# Patient Record
Sex: MALE | Race: WHITE | NOT HISPANIC OR LATINO | Employment: UNEMPLOYED | URBAN - METROPOLITAN AREA
[De-identification: names, ages, dates, MRNs, and addresses within clinical notes are randomized per-mention and may not be internally consistent; named-entity substitution may affect disease eponyms.]

---

## 2019-11-25 ENCOUNTER — HOSPITAL ENCOUNTER (INPATIENT)
Facility: HOSPITAL | Age: 64
LOS: 4 days | Discharge: HOME | End: 2019-11-29
Attending: EMERGENCY MEDICINE | Admitting: INTERNAL MEDICINE

## 2019-11-25 ENCOUNTER — APPOINTMENT (OUTPATIENT)
Dept: GENERAL RADIOLOGY | Facility: HOSPITAL | Age: 64
End: 2019-11-25

## 2019-11-25 ENCOUNTER — APPOINTMENT (OUTPATIENT)
Dept: CT IMAGING | Facility: HOSPITAL | Age: 64
End: 2019-11-25

## 2019-11-25 DIAGNOSIS — I63.9 CEREBROVASCULAR ACCIDENT (CVA), UNSPECIFIED MECHANISM (HCC): Primary | ICD-10-CM

## 2019-11-25 LAB
ABO GROUP BLD: NORMAL
ALBUMIN SERPL-MCNC: 4.2 G/DL (ref 3.5–5.2)
ALBUMIN/GLOB SERPL: 1.5 G/DL
ALP SERPL-CCNC: 77 U/L (ref 39–117)
ALT SERPL W P-5'-P-CCNC: 18 U/L (ref 1–41)
ANION GAP SERPL CALCULATED.3IONS-SCNC: 11 MMOL/L (ref 5–15)
APTT PPP: 24.1 SECONDS (ref 24–31)
AST SERPL-CCNC: 17 U/L (ref 1–40)
BASOPHILS # BLD AUTO: 0 10*3/MM3 (ref 0–0.2)
BASOPHILS NFR BLD AUTO: 0.6 % (ref 0–1.5)
BILIRUB SERPL-MCNC: 0.4 MG/DL (ref 0.2–1.2)
BLD GP AB SCN SERPL QL: NEGATIVE
BUN BLD-MCNC: 16 MG/DL (ref 8–23)
BUN/CREAT SERPL: 13.1 (ref 7–25)
CALCIUM SPEC-SCNC: 9.1 MG/DL (ref 8.6–10.5)
CHLORIDE SERPL-SCNC: 96 MMOL/L (ref 98–107)
CO2 SERPL-SCNC: 29 MMOL/L (ref 22–29)
CREAT BLD-MCNC: 1.22 MG/DL (ref 0.76–1.27)
DEPRECATED RDW RBC AUTO: 43.3 FL (ref 37–54)
EOSINOPHIL # BLD AUTO: 0.2 10*3/MM3 (ref 0–0.4)
EOSINOPHIL NFR BLD AUTO: 2.7 % (ref 0.3–6.2)
ERYTHROCYTE [DISTWIDTH] IN BLOOD BY AUTOMATED COUNT: 13.9 % (ref 12.3–15.4)
GFR SERPL CREATININE-BSD FRML MDRD: 60 ML/MIN/1.73
GLOBULIN UR ELPH-MCNC: 2.8 GM/DL
GLUCOSE BLD-MCNC: 283 MG/DL (ref 65–99)
GLUCOSE BLDC GLUCOMTR-MCNC: 165 MG/DL (ref 70–105)
GLUCOSE BLDC GLUCOMTR-MCNC: 275 MG/DL (ref 70–105)
HCT VFR BLD AUTO: 43.9 % (ref 37.5–51)
HGB BLD-MCNC: 14.9 G/DL (ref 13–17.7)
HOLD SPECIMEN: NORMAL
HOLD SPECIMEN: NORMAL
INR PPP: 0.99 (ref 0.9–1.1)
LARGE PLATELETS: NORMAL
LYMPHOCYTES # BLD AUTO: 2.9 10*3/MM3 (ref 0.7–3.1)
LYMPHOCYTES NFR BLD AUTO: 40.3 % (ref 19.6–45.3)
MCH RBC QN AUTO: 30.5 PG (ref 26.6–33)
MCHC RBC AUTO-ENTMCNC: 34 G/DL (ref 31.5–35.7)
MCV RBC AUTO: 89.7 FL (ref 79–97)
MONOCYTES # BLD AUTO: 0.5 10*3/MM3 (ref 0.1–0.9)
MONOCYTES NFR BLD AUTO: 6.6 % (ref 5–12)
NEUTROPHILS # BLD AUTO: 3.6 10*3/MM3 (ref 1.7–7)
NEUTROPHILS NFR BLD AUTO: 49.8 % (ref 42.7–76)
NRBC BLD AUTO-RTO: 0.1 /100 WBC (ref 0–0.2)
PLATELET # BLD AUTO: 167 10*3/MM3 (ref 140–450)
PMV BLD AUTO: 11.6 FL (ref 6–12)
POTASSIUM BLD-SCNC: 4.3 MMOL/L (ref 3.5–5.2)
PROT SERPL-MCNC: 7 G/DL (ref 6–8.5)
PROTHROMBIN TIME: 10.4 SECONDS (ref 9.6–11.7)
RBC # BLD AUTO: 4.89 10*6/MM3 (ref 4.14–5.8)
RBC MORPH BLD: NORMAL
RH BLD: POSITIVE
SODIUM BLD-SCNC: 136 MMOL/L (ref 136–145)
T&S EXPIRATION DATE: NORMAL
TROPONIN T SERPL-MCNC: <0.01 NG/ML (ref 0–0.03)
WBC MORPH BLD: NORMAL
WBC NRBC COR # BLD: 7.1 10*3/MM3 (ref 3.4–10.8)
WHOLE BLOOD HOLD SPECIMEN: NORMAL
WHOLE BLOOD HOLD SPECIMEN: NORMAL

## 2019-11-25 PROCEDURE — 70498 CT ANGIOGRAPHY NECK: CPT

## 2019-11-25 PROCEDURE — 84484 ASSAY OF TROPONIN QUANT: CPT | Performed by: EMERGENCY MEDICINE

## 2019-11-25 PROCEDURE — 85025 COMPLETE CBC W/AUTO DIFF WBC: CPT | Performed by: EMERGENCY MEDICINE

## 2019-11-25 PROCEDURE — 86900 BLOOD TYPING SEROLOGIC ABO: CPT | Performed by: EMERGENCY MEDICINE

## 2019-11-25 PROCEDURE — 71045 X-RAY EXAM CHEST 1 VIEW: CPT

## 2019-11-25 PROCEDURE — 85730 THROMBOPLASTIN TIME PARTIAL: CPT | Performed by: EMERGENCY MEDICINE

## 2019-11-25 PROCEDURE — 3E03317 INTRODUCTION OF OTHER THROMBOLYTIC INTO PERIPHERAL VEIN, PERCUTANEOUS APPROACH: ICD-10-PCS | Performed by: INTERNAL MEDICINE

## 2019-11-25 PROCEDURE — 93005 ELECTROCARDIOGRAM TRACING: CPT | Performed by: EMERGENCY MEDICINE

## 2019-11-25 PROCEDURE — 86900 BLOOD TYPING SEROLOGIC ABO: CPT

## 2019-11-25 PROCEDURE — 86850 RBC ANTIBODY SCREEN: CPT | Performed by: EMERGENCY MEDICINE

## 2019-11-25 PROCEDURE — 99285 EMERGENCY DEPT VISIT HI MDM: CPT

## 2019-11-25 PROCEDURE — 85007 BL SMEAR W/DIFF WBC COUNT: CPT | Performed by: EMERGENCY MEDICINE

## 2019-11-25 PROCEDURE — 80053 COMPREHEN METABOLIC PANEL: CPT | Performed by: EMERGENCY MEDICINE

## 2019-11-25 PROCEDURE — 25010000002 ALTEPLASE PER 1 MG: Performed by: EMERGENCY MEDICINE

## 2019-11-25 PROCEDURE — 70450 CT HEAD/BRAIN W/O DYE: CPT

## 2019-11-25 PROCEDURE — 82962 GLUCOSE BLOOD TEST: CPT

## 2019-11-25 PROCEDURE — 93005 ELECTROCARDIOGRAM TRACING: CPT | Performed by: INTERNAL MEDICINE

## 2019-11-25 PROCEDURE — 85610 PROTHROMBIN TIME: CPT | Performed by: EMERGENCY MEDICINE

## 2019-11-25 PROCEDURE — 0 IOPAMIDOL PER 1 ML: Performed by: EMERGENCY MEDICINE

## 2019-11-25 PROCEDURE — 86901 BLOOD TYPING SEROLOGIC RH(D): CPT | Performed by: EMERGENCY MEDICINE

## 2019-11-25 PROCEDURE — 86901 BLOOD TYPING SEROLOGIC RH(D): CPT

## 2019-11-25 PROCEDURE — 70496 CT ANGIOGRAPHY HEAD: CPT

## 2019-11-25 PROCEDURE — 82607 VITAMIN B-12: CPT | Performed by: NURSE PRACTITIONER

## 2019-11-25 RX ORDER — SODIUM CHLORIDE 0.9 % (FLUSH) 0.9 %
10 SYRINGE (ML) INJECTION EVERY 12 HOURS SCHEDULED
Status: DISCONTINUED | OUTPATIENT
Start: 2019-11-25 | End: 2019-11-29 | Stop reason: HOSPADM

## 2019-11-25 RX ORDER — LABETALOL HYDROCHLORIDE 5 MG/ML
10 INJECTION, SOLUTION INTRAVENOUS EVERY 6 HOURS PRN
Status: DISCONTINUED | OUTPATIENT
Start: 2019-11-25 | End: 2019-11-29 | Stop reason: HOSPADM

## 2019-11-25 RX ORDER — FAMOTIDINE 10 MG/ML
20 INJECTION, SOLUTION INTRAVENOUS EVERY 12 HOURS SCHEDULED
Status: DISCONTINUED | OUTPATIENT
Start: 2019-11-25 | End: 2019-11-29 | Stop reason: HOSPADM

## 2019-11-25 RX ORDER — DIAZEPAM 5 MG/1
5 TABLET ORAL DAILY PRN
COMMUNITY
End: 2019-11-29 | Stop reason: HOSPADM

## 2019-11-25 RX ORDER — HYDROCODONE BITARTRATE AND ACETAMINOPHEN 10; 325 MG/1; MG/1
1 TABLET ORAL 2 TIMES DAILY PRN
COMMUNITY
End: 2019-12-17 | Stop reason: SDUPTHER

## 2019-11-25 RX ORDER — PHENTERMINE HYDROCHLORIDE 37.5 MG/1
37.5 TABLET ORAL
COMMUNITY
End: 2019-11-29 | Stop reason: HOSPADM

## 2019-11-25 RX ORDER — SODIUM CHLORIDE 9 MG/ML
100 INJECTION, SOLUTION INTRAVENOUS ONCE
Status: COMPLETED | OUTPATIENT
Start: 2019-11-25 | End: 2019-11-25

## 2019-11-25 RX ORDER — ATORVASTATIN CALCIUM 40 MG/1
80 TABLET, FILM COATED ORAL NIGHTLY
Status: DISCONTINUED | OUTPATIENT
Start: 2019-11-26 | End: 2019-11-29 | Stop reason: HOSPADM

## 2019-11-25 RX ORDER — SODIUM CHLORIDE 0.9 % (FLUSH) 0.9 %
10 SYRINGE (ML) INJECTION AS NEEDED
Status: DISCONTINUED | OUTPATIENT
Start: 2019-11-25 | End: 2019-11-27 | Stop reason: SDUPTHER

## 2019-11-25 RX ORDER — ASPIRIN 300 MG/1
300 SUPPOSITORY RECTAL DAILY
Status: DISCONTINUED | OUTPATIENT
Start: 2019-11-26 | End: 2019-11-29 | Stop reason: HOSPADM

## 2019-11-25 RX ORDER — ACETAMINOPHEN 325 MG/1
650 TABLET ORAL ONCE
Status: COMPLETED | OUTPATIENT
Start: 2019-11-25 | End: 2019-11-25

## 2019-11-25 RX ORDER — SODIUM CHLORIDE 0.9 % (FLUSH) 0.9 %
10 SYRINGE (ML) INJECTION AS NEEDED
Status: DISCONTINUED | OUTPATIENT
Start: 2019-11-25 | End: 2019-11-29 | Stop reason: HOSPADM

## 2019-11-25 RX ORDER — MECLIZINE HYDROCHLORIDE 25 MG/1
25 TABLET ORAL 3 TIMES DAILY PRN
COMMUNITY
End: 2019-11-29 | Stop reason: HOSPADM

## 2019-11-25 RX ORDER — PREGABALIN 75 MG/1
75 CAPSULE ORAL 2 TIMES DAILY
COMMUNITY
End: 2019-12-17 | Stop reason: SDUPTHER

## 2019-11-25 RX ORDER — ASPIRIN 325 MG
325 TABLET ORAL DAILY
Status: DISCONTINUED | OUTPATIENT
Start: 2019-11-26 | End: 2019-11-29 | Stop reason: HOSPADM

## 2019-11-25 RX ADMIN — ACETAMINOPHEN 650 MG: 325 TABLET, FILM COATED ORAL at 21:47

## 2019-11-25 RX ADMIN — FAMOTIDINE 20 MG: 10 INJECTION, SOLUTION INTRAVENOUS at 21:47

## 2019-11-25 RX ADMIN — SODIUM CHLORIDE 100 ML: 9 INJECTION, SOLUTION INTRAVENOUS at 19:16

## 2019-11-25 RX ADMIN — Medication 10 ML: at 21:47

## 2019-11-25 RX ADMIN — ALTEPLASE 81 MG: KIT at 16:48

## 2019-11-25 RX ADMIN — IOPAMIDOL 100 ML: 755 INJECTION, SOLUTION INTRAVENOUS at 16:56

## 2019-11-26 ENCOUNTER — APPOINTMENT (OUTPATIENT)
Dept: MRI IMAGING | Facility: HOSPITAL | Age: 64
End: 2019-11-26

## 2019-11-26 ENCOUNTER — APPOINTMENT (OUTPATIENT)
Dept: CT IMAGING | Facility: HOSPITAL | Age: 64
End: 2019-11-26

## 2019-11-26 ENCOUNTER — HOSPITAL ENCOUNTER (INPATIENT)
Dept: CARDIOLOGY | Facility: HOSPITAL | Age: 64
Discharge: HOME | End: 2019-11-26

## 2019-11-26 PROBLEM — R42 VERTIGO, INTERMITTENT: Chronic | Status: ACTIVE | Noted: 2019-11-26

## 2019-11-26 PROBLEM — G81.91 ACUTE RIGHT HEMIPARESIS: Status: ACTIVE | Noted: 2019-11-26

## 2019-11-26 PROBLEM — R47.1 DYSARTHRIA: Status: ACTIVE | Noted: 2019-11-25

## 2019-11-26 PROBLEM — E78.2 MIXED HYPERLIPIDEMIA: Status: ACTIVE | Noted: 2019-11-26

## 2019-11-26 PROBLEM — F33.1 MAJOR DEPRESSIVE DISORDER, RECURRENT EPISODE, MODERATE DEGREE (HCC): Status: ACTIVE | Noted: 2019-11-26

## 2019-11-26 PROBLEM — E11.9 TYPE 2 DIABETES MELLITUS (HCC): Chronic | Status: ACTIVE | Noted: 2019-11-26

## 2019-11-26 LAB
ALBUMIN SERPL-MCNC: 3.6 G/DL (ref 3.5–5.2)
ALBUMIN/GLOB SERPL: 1.3 G/DL
ALP SERPL-CCNC: 71 U/L (ref 39–117)
ALT SERPL W P-5'-P-CCNC: 16 U/L (ref 1–41)
ANION GAP SERPL CALCULATED.3IONS-SCNC: 9 MMOL/L (ref 5–15)
APTT PPP: 24.8 SECONDS (ref 24–31)
AST SERPL-CCNC: 13 U/L (ref 1–40)
BH CV ECHO MEAS - ACS: 2.3 CM
BH CV ECHO MEAS - AO MAX PG (FULL): 1.3 MMHG
BH CV ECHO MEAS - AO MAX PG: 3.9 MMHG
BH CV ECHO MEAS - AO MEAN PG (FULL): 0.94 MMHG
BH CV ECHO MEAS - AO MEAN PG: 2.4 MMHG
BH CV ECHO MEAS - AO ROOT AREA (BSA CORRECTED): 1.6
BH CV ECHO MEAS - AO ROOT AREA: 11 CM^2
BH CV ECHO MEAS - AO ROOT DIAM: 3.7 CM
BH CV ECHO MEAS - AO V2 MAX: 99.2 CM/SEC
BH CV ECHO MEAS - AO V2 MEAN: 75.3 CM/SEC
BH CV ECHO MEAS - AO V2 VTI: 20.2 CM
BH CV ECHO MEAS - AORTIC HR: 223.6 BPM
BH CV ECHO MEAS - AORTIC R-R: 0.27 SEC
BH CV ECHO MEAS - ASC AORTA: 3 CM
BH CV ECHO MEAS - AVA(I,A): 2.9 CM^2
BH CV ECHO MEAS - AVA(I,D): 2.9 CM^2
BH CV ECHO MEAS - AVA(V,A): 3.1 CM^2
BH CV ECHO MEAS - AVA(V,D): 3.1 CM^2
BH CV ECHO MEAS - BSA(HAYCOCK): 2.4 M^2
BH CV ECHO MEAS - BSA: 2.3 M^2
BH CV ECHO MEAS - BZI_BMI: 39.1 KILOGRAMS/M^2
BH CV ECHO MEAS - BZI_METRIC_HEIGHT: 172.7 CM
BH CV ECHO MEAS - BZI_METRIC_WEIGHT: 116.6 KG
BH CV ECHO MEAS - CI(AO): 21.8 L/MIN/M^2
BH CV ECHO MEAS - CI(LVOT): 5.8 L/MIN/M^2
BH CV ECHO MEAS - CO(AO): 49.6 L/MIN
BH CV ECHO MEAS - CO(LVOT): 13.2 L/MIN
BH CV ECHO MEAS - EDV(CUBED): 59.1 ML
BH CV ECHO MEAS - EDV(MOD-SP2): 89.6 ML
BH CV ECHO MEAS - EDV(MOD-SP4): 79.5 ML
BH CV ECHO MEAS - EDV(TEICH): 65.7 ML
BH CV ECHO MEAS - EF(CUBED): 53.9 %
BH CV ECHO MEAS - EF(MOD-BP): 53 %
BH CV ECHO MEAS - EF(MOD-SP2): 57.3 %
BH CV ECHO MEAS - EF(MOD-SP4): 46.6 %
BH CV ECHO MEAS - EF(TEICH): 46.4 %
BH CV ECHO MEAS - ESV(CUBED): 27.2 ML
BH CV ECHO MEAS - ESV(MOD-SP2): 38.2 ML
BH CV ECHO MEAS - ESV(MOD-SP4): 42.5 ML
BH CV ECHO MEAS - ESV(TEICH): 35.2 ML
BH CV ECHO MEAS - FS: 22.8 %
BH CV ECHO MEAS - IVS/LVPW: 1
BH CV ECHO MEAS - IVSD: 1.2 CM
BH CV ECHO MEAS - LA DIMENSION(2D): 3 CM
BH CV ECHO MEAS - LA DIMENSION: 4 CM
BH CV ECHO MEAS - LA/AO: 1.1
BH CV ECHO MEAS - LV DIASTOLIC VOL/BSA (35-75): 34.9 ML/M^2
BH CV ECHO MEAS - LV MASS(C)D: 163.7 GRAMS
BH CV ECHO MEAS - LV MASS(C)DI: 72 GRAMS/M^2
BH CV ECHO MEAS - LV MAX PG: 2.6 MMHG
BH CV ECHO MEAS - LV MEAN PG: 1.4 MMHG
BH CV ECHO MEAS - LV SYSTOLIC VOL/BSA (12-30): 18.7 ML/M^2
BH CV ECHO MEAS - LV V1 MAX: 80.9 CM/SEC
BH CV ECHO MEAS - LV V1 MEAN: 56.9 CM/SEC
BH CV ECHO MEAS - LV V1 VTI: 15.4 CM
BH CV ECHO MEAS - LVIDD: 3.9 CM
BH CV ECHO MEAS - LVIDS: 3 CM
BH CV ECHO MEAS - LVOT AREA: 3.8 CM^2
BH CV ECHO MEAS - LVOT DIAM: 2.2 CM
BH CV ECHO MEAS - LVPWD: 1.2 CM
BH CV ECHO MEAS - MV A MAX VEL: 77.3 CM/SEC
BH CV ECHO MEAS - MV DEC SLOPE: 177.4 CM/SEC^2
BH CV ECHO MEAS - MV DEC TIME: 0.31 SEC
BH CV ECHO MEAS - MV E MAX VEL: 54.4 CM/SEC
BH CV ECHO MEAS - MV E/A: 0.7
BH CV ECHO MEAS - MV MAX PG: 3.8 MMHG
BH CV ECHO MEAS - MV MEAN PG: 1.4 MMHG
BH CV ECHO MEAS - MV V2 MAX: 98 CM/SEC
BH CV ECHO MEAS - MV V2 MEAN: 56.4 CM/SEC
BH CV ECHO MEAS - MV V2 VTI: 24.6 CM
BH CV ECHO MEAS - MVA(VTI): 2.4 CM^2
BH CV ECHO MEAS - PA ACC TIME: 0.06 SEC
BH CV ECHO MEAS - PA MAX PG (FULL): 1.1 MMHG
BH CV ECHO MEAS - PA MAX PG: 3.5 MMHG
BH CV ECHO MEAS - PA PR(ACCEL): 51.6 MMHG
BH CV ECHO MEAS - PA V2 MAX: 93.1 CM/SEC
BH CV ECHO MEAS - PULM A REVS VEL: 25.5 CM/SEC
BH CV ECHO MEAS - PULM DIAS VEL: 44.6 CM/SEC
BH CV ECHO MEAS - PULM S/D: 0.84
BH CV ECHO MEAS - PULM SYS VEL: 37.6 CM/SEC
BH CV ECHO MEAS - PVA(V,A): 7.6 CM^2
BH CV ECHO MEAS - PVA(V,D): 7.6 CM^2
BH CV ECHO MEAS - QP/QS: 1.8
BH CV ECHO MEAS - RAP SYSTOLE: 8 MMHG
BH CV ECHO MEAS - RV MAX PG: 2.4 MMHG
BH CV ECHO MEAS - RV MEAN PG: 1.3 MMHG
BH CV ECHO MEAS - RV V1 MAX: 77.6 CM/SEC
BH CV ECHO MEAS - RV V1 MEAN: 55.1 CM/SEC
BH CV ECHO MEAS - RV V1 VTI: 11.3 CM
BH CV ECHO MEAS - RVDD: 3.6 CM
BH CV ECHO MEAS - RVOT AREA: 9.1 CM^2
BH CV ECHO MEAS - RVOT DIAM: 3.4 CM
BH CV ECHO MEAS - RVSP: 16.9 MMHG
BH CV ECHO MEAS - SI(AO): 97.5 ML/M^2
BH CV ECHO MEAS - SI(CUBED): 14 ML/M^2
BH CV ECHO MEAS - SI(LVOT): 25.9 ML/M^2
BH CV ECHO MEAS - SI(MOD-SP2): 22.6 ML/M^2
BH CV ECHO MEAS - SI(MOD-SP4): 16.3 ML/M^2
BH CV ECHO MEAS - SI(TEICH): 13.4 ML/M^2
BH CV ECHO MEAS - SV(AO): 221.6 ML
BH CV ECHO MEAS - SV(CUBED): 31.9 ML
BH CV ECHO MEAS - SV(LVOT): 59 ML
BH CV ECHO MEAS - SV(MOD-SP2): 51.4 ML
BH CV ECHO MEAS - SV(MOD-SP4): 37 ML
BH CV ECHO MEAS - SV(RVOT): 103.2 ML
BH CV ECHO MEAS - SV(TEICH): 30.5 ML
BH CV ECHO MEAS - TR MAX VEL: 148.9 CM/SEC
BILIRUB SERPL-MCNC: 0.4 MG/DL (ref 0.2–1.2)
BUN BLD-MCNC: 16 MG/DL (ref 8–23)
BUN/CREAT SERPL: 17 (ref 7–25)
CALCIUM SPEC-SCNC: 8.8 MG/DL (ref 8.6–10.5)
CHLORIDE SERPL-SCNC: 102 MMOL/L (ref 98–107)
CHOLEST SERPL-MCNC: 209 MG/DL (ref 0–200)
CO2 SERPL-SCNC: 27 MMOL/L (ref 22–29)
CREAT BLD-MCNC: 0.94 MG/DL (ref 0.76–1.27)
DEPRECATED RDW RBC AUTO: 42.4 FL (ref 37–54)
ERYTHROCYTE [DISTWIDTH] IN BLOOD BY AUTOMATED COUNT: 13.5 % (ref 12.3–15.4)
GFR SERPL CREATININE-BSD FRML MDRD: 81 ML/MIN/1.73
GLOBULIN UR ELPH-MCNC: 2.8 GM/DL
GLUCOSE BLD-MCNC: 121 MG/DL (ref 65–99)
GLUCOSE BLDC GLUCOMTR-MCNC: 115 MG/DL (ref 70–105)
GLUCOSE BLDC GLUCOMTR-MCNC: 154 MG/DL (ref 70–105)
GLUCOSE BLDC GLUCOMTR-MCNC: 220 MG/DL (ref 70–105)
GLUCOSE BLDC GLUCOMTR-MCNC: 98 MG/DL (ref 70–105)
HBA1C MFR BLD: 9.6 % (ref 3.5–5.6)
HCT VFR BLD AUTO: 42 % (ref 37.5–51)
HDLC SERPL-MCNC: 44 MG/DL (ref 40–60)
HGB BLD-MCNC: 14.5 G/DL (ref 13–17.7)
INR PPP: 1 (ref 0.9–1.1)
LDLC SERPL CALC-MCNC: 136 MG/DL (ref 0–100)
LDLC/HDLC SERPL: 3.08 {RATIO}
LV EF 2D ECHO EST: 55 %
MCH RBC QN AUTO: 30.9 PG (ref 26.6–33)
MCHC RBC AUTO-ENTMCNC: 34.4 G/DL (ref 31.5–35.7)
MCV RBC AUTO: 89.7 FL (ref 79–97)
PLATELET # BLD AUTO: 126 10*3/MM3 (ref 140–450)
PMV BLD AUTO: 11 FL (ref 6–12)
POTASSIUM BLD-SCNC: 3.7 MMOL/L (ref 3.5–5.2)
PROT SERPL-MCNC: 6.4 G/DL (ref 6–8.5)
PROTHROMBIN TIME: 10.5 SECONDS (ref 9.6–11.7)
RBC # BLD AUTO: 4.69 10*6/MM3 (ref 4.14–5.8)
SODIUM BLD-SCNC: 138 MMOL/L (ref 136–145)
TRIGL SERPL-MCNC: 147 MG/DL (ref 0–150)
TSH SERPL DL<=0.05 MIU/L-ACNC: 2.64 UIU/ML (ref 0.27–4.2)
VIT B12 BLD-MCNC: 570 PG/ML (ref 211–946)
VLDLC SERPL-MCNC: 29.4 MG/DL
WBC NRBC COR # BLD: 7 10*3/MM3 (ref 3.4–10.8)

## 2019-11-26 PROCEDURE — 99222 1ST HOSP IP/OBS MODERATE 55: CPT | Performed by: PSYCHIATRY & NEUROLOGY

## 2019-11-26 PROCEDURE — 70450 CT HEAD/BRAIN W/O DYE: CPT

## 2019-11-26 PROCEDURE — 80053 COMPREHEN METABOLIC PANEL: CPT | Performed by: NURSE PRACTITIONER

## 2019-11-26 PROCEDURE — 25010000002 MORPHINE PER 10 MG: Performed by: NURSE PRACTITIONER

## 2019-11-26 PROCEDURE — 63710000001 INSULIN LISPRO (HUMAN) PER 5 UNITS: Performed by: INTERNAL MEDICINE

## 2019-11-26 PROCEDURE — 85027 COMPLETE CBC AUTOMATED: CPT | Performed by: NURSE PRACTITIONER

## 2019-11-26 PROCEDURE — 80061 LIPID PANEL: CPT | Performed by: NURSE PRACTITIONER

## 2019-11-26 PROCEDURE — 85610 PROTHROMBIN TIME: CPT | Performed by: NURSE PRACTITIONER

## 2019-11-26 PROCEDURE — 93306 TTE W/DOPPLER COMPLETE: CPT

## 2019-11-26 PROCEDURE — 70551 MRI BRAIN STEM W/O DYE: CPT

## 2019-11-26 PROCEDURE — 84443 ASSAY THYROID STIM HORMONE: CPT | Performed by: NURSE PRACTITIONER

## 2019-11-26 PROCEDURE — 99223 1ST HOSP IP/OBS HIGH 75: CPT | Performed by: NURSE PRACTITIONER

## 2019-11-26 PROCEDURE — 85730 THROMBOPLASTIN TIME PARTIAL: CPT | Performed by: NURSE PRACTITIONER

## 2019-11-26 PROCEDURE — 99222 1ST HOSP IP/OBS MODERATE 55: CPT | Performed by: INTERNAL MEDICINE

## 2019-11-26 PROCEDURE — 82962 GLUCOSE BLOOD TEST: CPT

## 2019-11-26 PROCEDURE — 83036 HEMOGLOBIN GLYCOSYLATED A1C: CPT | Performed by: NURSE PRACTITIONER

## 2019-11-26 PROCEDURE — 93306 TTE W/DOPPLER COMPLETE: CPT | Performed by: INTERNAL MEDICINE

## 2019-11-26 RX ORDER — MORPHINE SULFATE 4 MG/ML
2 INJECTION, SOLUTION INTRAMUSCULAR; INTRAVENOUS ONCE
Status: COMPLETED | OUTPATIENT
Start: 2019-11-26 | End: 2019-11-26

## 2019-11-26 RX ORDER — HYDROCODONE BITARTRATE AND ACETAMINOPHEN 7.5; 325 MG/1; MG/1
1 TABLET ORAL ONCE AS NEEDED
Status: COMPLETED | OUTPATIENT
Start: 2019-11-26 | End: 2019-11-26

## 2019-11-26 RX ORDER — NICOTINE POLACRILEX 4 MG
15 LOZENGE BUCCAL
Status: DISCONTINUED | OUTPATIENT
Start: 2019-11-26 | End: 2019-11-29 | Stop reason: HOSPADM

## 2019-11-26 RX ORDER — DEXTROSE MONOHYDRATE 25 G/50ML
25 INJECTION, SOLUTION INTRAVENOUS
Status: DISCONTINUED | OUTPATIENT
Start: 2019-11-26 | End: 2019-11-29 | Stop reason: HOSPADM

## 2019-11-26 RX ORDER — HYDROCODONE BITARTRATE AND ACETAMINOPHEN 10; 325 MG/1; MG/1
1 TABLET ORAL 2 TIMES DAILY PRN
Status: DISCONTINUED | OUTPATIENT
Start: 2019-11-26 | End: 2019-11-29 | Stop reason: HOSPADM

## 2019-11-26 RX ADMIN — HYDROCODONE BITARTRATE AND ACETAMINOPHEN 1 TABLET: 10; 325 TABLET ORAL at 10:59

## 2019-11-26 RX ADMIN — HYDROCODONE BITARTRATE AND ACETAMINOPHEN 1 TABLET: 10; 325 TABLET ORAL at 20:25

## 2019-11-26 RX ADMIN — MORPHINE SULFATE 2 MG: 4 INJECTION INTRAVENOUS at 01:48

## 2019-11-26 RX ADMIN — Medication 10 ML: at 20:24

## 2019-11-26 RX ADMIN — ASPIRIN 325 MG ORAL TABLET 325 MG: 325 PILL ORAL at 20:24

## 2019-11-26 RX ADMIN — INSULIN LISPRO 3 UNITS: 100 INJECTION, SOLUTION INTRAVENOUS; SUBCUTANEOUS at 17:48

## 2019-11-26 RX ADMIN — HYDROCODONE BITARTRATE AND ACETAMINOPHEN 1 TABLET: 7.5; 325 TABLET ORAL at 05:10

## 2019-11-26 RX ADMIN — FAMOTIDINE 20 MG: 10 INJECTION, SOLUTION INTRAVENOUS at 20:24

## 2019-11-26 RX ADMIN — INSULIN LISPRO 2 UNITS: 100 INJECTION, SOLUTION INTRAVENOUS; SUBCUTANEOUS at 21:41

## 2019-11-26 RX ADMIN — Medication 10 ML: at 08:14

## 2019-11-26 RX ADMIN — FAMOTIDINE 20 MG: 10 INJECTION, SOLUTION INTRAVENOUS at 08:14

## 2019-11-26 RX ADMIN — ATORVASTATIN CALCIUM 80 MG: 40 TABLET, FILM COATED ORAL at 20:24

## 2019-11-27 DIAGNOSIS — G47.33 OSA (OBSTRUCTIVE SLEEP APNEA): Primary | ICD-10-CM

## 2019-11-27 PROBLEM — E11.42 DIABETIC POLYNEUROPATHY ASSOCIATED WITH TYPE 2 DIABETES MELLITUS (HCC): Status: ACTIVE | Noted: 2019-11-27

## 2019-11-27 LAB
ALBUMIN SERPL-MCNC: 3.5 G/DL (ref 3.5–5.2)
ALBUMIN/GLOB SERPL: 1.3 G/DL
ALP SERPL-CCNC: 68 U/L (ref 39–117)
ALT SERPL W P-5'-P-CCNC: 13 U/L (ref 1–41)
ANION GAP SERPL CALCULATED.3IONS-SCNC: 10 MMOL/L (ref 5–15)
APTT PPP: 24.7 SECONDS (ref 24–31)
AST SERPL-CCNC: 12 U/L (ref 1–40)
BILIRUB SERPL-MCNC: 0.3 MG/DL (ref 0.2–1.2)
BUN BLD-MCNC: 15 MG/DL (ref 8–23)
BUN/CREAT SERPL: 15.3 (ref 7–25)
CALCIUM SPEC-SCNC: 8.7 MG/DL (ref 8.6–10.5)
CHLORIDE SERPL-SCNC: 103 MMOL/L (ref 98–107)
CO2 SERPL-SCNC: 25 MMOL/L (ref 22–29)
CREAT BLD-MCNC: 0.98 MG/DL (ref 0.76–1.27)
CRP SERPL-MCNC: 0.22 MG/DL (ref 0–0.5)
D DIMER PPP FEU-MCNC: 0.42 MCGFEU/ML (ref 0.17–0.59)
DEPRECATED RDW RBC AUTO: 44.2 FL (ref 37–54)
ERYTHROCYTE [DISTWIDTH] IN BLOOD BY AUTOMATED COUNT: 14 % (ref 12.3–15.4)
ERYTHROCYTE [SEDIMENTATION RATE] IN BLOOD: 10 MM/HR (ref 0–20)
GFR SERPL CREATININE-BSD FRML MDRD: 77 ML/MIN/1.73
GLOBULIN UR ELPH-MCNC: 2.6 GM/DL
GLUCOSE BLD-MCNC: 139 MG/DL (ref 65–99)
GLUCOSE BLDC GLUCOMTR-MCNC: 130 MG/DL (ref 70–105)
GLUCOSE BLDC GLUCOMTR-MCNC: 142 MG/DL (ref 70–105)
GLUCOSE BLDC GLUCOMTR-MCNC: 203 MG/DL (ref 70–105)
GLUCOSE BLDC GLUCOMTR-MCNC: 259 MG/DL (ref 70–105)
HCT VFR BLD AUTO: 41 % (ref 37.5–51)
HGB BLD-MCNC: 14.1 G/DL (ref 13–17.7)
INR PPP: 0.97 (ref 0.9–1.1)
MCH RBC QN AUTO: 30.9 PG (ref 26.6–33)
MCHC RBC AUTO-ENTMCNC: 34.3 G/DL (ref 31.5–35.7)
MCV RBC AUTO: 90.2 FL (ref 79–97)
PLATELET # BLD AUTO: 126 10*3/MM3 (ref 140–450)
PMV BLD AUTO: 11.4 FL (ref 6–12)
POTASSIUM BLD-SCNC: 4 MMOL/L (ref 3.5–5.2)
PROT SERPL-MCNC: 6.1 G/DL (ref 6–8.5)
PROTHROMBIN TIME: 10.3 SECONDS (ref 9.6–11.7)
RBC # BLD AUTO: 4.55 10*6/MM3 (ref 4.14–5.8)
SODIUM BLD-SCNC: 138 MMOL/L (ref 136–145)
WBC NRBC COR # BLD: 5.8 10*3/MM3 (ref 3.4–10.8)

## 2019-11-27 PROCEDURE — 85027 COMPLETE CBC AUTOMATED: CPT | Performed by: NURSE PRACTITIONER

## 2019-11-27 PROCEDURE — 92523 SPEECH SOUND LANG COMPREHEN: CPT

## 2019-11-27 PROCEDURE — 99221 1ST HOSP IP/OBS SF/LOW 40: CPT | Performed by: INTERNAL MEDICINE

## 2019-11-27 PROCEDURE — 99232 SBSQ HOSP IP/OBS MODERATE 35: CPT | Performed by: PSYCHIATRY & NEUROLOGY

## 2019-11-27 PROCEDURE — 63710000001 INSULIN LISPRO (HUMAN) PER 5 UNITS: Performed by: INTERNAL MEDICINE

## 2019-11-27 PROCEDURE — 86038 ANTINUCLEAR ANTIBODIES: CPT | Performed by: INTERNAL MEDICINE

## 2019-11-27 PROCEDURE — 97162 PT EVAL MOD COMPLEX 30 MIN: CPT

## 2019-11-27 PROCEDURE — 80053 COMPREHEN METABOLIC PANEL: CPT | Performed by: NURSE PRACTITIONER

## 2019-11-27 PROCEDURE — 82962 GLUCOSE BLOOD TEST: CPT

## 2019-11-27 PROCEDURE — 85730 THROMBOPLASTIN TIME PARTIAL: CPT | Performed by: NURSE PRACTITIONER

## 2019-11-27 PROCEDURE — 99232 SBSQ HOSP IP/OBS MODERATE 35: CPT | Performed by: NURSE PRACTITIONER

## 2019-11-27 PROCEDURE — 97166 OT EVAL MOD COMPLEX 45 MIN: CPT

## 2019-11-27 PROCEDURE — G0108 DIAB MANAGE TRN  PER INDIV: HCPCS

## 2019-11-27 PROCEDURE — 99232 SBSQ HOSP IP/OBS MODERATE 35: CPT | Performed by: INTERNAL MEDICINE

## 2019-11-27 PROCEDURE — 25010000002 ENOXAPARIN PER 10 MG: Performed by: INTERNAL MEDICINE

## 2019-11-27 PROCEDURE — 85379 FIBRIN DEGRADATION QUANT: CPT | Performed by: INTERNAL MEDICINE

## 2019-11-27 PROCEDURE — 97530 THERAPEUTIC ACTIVITIES: CPT

## 2019-11-27 PROCEDURE — 86140 C-REACTIVE PROTEIN: CPT | Performed by: INTERNAL MEDICINE

## 2019-11-27 PROCEDURE — 85610 PROTHROMBIN TIME: CPT | Performed by: NURSE PRACTITIONER

## 2019-11-27 PROCEDURE — 85652 RBC SED RATE AUTOMATED: CPT | Performed by: INTERNAL MEDICINE

## 2019-11-27 RX ORDER — PREGABALIN 75 MG/1
75 CAPSULE ORAL EVERY 12 HOURS SCHEDULED
Status: DISCONTINUED | OUTPATIENT
Start: 2019-11-27 | End: 2019-11-29 | Stop reason: HOSPADM

## 2019-11-27 RX ADMIN — ASPIRIN 325 MG ORAL TABLET 325 MG: 325 PILL ORAL at 08:15

## 2019-11-27 RX ADMIN — PREGABALIN 75 MG: 75 CAPSULE ORAL at 11:00

## 2019-11-27 RX ADMIN — HYDROCODONE BITARTRATE AND ACETAMINOPHEN 1 TABLET: 10; 325 TABLET ORAL at 17:24

## 2019-11-27 RX ADMIN — INSULIN LISPRO 3 UNITS: 100 INJECTION, SOLUTION INTRAVENOUS; SUBCUTANEOUS at 17:22

## 2019-11-27 RX ADMIN — FAMOTIDINE 20 MG: 10 INJECTION, SOLUTION INTRAVENOUS at 08:24

## 2019-11-27 RX ADMIN — ENOXAPARIN SODIUM 40 MG: 40 INJECTION SUBCUTANEOUS at 15:50

## 2019-11-27 RX ADMIN — SERTRALINE HYDROCHLORIDE 25 MG: 50 TABLET ORAL at 08:16

## 2019-11-27 RX ADMIN — INSULIN LISPRO 4 UNITS: 100 INJECTION, SOLUTION INTRAVENOUS; SUBCUTANEOUS at 21:18

## 2019-11-27 RX ADMIN — PREGABALIN 75 MG: 75 CAPSULE ORAL at 21:18

## 2019-11-27 RX ADMIN — ATORVASTATIN CALCIUM 80 MG: 40 TABLET, FILM COATED ORAL at 21:18

## 2019-11-27 RX ADMIN — FAMOTIDINE 20 MG: 10 INJECTION, SOLUTION INTRAVENOUS at 21:18

## 2019-11-27 RX ADMIN — Medication 10 ML: at 08:17

## 2019-11-27 RX ADMIN — Medication 10 ML: at 21:18

## 2019-11-27 RX ADMIN — HYDROCODONE BITARTRATE AND ACETAMINOPHEN 1 TABLET: 10; 325 TABLET ORAL at 08:24

## 2019-11-28 PROBLEM — G43.019 INTRACTABLE MIGRAINE WITHOUT AURA AND WITHOUT STATUS MIGRAINOSUS: Status: ACTIVE | Noted: 2019-11-28

## 2019-11-28 LAB
ALBUMIN SERPL-MCNC: 3.6 G/DL (ref 3.5–5.2)
ALBUMIN/GLOB SERPL: 1.4 G/DL
ALP SERPL-CCNC: 71 U/L (ref 39–117)
ALT SERPL W P-5'-P-CCNC: 12 U/L (ref 1–41)
ANION GAP SERPL CALCULATED.3IONS-SCNC: 11 MMOL/L (ref 5–15)
APTT PPP: 26.5 SECONDS (ref 24–31)
AST SERPL-CCNC: 11 U/L (ref 1–40)
BASOPHILS # BLD AUTO: 0 10*3/MM3 (ref 0–0.2)
BASOPHILS NFR BLD AUTO: 0.7 % (ref 0–1.5)
BILIRUB SERPL-MCNC: 0.4 MG/DL (ref 0.2–1.2)
BUN BLD-MCNC: 15 MG/DL (ref 8–23)
BUN/CREAT SERPL: 17.6 (ref 7–25)
CALCIUM SPEC-SCNC: 8.8 MG/DL (ref 8.6–10.5)
CHLORIDE SERPL-SCNC: 102 MMOL/L (ref 98–107)
CHOLEST SERPL-MCNC: 177 MG/DL (ref 0–200)
CK SERPL-CCNC: 59 U/L (ref 20–200)
CO2 SERPL-SCNC: 26 MMOL/L (ref 22–29)
CREAT BLD-MCNC: 0.85 MG/DL (ref 0.76–1.27)
CRP SERPL-MCNC: 0.21 MG/DL (ref 0–0.5)
DEPRECATED RDW RBC AUTO: 43.3 FL (ref 37–54)
EOSINOPHIL # BLD AUTO: 0.2 10*3/MM3 (ref 0–0.4)
EOSINOPHIL NFR BLD AUTO: 3.5 % (ref 0.3–6.2)
ERYTHROCYTE [DISTWIDTH] IN BLOOD BY AUTOMATED COUNT: 13.8 % (ref 12.3–15.4)
ERYTHROCYTE [SEDIMENTATION RATE] IN BLOOD: 15 MM/HR (ref 0–20)
FERRITIN SERPL-MCNC: 510.1 NG/ML (ref 30–400)
GFR SERPL CREATININE-BSD FRML MDRD: 91 ML/MIN/1.73
GLOBULIN UR ELPH-MCNC: 2.5 GM/DL
GLUCOSE BLD-MCNC: 140 MG/DL (ref 65–99)
GLUCOSE BLDC GLUCOMTR-MCNC: 137 MG/DL (ref 70–105)
GLUCOSE BLDC GLUCOMTR-MCNC: 161 MG/DL (ref 70–105)
GLUCOSE BLDC GLUCOMTR-MCNC: 214 MG/DL (ref 70–105)
GLUCOSE BLDC GLUCOMTR-MCNC: 306 MG/DL (ref 70–105)
HCT VFR BLD AUTO: 41.4 % (ref 37.5–51)
HDLC SERPL-MCNC: 42 MG/DL (ref 40–60)
HGB BLD-MCNC: 14 G/DL (ref 13–17.7)
INR PPP: 0.98 (ref 0.9–1.1)
LDLC SERPL CALC-MCNC: 112 MG/DL (ref 0–100)
LDLC/HDLC SERPL: 2.68 {RATIO}
LYMPHOCYTES # BLD AUTO: 2.7 10*3/MM3 (ref 0.7–3.1)
LYMPHOCYTES NFR BLD AUTO: 45.8 % (ref 19.6–45.3)
MAGNESIUM SERPL-MCNC: 1.9 MG/DL (ref 1.6–2.4)
MCH RBC QN AUTO: 30.2 PG (ref 26.6–33)
MCHC RBC AUTO-ENTMCNC: 33.9 G/DL (ref 31.5–35.7)
MCV RBC AUTO: 89.1 FL (ref 79–97)
MONOCYTES # BLD AUTO: 0.6 10*3/MM3 (ref 0.1–0.9)
MONOCYTES NFR BLD AUTO: 9.7 % (ref 5–12)
NEUTROPHILS # BLD AUTO: 2.4 10*3/MM3 (ref 1.7–7)
NEUTROPHILS NFR BLD AUTO: 40.3 % (ref 42.7–76)
NRBC BLD AUTO-RTO: 0.1 /100 WBC (ref 0–0.2)
PHOSPHATE SERPL-MCNC: 3.3 MG/DL (ref 2.5–4.5)
PLATELET # BLD AUTO: 138 10*3/MM3 (ref 140–450)
PMV BLD AUTO: 11.2 FL (ref 6–12)
POTASSIUM BLD-SCNC: 4.1 MMOL/L (ref 3.5–5.2)
PROT SERPL-MCNC: 6.1 G/DL (ref 6–8.5)
PROTHROMBIN TIME: 10.3 SECONDS (ref 9.6–11.7)
RBC # BLD AUTO: 4.65 10*6/MM3 (ref 4.14–5.8)
SODIUM BLD-SCNC: 139 MMOL/L (ref 136–145)
TRIGL SERPL-MCNC: 113 MG/DL (ref 0–150)
TROPONIN T SERPL-MCNC: <0.01 NG/ML (ref 0–0.03)
TSH SERPL DL<=0.05 MIU/L-ACNC: 2.13 UIU/ML (ref 0.27–4.2)
URATE SERPL-MCNC: 5.6 MG/DL (ref 3.4–7)
VIT B12 BLD-MCNC: 404 PG/ML (ref 211–946)
VLDLC SERPL-MCNC: 22.6 MG/DL
WBC NRBC COR # BLD: 5.9 10*3/MM3 (ref 3.4–10.8)

## 2019-11-28 PROCEDURE — 82607 VITAMIN B-12: CPT | Performed by: INTERNAL MEDICINE

## 2019-11-28 PROCEDURE — 63710000001 INSULIN LISPRO (HUMAN) PER 5 UNITS: Performed by: INTERNAL MEDICINE

## 2019-11-28 PROCEDURE — 82728 ASSAY OF FERRITIN: CPT | Performed by: INTERNAL MEDICINE

## 2019-11-28 PROCEDURE — 99222 1ST HOSP IP/OBS MODERATE 55: CPT | Performed by: INTERNAL MEDICINE

## 2019-11-28 PROCEDURE — 83735 ASSAY OF MAGNESIUM: CPT | Performed by: INTERNAL MEDICINE

## 2019-11-28 PROCEDURE — 99232 SBSQ HOSP IP/OBS MODERATE 35: CPT | Performed by: INTERNAL MEDICINE

## 2019-11-28 PROCEDURE — 82962 GLUCOSE BLOOD TEST: CPT

## 2019-11-28 PROCEDURE — 63710000001 INSULIN GLARGINE PER 5 UNITS: Performed by: INTERNAL MEDICINE

## 2019-11-28 PROCEDURE — 85610 PROTHROMBIN TIME: CPT | Performed by: NURSE PRACTITIONER

## 2019-11-28 PROCEDURE — 85652 RBC SED RATE AUTOMATED: CPT | Performed by: INTERNAL MEDICINE

## 2019-11-28 PROCEDURE — 85025 COMPLETE CBC W/AUTO DIFF WBC: CPT | Performed by: INTERNAL MEDICINE

## 2019-11-28 PROCEDURE — 25010000002 ENOXAPARIN PER 10 MG: Performed by: INTERNAL MEDICINE

## 2019-11-28 PROCEDURE — 84484 ASSAY OF TROPONIN QUANT: CPT | Performed by: INTERNAL MEDICINE

## 2019-11-28 PROCEDURE — 84443 ASSAY THYROID STIM HORMONE: CPT | Performed by: INTERNAL MEDICINE

## 2019-11-28 PROCEDURE — 84100 ASSAY OF PHOSPHORUS: CPT | Performed by: INTERNAL MEDICINE

## 2019-11-28 PROCEDURE — 80061 LIPID PANEL: CPT | Performed by: INTERNAL MEDICINE

## 2019-11-28 PROCEDURE — 84550 ASSAY OF BLOOD/URIC ACID: CPT | Performed by: INTERNAL MEDICINE

## 2019-11-28 PROCEDURE — 25010000002 KETOROLAC TROMETHAMINE PER 15 MG: Performed by: INTERNAL MEDICINE

## 2019-11-28 PROCEDURE — 85730 THROMBOPLASTIN TIME PARTIAL: CPT | Performed by: NURSE PRACTITIONER

## 2019-11-28 PROCEDURE — 80053 COMPREHEN METABOLIC PANEL: CPT | Performed by: NURSE PRACTITIONER

## 2019-11-28 PROCEDURE — 82550 ASSAY OF CK (CPK): CPT | Performed by: INTERNAL MEDICINE

## 2019-11-28 PROCEDURE — 86140 C-REACTIVE PROTEIN: CPT | Performed by: INTERNAL MEDICINE

## 2019-11-28 RX ORDER — INSULIN GLARGINE 100 [IU]/ML
30 INJECTION, SOLUTION SUBCUTANEOUS NIGHTLY
Status: DISCONTINUED | OUTPATIENT
Start: 2019-11-28 | End: 2019-11-29 | Stop reason: HOSPADM

## 2019-11-28 RX ORDER — KETOROLAC TROMETHAMINE 30 MG/ML
30 INJECTION, SOLUTION INTRAMUSCULAR; INTRAVENOUS EVERY 6 HOURS PRN
Status: DISCONTINUED | OUTPATIENT
Start: 2019-11-28 | End: 2019-11-29 | Stop reason: HOSPADM

## 2019-11-28 RX ORDER — BUTALBITAL, ACETAMINOPHEN AND CAFFEINE 50; 325; 40 MG/1; MG/1; MG/1
1 TABLET ORAL EVERY 4 HOURS PRN
Status: DISCONTINUED | OUTPATIENT
Start: 2019-11-28 | End: 2019-11-29 | Stop reason: HOSPADM

## 2019-11-28 RX ADMIN — SERTRALINE HYDROCHLORIDE 25 MG: 50 TABLET ORAL at 08:03

## 2019-11-28 RX ADMIN — INSULIN LISPRO 3 UNITS: 100 INJECTION, SOLUTION INTRAVENOUS; SUBCUTANEOUS at 11:16

## 2019-11-28 RX ADMIN — ASPIRIN 325 MG ORAL TABLET 325 MG: 325 PILL ORAL at 08:03

## 2019-11-28 RX ADMIN — HYDROCODONE BITARTRATE AND ACETAMINOPHEN 1 TABLET: 10; 325 TABLET ORAL at 06:28

## 2019-11-28 RX ADMIN — PREGABALIN 75 MG: 75 CAPSULE ORAL at 20:30

## 2019-11-28 RX ADMIN — Medication 10 ML: at 08:04

## 2019-11-28 RX ADMIN — FAMOTIDINE 20 MG: 10 INJECTION, SOLUTION INTRAVENOUS at 20:30

## 2019-11-28 RX ADMIN — INSULIN LISPRO 10 UNITS: 100 INJECTION, SOLUTION INTRAVENOUS; SUBCUTANEOUS at 17:21

## 2019-11-28 RX ADMIN — BUTALBITAL, ACETAMINOPHEN AND CAFFEINE 1 TABLET: 50; 325; 40 TABLET ORAL at 16:21

## 2019-11-28 RX ADMIN — PREGABALIN 75 MG: 75 CAPSULE ORAL at 08:03

## 2019-11-28 RX ADMIN — ENOXAPARIN SODIUM 40 MG: 40 INJECTION SUBCUTANEOUS at 16:22

## 2019-11-28 RX ADMIN — KETOROLAC TROMETHAMINE 30 MG: 30 INJECTION, SOLUTION INTRAMUSCULAR at 11:16

## 2019-11-28 RX ADMIN — Medication 10 ML: at 22:23

## 2019-11-28 RX ADMIN — BUTALBITAL, ACETAMINOPHEN AND CAFFEINE 1 TABLET: 50; 325; 40 TABLET ORAL at 22:31

## 2019-11-28 RX ADMIN — INSULIN GLARGINE 30 UNITS: 100 INJECTION, SOLUTION SUBCUTANEOUS at 20:30

## 2019-11-28 RX ADMIN — FAMOTIDINE 20 MG: 10 INJECTION, SOLUTION INTRAVENOUS at 08:04

## 2019-11-28 RX ADMIN — INSULIN LISPRO 5 UNITS: 100 INJECTION, SOLUTION INTRAVENOUS; SUBCUTANEOUS at 17:21

## 2019-11-28 RX ADMIN — ATORVASTATIN CALCIUM 80 MG: 40 TABLET, FILM COATED ORAL at 22:23

## 2019-11-29 ENCOUNTER — APPOINTMENT (OUTPATIENT)
Dept: CARDIOLOGY | Facility: HOSPITAL | Age: 64
End: 2019-11-29

## 2019-11-29 ENCOUNTER — ANESTHESIA EVENT (OUTPATIENT)
Dept: CARDIOLOGY | Facility: HOSPITAL | Age: 64
End: 2019-11-29

## 2019-11-29 ENCOUNTER — ANESTHESIA (OUTPATIENT)
Dept: CARDIOLOGY | Facility: HOSPITAL | Age: 64
End: 2019-11-29

## 2019-11-29 VITALS
OXYGEN SATURATION: 100 % | BODY MASS INDEX: 38.62 KG/M2 | RESPIRATION RATE: 16 BRPM | DIASTOLIC BLOOD PRESSURE: 76 MMHG | TEMPERATURE: 98.7 F | HEIGHT: 68 IN | WEIGHT: 254.85 LBS | HEART RATE: 73 BPM | SYSTOLIC BLOOD PRESSURE: 156 MMHG

## 2019-11-29 PROBLEM — R42 VERTIGO, INTERMITTENT: Chronic | Status: RESOLVED | Noted: 2019-11-26 | Resolved: 2019-11-29

## 2019-11-29 PROBLEM — I63.10 CEREBROVASCULAR ACCIDENT (CVA) DUE TO EMBOLISM OF PRECEREBRAL ARTERY (HCC): Status: ACTIVE | Noted: 2019-11-29

## 2019-11-29 PROBLEM — Q21.12 PFO (PATENT FORAMEN OVALE): Chronic | Status: ACTIVE | Noted: 2019-11-29

## 2019-11-29 PROBLEM — G81.91 ACUTE RIGHT HEMIPARESIS (HCC): Status: RESOLVED | Noted: 2019-11-26 | Resolved: 2019-11-29

## 2019-11-29 PROBLEM — R47.1 DYSARTHRIA: Status: RESOLVED | Noted: 2019-11-25 | Resolved: 2019-11-29

## 2019-11-29 LAB
ALBUMIN SERPL-MCNC: 3.7 G/DL (ref 3.5–5.2)
ALBUMIN/GLOB SERPL: 1.4 G/DL
ALP SERPL-CCNC: 64 U/L (ref 39–117)
ALT SERPL W P-5'-P-CCNC: 12 U/L (ref 1–41)
ANA SER QL: NEGATIVE
ANION GAP SERPL CALCULATED.3IONS-SCNC: 9 MMOL/L (ref 5–15)
APTT PPP: 25.4 SECONDS (ref 24–31)
AST SERPL-CCNC: 14 U/L (ref 1–40)
BH CV ECHO MEAS - BSA(HAYCOCK): 2.4 M^2
BH CV ECHO MEAS - BSA: 2.3 M^2
BH CV ECHO MEAS - BZI_BMI: 39.1 KILOGRAMS/M^2
BH CV ECHO MEAS - BZI_METRIC_HEIGHT: 172.7 CM
BH CV ECHO MEAS - BZI_METRIC_WEIGHT: 116.6 KG
BILIRUB SERPL-MCNC: 0.4 MG/DL (ref 0.2–1.2)
BUN BLD-MCNC: 21 MG/DL (ref 8–23)
BUN/CREAT SERPL: 20.6 (ref 7–25)
CALCIUM SPEC-SCNC: 9 MG/DL (ref 8.6–10.5)
CHLORIDE SERPL-SCNC: 105 MMOL/L (ref 98–107)
CO2 SERPL-SCNC: 25 MMOL/L (ref 22–29)
CREAT BLD-MCNC: 1.02 MG/DL (ref 0.76–1.27)
DEPRECATED RDW RBC AUTO: 43.3 FL (ref 37–54)
ERYTHROCYTE [DISTWIDTH] IN BLOOD BY AUTOMATED COUNT: 13.9 % (ref 12.3–15.4)
GFR SERPL CREATININE-BSD FRML MDRD: 74 ML/MIN/1.73
GLOBULIN UR ELPH-MCNC: 2.6 GM/DL
GLUCOSE BLD-MCNC: 121 MG/DL (ref 65–99)
GLUCOSE BLDC GLUCOMTR-MCNC: 118 MG/DL (ref 70–105)
GLUCOSE BLDC GLUCOMTR-MCNC: 136 MG/DL (ref 70–105)
HCT VFR BLD AUTO: 40.2 % (ref 37.5–51)
HGB BLD-MCNC: 13.7 G/DL (ref 13–17.7)
INR PPP: 0.97 (ref 0.9–1.1)
MCH RBC QN AUTO: 30.3 PG (ref 26.6–33)
MCHC RBC AUTO-ENTMCNC: 34 G/DL (ref 31.5–35.7)
MCV RBC AUTO: 89 FL (ref 79–97)
PLATELET # BLD AUTO: 133 10*3/MM3 (ref 140–450)
PMV BLD AUTO: 11.7 FL (ref 6–12)
POTASSIUM BLD-SCNC: 3.9 MMOL/L (ref 3.5–5.2)
PROT SERPL-MCNC: 6.3 G/DL (ref 6–8.5)
PROTHROMBIN TIME: 10.2 SECONDS (ref 9.6–11.7)
RBC # BLD AUTO: 4.51 10*6/MM3 (ref 4.14–5.8)
SODIUM BLD-SCNC: 139 MMOL/L (ref 136–145)
WBC NRBC COR # BLD: 6.6 10*3/MM3 (ref 3.4–10.8)

## 2019-11-29 PROCEDURE — 85610 PROTHROMBIN TIME: CPT | Performed by: NURSE PRACTITIONER

## 2019-11-29 PROCEDURE — 82962 GLUCOSE BLOOD TEST: CPT

## 2019-11-29 PROCEDURE — 85730 THROMBOPLASTIN TIME PARTIAL: CPT | Performed by: NURSE PRACTITIONER

## 2019-11-29 PROCEDURE — 99232 SBSQ HOSP IP/OBS MODERATE 35: CPT | Performed by: INTERNAL MEDICINE

## 2019-11-29 PROCEDURE — 80053 COMPREHEN METABOLIC PANEL: CPT | Performed by: NURSE PRACTITIONER

## 2019-11-29 PROCEDURE — 97116 GAIT TRAINING THERAPY: CPT

## 2019-11-29 PROCEDURE — 93325 DOPPLER ECHO COLOR FLOW MAPG: CPT

## 2019-11-29 PROCEDURE — 99239 HOSP IP/OBS DSCHRG MGMT >30: CPT | Performed by: INTERNAL MEDICINE

## 2019-11-29 PROCEDURE — 97530 THERAPEUTIC ACTIVITIES: CPT

## 2019-11-29 PROCEDURE — 93320 DOPPLER ECHO COMPLETE: CPT | Performed by: INTERNAL MEDICINE

## 2019-11-29 PROCEDURE — 93320 DOPPLER ECHO COMPLETE: CPT

## 2019-11-29 PROCEDURE — 25010000002 PROPOFOL 10 MG/ML EMULSION: Performed by: NURSE ANESTHETIST, CERTIFIED REGISTERED

## 2019-11-29 PROCEDURE — 93312 ECHO TRANSESOPHAGEAL: CPT

## 2019-11-29 PROCEDURE — 93312 ECHO TRANSESOPHAGEAL: CPT | Performed by: INTERNAL MEDICINE

## 2019-11-29 PROCEDURE — 93325 DOPPLER ECHO COLOR FLOW MAPG: CPT | Performed by: INTERNAL MEDICINE

## 2019-11-29 PROCEDURE — 85027 COMPLETE CBC AUTOMATED: CPT | Performed by: NURSE PRACTITIONER

## 2019-11-29 PROCEDURE — 63710000001 INSULIN LISPRO (HUMAN) PER 5 UNITS: Performed by: INTERNAL MEDICINE

## 2019-11-29 RX ORDER — PROMETHAZINE HYDROCHLORIDE 25 MG/1
25 TABLET ORAL ONCE AS NEEDED
Status: CANCELLED | OUTPATIENT
Start: 2019-11-29

## 2019-11-29 RX ORDER — DIPHENHYDRAMINE HYDROCHLORIDE 50 MG/ML
12.5 INJECTION INTRAMUSCULAR; INTRAVENOUS
Status: CANCELLED | OUTPATIENT
Start: 2019-11-29

## 2019-11-29 RX ORDER — ISOPROPYL ALCOHOL 700 MG/ML
1 CLOTH TOPICAL
Qty: 200 EACH | Refills: 0 | Status: SHIPPED | OUTPATIENT
Start: 2019-11-29 | End: 2020-07-13

## 2019-11-29 RX ORDER — ACETAMINOPHEN 325 MG/1
650 TABLET ORAL ONCE AS NEEDED
Status: CANCELLED | OUTPATIENT
Start: 2019-11-29

## 2019-11-29 RX ORDER — ONDANSETRON 2 MG/ML
4 INJECTION INTRAMUSCULAR; INTRAVENOUS ONCE AS NEEDED
Status: CANCELLED | OUTPATIENT
Start: 2019-11-29

## 2019-11-29 RX ORDER — SODIUM CHLORIDE 9 MG/ML
INJECTION, SOLUTION INTRAVENOUS CONTINUOUS PRN
Status: DISCONTINUED | OUTPATIENT
Start: 2019-11-29 | End: 2019-11-29 | Stop reason: SURG

## 2019-11-29 RX ORDER — PROPOFOL 10 MG/ML
VIAL (ML) INTRAVENOUS AS NEEDED
Status: DISCONTINUED | OUTPATIENT
Start: 2019-11-29 | End: 2019-11-29 | Stop reason: SURG

## 2019-11-29 RX ORDER — INSULIN LISPRO 100 [IU]/ML
10 INJECTION, SOLUTION INTRAVENOUS; SUBCUTANEOUS
Qty: 1 PEN | Refills: 0 | Status: SHIPPED | OUTPATIENT
Start: 2019-11-29 | End: 2020-01-17 | Stop reason: SDUPTHER

## 2019-11-29 RX ORDER — IPRATROPIUM BROMIDE AND ALBUTEROL SULFATE 2.5; .5 MG/3ML; MG/3ML
3 SOLUTION RESPIRATORY (INHALATION) ONCE AS NEEDED
Status: CANCELLED | OUTPATIENT
Start: 2019-11-29

## 2019-11-29 RX ORDER — EPHEDRINE SULFATE 50 MG/ML
5 INJECTION, SOLUTION INTRAVENOUS ONCE AS NEEDED
Status: CANCELLED | OUTPATIENT
Start: 2019-11-29

## 2019-11-29 RX ORDER — PROMETHAZINE HYDROCHLORIDE 25 MG/1
25 SUPPOSITORY RECTAL ONCE AS NEEDED
Status: CANCELLED | OUTPATIENT
Start: 2019-11-29

## 2019-11-29 RX ORDER — PEN NEEDLE, DIABETIC 30 GX3/16"
1 NEEDLE, DISPOSABLE MISCELLANEOUS
Qty: 200 EACH | Refills: 0 | Status: SHIPPED | OUTPATIENT
Start: 2019-11-29 | End: 2020-07-13

## 2019-11-29 RX ORDER — SERTRALINE HYDROCHLORIDE 25 MG/1
25 TABLET, FILM COATED ORAL DAILY
Qty: 30 TABLET | Refills: 0 | Status: SHIPPED | OUTPATIENT
Start: 2019-11-30 | End: 2019-12-17

## 2019-11-29 RX ORDER — HYDRALAZINE HYDROCHLORIDE 20 MG/ML
5 INJECTION INTRAMUSCULAR; INTRAVENOUS
Status: CANCELLED | OUTPATIENT
Start: 2019-11-29

## 2019-11-29 RX ORDER — ACETAMINOPHEN 650 MG/1
650 SUPPOSITORY RECTAL ONCE AS NEEDED
Status: CANCELLED | OUTPATIENT
Start: 2019-11-29

## 2019-11-29 RX ORDER — PROMETHAZINE HYDROCHLORIDE 25 MG/ML
6.25 INJECTION, SOLUTION INTRAMUSCULAR; INTRAVENOUS ONCE AS NEEDED
Status: CANCELLED | OUTPATIENT
Start: 2019-11-29

## 2019-11-29 RX ORDER — LABETALOL HYDROCHLORIDE 5 MG/ML
5 INJECTION, SOLUTION INTRAVENOUS
Status: CANCELLED | OUTPATIENT
Start: 2019-11-29

## 2019-11-29 RX ORDER — ATORVASTATIN CALCIUM 80 MG/1
80 TABLET, FILM COATED ORAL NIGHTLY
Qty: 30 TABLET | Refills: 2 | Status: SHIPPED | OUTPATIENT
Start: 2019-11-29 | End: 2019-12-17 | Stop reason: SDUPTHER

## 2019-11-29 RX ORDER — PHENYLEPHRINE HCL IN 0.9% NACL 0.5 MG/5ML
.5-3 SYRINGE (ML) INTRAVENOUS
Status: CANCELLED | OUTPATIENT
Start: 2019-11-29

## 2019-11-29 RX ADMIN — PREGABALIN 75 MG: 75 CAPSULE ORAL at 10:29

## 2019-11-29 RX ADMIN — PROPOFOL 150 MG: 10 INJECTION, EMULSION INTRAVENOUS at 09:17

## 2019-11-29 RX ADMIN — INSULIN LISPRO 10 UNITS: 100 INJECTION, SOLUTION INTRAVENOUS; SUBCUTANEOUS at 10:31

## 2019-11-29 RX ADMIN — INSULIN LISPRO 10 UNITS: 100 INJECTION, SOLUTION INTRAVENOUS; SUBCUTANEOUS at 12:32

## 2019-11-29 RX ADMIN — SERTRALINE HYDROCHLORIDE 25 MG: 50 TABLET ORAL at 10:30

## 2019-11-29 RX ADMIN — SODIUM CHLORIDE: 0.9 INJECTION, SOLUTION INTRAVENOUS at 09:03

## 2019-11-29 RX ADMIN — HYDROCODONE BITARTRATE AND ACETAMINOPHEN 1 TABLET: 10; 325 TABLET ORAL at 10:42

## 2019-11-29 RX ADMIN — FAMOTIDINE 20 MG: 10 INJECTION, SOLUTION INTRAVENOUS at 10:30

## 2019-11-29 RX ADMIN — Medication 10 ML: at 10:29

## 2019-11-29 RX ADMIN — ASPIRIN 325 MG ORAL TABLET 325 MG: 325 PILL ORAL at 10:29

## 2019-11-29 NOTE — ANESTHESIA POSTPROCEDURE EVALUATION
Patient: Humble Green    Procedure Summary     Date:  11/29/19 Room / Location:  [facility]    Anesthesia Start:  0915 Anesthesia Stop:  0925    Procedure:  ADULT TRANSESOPHAGEAL ECHO (LILIAN) W/ CONT IF NECESSARY PER PROTOCOL Diagnosis:  (Cardiac Source of Emboli)    Scheduled Providers:  Oziel Velazquez MD Provider:  Oziel Velazquez MD    Anesthesia Type:  MAC ASA Status:  4          Anesthesia Type: MAC  Last vitals  BP   156/76 (11/29/19 1015)   Temp   97.3 °F (36.3 °C) (11/29/19 1002)   Pulse   73 (11/29/19 1015)   Resp   21 (11/29/19 1002)     SpO2   100 % (11/29/19 1015)     Post Anesthesia Care and Evaluation    Patient location during evaluation: PACU  Patient participation: complete - patient participated  Level of consciousness: awake  Pain score: 0 (See nurse's notes for pain score)  Pain management: adequate  Airway patency: patent  Anesthetic complications: No anesthetic complications  PONV Status: none  Cardiovascular status: acceptable  Respiratory status: acceptable  Hydration status: acceptable    Comments: Patient seen and examined postoperatively; vital signs stable; SpO2 greater than or equal to 90%; cardiopulmonary status stable; nausea/vomiting adequately controlled; pain adequately controlled; no apparent anesthesia complications; patient discharged from anesthesia care when discharge criteria were met

## 2019-11-29 NOTE — ANESTHESIA PREPROCEDURE EVALUATION
Anesthesia Evaluation     Patient summary reviewed and Nursing notes reviewed   NPO Solid Status: > 8 hours  NPO Liquid Status: > 8 hours           Airway   Mallampati: I  TM distance: >3 FB  Neck ROM: full  No difficulty expected  Dental - normal exam     Pulmonary - negative pulmonary ROS and normal exam   Cardiovascular - normal exam    (+) CAD,       Neuro/Psych  (+) headaches,     GI/Hepatic/Renal/Endo    (+)   diabetes mellitus,     Musculoskeletal (-) negative ROS    Abdominal  - normal exam    Bowel sounds: normal.   Substance History - negative use     OB/GYN negative ob/gyn ROS         Other                        Anesthesia Plan    ASA 4     MAC     intravenous induction     Anesthetic plan, all risks, benefits, and alternatives have been provided, discussed and informed consent has been obtained with: patient.

## 2019-11-30 ENCOUNTER — READMISSION MANAGEMENT (OUTPATIENT)
Dept: CALL CENTER | Facility: HOSPITAL | Age: 64
End: 2019-11-30

## 2019-11-30 NOTE — OUTREACH NOTE
Prep Survey      Responses   Facility patient discharged from?  [facility]   Is patient eligible?  Yes   Discharge diagnosis  Cerebrovascular accident,    PFO (patent foramen ovale),    Intractable migraine     Does the patient have one of the following disease processes/diagnoses(primary or secondary)?  Stroke (TIA)   Does the patient have Home health ordered?  No [ OP PT, OT and SLP]   Is there a DME ordered?  No   Prep survey completed?  Yes          Anum Salgado RN

## 2019-12-02 NOTE — PROGRESS NOTES
Case Management Discharge Note      Final Note: With OP PT, OT and SLP              Final Discharge Disposition Code: 01 - home or self-care

## 2019-12-03 ENCOUNTER — READMISSION MANAGEMENT (OUTPATIENT)
Dept: CALL CENTER | Facility: HOSPITAL | Age: 64
End: 2019-12-03

## 2019-12-03 NOTE — OUTREACH NOTE
Stroke Week 1 Survey      Responses   Facility patient discharged from?  [facility]   Does the patient have one of the following disease processes/diagnoses(primary or secondary)?  Stroke (TIA)   Is there a successful TCM telephone encounter documented?  No   Week 1 attempt successful?  Yes   Call start time  1311   Call end time  1320   Discharge diagnosis  Cerebrovascular accident,    PFO (patent foramen ovale),    Intractable migraine     Meds reviewed with patient/caregiver?  Yes   Is the patient having any side effects they believe may be caused by any medication additions or changes?  No   Does the patient have all medications ordered at discharge?  Yes   Is the patient taking all medications as directed (includes completed medication regime)?  Yes   Does the patient have a primary care provider?   No   PCP Nursing Intervention  Assisted patient with PCP selection   Does the patient have an appointment with their PCP within 7 days of discharge?  No   Comments regarding PCP  Appt made with Dr. Duckworth for Dec 17 @ 3:30    What is preventing the patient from scheduling follow up appointments within 7 days of discharge?  Earlier appointment not available   Urgent appointment interventions  Facilitated patient appointment   Has the patient kept scheduled appointments due by today?  N/A   Comments  Patient also needing appt with Dr. Doyle.  Called and left voicemail for office regarding this   Did the patient receive a copy of their discharge instructions?  Yes   Nursing interventions  Reviewed instructions with patient   What is the patient's perception of their health status since discharge?  Improving   Nursing interventions  Nurse provided patient education   Is the patient able to teach back FAST for Stroke?  Yes   Is the patient/caregiver able to teach back the risk factors for a stroke?  High blood pressure-goal below 120/80, Smoking, Diabetes, High Cholesterol, Physical inactivity and obesity, Carotid or other  artery disease, History of TIAs, History of Afib, HIgh red blood cell count, Excessive alcohol intake, Illegal drug use, Sleep apnea   Is the patient/caregiver able to teach back signs and symptoms related to disease process for when to call PCP?  Yes   Is the patient/caregiver able to teach back signs and symptoms related to disease process for when to call 911?  Yes   Is the patient/caregiver able to teach back the hierarchy of who to call/visit for symptoms/problems? PCP, Specialist, Home health nurse, Urgent Care, ED, 911  Yes   Additional teach back comments  Patient states he has not heard from [facility] regarding outpatient therapy.  Called [facility] and they stated patient is presumptional Medicaid and once it is full active they can start therapies.  They do have orders to start.  Spoke to pt regarding this and he states he has paperwork that he still needs to mail in.  Told him once he is active to notify [facility] so they can start therapies   Week 1 call completed?  Yes   Wrap up additional comments  Appt made with new PCP.  Waiting on return call from Dr. Doyle's office regarding appt.  Outpatient therapy can not be started til Medicaid is fully active          Priscilla Andrew LPN     Appt with Dr. Doyle Dec 26 @ 12:10.  Pt notified of date and time

## 2019-12-05 ENCOUNTER — NURSE TRIAGE (OUTPATIENT)
Dept: CALL CENTER | Facility: HOSPITAL | Age: 64
End: 2019-12-05

## 2019-12-05 NOTE — TELEPHONE ENCOUNTER
"Caller states that he is returning to work and needs a statement from the hospitalist.   Routed to case management.    Reason for Disposition  • [1] Caller requesting NON-URGENT health information AND [2] PCP's office is the best resource    Additional Information  • Negative: [1] Caller is not with the adult (patient) AND [2] reporting urgent symptoms  • Negative: Lab result questions  • Negative: Medication questions  • Negative: Caller cannot be reached by phone  • Negative: Caller has already spoken to PCP or another triager  • Negative: RN needs further essential information from caller in order to complete triage  • Negative: Requesting regular office appointment  • Negative: Health Information question, no triage required and triager able to answer question  • Negative: General information question, no triage required and triager able to answer question    Answer Assessment - Initial Assessment Questions  1. REASON FOR CALL or QUESTION: \"What is your reason for calling today?\" or \"How can I best help you?\" or \"What question do you have that I can help answer?\"      I a return to work statement.    Protocols used: INFORMATION ONLY CALL-ADULT-      "

## 2019-12-09 ENCOUNTER — NURSE TRIAGE (OUTPATIENT)
Dept: CALL CENTER | Facility: HOSPITAL | Age: 64
End: 2019-12-09

## 2019-12-09 NOTE — TELEPHONE ENCOUNTER
"States he was discharged from [facility] and they called him with his appointment but he doesn't remember when or where it is. Provided him with appointment information including date, time, office location and number. No further needs.     Reason for Disposition  • General information question, no triage required and triager able to answer question    Additional Information  • Negative: [1] Caller is not with the adult (patient) AND [2] reporting urgent symptoms  • Negative: Lab result questions  • Negative: Medication questions  • Negative: Caller cannot be reached by phone  • Negative: Caller has already spoken to PCP or another triager  • Negative: RN needs further essential information from caller in order to complete triage  • Negative: Requesting regular office appointment  • Negative: [1] Caller requesting NON-URGENT health information AND [2] PCP's office is the best resource  • Negative: Health Information question, no triage required and triager able to answer question  • Negative: Question about upcoming scheduled test, no triage required and triager able to answer question  • Negative: [1] Caller is not with the adult (patient) AND [2] probable NON-URGENT symptoms    Answer Assessment - Initial Assessment Questions  1. REASON FOR CALL or QUESTION: \"What is your reason for calling today?\" or \"How can I best help you?\" or \"What question do you have that I can help answer?\"      See note    Protocols used: INFORMATION ONLY CALL-ADULT-      "

## 2019-12-10 ENCOUNTER — READMISSION MANAGEMENT (OUTPATIENT)
Dept: CALL CENTER | Facility: HOSPITAL | Age: 64
End: 2019-12-10

## 2019-12-10 NOTE — OUTREACH NOTE
Stroke Week 2 Survey      Responses   Facility patient discharged from?  [facility]   Does the patient have one of the following disease processes/diagnoses(primary or secondary)?  Stroke (TIA)   Week 2 attempt successful?  No   Revoke  Decline to participate [No answer/Left voicemail]          Priscilla Andrew LPN

## 2019-12-13 ENCOUNTER — HOSPITAL ENCOUNTER (EMERGENCY)
Facility: HOSPITAL | Age: 64
Discharge: HOME | End: 2019-12-13
Admitting: EMERGENCY MEDICINE

## 2019-12-13 ENCOUNTER — APPOINTMENT (OUTPATIENT)
Dept: CT IMAGING | Facility: HOSPITAL | Age: 64
End: 2019-12-13

## 2019-12-13 ENCOUNTER — APPOINTMENT (OUTPATIENT)
Dept: GENERAL RADIOLOGY | Facility: HOSPITAL | Age: 64
End: 2019-12-13

## 2019-12-13 VITALS
OXYGEN SATURATION: 95 % | RESPIRATION RATE: 14 BRPM | TEMPERATURE: 97.5 F | HEART RATE: 75 BPM | HEIGHT: 68 IN | WEIGHT: 253.53 LBS | DIASTOLIC BLOOD PRESSURE: 84 MMHG | BODY MASS INDEX: 38.42 KG/M2 | SYSTOLIC BLOOD PRESSURE: 151 MMHG

## 2019-12-13 DIAGNOSIS — G44.209 TENSION-TYPE HEADACHE, NOT INTRACTABLE, UNSPECIFIED CHRONICITY PATTERN: Primary | ICD-10-CM

## 2019-12-13 DIAGNOSIS — R42 DIZZINESS: ICD-10-CM

## 2019-12-13 LAB
ALBUMIN SERPL-MCNC: 4.1 G/DL (ref 3.5–5.2)
ALBUMIN/GLOB SERPL: 1.4 G/DL
ALP SERPL-CCNC: 91 U/L (ref 39–117)
ALT SERPL W P-5'-P-CCNC: 15 U/L (ref 1–41)
ANION GAP SERPL CALCULATED.3IONS-SCNC: 12 MMOL/L (ref 5–15)
AST SERPL-CCNC: 10 U/L (ref 1–40)
BASOPHILS # BLD AUTO: 0.1 10*3/MM3 (ref 0–0.2)
BASOPHILS NFR BLD AUTO: 0.8 % (ref 0–1.5)
BILIRUB SERPL-MCNC: 0.3 MG/DL (ref 0.2–1.2)
BUN BLD-MCNC: 16 MG/DL (ref 8–23)
BUN/CREAT SERPL: 19.3 (ref 7–25)
CALCIUM SPEC-SCNC: 9.6 MG/DL (ref 8.6–10.5)
CHLORIDE SERPL-SCNC: 99 MMOL/L (ref 98–107)
CO2 SERPL-SCNC: 27 MMOL/L (ref 22–29)
CREAT BLD-MCNC: 0.83 MG/DL (ref 0.76–1.27)
DEPRECATED RDW RBC AUTO: 42.9 FL (ref 37–54)
EOSINOPHIL # BLD AUTO: 0.1 10*3/MM3 (ref 0–0.4)
EOSINOPHIL NFR BLD AUTO: 1.9 % (ref 0.3–6.2)
ERYTHROCYTE [DISTWIDTH] IN BLOOD BY AUTOMATED COUNT: 13.7 % (ref 12.3–15.4)
GFR SERPL CREATININE-BSD FRML MDRD: 93 ML/MIN/1.73
GLOBULIN UR ELPH-MCNC: 3 GM/DL
GLUCOSE BLD-MCNC: 266 MG/DL (ref 65–99)
HCT VFR BLD AUTO: 44.3 % (ref 37.5–51)
HGB BLD-MCNC: 15 G/DL (ref 13–17.7)
INR PPP: 1.06 (ref 0.9–1.1)
LYMPHOCYTES # BLD AUTO: 2.1 10*3/MM3 (ref 0.7–3.1)
LYMPHOCYTES NFR BLD AUTO: 29.8 % (ref 19.6–45.3)
MCH RBC QN AUTO: 30.1 PG (ref 26.6–33)
MCHC RBC AUTO-ENTMCNC: 33.8 G/DL (ref 31.5–35.7)
MCV RBC AUTO: 89 FL (ref 79–97)
MONOCYTES # BLD AUTO: 0.5 10*3/MM3 (ref 0.1–0.9)
MONOCYTES NFR BLD AUTO: 6.9 % (ref 5–12)
NEUTROPHILS # BLD AUTO: 4.3 10*3/MM3 (ref 1.7–7)
NEUTROPHILS NFR BLD AUTO: 60.6 % (ref 42.7–76)
NRBC BLD AUTO-RTO: 0.1 /100 WBC (ref 0–0.2)
PLATELET # BLD AUTO: 164 10*3/MM3 (ref 140–450)
PMV BLD AUTO: 11.4 FL (ref 6–12)
POTASSIUM BLD-SCNC: 4.5 MMOL/L (ref 3.5–5.2)
PROT SERPL-MCNC: 7.1 G/DL (ref 6–8.5)
PROTHROMBIN TIME: 11 SECONDS (ref 9.6–11.7)
RBC # BLD AUTO: 4.98 10*6/MM3 (ref 4.14–5.8)
SODIUM BLD-SCNC: 138 MMOL/L (ref 136–145)
WBC NRBC COR # BLD: 7.2 10*3/MM3 (ref 3.4–10.8)

## 2019-12-13 PROCEDURE — 96374 THER/PROPH/DIAG INJ IV PUSH: CPT

## 2019-12-13 PROCEDURE — 85610 PROTHROMBIN TIME: CPT | Performed by: NURSE PRACTITIONER

## 2019-12-13 PROCEDURE — 96375 TX/PRO/DX INJ NEW DRUG ADDON: CPT

## 2019-12-13 PROCEDURE — 99284 EMERGENCY DEPT VISIT MOD MDM: CPT

## 2019-12-13 PROCEDURE — 25010000002 DIPHENHYDRAMINE PER 50 MG: Performed by: NURSE PRACTITIONER

## 2019-12-13 PROCEDURE — 25010000002 ONDANSETRON PER 1 MG: Performed by: NURSE PRACTITIONER

## 2019-12-13 PROCEDURE — 85025 COMPLETE CBC W/AUTO DIFF WBC: CPT | Performed by: NURSE PRACTITIONER

## 2019-12-13 PROCEDURE — 93005 ELECTROCARDIOGRAM TRACING: CPT | Performed by: NURSE PRACTITIONER

## 2019-12-13 PROCEDURE — 25010000002 MORPHINE PER 10 MG: Performed by: NURSE PRACTITIONER

## 2019-12-13 PROCEDURE — 80053 COMPREHEN METABOLIC PANEL: CPT | Performed by: NURSE PRACTITIONER

## 2019-12-13 PROCEDURE — 71045 X-RAY EXAM CHEST 1 VIEW: CPT

## 2019-12-13 PROCEDURE — 70450 CT HEAD/BRAIN W/O DYE: CPT

## 2019-12-13 RX ORDER — MECLIZINE HYDROCHLORIDE 25 MG/1
25 TABLET ORAL 3 TIMES DAILY PRN
Qty: 21 TABLET | Refills: 0 | Status: SHIPPED | OUTPATIENT
Start: 2019-12-13 | End: 2019-12-17

## 2019-12-13 RX ORDER — MORPHINE SULFATE 4 MG/ML
4 INJECTION, SOLUTION INTRAMUSCULAR; INTRAVENOUS ONCE
Status: COMPLETED | OUTPATIENT
Start: 2019-12-13 | End: 2019-12-13

## 2019-12-13 RX ORDER — ONDANSETRON 2 MG/ML
4 INJECTION INTRAMUSCULAR; INTRAVENOUS ONCE
Status: COMPLETED | OUTPATIENT
Start: 2019-12-13 | End: 2019-12-13

## 2019-12-13 RX ORDER — METHYLPREDNISOLONE 4 MG/1
TABLET ORAL
Qty: 21 TABLET | Refills: 0 | Status: SHIPPED | OUTPATIENT
Start: 2019-12-13 | End: 2019-12-17

## 2019-12-13 RX ORDER — DIPHENHYDRAMINE HYDROCHLORIDE 50 MG/ML
25 INJECTION INTRAMUSCULAR; INTRAVENOUS ONCE
Status: COMPLETED | OUTPATIENT
Start: 2019-12-13 | End: 2019-12-13

## 2019-12-13 RX ORDER — SODIUM CHLORIDE 0.9 % (FLUSH) 0.9 %
10 SYRINGE (ML) INJECTION AS NEEDED
Status: DISCONTINUED | OUTPATIENT
Start: 2019-12-13 | End: 2019-12-13 | Stop reason: HOSPADM

## 2019-12-13 RX ADMIN — MORPHINE SULFATE 4 MG: 4 INJECTION INTRAVENOUS at 16:08

## 2019-12-13 RX ADMIN — DIPHENHYDRAMINE HYDROCHLORIDE 25 MG: 50 INJECTION, SOLUTION INTRAMUSCULAR; INTRAVENOUS at 16:09

## 2019-12-13 RX ADMIN — ONDANSETRON 4 MG: 2 INJECTION INTRAMUSCULAR; INTRAVENOUS at 16:09

## 2019-12-13 RX ADMIN — Medication 10 ML: at 13:37

## 2019-12-13 NOTE — ED NOTES
Pt reports that he had a stroke a couple weeks ago and was given TPA, states he continues to have a headache and dizziness and has been unable to work or function.      Laurie Tejada RN  12/13/19 0718

## 2019-12-13 NOTE — ED PROVIDER NOTES
[place]   Is a 64-year-old gentleman that states on November 25 he presented with right-sided weakness and was diagnosed with stroke and received TPA.  He states his symptoms resolved-    He states that prior to his stroke he was having dizzy episodes and since receiving TPA and being diagnosed with stroke he continues to have episodes of dizziness.  He denies any numbness or weakness at this time.  He states that when he stands up too quickly he becomes very dizzy that is when he has his episodes.  He states he also has right sided frontal headache.  He states that it has been gradual and persistent since his stroke.  He denies any thunderclap headache he denies any trauma.  He denies any nausea or vomiting or photophobia.  He denies fever.    At this time he rates his pain a 5/10.  He states it is a dull aching throbbing pain across the right side of his forehead.  He states it is constant but does wax and wane in intensity          Review of Systems   Constitutional: Negative for chills, fatigue and fever.   HENT: Negative for congestion, tinnitus and trouble swallowing.    Eyes: Negative for photophobia, discharge and redness.   Respiratory: Negative for cough and shortness of breath.    Cardiovascular: Negative for chest pain and palpitations.   Gastrointestinal: Negative for abdominal pain, diarrhea, nausea and vomiting.   Genitourinary: Negative for dysuria, frequency and urgency.   Musculoskeletal: Negative for back pain, joint swelling and myalgias.   Skin: Negative for rash.   Neurological: Positive for dizziness, speech difficulty and headaches. Negative for facial asymmetry, weakness and numbness.   Psychiatric/Behavioral: Negative for confusion.   All other systems reviewed and are negative.      Past Medical History:   Diagnosis Date   • BPH (benign prostatic hyperplasia)    • Chronic back pain    • Diabetes mellitus (CMS/[facility])    • Diabetic neuropathy (CMS/[facility])    • Low vision of right eye with  normal vision in contralateral eye    • Migraines    • Nonobstructive atherosclerosis of coronary artery    • PFO (patent foramen ovale) 11/29/2019       No Known Allergies    Past Surgical History:   Procedure Laterality Date   • ARM TENDON REPAIR Left    • CHOLECYSTECTOMY     • REPLACEMENT TOTAL KNEE Left    • ROTATOR CUFF REPAIR Bilateral    • UMBILICAL HERNIA REPAIR         Family History   Problem Relation Age of Onset   • Diabetes Mother    • Heart disease Mother    • Diabetes Father    • Heart disease Father    • Diabetes Brother        Social History     Socioeconomic History   • Marital status: Single     Spouse name: Not on file   • Number of children: Not on file   • Years of education: Not on file   • Highest education level: Not on file   Tobacco Use   • Smoking status: Never Smoker   • Smokeless tobacco: Never Used           Objective   Physical Exam   Constitutional: He is oriented to person, place, and time. He appears well-developed and well-nourished.   HENT:   Head: Normocephalic and atraumatic.   Right Ear: External ear normal.   Left Ear: External ear normal.   Nose: Nose normal.   Mouth/Throat: Oropharynx is clear and moist.   Eyes: Pupils are equal, round, and reactive to light. Conjunctivae, EOM and lids are normal. Right eye exhibits no nystagmus. Left eye exhibits no nystagmus.   Neck: Normal range of motion. Neck supple.   Cardiovascular: Normal rate, regular rhythm, normal heart sounds, intact distal pulses and normal pulses.   Pulmonary/Chest: Effort normal and breath sounds normal. He has no decreased breath sounds.   Abdominal: Soft. Bowel sounds are normal.   Musculoskeletal: Normal range of motion.   Neurological: He is alert and oriented to person, place, and time. He has normal strength and normal reflexes. No cranial nerve deficit or sensory deficit. He displays a negative Romberg sign. GCS eye subscore is 4. GCS verbal subscore is 5. GCS motor subscore is 6.   Skin: Skin is warm,  "dry and intact. Capillary refill takes less than 2 seconds. No rash noted.   Psychiatric: He has a normal mood and affect. His speech is normal and behavior is normal. Judgment and thought content normal. Cognition and memory are normal.   Vitals reviewed.      Procedures           ED Course  ED Course as of Dec 13 1626   Fri Dec 13, 2019   1622 Woke with Dr. Mejia who states the patient should be placed on a Medrol Dosepak and have neurology follow-up    [KW]      ED Course User Index  [KW] KeyshawnKarin D, APRN      /89   Pulse 76   Temp 97.5 °F (36.4 °C) (Oral)   Resp 14   Ht 172.7 cm (68\")   Wt 115 kg (253 lb 8.5 oz)   SpO2 96%   BMI 38.55 kg/m²   Labs Reviewed   COMPREHENSIVE METABOLIC PANEL - Abnormal; Notable for the following components:       Result Value    Glucose 266 (*)     All other components within normal limits    Narrative:     GFR Normal >60  Chronic Kidney Disease <60  Kidney Failure <15     PROTIME-INR - Normal   CBC WITH AUTO DIFFERENTIAL - Normal   CBC AND DIFFERENTIAL    Narrative:     The following orders were created for panel order CBC & Differential.  Procedure                               Abnormality         Status                     ---------                               -----------         ------                     CBC Auto Differential[[id number]]        Normal              Final result                 Please view results for these tests on the individual orders.     Medications   sodium chloride 0.9 % flush 10 mL (10 mL Intravenous Incomplete 12/13/19 1337)   Morphine sulfate (PF) injection 4 mg (has no administration in time range)   diphenhydrAMINE (BENADRYL) injection 25 mg (has no administration in time range)   ondansetron (ZOFRAN) injection 4 mg (has no administration in time range)     Ct Head Without Contrast    Result Date: 12/13/2019   1. Volume loss secondary to mild cerebral atrophy. 2. Chronic ischemic changes. 3. No acute findings.  " Electronically Signed By-Zev Gonzalez On:12/13/2019 2:38 PM This report was finalized on [id number] by  Zev Gonzalez, .    Xr Chest 1 View    Result Date: 12/13/2019  No acute chest findings.  Electronically Signed By-Dr. Charla Nelson MD On:12/13/2019 1:23 PM This report was finalized on [id number] by Dr. Charla Nelson MD.                    No data recorded                        MDM  Number of Diagnoses or Management Options  Dizziness:   Tension-type headache, not intractable, unspecified chronicity pattern:   Diagnosis management comments: Patient has had no change in his symptoms while in the emergency room.  And his symptomatology of dizziness is the same that he has had over the last year.-CT was normal today and patient was treated-with Benadryl morphine and for headache here in the emergency room.       Amount and/or Complexity of Data Reviewed  Clinical lab tests: reviewed  Tests in the radiology section of CPT®: reviewed  Tests in the medicine section of CPT®: reviewed        Final diagnoses:   Tension-type headache, not intractable, unspecified chronicity pattern   Dizziness              Karin Mahajan, APRN  12/13/19 4231

## 2019-12-13 NOTE — DISCHARGE INSTRUCTIONS
Push clear liquids    Use meclizine as needed for dizziness  Stand up slowly and get your balance before taking off.    Make an appointment with neurology for follow-up and continued care of headaches and post stroke care    Return if worse

## 2019-12-17 ENCOUNTER — OFFICE VISIT (OUTPATIENT)
Dept: FAMILY MEDICINE CLINIC | Facility: CLINIC | Age: 64
End: 2019-12-17

## 2019-12-17 VITALS
RESPIRATION RATE: 16 BRPM | SYSTOLIC BLOOD PRESSURE: 120 MMHG | HEART RATE: 94 BPM | TEMPERATURE: 97.5 F | HEIGHT: 68 IN | DIASTOLIC BLOOD PRESSURE: 83 MMHG | WEIGHT: 239 LBS | OXYGEN SATURATION: 96 % | BODY MASS INDEX: 36.22 KG/M2

## 2019-12-17 DIAGNOSIS — I63.9 CEREBROVASCULAR ACCIDENT (CVA), UNSPECIFIED MECHANISM (HCC): Primary | ICD-10-CM

## 2019-12-17 DIAGNOSIS — Z79.4 TYPE 2 DIABETES MELLITUS WITH DIABETIC POLYNEUROPATHY, WITH LONG-TERM CURRENT USE OF INSULIN (HCC): ICD-10-CM

## 2019-12-17 DIAGNOSIS — E11.42 DIABETIC POLYNEUROPATHY ASSOCIATED WITH TYPE 2 DIABETES MELLITUS (HCC): ICD-10-CM

## 2019-12-17 DIAGNOSIS — E11.42 TYPE 2 DIABETES MELLITUS WITH DIABETIC POLYNEUROPATHY, WITH LONG-TERM CURRENT USE OF INSULIN (HCC): ICD-10-CM

## 2019-12-17 DIAGNOSIS — M54.42 CHRONIC MIDLINE LOW BACK PAIN WITH LEFT-SIDED SCIATICA: ICD-10-CM

## 2019-12-17 DIAGNOSIS — F41.9 ANXIETY: ICD-10-CM

## 2019-12-17 DIAGNOSIS — R42 DIZZY: ICD-10-CM

## 2019-12-17 DIAGNOSIS — G89.29 CHRONIC MIDLINE LOW BACK PAIN WITH LEFT-SIDED SCIATICA: ICD-10-CM

## 2019-12-17 DIAGNOSIS — H61.22 IMPACTED CERUMEN OF LEFT EAR: ICD-10-CM

## 2019-12-17 PROCEDURE — 99203 OFFICE O/P NEW LOW 30 MIN: CPT | Performed by: FAMILY MEDICINE

## 2019-12-17 PROCEDURE — 69209 REMOVE IMPACTED EAR WAX UNI: CPT | Performed by: FAMILY MEDICINE

## 2019-12-17 RX ORDER — DIAZEPAM 5 MG/1
5 TABLET ORAL DAILY PRN
Qty: 1 TABLET | Refills: 0 | Status: SHIPPED
Start: 2019-12-17 | End: 2020-03-06

## 2019-12-17 RX ORDER — DIAZEPAM 5 MG/1
5 TABLET ORAL NIGHTLY
COMMUNITY
End: 2019-12-17

## 2019-12-17 RX ORDER — MELOXICAM 7.5 MG/1
7.5 TABLET ORAL DAILY
COMMUNITY
End: 2019-12-17

## 2019-12-17 RX ORDER — INSULIN GLARGINE 100 [IU]/ML
30 INJECTION, SOLUTION SUBCUTANEOUS DAILY
Qty: 3 PEN | Refills: 0
Start: 2019-12-17 | End: 2020-04-06

## 2019-12-17 RX ORDER — SERTRALINE HYDROCHLORIDE 25 MG/1
25 TABLET, FILM COATED ORAL NIGHTLY
Qty: 30 TABLET | Refills: 0 | Status: SHIPPED
Start: 2019-12-17 | End: 2019-12-17

## 2019-12-17 RX ORDER — HYDROCODONE BITARTRATE AND ACETAMINOPHEN 10; 325 MG/1; MG/1
1 TABLET ORAL 2 TIMES DAILY PRN
Qty: 60 TABLET | Refills: 0 | Status: SHIPPED | OUTPATIENT
Start: 2019-12-17 | End: 2019-12-19 | Stop reason: SDUPTHER

## 2019-12-17 RX ORDER — FUROSEMIDE 20 MG/1
20 TABLET ORAL DAILY
COMMUNITY
End: 2019-12-17

## 2019-12-17 RX ORDER — ATORVASTATIN CALCIUM 80 MG/1
80 TABLET, FILM COATED ORAL NIGHTLY
Qty: 30 TABLET | Refills: 2 | Status: SHIPPED | OUTPATIENT
Start: 2019-12-17 | End: 2020-03-16

## 2019-12-17 RX ORDER — MIDODRINE HYDROCHLORIDE 10 MG/1
10 TABLET ORAL
COMMUNITY
End: 2019-12-17

## 2019-12-17 RX ORDER — PREGABALIN 75 MG/1
75 CAPSULE ORAL 2 TIMES DAILY
Qty: 60 CAPSULE | Refills: 1 | Status: SHIPPED | OUTPATIENT
Start: 2019-12-17 | End: 2020-01-17

## 2019-12-19 DIAGNOSIS — M54.42 CHRONIC MIDLINE LOW BACK PAIN WITH LEFT-SIDED SCIATICA: ICD-10-CM

## 2019-12-19 DIAGNOSIS — G89.29 CHRONIC MIDLINE LOW BACK PAIN WITH LEFT-SIDED SCIATICA: ICD-10-CM

## 2019-12-19 RX ORDER — HYDROCODONE BITARTRATE AND ACETAMINOPHEN 10; 325 MG/1; MG/1
1 TABLET ORAL 2 TIMES DAILY PRN
Qty: 14 TABLET | Refills: 0 | Status: SHIPPED | OUTPATIENT
Start: 2019-12-19 | End: 2020-01-17 | Stop reason: SDUPTHER

## 2019-12-20 ENCOUNTER — TELEPHONE (OUTPATIENT)
Dept: FAMILY MEDICINE CLINIC | Facility: CLINIC | Age: 64
End: 2019-12-20

## 2019-12-20 NOTE — TELEPHONE ENCOUNTER
Patient called and left a VM stating that he hasn't had anymore dizziness since you did the procedure on his ears and he would like a letter to go back to work part time.

## 2020-01-02 ENCOUNTER — OFFICE VISIT (OUTPATIENT)
Dept: CARDIOLOGY | Facility: CLINIC | Age: 65
End: 2020-01-02

## 2020-01-02 VITALS
HEIGHT: 68 IN | HEART RATE: 86 BPM | BODY MASS INDEX: 37.74 KG/M2 | DIASTOLIC BLOOD PRESSURE: 75 MMHG | WEIGHT: 249 LBS | OXYGEN SATURATION: 95 % | SYSTOLIC BLOOD PRESSURE: 123 MMHG

## 2020-01-02 DIAGNOSIS — I63.10 CEREBROVASCULAR ACCIDENT (CVA) DUE TO EMBOLISM OF PRECEREBRAL ARTERY (HCC): ICD-10-CM

## 2020-01-02 DIAGNOSIS — E78.2 MIXED HYPERLIPIDEMIA: Primary | ICD-10-CM

## 2020-01-02 DIAGNOSIS — Q21.12 PFO (PATENT FORAMEN OVALE): ICD-10-CM

## 2020-01-02 DIAGNOSIS — E11.65 TYPE 2 DIABETES MELLITUS WITH HYPERGLYCEMIA, WITHOUT LONG-TERM CURRENT USE OF INSULIN (HCC): ICD-10-CM

## 2020-01-02 PROCEDURE — 99213 OFFICE O/P EST LOW 20 MIN: CPT | Performed by: INTERNAL MEDICINE

## 2020-01-02 NOTE — PROGRESS NOTES
"    Subjective:     Encounter Date:01/02/2020      Patient ID: Humble Green is a 64 y.o. male.    Chief Complaint:  History of Present Illness 64-year-old white male with recent CVA history of recently diagnosed patent foramen ovale hyperlipidemia hypertension diabetes presents to my office for follow-up.  Patient is currently stable without symptoms of chest pain or shortness of breath at rest or exertion.  No complaints of any PND orthopnea.  No palpitation dizziness syncope or swelling of the feet.  Patient is taking all the medicines regularly.  Patient was in the hospital with a CVA and had a patent foramen ovale with bubble contrast study.  Patient was placed on Eliquis and is currently stable    The following portions of the patient's history were reviewed and updated as appropriate: allergies, current medications, past family history, past medical history, past social history, past surgical history and problem list.  Past Medical History:   Diagnosis Date   • Arthritis    • BPH (benign prostatic hyperplasia)    • Chronic back pain    • Depression    • Diabetes mellitus (CMS/HCC)    • Diabetic neuropathy (CMS/HCC)    • Hyperlipidemia    • Low vision of right eye with normal vision in contralateral eye    • Memory problem    • Migraines    • Nonobstructive atherosclerosis of coronary artery    • PFO (patent foramen ovale) 11/29/2019   • Stroke (CMS/HCC)      Past Surgical History:   Procedure Laterality Date   • ARM TENDON REPAIR Left    • CHOLECYSTECTOMY     • COLONOSCOPY      2018 = [facility] 2023   • EYE SURGERY      Rt Eye Sx after trauma @ 2y/o   • JOINT REPLACEMENT      Left TKR   • ROTATOR CUFF REPAIR Bilateral    • UMBILICAL HERNIA REPAIR       /75 (BP Location: Left arm, Patient Position: Sitting)   Pulse 86   Ht 172.7 cm (68\")   Wt 113 kg (249 lb)   SpO2 95%   BMI 37.86 kg/m²   Family History   Problem Relation Age of Onset   • Diabetes Mother    • Heart disease Mother    • Arthritis " Mother    • Obesity Mother    • Hypertension Mother    • Migraines Mother    • Osteoporosis Mother    • Diabetes Father    • Heart disease Father    • Arthritis Father    • Hypertension Father    • Stroke Father    • Heart attack Father    • Hyperlipidemia Father    • Diabetes Brother    • Heart disease Brother    • Arthritis Sister    • Anemia Sister        Current Outpatient Medications:   •  apixaban (ELIQUIS) 5 MG tablet tablet, Take 1 tablet by mouth Every 12 (Twelve) Hours., Disp: 60 tablet, Rfl: 3  •  atorvastatin (LIPITOR) 80 MG tablet, Take 1 tablet by mouth Every Night for 90 days., Disp: 30 tablet, Rfl: 2  •  diazePAM (VALIUM) 5 MG tablet, Take 1 tablet by mouth Daily As Needed. 1/2 -1 tab po qday prn panic, Disp: 1 tablet, Rfl: 0  •  HYDROcodone-acetaminophen (NORCO)  MG per tablet, Take 1 tablet by mouth 2 (Two) Times a Day As Needed for Moderate Pain  or Severe Pain ., Disp: 14 tablet, Rfl: 0  •  Insulin Glargine (BASAGLAR KWIKPEN) 100 UNIT/ML injection pen, Inject 30 Units under the skin into the appropriate area as directed Daily., Disp: 3 pen, Rfl: 0  •  Insulin Lispro, 1 Unit Dial, (HUMALOG KWIKPEN) 100 UNIT/ML solution pen-injector, Inject 10 Units under the skin into the appropriate area as directed 3 (Three) Times a Day Before Meals., Disp: 1 pen, Rfl: 0  •  Insulin Pen Needle (PEN NEEDLES) 32G X 4 MM misc, 1 each 4 (Four) Times a Day Before Meals & at Bedtime., Disp: 200 each, Rfl: 0  •  Isopropyl Alcohol (ALCOHOL WIPES) 70 % misc, Apply 1 each topically 4 (Four) Times a Day Before Meals & at Bedtime., Disp: 200 each, Rfl: 0  •  pregabalin (LYRICA) 75 MG capsule, Take 1 capsule by mouth 2 (Two) Times a Day., Disp: 60 capsule, Rfl: 1  •  sertraline (ZOLOFT) 50 MG tablet, Take 1 tablet by mouth Every Night., Disp: 30 tablet, Rfl: 1  No Known Allergies  Social History     Socioeconomic History   • Marital status: Single     Spouse name: Not on file   • Number of children: Not on file   •  Years of education: Not on file   • Highest education level: Not on file   Tobacco Use   • Smoking status: Never Smoker   • Smokeless tobacco: Never Used   Substance and Sexual Activity   • Alcohol use: Yes     Alcohol/week: 1.0 standard drinks     Types: 1 Cans of beer per week   • Drug use: Never   • Sexual activity: Defer     Review of Systems   Constitution: Positive for malaise/fatigue. Negative for fever.   Cardiovascular: Negative for chest pain, dyspnea on exertion, irregular heartbeat, leg swelling and palpitations.   Respiratory: Positive for shortness of breath. Negative for cough.    Skin: Negative for rash.   Gastrointestinal: Negative for abdominal pain, nausea and vomiting.   Neurological: Positive for headaches. Negative for dizziness, focal weakness and light-headedness.   All other systems reviewed and are negative.             Objective:     Physical Exam   Constitutional: He appears well-developed and well-nourished.   HENT:   Head: Normocephalic and atraumatic.   Eyes: Conjunctivae are normal. No scleral icterus.   Neck: Normal range of motion. Neck supple. No JVD present. Carotid bruit is not present.   Cardiovascular: Normal rate, regular rhythm, S1 normal, S2 normal, normal heart sounds and intact distal pulses. PMI is not displaced.   Pulmonary/Chest: Effort normal and breath sounds normal. He has no wheezes. He has no rales.   Abdominal: Soft. Bowel sounds are normal.   Neurological: He is alert. He has normal strength.   Skin: Skin is warm and dry. No rash noted.     Procedures    Lab Review:       Assessment:          Diagnosis Plan   1. Mixed hyperlipidemia     2. PFO (patent foramen ovale)     3. Cerebrovascular accident (CVA) due to embolism of precerebral artery (CMS/HCC)     4. Type 2 diabetes mellitus with hyperglycemia, without long-term current use of insulin (CMS/HCC)            Plan:       Patient has a patent foramen ovale and is currently on Eliquis  Patient had a CVA and is  currently stable without any residual deficits  Patient is currently on medical therapy with Eliquis and statins  Patient sugar levels and blood pressure are followed by the primary care doctor  We will follow-up in 3 months and then schedule him for a PFO closure.

## 2020-01-17 ENCOUNTER — OFFICE VISIT (OUTPATIENT)
Dept: FAMILY MEDICINE CLINIC | Facility: CLINIC | Age: 65
End: 2020-01-17

## 2020-01-17 VITALS
BODY MASS INDEX: 37.28 KG/M2 | WEIGHT: 246 LBS | HEIGHT: 68 IN | DIASTOLIC BLOOD PRESSURE: 90 MMHG | SYSTOLIC BLOOD PRESSURE: 146 MMHG | RESPIRATION RATE: 14 BRPM | OXYGEN SATURATION: 99 % | TEMPERATURE: 97.4 F | HEART RATE: 80 BPM

## 2020-01-17 DIAGNOSIS — M54.42 CHRONIC MIDLINE LOW BACK PAIN WITH LEFT-SIDED SCIATICA: ICD-10-CM

## 2020-01-17 DIAGNOSIS — E11.42 DIABETIC POLYNEUROPATHY ASSOCIATED WITH TYPE 2 DIABETES MELLITUS (HCC): ICD-10-CM

## 2020-01-17 DIAGNOSIS — G89.29 CHRONIC MIDLINE LOW BACK PAIN WITH LEFT-SIDED SCIATICA: ICD-10-CM

## 2020-01-17 DIAGNOSIS — F32.5 MAJOR DEPRESSIVE DISORDER IN FULL REMISSION, UNSPECIFIED WHETHER RECURRENT (HCC): ICD-10-CM

## 2020-01-17 DIAGNOSIS — I10 ESSENTIAL HYPERTENSION: ICD-10-CM

## 2020-01-17 DIAGNOSIS — J06.9 ACUTE URI: ICD-10-CM

## 2020-01-17 DIAGNOSIS — IMO0002: Primary | ICD-10-CM

## 2020-01-17 DIAGNOSIS — Z23 FLU VACCINE NEED: ICD-10-CM

## 2020-01-17 PROCEDURE — 90471 IMMUNIZATION ADMIN: CPT | Performed by: FAMILY MEDICINE

## 2020-01-17 PROCEDURE — 90674 CCIIV4 VAC NO PRSV 0.5 ML IM: CPT | Performed by: FAMILY MEDICINE

## 2020-01-17 PROCEDURE — 99214 OFFICE O/P EST MOD 30 MIN: CPT | Performed by: FAMILY MEDICINE

## 2020-01-17 RX ORDER — ASPIRIN 81 MG/1
81 TABLET ORAL DAILY
COMMUNITY
End: 2020-06-19 | Stop reason: SDUPTHER

## 2020-01-17 RX ORDER — INSULIN LISPRO 100 [IU]/ML
10 INJECTION, SOLUTION INTRAVENOUS; SUBCUTANEOUS
Qty: 9 PEN | Refills: 0 | Status: SHIPPED | OUTPATIENT
Start: 2020-01-17 | End: 2020-04-06

## 2020-01-17 RX ORDER — PREGABALIN 75 MG/1
75 CAPSULE ORAL 3 TIMES DAILY
Qty: 90 CAPSULE | Refills: 1 | Status: SHIPPED | OUTPATIENT
Start: 2020-01-17 | End: 2020-04-28 | Stop reason: SDUPTHER

## 2020-01-17 RX ORDER — HYDROCODONE BITARTRATE AND ACETAMINOPHEN 10; 325 MG/1; MG/1
1 TABLET ORAL EVERY 8 HOURS PRN
Qty: 90 TABLET | Refills: 0 | Status: SHIPPED | OUTPATIENT
Start: 2020-01-17 | End: 2020-02-12 | Stop reason: SDUPTHER

## 2020-01-18 PROBLEM — I10 HYPERTENSION: Status: ACTIVE | Noted: 2020-01-18

## 2020-01-18 PROBLEM — I25.10 NONOBSTRUCTIVE ATHEROSCLEROSIS OF CORONARY ARTERY: Status: ACTIVE | Noted: 2020-01-18

## 2020-01-18 NOTE — PROGRESS NOTES
Subjective   Humble Green is a 64 y.o. male.     Chief Complaint   Patient presents with   • Sore Throat     runny nose,sore throat x3days    • Hypertension   • Diabetes   • Pain         Current Outpatient Medications:   •  apixaban (ELIQUIS) 5 MG tablet tablet, Take 1 tablet by mouth Every 12 (Twelve) Hours., Disp: 60 tablet, Rfl: 3  •  aspirin 81 MG EC tablet, Take 81 mg by mouth Daily., Disp: , Rfl:   •  atorvastatin (LIPITOR) 80 MG tablet, Take 1 tablet by mouth Every Night for 90 days., Disp: 30 tablet, Rfl: 2  •  diazePAM (VALIUM) 5 MG tablet, Take 1 tablet by mouth Daily As Needed. 1/2 -1 tab po qday prn panic, Disp: 1 tablet, Rfl: 0  •  glucose blood (TRUE METRIX BLOOD GLUCOSE TEST) test strip, Check sugar twice daily. Diagnosis E11.65, Disp: 300 each, Rfl: 3  •  HYDROcodone-acetaminophen (NORCO)  MG per tablet, Take 1 tablet by mouth Every 8 (Eight) Hours As Needed for Moderate Pain  or Severe Pain ., Disp: 90 tablet, Rfl: 0  •  Insulin Glargine (BASAGLAR KWIKPEN) 100 UNIT/ML injection pen, Inject 30 Units under the skin into the appropriate area as directed Daily., Disp: 3 pen, Rfl: 0  •  Insulin Lispro, 1 Unit Dial, (HUMALOG KWIKPEN) 100 UNIT/ML solution pen-injector, Inject 10 Units under the skin into the appropriate area as directed 3 (Three) Times a Day Before Meals., Disp: 9 pen, Rfl: 0  •  Insulin Pen Needle (PEN NEEDLES) 32G X 4 MM misc, 1 each 4 (Four) Times a Day Before Meals & at Bedtime., Disp: 200 each, Rfl: 0  •  Isopropyl Alcohol (ALCOHOL WIPES) 70 % misc, Apply 1 each topically 4 (Four) Times a Day Before Meals & at Bedtime., Disp: 200 each, Rfl: 0  •  pregabalin (LYRICA) 75 MG capsule, Take 1 capsule by mouth 3 (Three) Times a Day., Disp: 90 capsule, Rfl: 1  •  sertraline (ZOLOFT) 50 MG tablet, Take 1 tablet by mouth Every Night., Disp: 90 tablet, Rfl: 1    Past Medical History:   Diagnosis Date   • Arthritis    • BPH (benign prostatic hyperplasia)    • Chronic back pain    •  Depression    • Hypertension    • Low back pain    • Low vision of right eye with normal vision in contralateral eye    • Memory problem    • Nonobstructive atherosclerosis of coronary artery    • PFO (patent foramen ovale) 11/29/2019       Past Surgical History:   Procedure Laterality Date   • ARM TENDON REPAIR Left    • CHOLECYSTECTOMY     • COLONOSCOPY      2018 = TA, rech 2023   • EYE SURGERY      Rt Eye Sx after trauma @ 4y/o   • JOINT REPLACEMENT      Left TKR   • ROTATOR CUFF REPAIR Bilateral    • UMBILICAL HERNIA REPAIR         Family History   Problem Relation Age of Onset   • Diabetes Mother    • Heart disease Mother    • Arthritis Mother    • Obesity Mother    • Hypertension Mother    • Migraines Mother    • Osteoporosis Mother    • Diabetes Father    • Heart disease Father    • Arthritis Father    • Hypertension Father    • Stroke Father    • Heart attack Father    • Hyperlipidemia Father    • Diabetes Brother    • Heart disease Brother    • Arthritis Sister    • Anemia Sister        Social History     Socioeconomic History   • Marital status: Single     Spouse name: Not on file   • Number of children: Not on file   • Years of education: Not on file   • Highest education level: Not on file   Tobacco Use   • Smoking status: Never Smoker   • Smokeless tobacco: Never Used   Substance and Sexual Activity   • Alcohol use: Yes     Alcohol/week: 1.0 standard drinks     Types: 1 Cans of beer per week   • Drug use: Never   • Sexual activity: Defer       65 y/o C male here for f/u on DMII/ Subacute CVA/ HTN and new URI symptoms    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    Review of Systems   Constitutional: Negative for activity change, appetite change, fever, unexpected weight gain and unexpected weight loss.   HENT: Positive for congestion, rhinorrhea and sore throat. Negative for ear pain and trouble swallowing.    Eyes: Negative for blurred vision, double vision, pain and visual disturbance.   Respiratory: Negative for cough and shortness of breath.    Cardiovascular: Negative for leg swelling.   Gastrointestinal: Negative for abdominal distention, abdominal pain, constipation, diarrhea, nausea and vomiting.   Endocrine: Negative for polydipsia, polyphagia and polyuria.   Genitourinary: Negative for frequency.   Musculoskeletal: Negative for gait problem.   Skin: Negative for color change, dry skin, rash and skin lesions.   Neurological: Negative for weakness and numbness.   Psychiatric/Behavioral: Negative for sleep disturbance.       Vitals:    01/17/20 0827   BP: 146/90   Pulse: 80   Resp: 14   Temp: 97.4 °F (36.3 °C)   SpO2: 99%       Objective   Physical Exam   Constitutional: He is oriented to person, place, and time. He appears well-developed and well-nourished. No distress.   HENT:   Head: Normocephalic and atraumatic.   Right Ear: External ear normal.   Left Ear: External ear normal.   Nose: Nose normal.   Mouth/Throat: Oropharynx is clear and moist. No oropharyngeal exudate.   Eyes: Pupils are equal, round, and reactive to light. Conjunctivae and EOM are normal. Right eye exhibits no discharge. Left eye exhibits no discharge. No scleral icterus.   Neck: Normal range of motion. Neck supple. No thyromegaly present.   Cardiovascular: Normal rate, regular rhythm, normal heart sounds and intact distal pulses.   No murmur heard.  Pulmonary/Chest: Effort normal and breath sounds normal. No respiratory distress. He has no wheezes. He has no rales.   Musculoskeletal: He exhibits no edema, tenderness or  deformity.    Humble had a diabetic foot exam performed today.    Neurological Sensory Findings - Unaltered hot/cold right ankle/foot discrimination and unaltered hot/cold left ankle/foot discrimination. Unaltered sharp/dull right ankle/foot discrimination and unaltered sharp/dull left ankle/foot discrimination.  Vascular Status -  His right foot exhibits normal foot vasculature  and no edema. His left foot exhibits normal foot vasculature  and no edema.  Skin Integrity  -  His right foot skin is intact.  He has no right foot onychomycosis, no right foot ulcer, no ingrown toenail on right foot, right heel is not dry and cracked, no right foot warmth, no right foot blister and no right foot gangrenous changes.His left foot skin is intact. He has no left foot onychomycosis, non-callous left foot, no left ingrown toenail, no left heel dry and cracked, no left foot warmth, no left foot blister and no left foot gangrenous changes..  Lymphadenopathy:     He has no cervical adenopathy.   Neurological: He is alert and oriented to person, place, and time. No cranial nerve deficit.   Skin: Skin is warm and dry. Capillary refill takes less than 2 seconds. No rash noted. He is not diaphoretic. No erythema. No pallor.   Psychiatric: He has a normal mood and affect. His behavior is normal. Judgment and thought content normal.   Nursing note and vitals reviewed.        Assessment/Plan   Humble was seen today for sore throat, hypertension, diabetes and pain.    Diagnoses and all orders for this visit:    Uncontrolled diabetes mellitus with diabetic autonomic neuropathy, with long-term current use of insulin (CMS/[facility])    Acute URI    Chronic midline low back pain with left-sided sciatica  -     HYDROcodone-acetaminophen (NORCO)  MG per tablet; Take 1 tablet by mouth Every 8 (Eight) Hours As Needed for Moderate Pain  or Severe Pain .    Major depressive disorder in full remission, unspecified whether recurrent  (CMS/HCC)    Essential hypertension    Diabetic polyneuropathy associated with type 2 diabetes mellitus (CMS/HCC)  -     pregabalin (LYRICA) 75 MG capsule; Take 1 capsule by mouth 3 (Three) Times a Day.    Flu vaccine need  -     Flucelvax Quad=>4Years (PFS)    Other orders  -     sertraline (ZOLOFT) 50 MG tablet; Take 1 tablet by mouth Every Night.  -     Insulin Lispro, 1 Unit Dial, (HUMALOG KWIKPEN) 100 UNIT/ML solution pen-injector; Inject 10 Units under the skin into the appropriate area as directed 3 (Three) Times a Day Before Meals.  -     glucose blood (TRUE METRIX BLOOD GLUCOSE TEST) test strip; Check sugar twice daily. Diagnosis E11.65    OTC URI symptomatic tx disc'd  A1C = 9.6   (11/19)  rech A1C 3mos w/ f/u appt  Flu shot today

## 2020-01-20 ENCOUNTER — HOSPITAL ENCOUNTER (OUTPATIENT)
Dept: SLEEP MEDICINE | Facility: HOSPITAL | Age: 65
Discharge: HOME | End: 2020-01-20

## 2020-02-06 ENCOUNTER — TELEPHONE (OUTPATIENT)
Dept: FAMILY MEDICINE CLINIC | Facility: CLINIC | Age: 65
End: 2020-02-06

## 2020-02-12 DIAGNOSIS — M54.42 CHRONIC MIDLINE LOW BACK PAIN WITH LEFT-SIDED SCIATICA: ICD-10-CM

## 2020-02-12 DIAGNOSIS — G89.29 CHRONIC MIDLINE LOW BACK PAIN WITH LEFT-SIDED SCIATICA: ICD-10-CM

## 2020-02-12 RX ORDER — HYDROCODONE BITARTRATE AND ACETAMINOPHEN 10; 325 MG/1; MG/1
1 TABLET ORAL EVERY 8 HOURS PRN
Qty: 90 TABLET | Refills: 0 | Status: SHIPPED | OUTPATIENT
Start: 2020-02-12 | End: 2020-03-06 | Stop reason: SDUPTHER

## 2020-02-12 NOTE — TELEPHONE ENCOUNTER
Patient is requesting his hydrocodone be called in to [facility] in [place] rather than [facility] because [facility] told him that they are out of stock and wont have any in stock again until Wednesday

## 2020-03-06 ENCOUNTER — OFFICE VISIT (OUTPATIENT)
Dept: FAMILY MEDICINE CLINIC | Facility: CLINIC | Age: 65
End: 2020-03-06

## 2020-03-06 VITALS
DIASTOLIC BLOOD PRESSURE: 79 MMHG | RESPIRATION RATE: 16 BRPM | TEMPERATURE: 98.2 F | OXYGEN SATURATION: 98 % | HEIGHT: 68 IN | WEIGHT: 252 LBS | BODY MASS INDEX: 38.19 KG/M2 | HEART RATE: 85 BPM | SYSTOLIC BLOOD PRESSURE: 144 MMHG

## 2020-03-06 DIAGNOSIS — IMO0002: Primary | ICD-10-CM

## 2020-03-06 DIAGNOSIS — M54.42 CHRONIC MIDLINE LOW BACK PAIN WITH LEFT-SIDED SCIATICA: ICD-10-CM

## 2020-03-06 DIAGNOSIS — G89.29 CHRONIC MIDLINE LOW BACK PAIN WITH LEFT-SIDED SCIATICA: ICD-10-CM

## 2020-03-06 DIAGNOSIS — F33.1 MAJOR DEPRESSIVE DISORDER, RECURRENT EPISODE, MODERATE DEGREE (HCC): ICD-10-CM

## 2020-03-06 LAB
GLUCOSE BLDC GLUCOMTR-MCNC: 194 MG/DL (ref 70–130)
HBA1C MFR BLD: 8.6 %

## 2020-03-06 PROCEDURE — 99214 OFFICE O/P EST MOD 30 MIN: CPT | Performed by: FAMILY MEDICINE

## 2020-03-06 PROCEDURE — 83036 HEMOGLOBIN GLYCOSYLATED A1C: CPT | Performed by: FAMILY MEDICINE

## 2020-03-06 PROCEDURE — 82962 GLUCOSE BLOOD TEST: CPT | Performed by: FAMILY MEDICINE

## 2020-03-06 RX ORDER — FLUOXETINE HYDROCHLORIDE 40 MG/1
40 CAPSULE ORAL DAILY
Qty: 90 CAPSULE | Refills: 0 | Status: SHIPPED | OUTPATIENT
Start: 2020-03-06 | End: 2020-06-19 | Stop reason: SDUPTHER

## 2020-03-06 RX ORDER — CYCLOBENZAPRINE HCL 10 MG
10 TABLET ORAL NIGHTLY PRN
Qty: 30 TABLET | Refills: 0 | Status: SHIPPED | OUTPATIENT
Start: 2020-03-06 | End: 2020-04-17 | Stop reason: SDUPTHER

## 2020-03-06 RX ORDER — HYDROCODONE BITARTRATE AND ACETAMINOPHEN 10; 325 MG/1; MG/1
1 TABLET ORAL EVERY 6 HOURS PRN
Qty: 120 TABLET | Refills: 0 | Status: SHIPPED | OUTPATIENT
Start: 2020-03-06 | End: 2020-03-30

## 2020-03-07 NOTE — PROGRESS NOTES
Subjective   Humble Green is a 64 y.o. male.     Chief Complaint   Patient presents with   • Diabetes     Patient was given a accu-chek guide meter today.    • Hypertension     needs a refill on everything pulse he is wanting diabetic foot cream for his feet.    • Depression         Current Outpatient Medications:   •  apixaban (ELIQUIS) 5 MG tablet tablet, Take 1 tablet by mouth Every 12 (Twelve) Hours., Disp: 60 tablet, Rfl: 3  •  aspirin 81 MG EC tablet, Take 81 mg by mouth Daily., Disp: , Rfl:   •  glucose blood (ACCU-CHEK GUIDE) test strip, Check sugar BID with Accu-chek guide glucometer. Diagnosis:E11.65, Disp: , Rfl:   •  HYDROcodone-acetaminophen (NORCO)  MG per tablet, Take 1 tablet by mouth Every 6 (Six) Hours As Needed for Moderate Pain  or Severe Pain ., Disp: 120 tablet, Rfl: 0  •  Insulin Glargine (BASAGLAR KWIKPEN) 100 UNIT/ML injection pen, Inject 30 Units under the skin into the appropriate area as directed Daily., Disp: 3 pen, Rfl: 0  •  Insulin Lispro, 1 Unit Dial, (HUMALOG KWIKPEN) 100 UNIT/ML solution pen-injector, Inject 10 Units under the skin into the appropriate area as directed 3 (Three) Times a Day Before Meals., Disp: 9 pen, Rfl: 0  •  Insulin Pen Needle (PEN NEEDLES) 32G X 4 MM misc, 1 each 4 (Four) Times a Day Before Meals & at Bedtime., Disp: 200 each, Rfl: 0  •  Isopropyl Alcohol (ALCOHOL WIPES) 70 % misc, Apply 1 each topically 4 (Four) Times a Day Before Meals & at Bedtime., Disp: 200 each, Rfl: 0  •  atorvastatin (LIPITOR) 80 MG tablet, Take 1 tablet by mouth Every Night for 90 days., Disp: 30 tablet, Rfl: 2  •  cyclobenzaprine (FLEXERIL) 10 MG tablet, Take 1 tablet by mouth At Night As Needed for Muscle Spasms., Disp: 30 tablet, Rfl: 0  •  Dapagliflozin Propanediol (FARXIGA) 10 MG tablet, Take 10 mg by mouth Daily With Breakfast., Disp: 30 tablet, Rfl: 0  •  FLUoxetine (PROZAC) 40 MG capsule, Take 1 capsule by mouth Daily., Disp: 90 capsule, Rfl: 0  •  pregabalin (LYRICA)  75 MG capsule, Take 1 capsule by mouth 3 (Three) Times a Day., Disp: 90 capsule, Rfl: 1    Past Medical History:   Diagnosis Date   • Arthritis    • BPH (benign prostatic hyperplasia)    • Chronic back pain    • Depression    • Hypertension    • Low back pain    • Low vision of right eye with normal vision in contralateral eye    • Memory problem    • Nonobstructive atherosclerosis of coronary artery    • PFO (patent foramen ovale) 11/29/2019       Past Surgical History:   Procedure Laterality Date   • ARM TENDON REPAIR Left    • CHOLECYSTECTOMY     • COLONOSCOPY      2018 = TA, rech 2023   • EYE SURGERY      Rt Eye Sx after trauma @ 4y/o   • JOINT REPLACEMENT      Left TKR   • ROTATOR CUFF REPAIR Bilateral    • UMBILICAL HERNIA REPAIR         Family History   Problem Relation Age of Onset   • Diabetes Mother    • Heart disease Mother    • Arthritis Mother    • Obesity Mother    • Hypertension Mother    • Migraines Mother    • Osteoporosis Mother    • Diabetes Father    • Heart disease Father    • Arthritis Father    • Hypertension Father    • Stroke Father    • Heart attack Father    • Hyperlipidemia Father    • Diabetes Brother    • Heart disease Brother    • Arthritis Sister    • Anemia Sister        Social History     Socioeconomic History   • Marital status: Single     Spouse name: Not on file   • Number of children: Not on file   • Years of education: Not on file   • Highest education level: Not on file   Tobacco Use   • Smoking status: Never Smoker   • Smokeless tobacco: Never Used   Substance and Sexual Activity   • Alcohol use: Yes     Alcohol/week: 1.0 standard drinks     Types: 1 Cans of beer per week   • Drug use: Never   • Sexual activity: Defer       63 y/o C male here for f/u on DMII/ HTN/ Depression/ Chronic low back pain    Pt frustrated because his ins hasnt sent him a card so he can get his meds-----he has tried to call and has a f/u appt on MON but cant afford the meds w/o the ins card.....the  patient states he is having to take his pain med up to 5 times a day since ran out of Lyrica.......    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    He needs a new meter since old one has too $$ strips    Pt states he has been on Prozac 40mg in past w/ good response and not sure why he stopped taking it----zoloft isn't working        The following portions of the patient's history were reviewed and updated as appropriate: allergies, current medications, past family history, past medical history, past social history, past surgical history and problem list.    Review of Systems   Constitutional: Negative for appetite change.   Eyes: Negative for visual disturbance.   Musculoskeletal: Positive for arthralgias, back pain and myalgias.   Skin: Negative for rash.   Psychiatric/Behavioral: Positive for dysphoric mood, depressed mood and stress.       Vitals:    03/06/20 1356   BP: 144/79   Pulse: 85   Resp: 16   Temp: 98.2 °F (36.8 °C)   SpO2: 98%       Objective   Physical Exam   Constitutional: He is oriented to person, place, and time. He appears well-developed and well-nourished.   Cardiovascular: Normal rate, regular rhythm, normal heart sounds and intact distal pulses.   No murmur heard.  Pulmonary/Chest: Effort normal and breath sounds normal.   Musculoskeletal: He exhibits no edema, tenderness or deformity.    Humble had a diabetic foot exam performed today.   During the foot exam he had a monofilament test performed.    Neurological Sensory Findings - Unaltered hot/cold right ankle/foot discrimination and unaltered hot/cold left ankle/foot discrimination. Unaltered sharp/dull right ankle/foot discrimination and unaltered sharp/dull left ankle/foot discrimination.  Vascular Status -  His right foot exhibits normal foot vasculature  and no  edema. His left foot exhibits normal foot vasculature  and no edema.  Skin Integrity  -  His right foot skin is intact.  He has no right foot onychomycosis, no right foot ulcer, no ingrown toenail on right foot, right heel is not dry and cracked, no right foot warmth, no right foot blister and no right foot gangrenous changes.His left foot skin is intact. He has no left foot onychomycosis, non-callous left foot, no left ingrown toenail, no left heel dry and cracked, no left foot warmth, no left foot blister and no left foot gangrenous changes..  Neurological: He is alert and oriented to person, place, and time. No cranial nerve deficit.   Skin: Skin is warm and dry. Capillary refill takes less than 2 seconds. No rash noted. No erythema.   Psychiatric: His behavior is normal. Judgment and thought content normal. Cognition and memory are normal. He exhibits a depressed mood (tearful).   Nursing note and vitals reviewed.        Assessment/Plan   Humble was seen today for diabetes, hypertension and depression.    Diagnoses and all orders for this visit:    Uncontrolled diabetes mellitus with diabetic autonomic neuropathy, with long-term current use of insulin (CMS/[facility])  -     POC Glycosylated Hemoglobin (Hb A1C)  -     POCT Glucose    Chronic midline low back pain with left-sided sciatica  -     HYDROcodone-acetaminophen (NORCO)  MG per tablet; Take 1 tablet by mouth Every 6 (Six) Hours As Needed for Moderate Pain  or Severe Pain .    Major depressive disorder, recurrent episode, moderate degree (CMS/[facility])    Other orders  -     FLUoxetine (PROZAC) 40 MG capsule; Take 1 capsule by mouth Daily.  -     Dapagliflozin Propanediol (FARXIGA) 10 MG tablet; Take 10 mg by mouth Daily With Breakfast.  -     cyclobenzaprine (FLEXERIL) 10 MG tablet; Take 1 tablet by mouth At Night As Needed for Muscle Spasms.    Basaglar insulin pen x 2 samples given  Eliquis 5mg samples x 3weeks given  Farxiga 10mg qday samples/ Rx/  Coupon  D/c Zoloft and start Prozac 40mg po qday  Rx Norco qid until Lyrica avail w/ ins  Accuchek meter given at appt  RTC 1mo

## 2020-03-30 DIAGNOSIS — M54.42 CHRONIC MIDLINE LOW BACK PAIN WITH LEFT-SIDED SCIATICA: ICD-10-CM

## 2020-03-30 DIAGNOSIS — G89.29 CHRONIC MIDLINE LOW BACK PAIN WITH LEFT-SIDED SCIATICA: ICD-10-CM

## 2020-03-30 RX ORDER — HYDROCODONE BITARTRATE AND ACETAMINOPHEN 10; 325 MG/1; MG/1
TABLET ORAL
Qty: 120 TABLET | Refills: 0 | Status: SHIPPED | OUTPATIENT
Start: 2020-03-30 | End: 2020-04-23 | Stop reason: SDUPTHER

## 2020-03-30 NOTE — TELEPHONE ENCOUNTER
Last visit: 3/6/20  Next visit: 4/6/20  Last labs:3/6/20     Rx requested: HYDROcodone-acetaminophen  MG per tablet  Pharmacy: [facility] in [place]

## 2020-04-06 ENCOUNTER — OFFICE VISIT (OUTPATIENT)
Dept: FAMILY MEDICINE CLINIC | Facility: CLINIC | Age: 65
End: 2020-04-06

## 2020-04-06 VITALS
HEART RATE: 78 BPM | OXYGEN SATURATION: 97 % | HEIGHT: 68 IN | DIASTOLIC BLOOD PRESSURE: 88 MMHG | SYSTOLIC BLOOD PRESSURE: 148 MMHG | RESPIRATION RATE: 18 BRPM | TEMPERATURE: 98.1 F | BODY MASS INDEX: 38.04 KG/M2 | WEIGHT: 251 LBS

## 2020-04-06 DIAGNOSIS — F33.1 MAJOR DEPRESSIVE DISORDER, RECURRENT EPISODE, MODERATE DEGREE (HCC): ICD-10-CM

## 2020-04-06 DIAGNOSIS — I10 UNCONTROLLED HYPERTENSION: ICD-10-CM

## 2020-04-06 DIAGNOSIS — E11.42 TYPE 2 DIABETES MELLITUS WITH DIABETIC POLYNEUROPATHY, WITHOUT LONG-TERM CURRENT USE OF INSULIN (HCC): Primary | ICD-10-CM

## 2020-04-06 PROBLEM — E11.9 DIABETES MELLITUS: Status: ACTIVE | Noted: 2020-04-06

## 2020-04-06 PROCEDURE — 99214 OFFICE O/P EST MOD 30 MIN: CPT | Performed by: FAMILY MEDICINE

## 2020-04-06 RX ORDER — LISINOPRIL 20 MG/1
20 TABLET ORAL DAILY
Qty: 90 TABLET | Refills: 0 | Status: SHIPPED | OUTPATIENT
Start: 2020-04-06 | End: 2020-06-19 | Stop reason: SDUPTHER

## 2020-04-06 RX ORDER — INSULIN GLARGINE 100 [IU]/ML
33 INJECTION, SOLUTION SUBCUTANEOUS EVERY MORNING
Qty: 3 PEN | Refills: 0 | Status: SHIPPED
Start: 2020-04-06 | End: 2020-04-23 | Stop reason: SDUPTHER

## 2020-04-06 RX ORDER — INSULIN GLARGINE 100 [IU]/ML
33 INJECTION, SOLUTION SUBCUTANEOUS DAILY
Qty: 3 PEN | Refills: 0 | Status: SHIPPED
Start: 2020-04-06 | End: 2020-04-06

## 2020-04-06 RX ORDER — INSULIN LISPRO 100 [IU]/ML
5 INJECTION, SOLUTION INTRAVENOUS; SUBCUTANEOUS
Qty: 9 PEN | Refills: 0 | Status: SHIPPED
Start: 2020-04-06 | End: 2020-06-19 | Stop reason: SDUPTHER

## 2020-04-06 NOTE — PROGRESS NOTES
Subjective   Humble Green is a 64 y.o. male.     Chief Complaint   Patient presents with   • Heart Problem     sx postponed (Dr Doyle)   • Diabetes         Current Outpatient Medications:   •  apixaban (ELIQUIS) 5 MG tablet tablet, Take 1 tablet by mouth Every 12 (Twelve) Hours., Disp: 60 tablet, Rfl: 3  •  aspirin 81 MG EC tablet, Take 81 mg by mouth Daily., Disp: , Rfl:   •  Dapagliflozin Propanediol (FARXIGA) 10 MG tablet, Take 10 mg by mouth Daily With Breakfast., Disp: 30 tablet, Rfl: 0  •  FLUoxetine (PROZAC) 40 MG capsule, Take 1 capsule by mouth Daily., Disp: 90 capsule, Rfl: 0  •  glucose blood (ACCU-CHEK GUIDE) test strip, Check sugar BID with Accu-chek guide glucometer. Diagnosis:E11.65, Disp: , Rfl:   •  HYDROcodone-acetaminophen (NORCO)  MG per tablet, TAKE 1 TABLET BY MOUTH EVERY SIX HOURS AS NEEDED FOR MODERATE OR SEVERE PAIN, Disp: 120 tablet, Rfl: 0  •  Insulin Glargine (BASAGLAR KWIKPEN) 100 UNIT/ML injection pen, Inject 33 Units under the skin into the appropriate area as directed Every Morning., Disp: 3 pen, Rfl: 0  •  Insulin Lispro, 1 Unit Dial, (HumaLOG KwikPen) 100 UNIT/ML solution pen-injector, Inject 5 Units under the skin into the appropriate area as directed 3 (Three) Times a Day Before Meals., Disp: 9 pen, Rfl: 0  •  Insulin Pen Needle (PEN NEEDLES) 32G X 4 MM misc, 1 each 4 (Four) Times a Day Before Meals & at Bedtime., Disp: 200 each, Rfl: 0  •  Isopropyl Alcohol (ALCOHOL WIPES) 70 % misc, Apply 1 each topically 4 (Four) Times a Day Before Meals & at Bedtime., Disp: 200 each, Rfl: 0  •  pregabalin (LYRICA) 75 MG capsule, Take 1 capsule by mouth 3 (Three) Times a Day., Disp: 90 capsule, Rfl: 1  •  cyclobenzaprine (FLEXERIL) 10 MG tablet, Take 1 tablet by mouth At Night As Needed for Muscle Spasms., Disp: 30 tablet, Rfl: 0  •  lisinopril (PRINIVIL,ZESTRIL) 20 MG tablet, Take 1 tablet by mouth Daily., Disp: 90 tablet, Rfl: 0    Past Medical History:   Diagnosis Date   • Arthritis     • BPH (benign prostatic hyperplasia)    • Chronic back pain    • Diabetes mellitus (CMS/HCC)    • Hypertension    • Low back pain    • Low vision of right eye with normal vision in contralateral eye    • Memory problem    • Nonobstructive atherosclerosis of coronary artery    • PFO (patent foramen ovale) 11/29/2019       Past Surgical History:   Procedure Laterality Date   • ARM TENDON REPAIR Left    • CHOLECYSTECTOMY     • COLONOSCOPY      2018 = TA, rech 2023   • EYE SURGERY      Rt Eye Sx after trauma @ 4y/o   • JOINT REPLACEMENT      Left TKR   • ROTATOR CUFF REPAIR Bilateral    • UMBILICAL HERNIA REPAIR         Family History   Problem Relation Age of Onset   • Diabetes Mother    • Heart disease Mother    • Arthritis Mother    • Obesity Mother    • Hypertension Mother    • Migraines Mother    • Osteoporosis Mother    • Diabetes Father    • Heart disease Father    • Arthritis Father    • Hypertension Father    • Stroke Father    • Heart attack Father    • Hyperlipidemia Father    • Diabetes Brother    • Heart disease Brother    • Arthritis Sister    • Anemia Sister        Social History     Socioeconomic History   • Marital status: Single     Spouse name: Not on file   • Number of children: Not on file   • Years of education: Not on file   • Highest education level: Not on file   Tobacco Use   • Smoking status: Never Smoker   • Smokeless tobacco: Never Used   Substance and Sexual Activity   • Alcohol use: Yes     Alcohol/week: 1.0 standard drinks     Types: 1 Cans of beer per week   • Drug use: Never   • Sexual activity: Defer       63 y/o C male here for f/u on DMII/ PFO/ Elev BP/ Depression    Pt states he is still waiting on his ins to give him cards and paying for meds out of pocket; 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  Pt using Basaglar pen @ 30 units qday and about 3 units of the other pen @ each meal but   BS QAC < 220 usually; Pt states doing pretty good w/ the DM  diet but exercise is limited due to trailor and limited o/s area  Hollie the Farxiga w/o side effects    Pt states his heart sx was post-poned due to virus outbreak----taking the samples of eliquis bid w/o a prob  Hollie other meds/ doses w/o difficulty       The following portions of the patient's history were reviewed and updated as appropriate: allergies, current medications, past family history, past medical history, past social history, past surgical history and problem list.    Review of Systems   Constitutional: Negative for unexpected weight gain and unexpected weight loss.   Eyes: Negative for blurred vision, double vision, pain and visual disturbance.   Cardiovascular: Negative for leg swelling.   Gastrointestinal: Negative for abdominal distention, abdominal pain, constipation, diarrhea, nausea and vomiting.   Endocrine: Negative for polydipsia, polyphagia and polyuria.   Genitourinary: Negative for frequency.   Musculoskeletal: Negative for gait problem.   Skin: Negative for color change, dry skin, rash and skin lesions.   Neurological: Negative for weakness and numbness.   Psychiatric/Behavioral: Negative for decreased concentration, dysphoric mood, sleep disturbance and depressed mood. The patient is not nervous/anxious.        Vitals:    04/06/20 0924   BP: 148/88   Pulse:    Resp:    Temp:    SpO2:        Objective   Physical Exam   Constitutional: He is oriented to person, place, and time. He appears well-developed and well-nourished.   HENT:   Head: Normocephalic and atraumatic.   Musculoskeletal: He exhibits no edema.   Neurological: He is alert and oriented to person, place, and time. No cranial nerve deficit.   Skin: Skin is warm and dry. No rash noted.   Psychiatric: He has a normal mood and affect. His behavior is normal. Judgment and thought content normal.   Nursing note and vitals reviewed.        Assessment/Plan   Humble was seen today for heart problem and diabetes.    Diagnoses and all orders  for this visit:    Type 2 diabetes mellitus with diabetic polyneuropathy, without long-term current use of insulin (CMS/HCC)    Uncontrolled hypertension    Major depressive disorder, recurrent episode, moderate degree (CMS/HCC)    Other orders  -     Discontinue: Insulin Glargine (BASAGLAR KWIKPEN) 100 UNIT/ML injection pen; Inject 33 Units under the skin into the appropriate area as directed Daily.  -     Insulin Lispro, 1 Unit Dial, (HumaLOG KwikPen) 100 UNIT/ML solution pen-injector; Inject 5 Units under the skin into the appropriate area as directed 3 (Three) Times a Day Before Meals.  -     Insulin Glargine (BASAGLAR KWIKPEN) 100 UNIT/ML injection pen; Inject 33 Units under the skin into the appropriate area as directed Every Morning.  -     lisinopril (PRINIVIL,ZESTRIL) 20 MG tablet; Take 1 tablet by mouth Daily.    Increase the basaglar to 33untis and Qac insulin to 5units   Pt drop off new readings in 1-2 weeks for poss insulin adjustments  Start Lisinopril 20mg qday  Stay on Prozac  Pt to call Ins office about his medical ins card

## 2020-04-07 ENCOUNTER — TELEPHONE (OUTPATIENT)
Dept: FAMILY MEDICINE CLINIC | Facility: CLINIC | Age: 65
End: 2020-04-07

## 2020-04-07 NOTE — TELEPHONE ENCOUNTER
Patient called and stated he was started on Lisinopril yesterday.  He stated that within 1-2 hours after taking medication that he had severe dizziness.  He hasn't taken it yet today because he wanted to check with you first to see if you think the dizziness was caused by the medication.

## 2020-04-08 NOTE — TELEPHONE ENCOUNTER
Patient called and stated that he took the 1/2 tab at bedtime last night and just wanted to let you know that so far he seems to be doing fine.

## 2020-04-14 ENCOUNTER — TELEPHONE (OUTPATIENT)
Dept: FAMILY MEDICINE CLINIC | Facility: CLINIC | Age: 65
End: 2020-04-14

## 2020-04-15 NOTE — TELEPHONE ENCOUNTER
FIRST-------Did his ins finally go thru?  Second ------how much insulin is he using before meals? And is he still taking 33 units Basaglar qday too?

## 2020-04-17 ENCOUNTER — TELEPHONE (OUTPATIENT)
Dept: FAMILY MEDICINE CLINIC | Facility: CLINIC | Age: 65
End: 2020-04-17

## 2020-04-17 RX ORDER — CYCLOBENZAPRINE HCL 10 MG
10 TABLET ORAL NIGHTLY PRN
Qty: 30 TABLET | Refills: 0 | Status: SHIPPED | OUTPATIENT
Start: 2020-04-17 | End: 2020-04-17 | Stop reason: SDUPTHER

## 2020-04-17 RX ORDER — CYCLOBENZAPRINE HCL 10 MG
10 TABLET ORAL NIGHTLY PRN
Qty: 30 TABLET | Refills: 0 | Status: SHIPPED | OUTPATIENT
Start: 2020-04-17 | End: 2020-06-19

## 2020-04-17 NOTE — TELEPHONE ENCOUNTER
"Pt called - requesting medication for back spasms/ muscle relaxer.    I saw that pt had rx for flexeril in March - pt stated he \"was not able to get it filled\"    [facility]  "

## 2020-04-23 DIAGNOSIS — M54.42 CHRONIC MIDLINE LOW BACK PAIN WITH LEFT-SIDED SCIATICA: ICD-10-CM

## 2020-04-23 DIAGNOSIS — G89.29 CHRONIC MIDLINE LOW BACK PAIN WITH LEFT-SIDED SCIATICA: ICD-10-CM

## 2020-04-23 RX ORDER — INSULIN GLARGINE 100 [IU]/ML
33 INJECTION, SOLUTION SUBCUTANEOUS EVERY MORNING
Qty: 4 PEN | Refills: 1 | Status: SHIPPED | OUTPATIENT
Start: 2020-04-23 | End: 2020-05-07

## 2020-04-23 RX ORDER — HYDROCODONE BITARTRATE AND ACETAMINOPHEN 10; 325 MG/1; MG/1
1 TABLET ORAL EVERY 6 HOURS PRN
Qty: 120 TABLET | Refills: 0 | Status: SHIPPED | OUTPATIENT
Start: 2020-04-23 | End: 2020-05-26

## 2020-04-23 NOTE — TELEPHONE ENCOUNTER
Pt called stating he got his INS back. Needs refills of Norco, Basaglar, Accucheck Strips - [facility]

## 2020-04-28 ENCOUNTER — OFFICE VISIT (OUTPATIENT)
Dept: FAMILY MEDICINE CLINIC | Facility: CLINIC | Age: 65
End: 2020-04-28

## 2020-04-28 DIAGNOSIS — G43.009 ATYPICAL MIGRAINE: Primary | ICD-10-CM

## 2020-04-28 DIAGNOSIS — E11.42 DIABETIC POLYNEUROPATHY ASSOCIATED WITH TYPE 2 DIABETES MELLITUS (HCC): ICD-10-CM

## 2020-04-28 PROCEDURE — 99213 OFFICE O/P EST LOW 20 MIN: CPT | Performed by: FAMILY MEDICINE

## 2020-04-28 RX ORDER — PREGABALIN 75 MG/1
75 CAPSULE ORAL 3 TIMES DAILY
Qty: 90 CAPSULE | Refills: 1 | Status: SHIPPED | OUTPATIENT
Start: 2020-04-28 | End: 2020-06-19 | Stop reason: SDUPTHER

## 2020-05-07 ENCOUNTER — TELEPHONE (OUTPATIENT)
Dept: FAMILY MEDICINE CLINIC | Facility: CLINIC | Age: 65
End: 2020-05-07

## 2020-05-12 ENCOUNTER — TELEPHONE (OUTPATIENT)
Dept: FAMILY MEDICINE CLINIC | Facility: CLINIC | Age: 65
End: 2020-05-12

## 2020-05-12 DIAGNOSIS — M54.50 CHRONIC MIDLINE LOW BACK PAIN WITHOUT SCIATICA: ICD-10-CM

## 2020-05-12 DIAGNOSIS — E11.42 DIABETIC POLYNEUROPATHY ASSOCIATED WITH TYPE 2 DIABETES MELLITUS (HCC): Primary | ICD-10-CM

## 2020-05-12 DIAGNOSIS — G89.29 CHRONIC MIDLINE LOW BACK PAIN WITHOUT SCIATICA: ICD-10-CM

## 2020-05-12 NOTE — TELEPHONE ENCOUNTER
Pt wants to know if you will send him for PT for his back.  He wants to go next door here.  Last seen 4/2020

## 2020-05-26 DIAGNOSIS — M54.42 CHRONIC MIDLINE LOW BACK PAIN WITH LEFT-SIDED SCIATICA: ICD-10-CM

## 2020-05-26 DIAGNOSIS — G89.29 CHRONIC MIDLINE LOW BACK PAIN WITH LEFT-SIDED SCIATICA: ICD-10-CM

## 2020-05-26 PROCEDURE — U0003 INFECTIOUS AGENT DETECTION BY NUCLEIC ACID (DNA OR RNA); SEVERE ACUTE RESPIRATORY SYNDROME CORONAVIRUS 2 (SARS-COV-2) (CORONAVIRUS DISEASE [COVID-19]), AMPLIFIED PROBE TECHNIQUE, MAKING USE OF HIGH THROUGHPUT TECHNOLOGIES AS DESCRIBED BY CMS-2020-01-R: HCPCS | Performed by: FAMILY MEDICINE

## 2020-05-26 RX ORDER — HYDROCODONE BITARTRATE AND ACETAMINOPHEN 10; 325 MG/1; MG/1
TABLET ORAL
Qty: 120 TABLET | Refills: 0 | Status: SHIPPED | OUTPATIENT
Start: 2020-05-26 | End: 2020-06-19 | Stop reason: SDUPTHER

## 2020-05-26 NOTE — TELEPHONE ENCOUNTER
Per PT [facility] staff. Patient came in to their office with a sore throat,fatigue and a temporal temp of 99.0 and a oral temp of 99.8. Patient was directed to go to the [facility] per the PT staff.

## 2020-06-01 ENCOUNTER — TREATMENT (OUTPATIENT)
Dept: PHYSICAL THERAPY | Facility: CLINIC | Age: 65
End: 2020-06-01

## 2020-06-01 DIAGNOSIS — M99.04 SEGMENTAL AND SOMATIC DYSFUNCTION OF SACRAL REGION: ICD-10-CM

## 2020-06-01 DIAGNOSIS — G89.29 CHRONIC MIDLINE LOW BACK PAIN WITHOUT SCIATICA: Primary | ICD-10-CM

## 2020-06-01 DIAGNOSIS — M54.50 CHRONIC MIDLINE LOW BACK PAIN WITHOUT SCIATICA: Primary | ICD-10-CM

## 2020-06-01 DIAGNOSIS — M99.05 SEGMENTAL AND SOMATIC DYSFUNCTION OF PELVIC REGION: ICD-10-CM

## 2020-06-01 DIAGNOSIS — M79.605 PAIN IN LEFT LEG: ICD-10-CM

## 2020-06-01 PROCEDURE — 97163 PT EVAL HIGH COMPLEX 45 MIN: CPT | Performed by: PHYSICAL THERAPIST

## 2020-06-01 NOTE — PROGRESS NOTES
Physical Therapy Initial Evaluation and Plan of Care    Patient: Humble Green   : 1955  Diagnosis/ICD-10 Code:  Chronic midline low back pain without sciatica [M54.5, G89.29]  Referring practitioner: Leda Duckworth DO  Date of Initial Visit: 2020  Today's Date: 2020  Patient seen for 1 sessions           Subjective Questionnaire: Oswestry: 86% limited      Subjective Evaluation    History of Present Illness  Mechanism of injury: Pt reports 3 yr hx LBP issues for DIANELYS. Pt states the spine is closing up around the sciatic nerve. Symptoms have worsened over time and reports L LE symptoms with pain down the back of the leg to the foot. Pt reports numbness B hips which he attributes to arthritis. Pt states that leg gives out on him especially if going up steps.  Pt moved from [place] in 10/2019 then had a CVA around that time with some reduced strength R side. Pt uses SPC prn, but did not bring today. No recent imaging. Dr. Duckworth referred for PT for the LB. Pt denies any falls in the last 12 months or bowel/bladder dysfunction.     RMD: 20    Reviewed meds/medical hx with pt. 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), colonoscopy, cholecystectomy; takes Hydrocodone 10 mg 1 tab q 6 hrs, insulin injectible, depression meds, Flexeril, baby aspirin per pt    Pain: 9/10 current, 4-5/10 at best, 10/10 at worst    Aggravating/functional factors: sitting prolonged, rising, standing, walking, moving around in bed, pushing, pulling, lifting, driving/riding, house work, yard work and personal care, stairs, sleep, work activities    Relieving factors: pain  meds    Social Hx: lives alone, ramp into house then it's level, 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     Patient Goals  Patient goals for therapy: decreased pain, improved balance, increased strength, independence with ADLs/IADLs, return to sport/leisure activities, increased motion and decreased edema  Patient goal: Be able to continue working; would love to return to [place] but knows he can't     Objective          Active Range of Motion     Lumbar   Flexion: 40 degrees with pain  Extension: 5 degrees with pain  Left lateral flexion: 20 degrees with pain  Right lateral flexion: 15 degrees with pain  Left rotation: 15 degrees with pain  Right rotation: 15 degrees with pain    Additional Active Range of Motion Details  Hip flex 90 L/103 R pain at hips  Knee ext la L/10 R pulling at hams    Strength/Myotome Testing     Left Hip   Planes of Motion   Flexion: 4-  External rotation: 4+  Internal rotation: 4+    Right Hip   Planes of Motion   Flexion: 4-  External rotation: 4+  Internal rotation: 4+    Left Ankle/Foot   Dorsiflexion: 4-  Inversion: 5  Eversion: 4-    Right Ankle/Foot   Dorsiflexion: 4  Inversion: 5  Eversion: 4-      Vitals: /84 ; HR 82; SpO2 95%    Posture/observation: head fwd/rounded shoulders, L shld elevated, convexity L in mid/low thor region; diastasis recti noted with attempts at trunk flex to reposition    Palpation: Mod TTP at sacral/SI regions; R on L & R on R sacrum; L ASIS superior/PSIS inferior; lumb vert & lower thor rotated L with reduced UPA on that side & reduced     Sensation: intact/equal to LT B LEs    DTRs: 1+ LEs    Clonus: (-)    Gait: no AD (uses SPC prn, not with pt);  antalgic with L forefoot abd, foot does not fully clear the floor & scuffs forefoot on ground with swing phase, slight trunk lat flex R during L swing phase    Balance: SLS unable without UE support; <2 s with UE support B; level ground; SBA    Bed Mobility: slow and cautious with mod difficulty    Transfers: I with mod difficulty rising requiring UE support on counter for A    Flexibility: Mod restricted hip IRs/ERs, hams L, ITB, hip flexors, adductors; min/mod lea R hams    Special Tests: 90/90 (+) L tighter than R, SLR (+) L for radiating pain LB to foot, TERESA (+) B, Ely's (+) B, Verona's (+) B, Modified Gary test (+) B, Femoral n test (+) L    Assessment & Plan     Assessment  Impairments: abnormal coordination, abnormal gait, abnormal muscle firing, abnormal muscle tone, abnormal or restricted ROM, activity intolerance, impaired balance, impaired physical strength, lacks appropriate home exercise program, pain with function, safety issue and weight-bearing intolerance  Assessment details: The patient is a 64 y.o. male who presents to physical therapy today for chronic midline low back pain without sciatica. Upon initial evaluation, the patient demonstrates the following impairments: pain, reduced posture, SI/IS dysfunction, decreased ROM/flexibility, strength, gait, balance and function. Due to these impairments, the patient is unable to/limited with: sitting prolonged, rising, standing, walking, moving around in bed, pushing, pulling, lifting, driving/riding, house work, yard work, stairs, sleep, work activities, and personal care. The patient would benefit from skilled PT services to address functional limitations and impairments and to improve patient quality of life.      Barriers to therapy: PFO, HTN, DM, arthritis, memory issues, hx CVA, anxiety all could affect PT Rx/progress/outcomes if exacerbated, unregulated or recurs  Prognosis: good    Goals  Plan Goals: STGs in 4 weeks:  Decrease pain to 5-6/10  on average  Increase trunk/LE ROM by 10 degrees where limited as much  Increase LE strength to 4/5 or better for those movements tested    LTGs by discharge  Increase trunk/LE ROM to WFL/WNL  Increase LE strength to 5/5   Pt will be able to ascend/descend stairs reciprocally with or without use of rail(s) and with minimal difficulty or pain and no reports of knee giving out  Pt will be able to sit/drive/ride for 30-60 mins without difficulty or pain  Pt will be able to stand 30-60 mins for basic ADLs without difficulty or pain  Pt will be able to walk 30-60 mins for grocery shopping without difficulty, pain or LOB  Pt will be able to wash/dress/groom without difficulty or increased pain  Pt will be able to lift/carry laundry baskets, pots/pans, garbage or grocery bags without difficulty or increased pain  Pt will be able to sleep uninterrupted by pain        Plan  Therapy options: will be seen for skilled physical therapy services  Planned modality interventions: cryotherapy, thermotherapy (hydrocollator packs), electrical stimulation/[place]alander stimulation and ultrasound  Planned therapy interventions: manual therapy, neuromuscular re-education, postural training, soft tissue mobilization, spinal/joint mobilization, strengthening, stretching, therapeutic activities, transfer training, abdominal trunk stabilization, ADL retraining, body mechanics training, home exercise program, gait training, functional ROM exercises, flexibility, balance/weight-bearing training, motor coordination training and joint mobilization  Other planned therapy interventions: *telehealth visits prn due to higher risk with Covid   Frequency: 3x week  Duration in visits: 20  Treatment plan discussed with: patient  Plan details: Pt in agreement.      History # of Personal Factors and/or Comorbidities: HIGH (3+)  Examination of Body System(s): # of elements: HIGH (4+)  Clinical Presentation: HIGH  Clinical Decision Making: HIGH      Timed:          Manual Therapy:         mins  56539;     Therapeutic Exercise:         mins  61089;     Neuromuscular Irina:        mins  96951;    Therapeutic Activity:          mins  04364;     Gait Training:           mins  42499;     Ultrasound:          mins  63876;    Ionto                                  mins   29061  Self Care                            mins   48406  Canalith Repos         mins 15686      Un-Timed:  Electrical Stimulation:         mins  79356 ( );  Dry Needling          mins self-pay  Traction          mins 20317  Low Eval          Mins  42723  Mod Eval          Mins  85337  High Eval                       55     Mins  37896  Re-Eval                               mins  58712        Timed Treatment:      mins   Total Treatment:        mins    PT SIGNATURE: Deepa Kingsley, PT   DATE TREATMENT INITIATED: 6/1/2020    Initial Certification  Certification Period: 8/30/2020  I certify that the therapy services are furnished while this patient is under my care.  The services outlined above are required by this patient, and will be reviewed every 90 days.     PHYSICIAN: Leda Duckworth,       DATE:     Please sign and return via fax to [phone]. Thank you, [facility].

## 2020-06-10 ENCOUNTER — TREATMENT (OUTPATIENT)
Dept: PHYSICAL THERAPY | Facility: CLINIC | Age: 65
End: 2020-06-10

## 2020-06-10 ENCOUNTER — OFFICE VISIT (OUTPATIENT)
Dept: CARDIOLOGY | Facility: CLINIC | Age: 65
End: 2020-06-10

## 2020-06-10 VITALS
HEART RATE: 82 BPM | HEIGHT: 69 IN | WEIGHT: 254 LBS | OXYGEN SATURATION: 97 % | SYSTOLIC BLOOD PRESSURE: 150 MMHG | DIASTOLIC BLOOD PRESSURE: 81 MMHG | BODY MASS INDEX: 37.62 KG/M2

## 2020-06-10 DIAGNOSIS — E78.2 MIXED HYPERLIPIDEMIA: Primary | ICD-10-CM

## 2020-06-10 DIAGNOSIS — M79.605 PAIN IN LEFT LEG: ICD-10-CM

## 2020-06-10 DIAGNOSIS — G89.29 CHRONIC MIDLINE LOW BACK PAIN WITHOUT SCIATICA: Primary | ICD-10-CM

## 2020-06-10 DIAGNOSIS — I25.10 NONOBSTRUCTIVE ATHEROSCLEROSIS OF CORONARY ARTERY: ICD-10-CM

## 2020-06-10 DIAGNOSIS — M54.50 CHRONIC MIDLINE LOW BACK PAIN WITHOUT SCIATICA: Primary | ICD-10-CM

## 2020-06-10 DIAGNOSIS — Q21.12 PFO (PATENT FORAMEN OVALE): ICD-10-CM

## 2020-06-10 DIAGNOSIS — E11.42 TYPE 2 DIABETES MELLITUS WITH DIABETIC POLYNEUROPATHY, WITHOUT LONG-TERM CURRENT USE OF INSULIN (HCC): ICD-10-CM

## 2020-06-10 DIAGNOSIS — M99.04 SEGMENTAL AND SOMATIC DYSFUNCTION OF SACRAL REGION: ICD-10-CM

## 2020-06-10 DIAGNOSIS — M99.05 SEGMENTAL AND SOMATIC DYSFUNCTION OF PELVIC REGION: ICD-10-CM

## 2020-06-10 DIAGNOSIS — I10 ESSENTIAL HYPERTENSION: ICD-10-CM

## 2020-06-10 DIAGNOSIS — R00.2 PALPITATIONS: ICD-10-CM

## 2020-06-10 DIAGNOSIS — I63.10 CEREBROVASCULAR ACCIDENT (CVA) DUE TO EMBOLISM OF PRECEREBRAL ARTERY (HCC): ICD-10-CM

## 2020-06-10 PROCEDURE — 97110 THERAPEUTIC EXERCISES: CPT | Performed by: PHYSICAL THERAPIST

## 2020-06-10 PROCEDURE — 97140 MANUAL THERAPY 1/> REGIONS: CPT | Performed by: PHYSICAL THERAPIST

## 2020-06-10 PROCEDURE — 97014 ELECTRIC STIMULATION THERAPY: CPT | Performed by: PHYSICAL THERAPIST

## 2020-06-10 PROCEDURE — 99214 OFFICE O/P EST MOD 30 MIN: CPT | Performed by: INTERNAL MEDICINE

## 2020-06-10 NOTE — PROGRESS NOTES
"    Subjective:     Encounter Date:06/10/2020      Patient ID: Humble Green is a 64 y.o. male.    Chief Complaint:  History of Present Illness 64-year-old white male with history of coronary artery disease history of patent foramen ovale history of arrhythmia with palpitations diabetes hypertension hyperlipidemia presents to my office for follow-up.  Patient is currently stable without incidence of chest pain or shortness of breath at rest on exertion.  No complaint of any PND orthopnea.  He has occasional palpitation without any dizziness syncope.  He has some swelling of the feet.  He is taking his medicines regularly.  He does not smoke.  He is trying to exercise regular.  He follows a good diet.    The following portions of the patient's history were reviewed and updated as appropriate: allergies, current medications, past family history, past medical history, past social history, past surgical history and problem list.  Past Medical History:   Diagnosis Date   • Arthritis    • BPH (benign prostatic hyperplasia)    • Chronic back pain    • Diabetes mellitus (CMS/HCC)    • Hypertension    • Low back pain    • Low vision of right eye with normal vision in contralateral eye    • Memory problem    • Nonobstructive atherosclerosis of coronary artery    • PFO (patent foramen ovale) 11/29/2019     Past Surgical History:   Procedure Laterality Date   • ARM TENDON REPAIR Left    • CHOLECYSTECTOMY     • COLONOSCOPY      2018 = TA, rech 2023   • EYE SURGERY      Rt Eye Sx after trauma @ 4y/o   • JOINT REPLACEMENT      Left TKR   • ROTATOR CUFF REPAIR Bilateral    • UMBILICAL HERNIA REPAIR       /81 (BP Location: Left arm, Patient Position: Sitting)   Pulse 82   Ht 175.3 cm (69\")   Wt 115 kg (254 lb)   SpO2 97%   BMI 37.51 kg/m²   Family History   Problem Relation Age of Onset   • Diabetes Mother    • Heart disease Mother    • Arthritis Mother    • Obesity Mother    • Hypertension Mother    • Migraines Mother  "   • Osteoporosis Mother    • Diabetes Father    • Heart disease Father    • Arthritis Father    • Hypertension Father    • Stroke Father    • Heart attack Father    • Hyperlipidemia Father    • Diabetes Brother    • Heart disease Brother    • Arthritis Sister    • Anemia Sister        Current Outpatient Medications:   •  apixaban (ELIQUIS) 5 MG tablet tablet, Take 1 tablet by mouth Every 12 (Twelve) Hours., Disp: 60 tablet, Rfl: 3  •  aspirin 81 MG EC tablet, Take 81 mg by mouth Daily., Disp: , Rfl:   •  cyclobenzaprine (FLEXERIL) 10 MG tablet, Take 1 tablet by mouth At Night As Needed for Muscle Spasms., Disp: 30 tablet, Rfl: 0  •  Dapagliflozin Propanediol (FARXIGA) 10 MG tablet, Take 10 mg by mouth Daily With Breakfast., Disp: 30 tablet, Rfl: 0  •  FLUoxetine (PROZAC) 40 MG capsule, Take 1 capsule by mouth Daily., Disp: 90 capsule, Rfl: 0  •  glucose blood (Accu-Chek Guide) test strip, Check sugar BID with Accu-chek guide glucometer., Disp: 120 each, Rfl: 1  •  HYDROcodone-acetaminophen (NORCO)  MG per tablet, TAKE 1 TABLET BY MOUTH EVERY SIX HOURS AS NEEDED FOR SEVERE PAIN, Disp: 120 tablet, Rfl: 0  •  insulin detemir (LEVEMIR) 100 UNIT/ML injection, Inject 33 Units under the skin into the appropriate area as directed Daily., Disp: 4 pen, Rfl: 3  •  Insulin Lispro, 1 Unit Dial, (HumaLOG KwikPen) 100 UNIT/ML solution pen-injector, Inject 5 Units under the skin into the appropriate area as directed 3 (Three) Times a Day Before Meals., Disp: 9 pen, Rfl: 0  •  Insulin Pen Needle (PEN NEEDLES) 32G X 4 MM misc, 1 each 4 (Four) Times a Day Before Meals & at Bedtime., Disp: 200 each, Rfl: 0  •  Isopropyl Alcohol (ALCOHOL WIPES) 70 % misc, Apply 1 each topically 4 (Four) Times a Day Before Meals & at Bedtime., Disp: 200 each, Rfl: 0  •  lisinopril (PRINIVIL,ZESTRIL) 20 MG tablet, Take 1 tablet by mouth Daily., Disp: 90 tablet, Rfl: 0  •  pregabalin (LYRICA) 75 MG capsule, Take 1 capsule by mouth 3 (Three) Times a  Day., Disp: 90 capsule, Rfl: 1  No Known Allergies  Social History     Socioeconomic History   • Marital status: Single     Spouse name: Not on file   • Number of children: Not on file   • Years of education: Not on file   • Highest education level: Not on file   Tobacco Use   • Smoking status: Never Smoker   • Smokeless tobacco: Never Used   Substance and Sexual Activity   • Alcohol use: Yes     Alcohol/week: 1.0 standard drinks     Types: 1 Cans of beer per week   • Drug use: Never   • Sexual activity: Defer     Review of Systems   Constitution: Positive for malaise/fatigue. Negative for fever.   HENT: Negative for ear pain and nosebleeds.    Eyes: Negative for blurred vision and double vision.   Cardiovascular: Positive for palpitations. Negative for chest pain, dyspnea on exertion and leg swelling.   Respiratory: Negative for cough and shortness of breath.    Skin: Negative for rash.   Musculoskeletal: Negative for joint pain.   Gastrointestinal: Negative for abdominal pain, nausea and vomiting.   Neurological: Negative for focal weakness, headaches, light-headedness and numbness.   Psychiatric/Behavioral: Negative for depression. The patient is not nervous/anxious.    All other systems reviewed and are negative.             Objective:     Physical Exam   Constitutional: He appears well-developed and well-nourished.   HENT:   Head: Normocephalic and atraumatic.   Eyes: Pupils are equal, round, and reactive to light. Conjunctivae and EOM are normal. No scleral icterus.   Neck: Normal range of motion. Neck supple. No JVD present. Carotid bruit is not present.   Cardiovascular: Normal rate, regular rhythm, S1 normal, S2 normal, normal heart sounds and intact distal pulses. PMI is not displaced.   Pulmonary/Chest: Effort normal and breath sounds normal. He has no wheezes. He has no rales.   Abdominal: Soft. Bowel sounds are normal.   Musculoskeletal: Normal range of motion.   Neurological: He is alert. He has normal  strength.   No focal deficits   Skin: Skin is warm and dry. No rash noted.   Psychiatric: He has a normal mood and affect.     Procedures    Lab Review:       Assessment:          Diagnosis Plan   1. Mixed hyperlipidemia     2. Nonobstructive atherosclerosis of coronary artery     3. Cerebrovascular accident (CVA) due to embolism of precerebral artery (CMS/HCC)     4. Essential hypertension     5. PFO (patent foramen ovale)     6. Type 2 diabetes mellitus with diabetic polyneuropathy, without long-term current use of insulin (CMS/[facility])     7. Palpitations            Plan:     Patient has history of nonobstructive coronary disease and is currently stable on medical therapy  Patient has normal LV systolic function  Patient has history of significant PFO and hence he is on Eliquis  Patient also has palpitations with possible arrhythmias and had a history of CVA and hence he is on Eliquis  Patient's blood pressure and heart rate are stable  Patient's lipid levels are followed by the primary care doctor  Patient has diabetes and is currently using oral medicines  We will follow him in 6 months

## 2020-06-10 NOTE — PROGRESS NOTES
Physical Therapy Daily Progress Note    VISIT#: 2    Subjective   Humble Green reports his lower back is not doing well this morning. He is experiencing pain on both sides of his back and down both LE's.     Current Pain Level: 7-8/10      Objective     See Exercise, Manual, and Modality Logs for complete treatment.   Established  HEP for patient. Handouts given.      Assessment/Plan  Patient presents with increased pain along lower back and down the back of both LE's. Reported that manual felt good but he was very tender to deep pressure. Exercises were mildly uncomfortable however he was able to complete with minor adjustments. Has a lot of difficulty with transitioning between positions. Reported improvement in pain level following ESTIM.      Goals  Plan Goals: STGs in 4 weeks:  Decrease pain to 5-6/10 on average  Increase trunk/LE ROM by 10 degrees where limited as much  Increase LE strength to 4/5 or better for those movements tested    LTGs by discharge  Increase trunk/LE ROM to WFL/WNL  Increase LE strength to 5/5   Pt will be able to ascend/descend stairs reciprocally with or without use of rail(s) and with minimal difficulty or pain and no reports of knee giving out  Pt will be able to sit/drive/ride for 30-60 mins without difficulty or pain  Pt will be able to stand 30-60 mins for basic ADLs without difficulty or pain  Pt will be able to walk 30-60 mins for grocery shopping without difficulty, pain or LOB  Pt will be able to wash/dress/groom without difficulty or increased pain  Pt will be able to lift/carry laundry baskets, pots/pans, garbage or grocery bags without difficulty or increased pain  Pt will be able to sleep uninterrupted by pain          Timed:         Manual Therapy:    20     mins  74277;     Therapeutic Exercise:    30     mins  24574;         Un-Timed:  Electrical Stimulation:    15     mins  28015 ( );      Timed Treatment:   50   mins   Total Treatment:     65   mins    Eboni  Cristina, JERE    Physical Therapist Assistant

## 2020-06-11 ENCOUNTER — TREATMENT (OUTPATIENT)
Dept: PHYSICAL THERAPY | Facility: CLINIC | Age: 65
End: 2020-06-11

## 2020-06-11 DIAGNOSIS — M99.05 SEGMENTAL AND SOMATIC DYSFUNCTION OF PELVIC REGION: ICD-10-CM

## 2020-06-11 DIAGNOSIS — M99.04 SEGMENTAL AND SOMATIC DYSFUNCTION OF SACRAL REGION: ICD-10-CM

## 2020-06-11 DIAGNOSIS — M54.50 CHRONIC MIDLINE LOW BACK PAIN WITHOUT SCIATICA: Primary | ICD-10-CM

## 2020-06-11 DIAGNOSIS — G89.29 CHRONIC MIDLINE LOW BACK PAIN WITHOUT SCIATICA: Primary | ICD-10-CM

## 2020-06-11 DIAGNOSIS — M79.605 PAIN IN LEFT LEG: ICD-10-CM

## 2020-06-11 PROCEDURE — 97140 MANUAL THERAPY 1/> REGIONS: CPT | Performed by: PHYSICAL THERAPIST

## 2020-06-11 PROCEDURE — G0283 ELEC STIM OTHER THAN WOUND: HCPCS | Performed by: PHYSICAL THERAPIST

## 2020-06-11 PROCEDURE — 97110 THERAPEUTIC EXERCISES: CPT | Performed by: PHYSICAL THERAPIST

## 2020-06-11 NOTE — PROGRESS NOTES
"Physical Therapy Daily Progress Note    VISIT#: 3    Subjective   Humble Green reports he felt great when he left his appointment yesterday. By about 7pm his back started hurting but doesn't recall doing anything that caused it. He had a lot of difficulty sleeping and \"almost drove myself to the ER my pain got so bad.\" He ended up going to work today and his back is hurting a lot worse now.    Current Pain Level: 10/10    Objective     See Exercise, Manual, and Modality Logs for complete treatment.   Increased difficulty transitioning from prone to supine to sitting.  More difficulty lifting his R LE during exercises and required a strap or therapist to assist.    Patient Education: Reviewed how to perform a log roll to get in and out of bed. Patient was able to safely perform will less irritation in the back.    Assessment/Plan  Patient demonstrated increased pain in his LB and hips this session. Able to complete his exercises but with modifications. Slow, guarded, antalgic gait walking into and out of the clinic this date.      Goals  Plan Goals: STGs in 4 weeks:  Decrease pain to 5-6/10 on average  Increase trunk/LE ROM by 10 degrees where limited as much  Increase LE strength to 4/5 or better for those movements tested    LTGs by discharge  Increase trunk/LE ROM to WFL/WNL  Increase LE strength to 5/5   Pt will be able to ascend/descend stairs reciprocally with or without use of rail(s) and with minimal difficulty or pain and no reports of knee giving out  Pt will be able to sit/drive/ride for 30-60 mins without difficulty or pain  Pt will be able to stand 30-60 mins for basic ADLs without difficulty or pain  Pt will be able to walk 30-60 mins for grocery shopping without difficulty, pain or LOB  Pt will be able to wash/dress/groom without difficulty or increased pain  Pt will be able to lift/carry laundry baskets, pots/pans, garbage or grocery bags without difficulty or increased pain  Pt will be able to sleep " uninterrupted by pain          Timed:         Manual Therapy:    20     mins  97374;     Therapeutic Exercise:    25     mins  24387;            Un-Timed:  Electrical Stimulation:    15     mins  14031 ( );      Timed Treatment:   45   mins   Total Treatment:     60   mins    Eboni Evans PTA    Physical Therapist Assistant

## 2020-06-15 DIAGNOSIS — M54.42 CHRONIC MIDLINE LOW BACK PAIN WITH LEFT-SIDED SCIATICA: ICD-10-CM

## 2020-06-15 DIAGNOSIS — G89.29 CHRONIC MIDLINE LOW BACK PAIN WITH LEFT-SIDED SCIATICA: ICD-10-CM

## 2020-06-15 RX ORDER — HYDROCODONE BITARTRATE AND ACETAMINOPHEN 10; 325 MG/1; MG/1
TABLET ORAL
Qty: 120 TABLET | Refills: 0 | OUTPATIENT
Start: 2020-06-15

## 2020-06-15 NOTE — TELEPHONE ENCOUNTER
Last visit:  4/28/20  Next visit: 6/19/20  Last labs: 3/6/20    Rx requested: Hydrocodone   Pharmacy: [facility] in [place]

## 2020-06-17 ENCOUNTER — TREATMENT (OUTPATIENT)
Dept: PHYSICAL THERAPY | Facility: CLINIC | Age: 65
End: 2020-06-17

## 2020-06-17 DIAGNOSIS — M54.42 CHRONIC MIDLINE LOW BACK PAIN WITH LEFT-SIDED SCIATICA: ICD-10-CM

## 2020-06-17 DIAGNOSIS — M99.04 SEGMENTAL AND SOMATIC DYSFUNCTION OF SACRAL REGION: ICD-10-CM

## 2020-06-17 DIAGNOSIS — G89.29 CHRONIC MIDLINE LOW BACK PAIN WITH LEFT-SIDED SCIATICA: ICD-10-CM

## 2020-06-17 DIAGNOSIS — M99.05 SEGMENTAL AND SOMATIC DYSFUNCTION OF PELVIC REGION: ICD-10-CM

## 2020-06-17 DIAGNOSIS — G89.29 CHRONIC MIDLINE LOW BACK PAIN WITHOUT SCIATICA: Primary | ICD-10-CM

## 2020-06-17 DIAGNOSIS — M79.605 PAIN IN LEFT LEG: ICD-10-CM

## 2020-06-17 DIAGNOSIS — M54.50 CHRONIC MIDLINE LOW BACK PAIN WITHOUT SCIATICA: Primary | ICD-10-CM

## 2020-06-17 PROCEDURE — 97035 APP MDLTY 1+ULTRASOUND EA 15: CPT | Performed by: PHYSICAL THERAPIST

## 2020-06-17 PROCEDURE — 97014 ELECTRIC STIMULATION THERAPY: CPT | Performed by: PHYSICAL THERAPIST

## 2020-06-17 PROCEDURE — 97140 MANUAL THERAPY 1/> REGIONS: CPT | Performed by: PHYSICAL THERAPIST

## 2020-06-17 RX ORDER — HYDROCODONE BITARTRATE AND ACETAMINOPHEN 10; 325 MG/1; MG/1
TABLET ORAL
Qty: 120 TABLET | Refills: 0 | OUTPATIENT
Start: 2020-06-17

## 2020-06-17 NOTE — TELEPHONE ENCOUNTER
Last visit: 4/28/20  Next visit:6/19/20  Last labs: 3/6/20    Rx requested:Hydrocodone   Pharmacy:[facility] in [place]

## 2020-06-17 NOTE — PROGRESS NOTES
"Physical Therapy Daily Progress Note    VISIT#: 4    Subjective   Humble Green reports his back is the worst it has ever been. He tried to call in his meds to get refilled and the pharmacy was out of stock so he will be without his meds until Friday this week. He was having trouble getting in/out of his shower and getting dressed this morning and showed up late to work today which is not like him. \"Not sure I'm gonna make it to my Dr appointment on Friday. I may have to take myself to the [facility] if this gets any worse.\"  RMD: this Friday 6/19/20    Current Pain Level: 12/10     Objective     See Exercise, Manual, and Modality Logs for complete treatment.   O: Ambulating with a forward head and trunk posture. Very guarded and occasional grunting.    Assessment/Plan  Patient continues to have a significant increase in LB pain. Difficulty performing ADL's and with transitioning into different positions. Very limited tolerance to exercises even with modifications. Avoided any rotation of the lumbar spine.    Goals  Plan Goals: STGs in 4 weeks:  Decrease pain to 5-6/10 on average  Increase trunk/LE ROM by 10 degrees where limited as much  Increase LE strength to 4/5 or better for those movements tested    LTGs by discharge  Increase trunk/LE ROM to WFL/WNL  Increase LE strength to 5/5   Pt will be able to ascend/descend stairs reciprocally with or without use of rail(s) and with minimal difficulty or pain and no reports of knee giving out  Pt will be able to sit/drive/ride for 30-60 mins without difficulty or pain  Pt will be able to stand 30-60 mins for basic ADLs without difficulty or pain  Pt will be able to walk 30-60 mins for grocery shopping without difficulty, pain or LOB  Pt will be able to wash/dress/groom without difficulty or increased pain  Pt will be able to lift/carry laundry baskets, pots/pans, garbage or grocery bags without difficulty or increased pain  Pt will be able to sleep uninterrupted by " pain          Timed:         Manual Therapy:    15     mins  07295;     Therapeutic Exercise:   5      mins  71283;        Ultrasound:     12     mins  68892;        Un-Timed:  Electrical Stimulation:    15     mins  15577 ( );      Timed Treatment:   32   mins   Total Treatment:     55   mins    Eboni Evans PTA    Physical Therapist Assistant

## 2020-06-19 ENCOUNTER — OFFICE VISIT (OUTPATIENT)
Dept: FAMILY MEDICINE CLINIC | Facility: CLINIC | Age: 65
End: 2020-06-19

## 2020-06-19 ENCOUNTER — TELEPHONE (OUTPATIENT)
Dept: FAMILY MEDICINE CLINIC | Facility: CLINIC | Age: 65
End: 2020-06-19

## 2020-06-19 VITALS
BODY MASS INDEX: 36.73 KG/M2 | HEART RATE: 87 BPM | RESPIRATION RATE: 18 BRPM | HEIGHT: 69 IN | SYSTOLIC BLOOD PRESSURE: 149 MMHG | TEMPERATURE: 97.1 F | OXYGEN SATURATION: 98 % | WEIGHT: 248 LBS | DIASTOLIC BLOOD PRESSURE: 92 MMHG

## 2020-06-19 DIAGNOSIS — E11.42 TYPE 2 DIABETES MELLITUS WITH DIABETIC POLYNEUROPATHY, WITHOUT LONG-TERM CURRENT USE OF INSULIN (HCC): Primary | ICD-10-CM

## 2020-06-19 DIAGNOSIS — M54.42 CHRONIC MIDLINE LOW BACK PAIN WITH LEFT-SIDED SCIATICA: ICD-10-CM

## 2020-06-19 DIAGNOSIS — E11.42 DIABETIC POLYNEUROPATHY ASSOCIATED WITH TYPE 2 DIABETES MELLITUS (HCC): ICD-10-CM

## 2020-06-19 DIAGNOSIS — B35.3 TINEA PEDIS OF BOTH FEET: ICD-10-CM

## 2020-06-19 DIAGNOSIS — G89.29 CHRONIC MIDLINE LOW BACK PAIN WITH LEFT-SIDED SCIATICA: ICD-10-CM

## 2020-06-19 LAB — HBA1C MFR BLD: 8.2 %

## 2020-06-19 PROCEDURE — 83036 HEMOGLOBIN GLYCOSYLATED A1C: CPT | Performed by: FAMILY MEDICINE

## 2020-06-19 PROCEDURE — 99214 OFFICE O/P EST MOD 30 MIN: CPT | Performed by: FAMILY MEDICINE

## 2020-06-19 RX ORDER — HYDROCODONE BITARTRATE AND ACETAMINOPHEN 10; 325 MG/1; MG/1
1 TABLET ORAL EVERY 4 HOURS PRN
Qty: 150 TABLET | Refills: 0 | Status: SHIPPED | OUTPATIENT
Start: 2020-06-19 | End: 2020-07-13

## 2020-06-19 RX ORDER — FLUOXETINE HYDROCHLORIDE 40 MG/1
40 CAPSULE ORAL DAILY
Qty: 90 CAPSULE | Refills: 1 | Status: SHIPPED | OUTPATIENT
Start: 2020-06-19 | End: 2020-07-13

## 2020-06-19 RX ORDER — PREGABALIN 75 MG/1
75 CAPSULE ORAL 3 TIMES DAILY
Qty: 90 CAPSULE | Refills: 2 | Status: SHIPPED | OUTPATIENT
Start: 2020-06-19 | End: 2020-07-21

## 2020-06-19 RX ORDER — INSULIN LISPRO 100 [IU]/ML
5 INJECTION, SOLUTION INTRAVENOUS; SUBCUTANEOUS
Qty: 9 PEN | Refills: 0 | Status: SHIPPED | OUTPATIENT
Start: 2020-06-19 | End: 2020-10-05 | Stop reason: SDUPTHER

## 2020-06-19 RX ORDER — BACLOFEN 10 MG/1
10 TABLET ORAL NIGHTLY PRN
Qty: 30 TABLET | Refills: 0 | Status: SHIPPED | OUTPATIENT
Start: 2020-06-19 | End: 2020-08-04

## 2020-06-19 RX ORDER — LISINOPRIL 10 MG/1
10 TABLET ORAL NIGHTLY
Qty: 90 TABLET | Refills: 0 | Status: SHIPPED | OUTPATIENT
Start: 2020-06-19 | End: 2020-08-04 | Stop reason: SDUPTHER

## 2020-06-19 RX ORDER — ASPIRIN 81 MG/1
81 TABLET ORAL DAILY
Qty: 90 TABLET | Refills: 1 | Status: SHIPPED | OUTPATIENT
Start: 2020-06-19 | End: 2020-07-13

## 2020-06-19 NOTE — PROGRESS NOTES
Subjective   Humble Green is a 64 y.o. male.     Chief Complaint   Patient presents with   • Diabetes   • Hypertension   • Pain         Current Outpatient Medications:   •  apixaban (ELIQUIS) 5 MG tablet tablet, Take 1 tablet by mouth Every 12 (Twelve) Hours., Disp: 60 tablet, Rfl: 3  •  aspirin 81 MG EC tablet, Take 1 tablet by mouth Daily., Disp: 90 tablet, Rfl: 1  •  baclofen (LIORESAL) 10 MG tablet, Take 1 tablet by mouth At Night As Needed for Muscle Spasms., Disp: 30 tablet, Rfl: 0  •  Dapagliflozin Propanediol (Farxiga) 10 MG tablet, Take 10 mg by mouth Daily With Breakfast., Disp: 90 tablet, Rfl: 0  •  FLUoxetine (PROzac) 40 MG capsule, Take 1 capsule by mouth Daily., Disp: 90 capsule, Rfl: 1  •  glucose blood (Accu-Chek Guide) test strip, Check sugar BID with Accu-chek guide glucometer., Disp: 120 each, Rfl: 1  •  HYDROcodone-acetaminophen (NORCO)  MG per tablet, Take 1 tablet by mouth Every 4 (Four) Hours As Needed for Severe Pain ., Disp: 150 tablet, Rfl: 0  •  insulin detemir (LEVEMIR) 100 UNIT/ML injection, Inject 33 Units under the skin into the appropriate area as directed Daily., Disp: 4 pen, Rfl: 3  •  Insulin Lispro, 1 Unit Dial, (HumaLOG KwikPen) 100 UNIT/ML solution pen-injector, Inject 5 Units under the skin into the appropriate area as directed 3 (Three) Times a Day Before Meals., Disp: 9 pen, Rfl: 0  •  Insulin Pen Needle (PEN NEEDLES) 32G X 4 MM misc, 1 each 4 (Four) Times a Day Before Meals & at Bedtime., Disp: 200 each, Rfl: 0  •  Isopropyl Alcohol (ALCOHOL WIPES) 70 % misc, Apply 1 each topically 4 (Four) Times a Day Before Meals & at Bedtime., Disp: 200 each, Rfl: 0  •  lisinopril (PRINIVIL,ZESTRIL) 10 MG tablet, Take 1 tablet by mouth Every Night., Disp: 90 tablet, Rfl: 0  •  pregabalin (LYRICA) 75 MG capsule, Take 1 capsule by mouth 3 (Three) Times a Day., Disp: 90 capsule, Rfl: 2    Past Medical History:   Diagnosis Date   • Arthritis    • BPH (benign prostatic hyperplasia)     • Chronic back pain    • Diabetes mellitus (CMS/HCC)    • Hypertension    • Low back pain    • Low vision of right eye with normal vision in contralateral eye    • Memory problem    • Nonobstructive atherosclerosis of coronary artery    • PFO (patent foramen ovale) 11/29/2019       Past Surgical History:   Procedure Laterality Date   • ARM TENDON REPAIR Left    • CHOLECYSTECTOMY     • COLONOSCOPY      2018 = TA, rech 2023   • EYE SURGERY      Rt Eye Sx after trauma @ 4y/o   • JOINT REPLACEMENT      Left TKR   • ROTATOR CUFF REPAIR Bilateral    • UMBILICAL HERNIA REPAIR         Family History   Problem Relation Age of Onset   • Diabetes Mother    • Heart disease Mother    • Arthritis Mother    • Obesity Mother    • Hypertension Mother    • Migraines Mother    • Osteoporosis Mother    • Diabetes Father    • Heart disease Father    • Arthritis Father    • Hypertension Father    • Stroke Father    • Heart attack Father    • Hyperlipidemia Father    • Diabetes Brother    • Heart disease Brother    • Arthritis Sister    • Anemia Sister        Social History     Socioeconomic History   • Marital status: Single     Spouse name: Not on file   • Number of children: Not on file   • Years of education: Not on file   • Highest education level: Not on file   Tobacco Use   • Smoking status: Never Smoker   • Smokeless tobacco: Never Used   Substance and Sexual Activity   • Alcohol use: Yes     Alcohol/week: 1.0 standard drinks     Types: 1 Cans of beer per week   • Drug use: Never   • Sexual activity: Defer       63 y/o C male here for fu on DMII/ HTN/ Chronic back pain    Pt states his low back has been hurting more lately w/o inciting incident and admits he has taken more of his pain meds and ran out early---he had to take a couple days off work too because of it......    His ins kicked in but states the pharmacy charged him $40 for his pain meds w/ the Good Rx per pt; admits he has been out of BP med too  Pt states he has been  following his DM diet pretty well and feels his BS is under good control w/ BS usu <140          Diabetes   He presents for his follow-up diabetic visit. He has type 2 diabetes mellitus. His disease course has been improving. Associated symptoms include blurred vision, visual change and weight loss. Pertinent negatives for diabetes include no polydipsia, no polyphagia, no polyuria and no weakness. Symptoms are improving. Diabetic complications include a CVA, heart disease, peripheral neuropathy and PVD. Risk factors for coronary artery disease include diabetes mellitus, hypertension, dyslipidemia and male sex. Current diabetic treatment includes insulin injections and diet. He is compliant with treatment most of the time. His weight is decreasing steadily. He is following a diabetic diet. Meal planning includes carbohydrate counting and avoidance of concentrated sweets. He has not had a previous visit with a dietitian. He participates in exercise daily. His breakfast blood glucose range is generally 110-130 mg/dl. His dinner blood glucose range is generally 110-130 mg/dl. An ACE inhibitor/angiotensin II receptor blocker is being taken. He does not see a podiatrist.Eye exam is not current.        The following portions of the patient's history were reviewed and updated as appropriate: allergies, current medications, past family history, past medical history, past social history, past surgical history and problem list.    Review of Systems   Constitutional: Positive for activity change, appetite change and unexpected weight loss. Negative for unexpected weight gain.   Eyes: Positive for blurred vision and visual disturbance. Negative for double vision and pain.   Cardiovascular: Negative for leg swelling.   Gastrointestinal: Negative for abdominal distention, abdominal pain, constipation, diarrhea, nausea and vomiting.   Endocrine: Negative for polydipsia, polyphagia and polyuria.   Genitourinary: Negative for  frequency.   Musculoskeletal: Positive for arthralgias, back pain and gait problem.   Skin: Negative for color change, dry skin, rash and skin lesions.   Neurological: Negative for weakness and numbness.       Vitals:    06/19/20 0920   BP: 149/92   Pulse: 87   Resp: 18   Temp: 97.1 °F (36.2 °C)   SpO2: 98%       Objective   Physical Exam   Constitutional: He is oriented to person, place, and time. He appears well-developed and well-nourished. No distress.   HENT:   Head: Normocephalic and atraumatic.   Cardiovascular: Normal rate, regular rhythm, normal heart sounds and intact distal pulses.   No murmur heard.  Pulmonary/Chest: Effort normal and breath sounds normal. No respiratory distress.   Musculoskeletal: He exhibits tenderness. He exhibits no edema or deformity.        Lumbar back: He exhibits decreased range of motion, bony tenderness and pain. He exhibits no deformity and no laceration.    Humble had a diabetic foot exam performed today.    Neurological Sensory Findings - Unaltered hot/cold right ankle/foot discrimination and unaltered hot/cold left ankle/foot discrimination. Unaltered sharp/dull right ankle/foot discrimination and unaltered sharp/dull left ankle/foot discrimination.  Vascular Status -  His right foot exhibits normal foot vasculature  and no edema. His left foot exhibits normal foot vasculature  and no edema.  Skin Integrity  -  His right foot skin is intact. He has right foot onychomycosis.  He has no right foot ulcer, non-callous right foot, no ingrown toenail on right foot, right heel is not dry and cracked, no right foot warmth, no right foot blister and no right foot gangrenous changes.His left foot skin is intact. He has no left foot onychomycosis, no left foot ulcer, non-callous left foot, no left ingrown toenail, no left heel dry and cracked, no left foot warmth, no left foot blister and no left foot gangrenous changes..     Neurological: He is alert and oriented to person, place,  and time. No cranial nerve deficit.   Skin: Skin is warm and dry. Capillary refill takes less than 2 seconds. No rash noted. No erythema.   Psychiatric: He has a normal mood and affect. His behavior is normal. Judgment and thought content normal.   Nursing note and vitals reviewed.        Assessment/Plan   Humble was seen today for diabetes, hypertension and pain.    Diagnoses and all orders for this visit:    Type 2 diabetes mellitus with diabetic polyneuropathy, without long-term current use of insulin (CMS/[facility])  -     POC Glycosylated Hemoglobin (Hb A1C)  -     Ambulatory Referral to Ophthalmology    Chronic midline low back pain with left-sided sciatica  -     MRI Lumbar Spine Without Contrast; Future  -     HYDROcodone-acetaminophen (NORCO)  MG per tablet; Take 1 tablet by mouth Every 4 (Four) Hours As Needed for Severe Pain .    Diabetic polyneuropathy associated with type 2 diabetes mellitus (CMS/[facility])  -     pregabalin (LYRICA) 75 MG capsule; Take 1 capsule by mouth 3 (Three) Times a Day.    Tinea pedis of both feet    Other orders  -     Insulin Lispro, 1 Unit Dial, (HumaLOG KwikPen) 100 UNIT/ML solution pen-injector; Inject 5 Units under the skin into the appropriate area as directed 3 (Three) Times a Day Before Meals.  -     lisinopril (PRINIVIL,ZESTRIL) 10 MG tablet; Take 1 tablet by mouth Every Night.  -     FLUoxetine (PROzac) 40 MG capsule; Take 1 capsule by mouth Daily.  -     Dapagliflozin Propanediol (Farxiga) 10 MG tablet; Take 10 mg by mouth Daily With Breakfast.  -     apixaban (ELIQUIS) 5 MG tablet tablet; Take 1 tablet by mouth Every 12 (Twelve) Hours.  -     aspirin 81 MG EC tablet; Take 1 tablet by mouth Daily.  -     baclofen (LIORESAL) 10 MG tablet; Take 1 tablet by mouth At Night As Needed for Muscle Spasms.      STRESSED imp of calling office if he runs out of med  A1C = 8.2 today  TRIAL of Farxiga 10mg qday at breakfast  OPTHO appt  RTC 3mos

## 2020-06-23 ENCOUNTER — TREATMENT (OUTPATIENT)
Dept: PHYSICAL THERAPY | Facility: CLINIC | Age: 65
End: 2020-06-23

## 2020-06-23 DIAGNOSIS — G89.29 CHRONIC MIDLINE LOW BACK PAIN WITHOUT SCIATICA: Primary | ICD-10-CM

## 2020-06-23 DIAGNOSIS — M79.605 PAIN IN LEFT LEG: ICD-10-CM

## 2020-06-23 DIAGNOSIS — M99.05 SEGMENTAL AND SOMATIC DYSFUNCTION OF PELVIC REGION: ICD-10-CM

## 2020-06-23 DIAGNOSIS — M54.50 CHRONIC MIDLINE LOW BACK PAIN WITHOUT SCIATICA: Primary | ICD-10-CM

## 2020-06-23 DIAGNOSIS — M99.04 SEGMENTAL AND SOMATIC DYSFUNCTION OF SACRAL REGION: ICD-10-CM

## 2020-06-23 PROCEDURE — 97012 MECHANICAL TRACTION THERAPY: CPT | Performed by: PHYSICAL THERAPIST

## 2020-06-23 PROCEDURE — 97035 APP MDLTY 1+ULTRASOUND EA 15: CPT | Performed by: PHYSICAL THERAPIST

## 2020-06-23 PROCEDURE — 97014 ELECTRIC STIMULATION THERAPY: CPT | Performed by: PHYSICAL THERAPIST

## 2020-06-23 PROCEDURE — 97140 MANUAL THERAPY 1/> REGIONS: CPT | Performed by: PHYSICAL THERAPIST

## 2020-06-23 NOTE — PROGRESS NOTES
Physical Therapy Daily Progress Note    VISIT#: 5    Subjective   Humble Green reports he is still having trouble walking but glad he is still upright.   Has an MRI schedule for this Friday (6/26/2020) on his LB.    Current Pain Level: 10/10    Objective     See Exercise, Manual, and Modality Logs for complete treatment.   Observation: Slow antalgic gait, grunting and grabbing onto anything nearby for support while ambulating. 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 Have had discussions with patient about trying to at least cut back on the side jobs he is doing now while his back is flared up but patient states he can't really afford to pass on jobs.    Assessment/Plan  Patient is presenting with very high pain levels. ADL's and ambulation have become more difficult to perform. Trialed mechanical traction on his lumbar spine and once off the table his back felt worse. Will follow up with him next visit on how he tolerated it. Unable to tolerate his exercises at this time.    Next Visit: NO TRACTION    Goals  Plan Goals: STGs in 4 weeks:  Decrease pain to 5-6/10 on average  Increase trunk/LE ROM by 10 degrees where limited as much  Increase LE strength to 4/5 or better for those movements tested    LTGs by discharge  Increase trunk/LE ROM to WFL/WNL  Increase LE strength to 5/5   Pt will be able to ascend/descend stairs reciprocally with or without use of rail(s) and with minimal difficulty or pain and no reports of knee giving out  Pt will be able to sit/drive/ride for 30-60 mins without difficulty or pain  Pt will be able to stand 30-60 mins for basic ADLs without difficulty or pain  Pt will be able to walk 30-60 mins for grocery shopping without difficulty, pain or LOB  Pt will be able to wash/dress/groom without difficulty or increased pain  Pt will be able to lift/carry laundry  baskets, pots/pans, garbage or grocery bags without difficulty or increased pain  Pt will be able to sleep uninterrupted by pain          Timed:         Manual Therapy:    18     mins  06236;        Ultrasound:     12     mins  31330;        Un-Timed:  Electrical Stimulation:    15     mins  55967 ( );  Traction     15     mins 11897      Timed Treatment:   30   mins   Total Treatment:     64   mins    Eboni Evans PTA    Physical Therapist Assistant

## 2020-06-25 ENCOUNTER — TREATMENT (OUTPATIENT)
Dept: PHYSICAL THERAPY | Facility: CLINIC | Age: 65
End: 2020-06-25

## 2020-06-25 PROCEDURE — 97140 MANUAL THERAPY 1/> REGIONS: CPT | Performed by: PHYSICAL THERAPIST

## 2020-06-25 PROCEDURE — 97110 THERAPEUTIC EXERCISES: CPT | Performed by: PHYSICAL THERAPIST

## 2020-06-25 PROCEDURE — 97014 ELECTRIC STIMULATION THERAPY: CPT | Performed by: PHYSICAL THERAPIST

## 2020-06-25 NOTE — PROGRESS NOTES
Physical Therapy Daily Progress Note    VISIT#: 6    Subjective   Humble Green reports his pain level is currently 10/10.  This is the pain level is frequently on a daily basis.  An MRI was scheduled for [facility] (tomorrow) however canceled due to waiting on insurance approval.   Left leg feels really heavy       Objective   Seated knee ext with posterior leg tightness./    jacky IR/ER with LBP equal.    Trunk motion very restricted with rigid movements by patient.  Pain increased in all planes with greatest pain during trunk ext, LSB and bilateral rotation.  LLE distraction with greater pain.   Palpation without soft tissue thickening; however with point tenderness throughout bilateral lumbar paraspinals and PSIS.   PIVM 1- jacky.  Posture check unchanged as noted from the eval date.   See Exercise, Manual, and Modality Logs for complete treatment.     Patient Education:  Stop all rotation ex  Therapist discussed with patient to hold PT due to lack of progress from the PT eval date and until an MRI has been completed.    Assessment/Plan  Mr. Green's subjective reports are unchanged from the eval with reports of pain levels mostly at a 10.  The objective presentation is also unchanged.  He states an MRI is to be scheduled once approved by the insurance.  He is not able to tolerate the exercises or manual work.  Pt will be held until an MRI is completed.     Other  Hold PT until MRI is completed.          Timed:         Manual Therapy:    11     mins  93199;     Therapeutic Exercise:    10     mins  18373;     Neuromuscular Irina:        mins  71368;    Therapeutic Activity:          mins  90855;     Gait Training:           mins  05897;     Ultrasound:          mins  19583;    Ionto                                   mins   45247  Self Care                       4     mins   41575  Canalith Repos                   mins  78488    Un-Timed:  Electrical Stimulation:    15     mins  08704 ( );  Dry Needling          mins  self-pay  Traction          mins 64788  Low Eval          Mins  53290  Mod Eval          Mins  85677  High Eval                            Mins  44497  Re-Eval                               mins  00425    Timed Treatment:  25    mins   Total Treatment:     52   mins    Yarely Vega PT    Physical Therapist

## 2020-06-30 ENCOUNTER — TELEPHONE (OUTPATIENT)
Dept: FAMILY MEDICINE CLINIC | Facility: CLINIC | Age: 65
End: 2020-06-30

## 2020-06-30 NOTE — TELEPHONE ENCOUNTER
Pt states he is having rt testicle swelling.  Advised to go to ER due to condition per Gelacio and Vi.  Dr LACEY booked.  He states he is going to [place] today

## 2020-07-01 ENCOUNTER — HOSPITAL ENCOUNTER (EMERGENCY)
Facility: HOSPITAL | Age: 65
Discharge: HOME | End: 2020-07-01
Admitting: EMERGENCY MEDICINE

## 2020-07-01 ENCOUNTER — APPOINTMENT (OUTPATIENT)
Dept: ULTRASOUND IMAGING | Facility: HOSPITAL | Age: 65
End: 2020-07-01

## 2020-07-01 VITALS
BODY MASS INDEX: 37.39 KG/M2 | TEMPERATURE: 98 F | HEIGHT: 69 IN | SYSTOLIC BLOOD PRESSURE: 146 MMHG | WEIGHT: 252.43 LBS | DIASTOLIC BLOOD PRESSURE: 80 MMHG | OXYGEN SATURATION: 97 % | RESPIRATION RATE: 16 BRPM | HEART RATE: 70 BPM

## 2020-07-01 DIAGNOSIS — N50.811 PAIN IN RIGHT TESTICLE: Primary | ICD-10-CM

## 2020-07-01 DIAGNOSIS — N43.3 BILATERAL HYDROCELE: ICD-10-CM

## 2020-07-01 LAB
ANION GAP SERPL CALCULATED.3IONS-SCNC: 13 MMOL/L (ref 5–15)
BACTERIA UR QL AUTO: ABNORMAL /HPF
BASOPHILS # BLD AUTO: 0 10*3/MM3 (ref 0–0.2)
BASOPHILS NFR BLD AUTO: 0.4 % (ref 0–1.5)
BILIRUB UR QL STRIP: NEGATIVE
BUN SERPL-MCNC: 25 MG/DL (ref 8–23)
BUN SERPL-MCNC: ABNORMAL MG/DL
BUN/CREAT SERPL: ABNORMAL
CALCIUM SPEC-SCNC: 8.7 MG/DL (ref 8.6–10.5)
CHLORIDE SERPL-SCNC: 99 MMOL/L (ref 98–107)
CLARITY UR: CLEAR
CO2 SERPL-SCNC: 25 MMOL/L (ref 22–29)
COLOR UR: YELLOW
CREAT SERPL-MCNC: 1.02 MG/DL (ref 0.76–1.27)
DEPRECATED RDW RBC AUTO: 45.1 FL (ref 37–54)
EOSINOPHIL # BLD AUTO: 0.2 10*3/MM3 (ref 0–0.4)
EOSINOPHIL NFR BLD AUTO: 2.7 % (ref 0.3–6.2)
ERYTHROCYTE [DISTWIDTH] IN BLOOD BY AUTOMATED COUNT: 14.3 % (ref 12.3–15.4)
GFR SERPL CREATININE-BSD FRML MDRD: 74 ML/MIN/1.73
GLUCOSE SERPL-MCNC: 222 MG/DL (ref 65–99)
GLUCOSE UR STRIP-MCNC: ABNORMAL MG/DL
HCT VFR BLD AUTO: 39.4 % (ref 37.5–51)
HGB BLD-MCNC: 13.2 G/DL (ref 13–17.7)
HGB UR QL STRIP.AUTO: NEGATIVE
HYALINE CASTS UR QL AUTO: ABNORMAL /LPF
KETONES UR QL STRIP: NEGATIVE
LEUKOCYTE ESTERASE UR QL STRIP.AUTO: NEGATIVE
LYMPHOCYTES # BLD AUTO: 2.1 10*3/MM3 (ref 0.7–3.1)
LYMPHOCYTES NFR BLD AUTO: 31.3 % (ref 19.6–45.3)
MCH RBC QN AUTO: 30.6 PG (ref 26.6–33)
MCHC RBC AUTO-ENTMCNC: 33.4 G/DL (ref 31.5–35.7)
MCV RBC AUTO: 91.7 FL (ref 79–97)
MONOCYTES # BLD AUTO: 0.6 10*3/MM3 (ref 0.1–0.9)
MONOCYTES NFR BLD AUTO: 8.9 % (ref 5–12)
NEUTROPHILS NFR BLD AUTO: 3.8 10*3/MM3 (ref 1.7–7)
NEUTROPHILS NFR BLD AUTO: 56.7 % (ref 42.7–76)
NITRITE UR QL STRIP: NEGATIVE
NRBC BLD AUTO-RTO: 0.1 /100 WBC (ref 0–0.2)
PH UR STRIP.AUTO: <=5 [PH] (ref 5–8)
PLATELET # BLD AUTO: 140 10*3/MM3 (ref 140–450)
PMV BLD AUTO: 11.3 FL (ref 6–12)
POTASSIUM SERPL-SCNC: 4.3 MMOL/L (ref 3.5–5.2)
PROT UR QL STRIP: ABNORMAL
RBC # BLD AUTO: 4.3 10*6/MM3 (ref 4.14–5.8)
RBC # UR: ABNORMAL /HPF
REF LAB TEST METHOD: ABNORMAL
SODIUM SERPL-SCNC: 137 MMOL/L (ref 136–145)
SP GR UR STRIP: 1.03 (ref 1–1.03)
SQUAMOUS #/AREA URNS HPF: ABNORMAL /HPF
UROBILINOGEN UR QL STRIP: ABNORMAL
WBC # BLD AUTO: 6.7 10*3/MM3 (ref 3.4–10.8)
WBC UR QL AUTO: ABNORMAL /HPF

## 2020-07-01 PROCEDURE — 85025 COMPLETE CBC W/AUTO DIFF WBC: CPT | Performed by: NURSE PRACTITIONER

## 2020-07-01 PROCEDURE — 81001 URINALYSIS AUTO W/SCOPE: CPT | Performed by: NURSE PRACTITIONER

## 2020-07-01 PROCEDURE — 99283 EMERGENCY DEPT VISIT LOW MDM: CPT

## 2020-07-01 PROCEDURE — 76870 US EXAM SCROTUM: CPT

## 2020-07-01 PROCEDURE — 80048 BASIC METABOLIC PNL TOTAL CA: CPT | Performed by: NURSE PRACTITIONER

## 2020-07-01 NOTE — DISCHARGE INSTRUCTIONS
Continue current home medications.  Monitor your blood sugars.  Follow-up with urology. Return for any worsening symptoms.

## 2020-07-01 NOTE — ED PROVIDER NOTES
Subjective   Patient is a 64-year-old white male with history of hypertension, CAD and diabetes who presents today with complaints of right testicular pain and swelling.  He states he has had symptoms for 1 week with no known injury or trauma.  He denies any redness.  He denies any fever chills nausea vomiting.  He denies any dysuria frequency urgency or difficulty urinating.  He denies any abdominal pain.  Denies any penile discharge.  He denies any alleviating or exacerbating factors.          Review of Systems   Constitutional: Negative for chills and fever.   Gastrointestinal: Negative for abdominal pain, nausea and vomiting.   Genitourinary: Positive for scrotal swelling and testicular pain. Negative for decreased urine volume, difficulty urinating, discharge, dysuria, flank pain, frequency, hematuria, penile pain, penile swelling and urgency.   Skin: Negative for rash.       Past Medical History:   Diagnosis Date   • Arthritis    • BPH (benign prostatic hyperplasia)    • Chronic back pain    • Diabetes mellitus (CMS/HCC)    • Hypertension    • Low back pain    • Low vision of right eye with normal vision in contralateral eye    • Memory problem    • Nonobstructive atherosclerosis of coronary artery    • PFO (patent foramen ovale) 11/29/2019       No Known Allergies    Past Surgical History:   Procedure Laterality Date   • ARM TENDON REPAIR Left    • CHOLECYSTECTOMY     • COLONOSCOPY      2018 = TA, rech 2023   • EYE SURGERY      Rt Eye Sx after trauma @ 2y/o   • JOINT REPLACEMENT      Left TKR   • ROTATOR CUFF REPAIR Bilateral    • UMBILICAL HERNIA REPAIR         Family History   Problem Relation Age of Onset   • Diabetes Mother    • Heart disease Mother    • Arthritis Mother    • Obesity Mother    • Hypertension Mother    • Migraines Mother    • Osteoporosis Mother    • Diabetes Father    • Heart disease Father    • Arthritis Father    • Hypertension Father    • Stroke Father    • Heart attack Father    •  Hyperlipidemia Father    • Diabetes Brother    • Heart disease Brother    • Arthritis Sister    • Anemia Sister        Social History     Socioeconomic History   • Marital status: Single     Spouse name: Not on file   • Number of children: Not on file   • Years of education: Not on file   • Highest education level: Not on file   Tobacco Use   • Smoking status: Never Smoker   • Smokeless tobacco: Never Used   Substance and Sexual Activity   • Alcohol use: Yes     Alcohol/week: 1.0 standard drinks     Types: 1 Cans of beer per week   • Drug use: Never   • Sexual activity: Defer           Objective   Physical Exam  Vital signs and triage nurse note reviewed.  Constitutional: Awake, alert; well-developed and well-nourished. No acute distress is noted.  HEENT: Normocephalic, atraumatic; pupils are PERRL with intact EOM; oropharynx is pink and moist without exudate or erythema.  No drooling or pooling of oral secretions.  Neck: Supple, full range of motion without pain; no cervical lymphadenopathy. Normal phonation.  Cardiovascular: Regular rate and rhythm, normal S1-S2.  No murmur noted.  Pulmonary: Respiratory effort regular nonlabored, breath sounds clear to auscultation all fields.  Abdomen: Soft, nontender, nondistended with normoactive bowel sounds; no rebound or guarding.  Genital exam reveals bilateral descended testicles.  There is mild edema noted over the right scrotum.  There is no erythema.  No significant tenderness.  No palpable masses.  Normal thoracic reflexes bilaterally.  Musculoskeletal: Independent range of motion of all extremities with no palpable tenderness or edema.  Neuro: Alert oriented x3, speech is clear and appropriate, GCS 15.    Skin: Flesh tone, warm, dry, intact; no erythematous or petechial rash or lesion.      Procedures           ED Course      Labs Reviewed   URINALYSIS W/ CULTURE IF INDICATED - Abnormal; Notable for the following components:       Result Value    Glucose, UA >=1000  mg/dL (3+) (*)     Protein, UA 30 mg/dL (1+) (*)     All other components within normal limits   URINALYSIS, MICROSCOPIC ONLY - Abnormal; Notable for the following components:    RBC, UA 0-2 (*)     WBC, UA 0-2 (*)     All other components within normal limits   CBC WITH AUTO DIFFERENTIAL - Normal   BASIC METABOLIC PANEL   CBC AND DIFFERENTIAL    Narrative:     The following orders were created for panel order CBC & Differential.  Procedure                               Abnormality         Status                     ---------                               -----------         ------                     CBC Auto Differential[[id number]]        Normal              Final result                 Please view results for these tests on the individual orders.     Us Scrotum & Testicles    Result Date: 7/1/2020  1. No acute scrotal abnormality. 2. Small bilateral hydroceles.  Electronically Signed By-Juma Perry On:7/1/2020 9:37 AM This report was finalized on [id number] by  Juma Perry, .    Medications - No data to display                                       MDM  Number of Diagnoses or Management Options  Diagnosis management comments: Comorbidities: Hypertension, CAD, diabetes  Differentials: Hydrocele, epididymitis, tumor mass, torsion, infection;this list is not all inclusive and does not constitute the entirety of considered causes  Discussion with provider:  Radiology interpretation: X-rays reviewed by me and interpreted by radiologist: As above  Lab interpretation: Labs viewed by me significant for: As above    Patient had labs and ultrasound obtained.  He remained well-appearing throughout his ED stay and in no acute distress and with stable vital signs.  Ultrasound revealed small bilateral hydroceles without other acute findings.  Urinalysis significant for glucose.  Normal CBC.  Patient is afebrile.  There is no erythema or sign of infection.  He will be discharged follow-up with urology.    Diagnosis  and treatment plan discussed with patient.  Patient agreeable to plan.   I discussed findings with patient who voices understanding of discharge instructions, signs and symptoms requiring return to ED; discharged improved and in stable condition with follow up for re-evaluation.           Amount and/or Complexity of Data Reviewed  Clinical lab tests: reviewed and ordered  Tests in the radiology section of CPT®: reviewed and ordered    Patient Progress  Patient progress: stable      Final diagnoses:   Pain in right testicle   Bilateral hydrocele            Nusrat Raza, ANTONY  07/01/20 1024

## 2020-07-02 ENCOUNTER — TELEPHONE (OUTPATIENT)
Dept: FAMILY MEDICINE CLINIC | Facility: CLINIC | Age: 65
End: 2020-07-02

## 2020-07-06 ENCOUNTER — TELEPHONE (OUTPATIENT)
Dept: FAMILY MEDICINE CLINIC | Facility: CLINIC | Age: 65
End: 2020-07-06

## 2020-07-06 NOTE — TELEPHONE ENCOUNTER
Patient called and would like to know what other options he has since his Mri was denied.  Should he be referred to a spine doctor?  He stated he just really wants to find out what is going on because they had to stop his PT and he is in a lot of pain.

## 2020-07-07 DIAGNOSIS — G89.29 CHRONIC MIDLINE LOW BACK PAIN WITH LEFT-SIDED SCIATICA: Primary | ICD-10-CM

## 2020-07-07 DIAGNOSIS — M54.42 CHRONIC MIDLINE LOW BACK PAIN WITH LEFT-SIDED SCIATICA: Primary | ICD-10-CM

## 2020-07-10 DIAGNOSIS — M51.36 NARROWING OF LUMBAR INTERVERTEBRAL DISC SPACE: Primary | ICD-10-CM

## 2020-07-10 DIAGNOSIS — M54.50 ACUTE MIDLINE LOW BACK PAIN WITHOUT SCIATICA: ICD-10-CM

## 2020-07-13 ENCOUNTER — HOSPITAL ENCOUNTER (INPATIENT)
Facility: HOSPITAL | Age: 65
LOS: 2 days | Discharge: HOME | End: 2020-07-16
Attending: INTERNAL MEDICINE | Admitting: INTERNAL MEDICINE

## 2020-07-13 ENCOUNTER — APPOINTMENT (OUTPATIENT)
Dept: GENERAL RADIOLOGY | Facility: HOSPITAL | Age: 65
End: 2020-07-13

## 2020-07-13 ENCOUNTER — APPOINTMENT (OUTPATIENT)
Dept: CT IMAGING | Facility: HOSPITAL | Age: 65
End: 2020-07-13

## 2020-07-13 DIAGNOSIS — G45.9 TIA (TRANSIENT ISCHEMIC ATTACK): Primary | ICD-10-CM

## 2020-07-13 DIAGNOSIS — G89.29 CHRONIC MIDLINE LOW BACK PAIN WITH LEFT-SIDED SCIATICA: ICD-10-CM

## 2020-07-13 DIAGNOSIS — M54.42 CHRONIC MIDLINE LOW BACK PAIN WITH LEFT-SIDED SCIATICA: ICD-10-CM

## 2020-07-13 DIAGNOSIS — R41.0 TRANSIENT CONFUSION: ICD-10-CM

## 2020-07-13 DIAGNOSIS — I63.413 CEREBROVASCULAR ACCIDENT (CVA) DUE TO BILATERAL EMBOLISM OF MIDDLE CEREBRAL ARTERIES (HCC): ICD-10-CM

## 2020-07-13 LAB
ABO GROUP BLD: NORMAL
ALBUMIN SERPL-MCNC: 4.3 G/DL (ref 3.5–5.2)
ALBUMIN/GLOB SERPL: 1.6 G/DL
ALP SERPL-CCNC: 63 U/L (ref 39–117)
ALT SERPL W P-5'-P-CCNC: 13 U/L (ref 1–41)
ANION GAP SERPL CALCULATED.3IONS-SCNC: 14 MMOL/L (ref 5–15)
APTT PPP: 24.1 SECONDS (ref 24–31)
AST SERPL-CCNC: 12 U/L (ref 1–40)
BASOPHILS # BLD AUTO: 0.1 10*3/MM3 (ref 0–0.2)
BASOPHILS NFR BLD AUTO: 0.8 % (ref 0–1.5)
BILIRUB SERPL-MCNC: 0.4 MG/DL (ref 0–1.2)
BLD GP AB SCN SERPL QL: NEGATIVE
BUN SERPL-MCNC: 26 MG/DL (ref 8–23)
BUN SERPL-MCNC: ABNORMAL MG/DL
BUN/CREAT SERPL: ABNORMAL
CALCIUM SPEC-SCNC: 9 MG/DL (ref 8.6–10.5)
CHLORIDE SERPL-SCNC: 99 MMOL/L (ref 98–107)
CHOLEST SERPL-MCNC: 224 MG/DL (ref 0–200)
CO2 SERPL-SCNC: 26 MMOL/L (ref 22–29)
CREAT SERPL-MCNC: 1.57 MG/DL (ref 0.76–1.27)
DEPRECATED RDW RBC AUTO: 45.5 FL (ref 37–54)
EOSINOPHIL # BLD AUTO: 0.1 10*3/MM3 (ref 0–0.4)
EOSINOPHIL NFR BLD AUTO: 1.6 % (ref 0.3–6.2)
ERYTHROCYTE [DISTWIDTH] IN BLOOD BY AUTOMATED COUNT: 14.2 % (ref 12.3–15.4)
GFR SERPL CREATININE-BSD FRML MDRD: 45 ML/MIN/1.73
GLOBULIN UR ELPH-MCNC: 2.7 GM/DL
GLUCOSE BLDC GLUCOMTR-MCNC: 105 MG/DL (ref 70–105)
GLUCOSE BLDC GLUCOMTR-MCNC: 132 MG/DL (ref 70–105)
GLUCOSE BLDC GLUCOMTR-MCNC: 168 MG/DL (ref 70–105)
GLUCOSE SERPL-MCNC: 171 MG/DL (ref 65–99)
HBA1C MFR BLD: 8.1 % (ref 3.5–5.6)
HCT VFR BLD AUTO: 40.3 % (ref 37.5–51)
HDLC SERPL-MCNC: 61 MG/DL (ref 40–60)
HGB BLD-MCNC: 13.8 G/DL (ref 13–17.7)
HOLD SPECIMEN: NORMAL
HOLD SPECIMEN: NORMAL
INR PPP: 0.93 (ref 0.9–1.1)
LDLC SERPL CALC-MCNC: 116 MG/DL (ref 0–100)
LDLC/HDLC SERPL: 1.9 {RATIO}
LYMPHOCYTES # BLD AUTO: 2.3 10*3/MM3 (ref 0.7–3.1)
LYMPHOCYTES NFR BLD AUTO: 31.8 % (ref 19.6–45.3)
MCH RBC QN AUTO: 30.9 PG (ref 26.6–33)
MCHC RBC AUTO-ENTMCNC: 34.3 G/DL (ref 31.5–35.7)
MCV RBC AUTO: 89.9 FL (ref 79–97)
MONOCYTES # BLD AUTO: 0.5 10*3/MM3 (ref 0.1–0.9)
MONOCYTES NFR BLD AUTO: 7.2 % (ref 5–12)
NEUTROPHILS NFR BLD AUTO: 4.3 10*3/MM3 (ref 1.7–7)
NEUTROPHILS NFR BLD AUTO: 58.6 % (ref 42.7–76)
NRBC BLD AUTO-RTO: 0 /100 WBC (ref 0–0.2)
PLATELET # BLD AUTO: 144 10*3/MM3 (ref 140–450)
PMV BLD AUTO: 10.6 FL (ref 6–12)
POTASSIUM SERPL-SCNC: 4.1 MMOL/L (ref 3.5–5.2)
PROT SERPL-MCNC: 7 G/DL (ref 6–8.5)
PROTHROMBIN TIME: 9.9 SECONDS (ref 9.6–11.7)
RBC # BLD AUTO: 4.48 10*6/MM3 (ref 4.14–5.8)
RH BLD: POSITIVE
SODIUM SERPL-SCNC: 139 MMOL/L (ref 136–145)
T&S EXPIRATION DATE: NORMAL
TRIGL SERPL-MCNC: 234 MG/DL (ref 0–150)
TROPONIN T SERPL-MCNC: <0.01 NG/ML (ref 0–0.03)
TSH SERPL DL<=0.05 MIU/L-ACNC: 1.1 UIU/ML (ref 0.27–4.2)
VIT B12 BLD-MCNC: 556 PG/ML (ref 211–946)
VLDLC SERPL-MCNC: 46.8 MG/DL
WBC # BLD AUTO: 7.3 10*3/MM3 (ref 3.4–10.8)
WHOLE BLOOD HOLD SPECIMEN: NORMAL
WHOLE BLOOD HOLD SPECIMEN: NORMAL

## 2020-07-13 PROCEDURE — 82962 GLUCOSE BLOOD TEST: CPT

## 2020-07-13 PROCEDURE — 70498 CT ANGIOGRAPHY NECK: CPT

## 2020-07-13 PROCEDURE — 80053 COMPREHEN METABOLIC PANEL: CPT | Performed by: PHYSICIAN ASSISTANT

## 2020-07-13 PROCEDURE — 83036 HEMOGLOBIN GLYCOSYLATED A1C: CPT | Performed by: NURSE PRACTITIONER

## 2020-07-13 PROCEDURE — 84484 ASSAY OF TROPONIN QUANT: CPT | Performed by: PHYSICIAN ASSISTANT

## 2020-07-13 PROCEDURE — 99285 EMERGENCY DEPT VISIT HI MDM: CPT

## 2020-07-13 PROCEDURE — 70450 CT HEAD/BRAIN W/O DYE: CPT

## 2020-07-13 PROCEDURE — G0378 HOSPITAL OBSERVATION PER HR: HCPCS

## 2020-07-13 PROCEDURE — 70496 CT ANGIOGRAPHY HEAD: CPT

## 2020-07-13 PROCEDURE — 82607 VITAMIN B-12: CPT | Performed by: NURSE PRACTITIONER

## 2020-07-13 PROCEDURE — 63710000001 INSULIN GLARGINE PER 5 UNITS: Performed by: INTERNAL MEDICINE

## 2020-07-13 PROCEDURE — 99222 1ST HOSP IP/OBS MODERATE 55: CPT | Performed by: NURSE PRACTITIONER

## 2020-07-13 PROCEDURE — 85025 COMPLETE CBC W/AUTO DIFF WBC: CPT | Performed by: PHYSICIAN ASSISTANT

## 2020-07-13 PROCEDURE — 86901 BLOOD TYPING SEROLOGIC RH(D): CPT | Performed by: PHYSICIAN ASSISTANT

## 2020-07-13 PROCEDURE — 85610 PROTHROMBIN TIME: CPT | Performed by: PHYSICIAN ASSISTANT

## 2020-07-13 PROCEDURE — 86900 BLOOD TYPING SEROLOGIC ABO: CPT | Performed by: PHYSICIAN ASSISTANT

## 2020-07-13 PROCEDURE — 0 IOPAMIDOL PER 1 ML: Performed by: PHYSICIAN ASSISTANT

## 2020-07-13 PROCEDURE — 71045 X-RAY EXAM CHEST 1 VIEW: CPT

## 2020-07-13 PROCEDURE — 93005 ELECTROCARDIOGRAM TRACING: CPT | Performed by: PHYSICIAN ASSISTANT

## 2020-07-13 PROCEDURE — 84443 ASSAY THYROID STIM HORMONE: CPT | Performed by: NURSE PRACTITIONER

## 2020-07-13 PROCEDURE — 80061 LIPID PANEL: CPT | Performed by: NURSE PRACTITIONER

## 2020-07-13 PROCEDURE — 99220 PR INITIAL OBSERVATION CARE/DAY 70 MINUTES: CPT | Performed by: INTERNAL MEDICINE

## 2020-07-13 PROCEDURE — 85730 THROMBOPLASTIN TIME PARTIAL: CPT | Performed by: PHYSICIAN ASSISTANT

## 2020-07-13 PROCEDURE — 86850 RBC ANTIBODY SCREEN: CPT | Performed by: PHYSICIAN ASSISTANT

## 2020-07-13 RX ORDER — ACETAMINOPHEN 650 MG/1
650 SUPPOSITORY RECTAL EVERY 4 HOURS PRN
Status: DISCONTINUED | OUTPATIENT
Start: 2020-07-13 | End: 2020-07-16 | Stop reason: HOSPADM

## 2020-07-13 RX ORDER — DEXTROSE MONOHYDRATE 25 G/50ML
25 INJECTION, SOLUTION INTRAVENOUS
Status: DISCONTINUED | OUTPATIENT
Start: 2020-07-13 | End: 2020-07-16 | Stop reason: HOSPADM

## 2020-07-13 RX ORDER — SODIUM CHLORIDE 0.9 % (FLUSH) 0.9 %
10 SYRINGE (ML) INJECTION AS NEEDED
Status: DISCONTINUED | OUTPATIENT
Start: 2020-07-13 | End: 2020-07-16 | Stop reason: HOSPADM

## 2020-07-13 RX ORDER — FLUOXETINE HYDROCHLORIDE 40 MG/1
40 CAPSULE ORAL EVERY EVENING
COMMUNITY
End: 2020-07-21

## 2020-07-13 RX ORDER — HYDROCODONE BITARTRATE AND ACETAMINOPHEN 10; 325 MG/1; MG/1
1 TABLET ORAL EVERY 4 HOURS PRN
COMMUNITY
End: 2020-08-04

## 2020-07-13 RX ORDER — ASPIRIN 81 MG/1
81 TABLET, CHEWABLE ORAL DAILY
COMMUNITY
End: 2021-10-19

## 2020-07-13 RX ORDER — ONDANSETRON 2 MG/ML
4 INJECTION INTRAMUSCULAR; INTRAVENOUS EVERY 6 HOURS PRN
Status: DISCONTINUED | OUTPATIENT
Start: 2020-07-13 | End: 2020-07-16 | Stop reason: HOSPADM

## 2020-07-13 RX ORDER — HYDROCODONE BITARTRATE AND ACETAMINOPHEN 7.5; 325 MG/1; MG/1
1 TABLET ORAL ONCE
Status: COMPLETED | OUTPATIENT
Start: 2020-07-13 | End: 2020-07-13

## 2020-07-13 RX ORDER — SODIUM CHLORIDE 0.9 % (FLUSH) 0.9 %
10 SYRINGE (ML) INJECTION EVERY 12 HOURS SCHEDULED
Status: DISCONTINUED | OUTPATIENT
Start: 2020-07-13 | End: 2020-07-16 | Stop reason: HOSPADM

## 2020-07-13 RX ORDER — ACETAMINOPHEN 325 MG/1
650 TABLET ORAL EVERY 4 HOURS PRN
Status: DISCONTINUED | OUTPATIENT
Start: 2020-07-13 | End: 2020-07-16 | Stop reason: HOSPADM

## 2020-07-13 RX ORDER — SODIUM CHLORIDE 9 MG/ML
75 INJECTION, SOLUTION INTRAVENOUS CONTINUOUS
Status: DISCONTINUED | OUTPATIENT
Start: 2020-07-13 | End: 2020-07-14

## 2020-07-13 RX ORDER — NICOTINE POLACRILEX 4 MG
15 LOZENGE BUCCAL
Status: DISCONTINUED | OUTPATIENT
Start: 2020-07-13 | End: 2020-07-16 | Stop reason: HOSPADM

## 2020-07-13 RX ORDER — FLUOXETINE HYDROCHLORIDE 20 MG/1
40 CAPSULE ORAL EVERY EVENING
Status: DISCONTINUED | OUTPATIENT
Start: 2020-07-13 | End: 2020-07-16 | Stop reason: HOSPADM

## 2020-07-13 RX ORDER — ALUMINA, MAGNESIA, AND SIMETHICONE 2400; 2400; 240 MG/30ML; MG/30ML; MG/30ML
7.5 SUSPENSION ORAL EVERY 4 HOURS PRN
Status: DISCONTINUED | OUTPATIENT
Start: 2020-07-13 | End: 2020-07-16 | Stop reason: HOSPADM

## 2020-07-13 RX ORDER — HYDROCODONE BITARTRATE AND ACETAMINOPHEN 10; 325 MG/1; MG/1
1 TABLET ORAL EVERY 4 HOURS PRN
Status: DISCONTINUED | OUTPATIENT
Start: 2020-07-13 | End: 2020-07-16 | Stop reason: HOSPADM

## 2020-07-13 RX ORDER — BACLOFEN 10 MG/1
10 TABLET ORAL NIGHTLY PRN
Status: DISCONTINUED | OUTPATIENT
Start: 2020-07-13 | End: 2020-07-16 | Stop reason: HOSPADM

## 2020-07-13 RX ORDER — BISACODYL 10 MG
10 SUPPOSITORY, RECTAL RECTAL DAILY PRN
Status: DISCONTINUED | OUTPATIENT
Start: 2020-07-13 | End: 2020-07-16 | Stop reason: HOSPADM

## 2020-07-13 RX ORDER — PREGABALIN 75 MG/1
75 CAPSULE ORAL 3 TIMES DAILY
Status: DISCONTINUED | OUTPATIENT
Start: 2020-07-13 | End: 2020-07-16 | Stop reason: HOSPADM

## 2020-07-13 RX ORDER — LISINOPRIL 5 MG/1
10 TABLET ORAL NIGHTLY
Status: DISCONTINUED | OUTPATIENT
Start: 2020-07-13 | End: 2020-07-16 | Stop reason: HOSPADM

## 2020-07-13 RX ORDER — HYDROCODONE BITARTRATE AND ACETAMINOPHEN 10; 325 MG/1; MG/1
TABLET ORAL
Qty: 150 TABLET | Refills: 0 | Status: SHIPPED | OUTPATIENT
Start: 2020-07-13 | End: 2020-07-13

## 2020-07-13 RX ORDER — INSULIN GLARGINE 100 [IU]/ML
20 INJECTION, SOLUTION SUBCUTANEOUS NIGHTLY
Status: DISCONTINUED | OUTPATIENT
Start: 2020-07-13 | End: 2020-07-16 | Stop reason: HOSPADM

## 2020-07-13 RX ORDER — ATORVASTATIN CALCIUM 40 MG/1
80 TABLET, FILM COATED ORAL NIGHTLY
Status: DISCONTINUED | OUTPATIENT
Start: 2020-07-13 | End: 2020-07-16 | Stop reason: HOSPADM

## 2020-07-13 RX ORDER — CIPROFLOXACIN 500 MG/1
500 TABLET, FILM COATED ORAL 2 TIMES DAILY
COMMUNITY
Start: 2020-07-01 | End: 2020-07-16 | Stop reason: HOSPADM

## 2020-07-13 RX ORDER — ASPIRIN 81 MG/1
81 TABLET, CHEWABLE ORAL NIGHTLY
Status: DISCONTINUED | OUTPATIENT
Start: 2020-07-13 | End: 2020-07-16 | Stop reason: HOSPADM

## 2020-07-13 RX ORDER — INSULIN LISPRO 100 [IU]/ML
5 INJECTION, SOLUTION INTRAVENOUS; SUBCUTANEOUS
Status: DISCONTINUED | OUTPATIENT
Start: 2020-07-13 | End: 2020-07-13 | Stop reason: CLARIF

## 2020-07-13 RX ADMIN — INSULIN GLARGINE 20 UNITS: 100 INJECTION, SOLUTION SUBCUTANEOUS at 21:25

## 2020-07-13 RX ADMIN — IOPAMIDOL 100 ML: 755 INJECTION, SOLUTION INTRAVENOUS at 15:15

## 2020-07-13 RX ADMIN — ASPIRIN 81 MG 81 MG: 81 TABLET ORAL at 21:24

## 2020-07-13 RX ADMIN — HYDROCODONE BITARTRATE AND ACETAMINOPHEN 1 TABLET: 10; 325 TABLET ORAL at 21:24

## 2020-07-13 RX ADMIN — Medication 10 ML: at 21:25

## 2020-07-13 RX ADMIN — FLUOXETINE 40 MG: 20 CAPSULE ORAL at 21:24

## 2020-07-13 RX ADMIN — HYDROCODONE BITARTRATE AND ACETAMINOPHEN 1 TABLET: 7.5; 325 TABLET ORAL at 17:08

## 2020-07-13 RX ADMIN — SODIUM CHLORIDE 1000 ML: 0.9 INJECTION, SOLUTION INTRAVENOUS at 16:00

## 2020-07-13 RX ADMIN — APIXABAN 5 MG: 5 TABLET, FILM COATED ORAL at 21:25

## 2020-07-13 RX ADMIN — SODIUM CHLORIDE 75 ML/HR: 900 INJECTION, SOLUTION INTRAVENOUS at 21:28

## 2020-07-13 RX ADMIN — PREGABALIN 75 MG: 75 CAPSULE ORAL at 21:24

## 2020-07-13 RX ADMIN — ATORVASTATIN CALCIUM 80 MG: 40 TABLET, FILM COATED ORAL at 21:24

## 2020-07-13 RX ADMIN — LISINOPRIL 10 MG: 5 TABLET ORAL at 21:24

## 2020-07-13 NOTE — CONSULTS
Primary Care Provider: Leda Duckworth DO     Consult requested by: GAVIOTA Chin    Reason for Consultation: Neurological evaluation, code stroke    History taken from: patient chart RN    Chief complaint: Acute onset confusion       SUBJECTIVE:    History of present illness: Is a 64-year-old male who was at work around 130 this afternoon when he had acute onset of confusion with mild headache. Patient was shortly brought into the ER afterwards.  Code stroke was initiated.  Patient was examined at CT suite by myself and Dr. Low. Patient was able to give a decent history of what happened. He denies any vision changes other than blurry vision, no focal deficits, no numbness tingling or weakness, no speech difficulty or slurred speech, no difficulty with ambulating, no ataxia, no loss of consciousness or syncopal event.  Patient does feel that his confusion and headache is better now at the time of arrival.  Patient states he is on Coumadin.  CT head completed without any hemorrhage seen, CTA completed.    Review of Systems   Eyes: Positive for visual disturbance (Blurry  vision).   Cardiovascular: Negative.    Neurological: Positive for headaches. Negative for dizziness, tremors, seizures, syncope, facial asymmetry, speech difficulty, weakness, light-headedness and numbness.   Psychiatric/Behavioral: Positive for confusion and decreased concentration.          PATIENT HISTORY:  Past Medical History:   Diagnosis Date   • Arthritis    • BPH (benign prostatic hyperplasia)    • Chronic back pain    • Diabetes mellitus (CMS/HCC)    • Hypertension    • Low back pain    • Low vision of right eye with normal vision in contralateral eye    • Memory problem    • Nonobstructive atherosclerosis of coronary artery    • PFO (patent foramen ovale) 11/29/2019   , Past Surgical History:   Procedure Laterality Date   • ARM TENDON REPAIR Left    • CHOLECYSTECTOMY     • COLONOSCOPY      2018 = TA, rech 2023   • EYE SURGERY       Rt Eye Sx after trauma @ 2y/o   • JOINT REPLACEMENT      Left TKR   • ROTATOR CUFF REPAIR Bilateral    • UMBILICAL HERNIA REPAIR     , Family History   Problem Relation Age of Onset   • Diabetes Mother    • Heart disease Mother    • Arthritis Mother    • Obesity Mother    • Hypertension Mother    • Migraines Mother    • Osteoporosis Mother    • Diabetes Father    • Heart disease Father    • Arthritis Father    • Hypertension Father    • Stroke Father    • Heart attack Father    • Hyperlipidemia Father    • Diabetes Brother    • Heart disease Brother    • Arthritis Sister    • Anemia Sister    , Social History     Tobacco Use   • Smoking status: Never Smoker   • Smokeless tobacco: Never Used   Substance Use Topics   • Alcohol use: Yes     Alcohol/week: 1.0 standard drinks     Types: 1 Cans of beer per week   • Drug use: Never   ,   (Not in a hospital admission), Scheduled Meds:   , Continuous Infusions:   , PRN Meds:  •  sodium chloride, Allergies:  Patient has no known allergies.    ________________________________________________________        OBJECTIVE:    PHYSICAL EXAM:    Constitutional: The patient is in no apparent distress, awake and alert. There is no shortness of breath.     HEENT: Normocephalic, atraumatic.     Chest: Breathing unlabored    Cardiac: Regular rate and rhythm.     Extremities:  No clubbing, cyanosis or edema.    NEUROLOGICAL:    Cognition:   Oriented x3  Fund of knowledge limited.  Very mild encephalopathy  Concentration and attention normal.   Language normal with normal comprehension, fluent speech, intact repetition and naming.     Cranial nerves;    II - pupils bilaterally equal reacting to light,  No new Visual field deficits;  Fundoscopic exam- Not able to be done, non-dilated exam  III,IV,VI: EOMI with no diplopia  V: Normal facial sensations  VII: No facial asymmetry,  VIII: No New hearing abnormality  IX, X, XI: normal gag and shoulder shrug;  XII: tongue is in the  midline.    Sensory:  Intact to light touch in all extremities.     Motor: Strength 5/5 bilaterally upper and lower extremities. No involuntary movements present. Normal tone and bulk.  Deep tendon reflexes: 2/4 and symmetrical in biceps, brachioradialis, triceps, bilateral 2/4 knees and ankles. Both plantars are flexor.    Cerebellar: Finger to nose and mirror movements normal bilaterally.    Gait and balance: Deferred.     Physical exam performed by FRANCI Stone.    NIHSS:    Level Of Consciousness 0  0=Alert; keenly responsive 1=Not alert, but arousable by minor stimulation 2=Not alert, requires repeated stimulation 3=Responds only with reflex movements    LOC Questions to Month and age 0  0=Answers both questions correctly 1=Answers one question correctly 2=Answers neither question correctly    LOC Commands      -Open/Close eyes 0    -Open/close  0   0=Performs both tasks correctly 1=Performs one task correctly 2=Performs neighter task correctly     Best Gaze 0  0=Normal 1=Partial gaze palsy 2=Forced deviation, or total gaze paresis    Visual 0  0=No visual loss 1=Partial hemianopia 2=Complete hemianopia 3=Bilateral hemianopia (blind including cortical blindness)    Facial Palsy 0  0=Normal symmetrical movement 1=Minor paralysis (asymmetry) 2=Partial paralysis (lower facde) 3=Complete paralysis (upper and lower face)    Motor: Left Arm-0  Left leg-0; Right Arm-0 Right Leg-0  0=No drift, limb holds posture for full 10 seconds 1=Drift, limb holds posture, no drift to bed 2=Some antigravity effort, cannot maintain posture, drifts to bed 3=No effort against gravity, limb falls 4=No movement    Limb Ataxia 0  0=Absent 1=Present in one limb 2=Present in two limbs    Sensory 0   0=Normal 1=Mild to moderate sensory loss 2=Severe to total sensory loss    Best Language 0   0=No aphasia, normal 1=Mild to moderate aphasia 2=Severe Aphasia (very few words correct or understood)3=Multe, global aphasia    Dysarthria  0  0=Normal 1=Mild to moderate 2=Severe, unintelligible or mute/anarthric    Extinction/Neglect 0   0=No abnormality 1=Extinction to bilateral simultaneous stimulation 2=Profound neglect    Total: 0    Blood pressure: 109/70  Blood glucose: 168  Last known well 130  No tPA at this time due to NIH of 0, no focal symptoms.   ________________________________________________________   RESULTS REVIEW:    VITAL SIGNS:   Temp:  [98.2 °F (36.8 °C)] 98.2 °F (36.8 °C)  Heart Rate:  [82] 82  Resp:  [14] 14  BP: (109-123)/(64-70) 123/64     LABS:  WBC   Date Value Ref Range Status   07/13/2020 7.30 3.40 - 10.80 10*3/mm3 Final     RBC   Date Value Ref Range Status   07/13/2020 4.48 4.14 - 5.80 10*6/mm3 Final     Hemoglobin   Date Value Ref Range Status   07/13/2020 13.8 13.0 - 17.7 g/dL Final     Hematocrit   Date Value Ref Range Status   07/13/2020 40.3 37.5 - 51.0 % Final     MCV   Date Value Ref Range Status   07/13/2020 89.9 79.0 - 97.0 fL Final     MCH   Date Value Ref Range Status   07/13/2020 30.9 26.6 - 33.0 pg Final     MCHC   Date Value Ref Range Status   07/13/2020 34.3 31.5 - 35.7 g/dL Final     RDW   Date Value Ref Range Status   07/13/2020 14.2 12.3 - 15.4 % Final     RDW-SD   Date Value Ref Range Status   07/13/2020 45.5 37.0 - 54.0 fl Final     MPV   Date Value Ref Range Status   07/13/2020 10.6 6.0 - 12.0 fL Final     Platelets   Date Value Ref Range Status   07/13/2020 144 140 - 450 10*3/mm3 Final     Neutrophil %   Date Value Ref Range Status   07/13/2020 58.6 42.7 - 76.0 % Final     Lymphocyte %   Date Value Ref Range Status   07/13/2020 31.8 19.6 - 45.3 % Final     Monocyte %   Date Value Ref Range Status   07/13/2020 7.2 5.0 - 12.0 % Final     Eosinophil %   Date Value Ref Range Status   07/13/2020 1.6 0.3 - 6.2 % Final     Basophil %   Date Value Ref Range Status   07/13/2020 0.8 0.0 - 1.5 % Final     Neutrophils, Absolute   Date Value Ref Range Status   07/13/2020 4.30 1.70 - 7.00 10*3/mm3 Final      Lymphocytes, Absolute   Date Value Ref Range Status   07/13/2020 2.30 0.70 - 3.10 10*3/mm3 Final     Monocytes, Absolute   Date Value Ref Range Status   07/13/2020 0.50 0.10 - 0.90 10*3/mm3 Final     Eosinophils, Absolute   Date Value Ref Range Status   07/13/2020 0.10 0.00 - 0.40 10*3/mm3 Final     Basophils, Absolute   Date Value Ref Range Status   07/13/2020 0.10 0.00 - 0.20 10*3/mm3 Final     nRBC   Date Value Ref Range Status   07/13/2020 0.0 0.0 - 0.2 /100 WBC Final     Glucose   Date Value Ref Range Status   07/13/2020 171 (H) 65 - 99 mg/dL Final     BUN   Date Value Ref Range Status   07/13/2020   Final     Comment:     Testing performed by alternate method     Creatinine   Date Value Ref Range Status   07/13/2020 1.57 (H) 0.76 - 1.27 mg/dL Final     Sodium   Date Value Ref Range Status   07/13/2020 139 136 - 145 mmol/L Final     Potassium   Date Value Ref Range Status   07/13/2020 4.1 3.5 - 5.2 mmol/L Final     Chloride   Date Value Ref Range Status   07/13/2020 99 98 - 107 mmol/L Final     CO2   Date Value Ref Range Status   07/13/2020 26.0 22.0 - 29.0 mmol/L Final     Calcium   Date Value Ref Range Status   07/13/2020 9.0 8.6 - 10.5 mg/dL Final     Total Protein   Date Value Ref Range Status   07/13/2020 7.0 6.0 - 8.5 g/dL Final     Albumin   Date Value Ref Range Status   07/13/2020 4.30 3.50 - 5.20 g/dL Final     ALT (SGPT)   Date Value Ref Range Status   07/13/2020 13 1 - 41 U/L Final     AST (SGOT)   Date Value Ref Range Status   07/13/2020 12 1 - 40 U/L Final     Alkaline Phosphatase   Date Value Ref Range Status   07/13/2020 63 39 - 117 U/L Final     Total Bilirubin   Date Value Ref Range Status   07/13/2020 0.4 0.0 - 1.2 mg/dL Final     eGFR Non  Amer   Date Value Ref Range Status   07/13/2020 45 (L) >60 mL/min/1.73 Final     BUN/Creatinine Ratio   Date Value Ref Range Status   07/13/2020   Final     Comment:     Testing not performed     Anion Gap   Date Value Ref Range Status    07/13/2020 14.0 5.0 - 15.0 mmol/L Final       Lab Results   Component Value Date    TSH 2.130 11/28/2019     (H) 11/28/2019    HGBA1C 8.2 06/19/2020    [id number] 11/28/2019         IMAGING STUDIES:  Xr Chest 1 View    Result Date: 7/13/2020  Negative portable chest  Electronically Signed By-Honorio Daly On:7/13/2020 3:24 PM This report was finalized on [id number] by  Honorio Daly, .    Ct Head Without Contrast Stroke Protocol    Result Date: 7/13/2020  No evidence of hemorrhage, mass effect or midline shift. No acute process identified.  The above findings were discussed with Brianne Carreon nurse practitioner at 3:06 PM on July 13, 2020  Electronically Signed By-Lilia Scruggs On:7/13/2020 3:07 PM This report was finalized on [id number] by  Lilia Scruggs, .      I reviewed the patient's new clinical results.      ________________________________________________________     PROBLEM LIST:    * No active hospital problems. *          Assessment/Plan   ASSESSMENT/PLAN:    1.  Acute onset of confusion, improving. Unknown etiology.  Patient did not have any focal deficit, no dysarthria or aphasia, no vision deficits. No tPA given due to NIH of 0, symptoms resolved.   - CT head: No evidence of hemorrhage, no acute process.  Multiple chronic infarcts seen.  - CTA head and neck: Preliminary negative, awaiting official read .   - MRI brain if symptoms continue   - EKG: Sinus rhythm, rate 79, MO interval 177  - Labs: A1C: P, B12: P, LDL:  P, TSH: P  - Continue Eliquis and ASA  - Additional work-up pending clinical course.     Modification of stroke risk factors:   - Blood pressure should be less than 130/80 outpatient, HbA1c less than 6.5, LDL less than 70; b12>500 and smoking cessation if applicable. We would be grateful if the primary team / primary care physician would keep a close watch on the above targets.  - Stroke education  - Follow up with neurologist of choice      Will follow as needed.  Please call with  any neurological changes, questions or concerns.  I discussed the patient's findings and my recommendations with patient and consulting provider.     Dagmar Carreon, ANTONY  07/13/20  15:44

## 2020-07-13 NOTE — H&P
[facility] MEDICAL [facility] HOSPITALIST     Leda Duckworth DO    CHIEF COMPLAINT:     Confusion    HISTORY OF PRESENT ILLNESS:    64-year-old male who presented to the ER after having acute onset of confusion with a mild headache that started today.  It started around 1330 today at work.  He noticed he was more confused and he called his son who noted that he was having trouble communicating with the patient so they became concerned and decided to come to the ER for evaluation.  In the ER code stroke was called and stroke work-up was initiated.  The patient denied any visual changes no focal deficits had no numbness or tingling.  Shortly after arriving to the ER symptoms started to improve.  Admission was requested after code stroke was work-up done.      Past Medical History:   Diagnosis Date   • Arthritis    • BPH (benign prostatic hyperplasia)    • Chronic back pain    • Diabetes mellitus (CMS/HCC)    • Hypertension    • Low back pain    • Low vision of right eye with normal vision in contralateral eye    • Memory problem    • Nonobstructive atherosclerosis of coronary artery    • PFO (patent foramen ovale) 11/29/2019     Past Surgical History:   Procedure Laterality Date   • ARM TENDON REPAIR Left    • CHOLECYSTECTOMY     • COLONOSCOPY      2018 = TA, rech 2023   • EYE SURGERY      Rt Eye Sx after trauma @ 4y/o   • JOINT REPLACEMENT      Left TKR   • ROTATOR CUFF REPAIR Bilateral    • UMBILICAL HERNIA REPAIR       Family History   Problem Relation Age of Onset   • Diabetes Mother    • Heart disease Mother    • Arthritis Mother    • Obesity Mother    • Hypertension Mother    • Migraines Mother    • Osteoporosis Mother    • Diabetes Father    • Heart disease Father    • Arthritis Father    • Hypertension Father    • Stroke Father    • Heart attack Father    • Hyperlipidemia Father    • Diabetes Brother    • Heart disease Brother    • Arthritis Sister    • Anemia Sister      Social History     Tobacco Use   •  "Smoking status: Never Smoker   • Smokeless tobacco: Never Used   Substance Use Topics   • Alcohol use: Yes     Alcohol/week: 1.0 standard drinks     Types: 1 Cans of beer per week   • Drug use: Never       (Not in a hospital admission)  Allergies:  Patient has no known allergies.    Immunization History   Administered Date(s) Administered   • Influenza TIV (IM) 11/01/2015   • flucelvax quad pfs =>4 YRS 01/17/2020           REVIEW OF SYSTEMS:  Please see the above history of present illness for pertinent positives and negatives.  The remainder of the patient's systems have been reviewed and are negative.     Objective     Vital Signs  Temp:  [98.2 °F (36.8 °C)] 98.2 °F (36.8 °C)  Heart Rate:  [76-82] 77  Resp:  [14] 14  BP: (109-141)/(64-78) 129/73    Flowsheet Rows      First Filed Value   Admission Height  172.7 cm (68\") Documented at 07/13/2020 1442   Admission Weight  114 kg (251 lb 5.2 oz) Documented at 07/13/2020 1442           Physical Exam:  Physical Exam   Vitals reviewed.      Gen: NAD  HEENT: EOMI, no icterus, PERRL  Neck: No JVD  Heart: RRR, no murmur  Lung: CTA b/l, adequate air movement  ABD: soft, NT, ND, no rebound or guarding  MSK: moves ext spontaneously  Neuro: AO x 3, CN II-XII intact  Psych: no anxiety, no depression  Skin: warm, dry, intact  Extremities:  No edema    Results Review:    I reviewed the patient's new clinical results.  Lab Results (most recent)     Procedure Component Value Units Date/Time    BUN [[id number]]  (Abnormal) Collected:  07/13/20 1456    Specimen:  Blood from Arm, Right Updated:  07/13/20 1630     BUN 26 mg/dL     TSH [[id number]]  (Normal) Collected:  07/13/20 1456    Specimen:  Blood from Arm, Right Updated:  07/13/20 1621     TSH 1.100 uIU/mL     Lipid Panel [[id number]]  (Abnormal) Collected:  07/13/20 1456    Specimen:  Blood from Arm, Right Updated:  07/13/20 1615     Total Cholesterol 224 mg/dL      Triglycerides 234 mg/dL      HDL Cholesterol 61 mg/dL      LDL " Cholesterol  116 mg/dL      VLDL Cholesterol 46.8 mg/dL      LDL/HDL Ratio 1.90    Narrative:       Cholesterol Reference Ranges  (U.S. Department of Health and Human Services ATP III Classifications)    Desirable          <200 mg/dL  Borderline High    200-239 mg/dL  High Risk          >240 mg/dL      Triglyceride Reference Ranges  (U.S. Department of Health and Human Services ATP III Classifications)    Normal           <150 mg/dL  Borderline High  150-199 mg/dL  High             200-499 mg/dL  Very High        >500 mg/dL    HDL Reference Ranges  (U.S. Department of Health and Human Services ATP III Classifcations)    Low     <40 mg/dl (major risk factor for CHD)  High    >60 mg/dl ('negative' risk factor for CHD)        LDL Reference Ranges  (U.S. Department of Health and Human Services ATP III Classifcations)    Optimal          <100 mg/dL  Near Optimal     100-129 mg/dL  Borderline High  130-159 mg/dL  High             160-189 mg/dL  Very High        >189 mg/dL    Hemoglobin A1c [[id number]]  (Abnormal) Collected:  07/13/20 1456    Specimen:  Blood from Arm, Right Updated:  07/13/20 1602     Hemoglobin A1C 8.1 %     Narrative:       Hemoglobin A1C Reference Range:    <5.7 %        Normal  5.7-6.4 %     Increased risk for diabetes  > 6.4 %        Diabetes       These guidelines have been recommended by the American Diabetic Association for Hgb A1c.      The following 2010 guidelines have been recommended by the American Diabetes Association for Hemoglobin A1c.    HBA1c 5.7-6.4% Increased risk for future diabetes (pre-diabetes)  HBA1c     >6.4% Diabetes      [place] Draw [[id number]] Collected:  07/13/20 1456    Specimen:  Blood from Arm, Right Updated:  07/13/20 1600    Narrative:       The following orders were created for panel order [place] Draw.  Procedure                               Abnormality         Status                     ---------                               -----------         ------                      Light Blue Top[[id number]]                                   Final result               Green Top (Gel)[[id number]]                                  Final result               Lavender Top[[id number]]                                     Final result               Gold Top - SST[[id number]]                                   Final result                 Please view results for these tests on the individual orders.    Green Top (Gel) [[id number]] Collected:  07/13/20 1456    Specimen:  Blood from Arm, Right Updated:  07/13/20 1600     Extra Tube Hold for add-ons.     Comment: Auto resulted.       Gold Top - SST [[id number]] Collected:  07/13/20 1456    Specimen:  Blood from Arm, Right Updated:  07/13/20 1600     Extra Tube Hold for add-ons.     Comment: Auto resulted.       Light Blue Top [[id number]] Collected:  07/13/20 1456    Specimen:  Blood from Arm, Right Updated:  07/13/20 1600     Extra Tube hold for add-on     Comment: Auto resulted       Lavender Top [[id number]] Collected:  07/13/20 1456    Specimen:  Blood from Arm, Right Updated:  07/13/20 1600     Extra Tube hold for add-on     Comment: Auto resulted       Vitamin B12 [[id number]] Collected:  07/13/20 1456    Specimen:  Blood from Arm, Right Updated:  07/13/20 1557    Comprehensive Metabolic Panel [[id number]]  (Abnormal) Collected:  07/13/20 1456    Specimen:  Blood from Arm, Right Updated:  07/13/20 1543     Glucose 171 mg/dL      BUN --     Comment: Testing performed by alternate method        Creatinine 1.57 mg/dL      Sodium 139 mmol/L      Potassium 4.1 mmol/L      Chloride 99 mmol/L      CO2 26.0 mmol/L      Calcium 9.0 mg/dL      Total Protein 7.0 g/dL      Albumin 4.30 g/dL      ALT (SGPT) 13 U/L      AST (SGOT) 12 U/L      Alkaline Phosphatase 63 U/L      Total Bilirubin 0.4 mg/dL      eGFR Non African Amer 45 mL/min/1.73      Globulin 2.7 gm/dL      A/G Ratio 1.6 g/dL      BUN/Creatinine Ratio --     Comment: Testing not performed        Anion  Gap 14.0 mmol/L     Narrative:       GFR Normal >60  Chronic Kidney Disease <60  Kidney Failure <15      Troponin [[id number]]  (Normal) Collected:  07/13/20 1456    Specimen:  Blood from Arm, Right Updated:  07/13/20 1543     Troponin T <0.010 ng/mL     Narrative:       Troponin T Reference Range:  <= 0.03 ng/mL-   Negative for AMI  >0.03 ng/mL-     Abnormal for myocardial necrosis.  Clinicians would have to utilize clinical acumen, EKG, Troponin and serial changes to determine if it is an Acute Myocardial Infarction or myocardial injury due to an underlying chronic condition.       Results may be falsely decreased if patient taking Biotin.      Protime-INR [[id number]]  (Normal) Collected:  07/13/20 1456    Specimen:  Blood from Arm, Right Updated:  07/13/20 1512     Protime 9.9 Seconds      INR 0.93    aPTT [[id number]]  (Normal) Collected:  07/13/20 1456    Specimen:  Blood from Arm, Right Updated:  07/13/20 1512     PTT 24.1 seconds     CBC & Differential [[id number]] Collected:  07/13/20 1456    Specimen:  Blood from Arm, Right Updated:  07/13/20 1501    Narrative:       The following orders were created for panel order CBC & Differential.  Procedure                               Abnormality         Status                     ---------                               -----------         ------                     CBC Auto Differential[[id number]]        Normal              Final result                 Please view results for these tests on the individual orders.    CBC Auto Differential [[id number]]  (Normal) Collected:  07/13/20 1456    Specimen:  Blood from Arm, Right Updated:  07/13/20 1501     WBC 7.30 10*3/mm3      RBC 4.48 10*6/mm3      Hemoglobin 13.8 g/dL      Hematocrit 40.3 %      MCV 89.9 fL      MCH 30.9 pg      MCHC 34.3 g/dL      RDW 14.2 %      RDW-SD 45.5 fl      MPV 10.6 fL      Platelets 144 10*3/mm3      Neutrophil % 58.6 %      Lymphocyte % 31.8 %      Monocyte % 7.2 %      Eosinophil % 1.6 %       Basophil % 0.8 %      Neutrophils, Absolute 4.30 10*3/mm3      Lymphocytes, Absolute 2.30 10*3/mm3      Monocytes, Absolute 0.50 10*3/mm3      Eosinophils, Absolute 0.10 10*3/mm3      Basophils, Absolute 0.10 10*3/mm3      nRBC 0.0 /100 WBC     POC Glucose Once [[id number]]  (Abnormal) Collected:  07/13/20 1446    Specimen:  Blood Updated:  07/13/20 1447     Glucose 168 mg/dL      Comment: Serial Number: [id number]:  [id number]             Imaging Results (Most Recent)     Procedure Component Value Units Date/Time    CT Angiogram Head [[id number]] Collected:  07/13/20 1633     Updated:  07/13/20 1644    Narrative:          DATE OF EXAM:  7/13/2020 3:03 PM     PROCEDURE:  CT ANGIOGRAM HEAD-, CT ANGIOGRAM NECK-     INDICATIONS:   Stroke, confusion     COMPARISON:   No Comparisons Available     TECHNIQUE:  CTA of the head and CTA of the neck were performed after the intravenous  administration of 100 mL Isovue 370. Reconstructed coronal and sagittal  images were also obtained. In addition, a 3 D volume rendered image was  obtained after post processing. Automated exposure control and iterative  reconstruction methods were used.      FINDINGS:  VASCULAR FINDINGS: No significant plaque identified in the arch or  proximal great vessels. Right and left vertebral arteries appear patent.  There is no significant basilar stenosis. There is intimal thickening in  the right carotid bifurcation and calcified plaque in the left carotid  bifurcation extending into the proximal left ICA. The degree of  narrowing is less than 50% and not hemodynamically significant. There is  no significant carotid siphon stenosis. There is symmetric filling of  the middle and anterior cerebral arteries without significant stenosis  or occlusion. Both posterior cerebral arteries appear to fill normally.     NONVASCULAR FINDINGS: The lung apices are clear. No superior mediastinal  masses are seen. There is no evidence of supraclavicular or  cervical  lymphadenopathy. Skull base, mastoid air cells and paranasal sinuses are  clear. No enhancing intracranial lesions are identified. There are areas  of infarction identified in the basal ganglia bilaterally.          Impression:          1. Atherosclerotic plaque in both carotid bifurcations. The degree of  narrowing is less than 50% and not hemodynamically significant.  2. No evidence of vertebrobasilar stenosis.  3. No intracranial stenoses or occlusion. No evidence of aneurysm or  AVM.     Electronically Signed By-Honorio Daly On:7/13/2020 4:37 PM  This report was finalized on [id number] by  Honorio Daly, .    CT Angiogram Neck [[id number]] Collected:  07/13/20 1633     Updated:  07/13/20 1644    Narrative:          DATE OF EXAM:  7/13/2020 3:03 PM     PROCEDURE:  CT ANGIOGRAM HEAD-, CT ANGIOGRAM NECK-     INDICATIONS:   Stroke, confusion     COMPARISON:   No Comparisons Available     TECHNIQUE:  CTA of the head and CTA of the neck were performed after the intravenous  administration of 100 mL Isovue 370. Reconstructed coronal and sagittal  images were also obtained. In addition, a 3 D volume rendered image was  obtained after post processing. Automated exposure control and iterative  reconstruction methods were used.      FINDINGS:  VASCULAR FINDINGS: No significant plaque identified in the arch or  proximal great vessels. Right and left vertebral arteries appear patent.  There is no significant basilar stenosis. There is intimal thickening in  the right carotid bifurcation and calcified plaque in the left carotid  bifurcation extending into the proximal left ICA. The degree of  narrowing is less than 50% and not hemodynamically significant. There is  no significant carotid siphon stenosis. There is symmetric filling of  the middle and anterior cerebral arteries without significant stenosis  or occlusion. Both posterior cerebral arteries appear to fill normally.     NONVASCULAR FINDINGS: The lung apices  are clear. No superior mediastinal  masses are seen. There is no evidence of supraclavicular or cervical  lymphadenopathy. Skull base, mastoid air cells and paranasal sinuses are  clear. No enhancing intracranial lesions are identified. There are areas  of infarction identified in the basal ganglia bilaterally.          Impression:          1. Atherosclerotic plaque in both carotid bifurcations. The degree of  narrowing is less than 50% and not hemodynamically significant.  2. No evidence of vertebrobasilar stenosis.  3. No intracranial stenoses or occlusion. No evidence of aneurysm or  AVM.     Electronically Signed ByNik Daly On:7/13/2020 4:37 PM  This report was finalized on [id number] by  Honorio Daly, .    XR Chest 1 View [[id number]] Collected:  07/13/20 1524     Updated:  07/13/20 1526    Narrative:       DATE OF EXAM:  7/13/2020 3:21 PM     PROCEDURE:  XR CHEST 1 VW-     INDICATIONS:  Acute Stroke Protocol (onset < 12 hrs)     COMPARISON:  12/13/2019     TECHNIQUE:   Single radiographic AP view of the chest was obtained.     FINDINGS:  Cardiac size is normal. The film is relatively underpenetrated the  bases. The lungs appear clear and the vascular markings normal        Impression:       Negative portable chest     Electronically Signed ByNik Daly On:7/13/2020 3:24 PM  This report was finalized on [id number] by  Honorio Daly, .    CT Head Without Contrast Stroke Protocol [[id number]] Collected:  07/13/20 1506     Updated:  07/13/20 1514    Narrative:       CT HEAD WO CONTRAST STROKE PROTOCOL-     Date of Exam: 7/13/2020 2:55 PM     Indication: Stroke.  Confusion, headache     Comparison: CT December 2019     Technique:  Without contrast, contiguous axial CT images of the head  were obtained from skull base to vertex.  Coronal and sagittal  reconstructions were performed.  Automated exposure control and  iterative reconstruction methods were used.     FINDINGS  No evidence of intracranial hemorrhage,  mass, or midline shift. The  ventricles appear normal in size for the patient's age. Previous lacunar  infarcts are present within the basal ganglia region bilaterally,  unchanged as compared to the previous study. No extra-axial collections  identified. The gray white matter differentiation is intact. No  air-fluid levels identified within the paranasal sinuses. The  extra-axial structures demonstrate no acute process.       Impression:       No evidence of hemorrhage, mass effect or midline shift. No acute  process identified.     The above findings were discussed with Brianne Carreon nurse practitioner at  3:06 PM on July 13, 2020     Electronically Signed By-Lilia Scruggs On:7/13/2020 3:07 PM  This report was finalized on [id number] by  Lilia Scruggs, .        Reviewed personally     ECG/EMG Results (most recent)     Procedure Component Value Units Date/Time    ECG 12 Lead [[id number]] Collected:  07/13/20 1519     Updated:  07/13/20 1522    Narrative:       HEART RATE= 79  bpm  RR Interval= 760  ms  LA Interval= 177  ms  P Horizontal Axis= 10  deg  P Front Axis= 68  deg  QRSD Interval= 91  ms  QT Interval= 403  ms  QRS Axis= 12  deg  T Wave Axis= 51  deg  - OTHERWISE NORMAL ECG -  Sinus rhythm  Low voltage, precordial leads  When compared with ECG of 13-Dec-2019 13:36:02,  No significant change  Electronically Signed By:   Date and Time of Study: 2020-07-13 15:19:59        Reviewed personally       Assessment/Plan     Active Hospital Problems:  Active Hospital Problems    Diagnosis  POA   • **Confusion and disorientation [R41.0]  Yes   • PFO (patent foramen ovale) [Q21.1]  Not Applicable   • Diabetic polyneuropathy associated with type 2 diabetes mellitus (CMS/HCC) [E11.42]  Yes      Resolved Hospital Problems   No resolved problems to display.     Acute onset of confusion, improving. Unknown etiology.  - CT head: No evidence of hemorrhage, no acute process.  Multiple chronic infarcts seen.  - neurology evaluated in  the ER  - CTA head and neck: Preliminary negative, awaiting official read .   - MRI brain if symptoms continue   - Continue Eliquis and ASA    ANT  -Cr 1.5, baseline 1.0  -IVF and BMP in am     Chronic embolic stroke  -on ASA and eliquis continue    PFO  -chronic an on eliquis    Type II DM  -c/w home basal and pre-meal insulin  -add SSI and adjust prn  -hold home po agents     HTN  -chronic   -c/w lisinopril    DVT ppx-on eliquis    High risk     This patient has been examined wearing appropriate Personal Protective Equipment. 07/13/20      I discussed the patient's findings and my recommendations with patient.     Kevyn Vogel DO  07/13/20  17:19

## 2020-07-13 NOTE — TELEPHONE ENCOUNTER
Last visit:  6/19/20  Next visit: 7/16/20  Last labs: 7/1/20    Rx requested: Hydrocodone   Pharmacy: [facility] in [place]

## 2020-07-13 NOTE — ED NOTES
Called lab to verify new lab orders were added to blood already sent to Molly Humphries, RN  07/13/20 4253

## 2020-07-13 NOTE — ED PROVIDER NOTES
Subjective   History: Patient is 64-year-old male who is brought in by his son for transient confusion.  He reports that he got off work he was on his way home and he felt very confused and he did not know where he was he called his son who reports that he is speech sounded confused as well.  Has a history of previous CVA.  Currently alert and oriented x3      Onset: 1 day  Location: Generalized  Duration: Improving  Character: Confusion  Aggravating/Alleviating factors: None  Radiation none  Severity: Moderate            Review of Systems   Constitutional: Negative for chills, diaphoresis, fatigue and fever.   Respiratory: Negative for cough and shortness of breath.    Cardiovascular: Negative for chest pain and palpitations.   Psychiatric/Behavioral: Positive for confusion.       Past Medical History:   Diagnosis Date   • Arthritis    • BPH (benign prostatic hyperplasia)    • Chronic back pain    • Diabetes mellitus (CMS/HCC)    • Hypertension    • Low back pain    • Low vision of right eye with normal vision in contralateral eye    • Memory problem    • Nonobstructive atherosclerosis of coronary artery    • PFO (patent foramen ovale) 11/29/2019       No Known Allergies    Past Surgical History:   Procedure Laterality Date   • ARM TENDON REPAIR Left    • CHOLECYSTECTOMY     • COLONOSCOPY      2018 = TA, rech 2023   • EYE SURGERY      Rt Eye Sx after trauma @ 4y/o   • JOINT REPLACEMENT      Left TKR   • ROTATOR CUFF REPAIR Bilateral    • UMBILICAL HERNIA REPAIR         Family History   Problem Relation Age of Onset   • Diabetes Mother    • Heart disease Mother    • Arthritis Mother    • Obesity Mother    • Hypertension Mother    • Migraines Mother    • Osteoporosis Mother    • Diabetes Father    • Heart disease Father    • Arthritis Father    • Hypertension Father    • Stroke Father    • Heart attack Father    • Hyperlipidemia Father    • Diabetes Brother    • Heart disease Brother    • Arthritis Sister    • Anemia  Sister        Social History     Socioeconomic History   • Marital status: Single     Spouse name: Not on file   • Number of children: Not on file   • Years of education: Not on file   • Highest education level: Not on file   Tobacco Use   • Smoking status: Never Smoker   • Smokeless tobacco: Never Used   Substance and Sexual Activity   • Alcohol use: Yes     Alcohol/week: 1.0 standard drinks     Types: 1 Cans of beer per week   • Drug use: Never   • Sexual activity: Defer           Objective   Physical Exam   Constitutional: He is oriented to person, place, and time. He appears well-developed and well-nourished.   HENT:   Head: Normocephalic and atraumatic.   Eyes: Pupils are equal, round, and reactive to light.   Neck: Normal range of motion.   Cardiovascular: Normal rate and regular rhythm.   Pulmonary/Chest: Effort normal and breath sounds normal.   Musculoskeletal: Normal range of motion.   Neurological: He is alert and oriented to person, place, and time. He has normal strength. He is not disoriented. No cranial nerve deficit or sensory deficit. Coordination normal.   Skin: Skin is warm and dry.   Psychiatric: He has a normal mood and affect. His behavior is normal. His mood appears not anxious. He is not agitated.       Procedures           ED Course  ED Course as of Jul 13 1637   Mon Jul 13, 2020   1538 KG interpreted by ER physician reviewed myself.  Sinus rhythm rate of 79    [MG]      ED Course User Index  [MG] Yoanna Arriola PA-C          Xr Chest 1 View    Result Date: 7/13/2020  Negative portable chest  Electronically Signed By-Honorio Daly On:7/13/2020 3:24 PM This report was finalized on [id number] by  Honorio Daly, .    Ct Head Without Contrast Stroke Protocol    Result Date: 7/13/2020  No evidence of hemorrhage, mass effect or midline shift. No acute process identified.  The above findings were discussed with Brianne Carreon nurse practitioner at 3:06 PM on July 13, 2020  Electronically Signed  By-Lilia Scruggs On:7/13/2020 3:07 PM This report was finalized on [id number] by  Lilia Scruggs, .    Labs Reviewed   COMPREHENSIVE METABOLIC PANEL - Abnormal; Notable for the following components:       Result Value    Glucose 171 (*)     Creatinine 1.57 (*)     eGFR Non  Amer 45 (*)     All other components within normal limits    Narrative:     GFR Normal >60  Chronic Kidney Disease <60  Kidney Failure <15     BUN - Abnormal; Notable for the following components:    BUN 26 (*)     All other components within normal limits   LIPID PANEL - Abnormal; Notable for the following components:    Total Cholesterol 224 (*)     Triglycerides 234 (*)     HDL Cholesterol 61 (*)     LDL Cholesterol  116 (*)     All other components within normal limits    Narrative:     Cholesterol Reference Ranges  (U.S. Department of Health and Human Services ATP III Classifications)    Desirable          <200 mg/dL  Borderline High    200-239 mg/dL  High Risk          >240 mg/dL      Triglyceride Reference Ranges  (U.S. Department of Health and Human Services ATP III Classifications)    Normal           <150 mg/dL  Borderline High  150-199 mg/dL  High             200-499 mg/dL  Very High        >500 mg/dL    HDL Reference Ranges  (U.S. Department of Health and Human Services ATP III Classifcations)    Low     <40 mg/dl (major risk factor for CHD)  High    >60 mg/dl ('negative' risk factor for CHD)        LDL Reference Ranges  (U.S. Department of Health and Human Services ATP III Classifcations)    Optimal          <100 mg/dL  Near Optimal     100-129 mg/dL  Borderline High  130-159 mg/dL  High             160-189 mg/dL  Very High        >189 mg/dL   HEMOGLOBIN A1C - Abnormal; Notable for the following components:    Hemoglobin A1C 8.1 (*)     All other components within normal limits    Narrative:     Hemoglobin A1C Reference Range:    <5.7 %        Normal  5.7-6.4 %     Increased risk for diabetes  > 6.4 %        Diabetes        These guidelines have been recommended by the American Diabetic Association for Hgb A1c.      The following 2010 guidelines have been recommended by the American Diabetes Association for Hemoglobin A1c.    HBA1c 5.7-6.4% Increased risk for future diabetes (pre-diabetes)  HBA1c     >6.4% Diabetes     POCT GLUCOSE FINGERSTICK - Abnormal; Notable for the following components:    Glucose 168 (*)     All other components within normal limits   PROTIME-INR - Normal   APTT - Normal   TROPONIN (IN-HOUSE) - Normal    Narrative:     Troponin T Reference Range:  <= 0.03 ng/mL-   Negative for AMI  >0.03 ng/mL-     Abnormal for myocardial necrosis.  Clinicians would have to utilize clinical acumen, EKG, Troponin and serial changes to determine if it is an Acute Myocardial Infarction or myocardial injury due to an underlying chronic condition.       Results may be falsely decreased if patient taking Biotin.     CBC WITH AUTO DIFFERENTIAL - Normal   TSH - Normal   RAINBOW DRAW    Narrative:     The following orders were created for panel order [place] Draw.  Procedure                               Abnormality         Status                     ---------                               -----------         ------                     Light Blue Top[[id number]]                                   Final result               Green Top (Gel)[[id number]]                                  Final result               Lavender Top[[id number]]                                     Final result               Gold Top - SST[[id number]]                                   Final result                 Please view results for these tests on the individual orders.   VITAMIN B12   POCT GLUCOSE FINGERSTICK   TYPE AND SCREEN   BB ARMBAND CHECK   CBC AND DIFFERENTIAL    Narrative:     The following orders were created for panel order CBC & Differential.  Procedure                               Abnormality         Status                     ---------                                -----------         ------                     CBC Auto Differential[[id number]]        Normal              Final result                 Please view results for these tests on the individual orders.   LIGHT BLUE TOP   GREEN TOP   LAVENDER TOP   GOLD TOP - SST     Medications   sodium chloride 0.9 % flush 10 mL (has no administration in time range)   sodium chloride 0.9 % bolus 1,000 mL (has no administration in time range)   iopamidol (ISOVUE-370) 76 % injection 100 mL (100 mL Intravenous Given 7/13/20 1515)                                       MDM  Number of Diagnoses or Management Options  TIA (transient ischemic attack):   Transient confusion:   Diagnosis management comments: I examined the patient using the appropriate personal protective equipment.      DISPOSITION:   Chart Review:  Comorbidity:  has a past medical history of Arthritis, BPH (benign prostatic hyperplasia), Chronic back pain, Diabetes mellitus (CMS/HCC), Hypertension, Low back pain, Low vision of right eye with normal vision in contralateral eye, Memory problem, Nonobstructive atherosclerosis of coronary artery, and PFO (patent foramen ovale) (11/29/2019).  Differentials:this list is not all inclusive and does not constitute the entirety of considered causes --> TIA, acute stroke, dehydration  ECG: interpreted by ER physician and reviewed by myself: Sinus rhythm  Labs: Slightly elevated creatinine from baseline.    Imaging: Was interpreted by physician and reviewed by myself:  Xr Chest 1 View    Result Date: 7/13/2020  Negative portable chest  Electronically Signed By-Honorio Daly On:7/13/2020 3:24 PM This report was finalized on [id number] by  Honorio Daly, .    Ct Head Without Contrast Stroke Protocol    Result Date: 7/13/2020  No evidence of hemorrhage, mass effect or midline shift. No acute process identified.  The above findings were discussed with Brianne Carreon nurse practitioner at 3:06 PM on July 13, 2020  Electronically  Signed By-Lilia Scruggs On:7/13/2020 3:07 PM This report was finalized on [id number] by  Lilia Scruggs, .      Disposition/Treatment:    64-year-old male who presents to the ER with transient confusion.  Patient's imaging and lab work was all negative.  This was resolving by the time he got to the ER.  I evaluated the patient and consulted with Dr. Perez who agrees with the plan.  Patient is admitted to the hospitalist.  Neurology was consulted       Amount and/or Complexity of Data Reviewed  Clinical lab tests: reviewed  Tests in the radiology section of CPT®: reviewed  Tests in the medicine section of CPT®: reviewed  Decide to obtain previous medical records or to obtain history from someone other than the patient: yes    Patient Progress  Patient progress: improved      Final diagnoses:   TIA (transient ischemic attack)   Transient confusion            Yoanna Arriola PA-C  07/13/20 1631

## 2020-07-13 NOTE — ED NOTES
Pt c/o headache and confusion that started today about 1330.  Pt reports confusion has resolved but still has headache.     Molly Lucas RN  07/13/20 7919

## 2020-07-14 ENCOUNTER — APPOINTMENT (OUTPATIENT)
Dept: MRI IMAGING | Facility: HOSPITAL | Age: 65
End: 2020-07-14

## 2020-07-14 ENCOUNTER — APPOINTMENT (OUTPATIENT)
Dept: CARDIOLOGY | Facility: HOSPITAL | Age: 65
End: 2020-07-14

## 2020-07-14 PROBLEM — I63.413 CEREBROVASCULAR ACCIDENT (CVA) DUE TO BILATERAL EMBOLISM OF MIDDLE CEREBRAL ARTERIES (HCC): Status: ACTIVE | Noted: 2020-07-14

## 2020-07-14 LAB
ALBUMIN SERPL-MCNC: 3.6 G/DL (ref 3.5–5.2)
ALBUMIN/GLOB SERPL: 1.7 G/DL
ALP SERPL-CCNC: 52 U/L (ref 39–117)
ALT SERPL W P-5'-P-CCNC: 11 U/L (ref 1–41)
ANION GAP SERPL CALCULATED.3IONS-SCNC: 11 MMOL/L (ref 5–15)
AST SERPL-CCNC: 11 U/L (ref 1–40)
BH CV LOWER VASCULAR LEFT COMMON FEMORAL AUGMENT: NORMAL
BH CV LOWER VASCULAR LEFT COMMON FEMORAL COMPETENT: NORMAL
BH CV LOWER VASCULAR LEFT COMMON FEMORAL COMPRESS: NORMAL
BH CV LOWER VASCULAR LEFT COMMON FEMORAL PHASIC: NORMAL
BH CV LOWER VASCULAR LEFT COMMON FEMORAL SPONT: NORMAL
BH CV LOWER VASCULAR RIGHT COMMON FEMORAL AUGMENT: NORMAL
BH CV LOWER VASCULAR RIGHT COMMON FEMORAL COMPETENT: NORMAL
BH CV LOWER VASCULAR RIGHT COMMON FEMORAL COMPRESS: NORMAL
BH CV LOWER VASCULAR RIGHT COMMON FEMORAL PHASIC: NORMAL
BH CV LOWER VASCULAR RIGHT COMMON FEMORAL SPONT: NORMAL
BH CV LOWER VASCULAR RIGHT DISTAL FEMORAL COMPRESS: NORMAL
BH CV LOWER VASCULAR RIGHT GASTRONEMIUS COMPRESS: NORMAL
BH CV LOWER VASCULAR RIGHT GREATER SAPH AK COMPRESS: NORMAL
BH CV LOWER VASCULAR RIGHT GREATER SAPH BK COMPRESS: NORMAL
BH CV LOWER VASCULAR RIGHT LESSER SAPH COMPRESS: NORMAL
BH CV LOWER VASCULAR RIGHT MID FEMORAL AUGMENT: NORMAL
BH CV LOWER VASCULAR RIGHT MID FEMORAL COMPETENT: NORMAL
BH CV LOWER VASCULAR RIGHT MID FEMORAL COMPRESS: NORMAL
BH CV LOWER VASCULAR RIGHT MID FEMORAL PHASIC: NORMAL
BH CV LOWER VASCULAR RIGHT MID FEMORAL SPONT: NORMAL
BH CV LOWER VASCULAR RIGHT PERONEAL COMPRESS: NORMAL
BH CV LOWER VASCULAR RIGHT POPLITEAL AUGMENT: NORMAL
BH CV LOWER VASCULAR RIGHT POPLITEAL COMPETENT: NORMAL
BH CV LOWER VASCULAR RIGHT POPLITEAL COMPRESS: NORMAL
BH CV LOWER VASCULAR RIGHT POPLITEAL PHASIC: NORMAL
BH CV LOWER VASCULAR RIGHT POPLITEAL SPONT: NORMAL
BH CV LOWER VASCULAR RIGHT POSTERIOR TIBIAL COMPRESS: NORMAL
BH CV LOWER VASCULAR RIGHT PROXIMAL FEMORAL COMPRESS: NORMAL
BH CV LOWER VASCULAR RIGHT SAPHENOFEMORAL JUNCTION AUGMENT: NORMAL
BH CV LOWER VASCULAR RIGHT SAPHENOFEMORAL JUNCTION COMPETENT: NORMAL
BH CV LOWER VASCULAR RIGHT SAPHENOFEMORAL JUNCTION COMPRESS: NORMAL
BH CV LOWER VASCULAR RIGHT SAPHENOFEMORAL JUNCTION PHASIC: NORMAL
BH CV LOWER VASCULAR RIGHT SAPHENOFEMORAL JUNCTION SPONT: NORMAL
BILIRUB SERPL-MCNC: 0.4 MG/DL (ref 0–1.2)
BUN SERPL-MCNC: 21 MG/DL (ref 8–23)
BUN SERPL-MCNC: ABNORMAL MG/DL
BUN/CREAT SERPL: ABNORMAL
CALCIUM SPEC-SCNC: 8.7 MG/DL (ref 8.6–10.5)
CHLORIDE SERPL-SCNC: 103 MMOL/L (ref 98–107)
CHOLEST SERPL-MCNC: 194 MG/DL (ref 0–200)
CO2 SERPL-SCNC: 26 MMOL/L (ref 22–29)
CREAT SERPL-MCNC: 1.06 MG/DL (ref 0.76–1.27)
DEPRECATED RDW RBC AUTO: 46.4 FL (ref 37–54)
ERYTHROCYTE [DISTWIDTH] IN BLOOD BY AUTOMATED COUNT: 14.3 % (ref 12.3–15.4)
GFR SERPL CREATININE-BSD FRML MDRD: 70 ML/MIN/1.73
GLOBULIN UR ELPH-MCNC: 2.1 GM/DL
GLUCOSE BLDC GLUCOMTR-MCNC: 113 MG/DL (ref 70–105)
GLUCOSE BLDC GLUCOMTR-MCNC: 117 MG/DL (ref 70–105)
GLUCOSE BLDC GLUCOMTR-MCNC: 129 MG/DL (ref 70–105)
GLUCOSE BLDC GLUCOMTR-MCNC: 191 MG/DL (ref 70–105)
GLUCOSE BLDC GLUCOMTR-MCNC: 205 MG/DL (ref 70–105)
GLUCOSE SERPL-MCNC: 118 MG/DL (ref 65–99)
HCT VFR BLD AUTO: 37.1 % (ref 37.5–51)
HDLC SERPL-MCNC: 50 MG/DL (ref 40–60)
HGB BLD-MCNC: 12.9 G/DL (ref 13–17.7)
LDLC SERPL CALC-MCNC: 106 MG/DL (ref 0–100)
LDLC/HDLC SERPL: 2.12 {RATIO}
MCH RBC QN AUTO: 31.3 PG (ref 26.6–33)
MCHC RBC AUTO-ENTMCNC: 34.7 G/DL (ref 31.5–35.7)
MCV RBC AUTO: 90 FL (ref 79–97)
PLATELET # BLD AUTO: 133 10*3/MM3 (ref 140–450)
PMV BLD AUTO: 11.7 FL (ref 6–12)
POTASSIUM SERPL-SCNC: 4.4 MMOL/L (ref 3.5–5.2)
PROT SERPL-MCNC: 5.7 G/DL (ref 6–8.5)
RBC # BLD AUTO: 4.13 10*6/MM3 (ref 4.14–5.8)
SODIUM SERPL-SCNC: 140 MMOL/L (ref 136–145)
TRIGL SERPL-MCNC: 191 MG/DL (ref 0–150)
VLDLC SERPL-MCNC: 38.2 MG/DL
WBC # BLD AUTO: 6.9 10*3/MM3 (ref 3.4–10.8)

## 2020-07-14 PROCEDURE — 63710000001 INSULIN LISPRO (HUMAN) PER 5 UNITS: Performed by: INTERNAL MEDICINE

## 2020-07-14 PROCEDURE — 97166 OT EVAL MOD COMPLEX 45 MIN: CPT

## 2020-07-14 PROCEDURE — 70551 MRI BRAIN STEM W/O DYE: CPT

## 2020-07-14 PROCEDURE — 99232 SBSQ HOSP IP/OBS MODERATE 35: CPT | Performed by: NURSE PRACTITIONER

## 2020-07-14 PROCEDURE — 99222 1ST HOSP IP/OBS MODERATE 55: CPT | Performed by: INTERNAL MEDICINE

## 2020-07-14 PROCEDURE — 99233 SBSQ HOSP IP/OBS HIGH 50: CPT | Performed by: INTERNAL MEDICINE

## 2020-07-14 PROCEDURE — 93971 EXTREMITY STUDY: CPT

## 2020-07-14 PROCEDURE — 25010000002 DIPHENHYDRAMINE PER 50 MG: Performed by: NURSE PRACTITIONER

## 2020-07-14 PROCEDURE — 82962 GLUCOSE BLOOD TEST: CPT

## 2020-07-14 PROCEDURE — 80053 COMPREHEN METABOLIC PANEL: CPT | Performed by: INTERNAL MEDICINE

## 2020-07-14 PROCEDURE — 63710000001 INSULIN GLARGINE PER 5 UNITS: Performed by: INTERNAL MEDICINE

## 2020-07-14 PROCEDURE — 25010000002 PROCHLORPERAZINE 10 MG/2ML SOLUTION: Performed by: NURSE PRACTITIONER

## 2020-07-14 PROCEDURE — 85027 COMPLETE CBC AUTOMATED: CPT | Performed by: INTERNAL MEDICINE

## 2020-07-14 PROCEDURE — 97162 PT EVAL MOD COMPLEX 30 MIN: CPT

## 2020-07-14 PROCEDURE — 80061 LIPID PANEL: CPT | Performed by: INTERNAL MEDICINE

## 2020-07-14 RX ORDER — PROCHLORPERAZINE EDISYLATE 5 MG/ML
10 INJECTION INTRAMUSCULAR; INTRAVENOUS ONCE
Status: COMPLETED | OUTPATIENT
Start: 2020-07-14 | End: 2020-07-14

## 2020-07-14 RX ORDER — DIPHENHYDRAMINE HYDROCHLORIDE 50 MG/ML
12.5 INJECTION INTRAMUSCULAR; INTRAVENOUS ONCE
Status: COMPLETED | OUTPATIENT
Start: 2020-07-14 | End: 2020-07-14

## 2020-07-14 RX ADMIN — HYDROCODONE BITARTRATE AND ACETAMINOPHEN 1 TABLET: 10; 325 TABLET ORAL at 20:11

## 2020-07-14 RX ADMIN — PROCHLORPERAZINE EDISYLATE 10 MG: 5 INJECTION INTRAMUSCULAR; INTRAVENOUS at 17:31

## 2020-07-14 RX ADMIN — LISINOPRIL 10 MG: 5 TABLET ORAL at 20:10

## 2020-07-14 RX ADMIN — Medication 10 ML: at 08:59

## 2020-07-14 RX ADMIN — INSULIN LISPRO 5 UNITS: 100 INJECTION, SOLUTION INTRAVENOUS; SUBCUTANEOUS at 17:31

## 2020-07-14 RX ADMIN — APIXABAN 5 MG: 5 TABLET, FILM COATED ORAL at 20:11

## 2020-07-14 RX ADMIN — FLUOXETINE 40 MG: 20 CAPSULE ORAL at 14:56

## 2020-07-14 RX ADMIN — HYDROCODONE BITARTRATE AND ACETAMINOPHEN 1 TABLET: 10; 325 TABLET ORAL at 14:55

## 2020-07-14 RX ADMIN — ATORVASTATIN CALCIUM 80 MG: 40 TABLET, FILM COATED ORAL at 20:09

## 2020-07-14 RX ADMIN — PREGABALIN 75 MG: 75 CAPSULE ORAL at 08:58

## 2020-07-14 RX ADMIN — INSULIN LISPRO 5 UNITS: 100 INJECTION, SOLUTION INTRAVENOUS; SUBCUTANEOUS at 11:29

## 2020-07-14 RX ADMIN — PREGABALIN 75 MG: 75 CAPSULE ORAL at 14:55

## 2020-07-14 RX ADMIN — INSULIN LISPRO 5 UNITS: 100 INJECTION, SOLUTION INTRAVENOUS; SUBCUTANEOUS at 08:58

## 2020-07-14 RX ADMIN — DIPHENHYDRAMINE HYDROCHLORIDE 12.5 MG: 50 INJECTION, SOLUTION INTRAMUSCULAR; INTRAVENOUS at 17:30

## 2020-07-14 RX ADMIN — APIXABAN 5 MG: 5 TABLET, FILM COATED ORAL at 08:58

## 2020-07-14 RX ADMIN — ASPIRIN 81 MG 81 MG: 81 TABLET ORAL at 20:11

## 2020-07-14 RX ADMIN — PREGABALIN 75 MG: 75 CAPSULE ORAL at 20:11

## 2020-07-14 RX ADMIN — Medication 10 ML: at 20:14

## 2020-07-14 RX ADMIN — HYDROCODONE BITARTRATE AND ACETAMINOPHEN 1 TABLET: 10; 325 TABLET ORAL at 04:09

## 2020-07-14 RX ADMIN — INSULIN GLARGINE 20 UNITS: 100 INJECTION, SOLUTION SUBCUTANEOUS at 21:14

## 2020-07-14 RX ADMIN — FLUOXETINE 40 MG: 20 CAPSULE ORAL at 17:17

## 2020-07-14 NOTE — PLAN OF CARE
Problem: Patient Care Overview  Goal: Plan of Care Review  Outcome: Ongoing (interventions implemented as appropriate)  Flowsheets (Taken 7/14/2020 1257)  Outcome Summary: 63 y/o M who presented from work ( at [email or web address]) with acute onset confusion and history of stroke in nov 2019. Pt reports previous stroke he mostly recovered from but it may have remaining deficits on cognition and L LE. No therapy following his stroke. He reports he does notice tripping with L foot occasionally. Per assessment, pt with decreased foot clearance with ambulation L LE but is able to ambulate 200' independently. He has weakness in his R hip that he reports is chronic and is not sure why he has it. He is safe to d/c home from PT standpoint but would benefit from OPPT to address chronic deficits from previous stroke and R hip weakness. Pt agreeable to eager to participate. Will sign off. PPE: mask, face shield, gloves.

## 2020-07-14 NOTE — PROGRESS NOTES
Continued Stay Note  [facility]     Patient Name: Humble Green  MRN: [id number]  Today's Date: 7/14/2020    Admit Date: 7/13/2020    Discharge Plan        Attempted to reach patient per phone due to Covid process to discuss DC planning. Message left with son Sean to call to discuss.      Linda Arteaga RN, CM  Office Phone [phone]  Cell [phone]

## 2020-07-14 NOTE — PLAN OF CARE
Problem: Patient Care Overview  Goal: Plan of Care Review  Outcome: Ongoing (interventions implemented as appropriate)  Goal: Individualization and Mutuality  Outcome: Ongoing (interventions implemented as appropriate)  Goal: Discharge Needs Assessment  Outcome: Ongoing (interventions implemented as appropriate)  Goal: Interprofessional Rounds/Family Conf  Outcome: Ongoing (interventions implemented as appropriate)     Problem: Pain, Chronic (Adult)  Goal: Identify Related Risk Factors and Signs and Symptoms  Outcome: Ongoing (interventions implemented as appropriate)  Goal: Acceptable Pain/Comfort Level and Functional Ability  Outcome: Ongoing (interventions implemented as appropriate)     Problem: Stroke (Ischemic) (Adult)  Goal: Signs and Symptoms of Listed Potential Problems Will be Absent, Minimized or Managed (Stroke)  Outcome: Ongoing (interventions implemented as appropriate)

## 2020-07-14 NOTE — CONSULTS
Referring Provider: Hospitalist  Reason for Consultation: Stroke    Patient Care Team:  Leda Duckworth DO as PCP - General (Family Medicine)  Leda Duckworth DO as Referring Physician (Family Medicine)    Chief complaint headache and weakness    Subjective .     History of present illness:  Humble Green is a 64 y.o. male with history of diabetes coronary artery disease previous stroke PFO hypertension hyperlipidemia presented to the hospital with complaints of headaches and weakness.  Patient presented to the ER with the symptoms.  He did not have any chest pain or shortness of breath.  No complains of any PND orthopnea.  No palpitation dizziness syncope or swelling of the feet.  No visual deficits.  Patient had a stroke work-up done in the ER and cardiology consult is called.     Review of Systems   Constitution: Negative for fever and malaise/fatigue.   HENT: Negative for ear pain and nosebleeds.    Eyes: Negative for blurred vision and double vision.   Cardiovascular: Negative for chest pain, dyspnea on exertion and palpitations.   Respiratory: Positive for shortness of breath. Negative for cough.    Skin: Negative for rash.   Musculoskeletal: Negative for joint pain.   Gastrointestinal: Negative for abdominal pain, nausea and vomiting.   Neurological: Positive for headaches. Negative for focal weakness.   Psychiatric/Behavioral: Negative for depression. The patient is not nervous/anxious.    All other systems reviewed and are negative.      History  Past Medical History:   Diagnosis Date   • Arthritis    • BPH (benign prostatic hyperplasia)    • Chronic back pain    • Diabetes mellitus (CMS/HCC)    • Hypertension    • Low back pain    • Low vision of right eye with normal vision in contralateral eye    • Memory problem    • Nonobstructive atherosclerosis of coronary artery    • PFO (patent foramen ovale) 11/29/2019       Past Surgical History:   Procedure Laterality Date   • ARM TENDON REPAIR Left    •  CHOLECYSTECTOMY     • COLONOSCOPY      2018 = TA, rech 2023   • EYE SURGERY      Rt Eye Sx after trauma @ 4y/o   • JOINT REPLACEMENT      Left TKR   • ROTATOR CUFF REPAIR Bilateral    • UMBILICAL HERNIA REPAIR         Family History   Problem Relation Age of Onset   • Diabetes Mother    • Heart disease Mother    • Arthritis Mother    • Obesity Mother    • Hypertension Mother    • Migraines Mother    • Osteoporosis Mother    • Diabetes Father    • Heart disease Father    • Arthritis Father    • Hypertension Father    • Stroke Father    • Heart attack Father    • Hyperlipidemia Father    • Diabetes Brother    • Heart disease Brother    • Arthritis Sister    • Anemia Sister        Social History     Tobacco Use   • Smoking status: Never Smoker   • Smokeless tobacco: Never Used   Substance Use Topics   • Alcohol use: Yes     Alcohol/week: 1.0 standard drinks     Types: 1 Cans of beer per week   • Drug use: Never        Medications Prior to Admission   Medication Sig Dispense Refill Last Dose   • apixaban (ELIQUIS) 5 MG tablet tablet Take 1 tablet by mouth Every 12 (Twelve) Hours. 60 tablet 3 7/13/2020 at Unknown time   • aspirin 81 MG chewable tablet Chew 81 mg Every Night.   7/12/2020 at 20:00   • baclofen (LIORESAL) 10 MG tablet Take 1 tablet by mouth At Night As Needed for Muscle Spasms. 30 tablet 0    • ciprofloxacin (CIPRO) 500 MG tablet Take 500 mg by mouth 2 (Two) Times a Day. Start: 07/01/20  End: 07/15/20      • Dapagliflozin Propanediol (Farxiga) 10 MG tablet Take 10 mg by mouth Every Evening.   7/12/2020 at Unknown time   • FLUoxetine (PROzac) 40 MG capsule Take 40 mg by mouth Every Evening.   7/12/2020 at Unknown time   • HYDROcodone-acetaminophen (NORCO)  MG per tablet Take 1 tablet by mouth Every 4 (Four) Hours As Needed for Moderate Pain .      • insulin detemir (LEVEMIR) 100 UNIT/ML injection Inject 33 Units under the skin into the appropriate area as directed Every Night.   7/12/2020 at Unknown  "time   • Insulin Lispro, 1 Unit Dial, (HumaLOG KwikPen) 100 UNIT/ML solution pen-injector Inject 5 Units under the skin into the appropriate area as directed 3 (Three) Times a Day Before Meals. 9 pen 0    • lisinopril (PRINIVIL,ZESTRIL) 10 MG tablet Take 1 tablet by mouth Every Night. 90 tablet 0 7/12/2020 at Unknown time   • pregabalin (LYRICA) 75 MG capsule Take 1 capsule by mouth 3 (Three) Times a Day. 90 capsule 2 7/12/2020 at Unknown time         Patient has no known allergies.    Scheduled Meds:    apixaban 5 mg Oral Q12H   aspirin 81 mg Oral Nightly   atorvastatin 80 mg Oral Nightly   FLUoxetine 40 mg Oral Q PM   insulin glargine 20 Units Subcutaneous Nightly   insulin lispro 0-9 Units Subcutaneous TID AC   insulin lispro 5 Units Subcutaneous TID With Meals   lisinopril 10 mg Oral Nightly   pregabalin 75 mg Oral TID   sodium chloride 10 mL Intravenous Q12H     Continuous Infusions:   PRN Meds:.•  acetaminophen **OR** acetaminophen  •  aluminum-magnesium hydroxide-simethicone  •  baclofen  •  bisacodyl  •  dextrose  •  dextrose  •  glucagon (human recombinant)  •  HYDROcodone-acetaminophen  •  insulin lispro **AND** insulin lispro  •  ondansetron  •  sodium chloride  •  sodium chloride    Objective     VITAL SIGNS  Vitals:    07/14/20 0155 07/14/20 0605 07/14/20 1012 07/14/20 1418   BP: 100/54 115/72 122/74 139/66   BP Location: Left arm Left arm Left arm Right arm   Patient Position: Lying Lying Lying Lying   Pulse: 70 67 71 82   Resp: 13 14 19 19   Temp: 98 °F (36.7 °C) 98.7 °F (37.1 °C) 97.6 °F (36.4 °C) 98.3 °F (36.8 °C)   TempSrc: Oral Oral Oral Oral   SpO2: 90% 100% 95% 98%   Weight:  115 kg (252 lb 10.4 oz)     Height:           Flowsheet Rows      First Filed Value   Admission Height  172.7 cm (68\") Documented at 07/13/2020 1442   Admission Weight  114 kg (251 lb 5.2 oz) Documented at 07/13/2020 1442           TELEMETRY: Normal sinus rhythm with nonspecific ST segment abnormality    Physical " Exam:  Physical Exam   Constitutional: He appears well-developed and well-nourished.   HENT:   Head: Normocephalic and atraumatic.   Eyes: Pupils are equal, round, and reactive to light. Conjunctivae and EOM are normal. No scleral icterus.   Neck: Normal range of motion. Neck supple. No JVD present. Carotid bruit is not present.   Cardiovascular: Normal rate, regular rhythm, S1 normal, S2 normal, normal heart sounds and intact distal pulses. PMI is not displaced.   Pulmonary/Chest: Effort normal and breath sounds normal. He has no wheezes. He has no rales.   Abdominal: Soft. Bowel sounds are normal.   Musculoskeletal: Normal range of motion.   Neurological: He is alert. He has normal strength.   No focal deficits   Skin: Skin is warm and dry. No rash noted.   Psychiatric: He has a normal mood and affect.        Results Review:   I reviewed the patient's new clinical results.  Lab Results (last 24 hours)     Procedure Component Value Units Date/Time    POC Glucose Once [[id number]]  (Abnormal) Collected:  07/14/20 1657    Specimen:  Blood Updated:  07/14/20 1702     Glucose 205 mg/dL      Comment: Serial Number: [id number]:  [id number]       POC Glucose Once [[id number]]  (Abnormal) Collected:  07/14/20 1054    Specimen:  Blood Updated:  07/14/20 1055     Glucose 129 mg/dL      Comment: Serial Number: [id number]:  [id number]       BUN [[id number]]  (Normal) Collected:  07/14/20 0358    Specimen:  Blood Updated:  07/14/20 0742     BUN 21 mg/dL     POC Glucose Once [[id number]]  (Abnormal) Collected:  07/14/20 0731    Specimen:  Blood Updated:  07/14/20 0734     Glucose 117 mg/dL      Comment: Serial Number: [id number]:  [id number]       Lipid Panel [[id number]]  (Abnormal) Collected:  07/14/20 0358    Specimen:  Blood Updated:  07/14/20 0503     Total Cholesterol 194 mg/dL      Triglycerides 191 mg/dL      HDL Cholesterol 50 mg/dL      LDL Cholesterol  106 mg/dL      VLDL Cholesterol 38.2 mg/dL       LDL/HDL Ratio 2.12    Narrative:       Cholesterol Reference Ranges  (U.S. Department of Health and Human Services ATP III Classifications)    Desirable          <200 mg/dL  Borderline High    200-239 mg/dL  High Risk          >240 mg/dL      Triglyceride Reference Ranges  (U.S. Department of Health and Human Services ATP III Classifications)    Normal           <150 mg/dL  Borderline High  150-199 mg/dL  High             200-499 mg/dL  Very High        >500 mg/dL    HDL Reference Ranges  (U.S. Department of Health and Human Services ATP III Classifcations)    Low     <40 mg/dl (major risk factor for CHD)  High    >60 mg/dl ('negative' risk factor for CHD)        LDL Reference Ranges  (U.S. Department of Health and Human Services ATP III Classifcations)    Optimal          <100 mg/dL  Near Optimal     100-129 mg/dL  Borderline High  130-159 mg/dL  High             160-189 mg/dL  Very High        >189 mg/dL    Comprehensive Metabolic Panel [[id number]]  (Abnormal) Collected:  07/14/20 0358    Specimen:  Blood Updated:  07/14/20 0503     Glucose 118 mg/dL      BUN --     Comment: Testing performed by alternate method        Creatinine 1.06 mg/dL      Sodium 140 mmol/L      Potassium 4.4 mmol/L      Chloride 103 mmol/L      CO2 26.0 mmol/L      Calcium 8.7 mg/dL      Total Protein 5.7 g/dL      Albumin 3.60 g/dL      ALT (SGPT) 11 U/L      AST (SGOT) 11 U/L      Alkaline Phosphatase 52 U/L      Total Bilirubin 0.4 mg/dL      eGFR Non African Amer 70 mL/min/1.73      Globulin 2.1 gm/dL      A/G Ratio 1.7 g/dL      BUN/Creatinine Ratio --     Comment: Testing not performed        Anion Gap 11.0 mmol/L     Narrative:       GFR Normal >60  Chronic Kidney Disease <60  Kidney Failure <15      CBC (No Diff) [[id number]]  (Abnormal) Collected:  07/14/20 0358    Specimen:  Blood Updated:  07/14/20 0435     WBC 6.90 10*3/mm3      RBC 4.13 10*6/mm3      Hemoglobin 12.9 g/dL      Hematocrit 37.1 %      MCV 90.0 fL      MCH 31.3 pg       MCHC 34.7 g/dL      RDW 14.3 %      RDW-SD 46.4 fl      MPV 11.7 fL      Platelets 133 10*3/mm3     POC Glucose Once [[id number]]  (Abnormal) Collected:  07/14/20 0400    Specimen:  Blood Updated:  07/14/20 0401     Glucose 113 mg/dL      Comment: Serial Number: [id number]:  [id number]       Vitamin B12 [[id number]]  (Normal) Collected:  07/13/20 1456    Specimen:  Blood from Arm, Right Updated:  07/13/20 2028     Vitamin B-12 556 pg/mL     Narrative:       Results may be falsely increased if patient taking Biotin.      POC Glucose Once [[id number]]  (Abnormal) Collected:  07/13/20 2024    Specimen:  Blood Updated:  07/13/20 2024     Glucose 132 mg/dL      Comment: Serial Number: [id number]:  [id number]       POC Glucose Once [[id number]]  (Normal) Collected:  07/13/20 1832    Specimen:  Blood Updated:  07/13/20 1833     Glucose 105 mg/dL      Comment: Serial Number: [id number]:  [id number]             Imaging Results (Last 24 Hours)     Procedure Component Value Units Date/Time    MRI Brain Without Contrast [[id number]] Collected:  07/14/20 1337     Updated:  07/14/20 1346    Narrative:       MRI BRAIN WO CONTRAST-     Date of Exam: 7/14/2020 12:15 PM     Indication: Stroke  . Confusion.     Comparison: CT angiography of the head neck 07/13/2020. Noncontrast CT  head 07/13/2020. MRI brain 11/26/2019.     Technique: Multiplanar multisequence images of the brain were performed  without contrast according to routine brain MRI protocol.     FINDINGS:  10 x 3 mm focal restricted diffusion at the posterior margin of the left  lentiform nucleus, 5 mm focus of persistent acute diffusion the high  left parietal lobe, and 1.6 x 0.8 cm focal restricted diffusion within  the periventricular right frontal lobe, consistent with acute or  subacute multifocal infarcts. These findings are new since the  11/26/2019 comparison. There is no hemorrhagic transformation.           Scattered foci of FLAIR and T2 signal  intensity change in the deep white  matter system with chronic microvascular disease. Chronic appearing  lacunar infarcts in the bilateral basal ganglia.     No mass lesion or mass effect or midline shift. Normal ventricular  configuration. Calvarium is normal. Paranasal sinuses and mastoid air  cells are clear.          Impression:        IMPRESSION:     1. Multifocal small acute or subacute infarcts involving the posterior  left lentiform nucleus, high left parietal lobe, and periventricular  right frontal lobe. The findings could indicate features of shower  emboli phenomenon. No hemorrhagic transformation.  2. Features of mild to moderate chronic microvascular disease.        Electronically Signed By-Dr. Charla Nelson MD On:7/14/2020 1:44 PM  This report was finalized on [id number] by Dr. Charla Nelson MD.          EKG      I personally viewed and interpreted the patient's EKG/Telemetry data:    ECHOCARDIOGRAM:      STRESS MYOVIEW:    CARDIAC CATHETERIZATION:    OTHER:         Assessment/Plan     Principal Problem:    Cerebrovascular accident (CVA) due to bilateral embolism of middle cerebral arteries (CMS/HCC)  Active Problems:    Diabetic polyneuropathy associated with type 2 diabetes mellitus (CMS/HCC)    PFO (patent foramen ovale)    Confusion and disorientation  Coronary artery disease  Hypertension  Hyperlipidemia  Migraine    Patient presented with headaches and had a stroke work-up.  Patient did not have any hemorrhage but had multiple small acute or subacute infarcts involving the posterior left lentiform nucleus in the parietal lobe  Patient is seen by neurologist  Patient is already on Eliquis because of his history of PFO and previous stroke  If there is a true failure of Eliquis then he may have to be changed to Xarelto  Patient's blood pressure and heart rate stable  Discussed with patient at length about importance of medicines and he seems to forget his medicines on occasions and hence a  compliance issue is here  Patient's lipid levels will be followed by the primary care doctor  Patient also has diabetes with polyneuropathy.  Patient is ruled out for MI by EKG and enzymes    I discussed the patients findings and my recommendations with patient and nurse    Hans Doyle MD  07/14/20  18:05

## 2020-07-14 NOTE — PLAN OF CARE
Pt is 65 y/o M who had CVA last November, 2019, and is admitted here after a work shift w/ TIA. 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 States at the end of one of his shifts he became very worried that he might be having another stroke & came to the ER. Pt has not been found to have acute stroke but his workup is ongoing. Pt is back to baseline functionally & reports only fatigue & general weakness. Pt has difficulty putting on his socks & shoes & uses straight cane at times. OT recommends OP work-hardening program. PPE worn, mask, gloves, safety glasses.

## 2020-07-14 NOTE — PROGRESS NOTES
Nutrition Services    Patient Name:  Humble Green  YOB: 1955  MRN: [id number]  Admit Date:  7/13/2020    Progress note:  Chart reviewed for Hgb A1c 8.1 (noted previous value of 9.6 in 11/2019).    RD mailed diabetes education packet to patient's home address including RD contact info. Patient without questions via telephone at this time.    RD to follow up per protocol.      Electronically signed by:  Pham Parra RD  07/14/20 16:08

## 2020-07-14 NOTE — PROGRESS NOTES
"      [facility] Medicine Services Daily Progress Note      Hospitalist Team  LOS 0 days      Patient Care Team:  Leda Duckworth DO as PCP - General (Family Medicine)  Leda Duckworth DO as Referring Physician (Family Medicine)    Patient Location: 261/1      Subjective   Subjective     Chief Complaint / Subjective  Chief Complaint   Patient presents with   • Altered Mental Status     Mental status better and reports he seems back to his baseline. Has a slight headache. No other new focal deficits.     Brief Synopsis of Hospital Course/HPI  64-year-old male who presented to the ER after having acute onset of confusion with a mild headache that started today.  It started around 1330 today at work.  He noticed he was more confused and he called his son who noted that he was having trouble communicating with the patient so they became concerned and decided to come to the ER for evaluation.  In the ER code stroke was called and stroke work-up was initiated.  The patient denied any visual changes no focal deficits had no numbness or tingling.  Shortly after arriving to the ER symptoms started to improve.  Admission was requested after code stroke was work-up done.        Review of Systems   Neurological: Negative for numbness and paresthesias.         Objective   Objective      Vital Signs  Temp:  [97.4 °F (36.3 °C)-98.7 °F (37.1 °C)] 98.3 °F (36.8 °C)  Heart Rate:  [67-82] 82  Resp:  [13-19] 19  BP: (100-153)/(54-84) 139/66  Oxygen Therapy  SpO2: 98 %  Pulse Oximetry Type: Intermittent  Device (Oxygen Therapy): room air  Device (Oxygen Therapy): room air  Flowsheet Rows      First Filed Value   Admission Height  172.7 cm (68\") Documented at 07/13/2020 1442   Admission Weight  114 kg (251 lb 5.2 oz) Documented at 07/13/2020 1442        Intake & Output (last 3 days)       07/11 0701 - 07/12 0700 07/12 0701 - 07/13 0700 07/13 0701 - 07/14 0700 07/14 0701 - 07/15 0700    I.V. (mL/kg)   501.3 (4.4)     " Total Intake(mL/kg)   501.3 (4.4)     Net   +501.3             Urine Unmeasured Occurrence   3 x         Lines, Drains & Airways    Active LDAs     Name:   Placement date:   Placement time:   Site:   Days:    Peripheral IV 07/13/20 1559 Right Antecubital   07/13/20    1559    Antecubital   1    Peripheral IV 07/13/20 1450 Right Antecubital   07/13/20    1450    Antecubital   1                  Physical Exam:    Physical Exam   Nursing note and vitals reviewed.      Gen: NAD  HEENT: EOMI, no icterus, PERRL  Neck: No JVD  Heart: RRR, no murmur  Lung: CTA b/l, adequate air movement  ABD: soft, NT, ND, no rebound or guarding  MSK: moves ext spontaneously  Neuro: AO x 3, CN II-XII intact  Psych: no anxiety, no depression  Skin: warm, dry, intact  Extremities:  No edema      Procedures:              Results Review:     I reviewed the patient's new clinical results.      Lab Results (last 24 hours)     Procedure Component Value Units Date/Time    POC Glucose Once [[id number]]  (Abnormal) Collected:  07/14/20 1054    Specimen:  Blood Updated:  07/14/20 1055     Glucose 129 mg/dL      Comment: Serial Number: [id number]:  [id number]       BUN [[id number]]  (Normal) Collected:  07/14/20 0358    Specimen:  Blood Updated:  07/14/20 0742     BUN 21 mg/dL     POC Glucose Once [[id number]]  (Abnormal) Collected:  07/14/20 0731    Specimen:  Blood Updated:  07/14/20 0734     Glucose 117 mg/dL      Comment: Serial Number: [id number]:  [id number]       Lipid Panel [[id number]]  (Abnormal) Collected:  07/14/20 0358    Specimen:  Blood Updated:  07/14/20 0503     Total Cholesterol 194 mg/dL      Triglycerides 191 mg/dL      HDL Cholesterol 50 mg/dL      LDL Cholesterol  106 mg/dL      VLDL Cholesterol 38.2 mg/dL      LDL/HDL Ratio 2.12    Narrative:       Cholesterol Reference Ranges  (U.S. Department of Health and Human Services ATP III Classifications)    Desirable          <200 mg/dL  Borderline High    200-239 mg/dL  High  Risk          >240 mg/dL      Triglyceride Reference Ranges  (U.S. Department of Health and Human Services ATP III Classifications)    Normal           <150 mg/dL  Borderline High  150-199 mg/dL  High             200-499 mg/dL  Very High        >500 mg/dL    HDL Reference Ranges  (U.S. Department of Health and Human Services ATP III Classifcations)    Low     <40 mg/dl (major risk factor for CHD)  High    >60 mg/dl ('negative' risk factor for CHD)        LDL Reference Ranges  (U.S. Department of Health and Human Services ATP III Classifcations)    Optimal          <100 mg/dL  Near Optimal     100-129 mg/dL  Borderline High  130-159 mg/dL  High             160-189 mg/dL  Very High        >189 mg/dL    Comprehensive Metabolic Panel [[id number]]  (Abnormal) Collected:  07/14/20 0358    Specimen:  Blood Updated:  07/14/20 0503     Glucose 118 mg/dL      BUN --     Comment: Testing performed by alternate method        Creatinine 1.06 mg/dL      Sodium 140 mmol/L      Potassium 4.4 mmol/L      Chloride 103 mmol/L      CO2 26.0 mmol/L      Calcium 8.7 mg/dL      Total Protein 5.7 g/dL      Albumin 3.60 g/dL      ALT (SGPT) 11 U/L      AST (SGOT) 11 U/L      Alkaline Phosphatase 52 U/L      Total Bilirubin 0.4 mg/dL      eGFR Non African Amer 70 mL/min/1.73      Globulin 2.1 gm/dL      A/G Ratio 1.7 g/dL      BUN/Creatinine Ratio --     Comment: Testing not performed        Anion Gap 11.0 mmol/L     Narrative:       GFR Normal >60  Chronic Kidney Disease <60  Kidney Failure <15      CBC (No Diff) [[id number]]  (Abnormal) Collected:  07/14/20 0358    Specimen:  Blood Updated:  07/14/20 0435     WBC 6.90 10*3/mm3      RBC 4.13 10*6/mm3      Hemoglobin 12.9 g/dL      Hematocrit 37.1 %      MCV 90.0 fL      MCH 31.3 pg      MCHC 34.7 g/dL      RDW 14.3 %      RDW-SD 46.4 fl      MPV 11.7 fL      Platelets 133 10*3/mm3     POC Glucose Once [[id number]]  (Abnormal) Collected:  07/14/20 0400    Specimen:  Blood Updated:  07/14/20  0401     Glucose 113 mg/dL      Comment: Serial Number: [id number]:  [id number]       Vitamin B12 [[id number]]  (Normal) Collected:  07/13/20 1456    Specimen:  Blood from Arm, Right Updated:  07/13/20 2028     Vitamin B-12 556 pg/mL     Narrative:       Results may be falsely increased if patient taking Biotin.      POC Glucose Once [[id number]]  (Abnormal) Collected:  07/13/20 2024    Specimen:  Blood Updated:  07/13/20 2024     Glucose 132 mg/dL      Comment: Serial Number: [id number]:  [id number]       POC Glucose Once [[id number]]  (Normal) Collected:  07/13/20 1832    Specimen:  Blood Updated:  07/13/20 1833     Glucose 105 mg/dL      Comment: Serial Number: [id number]:  [id number]       BUN [[id number]]  (Abnormal) Collected:  07/13/20 1456    Specimen:  Blood from Arm, Right Updated:  07/13/20 1630     BUN 26 mg/dL     TSH [[id number]]  (Normal) Collected:  07/13/20 1456    Specimen:  Blood from Arm, Right Updated:  07/13/20 1621     TSH 1.100 uIU/mL         Hemoglobin A1C   Date Value Ref Range Status   07/13/2020 8.1 (H) 3.5 - 5.6 % Final     Results from last 7 days   Lab Units 07/13/20  1456   INR  0.93           No results found for: LIPASE  Lab Results   Component Value Date    CHOL 194 07/14/2020    TRIG 191 (H) 07/14/2020    HDL 50 07/14/2020     (H) 07/14/2020       No results found for: INTRAOP, PREDX, FINALDX, COMDX    Microbiology Results (last 10 days)     ** No results found for the last 240 hours. **          ECG/EMG Results (most recent)     Procedure Component Value Units Date/Time    ECG 12 Lead [[id number]] Collected:  07/13/20 1519     Updated:  07/13/20 1522    Narrative:       HEART RATE= 79  bpm  RR Interval= 760  ms  DE Interval= 177  ms  P Horizontal Axis= 10  deg  P Front Axis= 68  deg  QRSD Interval= 91  ms  QT Interval= 403  ms  QRS Axis= 12  deg  T Wave Axis= 51  deg  - OTHERWISE NORMAL ECG -  Sinus rhythm  Low voltage, precordial leads  When compared with ECG  of 13-Dec-2019 13:36:02,  No significant change  Electronically Signed By:   Date and Time of Study: 2020-07-13 15:19:59          Results for orders placed during the hospital encounter of 07/13/20   Duplex Venous Lower Extremity - Right CAR    Narrative · Normal right lower extremity venous duplex scan.          Results for orders placed during the hospital encounter of 11/25/19   Adult Transesophageal Echo (LILIAN) W/ Cont if Necessary Per Protocol    Narrative · Left ventricular systolic function is normal.  · Left atrial cavity size is mildly dilated.  · Mild mitral valve regurgitation is present  · Mild tricuspid valve regurgitation is present.  · LV ejection fraction is about 60%  · Significant patent foramen ovale noted by bubble contrast study  · No pericardial effusion noted  · Aorta has minimal atherosclerosis  · Technically difficult study          Ct Angiogram Head    Result Date: 7/13/2020   1. Atherosclerotic plaque in both carotid bifurcations. The degree of narrowing is less than 50% and not hemodynamically significant. 2. No evidence of vertebrobasilar stenosis. 3. No intracranial stenoses or occlusion. No evidence of aneurysm or AVM.  Electronically Signed By-Honorio Daly On:7/13/2020 4:37 PM This report was finalized on [id number] by  Honorio Dayl, .    Ct Angiogram Neck    Result Date: 7/13/2020   1. Atherosclerotic plaque in both carotid bifurcations. The degree of narrowing is less than 50% and not hemodynamically significant. 2. No evidence of vertebrobasilar stenosis. 3. No intracranial stenoses or occlusion. No evidence of aneurysm or AVM.  Electronically Signed By-Honorio Daly On:7/13/2020 4:37 PM This report was finalized on [id number] by  Honorio Daly, .    Mri Brain Without Contrast    Result Date: 7/14/2020   IMPRESSION:  1. Multifocal small acute or subacute infarcts involving the posterior left lentiform nucleus, high left parietal lobe, and periventricular right frontal lobe. The  findings could indicate features of shower emboli phenomenon. No hemorrhagic transformation. 2. Features of mild to moderate chronic microvascular disease.   Electronically Signed By-Dr. Charla Nelson MD On:7/14/2020 1:44 PM This report was finalized on [id number] by Dr. Charla Nelson MD.    Xr Chest 1 View    Result Date: 7/13/2020  Negative portable chest  Electronically Signed By-Honorio Daly On:7/13/2020 3:24 PM This report was finalized on [id number] by  Honorio Daly, .    Ct Head Without Contrast Stroke Protocol    Result Date: 7/13/2020  No evidence of hemorrhage, mass effect or midline shift. No acute process identified.  The above findings were discussed with Brianne Carreon nurse practitioner at 3:06 PM on July 13, 2020  Electronically Signed By-Lilia Scruggs On:7/13/2020 3:07 PM This report was finalized on [id number] by  Lilia Scruggs, .    Xr Spine Lumbar Complete 4+vw    Result Date: 7/8/2020  Chronic multilevel degenerative disc and facet disease.  Electronically Signed By-Honorio Daly On:7/8/2020 2:52 PM This report was finalized on [id number] by  Honorio Daly .          Xrays, labs reviewed personally by physician.    Medication Review:   I have reviewed the patient's current medication list      Scheduled Meds    apixaban 5 mg Oral Q12H   aspirin 81 mg Oral Nightly   atorvastatin 80 mg Oral Nightly   diphenhydrAMINE 12.5 mg Intravenous Once   FLUoxetine 40 mg Oral Q PM   insulin glargine 20 Units Subcutaneous Nightly   insulin lispro 0-9 Units Subcutaneous TID AC   insulin lispro 5 Units Subcutaneous TID With Meals   lisinopril 10 mg Oral Nightly   pregabalin 75 mg Oral TID   prochlorperazine 10 mg Intravenous Once   sodium chloride 10 mL Intravenous Q12H       Meds Infusions       Meds PRN  •  acetaminophen **OR** acetaminophen  •  aluminum-magnesium hydroxide-simethicone  •  baclofen  •  bisacodyl  •  dextrose  •  dextrose  •  glucagon (human recombinant)  •  HYDROcodone-acetaminophen  •   insulin lispro **AND** insulin lispro  •  ondansetron  •  sodium chloride  •  sodium chloride        Assessment/Plan   Assessment/Plan     Active Hospital Problems    Diagnosis  POA   • **Cerebrovascular accident (CVA) due to bilateral embolism of middle cerebral arteries (CMS/[facility]) [I63.413]  Clinically Undetermined   • Confusion and disorientation [R41.0]  Yes   • PFO (patent foramen ovale) [Q21.1]  Not Applicable   • Diabetic polyneuropathy associated with type 2 diabetes mellitus (CMS/[facility]) [E11.42]  Yes      Resolved Hospital Problems   No resolved problems to display.       MEDICAL DECISION MAKING COMPLEXITY BY PROBLEM:     Multiple subacute to acutestrokes of posterior left lentiform nucleus, high left parietal lobe, and periventricular right frontal lobe  - distribution concerning for embolic stroke  - MRI brain: Multifocal small acute or subacute infarcts involving the posterior left lentiform nucleus, high left parietal lobe, and periventricular right frontal lobe.  - CT head: No evidence of hemorrhage, no acute process.  Multiple chronic infarcts seen.  - CTA head and neck: Preliminary negative, awaiting official read .   - Continue Eliquis but given above may be eliquis failure, may need to  will consult cardiology   - d/w neurology today      ANT  -resolving Cr 1.0, baseline 1.0  -stop IVF     PFO  -chronic an on eliquis   -LILIAN (Nov 2019): Significant PFO, EF 60%.   -consulted cardiology for opinion given above      Chronic migraine disorder  -given compazine and benadryl today    Type II DM  -c/w home basal and pre-meal insulin  -add SSI and adjust prn  -hold home po agents      HTN  -chronic   -c/w lisinopril     DVT ppx-on eliquis     High risk      VTE Prophylaxis -   Mechanical Order History:      Ordered        07/13/20 1834  Place Sequential Compression Device  Once         07/13/20 1834  Maintain Sequential Compression Device  Continuous                 Pharmalogical Order History:      Ordered     Dose Route Frequency Stop    07/13/20 9123  apixaban (ELIQUIS) tablet 5 mg      5 mg PO Every 12 Hours Scheduled --            Code Status -   Code Status and Medical Interventions:   Ordered at: 07/13/20 1705     Code Status:    CPR     Medical Interventions (Level of Support Prior to Arrest):    Full       This patient has been examined wearing appropriate Personal Protective Equipment . 07/14/20        Discharge Planning          Destination      Coordination has not been started for this encounter.      Durable Medical Equipment      Coordination has not been started for this encounter.      Dialysis/Infusion      Coordination has not been started for this encounter.      Home Medical Care      Coordination has not been started for this encounter.      Therapy      Coordination has not been started for this encounter.      Community Resources      Coordination has not been started for this encounter.            Electronically signed by Kevyn Vogel DO, 07/14/20, 16:19.  [facility] Hospitalist Team

## 2020-07-14 NOTE — THERAPY EVALUATION
Patient Name: Humble Green  : 1955    MRN: [id number]                              Today's Date: 2020       Admit Date: 2020    Visit Dx:     ICD-10-CM ICD-9-CM   1. TIA (transient ischemic attack) G45.9 435.9   2. Transient confusion R41.0 298.9     Patient Active Problem List   Diagnosis   • Mixed hyperlipidemia   • Major depressive disorder, recurrent episode, moderate degree (CMS/HCC)   • Diabetic polyneuropathy associated with type 2 diabetes mellitus (CMS/HCC)   • Intractable migraine without aura and without status migrainosus   • PFO (patent foramen ovale)   • Cerebrovascular accident (CVA) due to embolism of precerebral artery (CMS/HCC)   • Hypertension   • Nonobstructive atherosclerosis of coronary artery   • Diabetes mellitus (CMS/HCC)   • Confusion and disorientation     Past Medical History:   Diagnosis Date   • Arthritis    • BPH (benign prostatic hyperplasia)    • Chronic back pain    • Diabetes mellitus (CMS/HCC)    • Hypertension    • Low back pain    • Low vision of right eye with normal vision in contralateral eye    • Memory problem    • Nonobstructive atherosclerosis of coronary artery    • PFO (patent foramen ovale) 2019     Past Surgical History:   Procedure Laterality Date   • ARM TENDON REPAIR Left    • CHOLECYSTECTOMY     • COLONOSCOPY       = TA, rech    • EYE SURGERY      Rt Eye Sx after trauma @ 2y/o   • JOINT REPLACEMENT      Left TKR   • ROTATOR CUFF REPAIR Bilateral    • UMBILICAL HERNIA REPAIR       General Information     Row Name 20 1021          PT Evaluation Time/Intention    Document Type  evaluation  -     Mode of Treatment  physical therapy  -     Row Name 20 1021          General Information    Patient Profile Reviewed?  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;yard work  -     Row Name 20 1021          Relationship/Environment    Lives With  alone  -     Row  Name 07/14/20 1021          Resource/Environmental Concerns    Current Living Arrangements  home/apartment/condo mobile home with ramp  -SS     Row Name 07/14/20 1255          Home Main Entrance    Number of Stairs, Main Entrance  none  -SS     Row Name 07/14/20 1255          Stairs Within Home, Primary    Number of Stairs, Within Home, Primary  none  -SS     Row Name 07/14/20 1255 07/14/20 1021       Cognitive Assessment/Intervention- PT/OT    Orientation Status (Cognition)  oriented x 4  -SS  oriented x 4  -SS      User Key  (r) = Recorded By, (t) = Taken By, (c) = Cosigned By    Initials Name Provider Type    SS Jillian Carmona PT Physical Therapist        Mobility     Row Name 07/14/20 1255          Bed Mobility Assessment/Treatment    Bed Mobility Assessment/Treatment  bed mobility (all) activities  -SS     [place] Level (Bed Mobility)  conditional independence  -SS     Row Name 07/14/20 1255          Sit-Stand Transfer    Sit-Stand [place] (Transfers)  independent  -SS     Row Name 07/14/20 1255          Gait/Stairs Assessment/Training    [place] Level (Gait)  independent  -SS     Distance in Feet (Gait)  200'  -SS     Comment (Gait/Stairs)  mild decreased foot clearance on L- pt thinks this may be due to previous CVA (nov 2019) following which he had no PT. otherwise WNL  -SS       User Key  (r) = Recorded By, (t) = Taken By, (c) = Cosigned By    Initials Name Provider Type    SS Jillian Carmona PT Physical Therapist        Obj/Interventions     Row Name 07/14/20 1256          General ROM    GENERAL ROM COMMENTS  B LE WFL  -SS     Row Name 07/14/20 1256          MMT (Manual Muscle Testing)    General MMT Comments  L hip flexion 3+/5 otherwise B LEs 5/5  -SS     Row Name 07/14/20 1256          Static Sitting Balance    Level of [place] (Unsupported Sitting, Static Balance)  independent  -SS     Row Name 07/14/20 1256          Static Standing Balance    Level of [place]  (Supported Standing, Static Balance)  independent  -SS     Row Name 07/14/20 1256          Dynamic Standing Balance    Level of [place], Reaches Outside Midline (Standing, Dynamic Balance)  independent  -     Row Name 07/14/20 1256          Sensory Assessment/Intervention    Sensory General Assessment  no sensation deficits identified  -       User Key  (r) = Recorded By, (t) = Taken By, (c) = Cosigned By    Initials Name Provider Type     Jillian Carmona, PT Physical Therapist        Goals/Plan    No documentation.       Clinical Impression     Row Name 07/14/20 1257 07/14/20 1022       Pain Assessment    Additional Documentation  Pain Scale: FACES Pre/Post-Treatment (Group)  -SS  Pain Scale: FACES Pre/Post-Treatment (Group)  -SS    Row Name 07/14/20 1257 07/14/20 1022       Pain Scale: FACES Pre/Post-Treatment    Pain: FACES Scale, Pretreatment  2-->hurts little bit  -SS  2-->hurts little bit  -SS    Pain: FACES Scale, Post-Treatment  2-->hurts little bit  -SS  2-->hurts little bit  -SS    Pre/Post Treatment Pain Comment  chronic back pain   -  --    Row Name 07/14/20 1257 07/14/20 1022       Plan of Care Review    Plan of Care Reviewed With  patient  -SS  patient  -SS    Outcome Summary  65 y/o M who presented from work ( at [employer]) with acute onset confusion and history of stroke in nov 2019. Pt reports previous stroke he mostly recovered from but it may have remaining deficits on cognition and L LE. No therapy following his stroke. He reports he does notice tripping with L foot occasionally. Per assessment, pt with decreased foot clearance with ambulation L LE but is able to ambulate 200' independently. He has weakness in his R hip that he reports is chronic and is not sure why he has it. He is safe to d/c home from PT standpoint but would benefit from OPPT to address chronic deficits from previous stroke and R hip weakness. Pt agreeable to eager to participate. Will sign off. PPE:  mask, face shield, gloves.   -SS  --    Row Name 07/14/20 1257          Physical Therapy Clinical Impression    Criteria for Skilled Interventions Met (PT Clinical Impression)  no baseline  -SS     Row Name 07/14/20 1022          Vital Signs    Pre Systolic BP Rehab  122  -SS     Pre Treatment Diastolic BP  74  -SS     Pretreatment Heart Rate (beats/min)  68  -SS     Pre SpO2 (%)  95  -SS     Row Name 07/14/20 1257          Positioning and Restraints    Pre-Treatment Position  in bed  -SS     Post Treatment Position  bed  -SS       User Key  (r) = Recorded By, (t) = Taken By, (c) = Cosigned By    Initials Name Provider Type    Jillian Lind PT Physical Therapist        Outcome Measures     Row Name 07/14/20 1302          Modified York Scale    Pre-Stroke Modified York Scale  0 - No Symptoms at all.  -SS     Modified Parul Scale  0 - No Symptoms at all.  -SS       User Key  (r) = Recorded By, (t) = Taken By, (c) = Cosigned By    Initials Name Provider Type    Jillian Lind PT Physical Therapist        Physical Therapy Education                 Title: PT OT SLP Therapies (In Progress)     Topic: Physical Therapy (In Progress)     Point: Mobility training (Done)     Description:   Instruct learner(s) on safety and technique for assisting patient out of bed, chair or wheelchair.  Instruct in the proper use of assistive devices, such as walker, crutches, cane or brace.              Patient Friendly Description:   It's important to get you on your feet again, but we need to do so in a way that is safe for you. Falling has serious consequences, and your personal safety is the most important thing of all.        When it's time to get out of bed, one of us or a family member will sit next to you on the bed to give you support.     If your doctor or nurse tells you to use a walker, crutches, a cane, or a brace, be sure you use it every time you get out of bed, even if you think you don't need it.     Learning Progress Summary           Patient Acceptance, E, VU by SS at 7/14/2020 1302    Acceptance, E, NR by AD at 7/14/2020 1216                   Point: Home exercise program (In Progress)     Description:   Instruct learner(s) on appropriate technique for monitoring, assisting and/or progressing patient with therapeutic exercises and activities.              Learning Progress Summary           Patient Acceptance, E, NR by AD at 7/14/2020 1216                   Point: Body mechanics (In Progress)     Description:   Instruct learner(s) on proper positioning and spine alignment for patient and/or caregiver during mobility tasks and/or exercises.              Learning Progress Summary           Patient Acceptance, E, NR by AD at 7/14/2020 1216                   Point: Precautions (In Progress)     Description:   Instruct learner(s) on prescribed precautions during mobility and gait tasks              Learning Progress Summary           Patient Acceptance, E, NR by AD at 7/14/2020 1216                               User Key     Initials Effective Dates Name Provider Type Discipline     06/19/19 -  Jillian Carmona, PT Physical Therapist PT    AD 03/25/19 -  Brianne Mckeon, RN Registered Nurse Nurse              PT Recommendation and Plan     Outcome Summary/Treatment Plan (PT)  Anticipated Discharge Disposition (PT): home with OP services  Plan of Care Reviewed With: patient  Outcome Summary: 63 y/o M who presented from work ( at [employer]) with acute onset confusion and history of stroke in nov 2019. Pt reports previous stroke he mostly recovered from but it may have remaining deficits on cognition and L LE. No therapy following his stroke. He reports he does notice tripping with L foot occasionally. Per assessment, pt with decreased foot clearance with ambulation L LE but is able to ambulate 200' independently. He has weakness in his R hip that he reports is chronic and is not sure why he has it. He  is safe to d/c home from PT standpoint but would benefit from OPPT to address chronic deficits from previous stroke and R hip weakness. Pt agreeable to eager to participate. Will sign off. PPE: mask, face shield, gloves.      Time Calculation:   PT Charges     Row Name 07/14/20 1303             Time Calculation    Start Time  1021  -SS      Stop Time  1038  -SS      Time Calculation (min)  17 min  -SS      PT Received On  07/14/20  -         Time Calculation- PT    Total Timed Code Minutes- PT  0 minute(s)  -        User Key  (r) = Recorded By, (t) = Taken By, (c) = Cosigned By    Initials Name Provider Type     Jillian Carmona, PT Physical Therapist        Therapy Charges for Today     Code Description Service Date Service Provider Modifiers Qty    [id number]  PT EVAL MOD COMPLEXITY 3 7/14/2020 Jillian Carmona, PT GP 1          PT G-Codes  Outcome Measure Options: Modified Parul  Modified Tyler Scale: 0 - No Symptoms at all.    Jillian Carmona PT  7/14/2020

## 2020-07-14 NOTE — CONSULTS
"Diabetes Education  Assessment/Teaching    Patient Name:  Humble Green  YOB: 1955  MRN: [id number]  Admit Date:  7/13/2020      Assessment Date:  7/14/2020    Most Recent Value   General Information    Referral From:  A1c [A1c this adm 8.1%]   Height  172.7 cm (68\")   Height Method  Stated   Weight  115 kg (252 lb 10.4 oz)   Weight Method  Bed scale   Pregnancy Assessment   Diabetes History   What type of diabetes do you have?  Type 2   Length of Diabetes Diagnosis  -- [pt not sure how long he has had diabetes but states has had as long as he can remember.]   Current DM knowledge  fair   Have you had diabetes education/teaching in the past?  yes   When and where was your diabetes education?  pt received education as inpatient at [facility] on 11/27/2019   Do you test your blood sugar at home?  yes   Frequency of checks  2-3 times/day   Meter type  Pt unsure of name of meter   Who performs the test?  self   Typical readings  150-200   Have you had high blood sugar? (>140mg/dl)  yes   How often do you have high blood sugar?  frequently   When was your last high blood sugar?  BS usually runs >150   How would you rate your diabetes control?  fair   Education Preferences   What areas of diabetes would you like to learn about?  avoiding high blood sugar, diabetes complications, testing my blood sugar at home   Nutrition Information   Assessment Topics   Problem Solving - Assessment  Needs education   Reducing Risk - Assessment  Needs education   Monitoring - Assessment  Needs education   DM Goals   Problem Solving - Goal  Today   Reducing Risk - Goal  Today   Monitoring - Goal  Today            Most Recent Value   DM Education Needs   Meter  Has own   Frequency of Testing  2 times a day   Blood Glucose Target Range  Reviewed A1c result of 8.1%. Discussed healthy A1c target and healthy bs range. Discussed importance of bs control.   Medication  Insulin, Oral [Pt takes Levemir 33 units at hs, Humalog 5 units " premeals and Farxiga 10 mg daily. Pt using insulin pens.]   Problem Solving  Hyperglycemia, Signs, Symptoms, Treatment   Reducing Risks  A1C testing   Healthy Coping  Appropriate   Motivation  Engaged   Teaching Method  Explanation, Discussion   Patient Response  Verbalized understanding            Other Comments:  Pt states has PCP and sees 1-2 times/month. Pt states has not been recording bs. Discussed importance of keeping bs log and taking readings to MD office. Pt states he will begin doing this. Pt has been checking bs two times/day in am and prelunch. Discussed also checking bs some presupper and hs. Discussed he should check bs prior to taking his mealtime humalog. Pt verbalized understanding of info. He states not needing additional info at this time.        Electronically signed by:  Diane Lara RN  07/14/20 19:33

## 2020-07-14 NOTE — PAYOR COMM NOTE
"Admit clinical  PA form and MD notes attached.    ------  ER admit to [facility]  Brain MRI positive for multiple acute & subacute infarcts  Neurology and cardiology consults pending.  --------  [place]:  Stroke: Ischemic (M-83)  •Admission is indicated for 1 or more of the following(1)(2)(3)(4)(5):  ?Acute ischemic stroke[A][B]  -------  AUTHORIZATION PENDING:   PLEASE FAX OR CALL DETERMINATION TO CONTACT BELOW:       THANK YOU,    ANNEMARIE So, RN  Utilization Review  [facility]  Phone: [phone]  Fax: [phone]      NPI: [id number]  Tax ID: [id number]        Elizabeth Green (64 y.o. Male)     Date of Birth Social Security Number Address Home Phone MRN    1955  [address] [facility]  [address]  [place] [address] [phone] [id number]    Restorationist Marital Status          None Single       Admission Date Admission Type Admitting Provider Attending Provider Department, Room/Bed    7/13/20 Emergency Kevyn Vogel, Kevyn Fuentes DO [facility] NEURO HEART, 261/1    Discharge Date Discharge Disposition Discharge Destination                       Attending Provider:  Kevyn Vogel DO    Allergies:  No Known Allergies    Isolation:  None   Infection:  None   Code Status:  CPR    Ht:  172.7 cm (68\")   Wt:  115 kg (252 lb 10.4 oz)    Admission Cmt:  None   Principal Problem:  Confusion and disorientation [R41.0]                 Active Insurance as of 7/13/2020     Primary Coverage     Payor Plan Insurance Group Employer/Plan Group    [facility] -[place] [employer] [id number]      Payor Plan Address Payor Plan Phone Number Payor Plan Fax Number Effective Dates    [address]   7/1/2020 - None Entered    [facility] [id number]       Subscriber Name Subscriber Birth Date Member ID       ELIZABETH GREEN LATA 1955 [id number]                 Emergency Contacts      (Rel.) Home Phone Work Phone Mobile Phone    LUBA GREEN (Son) -- -- [phone]    LEANDRO GREEN" LUBA (Daughter) -- -- [phone]               History & Physical      [place], Kevyn WALKERDO at 07/13/20 1710          [facility] HOSPITALIST     Leda Duckworth DO    CHIEF COMPLAINT:     Confusion    HISTORY OF PRESENT ILLNESS:    64-year-old male who presented to the ER after having acute onset of confusion with a mild headache that started today.  It started around 1330 today at work.  He noticed he was more confused and he called his son who noted that he was having trouble communicating with the patient so they became concerned and decided to come to the ER for evaluation.  In the ER code stroke was called and stroke work-up was initiated.  The patient denied any visual changes no focal deficits had no numbness or tingling.  Shortly after arriving to the ER symptoms started to improve.  Admission was requested after code stroke was work-up done.      Past Medical History:   Diagnosis Date   • Arthritis    • BPH (benign prostatic hyperplasia)    • Chronic back pain    • Diabetes mellitus (CMS/HCC)    • Hypertension    • Low back pain    • Low vision of right eye with normal vision in contralateral eye    • Memory problem    • Nonobstructive atherosclerosis of coronary artery    • PFO (patent foramen ovale) 11/29/2019     Past Surgical History:   Procedure Laterality Date   • ARM TENDON REPAIR Left    • CHOLECYSTECTOMY     • COLONOSCOPY      2018 = TA, rech 2023   • EYE SURGERY      Rt Eye Sx after trauma @ 4y/o   • JOINT REPLACEMENT      Left TKR   • ROTATOR CUFF REPAIR Bilateral    • UMBILICAL HERNIA REPAIR       Family History   Problem Relation Age of Onset   • Diabetes Mother    • Heart disease Mother    • Arthritis Mother    • Obesity Mother    • Hypertension Mother    • Migraines Mother    • Osteoporosis Mother    • Diabetes Father    • Heart disease Father    • Arthritis Father    • Hypertension Father    • Stroke Father    • Heart attack Father    • Hyperlipidemia Father    • Diabetes  "Brother    • Heart disease Brother    • Arthritis Sister    • Anemia Sister      Social History     Tobacco Use   • Smoking status: Never Smoker   • Smokeless tobacco: Never Used   Substance Use Topics   • Alcohol use: Yes     Alcohol/week: 1.0 standard drinks     Types: 1 Cans of beer per week   • Drug use: Never       (Not in a hospital admission)  Allergies:  Patient has no known allergies.    Immunization History   Administered Date(s) Administered   • Influenza TIV (IM) 11/01/2015   • flucelvax quad pfs =>4 YRS 01/17/2020           REVIEW OF SYSTEMS:  Please see the above history of present illness for pertinent positives and negatives.  The remainder of the patient's systems have been reviewed and are negative.     Objective     Vital Signs  Temp:  [98.2 °F (36.8 °C)] 98.2 °F (36.8 °C)  Heart Rate:  [76-82] 77  Resp:  [14] 14  BP: (109-141)/(64-78) 129/73    Flowsheet Rows      First Filed Value   Admission Height  172.7 cm (68\") Documented at 07/13/2020 1442   Admission Weight  114 kg (251 lb 5.2 oz) Documented at 07/13/2020 1442           Physical Exam:  Physical Exam   Vitals reviewed.      Gen: NAD  HEENT: EOMI, no icterus, PERRL  Neck: No JVD  Heart: RRR, no murmur  Lung: CTA b/l, adequate air movement  ABD: soft, NT, ND, no rebound or guarding  MSK: moves ext spontaneously  Neuro: AO x 3, CN II-XII intact  Psych: no anxiety, no depression  Skin: warm, dry, intact  Extremities:  No edema    Results Review:    I reviewed the patient's new clinical results.  Lab Results (most recent)     Procedure Component Value Units Date/Time    BUN [[id number]]  (Abnormal) Collected:  07/13/20 1456    Specimen:  Blood from Arm, Right Updated:  07/13/20 1630     BUN 26 mg/dL     TSH [[id number]]  (Normal) Collected:  07/13/20 1456    Specimen:  Blood from Arm, Right Updated:  07/13/20 1621     TSH 1.100 uIU/mL     Lipid Panel [[id number]]  (Abnormal) Collected:  07/13/20 1456    Specimen:  Blood from Arm, Right Updated:  " 07/13/20 1615     Total Cholesterol 224 mg/dL      Triglycerides 234 mg/dL      HDL Cholesterol 61 mg/dL      LDL Cholesterol  116 mg/dL      VLDL Cholesterol 46.8 mg/dL      LDL/HDL Ratio 1.90    Narrative:       Cholesterol Reference Ranges  (U.S. Department of Health and Human Services ATP III Classifications)    Desirable          <200 mg/dL  Borderline High    200-239 mg/dL  High Risk          >240 mg/dL      Triglyceride Reference Ranges  (U.S. Department of Health and Human Services ATP III Classifications)    Normal           <150 mg/dL  Borderline High  150-199 mg/dL  High             200-499 mg/dL  Very High        >500 mg/dL    HDL Reference Ranges  (U.S. Department of Health and Human Services ATP III Classifcations)    Low     <40 mg/dl (major risk factor for CHD)  High    >60 mg/dl ('negative' risk factor for CHD)        LDL Reference Ranges  (U.S. Department of Health and Human Services ATP III Classifcations)    Optimal          <100 mg/dL  Near Optimal     100-129 mg/dL  Borderline High  130-159 mg/dL  High             160-189 mg/dL  Very High        >189 mg/dL    Hemoglobin A1c [[id number]]  (Abnormal) Collected:  07/13/20 1456    Specimen:  Blood from Arm, Right Updated:  07/13/20 1602     Hemoglobin A1C 8.1 %     Narrative:       Hemoglobin A1C Reference Range:    <5.7 %        Normal  5.7-6.4 %     Increased risk for diabetes  > 6.4 %        Diabetes       These guidelines have been recommended by the American Diabetic Association for Hgb A1c.      The following 2010 guidelines have been recommended by the American Diabetes Association for Hemoglobin A1c.    HBA1c 5.7-6.4% Increased risk for future diabetes (pre-diabetes)  HBA1c     >6.4% Diabetes      Oxford Draw [[id number]] Collected:  07/13/20 1456    Specimen:  Blood from Arm, Right Updated:  07/13/20 1600    Narrative:       The following orders were created for panel order Oxford Draw.  Procedure                               Abnormality          Status                     ---------                               -----------         ------                     Light Blue Top[[id number]]                                   Final result               Green Top (Gel)[[id number]]                                  Final result               Lavender Top[[id number]]                                     Final result               Gold Top - SST[[id number]]                                   Final result                 Please view results for these tests on the individual orders.    Green Top (Gel) [[id number]] Collected:  07/13/20 1456    Specimen:  Blood from Arm, Right Updated:  07/13/20 1600     Extra Tube Hold for add-ons.     Comment: Auto resulted.       Gold Top - SST [[id number]] Collected:  07/13/20 1456    Specimen:  Blood from Arm, Right Updated:  07/13/20 1600     Extra Tube Hold for add-ons.     Comment: Auto resulted.       Light Blue Top [[id number]] Collected:  07/13/20 1456    Specimen:  Blood from Arm, Right Updated:  07/13/20 1600     Extra Tube hold for add-on     Comment: Auto resulted       Lavender Top [[id number]] Collected:  07/13/20 1456    Specimen:  Blood from Arm, Right Updated:  07/13/20 1600     Extra Tube hold for add-on     Comment: Auto resulted       Vitamin B12 [[id number]] Collected:  07/13/20 1456    Specimen:  Blood from Arm, Right Updated:  07/13/20 1557    Comprehensive Metabolic Panel [[id number]]  (Abnormal) Collected:  07/13/20 1456    Specimen:  Blood from Arm, Right Updated:  07/13/20 1543     Glucose 171 mg/dL      BUN --     Comment: Testing performed by alternate method        Creatinine 1.57 mg/dL      Sodium 139 mmol/L      Potassium 4.1 mmol/L      Chloride 99 mmol/L      CO2 26.0 mmol/L      Calcium 9.0 mg/dL      Total Protein 7.0 g/dL      Albumin 4.30 g/dL      ALT (SGPT) 13 U/L      AST (SGOT) 12 U/L      Alkaline Phosphatase 63 U/L      Total Bilirubin 0.4 mg/dL      eGFR Non African Amer 45 mL/min/1.73      Globulin  2.7 gm/dL      A/G Ratio 1.6 g/dL      BUN/Creatinine Ratio --     Comment: Testing not performed        Anion Gap 14.0 mmol/L     Narrative:       GFR Normal >60  Chronic Kidney Disease <60  Kidney Failure <15      Troponin [[id number]]  (Normal) Collected:  07/13/20 1456    Specimen:  Blood from Arm, Right Updated:  07/13/20 1543     Troponin T <0.010 ng/mL     Narrative:       Troponin T Reference Range:  <= 0.03 ng/mL-   Negative for AMI  >0.03 ng/mL-     Abnormal for myocardial necrosis.  Clinicians would have to utilize clinical acumen, EKG, Troponin and serial changes to determine if it is an Acute Myocardial Infarction or myocardial injury due to an underlying chronic condition.       Results may be falsely decreased if patient taking Biotin.      Protime-INR [[id number]]  (Normal) Collected:  07/13/20 1456    Specimen:  Blood from Arm, Right Updated:  07/13/20 1512     Protime 9.9 Seconds      INR 0.93    aPTT [[id number]]  (Normal) Collected:  07/13/20 1456    Specimen:  Blood from Arm, Right Updated:  07/13/20 1512     PTT 24.1 seconds     CBC & Differential [[id number]] Collected:  07/13/20 1456    Specimen:  Blood from Arm, Right Updated:  07/13/20 1501    Narrative:       The following orders were created for panel order CBC & Differential.  Procedure                               Abnormality         Status                     ---------                               -----------         ------                     CBC Auto Differential[[id number]]        Normal              Final result                 Please view results for these tests on the individual orders.    CBC Auto Differential [[id number]]  (Normal) Collected:  07/13/20 1456    Specimen:  Blood from Arm, Right Updated:  07/13/20 1501     WBC 7.30 10*3/mm3      RBC 4.48 10*6/mm3      Hemoglobin 13.8 g/dL      Hematocrit 40.3 %      MCV 89.9 fL      MCH 30.9 pg      MCHC 34.3 g/dL      RDW 14.2 %      RDW-SD 45.5 fl      MPV 10.6 fL      Platelets 144  10*3/mm3      Neutrophil % 58.6 %      Lymphocyte % 31.8 %      Monocyte % 7.2 %      Eosinophil % 1.6 %      Basophil % 0.8 %      Neutrophils, Absolute 4.30 10*3/mm3      Lymphocytes, Absolute 2.30 10*3/mm3      Monocytes, Absolute 0.50 10*3/mm3      Eosinophils, Absolute 0.10 10*3/mm3      Basophils, Absolute 0.10 10*3/mm3      nRBC 0.0 /100 WBC     POC Glucose Once [[id number]]  (Abnormal) Collected:  07/13/20 1446    Specimen:  Blood Updated:  07/13/20 1447     Glucose 168 mg/dL      Comment: Serial Number: [id number]:  [id number]             Imaging Results (Most Recent)     Procedure Component Value Units Date/Time    CT Angiogram Head [[id number]] Collected:  07/13/20 1633     Updated:  07/13/20 1644    Narrative:          DATE OF EXAM:  7/13/2020 3:03 PM     PROCEDURE:  CT ANGIOGRAM HEAD-, CT ANGIOGRAM NECK-     INDICATIONS:   Stroke, confusion     COMPARISON:   No Comparisons Available     TECHNIQUE:  CTA of the head and CTA of the neck were performed after the intravenous  administration of 100 mL Isovue 370. Reconstructed coronal and sagittal  images were also obtained. In addition, a 3 D volume rendered image was  obtained after post processing. Automated exposure control and iterative  reconstruction methods were used.      FINDINGS:  VASCULAR FINDINGS: No significant plaque identified in the arch or  proximal great vessels. Right and left vertebral arteries appear patent.  There is no significant basilar stenosis. There is intimal thickening in  the right carotid bifurcation and calcified plaque in the left carotid  bifurcation extending into the proximal left ICA. The degree of  narrowing is less than 50% and not hemodynamically significant. There is  no significant carotid siphon stenosis. There is symmetric filling of  the middle and anterior cerebral arteries without significant stenosis  or occlusion. Both posterior cerebral arteries appear to fill normally.     NONVASCULAR FINDINGS: The lung  apices are clear. No superior mediastinal  masses are seen. There is no evidence of supraclavicular or cervical  lymphadenopathy. Skull base, mastoid air cells and paranasal sinuses are  clear. No enhancing intracranial lesions are identified. There are areas  of infarction identified in the basal ganglia bilaterally.          Impression:          1. Atherosclerotic plaque in both carotid bifurcations. The degree of  narrowing is less than 50% and not hemodynamically significant.  2. No evidence of vertebrobasilar stenosis.  3. No intracranial stenoses or occlusion. No evidence of aneurysm or  AVM.     Electronically Signed By-Honorio Daly On:7/13/2020 4:37 PM  This report was finalized on [id number] by  Honorio Daly, .    CT Angiogram Neck [[id number]] Collected:  07/13/20 1633     Updated:  07/13/20 1644    Narrative:          DATE OF EXAM:  7/13/2020 3:03 PM     PROCEDURE:  CT ANGIOGRAM HEAD-, CT ANGIOGRAM NECK-     INDICATIONS:   Stroke, confusion     COMPARISON:   No Comparisons Available     TECHNIQUE:  CTA of the head and CTA of the neck were performed after the intravenous  administration of 100 mL Isovue 370. Reconstructed coronal and sagittal  images were also obtained. In addition, a 3 D volume rendered image was  obtained after post processing. Automated exposure control and iterative  reconstruction methods were used.      FINDINGS:  VASCULAR FINDINGS: No significant plaque identified in the arch or  proximal great vessels. Right and left vertebral arteries appear patent.  There is no significant basilar stenosis. There is intimal thickening in  the right carotid bifurcation and calcified plaque in the left carotid  bifurcation extending into the proximal left ICA. The degree of  narrowing is less than 50% and not hemodynamically significant. There is  no significant carotid siphon stenosis. There is symmetric filling of  the middle and anterior cerebral arteries without significant stenosis  or  occlusion. Both posterior cerebral arteries appear to fill normally.     NONVASCULAR FINDINGS: The lung apices are clear. No superior mediastinal  masses are seen. There is no evidence of supraclavicular or cervical  lymphadenopathy. Skull base, mastoid air cells and paranasal sinuses are  clear. No enhancing intracranial lesions are identified. There are areas  of infarction identified in the basal ganglia bilaterally.          Impression:          1. Atherosclerotic plaque in both carotid bifurcations. The degree of  narrowing is less than 50% and not hemodynamically significant.  2. No evidence of vertebrobasilar stenosis.  3. No intracranial stenoses or occlusion. No evidence of aneurysm or  AVM.     Electronically Signed ByNik Daly On:7/13/2020 4:37 PM  This report was finalized on [id number] by  Honorio Daly .    XR Chest 1 View [[id number]] Collected:  07/13/20 1524     Updated:  07/13/20 1526    Narrative:       DATE OF EXAM:  7/13/2020 3:21 PM     PROCEDURE:  XR CHEST 1 VW-     INDICATIONS:  Acute Stroke Protocol (onset < 12 hrs)     COMPARISON:  12/13/2019     TECHNIQUE:   Single radiographic AP view of the chest was obtained.     FINDINGS:  Cardiac size is normal. The film is relatively underpenetrated the  bases. The lungs appear clear and the vascular markings normal        Impression:       Negative portable chest     Electronically Signed ByNik Daly On:7/13/2020 3:24 PM  This report was finalized on [id number] by  Honorio Daly, .    CT Head Without Contrast Stroke Protocol [[id number]] Collected:  07/13/20 1506     Updated:  07/13/20 1514    Narrative:       CT HEAD WO CONTRAST STROKE PROTOCOL-     Date of Exam: 7/13/2020 2:55 PM     Indication: Stroke.  Confusion, headache     Comparison: CT December 2019     Technique:  Without contrast, contiguous axial CT images of the head  were obtained from skull base to vertex.  Coronal and sagittal  reconstructions were performed.  Automated exposure  control and  iterative reconstruction methods were used.     FINDINGS  No evidence of intracranial hemorrhage, mass, or midline shift. The  ventricles appear normal in size for the patient's age. Previous lacunar  infarcts are present within the basal ganglia region bilaterally,  unchanged as compared to the previous study. No extra-axial collections  identified. The gray white matter differentiation is intact. No  air-fluid levels identified within the paranasal sinuses. The  extra-axial structures demonstrate no acute process.       Impression:       No evidence of hemorrhage, mass effect or midline shift. No acute  process identified.     The above findings were discussed with Brianne Carreon nurse practitioner at  3:06 PM on July 13, 2020     Electronically Signed By-Lilia Scruggs On:7/13/2020 3:07 PM  This report was finalized on [id number] by  Lilia Scruggs, .        Reviewed personally     ECG/EMG Results (most recent)     Procedure Component Value Units Date/Time    ECG 12 Lead [[id number]] Collected:  07/13/20 1519     Updated:  07/13/20 1522    Narrative:       HEART RATE= 79  bpm  RR Interval= 760  ms  IA Interval= 177  ms  P Horizontal Axis= 10  deg  P Front Axis= 68  deg  QRSD Interval= 91  ms  QT Interval= 403  ms  QRS Axis= 12  deg  T Wave Axis= 51  deg  - OTHERWISE NORMAL ECG -  Sinus rhythm  Low voltage, precordial leads  When compared with ECG of 13-Dec-2019 13:36:02,  No significant change  Electronically Signed By:   Date and Time of Study: 2020-07-13 15:19:59        Reviewed personally       Assessment/Plan     Active Hospital Problems:  Active Hospital Problems    Diagnosis  POA   • **Confusion and disorientation [R41.0]  Yes   • PFO (patent foramen ovale) [Q21.1]  Not Applicable   • Diabetic polyneuropathy associated with type 2 diabetes mellitus (CMS/[facility]) [E11.42]  Yes      Resolved Hospital Problems   No resolved problems to display.     Acute onset of confusion, improving. Unknown etiology.  -  CT head: No evidence of hemorrhage, no acute process.  Multiple chronic infarcts seen.  - neurology evaluated in the ER  - CTA head and neck: Preliminary negative, awaiting official read .   - MRI brain if symptoms continue   - Continue Eliquis and ASA    ANT  -Cr 1.5, baseline 1.0  -IVF and BMP in am     Chronic embolic stroke  -on ASA and eliquis continue    PFO  -chronic an on eliquis    Type II DM  -c/w home basal and pre-meal insulin  -add SSI and adjust prn  -hold home po agents     HTN  -chronic   -c/w lisinopril    DVT ppx-on eliquis    High risk     This patient has been examined wearing appropriate Personal Protective Equipment. 07/13/20      I discussed the patient's findings and my recommendations with patient.     Kevyn Vogel DO  07/13/20  17:19                Electronically signed by Kevyn Vogel DO at 07/13/20 1719          Emergency Department Notes      Molly Lucas RN at 07/13/20 1520        Pt c/o headache and confusion that started today about 1330.  Pt reports confusion has resolved but still has headache.     Molly Lucas RN  07/13/20 1522      Electronically signed by Molly Lucas RN at 07/13/20 1522     Yoanna Arriola PA-C at 07/13/20 1554     Attestation signed by Honorio Perez MD at 07/13/20 1646          For this patient encounter, I reviewed the NP or PA documentation, treatment plan, and medical decision making. Honorio Perez MD 7/13/2020 16:46                  Subjective   History: Patient is 64-year-old male who is brought in by his son for transient confusion.  He reports that he got off work he was on his way home and he felt very confused and he did not know where he was he called his son who reports that he is speech sounded confused as well.  Has a history of previous CVA.  Currently alert and oriented x3      Onset: 1 day  Location: Generalized  Duration: Improving  Character: Confusion  Aggravating/Alleviating factors: None  Radiation none  Severity:  Moderate            Review of Systems   Constitutional: Negative for chills, diaphoresis, fatigue and fever.   Respiratory: Negative for cough and shortness of breath.    Cardiovascular: Negative for chest pain and palpitations.   Psychiatric/Behavioral: Positive for confusion.       Past Medical History:   Diagnosis Date   • Arthritis    • BPH (benign prostatic hyperplasia)    • Chronic back pain    • Diabetes mellitus (CMS/HCC)    • Hypertension    • Low back pain    • Low vision of right eye with normal vision in contralateral eye    • Memory problem    • Nonobstructive atherosclerosis of coronary artery    • PFO (patent foramen ovale) 11/29/2019       No Known Allergies    Past Surgical History:   Procedure Laterality Date   • ARM TENDON REPAIR Left    • CHOLECYSTECTOMY     • COLONOSCOPY      2018 = TA, rech 2023   • EYE SURGERY      Rt Eye Sx after trauma @ 4y/o   • JOINT REPLACEMENT      Left TKR   • ROTATOR CUFF REPAIR Bilateral    • UMBILICAL HERNIA REPAIR         Family History   Problem Relation Age of Onset   • Diabetes Mother    • Heart disease Mother    • Arthritis Mother    • Obesity Mother    • Hypertension Mother    • Migraines Mother    • Osteoporosis Mother    • Diabetes Father    • Heart disease Father    • Arthritis Father    • Hypertension Father    • Stroke Father    • Heart attack Father    • Hyperlipidemia Father    • Diabetes Brother    • Heart disease Brother    • Arthritis Sister    • Anemia Sister        Social History     Socioeconomic History   • Marital status: Single     Spouse name: Not on file   • Number of children: Not on file   • Years of education: Not on file   • Highest education level: Not on file   Tobacco Use   • Smoking status: Never Smoker   • Smokeless tobacco: Never Used   Substance and Sexual Activity   • Alcohol use: Yes     Alcohol/week: 1.0 standard drinks     Types: 1 Cans of beer per week   • Drug use: Never   • Sexual activity: Defer           Objective    Physical Exam   Constitutional: He is oriented to person, place, and time. He appears well-developed and well-nourished.   HENT:   Head: Normocephalic and atraumatic.   Eyes: Pupils are equal, round, and reactive to light.   Neck: Normal range of motion.   Cardiovascular: Normal rate and regular rhythm.   Pulmonary/Chest: Effort normal and breath sounds normal.   Musculoskeletal: Normal range of motion.   Neurological: He is alert and oriented to person, place, and time. He has normal strength. He is not disoriented. No cranial nerve deficit or sensory deficit. Coordination normal.   Skin: Skin is warm and dry.   Psychiatric: He has a normal mood and affect. His behavior is normal. His mood appears not anxious. He is not agitated.       Procedures          ED Course  ED Course as of Jul 13 1637   Mon Jul 13, 2020   1538 KG interpreted by ER physician reviewed myself.  Sinus rhythm rate of 79    [MG]      ED Course User Index  [MG] Yoanna Arriola PA-C          Xr Chest 1 View    Result Date: 7/13/2020  Negative portable chest  Electronically Signed By-Honorio Daly On:7/13/2020 3:24 PM This report was finalized on [id number] by  Honorio Daly, .    Ct Head Without Contrast Stroke Protocol    Result Date: 7/13/2020  No evidence of hemorrhage, mass effect or midline shift. No acute process identified.  The above findings were discussed with Brianne Carreon nurse practitioner at 3:06 PM on July 13, 2020  Electronically Signed ByRosas Scruggs On:7/13/2020 3:07 PM This report was finalized on [id number] by  Lilia Scruggs, .    Labs Reviewed   COMPREHENSIVE METABOLIC PANEL - Abnormal; Notable for the following components:       Result Value    Glucose 171 (*)     Creatinine 1.57 (*)     eGFR Non  Amer 45 (*)     All other components within normal limits    Narrative:     GFR Normal >60  Chronic Kidney Disease <60  Kidney Failure <15     BUN - Abnormal; Notable for the following components:    BUN 26 (*)     All other  components within normal limits   LIPID PANEL - Abnormal; Notable for the following components:    Total Cholesterol 224 (*)     Triglycerides 234 (*)     HDL Cholesterol 61 (*)     LDL Cholesterol  116 (*)     All other components within normal limits    Narrative:     Cholesterol Reference Ranges  (U.S. Department of Health and Human Services ATP III Classifications)    Desirable          <200 mg/dL  Borderline High    200-239 mg/dL  High Risk          >240 mg/dL      Triglyceride Reference Ranges  (U.S. Department of Health and Human Services ATP III Classifications)    Normal           <150 mg/dL  Borderline High  150-199 mg/dL  High             200-499 mg/dL  Very High        >500 mg/dL    HDL Reference Ranges  (U.S. Department of Health and Human Services ATP III Classifcations)    Low     <40 mg/dl (major risk factor for CHD)  High    >60 mg/dl ('negative' risk factor for CHD)        LDL Reference Ranges  (U.S. Department of Health and Human Services ATP III Classifcations)    Optimal          <100 mg/dL  Near Optimal     100-129 mg/dL  Borderline High  130-159 mg/dL  High             160-189 mg/dL  Very High        >189 mg/dL   HEMOGLOBIN A1C - Abnormal; Notable for the following components:    Hemoglobin A1C 8.1 (*)     All other components within normal limits    Narrative:     Hemoglobin A1C Reference Range:    <5.7 %        Normal  5.7-6.4 %     Increased risk for diabetes  > 6.4 %        Diabetes       These guidelines have been recommended by the American Diabetic Association for Hgb A1c.      The following 2010 guidelines have been recommended by the American Diabetes Association for Hemoglobin A1c.    HBA1c 5.7-6.4% Increased risk for future diabetes (pre-diabetes)  HBA1c     >6.4% Diabetes     POCT GLUCOSE FINGERSTICK - Abnormal; Notable for the following components:    Glucose 168 (*)     All other components within normal limits   PROTIME-INR - Normal   APTT - Normal   TROPONIN (IN-HOUSE) - Normal     Narrative:     Troponin T Reference Range:  <= 0.03 ng/mL-   Negative for AMI  >0.03 ng/mL-     Abnormal for myocardial necrosis.  Clinicians would have to utilize clinical acumen, EKG, Troponin and serial changes to determine if it is an Acute Myocardial Infarction or myocardial injury due to an underlying chronic condition.       Results may be falsely decreased if patient taking Biotin.     CBC WITH AUTO DIFFERENTIAL - Normal   TSH - Normal   RAINBOW DRAW    Narrative:     The following orders were created for panel order [place] Draw.  Procedure                               Abnormality         Status                     ---------                               -----------         ------                     Light Blue Top[[id number]]                                   Final result               Green Top (Gel)[[id number]]                                  Final result               Lavender Top[[id number]]                                     Final result               Gold Top - SST[[id number]]                                   Final result                 Please view results for these tests on the individual orders.   VITAMIN B12   POCT GLUCOSE FINGERSTICK   TYPE AND SCREEN   BB ARMBAND CHECK   CBC AND DIFFERENTIAL    Narrative:     The following orders were created for panel order CBC & Differential.  Procedure                               Abnormality         Status                     ---------                               -----------         ------                     CBC Auto Differential[[id number]]        Normal              Final result                 Please view results for these tests on the individual orders.   LIGHT BLUE TOP   GREEN TOP   LAVENDER TOP   GOLD TOP - SST     Medications   sodium chloride 0.9 % flush 10 mL (has no administration in time range)   sodium chloride 0.9 % bolus 1,000 mL (has no administration in time range)   iopamidol (ISOVUE-370) 76 % injection 100 mL (100 mL Intravenous  Given 7/13/20 1515)                                       MDM  Number of Diagnoses or Management Options  TIA (transient ischemic attack):   Transient confusion:   Diagnosis management comments: I examined the patient using the appropriate personal protective equipment.      DISPOSITION:   Chart Review:  Comorbidity:  has a past medical history of Arthritis, BPH (benign prostatic hyperplasia), Chronic back pain, Diabetes mellitus (CMS/HCC), Hypertension, Low back pain, Low vision of right eye with normal vision in contralateral eye, Memory problem, Nonobstructive atherosclerosis of coronary artery, and PFO (patent foramen ovale) (11/29/2019).  Differentials:this list is not all inclusive and does not constitute the entirety of considered causes --> TIA, acute stroke, dehydration  ECG: interpreted by ER physician and reviewed by myself: Sinus rhythm  Labs: Slightly elevated creatinine from baseline.    Imaging: Was interpreted by physician and reviewed by myself:  Xr Chest 1 View    Result Date: 7/13/2020  Negative portable chest  Electronically Signed ByNik Daly On:7/13/2020 3:24 PM This report was finalized on [id number] by  Honorio Daly, .    Ct Head Without Contrast Stroke Protocol    Result Date: 7/13/2020  No evidence of hemorrhage, mass effect or midline shift. No acute process identified.  The above findings were discussed with Brianne Carreon nurse practitioner at 3:06 PM on July 13, 2020  Electronically Signed ByRosas Scruggs On:7/13/2020 3:07 PM This report was finalized on [id number] by  Lilia Scruggs, .      Disposition/Treatment:    64-year-old male who presents to the ER with transient confusion.  Patient's imaging and lab work was all negative.  This was resolving by the time he got to the ER.  I evaluated the patient and consulted with Dr. Perez who agrees with the plan.  Patient is admitted to the hospitalist.  Neurology was consulted       Amount and/or Complexity of Data Reviewed  Clinical lab  tests: reviewed  Tests in the radiology section of CPT®:  reviewed  Tests in the medicine section of CPT®:  reviewed  Decide to obtain previous medical records or to obtain history from someone other than the patient: yes    Patient Progress  Patient progress: improved      Final diagnoses:   TIA (transient ischemic attack)   Transient confusion            Yoanna Arriola PA-C  07/13/20 1637      Electronically signed by Honorio Perez MD at 07/13/20 1646     Molly Lucas RN at 07/13/20 1555        Called lab to verify new lab orders were added to blood already sent to lab     Molly Lucas RN  07/13/20 1556      Electronically signed by Molly Lucas RN at 07/13/20 1556         Facility-Administered Medications as of 7/14/2020   Medication Dose Route Frequency Provider Last Rate Last Dose   • acetaminophen (TYLENOL) tablet 650 mg  650 mg Oral Q4H PRN Kevyn Vogel, DO        Or   • acetaminophen (TYLENOL) suppository 650 mg  650 mg Rectal Q4H PRN Kevyn Vogel, DO       • aluminum-magnesium hydroxide-simethicone (MAALOX MAX) 400-400-40 MG/5ML suspension 7.5 mL  7.5 mL Oral Q4H PRN Kevyn Vogel, DO       • apixaban (ELIQUIS) tablet 5 mg  5 mg Oral Q12H Kevyn Vogel DO   5 mg at 07/14/20 0858   • aspirin chewable tablet 81 mg  81 mg Oral Nightly Kevyn Vogel DO   81 mg at 07/13/20 2124   • atorvastatin (LIPITOR) tablet 80 mg  80 mg Oral Nightly Kevyn Vogel DO   80 mg at 07/13/20 2124   • baclofen (LIORESAL) tablet 10 mg  10 mg Oral Nightly PRN Kevyn Vogel, DO       • bisacodyl (DULCOLAX) suppository 10 mg  10 mg Rectal Daily PRN Kevyn Vogel, DO       • dextrose (D50W) 25 g/ 50mL Intravenous Solution 25 g  25 g Intravenous Q15 Min PRN Kevyn Vogel, DO       • dextrose (GLUTOSE) oral gel 15 g  15 g Oral Q15 Min PRN Kevyn Vogel, DO       • diphenhydrAMINE (BENADRYL) injection 12.5 mg  12.5 mg Intravenous Once Dagmar Carreon APRN       • FLUoxetine (PROzac) capsule 40 mg  40 mg Oral Q PM  Kevyn Vogel, DO   40 mg at 07/14/20 1456   • glucagon (human recombinant) (GLUCAGEN DIAGNOSTIC) injection 1 mg  1 mg Subcutaneous Q15 Min PRN Kevyn Vogel, DO       • HYDROcodone-acetaminophen (NORCO)  MG per tablet 1 tablet  1 tablet Oral Q4H PRN Kevyn Vogel, DO   1 tablet at 07/14/20 1455   • [COMPLETED] HYDROcodone-acetaminophen (NORCO) 7.5-325 MG per tablet 1 tablet  1 tablet Oral Once Honorio Perez MD   1 tablet at 07/13/20 1708   • insulin glargine (LANTUS) injection 20 Units  20 Units Subcutaneous Nightly Kevyn Vogel, DO   20 Units at 07/13/20 2125   • insulin lispro (humaLOG) injection 0-9 Units  0-9 Units Subcutaneous TID AC Kevyn Vogel, DO        And   • insulin lispro (humaLOG) injection 0-9 Units  0-9 Units Subcutaneous PRN Kevyn Vogel, DO       • insulin lispro (humaLOG) injection 5 Units  5 Units Subcutaneous TID With Meals Kevyn Vogel, DO   5 Units at 07/14/20 1129   • [COMPLETED] iopamidol (ISOVUE-370) 76 % injection 100 mL  100 mL Intravenous Once in imaging Yoanna Arriola PA-C   100 mL at 07/13/20 1515   • lisinopril (PRINIVIL,ZESTRIL) tablet 10 mg  10 mg Oral Nightly Kevyn Vogel, DO   10 mg at 07/13/20 2124   • ondansetron (ZOFRAN) injection 4 mg  4 mg Intravenous Q6H PRN Kevyn Vogel, DO       • pregabalin (LYRICA) capsule 75 mg  75 mg Oral TID Kevyn Vogel, DO   75 mg at 07/14/20 1455   • prochlorperazine (COMPAZINE) injection 10 mg  10 mg Intravenous Once Dagmar Carreon APRN       • [COMPLETED] sodium chloride 0.9 % bolus 1,000 mL  1,000 mL Intravenous Once Yoanna Arriola PA-C   Stopped at 07/13/20 2131   • sodium chloride 0.9 % flush 10 mL  10 mL Intravenous PRN Kevyn Vogel, DO       • sodium chloride 0.9 % flush 10 mL  10 mL Intravenous Q12H Novelty, Kevyn M, DO   10 mL at 07/14/20 0859   • sodium chloride 0.9 % flush 10 mL  10 mL Intravenous PRN Kevyn Vogel, DO           Lab Results (last 24 hours)     Procedure Component Value Units Date/Time    POC  Glucose Once [[id number]]  (Abnormal) Collected:  07/14/20 1054    Specimen:  Blood Updated:  07/14/20 1055     Glucose 129 mg/dL      Comment: Serial Number: [id number]:  [id number]       BUN [[id number]]  (Normal) Collected:  07/14/20 0358    Specimen:  Blood Updated:  07/14/20 0742     BUN 21 mg/dL     POC Glucose Once [[id number]]  (Abnormal) Collected:  07/14/20 0731    Specimen:  Blood Updated:  07/14/20 0734     Glucose 117 mg/dL      Comment: Serial Number: [id number]:  [id number]       Lipid Panel [[id number]]  (Abnormal) Collected:  07/14/20 0358    Specimen:  Blood Updated:  07/14/20 0503     Total Cholesterol 194 mg/dL      Triglycerides 191 mg/dL      HDL Cholesterol 50 mg/dL      LDL Cholesterol  106 mg/dL      VLDL Cholesterol 38.2 mg/dL      LDL/HDL Ratio 2.12    Narrative:       Cholesterol Reference Ranges  (U.S. Department of Health and Human Services ATP III Classifications)    Desirable          <200 mg/dL  Borderline High    200-239 mg/dL  High Risk          >240 mg/dL      Triglyceride Reference Ranges  (U.S. Department of Health and Human Services ATP III Classifications)    Normal           <150 mg/dL  Borderline High  150-199 mg/dL  High             200-499 mg/dL  Very High        >500 mg/dL    HDL Reference Ranges  (U.S. Department of Health and Human Services ATP III Classifcations)    Low     <40 mg/dl (major risk factor for CHD)  High    >60 mg/dl ('negative' risk factor for CHD)        LDL Reference Ranges  (U.S. Department of Health and Human Services ATP III Classifcations)    Optimal          <100 mg/dL  Near Optimal     100-129 mg/dL  Borderline High  130-159 mg/dL  High             160-189 mg/dL  Very High        >189 mg/dL    Comprehensive Metabolic Panel [[id number]]  (Abnormal) Collected:  07/14/20 0358    Specimen:  Blood Updated:  07/14/20 0503     Glucose 118 mg/dL      BUN --     Comment: Testing performed by alternate method        Creatinine 1.06 mg/dL      Sodium  140 mmol/L      Potassium 4.4 mmol/L      Chloride 103 mmol/L      CO2 26.0 mmol/L      Calcium 8.7 mg/dL      Total Protein 5.7 g/dL      Albumin 3.60 g/dL      ALT (SGPT) 11 U/L      AST (SGOT) 11 U/L      Alkaline Phosphatase 52 U/L      Total Bilirubin 0.4 mg/dL      eGFR Non African Amer 70 mL/min/1.73      Globulin 2.1 gm/dL      A/G Ratio 1.7 g/dL      BUN/Creatinine Ratio --     Comment: Testing not performed        Anion Gap 11.0 mmol/L     Narrative:       GFR Normal >60  Chronic Kidney Disease <60  Kidney Failure <15      CBC (No Diff) [[id number]]  (Abnormal) Collected:  07/14/20 0358    Specimen:  Blood Updated:  07/14/20 0435     WBC 6.90 10*3/mm3      RBC 4.13 10*6/mm3      Hemoglobin 12.9 g/dL      Hematocrit 37.1 %      MCV 90.0 fL      MCH 31.3 pg      MCHC 34.7 g/dL      RDW 14.3 %      RDW-SD 46.4 fl      MPV 11.7 fL      Platelets 133 10*3/mm3     POC Glucose Once [[id number]]  (Abnormal) Collected:  07/14/20 0400    Specimen:  Blood Updated:  07/14/20 0401     Glucose 113 mg/dL      Comment: Serial Number: [id number]:  [id number]       Vitamin B12 [[id number]]  (Normal) Collected:  07/13/20 1456    Specimen:  Blood from Arm, Right Updated:  07/13/20 2028     Vitamin B-12 556 pg/mL     Narrative:       Results may be falsely increased if patient taking Biotin.      POC Glucose Once [[id number]]  (Abnormal) Collected:  07/13/20 2024    Specimen:  Blood Updated:  07/13/20 2024     Glucose 132 mg/dL      Comment: Serial Number: [id number]:  [id number]       POC Glucose Once [[id number]]  (Normal) Collected:  07/13/20 1832    Specimen:  Blood Updated:  07/13/20 1833     Glucose 105 mg/dL      Comment: Serial Number: [id number]:  [id number]       BUN [[id number]]  (Abnormal) Collected:  07/13/20 1456    Specimen:  Blood from Arm, Right Updated:  07/13/20 1630     BUN 26 mg/dL     TSH [[id number]]  (Normal) Collected:  07/13/20 1456    Specimen:  Blood from Arm, Right Updated:  07/13/20  1621     TSH 1.100 uIU/mL     Lipid Panel [[id number]]  (Abnormal) Collected:  07/13/20 1456    Specimen:  Blood from Arm, Right Updated:  07/13/20 1615     Total Cholesterol 224 mg/dL      Triglycerides 234 mg/dL      HDL Cholesterol 61 mg/dL      LDL Cholesterol  116 mg/dL      VLDL Cholesterol 46.8 mg/dL      LDL/HDL Ratio 1.90    Narrative:       Cholesterol Reference Ranges  (U.S. Department of Health and Human Services ATP III Classifications)    Desirable          <200 mg/dL  Borderline High    200-239 mg/dL  High Risk          >240 mg/dL      Triglyceride Reference Ranges  (U.S. Department of Health and Human Services ATP III Classifications)    Normal           <150 mg/dL  Borderline High  150-199 mg/dL  High             200-499 mg/dL  Very High        >500 mg/dL    HDL Reference Ranges  (U.S. Department of Health and Human Services ATP III Classifcations)    Low     <40 mg/dl (major risk factor for CHD)  High    >60 mg/dl ('negative' risk factor for CHD)        LDL Reference Ranges  (U.S. Department of Health and Human Services ATP III Classifcations)    Optimal          <100 mg/dL  Near Optimal     100-129 mg/dL  Borderline High  130-159 mg/dL  High             160-189 mg/dL  Very High        >189 mg/dL    Hemoglobin A1c [[id number]]  (Abnormal) Collected:  07/13/20 1456    Specimen:  Blood from Arm, Right Updated:  07/13/20 1602     Hemoglobin A1C 8.1 %     Narrative:       Hemoglobin A1C Reference Range:    <5.7 %        Normal  5.7-6.4 %     Increased risk for diabetes  > 6.4 %        Diabetes       These guidelines have been recommended by the American Diabetic Association for Hgb A1c.      The following 2010 guidelines have been recommended by the American Diabetes Association for Hemoglobin A1c.    HBA1c 5.7-6.4% Increased risk for future diabetes (pre-diabetes)  HBA1c     >6.4% Diabetes      [place] Draw [[id number]] Collected:  07/13/20 1456    Specimen:  Blood from Arm, Right Updated:  07/13/20 1600     Narrative:       The following orders were created for panel order [place] Draw.  Procedure                               Abnormality         Status                     ---------                               -----------         ------                     Light Blue Top[[id number]]                                   Final result               Green Top (Gel)[[id number]]                                  Final result               Lavender Top[[id number]]                                     Final result               Gold Top - SST[[id number]]                                   Final result                 Please view results for these tests on the individual orders.    Green Top (Gel) [[id number]] Collected:  07/13/20 1456    Specimen:  Blood from Arm, Right Updated:  07/13/20 1600     Extra Tube Hold for add-ons.     Comment: Auto resulted.       Gold Top - SST [[id number]] Collected:  07/13/20 1456    Specimen:  Blood from Arm, Right Updated:  07/13/20 1600     Extra Tube Hold for add-ons.     Comment: Auto resulted.       Light Blue Top [[id number]] Collected:  07/13/20 1456    Specimen:  Blood from Arm, Right Updated:  07/13/20 1600     Extra Tube hold for add-on     Comment: Auto resulted       Lavender Top [[id number]] Collected:  07/13/20 1456    Specimen:  Blood from Arm, Right Updated:  07/13/20 1600     Extra Tube hold for add-on     Comment: Auto resulted       Comprehensive Metabolic Panel [[id number]]  (Abnormal) Collected:  07/13/20 1456    Specimen:  Blood from Arm, Right Updated:  07/13/20 1543     Glucose 171 mg/dL      BUN --     Comment: Testing performed by alternate method        Creatinine 1.57 mg/dL      Sodium 139 mmol/L      Potassium 4.1 mmol/L      Chloride 99 mmol/L      CO2 26.0 mmol/L      Calcium 9.0 mg/dL      Total Protein 7.0 g/dL      Albumin 4.30 g/dL      ALT (SGPT) 13 U/L      AST (SGOT) 12 U/L      Alkaline Phosphatase 63 U/L      Total Bilirubin 0.4 mg/dL      eGFR Non  Amer 45  mL/min/1.73      Globulin 2.7 gm/dL      A/G Ratio 1.6 g/dL      BUN/Creatinine Ratio --     Comment: Testing not performed        Anion Gap 14.0 mmol/L     Narrative:       GFR Normal >60  Chronic Kidney Disease <60  Kidney Failure <15      Troponin [[id number]]  (Normal) Collected:  07/13/20 1456    Specimen:  Blood from Arm, Right Updated:  07/13/20 1543     Troponin T <0.010 ng/mL     Narrative:       Troponin T Reference Range:  <= 0.03 ng/mL-   Negative for AMI  >0.03 ng/mL-     Abnormal for myocardial necrosis.  Clinicians would have to utilize clinical acumen, EKG, Troponin and serial changes to determine if it is an Acute Myocardial Infarction or myocardial injury due to an underlying chronic condition.       Results may be falsely decreased if patient taking Biotin.          Imaging Results (Last 24 Hours)     Procedure Component Value Units Date/Time    MRI Brain Without Contrast [[id number]] Collected:  07/14/20 1337     Updated:  07/14/20 1346    Narrative:       MRI BRAIN WO CONTRAST-     Date of Exam: 7/14/2020 12:15 PM     Indication: Stroke  . Confusion.     Comparison: CT angiography of the head neck 07/13/2020. Noncontrast CT  head 07/13/2020. MRI brain 11/26/2019.     Technique: Multiplanar multisequence images of the brain were performed  without contrast according to routine brain MRI protocol.     FINDINGS:  10 x 3 mm focal restricted diffusion at the posterior margin of the left  lentiform nucleus, 5 mm focus of persistent acute diffusion the high  left parietal lobe, and 1.6 x 0.8 cm focal restricted diffusion within  the periventricular right frontal lobe, consistent with acute or  subacute multifocal infarcts. These findings are new since the  11/26/2019 comparison. There is no hemorrhagic transformation.           Scattered foci of FLAIR and T2 signal intensity change in the deep white  matter system with chronic microvascular disease. Chronic appearing  lacunar infarcts in the bilateral  basal ganglia.     No mass lesion or mass effect or midline shift. Normal ventricular  configuration. Calvarium is normal. Paranasal sinuses and mastoid air  cells are clear.          Impression:        IMPRESSION:     1. Multifocal small acute or subacute infarcts involving the posterior  left lentiform nucleus, high left parietal lobe, and periventricular  right frontal lobe. The findings could indicate features of shower  emboli phenomenon. No hemorrhagic transformation.  2. Features of mild to moderate chronic microvascular disease.        Electronically Signed By-Dr. Charla Nelson MD On:7/14/2020 1:44 PM  This report was finalized on [id number] by Dr. Charla Nelson MD.    CT Angiogram Head [[id number]] Collected:  07/13/20 1633     Updated:  07/13/20 1644    Narrative:          DATE OF EXAM:  7/13/2020 3:03 PM     PROCEDURE:  CT ANGIOGRAM HEAD-, CT ANGIOGRAM NECK-     INDICATIONS:   Stroke, confusion     COMPARISON:   No Comparisons Available     TECHNIQUE:  CTA of the head and CTA of the neck were performed after the intravenous  administration of 100 mL Isovue 370. Reconstructed coronal and sagittal  images were also obtained. In addition, a 3 D volume rendered image was  obtained after post processing. Automated exposure control and iterative  reconstruction methods were used.      FINDINGS:  VASCULAR FINDINGS: No significant plaque identified in the arch or  proximal great vessels. Right and left vertebral arteries appear patent.  There is no significant basilar stenosis. There is intimal thickening in  the right carotid bifurcation and calcified plaque in the left carotid  bifurcation extending into the proximal left ICA. The degree of  narrowing is less than 50% and not hemodynamically significant. There is  no significant carotid siphon stenosis. There is symmetric filling of  the middle and anterior cerebral arteries without significant stenosis  or occlusion. Both posterior cerebral arteries  appear to fill normally.     NONVASCULAR FINDINGS: The lung apices are clear. No superior mediastinal  masses are seen. There is no evidence of supraclavicular or cervical  lymphadenopathy. Skull base, mastoid air cells and paranasal sinuses are  clear. No enhancing intracranial lesions are identified. There are areas  of infarction identified in the basal ganglia bilaterally.          Impression:          1. Atherosclerotic plaque in both carotid bifurcations. The degree of  narrowing is less than 50% and not hemodynamically significant.  2. No evidence of vertebrobasilar stenosis.  3. No intracranial stenoses or occlusion. No evidence of aneurysm or  AVM.     Electronically Signed By-Honorio Daly On:7/13/2020 4:37 PM  This report was finalized on [id number] by  Honorio Daly, .    CT Angiogram Neck [[id number]] Collected:  07/13/20 1633     Updated:  07/13/20 1644    Narrative:          DATE OF EXAM:  7/13/2020 3:03 PM     PROCEDURE:  CT ANGIOGRAM HEAD-, CT ANGIOGRAM NECK-     INDICATIONS:   Stroke, confusion     COMPARISON:   No Comparisons Available     TECHNIQUE:  CTA of the head and CTA of the neck were performed after the intravenous  administration of 100 mL Isovue 370. Reconstructed coronal and sagittal  images were also obtained. In addition, a 3 D volume rendered image was  obtained after post processing. Automated exposure control and iterative  reconstruction methods were used.      FINDINGS:  VASCULAR FINDINGS: No significant plaque identified in the arch or  proximal great vessels. Right and left vertebral arteries appear patent.  There is no significant basilar stenosis. There is intimal thickening in  the right carotid bifurcation and calcified plaque in the left carotid  bifurcation extending into the proximal left ICA. The degree of  narrowing is less than 50% and not hemodynamically significant. There is  no significant carotid siphon stenosis. There is symmetric filling of  the middle and anterior  cerebral arteries without significant stenosis  or occlusion. Both posterior cerebral arteries appear to fill normally.     NONVASCULAR FINDINGS: The lung apices are clear. No superior mediastinal  masses are seen. There is no evidence of supraclavicular or cervical  lymphadenopathy. Skull base, mastoid air cells and paranasal sinuses are  clear. No enhancing intracranial lesions are identified. There are areas  of infarction identified in the basal ganglia bilaterally.          Impression:          1. Atherosclerotic plaque in both carotid bifurcations. The degree of  narrowing is less than 50% and not hemodynamically significant.  2. No evidence of vertebrobasilar stenosis.  3. No intracranial stenoses or occlusion. No evidence of aneurysm or  AVM.     Electronically Signed ByNik Daly On:7/13/2020 4:37 PM  This report was finalized on [id number] by  Honorio Daly, .    XR Chest 1 View [[id number]] Collected:  07/13/20 1524     Updated:  07/13/20 1526    Narrative:       DATE OF EXAM:  7/13/2020 3:21 PM     PROCEDURE:  XR CHEST 1 VW-     INDICATIONS:  Acute Stroke Protocol (onset < 12 hrs)     COMPARISON:  12/13/2019     TECHNIQUE:   Single radiographic AP view of the chest was obtained.     FINDINGS:  Cardiac size is normal. The film is relatively underpenetrated the  bases. The lungs appear clear and the vascular markings normal        Impression:       Negative portable chest     Electronically Signed ByNik Daly On:7/13/2020 3:24 PM  This report was finalized on [id number] by  Honorio Daly, .    CT Head Without Contrast Stroke Protocol [[id number]] Collected:  07/13/20 1506     Updated:  07/13/20 1514    Narrative:       CT HEAD WO CONTRAST STROKE PROTOCOL-     Date of Exam: 7/13/2020 2:55 PM     Indication: Stroke.  Confusion, headache     Comparison: CT December 2019     Technique:  Without contrast, contiguous axial CT images of the head  were obtained from skull base to vertex.  Coronal and  sagittal  reconstructions were performed.  Automated exposure control and  iterative reconstruction methods were used.     FINDINGS  No evidence of intracranial hemorrhage, mass, or midline shift. The  ventricles appear normal in size for the patient's age. Previous lacunar  infarcts are present within the basal ganglia region bilaterally,  unchanged as compared to the previous study. No extra-axial collections  identified. The gray white matter differentiation is intact. No  air-fluid levels identified within the paranasal sinuses. The  extra-axial structures demonstrate no acute process.       Impression:       No evidence of hemorrhage, mass effect or midline shift. No acute  process identified.     The above findings were discussed with Brianne Carreon nurse practitioner at  3:06 PM on July 13, 2020     Electronically Signed By-Lilia Scruggs On:7/13/2020 3:07 PM  This report was finalized on [id number] by  Lilia Scruggs, .

## 2020-07-14 NOTE — THERAPY DISCHARGE NOTE
Acute Care - Occupational Therapy Initial Eval/Discharge  [facility]     Patient Name: Humble Green  : 1955  MRN: [id number]  Today's Date: 2020               Admit Date: 2020       ICD-10-CM ICD-9-CM   1. TIA (transient ischemic attack) G45.9 435.9   2. Transient confusion R41.0 298.9     Patient Active Problem List   Diagnosis   • Mixed hyperlipidemia   • Major depressive disorder, recurrent episode, moderate degree (CMS/HCC)   • Diabetic polyneuropathy associated with type 2 diabetes mellitus (CMS/HCC)   • Intractable migraine without aura and without status migrainosus   • PFO (patent foramen ovale)   • Cerebrovascular accident (CVA) due to embolism of precerebral artery (CMS/HCC)   • Hypertension   • Nonobstructive atherosclerosis of coronary artery   • Diabetes mellitus (CMS/HCC)   • Confusion and disorientation     Past Medical History:   Diagnosis Date   • Arthritis    • BPH (benign prostatic hyperplasia)    • Chronic back pain    • Diabetes mellitus (CMS/HCC)    • Hypertension    • Low back pain    • Low vision of right eye with normal vision in contralateral eye    • Memory problem    • Nonobstructive atherosclerosis of coronary artery    • PFO (patent foramen ovale) 2019     Past Surgical History:   Procedure Laterality Date   • ARM TENDON REPAIR Left    • CHOLECYSTECTOMY     • COLONOSCOPY       = TA, rech    • EYE SURGERY      Rt Eye Sx after trauma @ 4y/o   • JOINT REPLACEMENT      Left TKR   • ROTATOR CUFF REPAIR Bilateral    • UMBILICAL HERNIA REPAIR            OT ASSESSMENT FLOWSHEET (last 12 hours)      Occupational Therapy Evaluation     Row Name 20 0900                   OT Evaluation Time/Intention    Subjective Information  complains of;fatigue  -MH        Patient Effort  adequate  -MH        Symptoms Noted During/After Treatment  none  -MH           General Information    Patient Profile Reviewed?  yes  -MH        Patient/Family Observations  supine, tele  lines.  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  Pt had CVA last year in November. REports severe daytime fatigue despite sleeping adequate hours.  -           Relationship/Environment    Lives With  alone  -           Resource/Environmental Concerns    Current Living Arrangements  home/apartment/condo trailor w/ ramp  -           Cognitive Assessment/Intervention- PT/OT    Orientation Status (Cognition)  oriented x 4  -        Follows Commands (Cognition)  WNL  -           Bed Mobility Assessment/Treatment    Bed Mobility Assessment/Treatment  rolling left;supine-sit;sit-supine  -        Rolling Left [place] (Bed Mobility)  conditional independence  -        Supine-Sit [place] (Bed Mobility)  conditional independence  -        Sit-Supine [place] (Bed Mobility)  conditional independence  -        Assistive Device (Bed Mobility)  bed rails;head of bed elevated  -           Functional Mobility    Functional Mobility- Ind. Level  independent  -        Functional Mobility-Distance (Feet)  40  -        Functional Mobility- Comment  wide RICKY & increased lateral sway. Pt reports he has been weak & deconditioned for some time, at home on disability, and has take this PT  job to be more active.  -           Transfer Assessment/Treatment    Transfer Assessment/Treatment  sit-stand transfer;stand-sit transfer  -           Sit-Stand Transfer    Sit-Stand [place] (Transfers)  independent  -           Stand-Sit Transfer    Stand-Sit [place] (Transfers)  independent  -           ADL Assessment/Intervention    BADL Assessment/Intervention  grooming;toileting  -           Grooming Assessment/Training    [place] Level (Grooming)  wash face, hands;independent  -        Grooming Position  sink side  -           Toileting  Assessment/Training    [place] Level (Toileting)  adjust/manage clothing;perform perineal hygiene;independent  -        Toileting Position  unsupported standing  -           General ROM    GENERAL ROM COMMENTS  WFL  -           MMT (Manual Muscle Testing)    General MMT Comments  WFL  -MH           Positioning and Restraints    Pre-Treatment Position  in bed  -MH        Post Treatment Position  bed  -MH        In Bed  notified nsg;supine;call light within reach;encouraged to call for assist  -           Coping    Observed Emotional State  accepting;calm;cooperative  -        Verbalized Emotional State  acceptance  -           Plan of Care Review    Plan of Care Reviewed With  patient  -MH        Progress  improving  -        Outcome Summary  Pt is 63 y/o PT employed  who has had TIA. Had prior CVA late last year. Luive  -           Clinical Impression (OT)    Criteria for Skilled Therapeutic Interventions Met (OT Eval)  current level of function same as previous level of function  -        Therapy Frequency (OT Eval)  evaluation only  -        Anticipated Discharge Disposition (OT)  home with OP services OP PT or work-hardening program if available in his area.  -           Vital Signs    Post Systolic BP Rehab  121  -MH        Post Treatment Diastolic BP  76  -MH           Living Environment    Home Accessibility  wheelchair accessible  -          User Key  (r) = Recorded By, (t) = Taken By, (c) = Cosigned By    Initials Name Effective Dates     Kinjal Cole OT 03/01/19 -           Occupational Therapy Education                 Title: PT OT SLP Therapies (In Progress)     Topic: Occupational Therapy (In Progress)     Point: ADL training (Done)     Description:   Instruct learner(s) on proper safety adaptation and remediation techniques during self care or transfers.   Instruct in proper use of assistive devices.              Learning Progress Summary           Patient  Acceptance, E, VU by  at 7/14/2020 0930                   Point: Home exercise program (Done)     Description:   Instruct learner(s) on appropriate technique for monitoring, assisting and/or progressing therapeutic exercises/activities.              Learning Progress Summary           Patient Acceptance, E, VU by  at 7/14/2020 0930                   Point: Precautions (Not Started)     Description:   Instruct learner(s) on prescribed precautions during self-care and functional transfers.              Learner Progress:   Not documented in this visit.          Point: Body mechanics (Not Started)     Description:   Instruct learner(s) on proper positioning and spine alignment during self-care, functional mobility activities and/or exercises.              Learner Progress:   Not documented in this visit.                      User Key     Initials Effective Dates Name Provider Type [facility] 03/01/19 -  Kinjal Cole OT Occupational Therapist OT                OT Recommendation and Plan  Outcome Summary/Treatment Plan (OT)  Anticipated Discharge Disposition (OT): home with OP services(OP PT or work-hardening program if available in his area.)  Therapy Frequency (OT Eval): evaluation only  Plan of Care Review  Plan of Care Reviewed With: patient  Plan of Care Reviewed With: patient  Outcome Summary: Pt is 63 y/o PT employed  who has had TIA. Had prior CVA late last year. Luive         Outcome Measures     Row Name 07/14/20 0900             Modified [place] Scale    Pre-Stroke Modified [place] Scale  0 - No Symptoms at all.  -      Modified [place] Scale  0 - No Symptoms at all.  -         Functional Assessment    Outcome Measure Options  Modified [place]  -        User Key  (r) = Recorded By, (t) = Taken By, (c) = Cosigned By    Initials Name Provider Type     Kinjal Cole, OT Occupational Therapist          Time Calculation:   Time Calculation- OT     Row Name 07/14/20 0988             Time  Calculation- OT    OT Start Time  0900  -      OT Stop Time  0920  -      OT Time Calculation (min)  20 min  -      Total Timed Code Minutes- OT  0 minute(s)  -      OT Received On  07/14/20  -        User Key  (r) = Recorded By, (t) = Taken By, (c) = Cosigned By    Initials Name Provider Type     Kinjal Cole OT Occupational Therapist        Therapy Suggested Charges     Code   Minutes Charges    None           Therapy Charges for Today     Code Description Service Date Service Provider Modifiers Qty    [id number]  OT EVAL MOD COMPLEXITY 3 7/14/2020 Kinjal Cole OT GO 1               OT Discharge Summary  Anticipated Discharge Disposition (OT): home with OP services(OP PT or work-hardening program if available in his area.)    Kinjal Cole OT  7/14/2020

## 2020-07-14 NOTE — PROGRESS NOTES
LOS: 0 days     Chief Complaint: Confusion, headache       SUBJECTIVE:  History taken from: patient chart RN    Interval History: No events overnight.    Patient Complaints: Patient states yesterday that he was unable to see his phone when he was calling his son, his son states that his speech was very garbled.  Other problems was that he was just very confused, this lasted only 15 to 30 minutes.     Patient feels strongly that if he missed any doses of Eliquis it was maybe 1 dose within the past month.  Overall other than his headache and his back pain, he does not have any focal weakness.  He does have some pain and tenderness in his right calf that started 2 days prior.  Headache is on the right side only, he is sensitive to light and pain level is 8/10. He does have history of migraines.    Patient states he does snore at night and does have daytime fatigue. No sleep study in the past.      Review of Systems   Constitutional: Negative.    Eyes: Positive for visual disturbance (Blurry vision).   Cardiovascular: Negative.    Neurological: Positive for speech difficulty and headaches. Negative for dizziness, tremors, seizures, syncope, facial asymmetry, weakness, light-headedness and numbness.   Psychiatric/Behavioral: Positive for confusion.        Pertinent PMH:  has a past medical history of Arthritis, BPH (benign prostatic hyperplasia), Chronic back pain, Diabetes mellitus (CMS/HCC), Hypertension, Low back pain, Low vision of right eye with normal vision in contralateral eye, Memory problem, Nonobstructive atherosclerosis of coronary artery, and PFO (patent foramen ovale) (11/29/2019).   ________________________________________________     OBJECTIVE:    On exam:  GENERAL: NAD  CARDIO: RRR  NEURO:  Oriented x3  EOMI, PERRL, no visual field deficits  Left facial asymmetry that resolves with smiling   Speech clear without dysarthria  Sensations intact and equal bilaterally  Strength 5/5 and equal in all  extremities, slightly weaker in RLE due to pain   No ataxia    ________________________________________________   RESULTS REVIEW    VITAL SIGNS:  Temp:  [97.4 °F (36.3 °C)-98.7 °F (37.1 °C)] 98.7 °F (37.1 °C)  Heart Rate:  [67-82] 67  Resp:  [13-17] 14  BP: (100-153)/(54-84) 115/72    LABS:   Lab Results   Component Value Date    WBC 6.90 07/14/2020    HGB 12.9 (L) 07/14/2020    HCT 37.1 (L) 07/14/2020    MCV 90.0 07/14/2020     (L) 07/14/2020     Lab Results   Component Value Date    GLUCOSE 118 (H) 07/14/2020    BUN  07/14/2020      Comment:      Testing performed by alternate method    BUN 21 07/14/2020    CREATININE 1.06 07/14/2020    EGFRIFNONA 70 07/14/2020    BCR  07/14/2020      Comment:      Testing not performed    K 4.4 07/14/2020    CO2 26.0 07/14/2020    CALCIUM 8.7 07/14/2020    ALBUMIN 3.60 07/14/2020    AST 11 07/14/2020    ALT 11 07/14/2020       Lab Results   Component Value Date    TSH 1.100 07/13/2020     (H) 07/14/2020    HGBA1C 8.1 (H) 07/13/2020    [id number] 556 07/13/2020         IMAGING STUDIES:  Ct Angiogram Head    Result Date: 7/13/2020   1. Atherosclerotic plaque in both carotid bifurcations. The degree of narrowing is less than 50% and not hemodynamically significant. 2. No evidence of vertebrobasilar stenosis. 3. No intracranial stenoses or occlusion. No evidence of aneurysm or AVM.  Electronically Signed By-Honorio Daly On:7/13/2020 4:37 PM This report was finalized on [id number] by  Honorio Daly, .    Ct Angiogram Neck    Result Date: 7/13/2020   1. Atherosclerotic plaque in both carotid bifurcations. The degree of narrowing is less than 50% and not hemodynamically significant. 2. No evidence of vertebrobasilar stenosis. 3. No intracranial stenoses or occlusion. No evidence of aneurysm or AVM.  Electronically Signed By-Honorio Daly On:7/13/2020 4:37 PM This report was finalized on [id number] by  Honorio Daly, .    Xr Chest 1 View    Result Date: 7/13/2020  Negative  portable chest  Electronically Signed By-Honorio Daly On:7/13/2020 3:24 PM This report was finalized on [id number] by  Honorio Daly, .    Ct Head Without Contrast Stroke Protocol    Result Date: 7/13/2020  No evidence of hemorrhage, mass effect or midline shift. No acute process identified.  The above findings were discussed with Brianne Carreon nurse practitioner at 3:06 PM on July 13, 2020  Electronically Signed By-Lilia Scruggs On:7/13/2020 3:07 PM This report was finalized on [id number] by  Lilia Scruggs, .      I reviewed the patient's new clinical results.    ________________________________________________      PROBLEM LIST:    Confusion and disorientation    Diabetic polyneuropathy associated with type 2 diabetes mellitus (CMS/HCC)    PFO (patent foramen ovale)        Assessment/Plan   ASSESSMENT/PLAN:  1.  Multifocal small acute or subacute infarcts involving the posterior left lentiform nucleus, high left parietal lobe, and periventricular right frontal lobe. These are embolic strokes, patient already on chronic Eliquis therapy.   - CT head: No evidence of hemorrhage, no acute process.  Multiple chronic infarcts seen.  - CTA head and neck: Atherosclerotic plaque in both carotid bifurcations, less than 50%.  No evidence of vertebrobasilar stenosis, no intracranial stenosis or occlusion.   - MRI brain: Multifocal small acute or subacute infarcts involving the posterior left lentiform nucleus, high left parietal lobe, and periventricular right frontal lobe.  - LILIAN (Nov 2019): Significant PFO, EF 60%.   - EKG: Sinus rhythm, rate 79, VA interval 177  - Labs: A1C: 8.1, B12: 556, LDL:  106, TSH: 1.100  - Eliquis failure- Continue Eliquis and ASA 81 mg for now. May need to switch to another blood thinner, will wait for cardiology's recommendations.   - Lipitor 80   - PT/OT/ST as appropriate, Neuro checks per protocol, DVT prophylaxis, Stroke education  - Consult cardiology for recommendations.     2. Headache,  ongoing  - Compazine and Bendryl IV x 1 now    3. Positive PFO  - Eliquis failure, cardiology consult for further recommendations     4. Right calf pain  - RLE ultrasound- r/o dvt, pt already on Eliquis     5.  Type 2 Diabetes Mellitus  - A1C: 8.1  - Strict glycemic control, SSI, diabetic diet, diabetes educator    6.  Hypertension, controlled  - Continue lisinopril  - BP goal 130/90, avoid hypotension    7. Probable sleep apnea  - STOP-BANG score of 4.   - High risk of SHASTA with score 3+  - Sleep study ASAP once discharged      8. History of embolic stroke   - Blood pressure should be less than 130/80 outpatient, HbA1c less than 6.5, LDL less than 70; b12>500 and smoking cessation if applicable. We would be grateful if the primary team / primary care physician would keep a close watch on the above targets.  - Stroke education  - Follow up with neurologist of choice       Will follow.         Dagmar Carreon, ANTONY  07/14/20  09:20

## 2020-07-15 ENCOUNTER — TELEPHONE (OUTPATIENT)
Dept: FAMILY MEDICINE CLINIC | Facility: CLINIC | Age: 65
End: 2020-07-15

## 2020-07-15 LAB
CRP SERPL-MCNC: 0.12 MG/DL (ref 0–0.5)
ERYTHROCYTE [SEDIMENTATION RATE] IN BLOOD: 15 MM/HR (ref 0–20)
GLUCOSE BLDC GLUCOMTR-MCNC: 131 MG/DL (ref 70–105)
GLUCOSE BLDC GLUCOMTR-MCNC: 132 MG/DL (ref 70–105)
GLUCOSE BLDC GLUCOMTR-MCNC: 276 MG/DL (ref 70–105)
GLUCOSE BLDC GLUCOMTR-MCNC: 88 MG/DL (ref 70–105)

## 2020-07-15 PROCEDURE — 85220 BLOOC CLOT FACTOR V TEST: CPT | Performed by: NURSE PRACTITIONER

## 2020-07-15 PROCEDURE — 92523 SPEECH SOUND LANG COMPREHEN: CPT

## 2020-07-15 PROCEDURE — 82962 GLUCOSE BLOOD TEST: CPT

## 2020-07-15 PROCEDURE — 63710000001 INSULIN GLARGINE PER 5 UNITS: Performed by: INTERNAL MEDICINE

## 2020-07-15 PROCEDURE — 83520 IMMUNOASSAY QUANT NOS NONAB: CPT | Performed by: NURSE PRACTITIONER

## 2020-07-15 PROCEDURE — 85300 ANTITHROMBIN III ACTIVITY: CPT | Performed by: NURSE PRACTITIONER

## 2020-07-15 PROCEDURE — 99232 SBSQ HOSP IP/OBS MODERATE 35: CPT | Performed by: INTERNAL MEDICINE

## 2020-07-15 PROCEDURE — 63710000001 INSULIN LISPRO (HUMAN) PER 5 UNITS: Performed by: INTERNAL MEDICINE

## 2020-07-15 PROCEDURE — 81240 F2 GENE: CPT | Performed by: NURSE PRACTITIONER

## 2020-07-15 PROCEDURE — 85306 CLOT INHIBIT PROT S FREE: CPT | Performed by: NURSE PRACTITIONER

## 2020-07-15 PROCEDURE — 85652 RBC SED RATE AUTOMATED: CPT | Performed by: NURSE PRACTITIONER

## 2020-07-15 PROCEDURE — 85303 CLOT INHIBIT PROT C ACTIVITY: CPT | Performed by: NURSE PRACTITIONER

## 2020-07-15 PROCEDURE — 86140 C-REACTIVE PROTEIN: CPT | Performed by: NURSE PRACTITIONER

## 2020-07-15 PROCEDURE — 86038 ANTINUCLEAR ANTIBODIES: CPT | Performed by: NURSE PRACTITIONER

## 2020-07-15 PROCEDURE — 99233 SBSQ HOSP IP/OBS HIGH 50: CPT | Performed by: NURSE PRACTITIONER

## 2020-07-15 PROCEDURE — 85305 CLOT INHIBIT PROT S TOTAL: CPT | Performed by: NURSE PRACTITIONER

## 2020-07-15 PROCEDURE — 99233 SBSQ HOSP IP/OBS HIGH 50: CPT | Performed by: INTERNAL MEDICINE

## 2020-07-15 PROCEDURE — 86147 CARDIOLIPIN ANTIBODY EA IG: CPT | Performed by: NURSE PRACTITIONER

## 2020-07-15 RX ORDER — BUTALBITAL, ACETAMINOPHEN AND CAFFEINE 50; 325; 40 MG/1; MG/1; MG/1
1 TABLET ORAL 2 TIMES DAILY PRN
Status: DISCONTINUED | OUTPATIENT
Start: 2020-07-15 | End: 2020-07-16 | Stop reason: HOSPADM

## 2020-07-15 RX ADMIN — BUTALBITAL, ACETAMINOPHEN AND CAFFEINE 1 TABLET: 50; 325; 40 TABLET ORAL at 21:37

## 2020-07-15 RX ADMIN — PREGABALIN 75 MG: 75 CAPSULE ORAL at 15:21

## 2020-07-15 RX ADMIN — APIXABAN 5 MG: 5 TABLET, FILM COATED ORAL at 21:28

## 2020-07-15 RX ADMIN — HYDROCODONE BITARTRATE AND ACETAMINOPHEN 1 TABLET: 10; 325 TABLET ORAL at 03:45

## 2020-07-15 RX ADMIN — PREGABALIN 75 MG: 75 CAPSULE ORAL at 21:28

## 2020-07-15 RX ADMIN — FLUOXETINE 40 MG: 20 CAPSULE ORAL at 19:38

## 2020-07-15 RX ADMIN — INSULIN LISPRO 5 UNITS: 100 INJECTION, SOLUTION INTRAVENOUS; SUBCUTANEOUS at 08:27

## 2020-07-15 RX ADMIN — LISINOPRIL 10 MG: 5 TABLET ORAL at 21:28

## 2020-07-15 RX ADMIN — INSULIN LISPRO 5 UNITS: 100 INJECTION, SOLUTION INTRAVENOUS; SUBCUTANEOUS at 19:38

## 2020-07-15 RX ADMIN — ASPIRIN 81 MG 81 MG: 81 TABLET ORAL at 21:28

## 2020-07-15 RX ADMIN — BUTALBITAL, ACETAMINOPHEN AND CAFFEINE 1 TABLET: 50; 325; 40 TABLET ORAL at 12:22

## 2020-07-15 RX ADMIN — ATORVASTATIN CALCIUM 80 MG: 40 TABLET, FILM COATED ORAL at 21:28

## 2020-07-15 RX ADMIN — Medication 10 ML: at 08:28

## 2020-07-15 RX ADMIN — PREGABALIN 75 MG: 75 CAPSULE ORAL at 08:27

## 2020-07-15 RX ADMIN — HYDROCODONE BITARTRATE AND ACETAMINOPHEN 1 TABLET: 10; 325 TABLET ORAL at 19:40

## 2020-07-15 RX ADMIN — INSULIN GLARGINE 20 UNITS: 100 INJECTION, SOLUTION SUBCUTANEOUS at 21:24

## 2020-07-15 RX ADMIN — Medication 10 ML: at 21:29

## 2020-07-15 RX ADMIN — APIXABAN 5 MG: 5 TABLET, FILM COATED ORAL at 08:27

## 2020-07-15 RX ADMIN — HYDROCODONE BITARTRATE AND ACETAMINOPHEN 1 TABLET: 10; 325 TABLET ORAL at 08:28

## 2020-07-15 RX ADMIN — HYDROCODONE BITARTRATE AND ACETAMINOPHEN 1 TABLET: 10; 325 TABLET ORAL at 15:21

## 2020-07-15 NOTE — PROGRESS NOTES
Continued Stay Note  [facility]     Patient Name: Humble Green  MRN: [id number]  Today's Date: 7/15/2020    Admit Date: 7/13/2020     Discharge Plan        Patient needing OP Speech Therapy and is going to [facility] for OP PT. They do not do speech. Spoke to patient and he is willing to go there for PT and come here for speech. Called speech therapy and they will call patient to schedule. Order placed.    Linda Arteaga RN, CM  Office Phone [phone]  Cell [phone]

## 2020-07-15 NOTE — PROGRESS NOTES
"      [facility] Medicine Services Daily Progress Note      Hospitalist Team  LOS 1 days      Patient Care Team:  Leda Duckworth DO as PCP - General (Family Medicine)  Leda Duckworth DO as Referring Physician (Family Medicine)    Patient Location: 261/1      Subjective   Subjective     Chief Complaint / Subjective  Chief Complaint   Patient presents with   • Altered Mental Status     Mental status continues to improve but he is having a slight headache int he temporal region    Brief Synopsis of Hospital Course/HPI  64-year-old male who presented to the ER after having acute onset of confusion with a mild headache that started today.  It started around 1330 today at work.  He noticed he was more confused and he called his son who noted that he was having trouble communicating with the patient so they became concerned and decided to come to the ER for evaluation.  In the ER code stroke was called and stroke work-up was initiated.  The patient denied any visual changes no focal deficits had no numbness or tingling.  Shortly after arriving to the ER symptoms started to improve.  Admission was requested after code stroke was work-up done.        Review of Systems   Neurological: Negative for numbness and paresthesias.         Objective   Objective      Vital Signs  Temp:  [97.8 °F (36.6 °C)-98.4 °F (36.9 °C)] 97.8 °F (36.6 °C)  Heart Rate:  [68-83] 69  Resp:  [13-14] 14  BP: (106-144)/(62-75) 112/68  Oxygen Therapy  SpO2: 97 %  Pulse Oximetry Type: Continuous  Device (Oxygen Therapy): room air  Device (Oxygen Therapy): room air  Flowsheet Rows      First Filed Value   Admission Height  172.7 cm (68\") Documented at 07/13/2020 1442   Admission Weight  114 kg (251 lb 5.2 oz) Documented at 07/13/2020 1442        Intake & Output (last 3 days)       07/12 0701 - 07/13 0700 07/13 0701 - 07/14 0700 07/14 0701 - 07/15 0700 07/15 0701 - 07/16 0700    P.O.   650 240    I.V. (mL/kg)  501.3 (4.4)      Total " Intake(mL/kg)  501.3 (4.4) 650 (5.7) 240 (2.1)    Net  +501.3 +650 +240            Urine Unmeasured Occurrence  3 x          Lines, Drains & Airways    Active LDAs     Name:   Placement date:   Placement time:   Site:   Days:    Peripheral IV 07/13/20 1559 Right Antecubital   07/13/20    1559    Antecubital   1    Peripheral IV 07/13/20 1450 Right Antecubital   07/13/20    1450    Antecubital   1                  Physical Exam:    Physical Exam   Nursing note and vitals reviewed.      Gen: NAD  HEENT: EOMI, no icterus, PERRL  Neck: No JVD  Heart: RRR, no murmur  Lung: CTA b/l, adequate air movement  ABD: soft, NT, ND, no rebound or guarding  MSK: moves ext spontaneously  Neuro: AO x 3, CN II-XII intact  Psych: no anxiety, no depression  Skin: warm, dry, intact  Extremities:  No edema      Procedures:              Results Review:     I reviewed the patient's new clinical results.      Lab Results (last 24 hours)     Procedure Component Value Units Date/Time    POC Glucose Once [[id number]]  (Normal) Collected:  07/15/20 1132    Specimen:  Blood Updated:  07/15/20 1140     Glucose 88 mg/dL      Comment: Serial Number: [id number]:  [id number]       POC Glucose Once [[id number]]  (Abnormal) Collected:  07/15/20 0722    Specimen:  Blood Updated:  07/15/20 0724     Glucose 131 mg/dL      Comment: Serial Number: [id number]:  [id number]       POC Glucose Once [[id number]]  (Abnormal) Collected:  07/14/20 2054    Specimen:  Blood Updated:  07/14/20 2055     Glucose 191 mg/dL      Comment: Serial Number: [id number]:  [id number]       POC Glucose Once [[id number]]  (Abnormal) Collected:  07/14/20 1657    Specimen:  Blood Updated:  07/14/20 1702     Glucose 205 mg/dL      Comment: Serial Number: [id number]:  [id number]           Hemoglobin A1C   Date Value Ref Range Status   07/13/2020 8.1 (H) 3.5 - 5.6 % Final     Results from last 7 days   Lab Units 07/13/20  1456   INR  0.93           No results found  for: LIPASE  Lab Results   Component Value Date    CHOL 194 07/14/2020    TRIG 191 (H) 07/14/2020    HDL 50 07/14/2020     (H) 07/14/2020       No results found for: INTRAOP, PREDX, FINALDX, COMDX    Microbiology Results (last 10 days)     ** No results found for the last 240 hours. **          ECG/EMG Results (most recent)     Procedure Component Value Units Date/Time    ECG 12 Lead [[id number]] Collected:  07/13/20 1519     Updated:  07/14/20 1624    Narrative:       HEART RATE= 79  bpm  RR Interval= 760  ms  HI Interval= 177  ms  P Horizontal Axis= 10  deg  P Front Axis= 68  deg  QRSD Interval= 91  ms  QT Interval= 403  ms  QRS Axis= 12  deg  T Wave Axis= 51  deg  - OTHERWISE NORMAL ECG -  Sinus rhythm  Low voltage, precordial leads  When compared with ECG of 13-Dec-2019 13:36:02,  No significant change  Electronically Signed By: Honorio Perez (GATO) 14-Jul-2020 16:21:27  Date and Time of Study: 2020-07-13 15:19:59          Results for orders placed during the hospital encounter of 07/13/20   Duplex Venous Lower Extremity - Right CAR    Narrative · Normal right lower extremity venous duplex scan.          Results for orders placed during the hospital encounter of 11/25/19   Adult Transesophageal Echo (LILIAN) W/ Cont if Necessary Per Protocol    Narrative · Left ventricular systolic function is normal.  · Left atrial cavity size is mildly dilated.  · Mild mitral valve regurgitation is present  · Mild tricuspid valve regurgitation is present.  · LV ejection fraction is about 60%  · Significant patent foramen ovale noted by bubble contrast study  · No pericardial effusion noted  · Aorta has minimal atherosclerosis  · Technically difficult study          Ct Angiogram Head    Result Date: 7/13/2020   1. Atherosclerotic plaque in both carotid bifurcations. The degree of narrowing is less than 50% and not hemodynamically significant. 2. No evidence of vertebrobasilar stenosis. 3. No intracranial stenoses or occlusion.  No evidence of aneurysm or AVM.  Electronically Signed By-Honorio Daly On:7/13/2020 4:37 PM This report was finalized on [id number] by  Honorio Daly, .    Ct Angiogram Neck    Result Date: 7/13/2020   1. Atherosclerotic plaque in both carotid bifurcations. The degree of narrowing is less than 50% and not hemodynamically significant. 2. No evidence of vertebrobasilar stenosis. 3. No intracranial stenoses or occlusion. No evidence of aneurysm or AVM.  Electronically Signed ByNik Daly On:7/13/2020 4:37 PM This report was finalized on [id number] by  Honorio Daly, .    Mri Brain Without Contrast    Result Date: 7/14/2020   IMPRESSION:  1. Multifocal small acute or subacute infarcts involving the posterior left lentiform nucleus, high left parietal lobe, and periventricular right frontal lobe. The findings could indicate features of shower emboli phenomenon. No hemorrhagic transformation. 2. Features of mild to moderate chronic microvascular disease.   Electronically Signed By-Dr. Charla Nelson MD On:7/14/2020 1:44 PM This report was finalized on [id number] by Dr. Charla Nelson MD.    Xr Chest 1 View    Result Date: 7/13/2020  Negative portable chest  Electronically Signed ByNik Daly On:7/13/2020 3:24 PM This report was finalized on [id number] by  Honorio Daly, .    Ct Head Without Contrast Stroke Protocol    Result Date: 7/13/2020  No evidence of hemorrhage, mass effect or midline shift. No acute process identified.  The above findings were discussed with Brianne Carreon nurse practitioner at 3:06 PM on July 13, 2020  Electronically Signed ByRosas Scruggs On:7/13/2020 3:07 PM This report was finalized on [id number] by  Lilia Scruggs, .    Xr Spine Lumbar Complete 4+vw    Result Date: 7/8/2020  Chronic multilevel degenerative disc and facet disease.  Electronically Signed ByNik Daly On:7/8/2020 2:52 PM This report was finalized on [id number] by  Honorio Daly .          Xrays, labs reviewed personally by  physician.    Medication Review:   I have reviewed the patient's current medication list      Scheduled Meds    apixaban 5 mg Oral Q12H   aspirin 81 mg Oral Nightly   atorvastatin 80 mg Oral Nightly   FLUoxetine 40 mg Oral Q PM   insulin glargine 20 Units Subcutaneous Nightly   insulin lispro 0-9 Units Subcutaneous TID AC   insulin lispro 5 Units Subcutaneous TID With Meals   lisinopril 10 mg Oral Nightly   pregabalin 75 mg Oral TID   sodium chloride 10 mL Intravenous Q12H       Meds Infusions       Meds PRN  •  acetaminophen **OR** acetaminophen  •  aluminum-magnesium hydroxide-simethicone  •  baclofen  •  bisacodyl  •  butalbital-acetaminophen-caffeine  •  dextrose  •  dextrose  •  glucagon (human recombinant)  •  HYDROcodone-acetaminophen  •  insulin lispro **AND** insulin lispro  •  ondansetron  •  sodium chloride  •  sodium chloride        Assessment/Plan   Assessment/Plan     Active Hospital Problems    Diagnosis  POA   • **Cerebrovascular accident (CVA) due to bilateral embolism of middle cerebral arteries (CMS/HCC) [I63.413]  Clinically Undetermined   • Confusion and disorientation [R41.0]  Yes   • PFO (patent foramen ovale) [Q21.1]  Not Applicable   • Diabetic polyneuropathy associated with type 2 diabetes mellitus (CMS/HCC) [E11.42]  Yes      Resolved Hospital Problems   No resolved problems to display.       MEDICAL DECISION MAKING COMPLEXITY BY PROBLEM:     Multiple subacute to acutestrokes of posterior left lentiform nucleus, high left parietal lobe, and periventricular right frontal lobe  - per neuro mixture of small vessel disease and one small embolic type  - MRI brain: Multifocal small acute or subacute infarcts involving the posterior left lentiform nucleus, high left parietal lobe, and periventricular right frontal lobe.  - CT head: No evidence of hemorrhage, no acute process.  Multiple chronic infarcts seen.  - CTA head and neck: Preliminary negative, awaiting official read .   - Continue  Eliquis, per cardiology they suspect some degree of non-compliance and thus cannot confirm if true eliquis failure  - d/w neurology today, hypercoaguable workup sent  - may need to consider CT C/A/P pending ESR and CRP after d/w neuro     ANT  -resolved  -stop IVF     PFO  -chronic an on eliquis   -LILIAN (Nov 2019): Significant PFO, EF 60%.   -consulted cardiology, see above     Chronic migraine disorder  -checking ESR and CRP today    Type II DM  -c/w home basal and pre-meal insulin  -add SSI and adjust prn  -hold home po agents      HTN  -chronic   -c/w lisinopril     DVT ppx-on eliquis       VTE Prophylaxis -   Mechanical Order History:      Ordered        07/13/20 1834  Place Sequential Compression Device  Once         07/13/20 1834  Maintain Sequential Compression Device  Continuous                 Pharmalogical Order History:     Ordered     Dose Route Frequency Stop    07/13/20 1834  apixaban (ELIQUIS) tablet 5 mg      5 mg PO Every 12 Hours Scheduled --            Code Status -   Code Status and Medical Interventions:   Ordered at: 07/13/20 1702     Code Status:    CPR     Medical Interventions (Level of Support Prior to Arrest):    Full       This patient has been examined wearing appropriate Personal Protective Equipment . 07/15/20        Discharge Planning    Possibly 24 hours depending on workup       Destination      Coordination has not been started for this encounter.      Durable Medical Equipment      Coordination has not been started for this encounter.      Dialysis/Infusion      Coordination has not been started for this encounter.      Home Medical Care      Coordination has not been started for this encounter.      Therapy      Coordination has not been started for this encounter.      Community Resources      Coordination has not been started for this encounter.            Electronically signed by Kevyn Vogel DO, 07/15/20, 14:27.  [facility] Hospitalist Team

## 2020-07-15 NOTE — THERAPY EVALUATION
Acute Care - Speech Language Pathology Initial Evaluation   Agustín     Patient Name: Humble Green  : 1955  MRN: [id number]  Today's Date: 7/15/2020               Admit Date: 2020     Visit Dx:    ICD-10-CM ICD-9-CM   1. TIA (transient ischemic attack) G45.9 435.9   2. Transient confusion R41.0 298.9   3. Cerebrovascular accident (CVA) due to bilateral embolism of middle cerebral arteries (CMS/HCC) I63.413 434.11     Patient Active Problem List   Diagnosis   • Mixed hyperlipidemia   • Major depressive disorder, recurrent episode, moderate degree (CMS/HCC)   • Diabetic polyneuropathy associated with type 2 diabetes mellitus (CMS/HCC)   • Intractable migraine without aura and without status migrainosus   • PFO (patent foramen ovale)   • Hypertension   • Nonobstructive atherosclerosis of coronary artery   • Diabetes mellitus (CMS/HCC)   • Confusion and disorientation   • Cerebrovascular accident (CVA) due to bilateral embolism of middle cerebral arteries (CMS/HCC)     Past Medical History:   Diagnosis Date   • Arthritis    • BPH (benign prostatic hyperplasia)    • Chronic back pain    • Diabetes mellitus (CMS/HCC)    • Hypertension    • Low back pain    • Low vision of right eye with normal vision in contralateral eye    • Memory problem    • Nonobstructive atherosclerosis of coronary artery    • PFO (patent foramen ovale) 2019     Past Surgical History:   Procedure Laterality Date   • ARM TENDON REPAIR Left    • CHOLECYSTECTOMY     • COLONOSCOPY       = TA, rech    • EYE SURGERY      Rt Eye Sx after trauma @ 2y/o   • JOINT REPLACEMENT      Left TKR   • ROTATOR CUFF REPAIR Bilateral    • UMBILICAL HERNIA REPAIR          SLP EVALUATION (last 72 hours)      SLP SLC Evaluation     Row Name 07/15/20 1000       Communication Assessment/Intervention    Document Type  evaluation  -EC    Subjective Information  complains of headache, lethargy, mild speech difficulties  -EC    Patient Observations   alert;cooperative;agree to therapy;lethargic  -EC    Patient Effort  excellent  -EC    Symptoms Noted During/After Treatment  none  -EC       General Information    Patient Profile Reviewed  yes  -EC    Pertinent History Of Current Problem  64-year-old male who presented to the ER after having acute onset of confusion with a mild headache that started today.  It started around 1330 today at work.  He noticed he was more confused and he called his son who noted that he was having trouble communicating with the patient so they became concerned and decided to come to the ER for evaluation.  In the ER code stroke was called and stroke work-up was initiated.  The patient denied any visual changes no focal deficits had no numbness or tingling.  Shortly after arriving to the ER symptoms started to improve.  Admission was requested after code stroke was work-up done. Pt's MRI results include: Multifocal small acute or subacute infarcts involving the posterior left lentiform nucleus, high left parietal lobe, and periventricular right frontal lobe. The findings could indicate features of shower emboli phenomenon. No hemorrhagic transformation.   -EC    Precautions/Limitations, Vision  corrective lenses needed for reading pt does not have glasses at eval  -EC    Precautions/Limitations, Hearing  WFL  -EC    Patient Level of Education  10th grade  -EC    Prior Level of Function-Communication  WFL  -EC    Plans/Goals Discussed with  patient;family pt's son  -EC    Barriers to Rehab  none identified  -EC    Standardized Assessment Used  SLUMS  -EC       Expression Assessment/Intervention    Expression Assessment/Intervention  verbal expression  -EC       Verbal Expression Assessment/Intervention    Verbal Expression  WFL  -EC    Automatic Speech (Communication)  WFL  -EC    Repetition  words;WFL  -EC    Responsive Naming  WFL  -EC    Confrontational Naming  WFL  -EC    Sentence Formulation  WFL  -EC    Conversational  Discourse/Fluency  WFL  -EC    Verbal Expression, Comment  No aphasia, apraxia or dysarthria is demonstrated at evaluation. Pt is 100% intelligible at the conversational level and is appropriate to conversation.  -EC       Motor Speech Assessment/Intervention    Motor Speech Function  WFL  -EC       Cognitive Assessment Intervention- SLP    Cognitive Function (Cognition)  mild impairment  -EC    Orientation Status (Cognition)  WFL  -EC    Memory (Cognitive)  immediate;delayed;mental manipulation;mild impairment  -EC    Functional Math (Cognitive)  money calculation;mild impairment  -EC       Standardized Tests    Cognitive/Memory Tests  SLUMS: [facility] Mental Status Examination  -EC       SLUMS: [facility] Mental Status Examination    SLUMS Score  22  -EC    SLUMS Range  20-24: Mild Neurocognitive Disorder (Less than High school education)  -EC    SLUMS Comments  Pt is WFL for orientation, clock drawing, following simple written and verbal directions. Pt presents w/mild cognitive deficits in areas of memory and problem solving. Pt recalls 4/5 objects w/a delay, answers 3/4 simple questions immediately following short story, solves simple problems re: math w/50% accuracy and is unable to complete memory and mental manipulation task at 3 and 4 digit level.    -EC       SLP Clinical Impressions    SLP Diagnosis  Mild neurocognitive disorder  -EC    Rehab Potential/Prognosis  good  -EC    SLC Criteria for Skilled Therapy Interventions Met  yes  -EC       Recommendations    Anticipated Dischage Disposition (SLP)  home with assist;home with OP services  -EC       Communication Treatment Objective and Progress Goals (SLP)    Cognitive Linguistic Treatment Objectives  Cognitive Linguistic Treatment Objectives (Group)  -EC       Cognitive Linguistic Treatment Objectives    Memory Skills Selection  memory skills, SLP goal 1  -EC    Functional Math Skills Selection  functional math skills, SLP goal 1  -EC        Memory Skills Goal 1 (SLP)    Improve Memory Skills Through Goal 1 (SLP)  recalling related word lists with an imposed delay;select a word from a list by exclusion;listen to a paragraph and answer questions;use memory strategies;with minimal cues (75-90%)  -EC    Time Frame (Memory Skills Goal 1, SLP)  by discharge  -EC       Functional Math Skills Goal 1 (SLP)    Improve Functional Math Skills Through Goal 1 (SLP)  complete simple math problems;complete moderately complex math problems;complete word problems involving time;complete word problems involving money;with minimal cues (75-90%)  -EC    Time Frame (Functional Math Skills Goal 1, SLP)  by discharge  -EC      User Key  (r) = Recorded By, (t) = Taken By, (c) = Cosigned By    Initials Name Effective Dates    EC Nidia Maria 03/01/19 -          Patient was not wearing a face mask during this therapy encounter. Therapist used appropriate personal protective equipment including mask, eye protection and gloves.  Mask used was standard procedure mask. Appropriate PPE was worn during the entire therapy session. Hand hygiene was completed before and after therapy session. Patient is not in enhanced droplet precautions.         EDUCATION  The patient has been educated in the following areas:     Cognitive Impairment.    SLP Recommendation and Plan  SLP Diagnosis: Mild neurocognitive disorder     SLC Criteria for Skilled Therapy Interventions Met: yes  Anticipated Dischage Disposition (SLP): home with assist, home with OP services                 SLP GOALS     Row Name 07/15/20 1000             Memory Skills Goal 1 (SLP)    Improve Memory Skills Through Goal 1 (SLP)  recalling related word lists with an imposed delay;select a word from a list by exclusion;listen to a paragraph and answer questions;use memory strategies;with minimal cues (75-90%)  -EC      Time Frame (Memory Skills Goal 1, SLP)  by discharge  -EC         Functional Math Skills Goal 1 (SLP)     Improve Functional Math Skills Through Goal 1 (SLP)  complete simple math problems;complete moderately complex math problems;complete word problems involving time;complete word problems involving money;with minimal cues (75-90%)  -EC      Time Frame (Functional Math Skills Goal 1, SLP)  by discharge  -EC        User Key  (r) = Recorded By, (t) = Taken By, (c) = Cosigned By    Initials Name Provider Type    EC Nidia Maria Speech and Language Pathologist                  Time Calculation:                        Nidia Maria  7/15/2020

## 2020-07-15 NOTE — PAYOR COMM NOTE
"CLINICAL UPDATE  SENT ORIGINAL REQUEST AND PA FORM 7/14/2020  SENDING CARDIOLOGY AND NEURO NOTES     UTILIZATION REVIEW  RETURN CONTACT:  MINOR UREÑA RN [facility]  PH: [phone]  FAX: [phone]  [facility]  NPI# [id number]  TAX ID # [id number]    Elizabeth Green (64 y.o. Male)     Date of Birth Social Security Number Address Home Phone MRN    1955  [address]  [place] [address] [phone] [id number]    [employer] Marital Status          None Single       Admission Date Admission Type Admitting Provider Attending Provider Department, Room/Bed    7/13/20 Emergency Kevyn Vogel DO Riddle, Lee M, DO [facility] NEURO HEART, 261/1    Discharge Date Discharge Disposition Discharge Destination                       Attending Provider:  Kevyn Vogel DO    Allergies:  No Known Allergies    Isolation:  None   Infection:  None   Code Status:  CPR    Ht:  172.7 cm (68\")   Wt:  115 kg (253 lb 8.5 oz)    Admission Cmt:  None   Principal Problem:  Cerebrovascular accident (CVA) due to bilateral embolism of middle cerebral arteries (CMS/LTAC, located within [facility]) [I63.413]                 Active Insurance as of 7/13/2020     Primary Coverage     Payor Plan Insurance Group Employer/Plan Group    [facility] -[place] [employer] - [facility]      Payor Plan Address Payor Plan Phone Number Payor Plan Fax Number Effective Dates    [address]   7/1/2020 - None Entered    [facility] [phone]       Subscriber Name Subscriber Birth Date Member ID       ELIZABETH GREEN 1955 [id number]                 Emergency Contacts      (Rel.) Home Phone Work Phone Mobile Phone    LUBA GREEN (Son) -- -- [phone]    LEANDRO GREEN (Daughter) -- -- [phone]               Physician Progress Notes (last 24 hours) (Notes from 07/14/20 1043 through 07/15/20 1043)      Kevyn Vogel DO at 07/14/20 0119                [facility] Medicine Services Daily Progress " "Note      Hospitalist Team  LOS 0 days      Patient Care Team:  Leda Duckworth DO as PCP - General (Family Medicine)  Leda Duckworth DO as Referring Physician (Family Medicine)    Patient Location: 261/1      Subjective   Subjective     Chief Complaint / Subjective  Chief Complaint   Patient presents with   • Altered Mental Status     Mental status better and reports he seems back to his baseline. Has a slight headache. No other new focal deficits.     Brief Synopsis of Hospital Course/HPI  64-year-old male who presented to the ER after having acute onset of confusion with a mild headache that started today.  It started around 1330 today at work.  He noticed he was more confused and he called his son who noted that he was having trouble communicating with the patient so they became concerned and decided to come to the ER for evaluation.  In the ER code stroke was called and stroke work-up was initiated.  The patient denied any visual changes no focal deficits had no numbness or tingling.  Shortly after arriving to the ER symptoms started to improve.  Admission was requested after code stroke was work-up done.        Review of Systems   Neurological: Negative for numbness and paresthesias.         Objective   Objective      Vital Signs  Temp:  [97.4 °F (36.3 °C)-98.7 °F (37.1 °C)] 98.3 °F (36.8 °C)  Heart Rate:  [67-82] 82  Resp:  [13-19] 19  BP: (100-153)/(54-84) 139/66  Oxygen Therapy  SpO2: 98 %  Pulse Oximetry Type: Intermittent  Device (Oxygen Therapy): room air  Device (Oxygen Therapy): room air  Flowsheet Rows      First Filed Value   Admission Height  172.7 cm (68\") Documented at 07/13/2020 1442   Admission Weight  114 kg (251 lb 5.2 oz) Documented at 07/13/2020 1442        Intake & Output (last 3 days)       07/11 0701 - 07/12 0700 07/12 0701 - 07/13 0700 07/13 0701 - 07/14 0700 07/14 0701 - 07/15 0700    I.V. (mL/kg)   501.3 (4.4)     Total Intake(mL/kg)   501.3 (4.4)     Net   +501.3             Urine " Unmeasured Occurrence   3 x         Lines, Drains & Airways    Active LDAs     Name:   Placement date:   Placement time:   Site:   Days:    Peripheral IV 07/13/20 1559 Right Antecubital   07/13/20    1559    Antecubital   1    Peripheral IV 07/13/20 1450 Right Antecubital   07/13/20    1450    Antecubital   1                  Physical Exam:    Physical Exam   Nursing note and vitals reviewed.      Gen: NAD  HEENT: EOMI, no icterus, PERRL  Neck: No JVD  Heart: RRR, no murmur  Lung: CTA b/l, adequate air movement  ABD: soft, NT, ND, no rebound or guarding  MSK: moves ext spontaneously  Neuro: AO x 3, CN II-XII intact  Psych: no anxiety, no depression  Skin: warm, dry, intact  Extremities:  No edema      Procedures:              Results Review:     I reviewed the patient's new clinical results.      Lab Results (last 24 hours)     Procedure Component Value Units Date/Time    POC Glucose Once [[id number]]  (Abnormal) Collected:  07/14/20 1054    Specimen:  Blood Updated:  07/14/20 1055     Glucose 129 mg/dL      Comment: Serial Number: [id number]:  [id number]       BUN [[id number]]  (Normal) Collected:  07/14/20 0358    Specimen:  Blood Updated:  07/14/20 0742     BUN 21 mg/dL     POC Glucose Once [[id number]]  (Abnormal) Collected:  07/14/20 0731    Specimen:  Blood Updated:  07/14/20 0734     Glucose 117 mg/dL      Comment: Serial Number: [id number]:  [id number]       Lipid Panel [[id number]]  (Abnormal) Collected:  07/14/20 0358    Specimen:  Blood Updated:  07/14/20 0503     Total Cholesterol 194 mg/dL      Triglycerides 191 mg/dL      HDL Cholesterol 50 mg/dL      LDL Cholesterol  106 mg/dL      VLDL Cholesterol 38.2 mg/dL      LDL/HDL Ratio 2.12    Narrative:       Cholesterol Reference Ranges  (U.S. Department of Health and Human Services ATP III Classifications)    Desirable          <200 mg/dL  Borderline High    200-239 mg/dL  High Risk          >240 mg/dL      Triglyceride Reference Ranges  (U.S.  Department of Health and Human Services ATP III Classifications)    Normal           <150 mg/dL  Borderline High  150-199 mg/dL  High             200-499 mg/dL  Very High        >500 mg/dL    HDL Reference Ranges  (U.S. Department of Health and Human Services ATP III Classifcations)    Low     <40 mg/dl (major risk factor for CHD)  High    >60 mg/dl ('negative' risk factor for CHD)        LDL Reference Ranges  (U.S. Department of Health and Human Services ATP III Classifcations)    Optimal          <100 mg/dL  Near Optimal     100-129 mg/dL  Borderline High  130-159 mg/dL  High             160-189 mg/dL  Very High        >189 mg/dL    Comprehensive Metabolic Panel [[id number]]  (Abnormal) Collected:  07/14/20 0358    Specimen:  Blood Updated:  07/14/20 0503     Glucose 118 mg/dL      BUN --     Comment: Testing performed by alternate method        Creatinine 1.06 mg/dL      Sodium 140 mmol/L      Potassium 4.4 mmol/L      Chloride 103 mmol/L      CO2 26.0 mmol/L      Calcium 8.7 mg/dL      Total Protein 5.7 g/dL      Albumin 3.60 g/dL      ALT (SGPT) 11 U/L      AST (SGOT) 11 U/L      Alkaline Phosphatase 52 U/L      Total Bilirubin 0.4 mg/dL      eGFR Non African Amer 70 mL/min/1.73      Globulin 2.1 gm/dL      A/G Ratio 1.7 g/dL      BUN/Creatinine Ratio --     Comment: Testing not performed        Anion Gap 11.0 mmol/L     Narrative:       GFR Normal >60  Chronic Kidney Disease <60  Kidney Failure <15      CBC (No Diff) [[id number]]  (Abnormal) Collected:  07/14/20 0358    Specimen:  Blood Updated:  07/14/20 0435     WBC 6.90 10*3/mm3      RBC 4.13 10*6/mm3      Hemoglobin 12.9 g/dL      Hematocrit 37.1 %      MCV 90.0 fL      MCH 31.3 pg      MCHC 34.7 g/dL      RDW 14.3 %      RDW-SD 46.4 fl      MPV 11.7 fL      Platelets 133 10*3/mm3     POC Glucose Once [[id number]]  (Abnormal) Collected:  07/14/20 0400    Specimen:  Blood Updated:  07/14/20 0401     Glucose 113 mg/dL      Comment: Serial Number:  [id number]:  [id number]       Vitamin B12 [[id number]]  (Normal) Collected:  07/13/20 1456    Specimen:  Blood from Arm, Right Updated:  07/13/20 2028     Vitamin B-12 556 pg/mL     Narrative:       Results may be falsely increased if patient taking Biotin.      POC Glucose Once [[id number]]  (Abnormal) Collected:  07/13/20 2024    Specimen:  Blood Updated:  07/13/20 2024     Glucose 132 mg/dL      Comment: Serial Number: [id number]:  [id number]       POC Glucose Once [[id number]]  (Normal) Collected:  07/13/20 1832    Specimen:  Blood Updated:  07/13/20 1833     Glucose 105 mg/dL      Comment: Serial Number: [id number]:  [id number]       BUN [[id number]]  (Abnormal) Collected:  07/13/20 1456    Specimen:  Blood from Arm, Right Updated:  07/13/20 1630     BUN 26 mg/dL     TSH [[id number]]  (Normal) Collected:  07/13/20 1456    Specimen:  Blood from Arm, Right Updated:  07/13/20 1621     TSH 1.100 uIU/mL         Hemoglobin A1C   Date Value Ref Range Status   07/13/2020 8.1 (H) 3.5 - 5.6 % Final     Results from last 7 days   Lab Units 07/13/20  1456   INR  0.93           No results found for: LIPASE  Lab Results   Component Value Date    CHOL 194 07/14/2020    TRIG 191 (H) 07/14/2020    HDL 50 07/14/2020     (H) 07/14/2020       No results found for: INTRAOP, PREDX, FINALDX, COMDX    Microbiology Results (last 10 days)     ** No results found for the last 240 hours. **          ECG/EMG Results (most recent)     Procedure Component Value Units Date/Time    ECG 12 Lead [[id number]] Collected:  07/13/20 1519     Updated:  07/13/20 1522    Narrative:       HEART RATE= 79  bpm  RR Interval= 760  ms  MN Interval= 177  ms  P Horizontal Axis= 10  deg  P Front Axis= 68  deg  QRSD Interval= 91  ms  QT Interval= 403  ms  QRS Axis= 12  deg  T Wave Axis= 51  deg  - OTHERWISE NORMAL ECG -  Sinus rhythm  Low voltage, precordial leads  When compared with ECG of 13-Dec-2019 13:36:02,  No significant  change  Electronically Signed By:   Date and Time of Study: 2020-07-13 15:19:59          Results for orders placed during the hospital encounter of 07/13/20   Duplex Venous Lower Extremity - Right CAR    Narrative · Normal right lower extremity venous duplex scan.          Results for orders placed during the hospital encounter of 11/25/19   Adult Transesophageal Echo (LILIAN) W/ Cont if Necessary Per Protocol    Narrative · Left ventricular systolic function is normal.  · Left atrial cavity size is mildly dilated.  · Mild mitral valve regurgitation is present  · Mild tricuspid valve regurgitation is present.  · LV ejection fraction is about 60%  · Significant patent foramen ovale noted by bubble contrast study  · No pericardial effusion noted  · Aorta has minimal atherosclerosis  · Technically difficult study          Ct Angiogram Head    Result Date: 7/13/2020   1. Atherosclerotic plaque in both carotid bifurcations. The degree of narrowing is less than 50% and not hemodynamically significant. 2. No evidence of vertebrobasilar stenosis. 3. No intracranial stenoses or occlusion. No evidence of aneurysm or AVM.  Electronically Signed ByNik Daly On:7/13/2020 4:37 PM This report was finalized on [id number] by  Honorio Daly .    Ct Angiogram Neck    Result Date: 7/13/2020   1. Atherosclerotic plaque in both carotid bifurcations. The degree of narrowing is less than 50% and not hemodynamically significant. 2. No evidence of vertebrobasilar stenosis. 3. No intracranial stenoses or occlusion. No evidence of aneurysm or AVM.  Electronically Signed ByNik Daly On:7/13/2020 4:37 PM This report was finalized on [id number] by  Mikal Pedraza    Mri Brain Without Contrast    Result Date: 7/14/2020   IMPRESSION:  1. Multifocal small acute or subacute infarcts involving the posterior left lentiform nucleus, high left parietal lobe, and periventricular right frontal lobe. The findings could indicate features of shower  emboli phenomenon. No hemorrhagic transformation. 2. Features of mild to moderate chronic microvascular disease.   Electronically Signed By-Dr. Charla Nelson MD On:7/14/2020 1:44 PM This report was finalized on [id number] by Dr. Charla Nelson MD.    Xr Chest 1 View    Result Date: 7/13/2020  Negative portable chest  Electronically Signed By-Honorio Daly On:7/13/2020 3:24 PM This report was finalized on [id number] by  Honorio Daly, .    Ct Head Without Contrast Stroke Protocol    Result Date: 7/13/2020  No evidence of hemorrhage, mass effect or midline shift. No acute process identified.  The above findings were discussed with Brianne Carreon nurse practitioner at 3:06 PM on July 13, 2020  Electronically Signed By-Lilia Scruggs On:7/13/2020 3:07 PM This report was finalized on [id number] by  Lilia Scruggs, .    Xr Spine Lumbar Complete 4+vw    Result Date: 7/8/2020  Chronic multilevel degenerative disc and facet disease.  Electronically Signed By-Honorio Daly On:7/8/2020 2:52 PM This report was finalized on [id number] by  Honorio Daly .          Xrays, labs reviewed personally by physician.    Medication Review:   I have reviewed the patient's current medication list      Scheduled Meds    apixaban 5 mg Oral Q12H   aspirin 81 mg Oral Nightly   atorvastatin 80 mg Oral Nightly   diphenhydrAMINE 12.5 mg Intravenous Once   FLUoxetine 40 mg Oral Q PM   insulin glargine 20 Units Subcutaneous Nightly   insulin lispro 0-9 Units Subcutaneous TID AC   insulin lispro 5 Units Subcutaneous TID With Meals   lisinopril 10 mg Oral Nightly   pregabalin 75 mg Oral TID   prochlorperazine 10 mg Intravenous Once   sodium chloride 10 mL Intravenous Q12H       Meds Infusions       Meds PRN  •  acetaminophen **OR** acetaminophen  •  aluminum-magnesium hydroxide-simethicone  •  baclofen  •  bisacodyl  •  dextrose  •  dextrose  •  glucagon (human recombinant)  •  HYDROcodone-acetaminophen  •  insulin lispro **AND** insulin lispro  •   ondansetron  •  sodium chloride  •  sodium chloride        Assessment/Plan   Assessment/Plan     Active Hospital Problems    Diagnosis  POA   • **Cerebrovascular accident (CVA) due to bilateral embolism of middle cerebral arteries (CMS/HCC) [I63.413]  Clinically Undetermined   • Confusion and disorientation [R41.0]  Yes   • PFO (patent foramen ovale) [Q21.1]  Not Applicable   • Diabetic polyneuropathy associated with type 2 diabetes mellitus (CMS/HCC) [E11.42]  Yes      Resolved Hospital Problems   No resolved problems to display.       MEDICAL DECISION MAKING COMPLEXITY BY PROBLEM:     Multiple subacute to acutestrokes of posterior left lentiform nucleus, high left parietal lobe, and periventricular right frontal lobe  - distribution concerning for embolic stroke  - MRI brain: Multifocal small acute or subacute infarcts involving the posterior left lentiform nucleus, high left parietal lobe, and periventricular right frontal lobe.  - CT head: No evidence of hemorrhage, no acute process.  Multiple chronic infarcts seen.  - CTA head and neck: Preliminary negative, awaiting official read .   - Continue Eliquis but given above may be eliquis failure, may need to  will consult cardiology   - d/w neurology today      ANT  -resolving Cr 1.0, baseline 1.0  -stop IVF     PFO  -chronic an on eliquis   -LILIAN (Nov 2019): Significant PFO, EF 60%.   -consulted cardiology for opinion given above      Chronic migraine disorder  -given compazine and benadryl today    Type II DM  -c/w home basal and pre-meal insulin  -add SSI and adjust prn  -hold home po agents      HTN  -chronic   -c/w lisinopril     DVT ppx-on eliquis     High risk       VTE Prophylaxis -   Mechanical Order History:      Ordered        07/13/20 1834  Place Sequential Compression Device  Once         07/13/20 1834  Maintain Sequential Compression Device  Continuous                 Pharmalogical Order History:     Ordered     Dose Route Frequency Stop     07/13/20 1834  apixaban (ELIQUIS) tablet 5 mg      5 mg PO Every 12 Hours Scheduled --            Code Status -   Code Status and Medical Interventions:   Ordered at: 07/13/20 1708     Code Status:    CPR     Medical Interventions (Level of Support Prior to Arrest):    Full       This patient has been examined wearing appropriate Personal Protective Equipment . 07/14/20        Discharge Planning          Destination      Coordination has not been started for this encounter.      Durable Medical Equipment      Coordination has not been started for this encounter.      Dialysis/Infusion      Coordination has not been started for this encounter.      Home Medical Care      Coordination has not been started for this encounter.      Therapy      Coordination has not been started for this encounter.      Community Resources      Coordination has not been started for this encounter.            Electronically signed by Kevyn Vogel DO, 07/14/20, 16:19.  [facility] Hospitalist Team          Electronically signed by Kevyn Vogel DO at 07/14/20 1627          Consult Notes (last 24 hours) (Notes from 07/14/20 1043 through 07/15/20 1043)      Hans Doyle MD at 07/14/20 1804      Consult Orders    1. Inpatient Cardiology Consult [[id number]] ordered by Kevyn Vogel DO at 07/14/20 1400                  Referring Provider: Hospitalist  Reason for Consultation: Stroke    Patient Care Team:  Leda Duckworth DO as PCP - General (Family Medicine)  Leda Duckworth DO as Referring Physician (Family Medicine)    Chief complaint headache and weakness    Subjective .     History of present illness:  Humble Green is a 64 y.o. male with history of diabetes coronary artery disease previous stroke PFO hypertension hyperlipidemia presented to the hospital with complaints of headaches and weakness.  Patient presented to the ER with the symptoms.  He did not have any chest pain or shortness of breath.  No complains of any PND  orthopnea.  No palpitation dizziness syncope or swelling of the feet.  No visual deficits.  Patient had a stroke work-up done in the ER and cardiology consult is called.     Review of Systems   Constitution: Negative for fever and malaise/fatigue.   HENT: Negative for ear pain and nosebleeds.    Eyes: Negative for blurred vision and double vision.   Cardiovascular: Negative for chest pain, dyspnea on exertion and palpitations.   Respiratory: Positive for shortness of breath. Negative for cough.    Skin: Negative for rash.   Musculoskeletal: Negative for joint pain.   Gastrointestinal: Negative for abdominal pain, nausea and vomiting.   Neurological: Positive for headaches. Negative for focal weakness.   Psychiatric/Behavioral: Negative for depression. The patient is not nervous/anxious.    All other systems reviewed and are negative.      History  Past Medical History:   Diagnosis Date   • Arthritis    • BPH (benign prostatic hyperplasia)    • Chronic back pain    • Diabetes mellitus (CMS/HCC)    • Hypertension    • Low back pain    • Low vision of right eye with normal vision in contralateral eye    • Memory problem    • Nonobstructive atherosclerosis of coronary artery    • PFO (patent foramen ovale) 11/29/2019       Past Surgical History:   Procedure Laterality Date   • ARM TENDON REPAIR Left    • CHOLECYSTECTOMY     • COLONOSCOPY      2018 = TA, rech 2023   • EYE SURGERY      Rt Eye Sx after trauma @ 4y/o   • JOINT REPLACEMENT      Left TKR   • ROTATOR CUFF REPAIR Bilateral    • UMBILICAL HERNIA REPAIR         Family History   Problem Relation Age of Onset   • Diabetes Mother    • Heart disease Mother    • Arthritis Mother    • Obesity Mother    • Hypertension Mother    • Migraines Mother    • Osteoporosis Mother    • Diabetes Father    • Heart disease Father    • Arthritis Father    • Hypertension Father    • Stroke Father    • Heart attack Father    • Hyperlipidemia Father    • Diabetes Brother    • Heart  disease Brother    • Arthritis Sister    • Anemia Sister        Social History     Tobacco Use   • Smoking status: Never Smoker   • Smokeless tobacco: Never Used   Substance Use Topics   • Alcohol use: Yes     Alcohol/week: 1.0 standard drinks     Types: 1 Cans of beer per week   • Drug use: Never        Medications Prior to Admission   Medication Sig Dispense Refill Last Dose   • apixaban (ELIQUIS) 5 MG tablet tablet Take 1 tablet by mouth Every 12 (Twelve) Hours. 60 tablet 3 7/13/2020 at Unknown time   • aspirin 81 MG chewable tablet Chew 81 mg Every Night.   7/12/2020 at 20:00   • baclofen (LIORESAL) 10 MG tablet Take 1 tablet by mouth At Night As Needed for Muscle Spasms. 30 tablet 0    • ciprofloxacin (CIPRO) 500 MG tablet Take 500 mg by mouth 2 (Two) Times a Day. Start: 07/01/20  End: 07/15/20      • Dapagliflozin Propanediol (Farxiga) 10 MG tablet Take 10 mg by mouth Every Evening.   7/12/2020 at Unknown time   • FLUoxetine (PROzac) 40 MG capsule Take 40 mg by mouth Every Evening.   7/12/2020 at Unknown time   • HYDROcodone-acetaminophen (NORCO)  MG per tablet Take 1 tablet by mouth Every 4 (Four) Hours As Needed for Moderate Pain .      • insulin detemir (LEVEMIR) 100 UNIT/ML injection Inject 33 Units under the skin into the appropriate area as directed Every Night.   7/12/2020 at Unknown time   • Insulin Lispro, 1 Unit Dial, (HumaLOG KwikPen) 100 UNIT/ML solution pen-injector Inject 5 Units under the skin into the appropriate area as directed 3 (Three) Times a Day Before Meals. 9 pen 0    • lisinopril (PRINIVIL,ZESTRIL) 10 MG tablet Take 1 tablet by mouth Every Night. 90 tablet 0 7/12/2020 at Unknown time   • pregabalin (LYRICA) 75 MG capsule Take 1 capsule by mouth 3 (Three) Times a Day. 90 capsule 2 7/12/2020 at Unknown time         Patient has no known allergies.    Scheduled Meds:    apixaban 5 mg Oral Q12H   aspirin 81 mg Oral Nightly   atorvastatin 80 mg Oral Nightly   FLUoxetine 40 mg Oral Q PM  "  insulin glargine 20 Units Subcutaneous Nightly   insulin lispro 0-9 Units Subcutaneous TID AC   insulin lispro 5 Units Subcutaneous TID With Meals   lisinopril 10 mg Oral Nightly   pregabalin 75 mg Oral TID   sodium chloride 10 mL Intravenous Q12H     Continuous Infusions:   PRN Meds:.•  acetaminophen **OR** acetaminophen  •  aluminum-magnesium hydroxide-simethicone  •  baclofen  •  bisacodyl  •  dextrose  •  dextrose  •  glucagon (human recombinant)  •  HYDROcodone-acetaminophen  •  insulin lispro **AND** insulin lispro  •  ondansetron  •  sodium chloride  •  sodium chloride    Objective     VITAL SIGNS  Vitals:    07/14/20 0155 07/14/20 0605 07/14/20 1012 07/14/20 1418   BP: 100/54 115/72 122/74 139/66   BP Location: Left arm Left arm Left arm Right arm   Patient Position: Lying Lying Lying Lying   Pulse: 70 67 71 82   Resp: 13 14 19 19   Temp: 98 °F (36.7 °C) 98.7 °F (37.1 °C) 97.6 °F (36.4 °C) 98.3 °F (36.8 °C)   TempSrc: Oral Oral Oral Oral   SpO2: 90% 100% 95% 98%   Weight:  115 kg (252 lb 10.4 oz)     Height:           Flowsheet Rows      First Filed Value   Admission Height  172.7 cm (68\") Documented at 07/13/2020 1442   Admission Weight  114 kg (251 lb 5.2 oz) Documented at 07/13/2020 1442           TELEMETRY: Normal sinus rhythm with nonspecific ST segment abnormality    Physical Exam:  Physical Exam   Constitutional: He appears well-developed and well-nourished.   HENT:   Head: Normocephalic and atraumatic.   Eyes: Pupils are equal, round, and reactive to light. Conjunctivae and EOM are normal. No scleral icterus.   Neck: Normal range of motion. Neck supple. No JVD present. Carotid bruit is not present.   Cardiovascular: Normal rate, regular rhythm, S1 normal, S2 normal, normal heart sounds and intact distal pulses. PMI is not displaced.   Pulmonary/Chest: Effort normal and breath sounds normal. He has no wheezes. He has no rales.   Abdominal: Soft. Bowel sounds are normal.   Musculoskeletal: Normal " range of motion.   Neurological: He is alert. He has normal strength.   No focal deficits   Skin: Skin is warm and dry. No rash noted.   Psychiatric: He has a normal mood and affect.        Results Review:   I reviewed the patient's new clinical results.  Lab Results (last 24 hours)     Procedure Component Value Units Date/Time    POC Glucose Once [[id number]]  (Abnormal) Collected:  07/14/20 1657    Specimen:  Blood Updated:  07/14/20 1702     Glucose 205 mg/dL      Comment: Serial Number: [id number]:  [id number]       POC Glucose Once [[id number]]  (Abnormal) Collected:  07/14/20 1054    Specimen:  Blood Updated:  07/14/20 1055     Glucose 129 mg/dL      Comment: Serial Number: [id number]:  [id number]       BUN [[id number]]  (Normal) Collected:  07/14/20 0358    Specimen:  Blood Updated:  07/14/20 0742     BUN 21 mg/dL     POC Glucose Once [[id number]]  (Abnormal) Collected:  07/14/20 0731    Specimen:  Blood Updated:  07/14/20 0734     Glucose 117 mg/dL      Comment: Serial Number: [id number]:  [id number]       Lipid Panel [[id number]]  (Abnormal) Collected:  07/14/20 0358    Specimen:  Blood Updated:  07/14/20 0503     Total Cholesterol 194 mg/dL      Triglycerides 191 mg/dL      HDL Cholesterol 50 mg/dL      LDL Cholesterol  106 mg/dL      VLDL Cholesterol 38.2 mg/dL      LDL/HDL Ratio 2.12    Narrative:       Cholesterol Reference Ranges  ([place] Department of Health and Human Services ATP III Classifications)    Desirable          <200 mg/dL  Borderline High    200-239 mg/dL  High Risk          >240 mg/dL      Triglyceride Reference Ranges  (U.S. Department of Health and Human Services ATP III Classifications)    Normal           <150 mg/dL  Borderline High  150-199 mg/dL  High             200-499 mg/dL  Very High        >500 mg/dL    HDL Reference Ranges  (U.S. Department of Health and Human Services ATP III Classifcations)    Low     <40 mg/dl (major risk factor for CHD)  High    >60 mg/dl  ('negative' risk factor for CHD)        LDL Reference Ranges  (U.S. Department of Health and Human Services ATP III Classifcations)    Optimal          <100 mg/dL  Near Optimal     100-129 mg/dL  Borderline High  130-159 mg/dL  High             160-189 mg/dL  Very High        >189 mg/dL    Comprehensive Metabolic Panel [[id number]]  (Abnormal) Collected:  07/14/20 0358    Specimen:  Blood Updated:  07/14/20 0503     Glucose 118 mg/dL      BUN --     Comment: Testing performed by alternate method        Creatinine 1.06 mg/dL      Sodium 140 mmol/L      Potassium 4.4 mmol/L      Chloride 103 mmol/L      CO2 26.0 mmol/L      Calcium 8.7 mg/dL      Total Protein 5.7 g/dL      Albumin 3.60 g/dL      ALT (SGPT) 11 U/L      AST (SGOT) 11 U/L      Alkaline Phosphatase 52 U/L      Total Bilirubin 0.4 mg/dL      eGFR Non African Amer 70 mL/min/1.73      Globulin 2.1 gm/dL      A/G Ratio 1.7 g/dL      BUN/Creatinine Ratio --     Comment: Testing not performed        Anion Gap 11.0 mmol/L     Narrative:       GFR Normal >60  Chronic Kidney Disease <60  Kidney Failure <15      CBC (No Diff) [[id number]]  (Abnormal) Collected:  07/14/20 0358    Specimen:  Blood Updated:  07/14/20 0435     WBC 6.90 10*3/mm3      RBC 4.13 10*6/mm3      Hemoglobin 12.9 g/dL      Hematocrit 37.1 %      MCV 90.0 fL      MCH 31.3 pg      MCHC 34.7 g/dL      RDW 14.3 %      RDW-SD 46.4 fl      MPV 11.7 fL      Platelets 133 10*3/mm3     POC Glucose Once [[id number]]  (Abnormal) Collected:  07/14/20 0400    Specimen:  Blood Updated:  07/14/20 0401     Glucose 113 mg/dL      Comment: Serial Number: [id number]:  [id number]       Vitamin B12 [[id number]]  (Normal) Collected:  07/13/20 1456    Specimen:  Blood from Arm, Right Updated:  07/13/20 2028     Vitamin B-12 556 pg/mL     Narrative:       Results may be falsely increased if patient taking Biotin.      POC Glucose Once [[id number]]  (Abnormal) Collected:  07/13/20 2024    Specimen:  Blood Updated:   07/13/20 2024     Glucose 132 mg/dL      Comment: Serial Number: [id number]:  [id number]       POC Glucose Once [[id number]]  (Normal) Collected:  07/13/20 1832    Specimen:  Blood Updated:  07/13/20 1833     Glucose 105 mg/dL      Comment: Serial Number: [id number]:  [id number]             Imaging Results (Last 24 Hours)     Procedure Component Value Units Date/Time    MRI Brain Without Contrast [[id number]] Collected:  07/14/20 1337     Updated:  07/14/20 1346    Narrative:       MRI BRAIN WO CONTRAST-     Date of Exam: 7/14/2020 12:15 PM     Indication: Stroke  . Confusion.     Comparison: CT angiography of the head neck 07/13/2020. Noncontrast CT  head 07/13/2020. MRI brain 11/26/2019.     Technique: Multiplanar multisequence images of the brain were performed  without contrast according to routine brain MRI protocol.     FINDINGS:  10 x 3 mm focal restricted diffusion at the posterior margin of the left  lentiform nucleus, 5 mm focus of persistent acute diffusion the high  left parietal lobe, and 1.6 x 0.8 cm focal restricted diffusion within  the periventricular right frontal lobe, consistent with acute or  subacute multifocal infarcts. These findings are new since the  11/26/2019 comparison. There is no hemorrhagic transformation.           Scattered foci of FLAIR and T2 signal intensity change in the deep white  matter system with chronic microvascular disease. Chronic appearing  lacunar infarcts in the bilateral basal ganglia.     No mass lesion or mass effect or midline shift. Normal ventricular  configuration. Calvarium is normal. Paranasal sinuses and mastoid air  cells are clear.          Impression:        IMPRESSION:     1. Multifocal small acute or subacute infarcts involving the posterior  left lentiform nucleus, high left parietal lobe, and periventricular  right frontal lobe. The findings could indicate features of shower  emboli phenomenon. No hemorrhagic transformation.  2. Features of  mild to moderate chronic microvascular disease.        Electronically Signed By-Dr. Charla Nelson MD On:7/14/2020 1:44 PM  This report was finalized on [id number] by Dr. Charla Nelson MD.          EKG      I personally viewed and interpreted the patient's EKG/Telemetry data:    ECHOCARDIOGRAM:      STRESS MYOVIEW:    CARDIAC CATHETERIZATION:    OTHER:         Assessment/Plan     Principal Problem:    Cerebrovascular accident (CVA) due to bilateral embolism of middle cerebral arteries (CMS/HCC)  Active Problems:    Diabetic polyneuropathy associated with type 2 diabetes mellitus (CMS/HCC)    PFO (patent foramen ovale)    Confusion and disorientation  Coronary artery disease  Hypertension  Hyperlipidemia  Migraine    Patient presented with headaches and had a stroke work-up.  Patient did not have any hemorrhage but had multiple small acute or subacute infarcts involving the posterior left lentiform nucleus in the parietal lobe  Patient is seen by neurologist  Patient is already on Eliquis because of his history of PFO and previous stroke  If there is a true failure of Eliquis then he may have to be changed to Xarelto  Patient's blood pressure and heart rate stable  Discussed with patient at length about importance of medicines and he seems to forget his medicines on occasions and hence a compliance issue is here  Patient's lipid levels will be followed by the primary care doctor  Patient also has diabetes with polyneuropathy.  Patient is ruled out for MI by EKG and enzymes    I discussed the patients findings and my recommendations with patient and nurse    Hans Doyle MD  07/14/20  18:05              Electronically signed by Hans Doyle MD at 07/14/20 0666

## 2020-07-15 NOTE — PLAN OF CARE
Patient did not have any new procedures today. Patient did have more blood work done. Patient complained of a headache that was eased with fiorcet and also complained of chronic back pain. PRN pain medication was given for the back pain as well. The patient did not have any other complaints during the shift. Will continue to monitor.

## 2020-07-15 NOTE — PROGRESS NOTES
LOS: 1 day     Chief Complaint: Confusion, headache       SUBJECTIVE:  History taken from: patient chart RN    Interval History: On 7/13, patient states that he was unable to see his phone when he was calling his son, his son states that his speech was very garbled.  He was also very confused, this lasted only 15 to 30 minutes.     Patient feels strongly that if he missed any doses of Eliquis it was maybe 1 dose within the past month.  Overall, other than his headache and his back pain, he does not have any focal weakness.  He does have some pain and tenderness in his right calf that started 2 days prior.  Headache is on the right side only, he is sensitive to light and pain level is 8/10. He does have history of migraines.    Patient states he does snore at night and does have daytime fatigue. No sleep study in the past.  - Portions of the above HPI were copied from previous encounters and edited as appropriate.    Patient Complaints: At this time, pt denies any focal deficits. He does c/o a headache in the left frontotemporal region, 7/10. Not tender to touch. No vision changes currently.       Review of Systems   Constitutional: Negative.    Eyes: Negative for visual disturbance (Blurry vision).   Cardiovascular: Negative.    Neurological: Positive for headaches. Negative for dizziness, tremors, seizures, syncope, facial asymmetry, speech difficulty, weakness, light-headedness and numbness.   Psychiatric/Behavioral: Negative for confusion.        Pertinent PMH:  has a past medical history of Arthritis, BPH (benign prostatic hyperplasia), Chronic back pain, Diabetes mellitus (CMS/HCC), Hypertension, Low back pain, Low vision of right eye with normal vision in contralateral eye, Memory problem, Nonobstructive atherosclerosis of coronary artery, and PFO (patent foramen ovale) (11/29/2019).   ________________________________________________     OBJECTIVE:    On exam:  GENERAL: NAD  CARDIO: RRR  HEENT: No  temporal tenderness  NEURO:  Oriented x3  EOMI, PERRL, no visual field deficits  No facial asymmetry   Speech clear without dysarthria  Sensations intact and equal bilaterally  Strength 5/5 and equal in all extremities   No ataxia  ________________________________________________   RESULTS REVIEW    VITAL SIGNS:  Temp:  [97.6 °F (36.4 °C)-98.4 °F (36.9 °C)] 98 °F (36.7 °C)  Heart Rate:  [68-83] 70  Resp:  [13-19] 13  BP: (106-144)/(62-75) 106/62    LABS:   Lab Results   Component Value Date    WBC 6.90 07/14/2020    HGB 12.9 (L) 07/14/2020    HCT 37.1 (L) 07/14/2020    MCV 90.0 07/14/2020     (L) 07/14/2020     Lab Results   Component Value Date    GLUCOSE 118 (H) 07/14/2020    BUN  07/14/2020      Comment:      Testing performed by alternate method    BUN 21 07/14/2020    CREATININE 1.06 07/14/2020    EGFRIFNONA 70 07/14/2020    BCR  07/14/2020      Comment:      Testing not performed    K 4.4 07/14/2020    CO2 26.0 07/14/2020    CALCIUM 8.7 07/14/2020    ALBUMIN 3.60 07/14/2020    AST 11 07/14/2020    ALT 11 07/14/2020       Lab Results   Component Value Date    TSH 1.100 07/13/2020     (H) 07/14/2020    HGBA1C 8.1 (H) 07/13/2020    [id number] 556 07/13/2020         IMAGING STUDIES:  Ct Angiogram Head    Result Date: 7/13/2020   1. Atherosclerotic plaque in both carotid bifurcations. The degree of narrowing is less than 50% and not hemodynamically significant. 2. No evidence of vertebrobasilar stenosis. 3. No intracranial stenoses or occlusion. No evidence of aneurysm or AVM.  Electronically Signed By-Honorio Daly On:7/13/2020 4:37 PM This report was finalized on [id number] by  Honorio Daly, .    Ct Angiogram Neck    Result Date: 7/13/2020   1. Atherosclerotic plaque in both carotid bifurcations. The degree of narrowing is less than 50% and not hemodynamically significant. 2. No evidence of vertebrobasilar stenosis. 3. No intracranial stenoses or occlusion. No evidence of aneurysm or AVM.   Electronically Signed By-Honorio Daly On:7/13/2020 4:37 PM This report was finalized on [id number] by  Honorio Daly, .    Mri Brain Without Contrast    Result Date: 7/14/2020   IMPRESSION:  1. Multifocal small acute or subacute infarcts involving the posterior left lentiform nucleus, high left parietal lobe, and periventricular right frontal lobe. The findings could indicate features of shower emboli phenomenon. No hemorrhagic transformation. 2. Features of mild to moderate chronic microvascular disease.   Electronically Signed By-Dr. Charla Nelson MD On:7/14/2020 1:44 PM This report was finalized on [id number] by Dr. Charla Nelson MD.    Xr Chest 1 View    Result Date: 7/13/2020  Negative portable chest  Electronically Signed By-Honorio Daly On:7/13/2020 3:24 PM This report was finalized on [id number] by  Honorio Daly, .    Ct Head Without Contrast Stroke Protocol    Result Date: 7/13/2020  No evidence of hemorrhage, mass effect or midline shift. No acute process identified.  The above findings were discussed with Brianne Carreon nurse practitioner at 3:06 PM on July 13, 2020  Electronically Signed By-Lilia Scruggs On:7/13/2020 3:07 PM This report was finalized on [id number] by  Lilia Scruggs, .      I reviewed the patient's new clinical results.    ________________________________________________      PROBLEM LIST:    Cerebrovascular accident (CVA) due to bilateral embolism of middle cerebral arteries (CMS/HCC)    Diabetic polyneuropathy associated with type 2 diabetes mellitus (CMS/HCC)    PFO (patent foramen ovale)    Confusion and disorientation        Assessment/Plan   ASSESSMENT/PLAN:  1.  Multifocal small acute or subacute infarcts involving the posterior left lentiform nucleus, high left parietal lobe, and periventricular right frontal lobe. These are a combination of small vessel strokes and a tiny embolic appearing stroke. Patient is already on chronic Eliquis therapy for PFO.    - Labs: A1C: 8.1, B12:  556, LDL:  106, TSH: 1.100  - Eliquis failure- Continue Eliquis and ASA 81 mg for now. Per cardio, pt pay need to be switched to Xarelto due to Eliquis failure.   - Lipitor 80   - Recommend CT C/A/P to rule out malignancy as malignancy related hypercoagulable states tend to be less responsive to NOACs.   - Hypercoagulable workup ordered, please follow results outpt    2. Headache, ongoing  - Fioricet PRN  - ESR and CRP    3. Positive PFO  - Eliquis failure, cardiology consulted for further recommendations     4. Right calf pain  - RLE ultrasound: Normal right lower extremity venous duplex scan    5. Probable sleep apnea  - STOP-BANG score of 4.   - High risk of SHASTA with score 3+  - Sleep study ASAP once discharged         Will follow.         Angelita Roberson, ANTONY  07/15/20  09:32

## 2020-07-15 NOTE — PROGRESS NOTES
Referring Provider: Hospitalist    Reason for follow-up: TIA/stroke     Patient Care Team:  Leda Duckworth DO as PCP - General (Family Medicine)  Leda Duckworth DO as Referring Physician (Family Medicine)    Subjective .  No chest pain or shortness of breath    Objective  Lying in bed comfortable     Review of Systems   Constitution: Negative for fever and malaise/fatigue.   HENT: Negative for ear pain and nosebleeds.    Eyes: Negative for blurred vision and double vision.   Cardiovascular: Negative for chest pain, dyspnea on exertion and palpitations.   Respiratory: Negative for cough and shortness of breath.    Skin: Negative for rash.   Musculoskeletal: Negative for joint pain.   Gastrointestinal: Negative for abdominal pain, nausea and vomiting.   Neurological: Negative for focal weakness and headaches.   Psychiatric/Behavioral: Negative for depression. The patient is not nervous/anxious.    All other systems reviewed and are negative.      Patient has no known allergies.    Scheduled Meds:    apixaban 5 mg Oral Q12H   aspirin 81 mg Oral Nightly   atorvastatin 80 mg Oral Nightly   FLUoxetine 40 mg Oral Q PM   insulin glargine 20 Units Subcutaneous Nightly   insulin lispro 0-9 Units Subcutaneous TID AC   insulin lispro 5 Units Subcutaneous TID With Meals   lisinopril 10 mg Oral Nightly   pregabalin 75 mg Oral TID   sodium chloride 10 mL Intravenous Q12H     Continuous Infusions:   PRN Meds:.•  acetaminophen **OR** acetaminophen  •  aluminum-magnesium hydroxide-simethicone  •  baclofen  •  bisacodyl  •  butalbital-acetaminophen-caffeine  •  dextrose  •  dextrose  •  glucagon (human recombinant)  •  HYDROcodone-acetaminophen  •  insulin lispro **AND** insulin lispro  •  ondansetron  •  sodium chloride  •  sodium chloride        VITAL SIGNS  Vitals:    07/14/20 2216 07/15/20 0235 07/15/20 0625 07/15/20 1039   BP:  125/70 106/62 112/68   BP Location:  Left arm Left arm    Patient Position:  Lying Lying    Pulse:   "68 70 69   Resp: 14 13 13 14   Temp: 98.1 °F (36.7 °C) 98.4 °F (36.9 °C) 98 °F (36.7 °C) 97.8 °F (36.6 °C)   TempSrc: Oral Oral Oral Oral   SpO2:  98% 96% 97%   Weight:   115 kg (253 lb 8.5 oz)    Height:           Flowsheet Rows      First Filed Value   Admission Height  172.7 cm (68\") Documented at 07/13/2020 1442   Admission Weight  114 kg (251 lb 5.2 oz) Documented at 07/13/2020 1442           TELEMETRY: Sinus rhythm    Physical Exam:  Physical Exam   Constitutional: He appears well-developed and well-nourished.   HENT:   Head: Normocephalic and atraumatic.   Eyes: Pupils are equal, round, and reactive to light. Conjunctivae and EOM are normal. No scleral icterus.   Neck: Normal range of motion. Neck supple. No JVD present. Carotid bruit is not present.   Cardiovascular: Normal rate, regular rhythm, S1 normal, S2 normal, normal heart sounds and intact distal pulses. PMI is not displaced.   Pulmonary/Chest: Effort normal and breath sounds normal. He has no wheezes. He has no rales.   Abdominal: Soft. Bowel sounds are normal.   Musculoskeletal: Normal range of motion.   Neurological: He is alert. He has normal strength.   No focal deficits   Skin: Skin is warm and dry. No rash noted.   Psychiatric: He has a normal mood and affect.        Results Review:   I reviewed the patient's new clinical results.  Lab Results (last 24 hours)     Procedure Component Value Units Date/Time    POC Glucose Once [[id number]]  (Normal) Collected:  07/15/20 1132    Specimen:  Blood Updated:  07/15/20 1140     Glucose 88 mg/dL      Comment: Serial Number: [id number]:  [id number]       POC Glucose Once [[id number]]  (Abnormal) Collected:  07/15/20 0722    Specimen:  Blood Updated:  07/15/20 0724     Glucose 131 mg/dL      Comment: Serial Number: [id number]:  [id number]       POC Glucose Once [[id number]]  (Abnormal) Collected:  07/14/20 2054    Specimen:  Blood Updated:  07/14/20 2055     Glucose 191 mg/dL      Comment: Serial " Number: [id number]:  [id number]       POC Glucose Once [[id number]]  (Abnormal) Collected:  07/14/20 1657    Specimen:  Blood Updated:  07/14/20 1702     Glucose 205 mg/dL      Comment: Serial Number: [id number]:  [id number]             Imaging Results (Last 24 Hours)     ** No results found for the last 24 hours. **          EKG      I personally viewed and interpreted the patient's EKG/Telemetry data:    ECHOCARDIOGRAM:    STRESS MYOVIEW:    CARDIAC CATHETERIZATION:    OTHER:         Assessment/Plan     Principal Problem:    Cerebrovascular accident (CVA) due to bilateral embolism of middle cerebral arteries (CMS/HCC)  Active Problems:    Diabetic polyneuropathy associated with type 2 diabetes mellitus (CMS/HCC)    PFO (patent foramen ovale)    Confusion and disorientation  Sleep apnea  Hypertension    Patient presented with strokelike symptoms and had multiple focal small acute and subacute infarcts involving several areas in the brain  Patient is on Eliquis but may suspect some noncompliance and hence will continue Eliquis and aspirin for now  Patient's blood pressure and heart rate stable  Patient probably has sleep apnea and may need a CPAP machine but he needs to be tested first.  Patient is having work-up done to rule out hypercoagulable states and also malignancy    I discussed the patients findings and my recommendations with patient and nurse    Hans Doyle MD  07/15/20  14:24

## 2020-07-16 ENCOUNTER — READMISSION MANAGEMENT (OUTPATIENT)
Dept: CALL CENTER | Facility: HOSPITAL | Age: 65
End: 2020-07-16

## 2020-07-16 VITALS
SYSTOLIC BLOOD PRESSURE: 104 MMHG | HEIGHT: 68 IN | WEIGHT: 255.95 LBS | OXYGEN SATURATION: 99 % | TEMPERATURE: 98.3 F | DIASTOLIC BLOOD PRESSURE: 62 MMHG | HEART RATE: 69 BPM | BODY MASS INDEX: 38.79 KG/M2 | RESPIRATION RATE: 21 BRPM

## 2020-07-16 LAB
CARDIOLIPIN IGG SER IA-ACNC: <9 GPL U/ML (ref 0–14)
CARDIOLIPIN IGM SER IA-ACNC: <9 MPL U/ML (ref 0–12)
FACTOR II, DNA ANALYSIS: NORMAL
GLUCOSE BLDC GLUCOMTR-MCNC: 108 MG/DL (ref 70–105)

## 2020-07-16 PROCEDURE — 99232 SBSQ HOSP IP/OBS MODERATE 35: CPT | Performed by: NURSE PRACTITIONER

## 2020-07-16 PROCEDURE — 63710000001 INSULIN LISPRO (HUMAN) PER 5 UNITS: Performed by: INTERNAL MEDICINE

## 2020-07-16 PROCEDURE — 99232 SBSQ HOSP IP/OBS MODERATE 35: CPT | Performed by: INTERNAL MEDICINE

## 2020-07-16 PROCEDURE — 82962 GLUCOSE BLOOD TEST: CPT

## 2020-07-16 PROCEDURE — 99239 HOSP IP/OBS DSCHRG MGMT >30: CPT | Performed by: INTERNAL MEDICINE

## 2020-07-16 RX ORDER — ATORVASTATIN CALCIUM 80 MG/1
80 TABLET, FILM COATED ORAL NIGHTLY
Qty: 30 TABLET | Refills: 0 | Status: SHIPPED | OUTPATIENT
Start: 2020-07-16 | End: 2020-10-05

## 2020-07-16 RX ADMIN — PREGABALIN 75 MG: 75 CAPSULE ORAL at 09:10

## 2020-07-16 RX ADMIN — HYDROCODONE BITARTRATE AND ACETAMINOPHEN 1 TABLET: 10; 325 TABLET ORAL at 05:35

## 2020-07-16 RX ADMIN — Medication 10 ML: at 09:10

## 2020-07-16 RX ADMIN — HYDROCODONE BITARTRATE AND ACETAMINOPHEN 1 TABLET: 10; 325 TABLET ORAL at 00:14

## 2020-07-16 RX ADMIN — HYDROCODONE BITARTRATE AND ACETAMINOPHEN 1 TABLET: 10; 325 TABLET ORAL at 09:10

## 2020-07-16 RX ADMIN — INSULIN LISPRO 5 UNITS: 100 INJECTION, SOLUTION INTRAVENOUS; SUBCUTANEOUS at 09:23

## 2020-07-16 RX ADMIN — APIXABAN 5 MG: 5 TABLET, FILM COATED ORAL at 09:10

## 2020-07-16 NOTE — DISCHARGE SUMMARY
Date of Admission: 7/13/2020    Date of Discharge:  7/16/2020    Length of stay:  LOS: 2 days     Presenting Problem:   TIA (transient ischemic attack) [G45.9]  Transient confusion [R41.0]  TIA (transient ischemic attack) [G45.9]      Principal and Active Diagnosis During Hospital Stay:     Active Hospital Problems    Diagnosis  POA   • **Cerebrovascular accident (CVA) due to bilateral embolism of middle cerebral arteries (CMS/HCC) [I63.413]  Yes   • Confusion and disorientation [R41.0]  Yes   • PFO (patent foramen ovale) [Q21.1]  Not Applicable   • Diabetic polyneuropathy associated with type 2 diabetes mellitus (CMS/HCC) [E11.42]  Yes      Resolved Hospital Problems   No resolved problems to display.     Multiple subacute to acutestrokes of posterior left lentiform nucleus, high left parietal lobe, and periventricular right frontal lobe  - per neuro mixture of small vessel disease and one small embolic type  - MRI brain: Multifocal small acute or subacute infarcts involving the posterior left lentiform nucleus, high left parietal lobe, and periventricular right frontal lobe.  - CT head: No evidence of hemorrhage, no acute process.  Multiple chronic infarcts seen.  - CTA head and neck: Preliminary negative, awaiting official read .   - Continue Eliquis, per cardiology they suspect some degree of non-compliance and thus cannot confirm if true eliquis failure  - neurolgy followed, hypercoaguable workup sent results will need to be followed up by primary   - will need  CT C/A/P done as outpt to r/oo malignancy     ANT  -resolved  -stop IVF     PFO  -chronic an on eliquis   -LILIAN (Nov 2019): Significant PFO, EF 60%.   -consulted cardiology, see above     Chronic migraine disorder  -ESR and CRP both normal      Type II DM  -c/w home basal and pre-meal insulin  -add SSI and adjust prn  -hold home po agents      HTN  -chronic   -c/w lisinopril      Hospital Course  Patient is a 64 y.o. male presented with confusion. Was  admitted and found to have subacute to acute strokes on MRI. Had workup done as noted above. Was stabilized and treated as noted and was able to be sent home however will need continued workup as noted above as an outpt.        Procedures Performed:as noted          Consults:   Consults     Date and Time Order Name Status Description    7/14/2020 1400 Inpatient Cardiology Consult Completed     7/13/2020 1542 Hospitalist (on-call MD unless specified) Completed     7/13/2020 1454 Inpatient Neurology Consult Stroke Completed     7/13/2020 1454 Inpatient Neurology Consult Stroke Completed           Pertinent Test Results:     Lab Results (last 72 hours)     Procedure Component Value Units Date/Time    Factor II, DNA Analysis [[id number]]  (Normal) Collected:  07/15/20 1420    Specimen:  Blood Updated:  07/16/20 1114     Factor II, DNA Analysis Normal    Narrative:       A point mutation ([id number]) in the factor II (prothrombin) gene is the second most common cause of inherited thrombophilia. The incidence of this mutation in the [place]  population is about 2% and in the  population it is approximately 0.5%. This mutation is rare in the  and  population. Being heterozygous for a prothrombin mutation increases the risk for developing venous thrombosis about 2 to 3 times above the general population risk. Being homozygous for the prothrombin gene mutation increases the relative risk for venous thrombosis further, although it is not yet known how   much further the risk is increased. In women heterozygous for the prothrombin gene mutation, the use of estrogen containing oral contraceptives increases the relative risk of venous thrombosis about 16 times and the risk of developing cerebral thrombosis is also significantly increased. In pregnancy, the prothrombin gene mutation increases risk for venous thrombosis and may increase risk for stillbirth, placental abruption, pre-eclampsia  and fetal growth restriction.     The expression of Factor II thrombophilia is impacted by coexisting genetic thrombophilic disorders, acquired thrombophilic disorders (eg, malignancy, hyperhomocysteinemia, high factor VIII levels), and circumstances including: pregnancy, oral contraceptive use, hormone replacement therapy, selective estrogen receptor modulators, travel, central venous catheters, surgery, and organ transplantation.    POC Glucose Once [[id number]]  (Abnormal) Collected:  07/16/20 0729    Specimen:  Blood Updated:  07/16/20 0733     Glucose 108 mg/dL      Comment: Serial Number: [id number]:  [id number]       POC Glucose Once [[id number]]  (Abnormal) Collected:  07/15/20 2025    Specimen:  Blood Updated:  07/15/20 2026     Glucose 276 mg/dL      Comment: Serial Number: [id number]:  [id number]       POC Glucose Once [[id number]]  (Abnormal) Collected:  07/15/20 1609    Specimen:  Blood Updated:  07/15/20 1612     Glucose 132 mg/dL      Comment: Serial Number: [id number]:  [id number]       Sedimentation Rate [[id number]]  (Normal) Collected:  07/15/20 1435    Specimen:  Blood Updated:  07/15/20 1527     Sed Rate 15 mm/hr     C-reactive Protein [[id number]]  (Normal) Collected:  07/15/20 1420    Specimen:  Blood Updated:  07/15/20 1459     C-Reactive Protein 0.12 mg/dL     Factor 5 Activity [[id number]] Collected:  07/15/20 1420    Specimen:  Blood Updated:  07/15/20 1431    Protein S Panel [[id number]] Collected:  07/15/20 1420    Specimen:  Blood Updated:  07/15/20 1431    Anticardiolipin Antibody, IgG / M, Qn [[id number]] Collected:  07/15/20 1420    Specimen:  Blood Updated:  07/15/20 1431    Antiphosphotidyl Antibodies Panel II [[id number]] Collected:  07/15/20 1420    Specimen:  Blood Updated:  07/15/20 1431    Protein C Activity [[id number]] Collected:  07/15/20 1420    Specimen:  Blood Updated:  07/15/20 1431    Antithrombin III [[id number]] Collected:  07/15/20 1420    Specimen:  Blood  Updated:  07/15/20 1430    MIKE [[id number]] Collected:  07/15/20 1420    Specimen:  Blood Updated:  07/15/20 1430    POC Glucose Once [[id number]]  (Normal) Collected:  07/15/20 1132    Specimen:  Blood Updated:  07/15/20 1140     Glucose 88 mg/dL      Comment: Serial Number: [id number]:  [id number]       POC Glucose Once [[id number]]  (Abnormal) Collected:  07/15/20 0722    Specimen:  Blood Updated:  07/15/20 0724     Glucose 131 mg/dL      Comment: Serial Number: [id number]:  [id number]       POC Glucose Once [[id number]]  (Abnormal) Collected:  07/14/20 2054    Specimen:  Blood Updated:  07/14/20 2055     Glucose 191 mg/dL      Comment: Serial Number: [id number]:  [id number]       POC Glucose Once [[id number]]  (Abnormal) Collected:  07/14/20 1657    Specimen:  Blood Updated:  07/14/20 1702     Glucose 205 mg/dL      Comment: Serial Number: [id number]:  [id number]       POC Glucose Once [[id number]]  (Abnormal) Collected:  07/14/20 1054    Specimen:  Blood Updated:  07/14/20 1055     Glucose 129 mg/dL      Comment: Serial Number: [id number]:  [id number]       BUN [[id number]]  (Normal) Collected:  07/14/20 0358    Specimen:  Blood Updated:  07/14/20 0742     BUN 21 mg/dL     POC Glucose Once [[id number]]  (Abnormal) Collected:  07/14/20 0731    Specimen:  Blood Updated:  07/14/20 0734     Glucose 117 mg/dL      Comment: Serial Number: [id number]:  [id number]       Lipid Panel [[id number]]  (Abnormal) Collected:  07/14/20 0358    Specimen:  Blood Updated:  07/14/20 0503     Total Cholesterol 194 mg/dL      Triglycerides 191 mg/dL      HDL Cholesterol 50 mg/dL      LDL Cholesterol  106 mg/dL      VLDL Cholesterol 38.2 mg/dL      LDL/HDL Ratio 2.12    Narrative:       Cholesterol Reference Ranges  ([place].S. Department of Health and Human Services ATP III Classifications)    Desirable          <200 mg/dL  Borderline High    200-239 mg/dL  High Risk          >240 mg/dL      Triglyceride  Reference Ranges  (U.S. Department of Health and Human Services ATP III Classifications)    Normal           <150 mg/dL  Borderline High  150-199 mg/dL  High             200-499 mg/dL  Very High        >500 mg/dL    HDL Reference Ranges  (U.S. Department of Health and Human Services ATP III Classifcations)    Low     <40 mg/dl (major risk factor for CHD)  High    >60 mg/dl ('negative' risk factor for CHD)        LDL Reference Ranges  (U.S. Department of Health and Human Services ATP III Classifcations)    Optimal          <100 mg/dL  Near Optimal     100-129 mg/dL  Borderline High  130-159 mg/dL  High             160-189 mg/dL  Very High        >189 mg/dL    Comprehensive Metabolic Panel [[id number]]  (Abnormal) Collected:  07/14/20 0358    Specimen:  Blood Updated:  07/14/20 0503     Glucose 118 mg/dL      BUN --     Comment: Testing performed by alternate method        Creatinine 1.06 mg/dL      Sodium 140 mmol/L      Potassium 4.4 mmol/L      Chloride 103 mmol/L      CO2 26.0 mmol/L      Calcium 8.7 mg/dL      Total Protein 5.7 g/dL      Albumin 3.60 g/dL      ALT (SGPT) 11 U/L      AST (SGOT) 11 U/L      Alkaline Phosphatase 52 U/L      Total Bilirubin 0.4 mg/dL      eGFR Non African Amer 70 mL/min/1.73      Globulin 2.1 gm/dL      A/G Ratio 1.7 g/dL      BUN/Creatinine Ratio --     Comment: Testing not performed        Anion Gap 11.0 mmol/L     Narrative:       GFR Normal >60  Chronic Kidney Disease <60  Kidney Failure <15      CBC (No Diff) [[id number]]  (Abnormal) Collected:  07/14/20 0358    Specimen:  Blood Updated:  07/14/20 0435     WBC 6.90 10*3/mm3      RBC 4.13 10*6/mm3      Hemoglobin 12.9 g/dL      Hematocrit 37.1 %      MCV 90.0 fL      MCH 31.3 pg      MCHC 34.7 g/dL      RDW 14.3 %      RDW-SD 46.4 fl      MPV 11.7 fL      Platelets 133 10*3/mm3     POC Glucose Once [[id number]]  (Abnormal) Collected:  07/14/20 0400    Specimen:  Blood Updated:  07/14/20 0401     Glucose 113 mg/dL      Comment:  Serial Number: [id number]:  [id number]       Vitamin B12 [[id number]]  (Normal) Collected:  07/13/20 1456    Specimen:  Blood from Arm, Right Updated:  07/13/20 2028     Vitamin B-12 556 pg/mL     Narrative:       Results may be falsely increased if patient taking Biotin.      POC Glucose Once [[id number]]  (Abnormal) Collected:  07/13/20 2024    Specimen:  Blood Updated:  07/13/20 2024     Glucose 132 mg/dL      Comment: Serial Number: [id number]:  [id number]       POC Glucose Once [[id number]]  (Normal) Collected:  07/13/20 1832    Specimen:  Blood Updated:  07/13/20 1833     Glucose 105 mg/dL      Comment: Serial Number: [id number]:  [id number]       BUN [[id number]]  (Abnormal) Collected:  07/13/20 1456    Specimen:  Blood from Arm, Right Updated:  07/13/20 1630     BUN 26 mg/dL     TSH [[id number]]  (Normal) Collected:  07/13/20 1456    Specimen:  Blood from Arm, Right Updated:  07/13/20 1621     TSH 1.100 uIU/mL     Lipid Panel [[id number]]  (Abnormal) Collected:  07/13/20 1456    Specimen:  Blood from Arm, Right Updated:  07/13/20 1615     Total Cholesterol 224 mg/dL      Triglycerides 234 mg/dL      HDL Cholesterol 61 mg/dL      LDL Cholesterol  116 mg/dL      VLDL Cholesterol 46.8 mg/dL      LDL/HDL Ratio 1.90    Narrative:       Cholesterol Reference Ranges  (U.S. Department of Health and Human Services ATP III Classifications)    Desirable          <200 mg/dL  Borderline High    200-239 mg/dL  High Risk          >240 mg/dL      Triglyceride Reference Ranges  (U.S. Department of Health and Human Services ATP III Classifications)    Normal           <150 mg/dL  Borderline High  150-199 mg/dL  High             200-499 mg/dL  Very High        >500 mg/dL    HDL Reference Ranges  (U.S. Department of Health and Human Services ATP III Classifcations)    Low     <40 mg/dl (major risk factor for CHD)  High    >60 mg/dl ('negative' risk factor for CHD)        LDL Reference Ranges  (U.S. Department of  Health and Human Services ATP III Classifcations)    Optimal          <100 mg/dL  Near Optimal     100-129 mg/dL  Borderline High  130-159 mg/dL  High             160-189 mg/dL  Very High        >189 mg/dL    Hemoglobin A1c [[id number]]  (Abnormal) Collected:  07/13/20 1456    Specimen:  Blood from Arm, Right Updated:  07/13/20 1602     Hemoglobin A1C 8.1 %     Narrative:       Hemoglobin A1C Reference Range:    <5.7 %        Normal  5.7-6.4 %     Increased risk for diabetes  > 6.4 %        Diabetes       These guidelines have been recommended by the American Diabetic Association for Hgb A1c.      The following 2010 guidelines have been recommended by the American Diabetes Association for Hemoglobin A1c.    HBA1c 5.7-6.4% Increased risk for future diabetes (pre-diabetes)  HBA1c     >6.4% Diabetes      [place] Draw [[id number]] Collected:  07/13/20 1456    Specimen:  Blood from Arm, Right Updated:  07/13/20 1600    Narrative:       The following orders were created for panel order [place] Draw.  Procedure                               Abnormality         Status                     ---------                               -----------         ------                     Light Blue Top[[id number]]                                   Final result               Green Top (Gel)[[id number]]                                  Final result               Lavender Top[[id number]]                                     Final result               Gold Top - SST[[id number]]                                   Final result                 Please view results for these tests on the individual orders.    Green Top (Gel) [[id number]] Collected:  07/13/20 1456    Specimen:  Blood from Arm, Right Updated:  07/13/20 1600     Extra Tube Hold for add-ons.     Comment: Auto resulted.       Gold Top - SST [[id number]] Collected:  07/13/20 1456    Specimen:  Blood from Arm, Right Updated:  07/13/20 1600     Extra Tube Hold for add-ons.     Comment: Auto  resulted.       Light Blue Top [[id number]] Collected:  07/13/20 1456    Specimen:  Blood from Arm, Right Updated:  07/13/20 1600     Extra Tube hold for add-on     Comment: Auto resulted       Lavender Top [[id number]] Collected:  07/13/20 1456    Specimen:  Blood from Arm, Right Updated:  07/13/20 1600     Extra Tube hold for add-on     Comment: Auto resulted       Comprehensive Metabolic Panel [[id number]]  (Abnormal) Collected:  07/13/20 1456    Specimen:  Blood from Arm, Right Updated:  07/13/20 1543     Glucose 171 mg/dL      BUN --     Comment: Testing performed by alternate method        Creatinine 1.57 mg/dL      Sodium 139 mmol/L      Potassium 4.1 mmol/L      Chloride 99 mmol/L      CO2 26.0 mmol/L      Calcium 9.0 mg/dL      Total Protein 7.0 g/dL      Albumin 4.30 g/dL      ALT (SGPT) 13 U/L      AST (SGOT) 12 U/L      Alkaline Phosphatase 63 U/L      Total Bilirubin 0.4 mg/dL      eGFR Non African Amer 45 mL/min/1.73      Globulin 2.7 gm/dL      A/G Ratio 1.6 g/dL      BUN/Creatinine Ratio --     Comment: Testing not performed        Anion Gap 14.0 mmol/L     Narrative:       GFR Normal >60  Chronic Kidney Disease <60  Kidney Failure <15      Troponin [[id number]]  (Normal) Collected:  07/13/20 1456    Specimen:  Blood from Arm, Right Updated:  07/13/20 1543     Troponin T <0.010 ng/mL     Narrative:       Troponin T Reference Range:  <= 0.03 ng/mL-   Negative for AMI  >0.03 ng/mL-     Abnormal for myocardial necrosis.  Clinicians would have to utilize clinical acumen, EKG, Troponin and serial changes to determine if it is an Acute Myocardial Infarction or myocardial injury due to an underlying chronic condition.       Results may be falsely decreased if patient taking Biotin.      Protime-INR [[id number]]  (Normal) Collected:  07/13/20 1456    Specimen:  Blood from Arm, Right Updated:  07/13/20 1512     Protime 9.9 Seconds      INR 0.93    aPTT [[id number]]  (Normal) Collected:  07/13/20 1456    Specimen:   Blood from Arm, Right Updated:  07/13/20 1512     PTT 24.1 seconds     CBC & Differential [[id number]] Collected:  07/13/20 1456    Specimen:  Blood from Arm, Right Updated:  07/13/20 1501    Narrative:       The following orders were created for panel order CBC & Differential.  Procedure                               Abnormality         Status                     ---------                               -----------         ------                     CBC Auto Differential[[id number]]        Normal              Final result                 Please view results for these tests on the individual orders.    CBC Auto Differential [[id number]]  (Normal) Collected:  07/13/20 1456    Specimen:  Blood from Arm, Right Updated:  07/13/20 1501     WBC 7.30 10*3/mm3      RBC 4.48 10*6/mm3      Hemoglobin 13.8 g/dL      Hematocrit 40.3 %      MCV 89.9 fL      MCH 30.9 pg      MCHC 34.3 g/dL      RDW 14.2 %      RDW-SD 45.5 fl      MPV 10.6 fL      Platelets 144 10*3/mm3      Neutrophil % 58.6 %      Lymphocyte % 31.8 %      Monocyte % 7.2 %      Eosinophil % 1.6 %      Basophil % 0.8 %      Neutrophils, Absolute 4.30 10*3/mm3      Lymphocytes, Absolute 2.30 10*3/mm3      Monocytes, Absolute 0.50 10*3/mm3      Eosinophils, Absolute 0.10 10*3/mm3      Basophils, Absolute 0.10 10*3/mm3      nRBC 0.0 /100 WBC     POC Glucose Once [[id number]]  (Abnormal) Collected:  07/13/20 1446    Specimen:  Blood Updated:  07/13/20 1447     Glucose 168 mg/dL      Comment: Serial Number: [id number]:  [id number]                  Microbiology Results (last 10 days)     ** No results found for the last 240 hours. **            Results for orders placed during the hospital encounter of 11/25/19   Adult Transesophageal Echo ([facility]) W/ Cont if Necessary Per Protocol    Narrative · Left ventricular systolic function is normal.  · Left atrial cavity size is mildly dilated.  · Mild mitral valve regurgitation is present  · Mild tricuspid valve regurgitation is  present.  · LV ejection fraction is about 60%  · Significant patent foramen ovale noted by bubble contrast study  · No pericardial effusion noted  · Aorta has minimal atherosclerosis  · Technically difficult study          Imaging Results (All)     Procedure Component Value Units Date/Time    MRI Brain Without Contrast [[id number]] Collected:  07/14/20 1337     Updated:  07/14/20 1346    Narrative:       MRI BRAIN WO CONTRAST-     Date of Exam: 7/14/2020 12:15 PM     Indication: Stroke  . Confusion.     Comparison: CT angiography of the head neck 07/13/2020. Noncontrast CT  head 07/13/2020. MRI brain 11/26/2019.     Technique: Multiplanar multisequence images of the brain were performed  without contrast according to routine brain MRI protocol.     FINDINGS:  10 x 3 mm focal restricted diffusion at the posterior margin of the left  lentiform nucleus, 5 mm focus of persistent acute diffusion the high  left parietal lobe, and 1.6 x 0.8 cm focal restricted diffusion within  the periventricular right frontal lobe, consistent with acute or  subacute multifocal infarcts. These findings are new since the  11/26/2019 comparison. There is no hemorrhagic transformation.           Scattered foci of FLAIR and T2 signal intensity change in the deep white  matter system with chronic microvascular disease. Chronic appearing  lacunar infarcts in the bilateral basal ganglia.     No mass lesion or mass effect or midline shift. Normal ventricular  configuration. Calvarium is normal. Paranasal sinuses and mastoid air  cells are clear.          Impression:        IMPRESSION:     1. Multifocal small acute or subacute infarcts involving the posterior  left lentiform nucleus, high left parietal lobe, and periventricular  right frontal lobe. The findings could indicate features of shower  emboli phenomenon. No hemorrhagic transformation.  2. Features of mild to moderate chronic microvascular disease.        Electronically Signed By-Dr. Dunham  MD Omar On:7/14/2020 1:44 PM  This report was finalized on [id number] by Dr. Charla Nelson MD.    CT Angiogram Head [[id number]] Collected:  07/13/20 1633     Updated:  07/13/20 1644    Narrative:          DATE OF EXAM:  7/13/2020 3:03 PM     PROCEDURE:  CT ANGIOGRAM HEAD-, CT ANGIOGRAM NECK-     INDICATIONS:   Stroke, confusion     COMPARISON:   No Comparisons Available     TECHNIQUE:  CTA of the head and CTA of the neck were performed after the intravenous  administration of 100 mL Isovue 370. Reconstructed coronal and sagittal  images were also obtained. In addition, a 3 D volume rendered image was  obtained after post processing. Automated exposure control and iterative  reconstruction methods were used.      FINDINGS:  VASCULAR FINDINGS: No significant plaque identified in the arch or  proximal great vessels. Right and left vertebral arteries appear patent.  There is no significant basilar stenosis. There is intimal thickening in  the right carotid bifurcation and calcified plaque in the left carotid  bifurcation extending into the proximal left ICA. The degree of  narrowing is less than 50% and not hemodynamically significant. There is  no significant carotid siphon stenosis. There is symmetric filling of  the middle and anterior cerebral arteries without significant stenosis  or occlusion. Both posterior cerebral arteries appear to fill normally.     NONVASCULAR FINDINGS: The lung apices are clear. No superior mediastinal  masses are seen. There is no evidence of supraclavicular or cervical  lymphadenopathy. Skull base, mastoid air cells and paranasal sinuses are  clear. No enhancing intracranial lesions are identified. There are areas  of infarction identified in the basal ganglia bilaterally.          Impression:          1. Atherosclerotic plaque in both carotid bifurcations. The degree of  narrowing is less than 50% and not hemodynamically significant.  2. No evidence of vertebrobasilar  stenosis.  3. No intracranial stenoses or occlusion. No evidence of aneurysm or  AVM.     Electronically Signed By-Honorio Daly On:7/13/2020 4:37 PM  This report was finalized on [id number] by  Honorio Daly, .    CT Angiogram Neck [[id number]] Collected:  07/13/20 1633     Updated:  07/13/20 1644    Narrative:          DATE OF EXAM:  7/13/2020 3:03 PM     PROCEDURE:  CT ANGIOGRAM HEAD-, CT ANGIOGRAM NECK-     INDICATIONS:   Stroke, confusion     COMPARISON:   No Comparisons Available     TECHNIQUE:  CTA of the head and CTA of the neck were performed after the intravenous  administration of 100 mL Isovue 370. Reconstructed coronal and sagittal  images were also obtained. In addition, a 3 D volume rendered image was  obtained after post processing. Automated exposure control and iterative  reconstruction methods were used.      FINDINGS:  VASCULAR FINDINGS: No significant plaque identified in the arch or  proximal great vessels. Right and left vertebral arteries appear patent.  There is no significant basilar stenosis. There is intimal thickening in  the right carotid bifurcation and calcified plaque in the left carotid  bifurcation extending into the proximal left ICA. The degree of  narrowing is less than 50% and not hemodynamically significant. There is  no significant carotid siphon stenosis. There is symmetric filling of  the middle and anterior cerebral arteries without significant stenosis  or occlusion. Both posterior cerebral arteries appear to fill normally.     NONVASCULAR FINDINGS: The lung apices are clear. No superior mediastinal  masses are seen. There is no evidence of supraclavicular or cervical  lymphadenopathy. Skull base, mastoid air cells and paranasal sinuses are  clear. No enhancing intracranial lesions are identified. There are areas  of infarction identified in the basal ganglia bilaterally.          Impression:          1. Atherosclerotic plaque in both carotid bifurcations. The degree  of  narrowing is less than 50% and not hemodynamically significant.  2. No evidence of vertebrobasilar stenosis.  3. No intracranial stenoses or occlusion. No evidence of aneurysm or  AVM.     Electronically Signed ByNik Daly On:7/13/2020 4:37 PM  This report was finalized on [id number] by  Mikal Pedraza    XR Chest 1 View [[id number]] Collected:  07/13/20 1524     Updated:  07/13/20 1526    Narrative:       DATE OF EXAM:  7/13/2020 3:21 PM     PROCEDURE:  XR CHEST 1 VW-     INDICATIONS:  Acute Stroke Protocol (onset < 12 hrs)     COMPARISON:  12/13/2019     TECHNIQUE:   Single radiographic AP view of the chest was obtained.     FINDINGS:  Cardiac size is normal. The film is relatively underpenetrated the  bases. The lungs appear clear and the vascular markings normal        Impression:       Negative portable chest     Electronically Signed ByNik Daly On:7/13/2020 3:24 PM  This report was finalized on [id number] by  Honorio Daly, .    CT Head Without Contrast Stroke Protocol [[id number]] Collected:  07/13/20 1506     Updated:  07/13/20 1514    Narrative:       CT HEAD WO CONTRAST STROKE PROTOCOL-     Date of Exam: 7/13/2020 2:55 PM     Indication: Stroke.  Confusion, headache     Comparison: CT December 2019     Technique:  Without contrast, contiguous axial CT images of the head  were obtained from skull base to vertex.  Coronal and sagittal  reconstructions were performed.  Automated exposure control and  iterative reconstruction methods were used.     FINDINGS  No evidence of intracranial hemorrhage, mass, or midline shift. The  ventricles appear normal in size for the patient's age. Previous lacunar  infarcts are present within the basal ganglia region bilaterally,  unchanged as compared to the previous study. No extra-axial collections  identified. The gray white matter differentiation is intact. No  air-fluid levels identified within the paranasal sinuses. The  extra-axial structures demonstrate no  acute process.       Impression:       No evidence of hemorrhage, mass effect or midline shift. No acute  process identified.     The above findings were discussed with Brianne Carreon nurse practitioner at  3:06 PM on July 13, 2020     Electronically Signed By-Lilia Scruggs On:7/13/2020 3:07 PM  This report was finalized on [id number] by  Lilia Scruggs, .            Condition on Discharge:  Stable     Vital Signs  Temp:  [98 °F (36.7 °C)-98.3 °F (36.8 °C)] 98.3 °F (36.8 °C)  Heart Rate:  [69-72] 69  Resp:  [10-21] 21  BP: ()/(44-74) 104/62    Physical Exam:  Physical Exam    Gen: NAD  HEENT: EOMI, no icterus, PERRL  Neck: No JVD  Heart: RRR, no murmur  Lung: CTA b/l, adequate air movement  ABD: soft, NT, ND, no rebound or guarding  MSK: moves ext spontaneously  Neuro: AO x 3, CN II-XII intact  Psych: no anxiety, no depression  Skin: warm, dry, intact  Extremities:  No edema    Discharge Disposition  Home or Self Care    Discharge Medications     Discharge Medications      New Medications      Instructions Start Date   atorvastatin 80 MG tablet  Commonly known as:  LIPITOR   80 mg, Oral, Nightly         Continue These Medications      Instructions Start Date   apixaban 5 MG tablet tablet  Commonly known as:  ELIQUIS   5 mg, Oral, Every 12 Hours Scheduled      aspirin 81 MG chewable tablet   81 mg, Oral, Nightly      baclofen 10 MG tablet  Commonly known as:  LIORESAL   10 mg, Oral, Nightly PRN      Farxiga 10 MG tablet  Generic drug:  Dapagliflozin Propanediol   10 mg, Oral, Every Evening      HYDROcodone-acetaminophen  MG per tablet  Commonly known as:  NORCO   1 tablet, Oral, Every 4 Hours PRN      insulin detemir 100 UNIT/ML injection  Commonly known as:  LEVEMIR   33 Units, Subcutaneous, Nightly      Insulin Lispro (1 Unit Dial) 100 UNIT/ML solution pen-injector  Commonly known as:  HumaLOG KwikPen   5 Units, Subcutaneous, 3 Times Daily Before Meals      lisinopril 10 MG tablet  Commonly known as:   PRINIVIL,ZESTRIL   10 mg, Oral, Nightly      pregabalin 75 MG capsule  Commonly known as:  LYRICA   75 mg, Oral, 3 Times Daily      PROzac 40 MG capsule  Generic drug:  FLUoxetine   40 mg, Oral, Every Evening         Stop These Medications    ciprofloxacin 500 MG tablet  Commonly known as:  CIPRO            Discharge Diet:     Activity at Discharge:     Follow-up Appointments  Future Appointments   Date Time Provider Department Center   7/21/2020  3:00 PM Eusebia Oviedo, SLP [facility]   7/21/2020  3:45 PM Leda Duckworth DO [facility] None   12/10/2020  3:30 PM Hans Doyle MD [facility]     Additional Instructions for the Follow-ups that You Need to Schedule     Ambulatory Referral to Physical Therapy Evaluate and treat   As directed      Patient wants to go to [facility]    Order Comments:  Patient wants to go to [facility]     Specialty needed:  Evaluate and treat         Ambulatory Referral to Speech Therapy   As directed      Discharge Follow-up with PCP   As directed       Currently Documented PCP:    Leda Duckworth DO    PCP Phone Number:    [phone]     Follow Up Details:  primary one week         Discharge Follow-up with Specified Provider: neurology; 2 Weeks   As directed      To:  neurology    Follow Up:  2 Weeks               Test Results Pending at Discharge   Order Current Status    MIKE In process    Anticardiolipin Antibody, IgG / M, Qn In process    Antiphosphotidyl Antibodies Panel II In process    Antithrombin III In process    Factor 5 Activity In process    Protein C Activity In process    Protein S Panel In process           Risk for Readmission (LACE) Score: 11 (7/16/2020  6:00 AM)      This patient has been examined wearing appropriate Personal Protective Equipment . 07/16/20      Time: Discharge 32 min with face-to-face history exam, writing of prescriptions, and documenting discharge data including care coordination with the nursing staff.      Kevyn Vogel,    07/16/20  11:26

## 2020-07-16 NOTE — PROGRESS NOTES
LOS: 2 days     Chief Complaint: Confusion, headache       SUBJECTIVE:  History taken from: patient chart RN    Interval History: On 7/13, patient states that he was unable to see his phone when he was calling his son, his son states that his speech was very garbled.  He was also very confused, this lasted only 15 to 30 minutes.     Patient feels strongly that if he missed any doses of Eliquis it was maybe 1 dose within the past month.  Overall, other than his headache and his back pain, he does not have any focal weakness.  He does have some pain and tenderness in his right calf that started 2 days prior.  Headache is on the right side only, he is sensitive to light and pain level is 8/10. He does have history of migraines.    Patient states he does snore at night and does have daytime fatigue. No sleep study in the past.  - Portions of the above HPI were copied from previous encounters and edited as appropriate.    Patient Complaints: Pt denies any new complaints. States his headache has improved.       Review of Systems   Constitutional: Negative.    Eyes: Negative for visual disturbance (Blurry vision).   Cardiovascular: Negative.    Neurological: Positive for headaches. Negative for dizziness, tremors, seizures, syncope, facial asymmetry, speech difficulty, weakness, light-headedness and numbness.   Psychiatric/Behavioral: Negative for confusion.        Pertinent PMH:  has a past medical history of Arthritis, BPH (benign prostatic hyperplasia), Chronic back pain, Diabetes mellitus (CMS/HCC), Hypertension, Low back pain, Low vision of right eye with normal vision in contralateral eye, Memory problem, Nonobstructive atherosclerosis of coronary artery, and PFO (patent foramen ovale) (11/29/2019).   ________________________________________________     OBJECTIVE:    On exam:  GENERAL: NAD  CARDIO: RRR  NEURO:  Oriented x3  EOMI, PERRL, no visual field deficits  No facial asymmetry   Speech clear without  dysarthria  Sensations intact and equal bilaterally  Strength 5/5 and equal in all extremities   No ataxia   ________________________________________________   RESULTS REVIEW    VITAL SIGNS:  Temp:  [97.8 °F (36.6 °C)-98 °F (36.7 °C)] 98 °F (36.7 °C)  Heart Rate:  [69-72] 69  Resp:  [10-18] 10  BP: ()/(44-74) 125/73    LABS:   Lab Results   Component Value Date    WBC 6.90 07/14/2020    HGB 12.9 (L) 07/14/2020    HCT 37.1 (L) 07/14/2020    MCV 90.0 07/14/2020     (L) 07/14/2020     Lab Results   Component Value Date    GLUCOSE 118 (H) 07/14/2020    BUN  07/14/2020      Comment:      Testing performed by alternate method    BUN 21 07/14/2020    CREATININE 1.06 07/14/2020    EGFRIFNONA 70 07/14/2020    BCR  07/14/2020      Comment:      Testing not performed    K 4.4 07/14/2020    CO2 26.0 07/14/2020    CALCIUM 8.7 07/14/2020    ALBUMIN 3.60 07/14/2020    AST 11 07/14/2020    ALT 11 07/14/2020       Lab Results   Component Value Date    TSH 1.100 07/13/2020     (H) 07/14/2020    HGBA1C 8.1 (H) 07/13/2020    OTZOKEYY27 556 07/13/2020         IMAGING STUDIES:  Mri Brain Without Contrast    Result Date: 7/14/2020   IMPRESSION:  1. Multifocal small acute or subacute infarcts involving the posterior left lentiform nucleus, high left parietal lobe, and periventricular right frontal lobe. The findings could indicate features of shower emboli phenomenon. No hemorrhagic transformation. 2. Features of mild to moderate chronic microvascular disease.   Electronically Signed By-Dr. Charla Nelson MD On:7/14/2020 1:44 PM This report was finalized on [id number] by Dr. Charla Nelson MD.      I reviewed the patient's new clinical results.    ________________________________________________      PROBLEM LIST:    Cerebrovascular accident (CVA) due to bilateral embolism of middle cerebral arteries (CMS/HCC)    Diabetic polyneuropathy associated with type 2 diabetes mellitus (CMS/HCC)    PFO (patent foramen  ovale)    Confusion and disorientation        Assessment/Plan   ASSESSMENT/PLAN:  1.  Multifocal small acute or subacute infarcts involving the posterior left lentiform nucleus, high left parietal lobe, and periventricular right frontal lobe. These are a combination of small vessel strokes and a tiny embolic appearing stroke. Patient is already on chronic Eliquis therapy for PFO.    - Labs: A1C: 8.1, B12: 556, LDL:  106, TSH: 1.100  - Continue Eliquis and ASA 81 mg for now. Pt has reported some issues with medication compliance so true Eliquis failure cannot be determined.   - Lipitor 80   - Recommend CT C/A/P to rule out malignancy as malignancy related hypercoagulable states tend to be less responsive to NOACs.   - Hypercoagulable workup ordered, please follow results outpt    2. Headache, improved  - Fioricet PRN  - ESR and CRP unremarkable    3. Positive PFO  - Chronic Eliquis therapy. Per cardiology, there is question regarding compliance and true failure cannot be determined. Recommendation is to continue Eliquis and continue workup for underlying hypercoagulable state as above.     4. Right calf pain  - RLE ultrasound: Normal right lower extremity venous duplex scan    5. Probable sleep apnea  - STOP-BANG score of 4.   - High risk of SHASTA with score 3+  - Sleep study ASAP once discharged       Will sign off. Please call with questions or concerns.     Discussed with patient, nurse, Dr. Vogel, and Dr. Low.          Angelita Roberson, APRN  07/16/20  10:12

## 2020-07-16 NOTE — PLAN OF CARE
Problem: Patient Care Overview  Goal: Plan of Care Review  Outcome: Ongoing (interventions implemented as appropriate)  Flowsheets (Taken 7/16/2020 0613)  Progress: improving  Plan of Care Reviewed With: patient     Problem: Pain, Chronic (Adult)  Goal: Acceptable Pain/Comfort Level and Functional Ability  Outcome: Ongoing (interventions implemented as appropriate)  Flowsheets (Taken 7/16/2020 0613)  Acceptable Pain/Comfort Level and Functional Ability: making progress toward outcome     Problem: Stroke (Ischemic) (Adult)  Goal: Signs and Symptoms of Listed Potential Problems Will be Absent, Minimized or Managed (Stroke)  Outcome: Ongoing (interventions implemented as appropriate)  Flowsheets (Taken 7/16/2020 0613)  Problems Assessed (Stroke (Ischemic)): all  Problems Assessed (Stroke (Ischemic)): motor/sensory impairment

## 2020-07-16 NOTE — CONSULTS
Referring Provider: Hospitalist  Reason for Consultation: Stroke    Patient Care Team:  Leda Duckworth DO as PCP - General (Family Medicine)  Leda Duckworth DO as Referring Physician (Family Medicine)    Chief complaint headache and weakness    Subjective .     History of present illness:  Humble Green is a 64 y.o. male with history of diabetes coronary artery disease previous stroke PFO hypertension hyperlipidemia presented to the hospital with complaints of headaches and weakness.  Patient presented to the ER with the symptoms.  He did not have any chest pain or shortness of breath.  No complains of any PND orthopnea.  No palpitation dizziness syncope or swelling of the feet.  No visual deficits.  Patient had a stroke work-up done in the ER and cardiology consult is called.     Review of Systems   Constitution: Negative for fever and malaise/fatigue.   HENT: Negative for ear pain and nosebleeds.    Eyes: Negative for blurred vision and double vision.   Cardiovascular: Negative for chest pain, dyspnea on exertion and palpitations.   Respiratory: Negative for cough and shortness of breath.    Skin: Negative for rash.   Musculoskeletal: Negative for joint pain.   Gastrointestinal: Negative for abdominal pain, nausea and vomiting.   Neurological: Positive for headaches. Negative for focal weakness.   Psychiatric/Behavioral: Negative for depression. The patient is not nervous/anxious.    All other systems reviewed and are negative.      History  Past Medical History:   Diagnosis Date   • Arthritis    • BPH (benign prostatic hyperplasia)    • Chronic back pain    • Diabetes mellitus (CMS/HCC)    • Hypertension    • Low back pain    • Low vision of right eye with normal vision in contralateral eye    • Memory problem    • Nonobstructive atherosclerosis of coronary artery    • PFO (patent foramen ovale) 11/29/2019       Past Surgical History:   Procedure Laterality Date   • ARM TENDON REPAIR Left    •  CHOLECYSTECTOMY     • COLONOSCOPY      2018 = TA, rech    • EYE SURGERY      Rt Eye Sx after trauma @ 2y/o   • JOINT REPLACEMENT      Left TKR   • ROTATOR CUFF REPAIR Bilateral    • UMBILICAL HERNIA REPAIR         Family History   Problem Relation Age of Onset   • Diabetes Mother    • Heart disease Mother    • Arthritis Mother    • Obesity Mother    • Hypertension Mother    • Migraines Mother    • Osteoporosis Mother    • Diabetes Father    • Heart disease Father    • Arthritis Father    • Hypertension Father    • Stroke Father    • Heart attack Father    • Hyperlipidemia Father    • Diabetes Brother    • Heart disease Brother    • Arthritis Sister    • Anemia Sister        Social History     Tobacco Use   • Smoking status: Never Smoker   • Smokeless tobacco: Never Used   Substance Use Topics   • Alcohol use: Yes     Alcohol/week: 1.0 standard drinks     Types: 1 Cans of beer per week   • Drug use: Never        Medications Prior to Admission   Medication Sig Dispense Refill Last Dose   • apixaban (ELIQUIS) 5 MG tablet tablet Take 1 tablet by mouth Every 12 (Twelve) Hours. 60 tablet 3 2020 at Unknown time   • aspirin 81 MG chewable tablet Chew 81 mg Every Night.   2020 at 20:00   • baclofen (LIORESAL) 10 MG tablet Take 1 tablet by mouth At Night As Needed for Muscle Spasms. 30 tablet 0    • [] ciprofloxacin (CIPRO) 500 MG tablet Take 500 mg by mouth 2 (Two) Times a Day. Start: 20  End: 07/15/20      • Dapagliflozin Propanediol (Farxiga) 10 MG tablet Take 10 mg by mouth Every Evening.   2020 at Unknown time   • FLUoxetine (PROzac) 40 MG capsule Take 40 mg by mouth Every Evening.   2020 at Unknown time   • HYDROcodone-acetaminophen (NORCO)  MG per tablet Take 1 tablet by mouth Every 4 (Four) Hours As Needed for Moderate Pain .      • insulin detemir (LEVEMIR) 100 UNIT/ML injection Inject 33 Units under the skin into the appropriate area as directed Every Night.   2020 at  "Unknown time   • Insulin Lispro, 1 Unit Dial, (HumaLOG KwikPen) 100 UNIT/ML solution pen-injector Inject 5 Units under the skin into the appropriate area as directed 3 (Three) Times a Day Before Meals. 9 pen 0    • lisinopril (PRINIVIL,ZESTRIL) 10 MG tablet Take 1 tablet by mouth Every Night. 90 tablet 0 7/12/2020 at Unknown time   • pregabalin (LYRICA) 75 MG capsule Take 1 capsule by mouth 3 (Three) Times a Day. 90 capsule 2 7/12/2020 at Unknown time         Patient has no known allergies.    Scheduled Meds:    apixaban 5 mg Oral Q12H   aspirin 81 mg Oral Nightly   atorvastatin 80 mg Oral Nightly   FLUoxetine 40 mg Oral Q PM   insulin glargine 20 Units Subcutaneous Nightly   [START ON 7/17/2020] insulin lispro 0-9 Units Subcutaneous 4x Daily With Meals & Nightly   insulin lispro 5 Units Subcutaneous TID With Meals   lisinopril 10 mg Oral Nightly   pregabalin 75 mg Oral TID   sodium chloride 10 mL Intravenous Q12H     Continuous Infusions:   PRN Meds:.•  acetaminophen **OR** acetaminophen  •  aluminum-magnesium hydroxide-simethicone  •  baclofen  •  bisacodyl  •  butalbital-acetaminophen-caffeine  •  dextrose  •  dextrose  •  glucagon (human recombinant)  •  HYDROcodone-acetaminophen  •  [START ON 7/17/2020] insulin lispro **AND** insulin lispro  •  ondansetron  •  sodium chloride  •  sodium chloride    Objective     VITAL SIGNS  Vitals:    07/15/20 2241 07/16/20 0207 07/16/20 0611 07/16/20 0612   BP: 143/74 96/44  125/73   BP Location:  Left arm  Left arm   Patient Position:  Lying  Lying   Pulse: 69 72  69   Resp: 18 12  10   Temp: 98 °F (36.7 °C) 98 °F (36.7 °C)  98 °F (36.7 °C)   TempSrc: Oral Oral  Oral   SpO2:  92%  95%   Weight:   116 kg (255 lb 15.3 oz)    Height:           Flowsheet Rows      First Filed Value   Admission Height  172.7 cm (68\") Documented at 07/13/2020 1442   Admission Weight  114 kg (251 lb 5.2 oz) Documented at 07/13/2020 1442           TELEMETRY: Normal sinus rhythm with nonspecific ST " segment abnormality    Physical Exam:  Physical Exam   Constitutional: He appears well-developed and well-nourished.   HENT:   Head: Normocephalic and atraumatic.   Eyes: Pupils are equal, round, and reactive to light. Conjunctivae and EOM are normal. No scleral icterus.   Neck: Normal range of motion. Neck supple. No JVD present. Carotid bruit is not present.   Cardiovascular: Normal rate, regular rhythm, S1 normal, S2 normal, normal heart sounds and intact distal pulses. PMI is not displaced.   Pulmonary/Chest: Effort normal and breath sounds normal. He has no wheezes. He has no rales.   Abdominal: Soft. Bowel sounds are normal.   Musculoskeletal: Normal range of motion.   Neurological: He is alert. He has normal strength.   No focal deficits   Skin: Skin is warm and dry. No rash noted.   Psychiatric: He has a normal mood and affect.        Results Review:   I reviewed the patient's new clinical results.  Lab Results (last 24 hours)     Procedure Component Value Units Date/Time    POC Glucose Once [[id number]]  (Abnormal) Collected:  07/16/20 0729    Specimen:  Blood Updated:  07/16/20 0733     Glucose 108 mg/dL      Comment: Serial Number: [id number]:  [id number]       POC Glucose Once [[id number]]  (Abnormal) Collected:  07/15/20 2025    Specimen:  Blood Updated:  07/15/20 2026     Glucose 276 mg/dL      Comment: Serial Number: [id number]:  [id number]       POC Glucose Once [[id number]]  (Abnormal) Collected:  07/15/20 1609    Specimen:  Blood Updated:  07/15/20 1612     Glucose 132 mg/dL      Comment: Serial Number: [id number]:  [id number]       Sedimentation Rate [[id number]]  (Normal) Collected:  07/15/20 1435    Specimen:  Blood Updated:  07/15/20 1527     Sed Rate 15 mm/hr     C-reactive Protein [[id number]]  (Normal) Collected:  07/15/20 1420    Specimen:  Blood Updated:  07/15/20 1459     C-Reactive Protein 0.12 mg/dL     Factor 5 Activity [[id number]] Collected:  07/15/20 1420    Specimen:   Blood Updated:  07/15/20 1431    Protein S Panel [[id number]] Collected:  07/15/20 1420    Specimen:  Blood Updated:  07/15/20 1431    Factor II, DNA Analysis [[id number]] Collected:  07/15/20 1420    Specimen:  Blood Updated:  07/15/20 1431    Anticardiolipin Antibody, IgG / M, Qn [[id number]] Collected:  07/15/20 1420    Specimen:  Blood Updated:  07/15/20 1431    Antiphosphotidyl Antibodies Panel II [[id number]] Collected:  07/15/20 1420    Specimen:  Blood Updated:  07/15/20 1431    Protein C Activity [[id number]] Collected:  07/15/20 1420    Specimen:  Blood Updated:  07/15/20 1431    Antithrombin III [[id number]] Collected:  07/15/20 1420    Specimen:  Blood Updated:  07/15/20 1430    MIKE [[id number]] Collected:  07/15/20 1420    Specimen:  Blood Updated:  07/15/20 1430    POC Glucose Once [[id number]]  (Normal) Collected:  07/15/20 1132    Specimen:  Blood Updated:  07/15/20 1140     Glucose 88 mg/dL      Comment: Serial Number: [id number]:  [id number]             Imaging Results (Last 24 Hours)     ** No results found for the last 24 hours. **          EKG      I personally viewed and interpreted the patient's EKG/Telemetry data:    ECHOCARDIOGRAM:      STRESS MYOVIEW:    CARDIAC CATHETERIZATION:    OTHER:         Assessment/Plan     Principal Problem:    Cerebrovascular accident (CVA) due to bilateral embolism of middle cerebral arteries (CMS/HCC)  Active Problems:    Diabetic polyneuropathy associated with type 2 diabetes mellitus (CMS/HCC)    PFO (patent foramen ovale)    Confusion and disorientation  Coronary artery disease  Hypertension  Hyperlipidemia  Migraine    Patient presented with headaches and had a stroke work-up.  Patient did not have any hemorrhage but had multiple small acute or subacute infarcts involving the posterior left lentiform nucleus in the parietal lobe  Patient is seen by neurologist  Patient is already on Eliquis because of his history of PFO and previous stroke  If there is a true  failure of Eliquis then he may have to be changed to Xarelto  Patient's blood pressure and heart rate stable  Discussed with patient at length about importance of medicines and he seems to forget his medicines on occasions and hence a compliance issue is here  Patient's lipid levels will be followed by the primary care doctor  Patient also has diabetes with polyneuropathy.  Patient is ruled out for MI by EKG and enzymes    I discussed the patients findings and my recommendations with patient and nurse    Hans Doyle MD  07/16/20  10:12

## 2020-07-16 NOTE — PROGRESS NOTES
Referring Provider: Hospitalist    Reason for follow-up: TIA/stroke     Patient Care Team:  Leda Duckworth DO as PCP - General (Family Medicine)  Leda Duckworth DO as Referring Physician (Family Medicine)    Subjective .  No chest pain or shortness of breath    Objective  Lying in bed comfortable     Review of Systems   Constitution: Negative for fever and malaise/fatigue.   HENT: Negative for ear pain and nosebleeds.    Eyes: Negative for blurred vision and double vision.   Cardiovascular: Negative for chest pain, dyspnea on exertion and palpitations.   Respiratory: Negative for cough and shortness of breath.    Skin: Negative for rash.   Musculoskeletal: Negative for joint pain.   Gastrointestinal: Negative for abdominal pain, nausea and vomiting.   Neurological: Negative for focal weakness and headaches.   Psychiatric/Behavioral: Negative for depression. The patient is not nervous/anxious.    All other systems reviewed and are negative.      Patient has no known allergies.    Scheduled Meds:    apixaban 5 mg Oral Q12H   aspirin 81 mg Oral Nightly   atorvastatin 80 mg Oral Nightly   FLUoxetine 40 mg Oral Q PM   insulin glargine 20 Units Subcutaneous Nightly   [START ON 7/17/2020] insulin lispro 0-9 Units Subcutaneous 4x Daily With Meals & Nightly   insulin lispro 5 Units Subcutaneous TID With Meals   lisinopril 10 mg Oral Nightly   pregabalin 75 mg Oral TID   sodium chloride 10 mL Intravenous Q12H     Continuous Infusions:   PRN Meds:.•  acetaminophen **OR** acetaminophen  •  aluminum-magnesium hydroxide-simethicone  •  baclofen  •  bisacodyl  •  butalbital-acetaminophen-caffeine  •  dextrose  •  dextrose  •  glucagon (human recombinant)  •  HYDROcodone-acetaminophen  •  [START ON 7/17/2020] insulin lispro **AND** insulin lispro  •  ondansetron  •  sodium chloride  •  sodium chloride        VITAL SIGNS  Vitals:    07/15/20 2241 07/16/20 0207 07/16/20 0611 07/16/20 0612   BP: 143/74 96/44  125/73   BP Location:  " Left arm  Left arm   Patient Position:  Lying  Lying   Pulse: 69 72  69   Resp: 18 12  10   Temp: 98 °F (36.7 °C) 98 °F (36.7 °C)  98 °F (36.7 °C)   TempSrc: Oral Oral  Oral   SpO2:  92%  95%   Weight:   116 kg (255 lb 15.3 oz)    Height:           Flowsheet Rows      First Filed Value   Admission Height  172.7 cm (68\") Documented at 07/13/2020 1442   Admission Weight  114 kg (251 lb 5.2 oz) Documented at 07/13/2020 1442           TELEMETRY: Sinus rhythm    Physical Exam:  Physical Exam   Constitutional: He appears well-developed and well-nourished.   HENT:   Head: Normocephalic and atraumatic.   Eyes: Pupils are equal, round, and reactive to light. Conjunctivae and EOM are normal. No scleral icterus.   Neck: Normal range of motion. Neck supple. No JVD present. Carotid bruit is not present.   Cardiovascular: Normal rate, regular rhythm, S1 normal, S2 normal, normal heart sounds and intact distal pulses. PMI is not displaced.   Pulmonary/Chest: Effort normal and breath sounds normal. He has no wheezes. He has no rales.   Abdominal: Soft. Bowel sounds are normal.   Musculoskeletal: Normal range of motion.   Neurological: He is alert. He has normal strength.   No focal deficits   Skin: Skin is warm and dry. No rash noted.   Psychiatric: He has a normal mood and affect.        Results Review:   I reviewed the patient's new clinical results.  Lab Results (last 24 hours)     Procedure Component Value Units Date/Time    POC Glucose Once [[id number]]  (Abnormal) Collected:  07/16/20 0729    Specimen:  Blood Updated:  07/16/20 0733     Glucose 108 mg/dL      Comment: Serial Number: [id number]:  [id number]       POC Glucose Once [[id number]]  (Abnormal) Collected:  07/15/20 2025    Specimen:  Blood Updated:  07/15/20 2026     Glucose 276 mg/dL      Comment: Serial Number: [id number]:  [id number]       POC Glucose Once [[id number]]  (Abnormal) Collected:  07/15/20 1609    Specimen:  Blood Updated:  07/15/20 1612     " Glucose 132 mg/dL      Comment: Serial Number: [id number]:  [id number]       Sedimentation Rate [[id number]]  (Normal) Collected:  07/15/20 1435    Specimen:  Blood Updated:  07/15/20 1527     Sed Rate 15 mm/hr     C-reactive Protein [[id number]]  (Normal) Collected:  07/15/20 1420    Specimen:  Blood Updated:  07/15/20 1459     C-Reactive Protein 0.12 mg/dL     Factor 5 Activity [[id number]] Collected:  07/15/20 1420    Specimen:  Blood Updated:  07/15/20 1431    Protein S Panel [[id number]] Collected:  07/15/20 1420    Specimen:  Blood Updated:  07/15/20 1431    Factor II, DNA Analysis [[id number]] Collected:  07/15/20 1420    Specimen:  Blood Updated:  07/15/20 1431    Anticardiolipin Antibody, IgG / M, Qn [[id number]] Collected:  07/15/20 1420    Specimen:  Blood Updated:  07/15/20 1431    Antiphosphotidyl Antibodies Panel II [[id number]] Collected:  07/15/20 1420    Specimen:  Blood Updated:  07/15/20 1431    Protein C Activity [[id number]] Collected:  07/15/20 1420    Specimen:  Blood Updated:  07/15/20 1431    Antithrombin III [[id number]] Collected:  07/15/20 1420    Specimen:  Blood Updated:  07/15/20 1430    MIKE [[id number]] Collected:  07/15/20 1420    Specimen:  Blood Updated:  07/15/20 1430    POC Glucose Once [[id number]]  (Normal) Collected:  07/15/20 1132    Specimen:  Blood Updated:  07/15/20 1140     Glucose 88 mg/dL      Comment: Serial Number: [id number]:  [id number]             Imaging Results (Last 24 Hours)     ** No results found for the last 24 hours. **          EKG      I personally viewed and interpreted the patient's EKG/Telemetry data:    ECHOCARDIOGRAM:    STRESS MYOVIEW:    CARDIAC CATHETERIZATION:    OTHER:         Assessment/Plan     Principal Problem:    Cerebrovascular accident (CVA) due to bilateral embolism of middle cerebral arteries (CMS/HCC)  Active Problems:    Diabetic polyneuropathy associated with type 2 diabetes mellitus (CMS/HCC)    PFO (patent foramen ovale)     Confusion and disorientation  Sleep apnea  Hypertension    Patient presented with strokelike symptoms and had multiple focal small acute and subacute infarcts involving several areas in the brain  Patient is on Eliquis but may suspect some noncompliance and hence will continue Eliquis and aspirin for now  Patient's blood pressure and heart rate stable  Patient probably has sleep apnea and may need a CPAP machine but he needs to be tested first.  Patient is having work-up done to rule out hypercoagulable states and also malignancy    I discussed the patients findings and my recommendations with patient and nurse    Hans Doyle MD  07/16/20  10:13

## 2020-07-17 ENCOUNTER — READMISSION MANAGEMENT (OUTPATIENT)
Dept: CALL CENTER | Facility: HOSPITAL | Age: 65
End: 2020-07-17

## 2020-07-17 ENCOUNTER — TRANSITIONAL CARE MANAGEMENT TELEPHONE ENCOUNTER (OUTPATIENT)
Dept: CALL CENTER | Facility: HOSPITAL | Age: 65
End: 2020-07-17

## 2020-07-17 ENCOUNTER — HOSPITAL ENCOUNTER (OUTPATIENT)
Facility: HOSPITAL | Age: 65
Setting detail: OBSERVATION
Discharge: HOME | End: 2020-07-18
Attending: EMERGENCY MEDICINE | Admitting: INTERNAL MEDICINE

## 2020-07-17 ENCOUNTER — APPOINTMENT (OUTPATIENT)
Dept: MRI IMAGING | Facility: HOSPITAL | Age: 65
End: 2020-07-17

## 2020-07-17 ENCOUNTER — APPOINTMENT (OUTPATIENT)
Dept: GENERAL RADIOLOGY | Facility: HOSPITAL | Age: 65
End: 2020-07-17

## 2020-07-17 ENCOUNTER — APPOINTMENT (OUTPATIENT)
Dept: CT IMAGING | Facility: HOSPITAL | Age: 65
End: 2020-07-17

## 2020-07-17 DIAGNOSIS — G45.9 TIA (TRANSIENT ISCHEMIC ATTACK): Primary | ICD-10-CM

## 2020-07-17 PROBLEM — G43.019 INTRACTABLE MIGRAINE WITHOUT AURA AND WITHOUT STATUS MIGRAINOSUS: Chronic | Status: ACTIVE | Noted: 2019-11-28

## 2020-07-17 PROBLEM — F33.1 MAJOR DEPRESSIVE DISORDER, RECURRENT EPISODE, MODERATE DEGREE (HCC): Chronic | Status: ACTIVE | Noted: 2019-11-26

## 2020-07-17 PROBLEM — I10 HYPERTENSION: Chronic | Status: ACTIVE | Noted: 2020-01-18

## 2020-07-17 PROBLEM — E11.42 DIABETIC POLYNEUROPATHY ASSOCIATED WITH TYPE 2 DIABETES MELLITUS: Chronic | Status: ACTIVE | Noted: 2019-11-27

## 2020-07-17 PROBLEM — N18.30 CHRONIC KIDNEY DISEASE (CKD), STAGE III (MODERATE) (HCC): Chronic | Status: ACTIVE | Noted: 2020-07-17

## 2020-07-17 PROBLEM — N17.9 AKI (ACUTE KIDNEY INJURY) (HCC): Status: ACTIVE | Noted: 2020-07-17

## 2020-07-17 PROBLEM — E78.2 MIXED HYPERLIPIDEMIA: Chronic | Status: ACTIVE | Noted: 2019-11-26

## 2020-07-17 LAB
ABO GROUP BLD: NORMAL
ALBUMIN SERPL-MCNC: 4.2 G/DL (ref 3.5–5.2)
ALBUMIN/GLOB SERPL: 1.4 G/DL
ALP SERPL-CCNC: 63 U/L (ref 39–117)
ALT SERPL W P-5'-P-CCNC: 15 U/L (ref 1–41)
ANA SER QL: NEGATIVE
ANION GAP SERPL CALCULATED.3IONS-SCNC: 13 MMOL/L (ref 5–15)
APTT PPP: 26.1 SECONDS (ref 24–31)
AST SERPL-CCNC: 14 U/L (ref 1–40)
AT III PPP CHRO-ACNC: 92 % (ref 75–120)
BASOPHILS # BLD AUTO: 0.1 10*3/MM3 (ref 0–0.2)
BASOPHILS NFR BLD AUTO: 0.7 % (ref 0–1.5)
BILIRUB SERPL-MCNC: 0.3 MG/DL (ref 0–1.2)
BLD GP AB SCN SERPL QL: NEGATIVE
BUN SERPL-MCNC: 23 MG/DL (ref 8–23)
BUN SERPL-MCNC: ABNORMAL MG/DL
BUN/CREAT SERPL: ABNORMAL
CALCIUM SPEC-SCNC: 9.2 MG/DL (ref 8.6–10.5)
CHLORIDE SERPL-SCNC: 99 MMOL/L (ref 98–107)
CO2 SERPL-SCNC: 27 MMOL/L (ref 22–29)
CREAT SERPL-MCNC: 1.5 MG/DL (ref 0.76–1.27)
DEPRECATED RDW RBC AUTO: 45.9 FL (ref 37–54)
EOSINOPHIL # BLD AUTO: 0.1 10*3/MM3 (ref 0–0.4)
EOSINOPHIL NFR BLD AUTO: 1.6 % (ref 0.3–6.2)
ERYTHROCYTE [DISTWIDTH] IN BLOOD BY AUTOMATED COUNT: 14.4 % (ref 12.3–15.4)
FACT V ACT/NOR PPP: 95 % (ref 70–150)
GFR SERPL CREATININE-BSD FRML MDRD: 47 ML/MIN/1.73
GLOBULIN UR ELPH-MCNC: 2.9 GM/DL
GLUCOSE BLDC GLUCOMTR-MCNC: 310 MG/DL (ref 70–105)
GLUCOSE SERPL-MCNC: 324 MG/DL (ref 65–99)
HCT VFR BLD AUTO: 42.1 % (ref 37.5–51)
HGB BLD-MCNC: 13.9 G/DL (ref 13–17.7)
HOLD SPECIMEN: NORMAL
INR PPP: 0.99 (ref 0.9–1.1)
LYMPHOCYTES # BLD AUTO: 2.2 10*3/MM3 (ref 0.7–3.1)
LYMPHOCYTES NFR BLD AUTO: 29.2 % (ref 19.6–45.3)
MCH RBC QN AUTO: 30.2 PG (ref 26.6–33)
MCHC RBC AUTO-ENTMCNC: 32.9 G/DL (ref 31.5–35.7)
MCV RBC AUTO: 91.8 FL (ref 79–97)
MONOCYTES # BLD AUTO: 0.5 10*3/MM3 (ref 0.1–0.9)
MONOCYTES NFR BLD AUTO: 6.2 % (ref 5–12)
NEUTROPHILS NFR BLD AUTO: 4.6 10*3/MM3 (ref 1.7–7)
NEUTROPHILS NFR BLD AUTO: 62.3 % (ref 42.7–76)
NRBC BLD AUTO-RTO: 0.1 /100 WBC (ref 0–0.2)
PLATELET # BLD AUTO: 161 10*3/MM3 (ref 140–450)
PMV BLD AUTO: 11.7 FL (ref 6–12)
POTASSIUM SERPL-SCNC: 4.5 MMOL/L (ref 3.5–5.2)
PROT C ACT/NOR PPP: >130 % (ref 70–130)
PROT S FREE PPP-ACNC: 113 % (ref 57–157)
PROT S PPP-ACNC: 76 % (ref 60–150)
PROT SERPL-MCNC: 7.1 G/DL (ref 6–8.5)
PROTEIN S-FUNCTIONAL: 108 % (ref 63–140)
PROTHROMBIN TIME: 10.4 SECONDS (ref 9.6–11.7)
RBC # BLD AUTO: 4.58 10*6/MM3 (ref 4.14–5.8)
RH BLD: POSITIVE
SODIUM SERPL-SCNC: 139 MMOL/L (ref 136–145)
T&S EXPIRATION DATE: NORMAL
TROPONIN T SERPL-MCNC: <0.01 NG/ML (ref 0–0.03)
WBC # BLD AUTO: 7.4 10*3/MM3 (ref 3.4–10.8)
WHOLE BLOOD HOLD SPECIMEN: NORMAL
WHOLE BLOOD HOLD SPECIMEN: NORMAL

## 2020-07-17 PROCEDURE — 71045 X-RAY EXAM CHEST 1 VIEW: CPT

## 2020-07-17 PROCEDURE — G0378 HOSPITAL OBSERVATION PER HR: HCPCS

## 2020-07-17 PROCEDURE — 82962 GLUCOSE BLOOD TEST: CPT

## 2020-07-17 PROCEDURE — 70450 CT HEAD/BRAIN W/O DYE: CPT

## 2020-07-17 PROCEDURE — 85025 COMPLETE CBC W/AUTO DIFF WBC: CPT | Performed by: EMERGENCY MEDICINE

## 2020-07-17 PROCEDURE — 70551 MRI BRAIN STEM W/O DYE: CPT

## 2020-07-17 PROCEDURE — 85610 PROTHROMBIN TIME: CPT | Performed by: EMERGENCY MEDICINE

## 2020-07-17 PROCEDURE — 96374 THER/PROPH/DIAG INJ IV PUSH: CPT

## 2020-07-17 PROCEDURE — 86901 BLOOD TYPING SEROLOGIC RH(D): CPT | Performed by: EMERGENCY MEDICINE

## 2020-07-17 PROCEDURE — 99219 PR INITIAL OBSERVATION CARE/DAY 50 MINUTES: CPT | Performed by: PHYSICIAN ASSISTANT

## 2020-07-17 PROCEDURE — 99214 OFFICE O/P EST MOD 30 MIN: CPT | Performed by: PSYCHIATRY & NEUROLOGY

## 2020-07-17 PROCEDURE — 93005 ELECTROCARDIOGRAM TRACING: CPT | Performed by: EMERGENCY MEDICINE

## 2020-07-17 PROCEDURE — 63710000001 INSULIN GLARGINE PER 5 UNITS: Performed by: PHYSICIAN ASSISTANT

## 2020-07-17 PROCEDURE — 86850 RBC ANTIBODY SCREEN: CPT | Performed by: EMERGENCY MEDICINE

## 2020-07-17 PROCEDURE — 99284 EMERGENCY DEPT VISIT MOD MDM: CPT

## 2020-07-17 PROCEDURE — 25010000002 KETOROLAC TROMETHAMINE PER 15 MG: Performed by: PHYSICIAN ASSISTANT

## 2020-07-17 PROCEDURE — 85730 THROMBOPLASTIN TIME PARTIAL: CPT | Performed by: EMERGENCY MEDICINE

## 2020-07-17 PROCEDURE — 80053 COMPREHEN METABOLIC PANEL: CPT | Performed by: EMERGENCY MEDICINE

## 2020-07-17 PROCEDURE — 84484 ASSAY OF TROPONIN QUANT: CPT | Performed by: EMERGENCY MEDICINE

## 2020-07-17 PROCEDURE — 86900 BLOOD TYPING SEROLOGIC ABO: CPT | Performed by: EMERGENCY MEDICINE

## 2020-07-17 RX ORDER — ONDANSETRON 4 MG/1
4 TABLET, FILM COATED ORAL EVERY 6 HOURS PRN
Status: DISCONTINUED | OUTPATIENT
Start: 2020-07-17 | End: 2020-07-18 | Stop reason: HOSPADM

## 2020-07-17 RX ORDER — BACLOFEN 10 MG/1
10 TABLET ORAL NIGHTLY PRN
Status: DISCONTINUED | OUTPATIENT
Start: 2020-07-17 | End: 2020-07-18 | Stop reason: HOSPADM

## 2020-07-17 RX ORDER — SODIUM CHLORIDE 0.9 % (FLUSH) 0.9 %
10 SYRINGE (ML) INJECTION AS NEEDED
Status: DISCONTINUED | OUTPATIENT
Start: 2020-07-17 | End: 2020-07-18 | Stop reason: HOSPADM

## 2020-07-17 RX ORDER — DEXTROSE MONOHYDRATE 25 G/50ML
25 INJECTION, SOLUTION INTRAVENOUS
Status: DISCONTINUED | OUTPATIENT
Start: 2020-07-17 | End: 2020-07-18 | Stop reason: HOSPADM

## 2020-07-17 RX ORDER — ACETAMINOPHEN 160 MG/5ML
650 SOLUTION ORAL EVERY 4 HOURS PRN
Status: DISCONTINUED | OUTPATIENT
Start: 2020-07-17 | End: 2020-07-18 | Stop reason: HOSPADM

## 2020-07-17 RX ORDER — NICOTINE POLACRILEX 4 MG
15 LOZENGE BUCCAL
Status: DISCONTINUED | OUTPATIENT
Start: 2020-07-17 | End: 2020-07-18 | Stop reason: HOSPADM

## 2020-07-17 RX ORDER — ATORVASTATIN CALCIUM 40 MG/1
80 TABLET, FILM COATED ORAL NIGHTLY
Status: DISCONTINUED | OUTPATIENT
Start: 2020-07-17 | End: 2020-07-18 | Stop reason: HOSPADM

## 2020-07-17 RX ORDER — SODIUM CHLORIDE 9 MG/ML
75 INJECTION, SOLUTION INTRAVENOUS CONTINUOUS
Status: DISCONTINUED | OUTPATIENT
Start: 2020-07-17 | End: 2020-07-18 | Stop reason: HOSPADM

## 2020-07-17 RX ORDER — ONDANSETRON 2 MG/ML
4 INJECTION INTRAMUSCULAR; INTRAVENOUS EVERY 6 HOURS PRN
Status: DISCONTINUED | OUTPATIENT
Start: 2020-07-17 | End: 2020-07-18 | Stop reason: HOSPADM

## 2020-07-17 RX ORDER — POTASSIUM CHLORIDE 20 MEQ/1
40 TABLET, EXTENDED RELEASE ORAL AS NEEDED
Status: DISCONTINUED | OUTPATIENT
Start: 2020-07-17 | End: 2020-07-18 | Stop reason: HOSPADM

## 2020-07-17 RX ORDER — CHOLECALCIFEROL (VITAMIN D3) 125 MCG
5 CAPSULE ORAL NIGHTLY PRN
Status: DISCONTINUED | OUTPATIENT
Start: 2020-07-17 | End: 2020-07-18 | Stop reason: HOSPADM

## 2020-07-17 RX ORDER — PREGABALIN 75 MG/1
75 CAPSULE ORAL 3 TIMES DAILY
Status: DISCONTINUED | OUTPATIENT
Start: 2020-07-17 | End: 2020-07-18 | Stop reason: HOSPADM

## 2020-07-17 RX ORDER — ACETAMINOPHEN 650 MG/1
650 SUPPOSITORY RECTAL EVERY 4 HOURS PRN
Status: DISCONTINUED | OUTPATIENT
Start: 2020-07-17 | End: 2020-07-18 | Stop reason: HOSPADM

## 2020-07-17 RX ORDER — INSULIN GLARGINE 100 [IU]/ML
33 INJECTION, SOLUTION SUBCUTANEOUS NIGHTLY
Status: DISCONTINUED | OUTPATIENT
Start: 2020-07-17 | End: 2020-07-18 | Stop reason: HOSPADM

## 2020-07-17 RX ORDER — BISACODYL 10 MG
10 SUPPOSITORY, RECTAL RECTAL DAILY PRN
Status: DISCONTINUED | OUTPATIENT
Start: 2020-07-17 | End: 2020-07-18 | Stop reason: HOSPADM

## 2020-07-17 RX ORDER — FLUOXETINE HYDROCHLORIDE 20 MG/1
40 CAPSULE ORAL NIGHTLY
Status: DISCONTINUED | OUTPATIENT
Start: 2020-07-17 | End: 2020-07-18 | Stop reason: HOSPADM

## 2020-07-17 RX ORDER — HYDROCODONE BITARTRATE AND ACETAMINOPHEN 10; 325 MG/1; MG/1
1 TABLET ORAL EVERY 4 HOURS PRN
Status: DISCONTINUED | OUTPATIENT
Start: 2020-07-17 | End: 2020-07-18 | Stop reason: HOSPADM

## 2020-07-17 RX ORDER — MAGNESIUM SULFATE 1 G/100ML
1 INJECTION INTRAVENOUS AS NEEDED
Status: DISCONTINUED | OUTPATIENT
Start: 2020-07-17 | End: 2020-07-18 | Stop reason: HOSPADM

## 2020-07-17 RX ORDER — NITROGLYCERIN 0.4 MG/1
0.4 TABLET SUBLINGUAL
Status: DISCONTINUED | OUTPATIENT
Start: 2020-07-17 | End: 2020-07-18 | Stop reason: HOSPADM

## 2020-07-17 RX ORDER — KETOROLAC TROMETHAMINE 15 MG/ML
15 INJECTION, SOLUTION INTRAMUSCULAR; INTRAVENOUS ONCE
Status: COMPLETED | OUTPATIENT
Start: 2020-07-17 | End: 2020-07-17

## 2020-07-17 RX ORDER — ALUMINA, MAGNESIA, AND SIMETHICONE 2400; 2400; 240 MG/30ML; MG/30ML; MG/30ML
15 SUSPENSION ORAL EVERY 6 HOURS PRN
Status: DISCONTINUED | OUTPATIENT
Start: 2020-07-17 | End: 2020-07-18 | Stop reason: HOSPADM

## 2020-07-17 RX ORDER — ACETAMINOPHEN 325 MG/1
650 TABLET ORAL EVERY 4 HOURS PRN
Status: DISCONTINUED | OUTPATIENT
Start: 2020-07-17 | End: 2020-07-18 | Stop reason: HOSPADM

## 2020-07-17 RX ORDER — SODIUM CHLORIDE 0.9 % (FLUSH) 0.9 %
10 SYRINGE (ML) INJECTION EVERY 12 HOURS SCHEDULED
Status: DISCONTINUED | OUTPATIENT
Start: 2020-07-17 | End: 2020-07-18 | Stop reason: HOSPADM

## 2020-07-17 RX ORDER — MAGNESIUM SULFATE HEPTAHYDRATE 40 MG/ML
2 INJECTION, SOLUTION INTRAVENOUS AS NEEDED
Status: DISCONTINUED | OUTPATIENT
Start: 2020-07-17 | End: 2020-07-18 | Stop reason: HOSPADM

## 2020-07-17 RX ORDER — ASPIRIN 81 MG/1
81 TABLET, CHEWABLE ORAL NIGHTLY
Status: DISCONTINUED | OUTPATIENT
Start: 2020-07-17 | End: 2020-07-18 | Stop reason: HOSPADM

## 2020-07-17 RX ADMIN — FLUOXETINE 40 MG: 20 CAPSULE ORAL at 21:44

## 2020-07-17 RX ADMIN — Medication 10 ML: at 21:56

## 2020-07-17 RX ADMIN — PREGABALIN 75 MG: 75 CAPSULE ORAL at 21:44

## 2020-07-17 RX ADMIN — ATORVASTATIN CALCIUM 80 MG: 40 TABLET, FILM COATED ORAL at 21:44

## 2020-07-17 RX ADMIN — ASPIRIN 81 MG 81 MG: 81 TABLET ORAL at 21:44

## 2020-07-17 RX ADMIN — INSULIN GLARGINE 33 UNITS: 100 INJECTION, SOLUTION SUBCUTANEOUS at 21:44

## 2020-07-17 RX ADMIN — APIXABAN 5 MG: 5 TABLET, FILM COATED ORAL at 21:44

## 2020-07-17 RX ADMIN — KETOROLAC TROMETHAMINE 15 MG: 15 INJECTION, SOLUTION INTRAMUSCULAR; INTRAVENOUS at 21:10

## 2020-07-17 RX ADMIN — HYDROCODONE BITARTRATE AND ACETAMINOPHEN 1 TABLET: 10; 325 TABLET ORAL at 21:44

## 2020-07-17 NOTE — OUTREACH NOTE
Prep Survey      Responses   [facility] facility patient discharged from?  [facility]   Is LACE score < 7 ?  No   Eligibility  [facility]   Date of Admission  07/13/20   Date of Discharge  07/16/20   Discharge Disposition  Home or Self Care   Discharge diagnosis  CVA)    COVID-19 Test Status  Not tested   Does the patient have one of the following disease processes/diagnoses(primary or secondary)?  Stroke (TIA)   Does the patient have Home health ordered?  No   Is there a DME ordered?  No   Prep survey completed?  Yes          Yoanna Patel RN

## 2020-07-17 NOTE — CONSULTS
Chief Complaint:       Dizziness and numbness and tingling - code stroke.     HPI:  The patient is a 64 year old gentleman with multiple medical problems including BPH, DM, HTN, LBP, PFO (patent foramen ovale) who was discharge from hospital yesterday after he was admitted for similar problems.  The patient was initially admitted on 7/13/20.  An extensive work up was pursued, MRI of Brain showed multiple bihemispheric small embolic strokes.  The patient did not have any focal weakness,  His initial NIHH was 0 at that time.  The work up for stroke in you person was also unremarkable.  This afternoon he presented to ER of [facility] for similar symptoms.  He did not have any focal weakness, mild sensory deficits in the left foreleg.  The patient denies double vision, speech and swallowing problems, bowel and bladder incontinence.     ROS: Constitutional: no weakness, no chills, no headaches, no bowel and bladder problems, no speech and swallowing difficulty. No skin rash.      PMH:  Past Medical History:   Diagnosis Date   • Arthritis    • BPH (benign prostatic hyperplasia)    • Chronic back pain    • Diabetes mellitus (CMS/HCC)    • Hypertension    • Low back pain    • Low vision of right eye with normal vision in contralateral eye    • Memory problem    • Nonobstructive atherosclerosis of coronary artery    • PFO (patent foramen ovale) 11/29/2019     Social History     Socioeconomic History   • Marital status: Single     Spouse name: Not on file   • Number of children: Not on file   • Years of education: Not on file   • Highest education level: Not on file   Tobacco Use   • Smoking status: Never Smoker   • Smokeless tobacco: Never Used   Substance and Sexual Activity   • Alcohol use: Yes     Alcohol/week: 1.0 standard drinks     Types: 1 Cans of beer per week   • Drug use: Never   • Sexual activity: Defer     Past Surgical History:   Procedure Laterality Date   • ARM TENDON REPAIR Left    • CHOLECYSTECTOMY     •  COLONOSCOPY      2018 = TA, rech 2023   • EYE SURGERY      Rt Eye Sx after trauma @ 2y/o   • JOINT REPLACEMENT      Left TKR   • ROTATOR CUFF REPAIR Bilateral    • UMBILICAL HERNIA REPAIR       Family History   Problem Relation Age of Onset   • Diabetes Mother    • Heart disease Mother    • Arthritis Mother    • Obesity Mother    • Hypertension Mother    • Migraines Mother    • Osteoporosis Mother    • Diabetes Father    • Heart disease Father    • Arthritis Father    • Hypertension Father    • Stroke Father    • Heart attack Father    • Hyperlipidemia Father    • Diabetes Brother    • Heart disease Brother    • Arthritis Sister    • Anemia Sister        Labs:  Lab Results (last 24 hours)     Procedure Component Value Units Date/Time    [place] Draw [[id number]] Collected:  07/17/20 1626    Specimen:  Blood Updated:  07/17/20 1654    Narrative:       The following orders were created for panel order [place] Draw.  Procedure                               Abnormality         Status                     ---------                               -----------         ------                     Light Blue Top[[id number]]                                   In process                 Green Top (Gel)[[id number]]                                                             Lavender Top[[id number]]                                     In process                 Gold Top - SST[[id number]]                                   In process                   Please view results for these tests on the individual orders.    BUN [[id number]]  (Normal) Collected:  07/17/20 1626    Specimen:  Blood Updated:  07/17/20 1654     BUN 23 mg/dL     Comprehensive Metabolic Panel [[id number]]  (Abnormal) Collected:  07/17/20 1626    Specimen:  Blood Updated:  07/17/20 1651     Glucose 324 mg/dL      BUN --     Comment: Testing performed by alternate method        Creatinine 1.50 mg/dL      Sodium 139 mmol/L      Potassium 4.5 mmol/L      Chloride 99 mmol/L       CO2 27.0 mmol/L      Calcium 9.2 mg/dL      Total Protein 7.1 g/dL      Albumin 4.20 g/dL      ALT (SGPT) 15 U/L      AST (SGOT) 14 U/L      Alkaline Phosphatase 63 U/L      Total Bilirubin 0.3 mg/dL      eGFR Non African Amer 47 mL/min/1.73      Globulin 2.9 gm/dL      A/G Ratio 1.4 g/dL      BUN/Creatinine Ratio --     Comment: Testing not performed        Anion Gap 13.0 mmol/L     Narrative:       GFR Normal >60  Chronic Kidney Disease <60  Kidney Failure <15      Troponin [[id number]]  (Normal) Collected:  07/17/20 1626    Specimen:  Blood Updated:  07/17/20 1651     Troponin T <0.010 ng/mL     Narrative:       Troponin T Reference Range:  <= 0.03 ng/mL-   Negative for AMI  >0.03 ng/mL-     Abnormal for myocardial necrosis.  Clinicians would have to utilize clinical acumen, EKG, Troponin and serial changes to determine if it is an Acute Myocardial Infarction or myocardial injury due to an underlying chronic condition.       Results may be falsely decreased if patient taking Biotin.      aPTT [[id number]]  (Normal) Collected:  07/17/20 1626    Specimen:  Blood Updated:  07/17/20 1637     PTT 26.1 seconds     Protime-INR [[id number]]  (Normal) Collected:  07/17/20 1626    Specimen:  Blood Updated:  07/17/20 1637     Protime 10.4 Seconds      INR 0.99    CBC & Differential [[id number]] Collected:  07/17/20 1627    Specimen:  Blood Updated:  07/17/20 1630    Narrative:       The following orders were created for panel order CBC & Differential.  Procedure                               Abnormality         Status                     ---------                               -----------         ------                     CBC Auto Differential[[id number]]        Normal              Final result                 Please view results for these tests on the individual orders.    CBC Auto Differential [[id number]]  (Normal) Collected:  07/17/20 1627    Specimen:  Blood Updated:  07/17/20 1630     WBC 7.40 10*3/mm3      RBC 4.58  10*6/mm3      Hemoglobin 13.9 g/dL      Hematocrit 42.1 %      MCV 91.8 fL      MCH 30.2 pg      MCHC 32.9 g/dL      RDW 14.4 %      RDW-SD 45.9 fl      MPV 11.7 fL      Platelets 161 10*3/mm3      Neutrophil % 62.3 %      Lymphocyte % 29.2 %      Monocyte % 6.2 %      Eosinophil % 1.6 %      Basophil % 0.7 %      Neutrophils, Absolute 4.60 10*3/mm3      Lymphocytes, Absolute 2.20 10*3/mm3      Monocytes, Absolute 0.50 10*3/mm3      Eosinophils, Absolute 0.10 10*3/mm3      Basophils, Absolute 0.10 10*3/mm3      nRBC 0.1 /100 WBC     Lavender Top [[id number]] Collected:  07/17/20 1627    Specimen:  Blood Updated:  07/17/20 1627    Gold Top - SST [[id number]] Collected:  07/17/20 1626    Specimen:  Blood Updated:  07/17/20 1627    Light Blue Top [[id number]] Collected:  07/17/20 1626    Specimen:  Blood Updated:  07/17/20 1626    POC Glucose Once [[id number]]  (Abnormal) Collected:  07/17/20 1611    Specimen:  Blood Updated:  07/17/20 1613     Glucose 310 mg/dL      Comment: Serial Number: [id number]:  [id number]               Medications:  Schedule Meds         MED'S PRN  •  sodium chloride    Vitals:  Vitals:    07/17/20 1640   BP: 89/62   Pulse: 74   Resp:    Temp: 98.9   SpO2: 95%         Physical Exam;  The patient is lying in no apparent distress.  Head NC, neck supple.  Lungs CTA,  CV  S1-S2.  Abdomen soft.  Ext no edema.     Neurologic Exam:  The patient is awake, alert, oriented x 3, speech is good.  Follow commands.  CN VFFC, EOMI, no facial droop. Motor 5/5.  Sensory light touch intact except for left foreleg.  Reflexes +, plantar mute.  Cerebellum coordinated movements noted.  Gait able to walk without assistance.      Impression:  The patient is a 64 year old gentleman with multiple medical problems including DM, HTN, history of multiple small embolic strokes in the recent past, HLD, who has been having sensory paresthesias and dizziness described as being light headed.  The diagnostic considerations  include possible new embolic strokes vs., evolution of strokes vs., symptoms related to anxiety and mood disorder.     Recomendations:  Will obtain a MRI of Brain without contrast.  Further recommendation will be furnished after the results of MRI will be available.  Will follow.

## 2020-07-17 NOTE — H&P
[facility] Medicine Services      Patient Name: Humble Green  : 1955  MRN: [id number]  Primary Care Physician: Leda Duckworth DO  Date of admission: 2020    Patient Care Team:  Leda Duckworth DO as PCP - General (Family Medicine)  Leda Duckworth DO as Referring Physician (Family Medicine)          Subjective   History Present Illness     Chief Complaint:   Chief Complaint   Patient presents with   • Dizziness       Mr. Green is a 64 y.o. male presents to [facility] on 2020 complaining of blurred vision, slurred speech and left leg weakness for several hours prior to arrival to ER.  Patient states that woke up this morning and went to the grocery store walked around and had no issues.  When patient got home from grocery store he started noticing blurred vision, slurred speech as well as decreased concentration.  Patient then noticed some left leg weakness as well.  Patient states he has had a left-sided headache for about the past week that has been continuous.  Patient otherwise denies fever, chills, cough, chest pain, shortness of breath, nausea, vomiting, abdominal pain, diarrhea or swelling in his lower extremities bilaterally.    Upon chart review, patient had presented to [facility] on 2020 with confusion and was admitted and found to have subacute and acute strokes on MRI.  Patient was instructed to continue Eliquis per cardiology they suspect some degree of noncompliance and thus cannot confirm a true Eliquis failure.  Neurology followed patient's hospital close and hypercoagulable work-up results sent and will need to be followed up by primary doctor. Will need  CT C/A/P done as outpt to r/o malignancy.  Patient also has a history of PFO and has chronically on Eliquis.    In the ED, initial troponin negative, glucose elevated at 324, serum creatinine 1.5, GFR 47 with baseline serum creatinine of 1.0.  CBC unremarkable.  Chest  x-ray shows no acute chest findings.  CT head showed no acute intracranial findings.  Mild chronic microvascular disease and chronic lacunar infarcts in bilateral basal ganglia and the periventricular left frontal lobe.  MRI shows 5 mm persistent area of acute infarct in the left parietal lobe is  unchanged from 3 days ago. There are also areas of subacute infarcts in the right frontal lobe and right basal ganglia unchanged in distribution. No new areas of restricted diffusion. No acute  hemorrhages, mass effect or hydrocephalus. Stable mild to moderate chronic microvascular ischemic disease.  EKG showed sinus rhythm 73 bpm, no ST elevation apparent.  Last echo done on 11/29/2019 showed ejection fraction of 60% and significant patent foramen ovale noted by bubble contrast study.  Patient is afebrile, pulse in the 70s, on room air oxygen and 96% SPO2 blood pressure normotensive.      Review of Systems   Constitution: Negative. Negative for chills and fever.   HENT: Negative.    Eyes: Positive for blurred vision.   Cardiovascular: Negative.  Negative for chest pain.   Respiratory: Negative.  Negative for cough and shortness of breath.    Skin: Negative.    Musculoskeletal: Negative.    Gastrointestinal: Negative.  Negative for abdominal pain, nausea and vomiting.   Genitourinary: Negative.    Neurological: Positive for difficulty with concentration, dizziness and focal weakness (left lower leg). Negative for numbness and paresthesias.        Slurred speech   Psychiatric/Behavioral: Negative.            Personal History     Past Medical History:   Past Medical History:   Diagnosis Date   • Arthritis    • BPH (benign prostatic hyperplasia)    • Chronic back pain    • Diabetes mellitus (CMS/HCC)    • Hypertension    • Low back pain    • Low vision of right eye with normal vision in contralateral eye    • Memory problem    • Nonobstructive atherosclerosis of coronary artery    • PFO (patent foramen ovale) 11/29/2019        Surgical History:      Past Surgical History:   Procedure Laterality Date   • ARM TENDON REPAIR Left    • CHOLECYSTECTOMY     • COLONOSCOPY      2018 = TA, rech 2023   • EYE SURGERY      Rt Eye Sx after trauma @ 4y/o   • JOINT REPLACEMENT      Left TKR   • ROTATOR CUFF REPAIR Bilateral    • UMBILICAL HERNIA REPAIR             Family History: family history includes Anemia in his sister; Arthritis in his father, mother, and sister; Diabetes in his brother, father, and mother; Heart attack in his father; Heart disease in his brother, father, and mother; Hyperlipidemia in his father; Hypertension in his father and mother; Migraines in his mother; Obesity in his mother; Osteoporosis in his mother; Stroke in his father. Otherwise pertinent FHx was reviewed and unremarkable.     Social History:  reports that he has never smoked. He has never used smokeless tobacco. He reports that he drinks about 1.0 standard drinks of alcohol per week. He reports that he does not use drugs.      Medications:  Prior to Admission medications    Medication Sig Start Date End Date Taking? Authorizing Provider   apixaban (ELIQUIS) 5 MG tablet tablet Take 1 tablet by mouth Every 12 (Twelve) Hours. 6/19/20  Yes Leda Duckworth, DO   aspirin 81 MG chewable tablet Chew 81 mg Every Night.   Yes Choco Dangelo MD   atorvastatin (LIPITOR) 80 MG tablet Take 1 tablet by mouth Every Night. 7/16/20  Yes Kevyn Vogel, DO   Dapagliflozin Propanediol (Farxiga) 10 MG tablet Take 10 mg by mouth Every Evening.   Yes Choco Dangelo MD   FLUoxetine (PROzac) 40 MG capsule Take 40 mg by mouth Every Evening.   Yes Choco Dangelo MD   insulin detemir (LEVEMIR) 100 UNIT/ML injection Inject 33 Units under the skin into the appropriate area as directed Every Night.   Yes Choco Dangelo MD   Insulin Lispro, 1 Unit Dial, (HumaLOG KwikPen) 100 UNIT/ML solution pen-injector Inject 5 Units under the skin into the appropriate area as  directed 3 (Three) Times a Day Before Meals. 6/19/20  Yes Stroot, Leda, DO   lisinopril (PRINIVIL,ZESTRIL) 10 MG tablet Take 1 tablet by mouth Every Night. 6/19/20  Yes Stroot, Leda, DO   pregabalin (LYRICA) 75 MG capsule Take 1 capsule by mouth 3 (Three) Times a Day. 6/19/20  Yes Stroot, Leda, DO   baclofen (LIORESAL) 10 MG tablet Take 1 tablet by mouth At Night As Needed for Muscle Spasms. 6/19/20   Stroot, Leda, DO   HYDROcodone-acetaminophen (NORCO)  MG per tablet Take 1 tablet by mouth Every 4 (Four) Hours As Needed for Moderate Pain .    Provider, Choco, MD       Allergies:  No Known Allergies    Objective   Objective     Vital Signs  Temp:  [97.7 °F (36.5 °C)-98.9 °F (37.2 °C)] 97.7 °F (36.5 °C)  Heart Rate:  [65-77] 65  Resp:  [16-18] 16  BP: ()/(54-69) 110/54  SpO2:  [94 %-96 %] 96 %  on   ;   Device (Oxygen Therapy): room air  Body mass index is 35.94 kg/m².    Physical Exam   Constitutional: He is oriented to person, place, and time. He appears well-developed and well-nourished. No distress.   HENT:   Head: Normocephalic and atraumatic.   Nose: Nose normal.   Mouth/Throat: No oropharyngeal exudate.   Eyes: Pupils are equal, round, and reactive to light. Conjunctivae and EOM are normal.   Neck: Normal range of motion.   Cardiovascular: Normal rate, regular rhythm, normal heart sounds and intact distal pulses.   S1, S2 audible.   Pulmonary/Chest: Effort normal and breath sounds normal. No respiratory distress. He has no wheezes. He has no rales.   On room air.   Abdominal: Soft. Bowel sounds are normal. He exhibits no distension. There is no tenderness.   Musculoskeletal: Normal range of motion. He exhibits no edema, tenderness or deformity.   Neurological: He is alert and oriented to person, place, and time. No cranial nerve deficit or sensory deficit. He exhibits normal muscle tone. Coordination normal.   Mild to no weakness exhibited in left leg compared to right   Skin: Skin  is warm. No rash noted. He is not diaphoretic. No erythema.   Psychiatric: He has a normal mood and affect. His behavior is normal.   Nursing note and vitals reviewed.      Results Review:  I have personally reviewed most recent cardiac tracings, lab results and radiology images and interpretations and agree with findings.    Results from last 7 days   Lab Units 07/17/20  1627 07/17/20  1626   WBC 10*3/mm3 7.40  --    HEMOGLOBIN g/dL 13.9  --    HEMATOCRIT % 42.1  --    PLATELETS 10*3/mm3 161  --    INR   --  0.99     Results from last 7 days   Lab Units 07/17/20  1626  07/13/20  1456   SODIUM mmol/L 139   < > 139   POTASSIUM mmol/L 4.5   < > 4.1   CHLORIDE mmol/L 99   < > 99   CO2 mmol/L 27.0   < > 26.0   BUN  23   < > 26*   CREATININE mg/dL 1.50*   < > 1.57*   GLUCOSE mg/dL 324*   < > 171*   CALCIUM mg/dL 9.2   < > 9.0   ALT (SGPT) U/L 15   < > 13   AST (SGOT) U/L 14   < > 12   TROPONIN T ng/mL <0.010  --  <0.010    < > = values in this interval not displayed.     Estimated Creatinine Clearance: 60.8 mL/min (A) (by C-G formula based on SCr of 1.5 mg/dL (H)).  Brief Urine Lab Results  (Last result in the past 365 days)      Color   Clarity   Blood   Leuk Est   Nitrite   Protein   CREAT   Urine HCG        07/01/20 0913 Yellow Clear Negative Negative Negative 30 mg/dL (1+)               Microbiology Results (last 10 days)     ** No results found for the last 240 hours. **          ECG/EMG Results (most recent)     Procedure Component Value Units Date/Time    ECG 12 Lead [[id number]] Collected:  07/17/20 1636     Updated:  07/17/20 1638    Narrative:       HEART RATE= 73  bpm  RR Interval= 824  ms  ME Interval= 189  ms  P Horizontal Axis= -9  deg  P Front Axis= 64  deg  QRSD Interval= 83  ms  QT Interval= 392  ms  QRS Axis= 24  deg  T Wave Axis= 52  deg  - OTHERWISE NORMAL ECG -  Sinus rhythm  Low voltage, precordial leads  Electronically Signed By:   Date and Time of Study: 2020-07-17 16:36:34          Results for  orders placed during the hospital encounter of 07/13/20   Duplex Venous Lower Extremity - Right CAR    Narrative · Normal right lower extremity venous duplex scan.          Results for orders placed during the hospital encounter of 11/25/19   Adult Transesophageal Echo (LILIAN) W/ Cont if Necessary Per Protocol    Narrative · Left ventricular systolic function is normal.  · Left atrial cavity size is mildly dilated.  · Mild mitral valve regurgitation is present  · Mild tricuspid valve regurgitation is present.  · LV ejection fraction is about 60%  · Significant patent foramen ovale noted by bubble contrast study  · No pericardial effusion noted  · Aorta has minimal atherosclerosis  · Technically difficult study          Ct Angiogram Head    Result Date: 7/13/2020   1. Atherosclerotic plaque in both carotid bifurcations. The degree of narrowing is less than 50% and not hemodynamically significant. 2. No evidence of vertebrobasilar stenosis. 3. No intracranial stenoses or occlusion. No evidence of aneurysm or AVM.  Electronically Signed ByNik Daly On:7/13/2020 4:37 PM This report was finalized on [id number] by  Honorio Daly, .    Ct Angiogram Neck    Result Date: 7/13/2020   1. Atherosclerotic plaque in both carotid bifurcations. The degree of narrowing is less than 50% and not hemodynamically significant. 2. No evidence of vertebrobasilar stenosis. 3. No intracranial stenoses or occlusion. No evidence of aneurysm or AVM.  Electronically Signed ByNik Daly On:7/13/2020 4:37 PM This report was finalized on [id number] by  Honorio Daly, .    Mri Brain Without Contrast    Result Date: 7/17/2020   1. 5 mm persistent area of acute infarct in the left parietal lobe is unchanged from 3 days ago. There are also areas of subacute infarcts in the right frontal lobe and right basal ganglia unchanged in distribution. No new areas of restricted diffusion. No acute hemorrhages, mass effect or hydrocephalus. 2. Stable mild to  moderate chronic microvascular ischemic disease.  Electronically Signed By-Gelacio Jensen DO. On:7/17/2020 6:59 PM This report was finalized on [id number] by  Gelacio Jensen DO..    Mri Brain Without Contrast    Result Date: 7/14/2020   IMPRESSION:  1. Multifocal small acute or subacute infarcts involving the posterior left lentiform nucleus, high left parietal lobe, and periventricular right frontal lobe. The findings could indicate features of shower emboli phenomenon. No hemorrhagic transformation. 2. Features of mild to moderate chronic microvascular disease.   Electronically Signed By-Dr. Charla Nelson MD On:7/14/2020 1:44 PM This report was finalized on [id number] by Dr. Charla Nelson MD.    Xr Chest 1 View    Result Date: 7/17/2020  No acute chest finding.  Electronically Signed By-Dr. Charla Nelson MD On:7/17/2020 4:53 PM This report was finalized on [id number] by Dr. Charla Nelson MD.    Xr Chest 1 View    Result Date: 7/13/2020  Negative portable chest  Electronically Signed By-Honorio Daly On:7/13/2020 3:24 PM This report was finalized on [id number] by  Honorio Daly, .    Ct Head Without Contrast Stroke Protocol    Result Date: 7/17/2020  No acute intracranial findings. Mild chronic microvascular disease and chronic lacunar infarcts in bilateral basal ganglia and the periventricular left frontal lobe.  Electronically Signed By-Dr. Charla Nelson MD On:7/17/2020 4:29 PM This report was finalized on [id number] by Dr. Charla Nelson MD.    Ct Head Without Contrast Stroke Protocol    Result Date: 7/13/2020  No evidence of hemorrhage, mass effect or midline shift. No acute process identified.  The above findings were discussed with Brianne Carreon nurse practitioner at 3:06 PM on July 13, 2020  Electronically Signed By-Lilia Scruggs On:7/13/2020 3:07 PM This report was finalized on [id number] by  Lilia Scruggs, .        Estimated Creatinine Clearance: 60.8 mL/min (A) (by C-G formula based on  SCr of 1.5 mg/dL (H)).    Assessment/Plan   Assessment/Plan       Active Hospital Problems    Diagnosis  POA   • **TIA (transient ischemic attack) [G45.9]  Yes   • ANT (acute kidney injury) (CMS/[facility]) [N17.9]  Yes   • Chronic kidney disease (CKD), stage III (moderate) (CMS/[facility]) [N18.3]  Yes   • Hypertension [I10]  Yes   • PFO (patent foramen ovale) [Q21.1]  Not Applicable   • Intractable migraine without aura and without status migrainosus [G43.019]  Yes   • Diabetic polyneuropathy associated with type 2 diabetes mellitus (CMS/[facility]) [E11.42]  Yes   • Mixed hyperlipidemia [E78.2]  Yes   • Major depressive disorder, recurrent episode, moderate degree (CMS/[facility]) [F33.1]  Yes      Resolved Hospital Problems   No resolved problems to display.        TIA, with history of CVA   -NIH is 0 upon evaluation by neurology   - initial troponin negative  -CT head showed no acute intracranial findings.  Mild chronic microvascular disease and chronic lacunar infarcts in bilateral basal ganglia and the periventricular left frontal lobe.    -MRI shows 5 mm persistent area of acute infarct in the left parietal lobe is unchanged from 3 days ago. There are also areas of subacute infarcts in the right frontal lobe and right basal ganglia unchanged in distribution. No new areas of restricted diffusion. No acute hemorrhages, mass effect or hydrocephalus. Stable mild to moderate chronic microvascular ischemic disease.    -EKG showed sinus rhythm 73 bpm, no ST elevation apparent.    -Last echo done on 11/29/2019 showed ejection fraction of 60% and significant patent foramen ovale noted by bubble contrast study.    -Patient is afebrile, pulse in the 70s, on room air oxygen and 96% SPO2 blood pressure normotensive.  -Dr. Low, neurology following  -Lipids, B12, A1c and TSH last checked on 7/14/2020  -Fall precautions, neuro checks  -Continue home aspirin, statin  -ST/PT/OT consult, n.p.o. until swallow screen then diabetic diet    PFO  -Continue  home Eliquis  -Consider cardiology consult    ANT with CKD stage III  -serum creatinine 1.5,   -GFR 47 with baseline serum creatinine of 1.0.  -IV fluids 75 mL/h  -Hold home lisinopril  -Monitor BMP    Diabetes mellitus type 2 with hyperglycemia with peripheral neuropathy  -glucose elevated at 324,    -SSI, Accu-Cheks 3 times daily before meals  -Last A1c of 8.1 on 7/13/2020  -Continue home Lantus  -Hold home Humalog, Farxiga  -Continue home Lyrica, Norco, baclofen    Depression  -Continue home Prozac, Lyrica    Hyperlipidemia  -Continue home statin    Chronic migraine disorder  -ESR and CRP both normal upon last admission    VTE Prophylaxis -Eliquis  Mechanical Order History:     None      Pharmalogical Order History:     None          CODE STATUS:    Code Status and Medical Interventions:   Ordered at: 07/17/20 1913     Code Status:    CPR     Medical Interventions (Level of Support Prior to Arrest):    Full       This patient has been examined wearing appropriate Personal Protective Equipment and discussed with hospital infection control department. 07/17/20      I discussed the patient's findings and my recommendations with patient.        Electronically signed by GAVIOTA Whitten, 07/17/20, 7:04 PM.  [facility] Hospitalist Team

## 2020-07-17 NOTE — OUTREACH NOTE
Call Center TCM Note      Responses   [facility] patient discharged from?  [facility]   Does the patient have one of the following disease processes/diagnoses(primary or secondary)?  Stroke (TIA)   TCM attempt successful?  Yes   Call start time  1210   Call end time  1216   Discharge diagnosis  CVA   Meds reviewed with patient/caregiver?  Yes   Is the patient having any side effects they believe may be caused by any medication additions or changes?  No   Does the patient have all medications ordered at discharge?  Yes   Is the patient taking all medications as directed (includes completed medication regime)?  No   What is preventing the patient from taking all medications as directed?  Other [Pt has not picked up at time of call]   Nursing Interventions  Nurse provided patient education   Comments regarding appointments  Appt with cardiology on 8/17/20,  Appt with sleep study will be scheduled soon   Does the patient have a primary care provider?   Yes   Does the patient have an appointment with their PCP within 7 days of discharge?  Yes   Comments regarding PCP  7/21/20   Has the patient kept scheduled appointments due by today?  N/A   Psychosocial issues?  No   Does the patient require any assistance with activities of daily living such as eating, bathing, dressing, walking, etc.?  No   Does the patient have any residual symptoms from stroke/TIA?  Yes   Residual symptoms comments  Pt drags his left leg more than usual   Does the patient understand the diet ordered at discharge?  Yes   Did the patient receive a copy of their discharge instructions?  Yes   Nursing interventions  Reviewed instructions with patient   What is the patient's perception of their health status since discharge?  Improving   Nursing interventions  Nurse provided patient education   Is the patient able to teach back FAST for Stroke?  Yes [Provided education]   Is the patient/caregiver able to teach back the risk factors for a stroke?   Diabetes, High blood pressure-goal below 120/80, Smoking, High Cholesterol [Diabetic]   Is the patient/caregiver able to teach back signs and symptoms related to disease process for when to call PCP?  Yes   Is the patient/caregiver able to teach back signs and symptoms related to disease process for when to call 911?  Yes   If the patient is a current smoker, are they able to teach back resources for cessation?  -- [Nonsmoker]   Is the patient/caregiver able to teach back the hierarchy of who to call/visit for symptoms/problems? PCP, Specialist, Home health nurse, Urgent Care, ED, 911  Yes   TCM call completed?  Yes          Alondra Mc RN    7/17/2020, 12:16

## 2020-07-17 NOTE — ED PROVIDER NOTES
Subjective   Patient is a 64-year-old male who complains of headache dizziness and difficulty with his speech.  He states he had similar symptoms 1 week ago and was diagnosed with TIA versus CVA.  He was discharged from the hospital yesterday without complaint or abnormality.  He states this developed over the past 4 hours.          Review of Systems  Negative for earache sore throat neck pain cough fever chest pain shortness of breath abdominal pain Bondar dysuria is weight loss or other complaint.  A complete a systems was obtained and is otherwise negative.  Past Medical History:   Diagnosis Date   • Arthritis    • BPH (benign prostatic hyperplasia)    • Chronic back pain    • Diabetes mellitus (CMS/HCC)    • Hypertension    • Low back pain    • Low vision of right eye with normal vision in contralateral eye    • Memory problem    • Nonobstructive atherosclerosis of coronary artery    • PFO (patent foramen ovale) 11/29/2019       No Known Allergies    Past Surgical History:   Procedure Laterality Date   • ARM TENDON REPAIR Left    • CHOLECYSTECTOMY     • COLONOSCOPY      2018 = TA, rech 2023   • EYE SURGERY      Rt Eye Sx after trauma @ 4y/o   • JOINT REPLACEMENT      Left TKR   • ROTATOR CUFF REPAIR Bilateral    • UMBILICAL HERNIA REPAIR         Family History   Problem Relation Age of Onset   • Diabetes Mother    • Heart disease Mother    • Arthritis Mother    • Obesity Mother    • Hypertension Mother    • Migraines Mother    • Osteoporosis Mother    • Diabetes Father    • Heart disease Father    • Arthritis Father    • Hypertension Father    • Stroke Father    • Heart attack Father    • Hyperlipidemia Father    • Diabetes Brother    • Heart disease Brother    • Arthritis Sister    • Anemia Sister        Social History     Socioeconomic History   • Marital status: Single     Spouse name: Not on file   • Number of children: Not on file   • Years of education: Not on file   • Highest education level: Not on file    Tobacco Use   • Smoking status: Never Smoker   • Smokeless tobacco: Never Used   Substance and Sexual Activity   • Alcohol use: Yes     Alcohol/week: 1.0 standard drinks     Types: 1 Cans of beer per week   • Drug use: Never   • Sexual activity: Defer           Objective   Physical Exam  Neurologic exam shows patient in mild dysarthria.  Has no focal neurologic deficits.  NIH is 1.  HEENT exam shows TMs to be clear.  Oropharynx comers but sclerae nonicteric.  Neck has no adenopathy JVD or bruits.  Lungs are clear.  Heart has a regular rate rhythm without murmur gallop.  Chest is nontender.  Abdomen is soft nontender.  Extremity exam is no cyanosis or edema.  Procedures     My EKG interpretation shows normal sinus rhythm with no acute ST change      ED Course      Results for orders placed or performed during the hospital encounter of 07/17/20   Comprehensive Metabolic Panel   Result Value Ref Range    Glucose 324 (H) 65 - 99 mg/dL    BUN      Creatinine 1.50 (H) 0.76 - 1.27 mg/dL    Sodium 139 136 - 145 mmol/L    Potassium 4.5 3.5 - 5.2 mmol/L    Chloride 99 98 - 107 mmol/L    CO2 27.0 22.0 - 29.0 mmol/L    Calcium 9.2 8.6 - 10.5 mg/dL    Total Protein 7.1 6.0 - 8.5 g/dL    Albumin 4.20 3.50 - 5.20 g/dL    ALT (SGPT) 15 1 - 41 U/L    AST (SGOT) 14 1 - 40 U/L    Alkaline Phosphatase 63 39 - 117 U/L    Total Bilirubin 0.3 0.0 - 1.2 mg/dL    eGFR Non African Amer 47 (L) >60 mL/min/1.73    Globulin 2.9 gm/dL    A/G Ratio 1.4 g/dL    BUN/Creatinine Ratio      Anion Gap 13.0 5.0 - 15.0 mmol/L   Protime-INR   Result Value Ref Range    Protime 10.4 9.6 - 11.7 Seconds    INR 0.99 0.90 - 1.10   aPTT   Result Value Ref Range    PTT 26.1 24.0 - 31.0 seconds   Troponin   Result Value Ref Range    Troponin T <0.010 0.000 - 0.030 ng/mL   CBC Auto Differential   Result Value Ref Range    WBC 7.40 3.40 - 10.80 10*3/mm3    RBC 4.58 4.14 - 5.80 10*6/mm3    Hemoglobin 13.9 13.0 - 17.7 g/dL    Hematocrit 42.1 37.5 - 51.0 %    MCV 91.8  79.0 - 97.0 fL    MCH 30.2 26.6 - 33.0 pg    MCHC 32.9 31.5 - 35.7 g/dL    RDW 14.4 12.3 - 15.4 %    RDW-SD 45.9 37.0 - 54.0 fl    MPV 11.7 6.0 - 12.0 fL    Platelets 161 140 - 450 10*3/mm3    Neutrophil % 62.3 42.7 - 76.0 %    Lymphocyte % 29.2 19.6 - 45.3 %    Monocyte % 6.2 5.0 - 12.0 %    Eosinophil % 1.6 0.3 - 6.2 %    Basophil % 0.7 0.0 - 1.5 %    Neutrophils, Absolute 4.60 1.70 - 7.00 10*3/mm3    Lymphocytes, Absolute 2.20 0.70 - 3.10 10*3/mm3    Monocytes, Absolute 0.50 0.10 - 0.90 10*3/mm3    Eosinophils, Absolute 0.10 0.00 - 0.40 10*3/mm3    Basophils, Absolute 0.10 0.00 - 0.20 10*3/mm3    nRBC 0.1 0.0 - 0.2 /100 WBC   BUN   Result Value Ref Range    BUN 23 8 - 23 mg/dL   POC Glucose Once   Result Value Ref Range    Glucose 310 (H) 70 - 105 mg/dL   Type & Screen   Result Value Ref Range    ABO Type O     RH type Positive     Antibody Screen Negative     T&S Expiration Date 7/20/2020 11:59:59 PM    Light Blue Top   Result Value Ref Range    Extra Tube hold for add-on    Lavender Top   Result Value Ref Range    Extra Tube hold for add-on    Gold Top - SST   Result Value Ref Range    Extra Tube Hold for add-ons.      Xr Chest 1 View    Result Date: 7/17/2020  No acute chest finding.  Electronically Signed By-Dr. Charla Nelson MD On:7/17/2020 4:53 PM This report was finalized on [id number] by Dr. Charla Nelson MD.    Ct Head Without Contrast Stroke Protocol    Result Date: 7/17/2020  No acute intracranial findings. Mild chronic microvascular disease and chronic lacunar infarcts in bilateral basal ganglia and the periventricular left frontal lobe.  Electronically Signed By-Dr. Charla Nelson MD On:7/17/2020 4:29 PM This report was finalized on [id number] by Dr. Charla Nelson MD.                                         MDM  Number of Diagnoses or Management Options  Diagnosis management comments: I reviewed the patient chart from last week.  It is noted the patient a complete neurologic evaluation was  negative.  I did speak the on-call neurologist.  Patient is not a TPA candidate due to his low NIH scale and recent evaluation for no large vessel disease.  Will be brought to hospital for observation but I did speak the on-call hospitalist.       Amount and/or Complexity of Data Reviewed  Clinical lab tests: reviewed    Risk of Complications, Morbidity, and/or Mortality  Presenting problems: high  Diagnostic procedures: high  Management options: high    Patient Progress  Patient progress: stable      Final diagnoses:   TIA (transient ischemic attack)            Pedro William MD  07/17/20 4890

## 2020-07-18 ENCOUNTER — READMISSION MANAGEMENT (OUTPATIENT)
Dept: CALL CENTER | Facility: HOSPITAL | Age: 65
End: 2020-07-18

## 2020-07-18 VITALS
TEMPERATURE: 96.7 F | HEART RATE: 70 BPM | BODY MASS INDEX: 36.18 KG/M2 | DIASTOLIC BLOOD PRESSURE: 79 MMHG | RESPIRATION RATE: 18 BRPM | SYSTOLIC BLOOD PRESSURE: 144 MMHG | OXYGEN SATURATION: 100 % | WEIGHT: 244.27 LBS | HEIGHT: 69 IN

## 2020-07-18 PROBLEM — G45.9 TIA (TRANSIENT ISCHEMIC ATTACK): Status: RESOLVED | Noted: 2020-07-17 | Resolved: 2020-07-18

## 2020-07-18 LAB
GLUCOSE BLDC GLUCOMTR-MCNC: 104 MG/DL (ref 70–105)
GLUCOSE BLDC GLUCOMTR-MCNC: 96 MG/DL (ref 70–105)
MAGNESIUM SERPL-MCNC: 2 MG/DL (ref 1.6–2.4)
POTASSIUM SERPL-SCNC: 4.7 MMOL/L (ref 3.5–5.2)

## 2020-07-18 PROCEDURE — 99213 OFFICE O/P EST LOW 20 MIN: CPT | Performed by: PSYCHIATRY & NEUROLOGY

## 2020-07-18 PROCEDURE — 92523 SPEECH SOUND LANG COMPREHEN: CPT

## 2020-07-18 PROCEDURE — 97165 OT EVAL LOW COMPLEX 30 MIN: CPT

## 2020-07-18 PROCEDURE — 97161 PT EVAL LOW COMPLEX 20 MIN: CPT

## 2020-07-18 PROCEDURE — 83735 ASSAY OF MAGNESIUM: CPT | Performed by: PHYSICIAN ASSISTANT

## 2020-07-18 PROCEDURE — 96361 HYDRATE IV INFUSION ADD-ON: CPT

## 2020-07-18 PROCEDURE — 99217 PR OBSERVATION CARE DISCHARGE MANAGEMENT: CPT | Performed by: INTERNAL MEDICINE

## 2020-07-18 PROCEDURE — G0378 HOSPITAL OBSERVATION PER HR: HCPCS

## 2020-07-18 PROCEDURE — 82962 GLUCOSE BLOOD TEST: CPT

## 2020-07-18 PROCEDURE — 84132 ASSAY OF SERUM POTASSIUM: CPT | Performed by: PHYSICIAN ASSISTANT

## 2020-07-18 RX ORDER — BUTALBITAL, ACETAMINOPHEN AND CAFFEINE 50; 325; 40 MG/1; MG/1; MG/1
1 TABLET ORAL EVERY 6 HOURS PRN
Status: DISCONTINUED | OUTPATIENT
Start: 2020-07-18 | End: 2020-07-18 | Stop reason: HOSPADM

## 2020-07-18 RX ORDER — BUTALBITAL, ACETAMINOPHEN AND CAFFEINE 50; 325; 40 MG/1; MG/1; MG/1
1 TABLET ORAL EVERY 6 HOURS PRN
Qty: 20 TABLET | Refills: 0 | Status: SHIPPED | OUTPATIENT
Start: 2020-07-18 | End: 2021-08-02

## 2020-07-18 RX ADMIN — SODIUM CHLORIDE 75 ML/HR: 900 INJECTION, SOLUTION INTRAVENOUS at 13:22

## 2020-07-18 RX ADMIN — SODIUM CHLORIDE 75 ML/HR: 900 INJECTION, SOLUTION INTRAVENOUS at 00:32

## 2020-07-18 RX ADMIN — ACETAMINOPHEN 650 MG: 325 TABLET, FILM COATED ORAL at 03:00

## 2020-07-18 RX ADMIN — BACLOFEN 10 MG: 10 TABLET ORAL at 08:51

## 2020-07-18 RX ADMIN — HYDROCODONE BITARTRATE AND ACETAMINOPHEN 1 TABLET: 10; 325 TABLET ORAL at 01:43

## 2020-07-18 RX ADMIN — BUTALBITAL, ACETAMINOPHEN AND CAFFEINE 1 TABLET: 50; 325; 40 TABLET ORAL at 11:15

## 2020-07-18 RX ADMIN — APIXABAN 5 MG: 5 TABLET, FILM COATED ORAL at 08:51

## 2020-07-18 RX ADMIN — HYDROCODONE BITARTRATE AND ACETAMINOPHEN 1 TABLET: 10; 325 TABLET ORAL at 08:51

## 2020-07-18 RX ADMIN — Medication 10 ML: at 08:51

## 2020-07-18 RX ADMIN — PREGABALIN 75 MG: 75 CAPSULE ORAL at 08:51

## 2020-07-18 NOTE — THERAPY EVALUATION
Acute Care - Speech Language Pathology Initial Evaluation   [facility]     Patient Name: Humble Green  : 1955  MRN: [id number]  Today's Date: 2020               Admit Date: 2020     Visit Dx:    ICD-10-CM ICD-9-CM   1. TIA (transient ischemic attack) G45.9 435.9     Patient Active Problem List   Diagnosis   • Mixed hyperlipidemia   • Major depressive disorder, recurrent episode, moderate degree (CMS/HCC)   • Diabetic polyneuropathy associated with type 2 diabetes mellitus (CMS/HCC)   • Intractable migraine without aura and without status migrainosus   • PFO (patent foramen ovale)   • Hypertension   • Nonobstructive atherosclerosis of coronary artery   • Diabetes mellitus (CMS/HCC)   • Confusion and disorientation   • Cerebrovascular accident (CVA) due to bilateral embolism of middle cerebral arteries (CMS/HCC)   • TIA (transient ischemic attack)   • ANT (acute kidney injury) (CMS/HCC)   • Chronic kidney disease (CKD), stage III (moderate) (CMS/HCC)     Past Medical History:   Diagnosis Date   • Arthritis    • BPH (benign prostatic hyperplasia)    • Chronic back pain    • Diabetes mellitus (CMS/HCC)    • Hypertension    • Low back pain    • Low vision of right eye with normal vision in contralateral eye    • Memory problem    • Nonobstructive atherosclerosis of coronary artery    • PFO (patent foramen ovale) 2019     Past Surgical History:   Procedure Laterality Date   • ARM TENDON REPAIR Left    • CHOLECYSTECTOMY     • COLONOSCOPY       = TA, rech    • EYE SURGERY      Rt Eye Sx after trauma @ 2y/o   • JOINT REPLACEMENT      Left TKR   • ROTATOR CUFF REPAIR Bilateral    • UMBILICAL HERNIA REPAIR          SLP EVALUATION (last 72 hours)      SLP SLC Evaluation     Row Name 20 1200                   Communication Assessment/Intervention    Document Type  evaluation  -MC           General Information    Patient Profile Reviewed  yes  -MC        Pertinent History Of Current Problem   "Pt familiar to this department, having undergone previous speech/lang/cognitive linguistic assessment 7/15 on during recent admission for multiple bihemispheric small embolic strokes.  Specifically \"small acute or subacute infarcts involving the posterior left lentiform nucleus, high left parietal lobe, and periventricular right frontal lobe. The findings could indicate features of shower emboli phenomenon. No hemorrhagic transformation.\"  Following ST assessment, he was recommended for outpatient therapy targeting cognitive linguistic deficits. He was then readmitted with c/o sensory paresthesias and dizziness described as being light headed. MRI did not show any changes with acute L partiel infarct unchanged. He lives at home alone & works part time as a  at [employer]. Pt passed dysphagia stroke swallow screen & was placed on regular diet with thin liquids. He was evaluated with the mini mental state exam to assess any changes & updated recommendations for skilled intervention this admission     -           Oral Musculature and Cranial Nerve Assessment    Oral Motor, Comment  OME appears grossly intact with no overt facial asymmetry/weakness.   -           Cognitive Assessment Intervention- SLP    Cognitive Function (Cognition)  mild impairment  -        Orientation Status (Cognition)  WFL  -        Memory (Cognitive)  immediate;delayed;mild impairment  -        Problem Solving (Cognitive)  temporal;mild impairment;moderate impairment;multifactorial  -        Functional Math (Cognitive)  simple;mild impairment  -        Executive Function (Cognition)  deficit awareness;time management;planning;mild impairment  -        Cognition, Comment  Pt was evaluated with the mini mental state examination (MMSE) this admission to assess for any changes in previous recommendations & need for skilled services. Other informal measures of assessment also conducted. Pt scored a 23/29 " possible points (unable to complete visuospatial task as he did not have glasses.) areas of deficits fall in the domain of attention, executive function, memory with continued difficulty completing mental math, and verbal memory. Pt demonstrated difficulty with single step verbal problem solving questions related to time & medication administration. Executive function appeared impaired in absence of deficit awareness/identification and no attempts for self correction.   -           Standardized Tests    Cognitive/Memory Tests  -- minim mental state examination  -           SLP Clinical Impressions    SLP Diagnosis  Mild cognitive linguistic impairment  -        Rehab Potential/Prognosis  good  -           Recommendations    Therapy Frequency (SLP SLC)  2 days per week;3 days per week  -        Predicted Duration Therapy Intervention (Days)  until discharge  -        Anticipated Dischage Disposition (SLP)  home with  services  -           Communication Treatment Objective and Progress Goals (SLP)    Cognitive Linguistic Treatment Objectives  Cognitive Linguistic Treatment Objectives (Group)  -           Cognitive Linguistic Treatment Objectives    Memory Skills Selection  memory skills, SLP goal 1  -        Organizational Skills Selection  organizational skills, SLP goal 1  -        Problem Solving Selection  problem solving, SLP goal 1  -        Functional Math Skills Selection  functional math skills, SLP goal 1  -        Executive Function Skills Selection  executive function skills, SLP goal 1  -           Memory Skills Goal 1 (SLP)    Improve Memory Skills Through Goal 1 (SLP)  recalling related word lists with an imposed delay;repeat sentence;90%;use memory strategies;independently (over 90% accuracy)  -        Time Frame (Memory Skills Goal 1, SLP)  by discharge  -           Functional Problem Solving Skills Goal 1 (SLP)    Improve Problem Solving Through Goal 1 (SLP)  determine  solutions to simple ADL/safety problems;determine solutions to multifactorial problems;complete organization/home management task;80%;with minimal cues (75-90%)  -           Functional Math Skills Goal 1 (SLP)    Improve Functional Math Skills Through Goal 1 (SLP)  complete simple math problems;80%;with minimal cues (75-90%)  -           Executive Functional Skills Goal 1 (SLP)    Improve Executive Function Skills Goal 1 (SLP)  demonstrate awareness of deficit;identify strategies, strengths, limitations;identify anticipated needs;time management activity;90%;home management activity;with minimal cues (75-90%)  -          User Key  (r) = Recorded By, (t) = Taken By, (c) = Cosigned By    Initials Name Effective Dates     Mary Lou Campo, SLP 03/01/19 -              EDUCATION  The patient has been educated in the following areas:     Cognitive Impairment.    SLP Recommendation and Plan  SLP Diagnosis: Mild cognitive linguistic impairment        Anticipated Dischage Disposition (SLP): home with OP services     Predicted Duration Therapy Intervention (Days): until discharge           SLP GOALS     Row Name 07/18/20 1200             Memory Skills Goal 1 (SLP)    Improve Memory Skills Through Goal 1 (SLP)  recalling related word lists with an imposed delay;repeat sentence;90%;use memory strategies;independently (over 90% accuracy)  -      Time Frame (Memory Skills Goal 1, SLP)  by discharge  -         Functional Problem Solving Skills Goal 1 (SLP)    Improve Problem Solving Through Goal 1 (SLP)  determine solutions to simple ADL/safety problems;determine solutions to multifactorial problems;complete organization/home management task;80%;with minimal cues (75-90%)  -         Functional Math Skills Goal 1 (SLP)    Improve Functional Math Skills Through Goal 1 (SLP)  complete simple math problems;80%;with minimal cues (75-90%)  -         Executive Functional Skills Goal 1 (SLP)    Improve Executive Function  Skills Goal 1 (SLP)  demonstrate awareness of deficit;identify strategies, strengths, limitations;identify anticipated needs;time management activity;90%;home management activity;with minimal cues (75-90%)  -        User Key  (r) = Recorded By, (t) = Taken By, (c) = Cosigned By    Initials Name Provider Type    Mary Lou Rick, SLP Speech and Language Pathologist                  Time Calculation:                        HANNAH Watts  7/18/2020

## 2020-07-18 NOTE — OUTREACH NOTE
Prep Survey      Responses   [facility] discharged [facility]?  [facility]   Is LACE score < 7 ?  No   Eligibility  [facility]   Hospital  [facility]   Date of Admission  07/17/20   Date of Discharge  07/18/20   Discharge Disposition  Home or Self Care   Discharge diagnosis  TIA   COVID-19 Test Status  Negative   Does the patient have one of the following disease processes/diagnoses(primary or secondary)?  Stroke (TIA)   Does the patient have Home health ordered?  No   Is there a DME ordered?  No   Prep survey completed?  Yes          Adwoa Rivera RN

## 2020-07-18 NOTE — PLAN OF CARE
Pt re-admitted to [facility] after just being discharged on 7/16, pt was having blurred vision, headache and speech issues. When arriving to the unit, spoke to Dr. Low who has already seen pt and aware of condition, NIH-1 due to sensory issues on Left side, meds given for c/o headache and pt has since been resting in bed, free from falls with call light in reach. Will continue to monitor.  Problem: Patient Care Overview  Goal: Plan of Care Review  Outcome: Ongoing (interventions implemented as appropriate)  Flowsheets  Taken 7/18/2020 0150  Progress: no change  Taken 7/17/2020 6587  Plan of Care Reviewed With: patient  Goal: Individualization and Mutuality  Outcome: Ongoing (interventions implemented as appropriate)  Goal: Discharge Needs Assessment  Outcome: Ongoing (interventions implemented as appropriate)  Goal: Interprofessional Rounds/Family Conf  Outcome: Ongoing (interventions implemented as appropriate)     Problem: Pain, Chronic (Adult)  Goal: Identify Related Risk Factors and Signs and Symptoms  Outcome: Ongoing (interventions implemented as appropriate)  Goal: Acceptable Pain/Comfort Level and Functional Ability  Outcome: Ongoing (interventions implemented as appropriate)     Problem: Stroke (Ischemic) (Adult)  Goal: Signs and Symptoms of Listed Potential Problems Will be Absent, Minimized or Managed (Stroke)  Outcome: Ongoing (interventions implemented as appropriate)

## 2020-07-18 NOTE — PROGRESS NOTES
Cc:   Dizziness and sensory paresthesias.       S:  Patient states that he is doing better, sensory paresthesias almost resolved.  However c/o headaches seems like chronic issue.     O:  Vitals stable.  Repeat MRI of Brain showed no new acute changes.  O/E the patient is awake, alert, follow commands, speech is good.  CN EOMI, no facial droop, tongue midline.  Motor 5/5.      A:  64 year old gentleman with multiple medical problems with recent work up for embolic strokes unremarkable except for PFO.  He has been on Eliquis and ASA.  The MRI of Brain done did not showed any new changes.  His symptoms may be related to anxiety and mood disorder.      P:  Will continue same care.  Neurostatus is stable.  Will follow as needed.

## 2020-07-18 NOTE — THERAPY EVALUATION
Patient Name: Humble Green  : 1955    MRN: [id number]                              Today's Date: 2020       Admit Date: 2020    Visit Dx:     ICD-10-CM ICD-9-CM   1. TIA (transient ischemic attack) G45.9 435.9     Patient Active Problem List   Diagnosis   • Mixed hyperlipidemia   • Major depressive disorder, recurrent episode, moderate degree (CMS/[facility])   • Diabetic polyneuropathy associated with type 2 diabetes mellitus (CMS/[facility])   • Intractable migraine without aura and without status migrainosus   • PFO (patent foramen ovale)   • Hypertension   • Nonobstructive atherosclerosis of coronary artery   • Diabetes mellitus (CMS/[facility])   • Confusion and disorientation   • Cerebrovascular accident (CVA) due to bilateral embolism of middle cerebral arteries (CMS/[facility])   • ANT (acute kidney injury) (CMS/[facility])   • Chronic kidney disease (CKD), stage III (moderate) (CMS/[facility])     Past Medical History:   Diagnosis Date   • Arthritis    • BPH (benign prostatic hyperplasia)    • Chronic back pain    • Diabetes mellitus (CMS/[facility])    • Hypertension    • Low back pain    • Low vision of right eye with normal vision in contralateral eye    • Memory problem    • Nonobstructive atherosclerosis of coronary artery    • PFO (patent foramen ovale) 2019     Past Surgical History:   Procedure Laterality Date   • ARM TENDON REPAIR Left    • CHOLECYSTECTOMY     • COLONOSCOPY       = TA, rech    • EYE SURGERY      Rt Eye Sx after trauma @ 2y/o   • JOINT REPLACEMENT      Left TKR   • ROTATOR CUFF REPAIR Bilateral    • UMBILICAL HERNIA REPAIR       General Information     Row Name 20 1829          PT Evaluation Time/Intention    Document Type  evaluation  -     Mode of Treatment  physical therapy  -     Row Name 20 1829          General Information    Patient Profile Reviewed?  yes  -     Prior Level of Function  independent:  -     Existing Precautions/Restrictions  no known  precautions/restrictions  -     Barriers to Rehab  none identified  -[facility] Name 07/18/20 1829          Relationship/Environment    Lives With  alone  -[facility] Name 07/18/20 1829          Resource/Environmental Concerns    Current Living Arrangements  home/apartment/condo  -[facility] Name 07/18/20 1829          Home Main Entrance    Number of Stairs, Main Entrance  none  -[facility] Name 07/18/20 1829          Stairs Within Home, Primary    Number of Stairs, Within Home, Primary  none  -[facility] Name 07/18/20 1829          Cognitive Assessment/Intervention- PT/OT    Orientation Status (Cognition)  oriented x 4  -[facility] Name 07/18/20 1829          Safety Issues, Functional Mobility    Impairments Affecting Function (Mobility)  balance  -       User Key  (r) = Recorded By, (t) = Taken By, (c) = Cosigned By    Initials Name Provider Type     Cait Sanford, PT Physical Therapist        Mobility     [facility] Name 07/18/20 1829          Bed Mobility Assessment/Treatment    Bed Mobility Assessment/Treatment  bed mobility (all) activities  -     [place] Level (Bed Mobility)  independent  -[facility] Name 07/18/20 1829          Bed-Chair Transfer    Bed-Chair [place] (Transfers)  independent  -[facility] Name 07/18/20 1829          Sit-Stand Transfer    Sit-Stand [place] (Transfers)  independent  -[facility] Name 07/18/20 1829          Gait/Stairs Assessment/Training    [place] Level (Gait)  supervision  -     Distance in Feet (Gait)  300'  -     Comment (Gait/Stairs)  normal gait pattern  -       User Key  (r) = Recorded By, (t) = Taken By, (c) = Cosigned By    Initials Name Provider Type     Cait Sanford, PT Physical Therapist        Obj/Interventions     Row Name 07/18/20 1829          General ROM    GENERAL ROM COMMENTS  AROM WNLs  -[facility] Name 07/18/20 1829          MMT (Manual Muscle Testing)    General MMT Comments  strength 5/5  -[facility] Name 07/18/20 1829           Static Sitting Balance    Level of [place] (Unsupported Sitting, Static Balance)  independent  -J[facility]     Row Name 07/18/20 1829          Dynamic Sitting Balance    Level of [place], Reaches Outside Midline (Sitting, Dynamic Balance)  independent  -JH     Row Name 07/18/20 1829          Static Standing Balance    Level of [place] (Supported Standing, Static Balance)  independent  -JH     Row Name 07/18/20 1829          Dynamic Standing Balance    Level of [place], Reaches Outside Midline (Standing, Dynamic Balance)  contact guard assist  -     Comment, Reaches Outside Midline (Standing, Dynamic Balance)  challenged with increased gait speed, tandem gait and SLS activity  -       User Key  (r) = Recorded By, (t) = Taken By, (c) = Cosigned By    Initials Name Provider Type     Cait Sanford, PT Physical Therapist        Goals/Plan    No documentation.       Clinical Impression     Row Name 07/18/20 1830          Pain Assessment    Additional Documentation  Pain Scale: FACES Pre/Post-Treatment (Group)  -J[facility]     Row Name 07/18/20 1830          Pain Scale: FACES Pre/Post-Treatment    Pain: FACES Scale, Pretreatment  0-->no hurt  -     Pain: FACES Scale, Post-Treatment  0-->no hurt  -JH     Row Name 07/18/20 1830          Plan of Care Review    Plan of Care Reviewed With  patient  -     Outcome Summary  63 yo male admitted with HA, dizziness and vision changes, d/c earlier in week with d/o stroke.  Pts symptoms have resolved.  Pt was scheduled for OP PT at last hospital discharge.  Pt is up ad jacquelin, mild balance deficits wtih high level gait activity.  Safe for d/c home.  PPE worn:  gloves, mask with face shield.   -J[facility]     Row Name 07/18/20 1830          Physical Therapy Clinical Impression    Criteria for Skilled Interventions Met (PT Clinical Impression)  no;current level of function same as previous level of function  -JH     Row Name 07/18/20 1830          Positioning and Restraints     Pre-Treatment Position  in bed  -     Post Treatment Position  bed  -     In Bed  notified nsg;call light within reach  -       User Key  (r) = Recorded By, (t) = Taken By, (c) = Cosigned By    Initials Name Provider Type    Cait Smith PT Physical Therapist        Outcome Measures     Row Name 07/18/20 1831          Modified Baylor Scale    Modified Baylor Scale  1 - No significant disability despite symptoms.  Able to carry out all usual duties and activities.  -     Row Name 07/18/20 1831          Functional Assessment    Outcome Measure Options  Modified Baylor  -       User Key  (r) = Recorded By, (t) = Taken By, (c) = Cosigned By    Initials Name Provider Type    Cait Smith PT Physical Therapist        Physical Therapy Education                 Title: PT OT SLP Therapies (Resolved)     Topic: Physical Therapy (Resolved)     Point: Mobility training (Resolved)     Description:   Instruct learner(s) on safety and technique for assisting patient out of bed, chair or wheelchair.  Instruct in the proper use of assistive devices, such as walker, crutches, cane or brace.              Patient Friendly Description:   It's important to get you on your feet again, but we need to do so in a way that is safe for you. Falling has serious consequences, and your personal safety is the most important thing of all.        When it's time to get out of bed, one of us or a family member will sit next to you on the bed to give you support.     If your doctor or nurse tells you to use a walker, crutches, a cane, or a brace, be sure you use it every time you get out of bed, even if you think you don't need it.    Learner Progress:   Not documented in this visit.          Point: Home exercise program (Resolved)     Description:   Instruct learner(s) on appropriate technique for monitoring, assisting and/or progressing patient with therapeutic exercises and activities.              Learner Progress:   Not  documented in this visit.          Point: Body mechanics (Resolved)     Description:   Instruct learner(s) on proper positioning and spine alignment for patient and/or caregiver during mobility tasks and/or exercises.              Learner Progress:   Not documented in this visit.          Point: Precautions (Resolved)     Description:   Instruct learner(s) on prescribed precautions during mobility and gait tasks              Learner Progress:   Not documented in this visit.                          PT Recommendation and Plan     Outcome Summary/Treatment Plan (PT)  Anticipated Discharge Disposition (PT): home with OP services  Plan of Care Reviewed With: patient  Outcome Summary: 65 yo male admitted with HA, dizziness and vision changes, d/c earlier in week with d/o stroke.  Pts symptoms have resolved.  Pt was scheduled for OP PT at last hospital discharge.  Pt is up ad jacquelin, mild balance deficits wtih high level gait activity.  Safe for d/c home.  PPE worn:  gloves, mask with face shield.      Time Calculation:   PT Charges     Row Name 07/18/20 1832             Time Calculation    Start Time  1127  -      Stop Time  1140  -      Time Calculation (min)  13 min  -      PT Received On  07/18/20  -         Time Calculation- PT    Total Timed Code Minutes- PT  0 minute(s)  -        User Key  (r) = Recorded By, (t) = Taken By, (c) = Cosigned By    Initials Name Provider Type    Cait Smith, PT Physical Therapist        Therapy Charges for Today     Code Description Service Date Service Provider Modifiers Qty    98816390662  PT EVAL LOW COMPLEXITY 2 7/18/2020 Cait Sanford, PT GP 1          PT G-Codes  Outcome Measure Options: Modified [place]  Modified Parul Scale: 1 - No significant disability despite symptoms.  Able to carry out all usual duties and activities.    Cait Sanford PT  7/18/2020

## 2020-07-18 NOTE — THERAPY EVALUATION
Acute Care - Occupational Therapy Initial Evaluation   Agustín     Patient Name: Humble Green  : 1955  MRN: [id number]  Today's Date: 2020             Admit Date: 2020       ICD-10-CM ICD-9-CM   1. TIA (transient ischemic attack) G45.9 435.9     Patient Active Problem List   Diagnosis   • Mixed hyperlipidemia   • Major depressive disorder, recurrent episode, moderate degree (CMS/HCC)   • Diabetic polyneuropathy associated with type 2 diabetes mellitus (CMS/HCC)   • Intractable migraine without aura and without status migrainosus   • PFO (patent foramen ovale)   • Hypertension   • Nonobstructive atherosclerosis of coronary artery   • Diabetes mellitus (CMS/HCC)   • Confusion and disorientation   • Cerebrovascular accident (CVA) due to bilateral embolism of middle cerebral arteries (CMS/HCC)   • TIA (transient ischemic attack)   • ANT (acute kidney injury) (CMS/HCC)   • Chronic kidney disease (CKD), stage III (moderate) (CMS/HCC)     Past Medical History:   Diagnosis Date   • Arthritis    • BPH (benign prostatic hyperplasia)    • Chronic back pain    • Diabetes mellitus (CMS/HCC)    • Hypertension    • Low back pain    • Low vision of right eye with normal vision in contralateral eye    • Memory problem    • Nonobstructive atherosclerosis of coronary artery    • PFO (patent foramen ovale) 2019     Past Surgical History:   Procedure Laterality Date   • ARM TENDON REPAIR Left    • CHOLECYSTECTOMY     • COLONOSCOPY       = TA, rech    • EYE SURGERY      Rt Eye Sx after trauma @ 4y/o   • JOINT REPLACEMENT      Left TKR   • ROTATOR CUFF REPAIR Bilateral    • UMBILICAL HERNIA REPAIR            OT ASSESSMENT FLOWSHEET (last 12 hours)      Occupational Therapy Evaluation     Row Name 20 1300                   OT Evaluation Time/Intention    Subjective Information  complains of;weakness  -MP        Document Type  evaluation  -MP        Patient Effort  good  -MP           General  Information    Patient Observations  alert;cooperative;agree to therapy  -MP        Prior Level of Function  independent:;ADL's Pt. works part time  at high school  -MP           Relationship/Environment    Lives With  alone  -MP           Resource/Environmental Concerns    Current Living Arrangements  home/apartment/condo  -MP        Resource/Environmental Concerns  none  -MP           Cognitive Assessment/Intervention- PT/OT    Orientation Status (Cognition)  oriented x 3  -MP        Follows Commands (Cognition)  WNL  -MP           Bed Mobility Assessment/Treatment    Bed Mobility Assessment/Treatment  supine-sit;sit-supine  -MP        Supine-Sit [place] (Bed Mobility)  independent  -MP        Sit-Supine [place] (Bed Mobility)  independent  -MP           Functional Mobility    Functional Mobility- Ind. Level  independent  -MP           Transfer Assessment/Treatment    Transfer Assessment/Treatment  stand-sit transfer;sit-stand transfer  -MP           Sit-Stand Transfer    Sit-Stand [place] (Transfers)  independent  -MP           ADL Assessment/Intervention    BADL Assessment/Intervention  lower body dressing  -MP           Lower Body Dressing Assessment/Training    Lower Body Dressing [place]ina Level  lower body dressing skills;independent  -MP           General ROM    GENERAL ROM COMMENTS  BUE WFL  -MP           MMT (Manual Muscle Testing)    General MMT Comments  BUE WFL  -MP           Positioning and Restraints    Pre-Treatment Position  in bed  -MP        Post Treatment Position  bed  -MP        In Bed  call light within reach  -MP           Pain Assessment    Additional Documentation  Pain Scale: Word Pre/Post-Treatment (Group)  -MP           Pain Scale: Word Pre/Post-Treatment    Pain: Word Scale, Pretreatment  0 - no pain  -MP           Plan of Care Review    Plan of Care Reviewed With  patient  -MP        Progress  no change  -MP           Clinical Impression (OT)    Criteria for  Skilled Therapeutic Interventions Met (OT Eval)  no;no problems identified which require skilled intervention  -MP        Therapy Frequency (OT Eval)  evaluation only  -MP          User Key  (r) = Recorded By, (t) = Taken By, (c) = Cosigned By    Initials Name Effective Dates    Josh Munoz OT 03/01/19 -                OT Recommendation and Plan  Therapy Frequency (OT Eval): evaluation only  Plan of Care Review  Plan of Care Reviewed With: patient  Plan of Care Reviewed With: patient    Pt. Is 65 y/o male admit w/ L sided weakness/numbness, h/o CVA last November per pt. Report. Pt. Reports continued LLE dull sensation, strength WFL and pt. Completes functionral ambulatory transfers bathroom distance w/o assistance. Pt. Difficulty when challenging balance e.g. Single leg stance and will benefit from OP PT to address aforementioned deficits. Pt. Does not require skilled OT at this time.     Time Calculation:   Time Calculation- OT     Row Name 07/18/20 1311             Time Calculation- OT    OT Start Time  1005  -MP      OT Stop Time  1020  -MP      OT Time Calculation (min)  15 min  -MP      Total Timed Code Minutes- OT  0 minute(s)  -MP      OT Received On  07/18/20  -MP        User Key  (r) = Recorded By, (t) = Taken By, (c) = Cosigned By    Initials Name Provider Type    Josh Munoz OT Occupational Therapist        Therapy Charges for Today     Code Description Service Date Service Provider Modifiers Qty    [id number]  OT EVAL LOW COMPLEXITY 3 7/18/2020 Josh Eldridge OT GO 1               Josh Eldridge OT  7/18/2020

## 2020-07-18 NOTE — DISCHARGE SUMMARY
Date of Admission: 7/17/2020    Date of Discharge:  7/18/2020    Length of stay:  LOS: 0 days     Presenting Problem:   TIA (transient ischemic attack) [G45.9]      Principal and Active Diagnosis During Hospital Stay:     Active Hospital Problems    Diagnosis  POA   • ANT (acute kidney injury) (CMS/[facility]) [N17.9]  Yes   • Chronic kidney disease (CKD), stage III (moderate) (CMS/[facility]) [N18.3]  Yes   • Hypertension [I10]  Yes   • PFO (patent foramen ovale) [Q21.1]  Not Applicable   • Intractable migraine without aura and without status migrainosus [G43.019]  Yes   • Diabetic polyneuropathy associated with type 2 diabetes mellitus (CMS/[facility]) [E11.42]  Yes   • Mixed hyperlipidemia [E78.2]  Yes   • Major depressive disorder, recurrent episode, moderate degree (CMS/[facility]) [F33.1]  Yes      Resolved Hospital Problems    Diagnosis Date Resolved POA   • **TIA (transient ischemic attack) [G45.9] 07/18/2020 Yes       Dizziness and headache  -most likely migraine vs anxiety or mood disorder per neurology  -ruled out for acute stroke MRI brain was unchanged from 3 days prior  -improved on Fioricet     Recent Multiple subacute to acutestrokes of posterior left lentiform nucleus, high left parietal lobe, and periventricular right frontal lobe  -see extensive workup from last d/c summary 7/16/20  -c/w eliquis, ASA, statin  -MRI 7/17/20: unchanged findings from MRI 7/14/20    Hospital Course  Patient is a 64 y.o. male presented with dizziness and headache.  He read a recent admission for multiple subacute to acute strokes.  Stroke alert Was called in the ER but work-up revealed that there were no new findings and no new stroke.  He was still having headache the next day and improved with Fioricet.  Neurology evaluated and felt the patient could be suffering from anxiety or mood disorder to be causing symptoms.  The patient will be maintained on all medications from last admission and he was felt stable to discharge home.         Procedures Performed:none          Consults:   Consults     Date and Time Order Name Status Description    7/17/2020 2228 Inpatient Neurology Consult Stroke      7/17/2020 1734 Hospitalist (on-call MD unless specified) Completed     7/17/2020 1616 Inpatient Neurology Consult Stroke Completed     7/17/2020 1616 Inpatient Neurology Consult Stroke Completed     7/14/2020 1400 Inpatient Cardiology Consult Completed     7/13/2020 1542 Hospitalist (on-call MD unless specified) Completed     7/13/2020 1454 Inpatient Neurology Consult Stroke Completed     7/13/2020 1454 Inpatient Neurology Consult Stroke Completed           Pertinent Test Results:     Lab Results (last 72 hours)     Procedure Component Value Units Date/Time    POC Glucose Once [[id number]]  (Normal) Collected:  07/18/20 1129    Specimen:  Blood Updated:  07/18/20 1135     Glucose 104 mg/dL      Comment: Serial Number: [id number]:  [id number]       POC Glucose Once [[id number]]  (Normal) Collected:  07/18/20 0715    Specimen:  Blood Updated:  07/18/20 0720     Glucose 96 mg/dL      Comment: Serial Number: [id number]:  [id number]       Potassium [[id number]]  (Normal) Collected:  07/18/20 0337    Specimen:  Blood Updated:  07/18/20 0425     Potassium 4.7 mmol/L     Magnesium [[id number]]  (Normal) Collected:  07/18/20 0337    Specimen:  Blood Updated:  07/18/20 0425     Magnesium 2.0 mg/dL     [facility] [[id number]] Collected:  07/17/20 1626    Specimen:  Blood Updated:  07/17/20 1730    Narrative:       The following orders were created for panel order [facility].  Procedure                               Abnormality         Status                     ---------                               -----------         ------                     Light Blue Top[[id number]]                                   Final result               Green Top (Gel)[[id number]]                                                             Lavender Top[[id number]]                                      Final result               Gold Top - SST[[id number]]                                   Final result                 Please view results for these tests on the individual orders.    Light Blue Top [[id number]] Collected:  07/17/20 1626    Specimen:  Blood Updated:  07/17/20 1730     Extra Tube hold for add-on     Comment: Auto resulted       Lavender Top [[id number]] Collected:  07/17/20 1627    Specimen:  Blood Updated:  07/17/20 1730     Extra Tube hold for add-on     Comment: Auto resulted       Gold Top - SST [[id number]] Collected:  07/17/20 1626    Specimen:  Blood Updated:  07/17/20 1730     Extra Tube Hold for add-ons.     Comment: Auto resulted.       BUN [[id number]]  (Normal) Collected:  07/17/20 1626    Specimen:  Blood Updated:  07/17/20 1654     BUN 23 mg/dL     Comprehensive Metabolic Panel [[id number]]  (Abnormal) Collected:  07/17/20 1626    Specimen:  Blood Updated:  07/17/20 1651     Glucose 324 mg/dL      BUN --     Comment: Testing performed by alternate method        Creatinine 1.50 mg/dL      Sodium 139 mmol/L      Potassium 4.5 mmol/L      Chloride 99 mmol/L      CO2 27.0 mmol/L      Calcium 9.2 mg/dL      Total Protein 7.1 g/dL      Albumin 4.20 g/dL      ALT (SGPT) 15 U/L      AST (SGOT) 14 U/L      Alkaline Phosphatase 63 U/L      Total Bilirubin 0.3 mg/dL      eGFR Non African Amer 47 mL/min/1.73      Globulin 2.9 gm/dL      A/G Ratio 1.4 g/dL      BUN/Creatinine Ratio --     Comment: Testing not performed        Anion Gap 13.0 mmol/L     Narrative:       GFR Normal >60  Chronic Kidney Disease <60  Kidney Failure <15      Troponin [[id number]]  (Normal) Collected:  07/17/20 1626    Specimen:  Blood Updated:  07/17/20 1651     Troponin T <0.010 ng/mL     Narrative:       Troponin T Reference Range:  <= 0.03 ng/mL-   Negative for AMI  >0.03 ng/mL-     Abnormal for myocardial necrosis.  Clinicians would have to utilize clinical acumen, EKG, Troponin and serial changes to  determine if it is an Acute Myocardial Infarction or myocardial injury due to an underlying chronic condition.       Results may be falsely decreased if patient taking Biotin.      aPTT [[id number]]  (Normal) Collected:  07/17/20 1626    Specimen:  Blood Updated:  07/17/20 1637     PTT 26.1 seconds     Protime-INR [[id number]]  (Normal) Collected:  07/17/20 1626    Specimen:  Blood Updated:  07/17/20 1637     Protime 10.4 Seconds      INR 0.99    CBC & Differential [[id number]] Collected:  07/17/20 1627    Specimen:  Blood Updated:  07/17/20 1630    Narrative:       The following orders were created for panel order CBC & Differential.  Procedure                               Abnormality         Status                     ---------                               -----------         ------                     CBC Auto Differential[[id number]]        Normal              Final result                 Please view results for these tests on the individual orders.    CBC Auto Differential [[id number]]  (Normal) Collected:  07/17/20 1627    Specimen:  Blood Updated:  07/17/20 1630     WBC 7.40 10*3/mm3      RBC 4.58 10*6/mm3      Hemoglobin 13.9 g/dL      Hematocrit 42.1 %      MCV 91.8 fL      MCH 30.2 pg      MCHC 32.9 g/dL      RDW 14.4 %      RDW-SD 45.9 fl      MPV 11.7 fL      Platelets 161 10*3/mm3      Neutrophil % 62.3 %      Lymphocyte % 29.2 %      Monocyte % 6.2 %      Eosinophil % 1.6 %      Basophil % 0.7 %      Neutrophils, Absolute 4.60 10*3/mm3      Lymphocytes, Absolute 2.20 10*3/mm3      Monocytes, Absolute 0.50 10*3/mm3      Eosinophils, Absolute 0.10 10*3/mm3      Basophils, Absolute 0.10 10*3/mm3      nRBC 0.1 /100 WBC     POC Glucose Once [[id number]]  (Abnormal) Collected:  07/17/20 1611    Specimen:  Blood Updated:  07/17/20 1613     Glucose 310 mg/dL      Comment: Serial Number: [id number]:  [id number]                  Microbiology Results (last 10 days)     ** No results found for the last 240  hours. **            Results for orders placed during the hospital encounter of 11/25/19   Adult Transesophageal Echo ([facility]ILIAN) W/ Cont if Necessary Per Protocol    Narrative · Left ventricular systolic function is normal.  · Left atrial cavity size is mildly dilated.  · Mild mitral valve regurgitation is present  · Mild tricuspid valve regurgitation is present.  · LV ejection fraction is about 60%  · Significant patent foramen ovale noted by bubble contrast study  · No pericardial effusion noted  · Aorta has minimal atherosclerosis  · Technically difficult study          Imaging Results (All)     Procedure Component Value Units Date/Time    MRI Brain Without Contrast [[id number]] Collected:  07/17/20 1849     Updated:  07/17/20 1901    Narrative:          DATE OF EXAM:   7/17/2020 6:05 PM     PROCEDURE:   MRI BRAIN WO CONTRAST-     INDICATIONS:   Stroke; G45.9-Transient cerebral ischemic attack, unspecified     COMPARISON:  MRI of the brain without contrast dated 07/14/2020, and 11/26/2019, head  CT without contrast dated 07/17/2020, CT of the head and neck from  07/13/2020     TECHNIQUE:   Routine magnetic resonance imaging of the brain was performed without  administration of contrast.     FINDINGS:   There is a 5 mm area of restricted diffusion which persists in the high  left parietal lobe. It is unchanged from 3 days ago and remains  hypointense on the ADC map.. The areas of restricted diffusion along the  posterior margin of the left lentiform nucleus and in the right  periventricular frontal lobe have evolved and these are no longer  hypointense on the ADC map. No new areas of restricted diffusion are  demonstrated.     There is also a stable amount of fairly mild chronic T2 signal  hyperintensity predominantly in the frontal lobes which is nonspecific  but most likely represents chronic microvascular ischemia.  No  intracranial mass, midline shift, intracranial hemorrhage, or pathologic  extraaxial fluid  collection. The ventricular system is nondilated The  major intracranial flow voids are maintained.  The mastoid air cells and  paranasal sinuses are clear. The globes and extraocular muscles are  normal.  No cerebellar pontine angle masses.  Craniocervical junction is  normal.  Pituitary gland has normal size and signal intensity for  patient's age and gender.             Impression:          1. 5 mm persistent area of acute infarct in the left parietal lobe is  unchanged from 3 days ago. There are also areas of subacute infarcts in  the right frontal lobe and right basal ganglia unchanged in  distribution. No new areas of restricted diffusion. No acute  hemorrhages, mass effect or hydrocephalus.  2. Stable mild to moderate chronic microvascular ischemic disease.     Electronically Signed By-Gelacio Jensen DO. On:7/17/2020 6:59 PM  This report was finalized on [id number] by  Gelacio Jensen DO..    XR Chest 1 View [[id number]] Collected:  07/17/20 1653     Updated:  07/17/20 1655    Narrative:       DATE OF EXAM:  7/17/2020 4:33 PM     PROCEDURE:  XR CHEST 1 VW-     INDICATIONS:  Acute Stroke Protocol (Onset < 12 hrs)       COMPARISON:  AP portable chest 07/13/2020.     TECHNIQUE:   Single radiographic view of the chest was obtained.     FINDINGS:  Clear lungs. Normal heart size. No pleural effusion, pneumothorax or  acute osseous abnormalities.       Impression:       No acute chest finding.     Electronically Signed By-Dr. Charla Nelson MD On:7/17/2020 4:53 PM  This report was finalized on [id number] by Dr. Charla Nelson MD.    CT Head Without Contrast Stroke Protocol [[id number]] Collected:  07/17/20 1627     Updated:  07/17/20 1631    Narrative:       CT HEAD WO CONTRAST STROKE PROTOCOL-     Date of Exam: 7/17/2020 4:16 PM     Indication: Stroke. Left frontal headache..     Comparison: CT angiography of the head and neck 07/13/2020. Noncontrast  CT head 07/13/2020     Technique:  Without contrast,  contiguous axial CT images of the head  were obtained from skull base to vertex.  Coronal and sagittal  reconstructions were performed.  Automated exposure control and  iterative reconstruction methods were used.     FINDINGS  Chronic appearing bilateral basal ganglia lacunar infarcts. Chronic  appearing lacunar infarct anteriorly in the left frontal lobe abutting  the left lateral ventricle. Scattered mild hypodensities in the deep  white matter consistent with the appearance of chronic microvascular  disease. Preservation of gray matter-white matter junction distinction  without CT evidence of acute or evolving infarct. No acute intracranial  hemorrhage, mass lesion, mass calvarium within normal limits. Paranasal  sinuses and mastoid air cells are clear. Effect, or midline shift. Mild  age-appropriate parenchymal atrophy. Normal ventricular configuration.       Impression:       No acute intracranial findings. Mild chronic microvascular disease and  chronic lacunar infarcts in bilateral basal ganglia and the  periventricular left frontal lobe.     Electronically Signed By-Dr. Charla Nelson MD On:7/17/2020 4:29 PM  This report was finalized on [id number] by Dr. Charla Nelson MD.            Condition on Discharge:  Stable     Vital Signs  Temp:  [97.4 °F (36.3 °C)-98.9 °F (37.2 °C)] 98.3 °F (36.8 °C)  Heart Rate:  [62-77] 67  Resp:  [13-19] 19  BP: ()/(54-74) 120/65    Physical Exam:  Physical Exam   Constitutional: He is oriented to person, place, and time. No distress.   Cardiovascular: Normal rate and regular rhythm.   Pulmonary/Chest: Effort normal. No respiratory distress.   Abdominal: Soft. He exhibits no distension.   Musculoskeletal: He exhibits no tenderness.   Neurological: He is alert and oriented to person, place, and time.   Skin: Skin is warm.   Psychiatric: He has a normal mood and affect.       Discharge Disposition  Home or Self Care    Discharge Medications     Discharge Medications       New Medications      Instructions Start Date   butalbital-acetaminophen-caffeine -40 MG per tablet  Commonly known as:  FIORICET, ESGIC   1 tablet, Oral, Every 6 Hours PRN         Continue These Medications      Instructions Start Date   apixaban 5 MG tablet tablet  Commonly known as:  ELIQUIS   5 mg, Oral, Every 12 Hours Scheduled      aspirin 81 MG chewable tablet   81 mg, Oral, Nightly      atorvastatin 80 MG tablet  Commonly known as:  LIPITOR   80 mg, Oral, Nightly      baclofen 10 MG tablet  Commonly known as:  LIORESAL   10 mg, Oral, Nightly PRN      Farxiga 10 MG tablet  Generic drug:  Dapagliflozin Propanediol   10 mg, Oral, Every Evening      HYDROcodone-acetaminophen  MG per tablet  Commonly known as:  NORCO   1 tablet, Oral, Every 4 Hours PRN      insulin detemir 100 UNIT/ML injection  Commonly known as:  LEVEMIR   33 Units, Subcutaneous, Nightly      Insulin Lispro (1 Unit Dial) 100 UNIT/ML solution pen-injector  Commonly known as:  HumaLOG KwikPen   5 Units, Subcutaneous, 3 Times Daily Before Meals      lisinopril 10 MG tablet  Commonly known as:  PRINIVIL,ZESTRIL   10 mg, Oral, Nightly      pregabalin 75 MG capsule  Commonly known as:  LYRICA   75 mg, Oral, 3 Times Daily      PROzac 40 MG capsule  Generic drug:  FLUoxetine   40 mg, Oral, Every Evening             Discharge Diet:     Activity at Discharge:     Follow-up Appointments  Future Appointments   Date Time Provider Department Center   7/21/2020  3:45 PM Leda Duckworth DO [facility] None   8/3/2020  3:00 PM Deepa Kingsley, PT [facility]   8/4/2020 10:30 AM Stephanie Knowles MD [facility]   8/4/2020  3:00 PM Gayle Dove, SLP [facility]   8/17/2020  3:40 PM Hans Doyle MD [facility]   12/10/2020  3:30 PM Hans Doyle MD [facility]     Additional Instructions for the Follow-ups that You Need to Schedule     Discharge Follow-up with PCP   As directed       Currently Documented  PCP:    Leda Duckworth DO    PCP Phone Number:    [phone]     Follow Up Details:  one week               Test Results Pending at Discharge       Risk for Readmission (LACE) Score: 9 (7/18/2020  6:01 AM)      This patient has been examined wearing appropriate Personal Protective Equipment . 07/18/20       Kevyn Vogel DO  07/18/20  13:46

## 2020-07-20 ENCOUNTER — TRANSITIONAL CARE MANAGEMENT TELEPHONE ENCOUNTER (OUTPATIENT)
Dept: CALL CENTER | Facility: HOSPITAL | Age: 65
End: 2020-07-20

## 2020-07-20 LAB — GLUCOSE BLDC GLUCOMTR-MCNC: 96 MG/DL (ref 70–105)

## 2020-07-20 NOTE — PAYOR COMM NOTE
"This is discharge notice for Elizabeth Green, auth# [id number]  Pt discharged routine to home on 7/16/20.    LASHONDA COATS RN  UTILIZATION REVIEW  [facility]  PH: [phone]  FX: [phone]    Elizabeth Green (64 y.o. Male)     Date of Birth Social Security Number Address Home Phone MRN    1955  [address]  [address]  [place] [address] [phone] [id number]    [employer] Marital Status          None Single       Admission Date Admission Type Admitting Provider Attending Provider Department, Room/Bed    7/13/20 Emergency Kevyn Vogel DO  [facility] NEURO HEART, 261/1    Discharge Date Discharge Disposition Discharge Destination        7/16/2020 Home or Self Care              Attending Provider:  (none)   Allergies:  No Known Allergies    Isolation:  None   Infection:  None   Code Status:  Prior    Ht:  172.7 cm (68\")   Wt:  116 kg (255 lb 15.3 oz)    Admission Cmt:  None   Principal Problem:  Cerebrovascular accident (CVA) due to bilateral embolism of middle cerebral arteries (CMS/[facility]) [I63.413]                 Active Insurance as of 7/13/2020     Primary Coverage     Payor Plan Insurance Group Employer/Plan Group    [facility] -[place] [employer] [id number]      Payor Plan Address Payor Plan Phone Number Payor Plan Fax Number Effective Dates    [address]   6/1/2020 - None Entered    [facility] [phone]       Subscriber Name Subscriber Birth Date Member ID       ELIZABETH GREEN 1955 [id number]                 Emergency Contacts      (Rel.) Home Phone Work Phone Mobile Phone    LUBA GREEN (Son) -- -- [phone]    LEANDRO GREEN (Daughter) -- -- [phone]               Discharge Summary      Kevyn Vogel DO at 07/16/20 1126            Date of Admission: 7/13/2020    Date of Discharge:  7/16/2020    Length of stay:  LOS: 2 days     Presenting Problem:   TIA (transient ischemic attack) [G45.9]  Transient confusion " [R41.0]  TIA (transient ischemic attack) [G45.9]      Principal and Active Diagnosis During Hospital Stay:     Active Hospital Problems    Diagnosis  POA   • **Cerebrovascular accident (CVA) due to bilateral embolism of middle cerebral arteries (CMS/HCC) [I63.413]  Yes   • Confusion and disorientation [R41.0]  Yes   • PFO (patent foramen ovale) [Q21.1]  Not Applicable   • Diabetic polyneuropathy associated with type 2 diabetes mellitus (CMS/HCC) [E11.42]  Yes      Resolved Hospital Problems   No resolved problems to display.     Multiple subacute to acutestrokes of posterior left lentiform nucleus, high left parietal lobe, and periventricular right frontal lobe  - per neuro mixture of small vessel disease and one small embolic type  - MRI brain: Multifocal small acute or subacute infarcts involving the posterior left lentiform nucleus, high left parietal lobe, and periventricular right frontal lobe.  - CT head: No evidence of hemorrhage, no acute process.  Multiple chronic infarcts seen.  - CTA head and neck: Preliminary negative, awaiting official read .   - Continue Eliquis, per cardiology they suspect some degree of non-compliance and thus cannot confirm if true eliquis failure  - neurolgy followed, hypercoaguable workup sent results will need to be followed up by primary   - will need  CT C/A/P done as outpt to r/oo malignancy     ANT  -resolved  -stop IVF     PFO  -chronic an on eliquis   -LILIAN (Nov 2019): Significant PFO, EF 60%.   -consulted cardiology, see above     Chronic migraine disorder  -ESR and CRP both normal      Type II DM  -c/w home basal and pre-meal insulin  -add SSI and adjust prn  -hold home po agents      HTN  -chronic   -c/w lisinopril      Hospital Course  Patient is a 64 y.o. male presented with confusion. Was admitted and found to have subacute to acute strokes on MRI. Had workup done as noted above. Was stabilized and treated as noted and was able to be sent home however will need  continued workup as noted above as an outpt.        Procedures Performed:as noted          Consults:   Consults     Date and Time Order Name Status Description    7/14/2020 1400 Inpatient Cardiology Consult Completed     7/13/2020 1542 Hospitalist (on-call MD unless specified) Completed     7/13/2020 1454 Inpatient Neurology Consult Stroke Completed     7/13/2020 1454 Inpatient Neurology Consult Stroke Completed           Pertinent Test Results:     Lab Results (last 72 hours)     Procedure Component Value Units Date/Time    Factor II, DNA Analysis [[id number]]  (Normal) Collected:  07/15/20 1420    Specimen:  Blood Updated:  07/16/20 1114     Factor II, DNA Analysis Normal    Narrative:       A point mutation ([id number]) in the factor II (prothrombin) gene is the second most common cause of inherited thrombophilia. The incidence of this mutation in the [place]  population is about 2% and in the  population it is approximately 0.5%. This mutation is rare in the  and  population. Being heterozygous for a prothrombin mutation increases the risk for developing venous thrombosis about 2 to 3 times above the general population risk. Being homozygous for the prothrombin gene mutation increases the relative risk for venous thrombosis further, although it is not yet known how   much further the risk is increased. In women heterozygous for the prothrombin gene mutation, the use of estrogen containing oral contraceptives increases the relative risk of venous thrombosis about 16 times and the risk of developing cerebral thrombosis is also significantly increased. In pregnancy, the prothrombin gene mutation increases risk for venous thrombosis and may increase risk for stillbirth, placental abruption, pre-eclampsia and fetal growth restriction.     The expression of Factor II thrombophilia is impacted by coexisting genetic thrombophilic disorders, acquired thrombophilic disorders (eg,  malignancy, hyperhomocysteinemia, high factor VIII levels), and circumstances including: pregnancy, oral contraceptive use, hormone replacement therapy, selective estrogen receptor modulators, travel, central venous catheters, surgery, and organ transplantation.    POC Glucose Once [[id number]]  (Abnormal) Collected:  07/16/20 0729    Specimen:  Blood Updated:  07/16/20 0733     Glucose 108 mg/dL      Comment: Serial Number: [id number]:  [id number]       POC Glucose Once [[id number]]  (Abnormal) Collected:  07/15/20 2025    Specimen:  Blood Updated:  07/15/20 2026     Glucose 276 mg/dL      Comment: Serial Number: [id number]:  [id number]       POC Glucose Once [[id number]]  (Abnormal) Collected:  07/15/20 1609    Specimen:  Blood Updated:  07/15/20 1612     Glucose 132 mg/dL      Comment: Serial Number: [id number]:  [id number]       Sedimentation Rate [[id number]]  (Normal) Collected:  07/15/20 1435    Specimen:  Blood Updated:  07/15/20 1527     Sed Rate 15 mm/hr     C-reactive Protein [[id number]]  (Normal) Collected:  07/15/20 1420    Specimen:  Blood Updated:  07/15/20 1459     C-Reactive Protein 0.12 mg/dL     Factor 5 Activity [[id number]] Collected:  07/15/20 1420    Specimen:  Blood Updated:  07/15/20 1431    Protein S Panel [[id number]] Collected:  07/15/20 1420    Specimen:  Blood Updated:  07/15/20 1431    Anticardiolipin Antibody, IgG / M, Qn [[id number]] Collected:  07/15/20 1420    Specimen:  Blood Updated:  07/15/20 1431    Antiphosphotidyl Antibodies Panel II [[id number]] Collected:  07/15/20 1420    Specimen:  Blood Updated:  07/15/20 1431    Protein C Activity [[id number]] Collected:  07/15/20 1420    Specimen:  Blood Updated:  07/15/20 1431    Antithrombin III [[id number]] Collected:  07/15/20 1420    Specimen:  Blood Updated:  07/15/20 1430    MIKE [[id number]] Collected:  07/15/20 1420    Specimen:  Blood Updated:  07/15/20 1430    POC Glucose Once [[id number]]  (Normal) Collected:  07/15/20  1132    Specimen:  Blood Updated:  07/15/20 1140     Glucose 88 mg/dL      Comment: Serial Number: [id number]:  [id number]       POC Glucose Once [[id number]]  (Abnormal) Collected:  07/15/20 0722    Specimen:  Blood Updated:  07/15/20 0724     Glucose 131 mg/dL      Comment: Serial Number: [id number]:  [id number]       POC Glucose Once [[id number]]  (Abnormal) Collected:  07/14/20 2054    Specimen:  Blood Updated:  07/14/20 2055     Glucose 191 mg/dL      Comment: Serial Number: [id number]:  [id number]       POC Glucose Once [[id number]]  (Abnormal) Collected:  07/14/20 1657    Specimen:  Blood Updated:  07/14/20 1702     Glucose 205 mg/dL      Comment: Serial Number: [id number]:  [id number]       POC Glucose Once [[id number]]  (Abnormal) Collected:  07/14/20 1054    Specimen:  Blood Updated:  07/14/20 1055     Glucose 129 mg/dL      Comment: Serial Number: [id number]:  [id number]       BUN [[id number]]  (Normal) Collected:  07/14/20 0358    Specimen:  Blood Updated:  07/14/20 0742     BUN 21 mg/dL     POC Glucose Once [[id number]]  (Abnormal) Collected:  07/14/20 0731    Specimen:  Blood Updated:  07/14/20 0734     Glucose 117 mg/dL      Comment: Serial Number: [id number]:  [id number]       Lipid Panel [[id number]]  (Abnormal) Collected:  07/14/20 0358    Specimen:  Blood Updated:  07/14/20 0503     Total Cholesterol 194 mg/dL      Triglycerides 191 mg/dL      HDL Cholesterol 50 mg/dL      LDL Cholesterol  106 mg/dL      VLDL Cholesterol 38.2 mg/dL      LDL/HDL Ratio 2.12    Narrative:       Cholesterol Reference Ranges  (U.S. Department of Health and Human Services ATP III Classifications)    Desirable          <200 mg/dL  Borderline High    200-239 mg/dL  High Risk          >240 mg/dL      Triglyceride Reference Ranges  (U.S. Department of Health and Human Services ATP III Classifications)    Normal           <150 mg/dL  Borderline High  150-199 mg/dL  High             200-499  mg/dL  Very High        >500 mg/dL    HDL Reference Ranges  (U.S. Department of Health and Human Services ATP III Classifcations)    Low     <40 mg/dl (major risk factor for CHD)  High    >60 mg/dl ('negative' risk factor for CHD)        LDL Reference Ranges  (U.S. Department of Health and Human Services ATP III Classifcations)    Optimal          <100 mg/dL  Near Optimal     100-129 mg/dL  Borderline High  130-159 mg/dL  High             160-189 mg/dL  Very High        >189 mg/dL    Comprehensive Metabolic Panel [[id number]]  (Abnormal) Collected:  07/14/20 0358    Specimen:  Blood Updated:  07/14/20 0503     Glucose 118 mg/dL      BUN --     Comment: Testing performed by alternate method        Creatinine 1.06 mg/dL      Sodium 140 mmol/L      Potassium 4.4 mmol/L      Chloride 103 mmol/L      CO2 26.0 mmol/L      Calcium 8.7 mg/dL      Total Protein 5.7 g/dL      Albumin 3.60 g/dL      ALT (SGPT) 11 U/L      AST (SGOT) 11 U/L      Alkaline Phosphatase 52 U/L      Total Bilirubin 0.4 mg/dL      eGFR Non African Amer 70 mL/min/1.73      Globulin 2.1 gm/dL      A/G Ratio 1.7 g/dL      BUN/Creatinine Ratio --     Comment: Testing not performed        Anion Gap 11.0 mmol/L     Narrative:       GFR Normal >60  Chronic Kidney Disease <60  Kidney Failure <15      CBC (No Diff) [[id number]]  (Abnormal) Collected:  07/14/20 0358    Specimen:  Blood Updated:  07/14/20 0435     WBC 6.90 10*3/mm3      RBC 4.13 10*6/mm3      Hemoglobin 12.9 g/dL      Hematocrit 37.1 %      MCV 90.0 fL      MCH 31.3 pg      MCHC 34.7 g/dL      RDW 14.3 %      RDW-SD 46.4 fl      MPV 11.7 fL      Platelets 133 10*3/mm3     POC Glucose Once [[id number]]  (Abnormal) Collected:  07/14/20 0400    Specimen:  Blood Updated:  07/14/20 0401     Glucose 113 mg/dL      Comment: Serial Number: [id number]:  [id number]       Vitamin B12 [[id number]]  (Normal) Collected:  07/13/20 1456    Specimen:  Blood from Arm, Right Updated:  07/13/20 2028     Vitamin  B-12 556 pg/mL     Narrative:       Results may be falsely increased if patient taking Biotin.      POC Glucose Once [[id number]]  (Abnormal) Collected:  07/13/20 2024    Specimen:  Blood Updated:  07/13/20 2024     Glucose 132 mg/dL      Comment: Serial Number: [id number]:  [id number]       POC Glucose Once [[id number]]  (Normal) Collected:  07/13/20 1832    Specimen:  Blood Updated:  07/13/20 1833     Glucose 105 mg/dL      Comment: Serial Number: [id number]:  [id number]       BUN [[id number]]  (Abnormal) Collected:  07/13/20 1456    Specimen:  Blood from Arm, Right Updated:  07/13/20 1630     BUN 26 mg/dL     TSH [[id number]]  (Normal) Collected:  07/13/20 1456    Specimen:  Blood from Arm, Right Updated:  07/13/20 1621     TSH 1.100 uIU/mL     Lipid Panel [[id number]]  (Abnormal) Collected:  07/13/20 1456    Specimen:  Blood from Arm, Right Updated:  07/13/20 1615     Total Cholesterol 224 mg/dL      Triglycerides 234 mg/dL      HDL Cholesterol 61 mg/dL      LDL Cholesterol  116 mg/dL      VLDL Cholesterol 46.8 mg/dL      LDL/HDL Ratio 1.90    Narrative:       Cholesterol Reference Ranges  (U.S. Department of Health and Human Services ATP III Classifications)    Desirable          <200 mg/dL  Borderline High    200-239 mg/dL  High Risk          >240 mg/dL      Triglyceride Reference Ranges  (U.S. Department of Health and Human Services ATP III Classifications)    Normal           <150 mg/dL  Borderline High  150-199 mg/dL  High             200-499 mg/dL  Very High        >500 mg/dL    HDL Reference Ranges  (U.S. Department of Health and Human Services ATP III Classifcations)    Low     <40 mg/dl (major risk factor for CHD)  High    >60 mg/dl ('negative' risk factor for CHD)        LDL Reference Ranges  (U.S. Department of Health and Human Services ATP III Classifcations)    Optimal          <100 mg/dL  Near Optimal     100-129 mg/dL  Borderline High  130-159 mg/dL  High             160-189 mg/dL  Very  High        >189 mg/dL    Hemoglobin A1c [[id number]]  (Abnormal) Collected:  07/13/20 1456    Specimen:  Blood from Arm, Right Updated:  07/13/20 1602     Hemoglobin A1C 8.1 %     Narrative:       Hemoglobin A1C Reference Range:    <5.7 %        Normal  5.7-6.4 %     Increased risk for diabetes  > 6.4 %        Diabetes       These guidelines have been recommended by the American Diabetic Association for Hgb A1c.      The following 2010 guidelines have been recommended by the American Diabetes Association for Hemoglobin A1c.    HBA1c 5.7-6.4% Increased risk for future diabetes (pre-diabetes)  HBA1c     >6.4% Diabetes      [place] Draw [[id number]] Collected:  07/13/20 1456    Specimen:  Blood from Arm, Right Updated:  07/13/20 1600    Narrative:       The following orders were created for panel order [place] Draw.  Procedure                               Abnormality         Status                     ---------                               -----------         ------                     Light Blue Top[[id number]]                                   Final result               Green Top (Gel)[[id number]]                                  Final result               Lavender Top[[id number]]                                     Final result               Gold Top - SST[[id number]]                                   Final result                 Please view results for these tests on the individual orders.    Green Top (Gel) [[id number]] Collected:  07/13/20 1456    Specimen:  Blood from Arm, Right Updated:  07/13/20 1600     Extra Tube Hold for add-ons.     Comment: Auto resulted.       Gold Top - SST [[id number]] Collected:  07/13/20 1456    Specimen:  Blood from Arm, Right Updated:  07/13/20 1600     Extra Tube Hold for add-ons.     Comment: Auto resulted.       Light Blue Top [[id number]] Collected:  07/13/20 1456    Specimen:  Blood from Arm, Right Updated:  07/13/20 1600     Extra Tube hold for add-on     Comment: Auto resulted        Lavtana Top [[id number]] Collected:  07/13/20 1456    Specimen:  Blood from Arm, Right Updated:  07/13/20 1600     Extra Tube hold for add-on     Comment: Auto resulted       Comprehensive Metabolic Panel [[id number]]  (Abnormal) Collected:  07/13/20 1456    Specimen:  Blood from Arm, Right Updated:  07/13/20 1543     Glucose 171 mg/dL      BUN --     Comment: Testing performed by alternate method        Creatinine 1.57 mg/dL      Sodium 139 mmol/L      Potassium 4.1 mmol/L      Chloride 99 mmol/L      CO2 26.0 mmol/L      Calcium 9.0 mg/dL      Total Protein 7.0 g/dL      Albumin 4.30 g/dL      ALT (SGPT) 13 U/L      AST (SGOT) 12 U/L      Alkaline Phosphatase 63 U/L      Total Bilirubin 0.4 mg/dL      eGFR Non African Amer 45 mL/min/1.73      Globulin 2.7 gm/dL      A/G Ratio 1.6 g/dL      BUN/Creatinine Ratio --     Comment: Testing not performed        Anion Gap 14.0 mmol/L     Narrative:       GFR Normal >60  Chronic Kidney Disease <60  Kidney Failure <15      Troponin [[id number]]  (Normal) Collected:  07/13/20 1456    Specimen:  Blood from Arm, Right Updated:  07/13/20 1543     Troponin T <0.010 ng/mL     Narrative:       Troponin T Reference Range:  <= 0.03 ng/mL-   Negative for AMI  >0.03 ng/mL-     Abnormal for myocardial necrosis.  Clinicians would have to utilize clinical acumen, EKG, Troponin and serial changes to determine if it is an Acute Myocardial Infarction or myocardial injury due to an underlying chronic condition.       Results may be falsely decreased if patient taking Biotin.      Protime-INR [[id number]]  (Normal) Collected:  07/13/20 1456    Specimen:  Blood from Arm, Right Updated:  07/13/20 1512     Protime 9.9 Seconds      INR 0.93    aPTT [[id number]]  (Normal) Collected:  07/13/20 1456    Specimen:  Blood from Arm, Right Updated:  07/13/20 1512     PTT 24.1 seconds     CBC & Differential [[id number]] Collected:  07/13/20 1456    Specimen:  Blood from Arm, Right Updated:  07/13/20 1501     Narrative:       The following orders were created for panel order CBC & Differential.  Procedure                               Abnormality         Status                     ---------                               -----------         ------                     CBC Auto Differential[[id number]]        Normal              Final result                 Please view results for these tests on the individual orders.    CBC Auto Differential [[id number]]  (Normal) Collected:  07/13/20 1456    Specimen:  Blood from Arm, Right Updated:  07/13/20 1501     WBC 7.30 10*3/mm3      RBC 4.48 10*6/mm3      Hemoglobin 13.8 g/dL      Hematocrit 40.3 %      MCV 89.9 fL      MCH 30.9 pg      MCHC 34.3 g/dL      RDW 14.2 %      RDW-SD 45.5 fl      MPV 10.6 fL      Platelets 144 10*3/mm3      Neutrophil % 58.6 %      Lymphocyte % 31.8 %      Monocyte % 7.2 %      Eosinophil % 1.6 %      Basophil % 0.8 %      Neutrophils, Absolute 4.30 10*3/mm3      Lymphocytes, Absolute 2.30 10*3/mm3      Monocytes, Absolute 0.50 10*3/mm3      Eosinophils, Absolute 0.10 10*3/mm3      Basophils, Absolute 0.10 10*3/mm3      nRBC 0.0 /100 WBC     POC Glucose Once [[id number]]  (Abnormal) Collected:  07/13/20 1446    Specimen:  Blood Updated:  07/13/20 1447     Glucose 168 mg/dL      Comment: Serial Number: [id number]:  [id number]                  Microbiology Results (last 10 days)     ** No results found for the last 240 hours. **            Results for orders placed during the hospital encounter of 11/25/19   Adult Transesophageal Echo (LILIAN) W/ Cont if Necessary Per Protocol    Narrative · Left ventricular systolic function is normal.  · Left atrial cavity size is mildly dilated.  · Mild mitral valve regurgitation is present  · Mild tricuspid valve regurgitation is present.  · LV ejection fraction is about 60%  · Significant patent foramen ovale noted by bubble contrast study  · No pericardial effusion noted  · Aorta has minimal atherosclerosis  ·  Technically difficult study          Imaging Results (All)     Procedure Component Value Units Date/Time    MRI Brain Without Contrast [[id number]] Collected:  07/14/20 1337     Updated:  07/14/20 1346    Narrative:       MRI BRAIN WO CONTRAST-     Date of Exam: 7/14/2020 12:15 PM     Indication: Stroke  . Confusion.     Comparison: CT angiography of the head neck 07/13/2020. Noncontrast CT  head 07/13/2020. MRI brain 11/26/2019.     Technique: Multiplanar multisequence images of the brain were performed  without contrast according to routine brain MRI protocol.     FINDINGS:  10 x 3 mm focal restricted diffusion at the posterior margin of the left  lentiform nucleus, 5 mm focus of persistent acute diffusion the high  left parietal lobe, and 1.6 x 0.8 cm focal restricted diffusion within  the periventricular right frontal lobe, consistent with acute or  subacute multifocal infarcts. These findings are new since the  11/26/2019 comparison. There is no hemorrhagic transformation.           Scattered foci of FLAIR and T2 signal intensity change in the deep white  matter system with chronic microvascular disease. Chronic appearing  lacunar infarcts in the bilateral basal ganglia.     No mass lesion or mass effect or midline shift. Normal ventricular  configuration. Calvarium is normal. Paranasal sinuses and mastoid air  cells are clear.          Impression:        IMPRESSION:     1. Multifocal small acute or subacute infarcts involving the posterior  left lentiform nucleus, high left parietal lobe, and periventricular  right frontal lobe. The findings could indicate features of shower  emboli phenomenon. No hemorrhagic transformation.  2. Features of mild to moderate chronic microvascular disease.        Electronically Signed By-Dr. Charla Nelson MD On:7/14/2020 1:44 PM  This report was finalized on [id number] by Dr. Charla Nelson MD.    CT Angiogram Head [[id number]] Collected:  07/13/20 1633     Updated:  07/13/20  1644    Narrative:          DATE OF EXAM:  7/13/2020 3:03 PM     PROCEDURE:  CT ANGIOGRAM HEAD-, CT ANGIOGRAM NECK-     INDICATIONS:   Stroke, confusion     COMPARISON:   No Comparisons Available     TECHNIQUE:  CTA of the head and CTA of the neck were performed after the intravenous  administration of 100 mL Isovue 370. Reconstructed coronal and sagittal  images were also obtained. In addition, a 3 D volume rendered image was  obtained after post processing. Automated exposure control and iterative  reconstruction methods were used.      FINDINGS:  VASCULAR FINDINGS: No significant plaque identified in the arch or  proximal great vessels. Right and left vertebral arteries appear patent.  There is no significant basilar stenosis. There is intimal thickening in  the right carotid bifurcation and calcified plaque in the left carotid  bifurcation extending into the proximal left ICA. The degree of  narrowing is less than 50% and not hemodynamically significant. There is  no significant carotid siphon stenosis. There is symmetric filling of  the middle and anterior cerebral arteries without significant stenosis  or occlusion. Both posterior cerebral arteries appear to fill normally.     NONVASCULAR FINDINGS: The lung apices are clear. No superior mediastinal  masses are seen. There is no evidence of supraclavicular or cervical  lymphadenopathy. Skull base, mastoid air cells and paranasal sinuses are  clear. No enhancing intracranial lesions are identified. There are areas  of infarction identified in the basal ganglia bilaterally.          Impression:          1. Atherosclerotic plaque in both carotid bifurcations. The degree of  narrowing is less than 50% and not hemodynamically significant.  2. No evidence of vertebrobasilar stenosis.  3. No intracranial stenoses or occlusion. No evidence of aneurysm or  AVM.     Electronically Signed By-Honorio Daly On:7/13/2020 4:37 PM  This report was finalized on [id number] by   Honorio Daly, .    CT Angiogram Neck [[id number]] Collected:  07/13/20 1633     Updated:  07/13/20 1644    Narrative:          DATE OF EXAM:  7/13/2020 3:03 PM     PROCEDURE:  CT ANGIOGRAM HEAD-, CT ANGIOGRAM NECK-     INDICATIONS:   Stroke, confusion     COMPARISON:   No Comparisons Available     TECHNIQUE:  CTA of the head and CTA of the neck were performed after the intravenous  administration of 100 mL Isovue 370. Reconstructed coronal and sagittal  images were also obtained. In addition, a 3 D volume rendered image was  obtained after post processing. Automated exposure control and iterative  reconstruction methods were used.      FINDINGS:  VASCULAR FINDINGS: No significant plaque identified in the arch or  proximal great vessels. Right and left vertebral arteries appear patent.  There is no significant basilar stenosis. There is intimal thickening in  the right carotid bifurcation and calcified plaque in the left carotid  bifurcation extending into the proximal left ICA. The degree of  narrowing is less than 50% and not hemodynamically significant. There is  no significant carotid siphon stenosis. There is symmetric filling of  the middle and anterior cerebral arteries without significant stenosis  or occlusion. Both posterior cerebral arteries appear to fill normally.     NONVASCULAR FINDINGS: The lung apices are clear. No superior mediastinal  masses are seen. There is no evidence of supraclavicular or cervical  lymphadenopathy. Skull base, mastoid air cells and paranasal sinuses are  clear. No enhancing intracranial lesions are identified. There are areas  of infarction identified in the basal ganglia bilaterally.          Impression:          1. Atherosclerotic plaque in both carotid bifurcations. The degree of  narrowing is less than 50% and not hemodynamically significant.  2. No evidence of vertebrobasilar stenosis.  3. No intracranial stenoses or occlusion. No evidence of aneurysm or  AVM.      Electronically Signed By-Honorio Daly On:7/13/2020 4:37 PM  This report was finalized on [id number] by  Honorio Daly, .    XR Chest 1 View [[id number]] Collected:  07/13/20 1524     Updated:  07/13/20 1526    Narrative:       DATE OF EXAM:  7/13/2020 3:21 PM     PROCEDURE:  XR CHEST 1 VW-     INDICATIONS:  Acute Stroke Protocol (onset < 12 hrs)     COMPARISON:  12/13/2019     TECHNIQUE:   Single radiographic AP view of the chest was obtained.     FINDINGS:  Cardiac size is normal. The film is relatively underpenetrated the  bases. The lungs appear clear and the vascular markings normal        Impression:       Negative portable chest     Electronically Signed By-Honorio Daly On:7/13/2020 3:24 PM  This report was finalized on [id number] by  Honorio Daly, .    CT Head Without Contrast Stroke Protocol [[id number]] Collected:  07/13/20 1506     Updated:  07/13/20 1514    Narrative:       CT HEAD WO CONTRAST STROKE PROTOCOL-     Date of Exam: 7/13/2020 2:55 PM     Indication: Stroke.  Confusion, headache     Comparison: CT December 2019     Technique:  Without contrast, contiguous axial CT images of the head  were obtained from skull base to vertex.  Coronal and sagittal  reconstructions were performed.  Automated exposure control and  iterative reconstruction methods were used.     FINDINGS  No evidence of intracranial hemorrhage, mass, or midline shift. The  ventricles appear normal in size for the patient's age. Previous lacunar  infarcts are present within the basal ganglia region bilaterally,  unchanged as compared to the previous study. No extra-axial collections  identified. The gray white matter differentiation is intact. No  air-fluid levels identified within the paranasal sinuses. The  extra-axial structures demonstrate no acute process.       Impression:       No evidence of hemorrhage, mass effect or midline shift. No acute  process identified.     The above findings were discussed with Brianne merino  practitioner at  3:06 PM on July 13, 2020     Electronically Signed By-Lilia Scruggs On:7/13/2020 3:07 PM  This report was finalized on [id number] by  Lilia Scruggs, .            Condition on Discharge:  Stable     Vital Signs  Temp:  [98 °F (36.7 °C)-98.3 °F (36.8 °C)] 98.3 °F (36.8 °C)  Heart Rate:  [69-72] 69  Resp:  [10-21] 21  BP: ()/(44-74) 104/62    Physical Exam:  Physical Exam    Gen: NAD  HEENT: EOMI, no icterus, PERRL  Neck: No JVD  Heart: RRR, no murmur  Lung: CTA b/l, adequate air movement  ABD: soft, NT, ND, no rebound or guarding  MSK: moves ext spontaneously  Neuro: AO x 3, CN II-XII intact  Psych: no anxiety, no depression  Skin: warm, dry, intact  Extremities:  No edema    Discharge Disposition  Home or Self Care    Discharge Medications     Discharge Medications      New Medications      Instructions Start Date   atorvastatin 80 MG tablet  Commonly known as:  LIPITOR   80 mg, Oral, Nightly         Continue These Medications      Instructions Start Date   apixaban 5 MG tablet tablet  Commonly known as:  ELIQUIS   5 mg, Oral, Every 12 Hours Scheduled      aspirin 81 MG chewable tablet   81 mg, Oral, Nightly      baclofen 10 MG tablet  Commonly known as:  LIORESAL   10 mg, Oral, Nightly PRN      Farxiga 10 MG tablet  Generic drug:  Dapagliflozin Propanediol   10 mg, Oral, Every Evening      HYDROcodone-acetaminophen  MG per tablet  Commonly known as:  NORCO   1 tablet, Oral, Every 4 Hours PRN      insulin detemir 100 UNIT/ML injection  Commonly known as:  LEVEMIR   33 Units, Subcutaneous, Nightly      Insulin Lispro (1 Unit Dial) 100 UNIT/ML solution pen-injector  Commonly known as:  HumaLOG KwikPen   5 Units, Subcutaneous, 3 Times Daily Before Meals      lisinopril 10 MG tablet  Commonly known as:  PRINIVIL,ZESTRIL   10 mg, Oral, Nightly      pregabalin 75 MG capsule  Commonly known as:  LYRICA   75 mg, Oral, 3 Times Daily      PROzac 40 MG capsule  Generic drug:  FLUoxetine   40 mg,  Oral, Every Evening         Stop These Medications    ciprofloxacin 500 MG tablet  Commonly known as:  CIPRO            Discharge Diet:     Activity at Discharge:     Follow-up Appointments  Future Appointments   Date Time Provider Department Center   7/21/2020  3:00 PM Eusebia Oviedo, SLP [facility]   7/21/2020  3:45 PM Leda Duckworth DO [facility] None   12/10/2020  3:30 PM Hans Doyle MD [facility]     Additional Instructions for the Follow-ups that You Need to Schedule     Ambulatory Referral to Physical Therapy Evaluate and treat   As directed      Patient wants to go to [place]    Order Comments:  Patient wants to go to [place]     Specialty needed:  Evaluate and treat         Ambulatory Referral to Speech Therapy   As directed      Discharge Follow-up with PCP   As directed       Currently Documented PCP:    Leda Duckworth DO    PCP Phone Number:    [phone]     Follow Up Details:  primary one week         Discharge Follow-up with Specified Provider: neurology; 2 Weeks   As directed      To:  neurology    Follow Up:  2 Weeks               Test Results Pending at Discharge   Order Current Status    MIKE In process    Anticardiolipin Antibody, IgG / M, Qn In process    Antiphosphotidyl Antibodies Panel II In process    Antithrombin III In process    Factor 5 Activity In process    Protein C Activity In process    Protein S Panel In process           Risk for Readmission (LACE) Score: 11 (7/16/2020  6:00 AM)      This patient has been examined wearing appropriate Personal Protective Equipment . 07/16/20      Time: Discharge 32 min with face-to-face history exam, writing of prescriptions, and documenting discharge data including care coordination with the nursing staff.      Kevyn Vogel DO  07/16/20  11:26              Electronically signed by Kevyn Vogel DO at 07/16/20 7054

## 2020-07-20 NOTE — OUTREACH NOTE
Call Center TCM Note      Responses   [facility] patient discharged from?  [facility]   Does the patient have one of the following disease processes/diagnoses(primary or secondary)?  Stroke (TIA)   TCM attempt successful?  No   Unsuccessful attempts  Attempt 2          Ramin Calixto RN    7/20/2020, 14:27

## 2020-07-20 NOTE — OUTREACH NOTE
Call Center TCM Note      Responses   [facility] patient discharged from?  [facility]   Does the patient have one of the following disease processes/diagnoses(primary or secondary)?  Stroke (TIA)   TCM attempt successful?  No   Unsuccessful attempts  Attempt 1          Ramin Calixto RN    7/20/2020, 13:26

## 2020-07-21 ENCOUNTER — TRANSITIONAL CARE MANAGEMENT TELEPHONE ENCOUNTER (OUTPATIENT)
Dept: CALL CENTER | Facility: HOSPITAL | Age: 65
End: 2020-07-21

## 2020-07-21 ENCOUNTER — OFFICE VISIT (OUTPATIENT)
Dept: FAMILY MEDICINE CLINIC | Facility: CLINIC | Age: 65
End: 2020-07-21

## 2020-07-21 ENCOUNTER — APPOINTMENT (OUTPATIENT)
Dept: SPEECH THERAPY | Facility: HOSPITAL | Age: 65
End: 2020-07-21

## 2020-07-21 VITALS
BODY MASS INDEX: 36.43 KG/M2 | HEART RATE: 84 BPM | RESPIRATION RATE: 16 BRPM | SYSTOLIC BLOOD PRESSURE: 113 MMHG | HEIGHT: 69 IN | WEIGHT: 246 LBS | TEMPERATURE: 97.5 F | OXYGEN SATURATION: 96 % | DIASTOLIC BLOOD PRESSURE: 75 MMHG

## 2020-07-21 DIAGNOSIS — I10 ESSENTIAL HYPERTENSION: Chronic | ICD-10-CM

## 2020-07-21 DIAGNOSIS — E11.42 DIABETIC POLYNEUROPATHY ASSOCIATED WITH TYPE 2 DIABETES MELLITUS (HCC): ICD-10-CM

## 2020-07-21 DIAGNOSIS — F33.1 MAJOR DEPRESSIVE DISORDER, RECURRENT EPISODE, MODERATE DEGREE (HCC): Chronic | ICD-10-CM

## 2020-07-21 DIAGNOSIS — I63.413 CEREBROVASCULAR ACCIDENT (CVA) DUE TO BILATERAL EMBOLISM OF MIDDLE CEREBRAL ARTERIES (HCC): ICD-10-CM

## 2020-07-21 DIAGNOSIS — E11.42 TYPE 2 DIABETES MELLITUS WITH DIABETIC POLYNEUROPATHY, WITHOUT LONG-TERM CURRENT USE OF INSULIN (HCC): ICD-10-CM

## 2020-07-21 DIAGNOSIS — I63.9 CEREBROVASCULAR ACCIDENT (CVA), UNSPECIFIED MECHANISM (HCC): Primary | ICD-10-CM

## 2020-07-21 PROBLEM — M51.36 DDD (DEGENERATIVE DISC DISEASE), LUMBAR: Status: ACTIVE | Noted: 2020-07-21

## 2020-07-21 PROCEDURE — 99214 OFFICE O/P EST MOD 30 MIN: CPT | Performed by: FAMILY MEDICINE

## 2020-07-21 RX ORDER — PREGABALIN 75 MG/1
150 CAPSULE ORAL 2 TIMES DAILY
Qty: 90 CAPSULE | Refills: 2 | Status: SHIPPED
Start: 2020-07-21 | End: 2020-08-04

## 2020-07-21 RX ORDER — BUPROPION HYDROCHLORIDE 150 MG/1
150 TABLET ORAL DAILY
Qty: 30 TABLET | Refills: 1 | Status: SHIPPED | OUTPATIENT
Start: 2020-07-21 | End: 2020-10-05 | Stop reason: SDUPTHER

## 2020-07-21 NOTE — OUTREACH NOTE
Call Center TCM Note      Responses   [facility] patient discharged from?  [facility]   Does the patient have one of the following disease processes/diagnoses(primary or secondary)?  Stroke (TIA)   TCM attempt successful?  Yes   Call start time  1148   Call end time  1150   Discharge diagnosis  TIA   Meds reviewed with patient/caregiver?  Yes   Is the patient having any side effects they believe may be caused by any medication additions or changes?  No   Does the patient have all medications ordered at discharge?  Yes   Is the patient taking all medications as directed (includes completed medication regime)?  Yes   Does the patient have a primary care provider?   Yes   Does the patient have an appointment with their PCP within 7 days of discharge?  Yes   Comments regarding PCP  7/21/20   Has the patient kept scheduled appointments due by today?  N/A   Psychosocial issues?  No   Does the patient require any assistance with activities of daily living such as eating, bathing, dressing, walking, etc.?  No   Does the patient understand the diet ordered at discharge?  Yes   Did the patient receive a copy of their discharge instructions?  Yes   Nursing interventions  Reviewed instructions with patient   What is the patient's perception of their health status since discharge?  Improving   Nursing interventions  Nurse provided patient education   Is the patient able to teach back FAST for Stroke?  Yes   Is the patient/caregiver able to teach back the risk factors for a stroke?  Diabetes, History of TIAs [Pt has diabetes]   Is the patient/caregiver able to teach back signs and symptoms related to disease process for when to call PCP?  Yes   Is the patient/caregiver able to teach back signs and symptoms related to disease process for when to call 911?  Yes   Is the patient/caregiver able to teach back the hierarchy of who to call/visit for symptoms/problems? PCP, Specialist, Home health nurse, Urgent Care, ED, 911  Yes   TCM  call completed?  Yes          Alondra Mc, RN    7/21/2020, 11:50

## 2020-07-22 ENCOUNTER — TELEPHONE (OUTPATIENT)
Dept: FAMILY MEDICINE CLINIC | Facility: CLINIC | Age: 65
End: 2020-07-22

## 2020-07-22 NOTE — TELEPHONE ENCOUNTER
----- Message from Leda Duckworth DO sent at 7/21/2020  4:54 PM EDT -----  Call his insurance and get his pain meds authorized because pharmacy keeps charging him $40 a month and he has S

## 2020-07-22 NOTE — TELEPHONE ENCOUNTER
PA for Hydrocodone was approved. I don't see that you have sent this in for him, do I need to call the pharmacy to let them know or does he get this med through someone else?

## 2020-07-22 NOTE — TELEPHONE ENCOUNTER
I do it but call pt and let him know that you got it approved and he shouldn't pay $40 for it anymore

## 2020-07-29 ENCOUNTER — READMISSION MANAGEMENT (OUTPATIENT)
Dept: CALL CENTER | Facility: HOSPITAL | Age: 65
End: 2020-07-29

## 2020-07-29 NOTE — OUTREACH NOTE
Stroke Week 2 Survey      Responses   [facility]   Does the patient have one of the following disease processes/diagnoses(primary or secondary)?  Stroke (TIA)   Week 2 attempt successful?  No   Unsuccessful attempts  Attempt 1          Ann Corey RN

## 2020-07-30 LAB
ANTI-PHOSPHATIDIC ACID: ABNORMAL
ANTI-PHOSPHATIDIC,IGA: 2.4 U/ML
ANTI-PHOSPHATIDIC,IGG: 1.9 U/ML
ANTI-PHOSPHATIDIC,IGM: 13.8 U/ML
ANTI-PHOSPHATIDYL GLYCEROL, IGA: 5.1 U/ML
ANTI-PHOSPHATIDYL GLYCEROL, IGG: 6.4 U/ML
ANTI-PHOSPHATIDYL GLYCEROL, IGM: 5.4 U/ML
ANTI-PHOSPHATIDYL GLYCEROL: ABNORMAL
ANTI-PHOSPHATIDYL INOSITOL: ABNORMAL
ANTI-PHOSPHATIDYL,IGA: 5.4 U/ML
ANTI-PHOSPHATIDYL,IGG: 3.7 U/ML
ANTI-PHOSPHATIDYL,IGM: 3.8 U/ML
ANTI-PHOSPHATIDYLETHANOLAMINE, IGA: 3.6 U/ML
ANTI-PHOSPHATIDYLETHANOLAMINE, IGG: 3.8 U/ML
ANTI-PHOSPHATIDYLETHANOLAMINE, IGM: 1.8 U/ML
ANTI-PHOSPHATIDYLETHANOLAMINE: ABNORMAL

## 2020-07-30 NOTE — OUTREACH NOTE
Stroke Week 2 Survey      Responses   [facility] patient discharged from?  [facility]   Does the patient have one of the following disease processes/diagnoses(primary or secondary)?  Stroke (TIA)   Week 2 attempt successful?  Yes   Call start time  1022   Unsuccessful attempts  Attempt 1   Call end time  1024   Discharge diagnosis  TIA   Meds reviewed with patient/caregiver?  Yes   Is the patient taking all medications as directed (includes completed medication regime)?  Yes   Has the patient kept scheduled appointments due by today?  Yes   Does the patient require any assistance with activities of daily living such as eating, bathing, dressing, walking, etc.?  No   Does the patient have any residual symptoms from stroke/TIA?  No   Does the patient understand the diet ordered at discharge?  Yes   What is the patient's perception of their health status since discharge?  Improving   Is the patient able to teach back FAST for Stroke?  Yes   Is the patient/caregiver able to teach back the risk factors for a stroke?  History of TIAs, Sleep apnea   Week 2 call completed?  Yes          Ann Corey RN

## 2020-08-03 ENCOUNTER — TELEPHONE (OUTPATIENT)
Dept: FAMILY MEDICINE CLINIC | Facility: CLINIC | Age: 65
End: 2020-08-03

## 2020-08-03 NOTE — TELEPHONE ENCOUNTER
Patient called and left a Vm stating that he needs a return to work note for ASAP, they want him to come back to work.

## 2020-08-04 ENCOUNTER — APPOINTMENT (OUTPATIENT)
Dept: SPEECH THERAPY | Facility: HOSPITAL | Age: 65
End: 2020-08-04

## 2020-08-04 ENCOUNTER — OFFICE VISIT (OUTPATIENT)
Dept: FAMILY MEDICINE CLINIC | Facility: CLINIC | Age: 65
End: 2020-08-04

## 2020-08-04 VITALS
RESPIRATION RATE: 14 BRPM | TEMPERATURE: 97.1 F | BODY MASS INDEX: 36.88 KG/M2 | OXYGEN SATURATION: 97 % | HEIGHT: 69 IN | SYSTOLIC BLOOD PRESSURE: 107 MMHG | DIASTOLIC BLOOD PRESSURE: 73 MMHG | HEART RATE: 90 BPM | WEIGHT: 249 LBS

## 2020-08-04 DIAGNOSIS — R03.1 LOW BLOOD PRESSURE READING: ICD-10-CM

## 2020-08-04 DIAGNOSIS — E11.65 UNCONTROLLED TYPE 2 DIABETES MELLITUS WITH HYPERGLYCEMIA (HCC): ICD-10-CM

## 2020-08-04 DIAGNOSIS — M54.42 CHRONIC MIDLINE LOW BACK PAIN WITH LEFT-SIDED SCIATICA: ICD-10-CM

## 2020-08-04 DIAGNOSIS — E11.42 DIABETIC POLYNEUROPATHY ASSOCIATED WITH TYPE 2 DIABETES MELLITUS (HCC): ICD-10-CM

## 2020-08-04 DIAGNOSIS — I63.413 CEREBROVASCULAR ACCIDENT (CVA) DUE TO BILATERAL EMBOLISM OF MIDDLE CEREBRAL ARTERIES (HCC): Primary | ICD-10-CM

## 2020-08-04 DIAGNOSIS — G89.29 CHRONIC MIDLINE LOW BACK PAIN WITH LEFT-SIDED SCIATICA: ICD-10-CM

## 2020-08-04 PROCEDURE — 99214 OFFICE O/P EST MOD 30 MIN: CPT | Performed by: FAMILY MEDICINE

## 2020-08-04 RX ORDER — OXYCODONE HYDROCHLORIDE 5 MG/1
TABLET ORAL
Qty: 60 TABLET | Refills: 0 | Status: SHIPPED | OUTPATIENT
Start: 2020-08-04 | End: 2020-08-14 | Stop reason: SDUPTHER

## 2020-08-04 RX ORDER — PREGABALIN 150 MG/1
150 CAPSULE ORAL 2 TIMES DAILY
Qty: 180 CAPSULE | Refills: 0 | Status: SHIPPED | OUTPATIENT
Start: 2020-08-04 | End: 2020-10-21 | Stop reason: SDUPTHER

## 2020-08-04 RX ORDER — LISINOPRIL 5 MG/1
5 TABLET ORAL NIGHTLY
Qty: 30 TABLET | Refills: 2 | Status: SHIPPED | OUTPATIENT
Start: 2020-08-04 | End: 2020-10-05

## 2020-08-10 ENCOUNTER — READMISSION MANAGEMENT (OUTPATIENT)
Dept: CALL CENTER | Facility: HOSPITAL | Age: 65
End: 2020-08-10

## 2020-08-10 NOTE — OUTREACH NOTE
Stroke Week 3 Survey      Responses   [facility] patient discharged from?  [facility]   Does the patient have one of the following disease processes/diagnoses(primary or secondary)?  Stroke (TIA)   Week 3 attempt successful?  Yes   Call start time  1001   Call end time  1005   Discharge diagnosis  TIA   Is patient permission given to speak with other caregiver?  No   Meds reviewed with patient/caregiver?  Yes   Is the patient taking all medications as directed (includes completed medication regime)?  Yes   Medication comments  Patient reports that PCP adjusted diabetes meds.    Does the patient have a primary care provider?   Yes   Has the patient kept scheduled appointments due by today?  Yes   Has home health visited the patient within 72 hours of discharge?  N/A   Psychosocial issues?  No   Does the patient require any assistance with activities of daily living such as eating, bathing, dressing, walking, etc.?  No   Does the patient have any residual symptoms from stroke/TIA?  No   Residual symptoms comments  patient reports that the only thing is that he feels he tires out easily.    Does the patient understand the diet ordered at discharge?  Yes   Did the patient receive a copy of their discharge instructions?  Yes   Nursing interventions  Reviewed instructions with patient   What is the patient's perception of their health status since discharge?  Improving   Nursing interventions  Nurse provided patient education   Is the patient able to teach back FAST for Stroke?  Yes   Is the patient/caregiver able to teach back the risk factors for a stroke?  High blood pressure-goal below 120/80, Diabetes, High Cholesterol, History of TIAs   Is the patient/caregiver able to teach back signs and symptoms related to disease process for when to call PCP?  Yes   Is the patient/caregiver able to teach back signs and symptoms related to disease process for when to call 911?  Yes   Is the patient/caregiver able to teach back  the hierarchy of who to call/visit for symptoms/problems? PCP, Specialist, Home health nurse, Urgent Care, ED, 911  Yes   Week 3 call completed?  Yes          Yoanna Wright RN

## 2020-08-11 ENCOUNTER — TELEPHONE (OUTPATIENT)
Dept: FAMILY MEDICINE CLINIC | Facility: CLINIC | Age: 65
End: 2020-08-11

## 2020-08-11 NOTE — TELEPHONE ENCOUNTER
Pt states the pain meds are not helping and he is taking 2 at a time.  Last seen 8/20  [facility]

## 2020-08-11 NOTE — TELEPHONE ENCOUNTER
Spoke w/ pt and he will try 15mg tid of the oxycodone since 10mg TID/ QID is not touching the pain  Pt will call back in a few days if still not working

## 2020-08-12 ENCOUNTER — TELEPHONE (OUTPATIENT)
Dept: FAMILY MEDICINE CLINIC | Facility: CLINIC | Age: 65
End: 2020-08-12

## 2020-08-12 NOTE — TELEPHONE ENCOUNTER
Patient called and stated you had switched his oxycodone to 3 tablets per day instead of 2.  Patient was calling to let you know that he only has 24 pills left.    [facility]    Last Seen 8/4/2020  Next Appt 10/5/2020

## 2020-08-12 NOTE — TELEPHONE ENCOUNTER
LM asking pt if dose works or not.    Also, if he is ok on PT/speech therapy appts?   ST keeps sending the referral back to us, saying unable to sched..pt on vacay, etc

## 2020-08-14 ENCOUNTER — DOCUMENTATION (OUTPATIENT)
Dept: PHYSICAL THERAPY | Facility: CLINIC | Age: 65
End: 2020-08-14

## 2020-08-14 DIAGNOSIS — G89.29 CHRONIC MIDLINE LOW BACK PAIN WITH LEFT-SIDED SCIATICA: ICD-10-CM

## 2020-08-14 DIAGNOSIS — M54.42 CHRONIC MIDLINE LOW BACK PAIN WITH LEFT-SIDED SCIATICA: ICD-10-CM

## 2020-08-14 RX ORDER — OXYCODONE HYDROCHLORIDE 15 MG/1
15 TABLET ORAL EVERY 6 HOURS PRN
Qty: 120 TABLET | Refills: 0 | Status: SHIPPED | OUTPATIENT
Start: 2020-08-14 | End: 2020-09-09

## 2020-08-14 NOTE — TELEPHONE ENCOUNTER
Pt called and stated he needs refill on oxycodone - that last time seen Dr LACEY changed him from BID to TID and he is now almost out.  States taking this TID has improved his pain.  [facility]

## 2020-08-14 NOTE — PROGRESS NOTES
Discharge Summary  Discharge Summary from Physical Therapy Report      Dates  PT visit: 6/1/20-6/25/20  Number of Visits: 6    Discharge Status of Patient: Pt's PT was put on hold due to lack of progress on 6/25/20, then pt had various other medical issues occur.     Goals: Not Met    Discharge Plan: Pt was put on hold to get an MRI. D/C per pt not being seen in over a month per dept policy.     Date of Discharge: 8/14/20    Deepa Kingsley, PT  Physical Therapist  [place] Lic# [id number]

## 2020-08-15 NOTE — PROGRESS NOTES
Subjective   Humble Green is a 64 y.o. male.     Chief Complaint   Patient presents with   • Diabetes     2 week F/U on DM   • Cerebrovascular Accident     2 week F/U on stroke   • Depression     2 week F/U on depression         Current Outpatient Medications:   •  apixaban (ELIQUIS) 5 MG tablet tablet, Take 1 tablet by mouth Every 12 (Twelve) Hours., Disp: 180 tablet, Rfl: 0  •  aspirin 81 MG chewable tablet, Chew 81 mg Every Night., Disp: , Rfl:   •  atorvastatin (LIPITOR) 80 MG tablet, Take 1 tablet by mouth Every Night., Disp: 30 tablet, Rfl: 0  •  buPROPion XL (Wellbutrin XL) 150 MG 24 hr tablet, Take 1 tablet by mouth Daily., Disp: 30 tablet, Rfl: 1  •  butalbital-acetaminophen-caffeine (FIORICET, ESGIC) -40 MG per tablet, Take 1 tablet by mouth Every 6 (Six) Hours As Needed for Headache or Migraine., Disp: 20 tablet, Rfl: 0  •  Dapagliflozin Propanediol (Farxiga) 10 MG tablet, Take 10 mg by mouth Every Evening., Disp: 30 tablet, Rfl: 3  •  insulin detemir (LEVEMIR) 100 UNIT/ML injection, Inject 33 Units under the skin into the appropriate area as directed Every Night., Disp: , Rfl:   •  Insulin Lispro, 1 Unit Dial, (HumaLOG KwikPen) 100 UNIT/ML solution pen-injector, Inject 5 Units under the skin into the appropriate area as directed 3 (Three) Times a Day Before Meals., Disp: 9 pen, Rfl: 0  •  lisinopril (PRINIVIL,ZESTRIL) 5 MG tablet, Take 1 tablet by mouth Every Night., Disp: 30 tablet, Rfl: 2  •  oxyCODONE (ROXICODONE) 15 MG immediate release tablet, Take 1 tablet by mouth Every 6 (Six) Hours As Needed for Severe Pain ., Disp: 120 tablet, Rfl: 0  •  pregabalin (LYRICA) 150 MG capsule, Take 1 capsule by mouth 2 (Two) Times a Day., Disp: 180 capsule, Rfl: 0    Past Medical History:   Diagnosis Date   • Arthritis    • BPH (benign prostatic hyperplasia)    • Chronic back pain    • Diabetes mellitus (CMS/HCC)    • Hypertension    • Low back pain    • Low vision of right eye with normal vision in  contralateral eye    • Memory problem    • Nonobstructive atherosclerosis of coronary artery    • PFO (patent foramen ovale) 11/29/2019   • Stroke (CMS/HCC)        Past Surgical History:   Procedure Laterality Date   • ARM TENDON REPAIR Left    • CHOLECYSTECTOMY     • COLONOSCOPY      2018 = TA, rech 2023   • EYE SURGERY      Rt Eye Sx after trauma @ 4y/o   • JOINT REPLACEMENT      Left TKR   • ROTATOR CUFF REPAIR Bilateral    • UMBILICAL HERNIA REPAIR         Family History   Problem Relation Age of Onset   • Diabetes Mother    • Heart disease Mother    • Arthritis Mother    • Obesity Mother    • Hypertension Mother    • Migraines Mother    • Osteoporosis Mother    • Diabetes Father    • Heart disease Father    • Arthritis Father    • Hypertension Father    • Stroke Father    • Heart attack Father    • Hyperlipidemia Father    • Diabetes Brother    • Heart disease Brother    • Arthritis Sister    • Anemia Sister        Social History     Socioeconomic History   • Marital status: Single     Spouse name: Not on file   • Number of children: Not on file   • Years of education: Not on file   • Highest education level: Not on file   Tobacco Use   • Smoking status: Never Smoker   • Smokeless tobacco: Never Used   Substance and Sexual Activity   • Alcohol use: Yes     Alcohol/week: 1.0 standard drinks     Types: 1 Cans of beer per week   • Drug use: Never   • Sexual activity: Defer       65 y/o C male here for f/u on Depr/ recent CVA/ Chronic pain and DMII to see if appropriate to Return to work    Pt works as a  for about 35hrs/ week and wanting to return to work tomorrow; Pt feels he is doing better overall and can return to work    Pt feeling better.......taking his meds everyday as directed;  The Lyrica is helping some but the pain meds don't seem to be helping enough  Pt states the wellbutrin seems to be helping as well       The following portions of the patient's history were reviewed and updated as  appropriate: allergies, current medications, past family history, past medical history, past social history, past surgical history and problem list.    Review of Systems   Constitutional: Negative for activity change, appetite change, fatigue, unexpected weight gain and unexpected weight loss.   Respiratory: Negative for cough, chest tightness and shortness of breath.    Cardiovascular: Negative for chest pain, palpitations and leg swelling.   Genitourinary: Negative for frequency, urgency and urinary incontinence.   Musculoskeletal: Positive for back pain. Negative for arthralgias and myalgias.   Neurological: Positive for weakness (better than before) and numbness. Negative for dizziness, facial asymmetry, speech difficulty, light-headedness, headache, memory problem and confusion.   Psychiatric/Behavioral: Negative for dysphoric mood, sleep disturbance and depressed mood. The patient is not nervous/anxious.        Vitals:    08/04/20 1536   BP: 107/73   Pulse: 90   Resp: 14   Temp: 97.1 °F (36.2 °C)   SpO2: 97%       Objective   Physical Exam   Constitutional: He is oriented to person, place, and time. He appears well-developed and well-nourished. No distress.   HENT:   Head: Normocephalic and atraumatic.   Neck: Normal range of motion. Neck supple.   Cardiovascular: Normal rate, regular rhythm, normal heart sounds and intact distal pulses.   No murmur heard.  Pulmonary/Chest: Effort normal and breath sounds normal. No respiratory distress.   Musculoskeletal: He exhibits no edema.   Neurological: He is alert and oriented to person, place, and time. No cranial nerve deficit.   Skin: Skin is warm and dry. No rash noted.   Psychiatric: He has a normal mood and affect. His behavior is normal. Judgment and thought content normal.   Nursing note and vitals reviewed.        Assessment/Plan   Humble was seen today for diabetes, cerebrovascular accident and depression.    Diagnoses and all orders for this  visit:    Cerebrovascular accident (CVA) due to bilateral embolism of middle cerebral arteries (CMS/HCC)    Diabetic polyneuropathy associated with type 2 diabetes mellitus (CMS/HCC)  -     pregabalin (LYRICA) 150 MG capsule; Take 1 capsule by mouth 2 (Two) Times a Day.    Low blood pressure reading    Chronic midline low back pain with left-sided sciatica  -     Discontinue: oxyCODONE (ROXICODONE) 5 MG immediate release tablet; 1-2 po qid prn severe pain  -     Ambulatory Referral to Spine Surgery    Uncontrolled type 2 diabetes mellitus with hyperglycemia (CMS/HCC)    Other orders  -     Dapagliflozin Propanediol (Farxiga) 10 MG tablet; Take 10 mg by mouth Every Evening.  -     lisinopril (PRINIVIL,ZESTRIL) 5 MG tablet; Take 1 tablet by mouth Every Night.    CUT down the Lisinopril to 5mg and monitor o/s BP's  Switch PAIN med for better pain control/ referral to Spine Sx for alt tx options  May return to work tomorrow

## 2020-08-17 ENCOUNTER — TELEPHONE (OUTPATIENT)
Dept: FAMILY MEDICINE CLINIC | Facility: CLINIC | Age: 65
End: 2020-08-17

## 2020-08-17 ENCOUNTER — OFFICE VISIT (OUTPATIENT)
Dept: CARDIOLOGY | Facility: CLINIC | Age: 65
End: 2020-08-17

## 2020-08-17 VITALS
DIASTOLIC BLOOD PRESSURE: 66 MMHG | HEIGHT: 69 IN | SYSTOLIC BLOOD PRESSURE: 102 MMHG | BODY MASS INDEX: 37.03 KG/M2 | WEIGHT: 250 LBS | OXYGEN SATURATION: 95 % | HEART RATE: 80 BPM

## 2020-08-17 DIAGNOSIS — E78.2 MIXED HYPERLIPIDEMIA: Primary | ICD-10-CM

## 2020-08-17 DIAGNOSIS — Q21.12 PATENT FORAMEN OVALE: ICD-10-CM

## 2020-08-17 DIAGNOSIS — I10 ESSENTIAL HYPERTENSION: ICD-10-CM

## 2020-08-17 DIAGNOSIS — I63.9 CEREBROVASCULAR ACCIDENT (CVA), UNSPECIFIED MECHANISM (HCC): ICD-10-CM

## 2020-08-17 DIAGNOSIS — I25.10 CORONARY ARTERY DISEASE INVOLVING NATIVE CORONARY ARTERY OF NATIVE HEART WITHOUT ANGINA PECTORIS: ICD-10-CM

## 2020-08-17 DIAGNOSIS — E11.42 TYPE 2 DIABETES MELLITUS WITH DIABETIC POLYNEUROPATHY, WITHOUT LONG-TERM CURRENT USE OF INSULIN (HCC): ICD-10-CM

## 2020-08-17 PROCEDURE — 99213 OFFICE O/P EST LOW 20 MIN: CPT | Performed by: INTERNAL MEDICINE

## 2020-08-17 NOTE — TELEPHONE ENCOUNTER
He has had a stroke and feels week on this side-----he can also get back PTx if he feels it is helping

## 2020-08-17 NOTE — PROGRESS NOTES
"    Subjective:     Encounter Date:08/17/2020      Patient ID: Humble Green is a 64 y.o. male.    Chief Complaint:  History of Present Illness 64-year-old white male with history of coronary disease history of hypertension hyperlipidemia history of stroke and patent foramen ovale in the past diabetes presents to my office for follow-up.  Patient is currently stable without incidence of chest pain or shortness of breath at rest or exertion.  No complains any PND orthopnea.  No palpitation dizziness syncope or swelling of the feet.  He is take his medicine regularly.  He does not smoke.  He is trying to exercise regular.  He follows a good diet.    The following portions of the patient's history were reviewed and updated as appropriate: allergies, current medications, past family history, past medical history, past social history, past surgical history and problem list.  Past Medical History:   Diagnosis Date   • Arthritis    • BPH (benign prostatic hyperplasia)    • Chronic back pain    • Diabetes mellitus (CMS/HCC)    • Hypertension    • Low back pain    • Low vision of right eye with normal vision in contralateral eye    • Memory problem    • Nonobstructive atherosclerosis of coronary artery    • PFO (patent foramen ovale) 11/29/2019   • Stroke (CMS/HCC)      Past Surgical History:   Procedure Laterality Date   • ARM TENDON REPAIR Left    • CHOLECYSTECTOMY     • COLONOSCOPY      2018 = TA, rech 2023   • EYE SURGERY      Rt Eye Sx after trauma @ 2y/o   • JOINT REPLACEMENT      Left TKR   • ROTATOR CUFF REPAIR Bilateral    • UMBILICAL HERNIA REPAIR       /66 (BP Location: Left arm, Patient Position: Sitting)   Pulse 80   Ht 175.3 cm (69\")   Wt 113 kg (250 lb)   SpO2 95%   BMI 36.92 kg/m²   Family History   Problem Relation Age of Onset   • Diabetes Mother    • Heart disease Mother    • Arthritis Mother    • Obesity Mother    • Hypertension Mother    • Migraines Mother    • Osteoporosis Mother    • " Diabetes Father    • Heart disease Father    • Arthritis Father    • Hypertension Father    • Stroke Father    • Heart attack Father    • Hyperlipidemia Father    • Diabetes Brother    • Heart disease Brother    • Arthritis Sister    • Anemia Sister        Current Outpatient Medications:   •  apixaban (ELIQUIS) 5 MG tablet tablet, Take 1 tablet by mouth Every 12 (Twelve) Hours., Disp: 180 tablet, Rfl: 0  •  aspirin 81 MG chewable tablet, Chew 81 mg Every Night., Disp: , Rfl:   •  atorvastatin (LIPITOR) 80 MG tablet, Take 1 tablet by mouth Every Night., Disp: 30 tablet, Rfl: 0  •  buPROPion XL (Wellbutrin XL) 150 MG 24 hr tablet, Take 1 tablet by mouth Daily., Disp: 30 tablet, Rfl: 1  •  butalbital-acetaminophen-caffeine (FIORICET, ESGIC) -40 MG per tablet, Take 1 tablet by mouth Every 6 (Six) Hours As Needed for Headache or Migraine., Disp: 20 tablet, Rfl: 0  •  Dapagliflozin Propanediol (Farxiga) 10 MG tablet, Take 10 mg by mouth Every Evening., Disp: 30 tablet, Rfl: 3  •  insulin detemir (LEVEMIR) 100 UNIT/ML injection, Inject 33 Units under the skin into the appropriate area as directed Every Night., Disp: , Rfl:   •  Insulin Lispro, 1 Unit Dial, (HumaLOG KwikPen) 100 UNIT/ML solution pen-injector, Inject 5 Units under the skin into the appropriate area as directed 3 (Three) Times a Day Before Meals., Disp: 9 pen, Rfl: 0  •  lisinopril (PRINIVIL,ZESTRIL) 5 MG tablet, Take 1 tablet by mouth Every Night., Disp: 30 tablet, Rfl: 2  •  oxyCODONE (ROXICODONE) 15 MG immediate release tablet, Take 1 tablet by mouth Every 6 (Six) Hours As Needed for Severe Pain ., Disp: 120 tablet, Rfl: 0  •  pregabalin (LYRICA) 150 MG capsule, Take 1 capsule by mouth 2 (Two) Times a Day., Disp: 180 capsule, Rfl: 0  No Known Allergies  Social History     Socioeconomic History   • Marital status: Single     Spouse name: Not on file   • Number of children: Not on file   • Years of education: Not on file   • Highest education level:  Not on file   Tobacco Use   • Smoking status: Never Smoker   • Smokeless tobacco: Never Used   Substance and Sexual Activity   • Alcohol use: Yes     Alcohol/week: 1.0 standard drinks     Types: 1 Cans of beer per week   • Drug use: Never   • Sexual activity: Defer     Review of Systems   Constitution: Positive for malaise/fatigue. Negative for fever.   Cardiovascular: Negative for chest pain, dyspnea on exertion and palpitations.   Respiratory: Negative for cough and shortness of breath.    Skin: Negative for rash.   Gastrointestinal: Negative for abdominal pain, nausea and vomiting.   Neurological: Negative for dizziness, focal weakness, headaches and light-headedness.   All other systems reviewed and are negative.             Objective:     Physical Exam   Constitutional: He appears well-developed and well-nourished.   HENT:   Head: Normocephalic and atraumatic.   Eyes: Conjunctivae are normal. No scleral icterus.   Neck: Normal range of motion. Neck supple. No JVD present. Carotid bruit is not present.   Cardiovascular: Normal rate, regular rhythm, S1 normal, S2 normal, normal heart sounds and intact distal pulses. PMI is not displaced.   Pulmonary/Chest: Effort normal and breath sounds normal. He has no wheezes. He has no rales.   Abdominal: Soft. Bowel sounds are normal.   Neurological: He is alert. He has normal strength.   Skin: Skin is warm and dry. No rash noted.     Procedures    Lab Review:       Assessment:          Diagnosis Plan   1. Mixed hyperlipidemia     2. Essential hypertension     3. Type 2 diabetes mellitus with diabetic polyneuropathy, without long-term current use of insulin (CMS/HCC)     4. Coronary artery disease involving native coronary artery of native heart without angina pectoris     5. Patent foramen ovale     6. Cerebrovascular accident (CVA), unspecified mechanism (CMS/HCC)            Plan:       Patient has history of coronary disease with normal function is currently stable on  medical therapy  Patient also has patent foramen ovale with history of CVA and hence is on anticoagulation  Patient blood pressure and heart rate stable  Patient lipid levels are followed by the primary care doctor  Patient also has diabetes and is on oral medicines.

## 2020-08-17 NOTE — TELEPHONE ENCOUNTER
PT therapist needs clarification on PT wanted for pt.  Newest order states L/Side weakness, but they previously saw him for his back.

## 2020-08-18 NOTE — TELEPHONE ENCOUNTER
Pt had left VM stating he had not heard from PT - after clarification on order rec'd I contacted PT to let them know, however they said they had been trying to reach the pat numerous times and he had even NCNS to an appt.  I left VM for Humble to contact PT and or our ofc to get scheduled.

## 2020-09-09 DIAGNOSIS — M54.42 CHRONIC MIDLINE LOW BACK PAIN WITH LEFT-SIDED SCIATICA: ICD-10-CM

## 2020-09-09 DIAGNOSIS — G89.29 CHRONIC MIDLINE LOW BACK PAIN WITH LEFT-SIDED SCIATICA: ICD-10-CM

## 2020-09-09 RX ORDER — OXYCODONE HYDROCHLORIDE 15 MG/1
TABLET ORAL
Qty: 120 TABLET | Refills: 0 | Status: SHIPPED | OUTPATIENT
Start: 2020-09-09 | End: 2020-10-05 | Stop reason: SDUPTHER

## 2020-09-18 ENCOUNTER — TELEPHONE (OUTPATIENT)
Dept: FAMILY MEDICINE CLINIC | Facility: CLINIC | Age: 65
End: 2020-09-18

## 2020-09-18 NOTE — TELEPHONE ENCOUNTER
PA approved for generic [id number] MG tablets  PA approval faxed to [facility] in [place]     Approved for generic Oxycodone HCl Tablet 15 MG, quantity up to 120 tablets per 30 days, under the pharmacy benefit. Generic substitution required when available.

## 2020-09-24 ENCOUNTER — TELEPHONE (OUTPATIENT)
Dept: NEUROSURGERY | Facility: CLINIC | Age: 65
End: 2020-09-24

## 2020-09-24 NOTE — TELEPHONE ENCOUNTER
PATIENT CALLED REGARDING TELEVISIT SCHEDULED FOR [facility] YESTERDAY (9/23). PATIENT STATES HE DID NOT RECEIVE A CALL. UNABLE TO SCHEDULE FOR [facility] NOR TELEVISIT RELATED. INITIAL APPT CANCELLED BY OFFICE. ARIEL STATES HE WILL RETURN PATIENT'S CALL AT HIS EARLIER CONVENIENCE.

## 2020-09-24 NOTE — TELEPHONE ENCOUNTER
Patient was called twice yesterday with no answer, so patient was a no show for appointment.  I called patient twice today with no answer.

## 2020-09-25 ENCOUNTER — TELEPHONE (OUTPATIENT)
Dept: FAMILY MEDICINE CLINIC | Facility: CLINIC | Age: 65
End: 2020-09-25

## 2020-09-28 NOTE — TELEPHONE ENCOUNTER
PATIENT CALLED REGARDING THE FOLLOWING APPOINTMENT.  Type: NEW PATIENT (TELEVISIT)  Date: 10/14/20  Provider: MICHAEL BIGGS  Caller: PATIENT  Phone Number: [phone]  Reason: PATIENT CALLED WITH AN UPDATE REGARDING MRI. PATIENT INDICATES THAT PCP WAS UNABLE TO ORDER MRI FOR BACK DUE TO BEING DENIED BY INSURANCE. PATIENT INQUIRING IF OUR OFFICE COULD ORDER & SCHEDULE MRI PRIOR TO VISIT. UNABLE TO ASSIST AT TIME OF CALL. PLEASE ADVISE HOW PROVIDER WOULD LIKE TO PROCEED?  Availability for callback: ANYTIME  Preferred dates for scheduling: AS SOON AS POSSIBLE    PATIENT CAN BE CONTACTED WITH FURTHER INSTRUCTION

## 2020-10-05 ENCOUNTER — RESULTS ENCOUNTER (OUTPATIENT)
Dept: FAMILY MEDICINE CLINIC | Facility: CLINIC | Age: 65
End: 2020-10-05

## 2020-10-05 ENCOUNTER — OFFICE VISIT (OUTPATIENT)
Dept: FAMILY MEDICINE CLINIC | Facility: CLINIC | Age: 65
End: 2020-10-05

## 2020-10-05 VITALS
HEART RATE: 89 BPM | OXYGEN SATURATION: 97 % | SYSTOLIC BLOOD PRESSURE: 113 MMHG | HEIGHT: 69 IN | BODY MASS INDEX: 37.47 KG/M2 | DIASTOLIC BLOOD PRESSURE: 77 MMHG | TEMPERATURE: 97.8 F | WEIGHT: 253 LBS | RESPIRATION RATE: 16 BRPM

## 2020-10-05 DIAGNOSIS — E11.42 TYPE 2 DIABETES MELLITUS WITH DIABETIC POLYNEUROPATHY, WITHOUT LONG-TERM CURRENT USE OF INSULIN (HCC): Primary | ICD-10-CM

## 2020-10-05 DIAGNOSIS — Z23 IMMUNIZATION DUE: ICD-10-CM

## 2020-10-05 DIAGNOSIS — M54.42 CHRONIC MIDLINE LOW BACK PAIN WITH LEFT-SIDED SCIATICA: ICD-10-CM

## 2020-10-05 DIAGNOSIS — Z23 NEED FOR INFLUENZA VACCINATION: ICD-10-CM

## 2020-10-05 DIAGNOSIS — Z12.11 SCREENING FOR COLON CANCER: ICD-10-CM

## 2020-10-05 DIAGNOSIS — F33.1 MAJOR DEPRESSIVE DISORDER, RECURRENT EPISODE, MODERATE DEGREE (HCC): Chronic | ICD-10-CM

## 2020-10-05 DIAGNOSIS — G89.29 CHRONIC MIDLINE LOW BACK PAIN WITH LEFT-SIDED SCIATICA: ICD-10-CM

## 2020-10-05 DIAGNOSIS — I10 ESSENTIAL HYPERTENSION: Chronic | ICD-10-CM

## 2020-10-05 LAB — HBA1C MFR BLD: 7.5 %

## 2020-10-05 PROCEDURE — 90471 IMMUNIZATION ADMIN: CPT | Performed by: FAMILY MEDICINE

## 2020-10-05 PROCEDURE — 83036 HEMOGLOBIN GLYCOSYLATED A1C: CPT | Performed by: FAMILY MEDICINE

## 2020-10-05 PROCEDURE — 90472 IMMUNIZATION ADMIN EACH ADD: CPT | Performed by: FAMILY MEDICINE

## 2020-10-05 PROCEDURE — 90686 IIV4 VACC NO PRSV 0.5 ML IM: CPT | Performed by: FAMILY MEDICINE

## 2020-10-05 PROCEDURE — 90732 PPSV23 VACC 2 YRS+ SUBQ/IM: CPT | Performed by: FAMILY MEDICINE

## 2020-10-05 PROCEDURE — 99214 OFFICE O/P EST MOD 30 MIN: CPT | Performed by: FAMILY MEDICINE

## 2020-10-05 RX ORDER — OXYCODONE HYDROCHLORIDE 15 MG/1
15 TABLET ORAL EVERY 6 HOURS PRN
Qty: 120 TABLET | Refills: 0 | Status: SHIPPED | OUTPATIENT
Start: 2020-10-05 | End: 2020-10-21 | Stop reason: SDUPTHER

## 2020-10-05 RX ORDER — ATORVASTATIN CALCIUM 40 MG/1
40 TABLET, FILM COATED ORAL NIGHTLY
Status: SHIPPED
Start: 2020-10-05 | End: 2020-12-15 | Stop reason: SDUPTHER

## 2020-10-05 RX ORDER — INSULIN LISPRO 100 [IU]/ML
7 INJECTION, SOLUTION INTRAVENOUS; SUBCUTANEOUS
Qty: 9 PEN | Refills: 0
Start: 2020-10-05 | End: 2020-11-04 | Stop reason: SDUPTHER

## 2020-10-05 RX ORDER — LISINOPRIL 5 MG/1
2.5 TABLET ORAL NIGHTLY
Qty: 30 TABLET | Refills: 2 | Status: SHIPPED
Start: 2020-10-05 | End: 2020-10-05

## 2020-10-05 RX ORDER — LISINOPRIL 2.5 MG/1
2.5 TABLET ORAL NIGHTLY
Start: 2020-10-05 | End: 2021-04-20

## 2020-10-05 RX ORDER — BUPROPION HYDROCHLORIDE 150 MG/1
150 TABLET ORAL DAILY
Qty: 90 TABLET | Refills: 1 | Status: SHIPPED | OUTPATIENT
Start: 2020-10-05 | End: 2021-02-15

## 2020-10-05 RX ORDER — FLUOXETINE HYDROCHLORIDE 40 MG/1
40 CAPSULE ORAL DAILY
COMMUNITY
Start: 2020-09-24 | End: 2020-12-15

## 2020-10-05 NOTE — PROGRESS NOTES
Subjective   Humble Green is a 64 y.o. male.     Chief Complaint   Patient presents with   • Diabetes   • Pain   • Depression         Current Outpatient Medications:   •  apixaban (ELIQUIS) 5 MG tablet tablet, Take 1 tablet by mouth Every 12 (Twelve) Hours., Disp: 180 tablet, Rfl: 0  •  aspirin 81 MG chewable tablet, Chew 81 mg Every Night., Disp: , Rfl:   •  atorvastatin (LIPITOR) 40 MG tablet, Take 1 tablet by mouth Every Night., Disp: , Rfl:   •  buPROPion XL (Wellbutrin XL) 150 MG 24 hr tablet, Take 1 tablet by mouth Daily., Disp: 90 tablet, Rfl: 1  •  butalbital-acetaminophen-caffeine (FIORICET, ESGIC) -40 MG per tablet, Take 1 tablet by mouth Every 6 (Six) Hours As Needed for Headache or Migraine., Disp: 20 tablet, Rfl: 0  •  Dapagliflozin Propanediol (Farxiga) 10 MG tablet, Take 10 mg by mouth Every Evening., Disp: 30 tablet, Rfl: 3  •  FLUoxetine (PROzac) 40 MG capsule, Take 40 mg by mouth Daily., Disp: , Rfl:   •  insulin detemir (LEVEMIR) 100 UNIT/ML injection, Inject 33 Units under the skin into the appropriate area as directed Every Night., Disp: , Rfl:   •  Insulin Lispro, 1 Unit Dial, (HumaLOG KwikPen) 100 UNIT/ML solution pen-injector, Inject 7 Units under the skin into the appropriate area as directed 3 (Three) Times a Day Before Meals., Disp: 9 pen, Rfl: 0  •  lisinopril (PRINIVIL,ZESTRIL) 2.5 MG tablet, Take 1 tablet by mouth Every Night., Disp: , Rfl:   •  oxyCODONE (OxyCONTIN) 10 MG 12 hr tablet, Take 1 tablet by mouth Every 12 (Twelve) Hours., Disp: 60 tablet, Rfl: 0  •  oxyCODONE 7.5 MG tablet, Take 1 tablet by mouth Every 6 (Six) Hours As Needed for Severe Pain ., Disp: , Rfl:   •  pregabalin (LYRICA) 150 MG capsule, 1-2 po qhs, Disp: 180 capsule, Rfl: 0  •  tiZANidine (ZANAFLEX) 2 MG tablet, Take 1 tablet by mouth At Night As Needed for Muscle Spasms., Disp: 30 tablet, Rfl: 0    Past Medical History:   Diagnosis Date   • Arthritis    • BPH (benign prostatic hyperplasia)    • Chronic  back pain    • Depression    • Diabetes mellitus (CMS/HCC)    • Hypertension    • Low back pain    • Low vision of right eye with normal vision in contralateral eye    • Memory problem    • Nonobstructive atherosclerosis of coronary artery    • PFO (patent foramen ovale) 11/29/2019   • Stroke (CMS/HCC)        Past Surgical History:   Procedure Laterality Date   • ARM TENDON REPAIR Left    • CHOLECYSTECTOMY     • COLONOSCOPY      2018 = TA, rech 2023   • EYE SURGERY      Rt Eye Sx after trauma @ 4y/o   • JOINT REPLACEMENT      Left TKR   • ROTATOR CUFF REPAIR Bilateral    • UMBILICAL HERNIA REPAIR         Family History   Problem Relation Age of Onset   • Diabetes Mother    • Heart disease Mother    • Arthritis Mother    • Obesity Mother    • Hypertension Mother    • Migraines Mother    • Osteoporosis Mother    • Diabetes Father    • Heart disease Father    • Arthritis Father    • Hypertension Father    • Stroke Father    • Heart attack Father    • Hyperlipidemia Father    • Diabetes Brother    • Heart disease Brother    • Arthritis Sister    • Anemia Sister        Social History     Socioeconomic History   • Marital status: Single     Spouse name: Not on file   • Number of children: Not on file   • Years of education: Not on file   • Highest education level: Not on file   Tobacco Use   • Smoking status: Never Smoker   • Smokeless tobacco: Never Used   Substance and Sexual Activity   • Alcohol use: Yes     Alcohol/week: 1.0 standard drinks     Types: 1 Cans of beer per week   • Drug use: Never   • Sexual activity: Defer       63 y/o C male here for f/u on DMII/ Chronic pain and Depression    Pt tolerating current meds and doses fine and tries to follow his DM diet ----no low BS episodes  Pt working back at the school as a ......    Doing some better w/ the depression meds       The following portions of the patient's history were reviewed and updated as appropriate: allergies, current medications, past family  history, past medical history, past social history, past surgical history and problem list.    Review of Systems   Constitutional: Negative for activity change, appetite change, fatigue, unexpected weight gain and unexpected weight loss.   Eyes: Negative for blurred vision, double vision, pain and visual disturbance.   Respiratory: Negative for cough and wheezing.    Cardiovascular: Negative for leg swelling.   Gastrointestinal: Negative for abdominal distention, abdominal pain, constipation, diarrhea, nausea and vomiting.   Endocrine: Negative for polydipsia, polyphagia and polyuria.   Genitourinary: Negative for frequency.   Musculoskeletal: Positive for back pain and gait problem.   Skin: Negative for color change, dry skin, rash and skin lesions.   Neurological: Negative for weakness and numbness.   Psychiatric/Behavioral: Negative for dysphoric mood, sleep disturbance, negative for hyperactivity and depressed mood.       Vitals:    10/05/20 1452   BP: 113/77   Pulse: 89   Resp: 16   Temp: 97.8 °F (36.6 °C)   SpO2: 97%       Objective   Physical Exam  Vitals signs and nursing note reviewed.   Constitutional:       General: He is not in acute distress.     Appearance: He is well-developed and normal weight. He is not ill-appearing or toxic-appearing.   HENT:      Head: Normocephalic and atraumatic.   Cardiovascular:      Rate and Rhythm: Normal rate and regular rhythm.      Heart sounds: Normal heart sounds. No murmur.   Pulmonary:      Effort: Pulmonary effort is normal.      Breath sounds: Normal breath sounds.   Musculoskeletal:         General: No tenderness or deformity.   Feet:      Right foot:      Skin integrity: No ulcer, blister or warmth.      Toenail Condition: ingrown     Left foot:      Skin integrity: No blister, warmth or callus.      Toenail Condition: ingrown  Skin:     General: Skin is warm and dry.      Capillary Refill: Capillary refill takes less than 2 seconds.      Findings: No erythema or  rash.   Neurological:      General: No focal deficit present.      Mental Status: He is alert and oriented to person, place, and time.      Cranial Nerves: No cranial nerve deficit.   Psychiatric:         Mood and Affect: Mood normal.         Behavior: Behavior normal.         Thought Content: Thought content normal.         Judgment: Judgment normal.           Assessment/Plan   Diagnoses and all orders for this visit:    1. Type 2 diabetes mellitus with diabetic polyneuropathy, without long-term current use of insulin (CMS/[facility]) (Primary)  -     POC Glycosylated Hemoglobin (Hb A1C); Standing  -     POC Glycosylated Hemoglobin (Hb A1C)    2. Chronic midline low back pain with left-sided sciatica  -     Discontinue: oxyCODONE (ROXICODONE) 15 MG immediate release tablet; Take 1 tablet by mouth Every 6 (Six) Hours As Needed for Severe Pain .  Dispense: 120 tablet; Refill: 0    3. Essential hypertension    4. Major depressive disorder, recurrent episode, moderate degree (CMS/[facility])    5. Screening for colon cancer  -     Cologuard - Stool, Per Rectum; Future    6. Need for influenza vaccination  -     Fluarix/Fluzone/Afluria Quad>6 Months    7. Immunization due  -     Pneumococcal Polysaccharide Vaccine 23-Valent (PPSV23) Greater Than or Equal To 3yo Subcutaneous / IM    Other orders  -     Discontinue: lisinopril (PRINIVIL,ZESTRIL) 5 MG tablet; Take 0.5 tablets by mouth Every Night.  Dispense: 30 tablet; Refill: 2  -     atorvastatin (LIPITOR) 40 MG tablet; Take 1 tablet by mouth Every Night.  -     Insulin Lispro, 1 Unit Dial, (HumaLOG KwikPen) 100 UNIT/ML solution pen-injector; Inject 7 Units under the skin into the appropriate area as directed 3 (Three) Times a Day Before Meals.  Dispense: 9 pen; Refill: 0  -     apixaban (ELIQUIS) 5 MG tablet tablet; Take 1 tablet by mouth Every 12 (Twelve) Hours.  Dispense: 180 tablet; Refill: 0  -     buPROPion XL (Wellbutrin XL) 150 MG 24 hr tablet; Take 1 tablet by mouth Daily.   Dispense: 90 tablet; Refill: 1  -     lisinopril (PRINIVIL,ZESTRIL) 2.5 MG tablet; Take 1 tablet by mouth Every Night.    A1C = 7.5  Cont to work on low controlled carb diet  Cologuard test  Flu shot/ PNA23

## 2020-10-21 ENCOUNTER — OFFICE VISIT (OUTPATIENT)
Dept: FAMILY MEDICINE CLINIC | Facility: CLINIC | Age: 65
End: 2020-10-21

## 2020-10-21 VITALS
HEIGHT: 68 IN | SYSTOLIC BLOOD PRESSURE: 160 MMHG | TEMPERATURE: 97.5 F | OXYGEN SATURATION: 98 % | RESPIRATION RATE: 18 BRPM | BODY MASS INDEX: 38.13 KG/M2 | HEART RATE: 79 BPM | DIASTOLIC BLOOD PRESSURE: 96 MMHG | WEIGHT: 251.6 LBS

## 2020-10-21 DIAGNOSIS — G89.29 CHRONIC MIDLINE LOW BACK PAIN WITH LEFT-SIDED SCIATICA: ICD-10-CM

## 2020-10-21 DIAGNOSIS — M54.16 LUMBAR BACK PAIN WITH RADICULOPATHY AFFECTING LEFT LOWER EXTREMITY: ICD-10-CM

## 2020-10-21 DIAGNOSIS — E11.42 DIABETIC POLYNEUROPATHY ASSOCIATED WITH TYPE 2 DIABETES MELLITUS (HCC): ICD-10-CM

## 2020-10-21 DIAGNOSIS — M51.36 DDD (DEGENERATIVE DISC DISEASE), LUMBAR: Primary | ICD-10-CM

## 2020-10-21 DIAGNOSIS — M54.42 CHRONIC MIDLINE LOW BACK PAIN WITH LEFT-SIDED SCIATICA: ICD-10-CM

## 2020-10-21 PROCEDURE — 99213 OFFICE O/P EST LOW 20 MIN: CPT | Performed by: FAMILY MEDICINE

## 2020-10-21 RX ORDER — OXYCODONE HYDROCHLORIDE 15 MG/1
7.5 TABLET ORAL EVERY 6 HOURS PRN
Qty: 120 TABLET | Refills: 0
Start: 2020-10-21 | End: 2020-10-22

## 2020-10-21 RX ORDER — PREGABALIN 150 MG/1
CAPSULE ORAL
Qty: 180 CAPSULE | Refills: 0
Start: 2020-10-21 | End: 2020-12-15

## 2020-10-21 RX ORDER — OXYCODONE HCL 10 MG/1
10 TABLET, FILM COATED, EXTENDED RELEASE ORAL EVERY 12 HOURS
Qty: 60 TABLET | Refills: 0 | Status: SHIPPED | OUTPATIENT
Start: 2020-10-21 | End: 2020-11-04

## 2020-10-21 RX ORDER — TIZANIDINE 2 MG/1
2 TABLET ORAL NIGHTLY PRN
Qty: 30 TABLET | Refills: 0 | Status: SHIPPED | OUTPATIENT
Start: 2020-10-21 | End: 2021-01-20

## 2020-10-21 NOTE — PROGRESS NOTES
Subjective   Humble Green is a 64 y.o. male.     Chief Complaint   Patient presents with   • Back Pain         Current Outpatient Medications:   •  apixaban (ELIQUIS) 5 MG tablet tablet, Take 1 tablet by mouth Every 12 (Twelve) Hours., Disp: 180 tablet, Rfl: 0  •  aspirin 81 MG chewable tablet, Chew 81 mg Every Night., Disp: , Rfl:   •  atorvastatin (LIPITOR) 40 MG tablet, Take 1 tablet by mouth Every Night., Disp: , Rfl:   •  buPROPion XL (Wellbutrin XL) 150 MG 24 hr tablet, Take 1 tablet by mouth Daily., Disp: 90 tablet, Rfl: 1  •  butalbital-acetaminophen-caffeine (FIORICET, ESGIC) -40 MG per tablet, Take 1 tablet by mouth Every 6 (Six) Hours As Needed for Headache or Migraine., Disp: 20 tablet, Rfl: 0  •  Dapagliflozin Propanediol (Farxiga) 10 MG tablet, Take 10 mg by mouth Every Evening., Disp: 30 tablet, Rfl: 3  •  FLUoxetine (PROzac) 40 MG capsule, Take 40 mg by mouth Daily., Disp: , Rfl:   •  insulin detemir (LEVEMIR) 100 UNIT/ML injection, Inject 33 Units under the skin into the appropriate area as directed Every Night., Disp: , Rfl:   •  Insulin Lispro, 1 Unit Dial, (HumaLOG KwikPen) 100 UNIT/ML solution pen-injector, Inject 7 Units under the skin into the appropriate area as directed 3 (Three) Times a Day Before Meals., Disp: 9 pen, Rfl: 0  •  lisinopril (PRINIVIL,ZESTRIL) 2.5 MG tablet, Take 1 tablet by mouth Every Night., Disp: , Rfl:   •  pregabalin (LYRICA) 150 MG capsule, 1-2 po qhs, Disp: 180 capsule, Rfl: 0  •  oxyCODONE (OxyCONTIN) 10 MG 12 hr tablet, Take 1 tablet by mouth Every 12 (Twelve) Hours., Disp: 60 tablet, Rfl: 0  •  oxyCODONE 7.5 MG tablet, Take 1 tablet by mouth Every 6 (Six) Hours As Needed for Severe Pain ., Disp: , Rfl:   •  tiZANidine (ZANAFLEX) 2 MG tablet, Take 1 tablet by mouth At Night As Needed for Muscle Spasms., Disp: 30 tablet, Rfl: 0    Past Medical History:   Diagnosis Date   • Arthritis    • BPH (benign prostatic hyperplasia)    • Chronic back pain    •  Depression    • Diabetes mellitus (CMS/HCC)    • Hypertension    • Low back pain    • Low vision of right eye with normal vision in contralateral eye    • Memory problem    • Nonobstructive atherosclerosis of coronary artery    • PFO (patent foramen ovale) 11/29/2019   • Stroke (CMS/HCC)        Past Surgical History:   Procedure Laterality Date   • ARM TENDON REPAIR Left    • CHOLECYSTECTOMY     • COLONOSCOPY      2018 = TA, rech 2023   • EYE SURGERY      Rt Eye Sx after trauma @ 2y/o   • JOINT REPLACEMENT      Left TKR   • ROTATOR CUFF REPAIR Bilateral    • UMBILICAL HERNIA REPAIR         Family History   Problem Relation Age of Onset   • Diabetes Mother    • Heart disease Mother    • Arthritis Mother    • Obesity Mother    • Hypertension Mother    • Migraines Mother    • Osteoporosis Mother    • Diabetes Father    • Heart disease Father    • Arthritis Father    • Hypertension Father    • Stroke Father    • Heart attack Father    • Hyperlipidemia Father    • Diabetes Brother    • Heart disease Brother    • Arthritis Sister    • Anemia Sister        Social History     Socioeconomic History   • Marital status: Single     Spouse name: Not on file   • Number of children: Not on file   • Years of education: Not on file   • Highest education level: Not on file   Tobacco Use   • Smoking status: Never Smoker   • Smokeless tobacco: Never Used   Substance and Sexual Activity   • Alcohol use: Yes     Alcohol/week: 1.0 standard drinks     Types: 1 Cans of beer per week   • Drug use: Never   • Sexual activity: Defer       65 y/o C male here w/ c/o's worsening back pain    Pt states he had 2 phone visits scheduled w/ [facility] and each time they never called----(Pt states he had his phone on him the whole time).....He called them and was told the NP had COVID (but they never called him to cancel); he is frustrated w/ [facility] office and he wants to see someone else    Pt tried PTx but not helping the pain and they  stopped it because it wasn't helping  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    Review of Systems   Constitutional: Positive for activity change and fatigue. Negative for appetite change, unexpected weight gain and unexpected weight loss.   Musculoskeletal: Positive for arthralgias, back pain, gait problem and myalgias.   Psychiatric/Behavioral: Positive for dysphoric mood, sleep disturbance and stress.       Vitals:    10/21/20 0902   BP: 160/96   Pulse: 79   Resp: 18   Temp: 97.5 °F (36.4 °C)   SpO2: 98%       Objective   Physical Exam  Vitals signs and nursing note reviewed.   Constitutional:       General: He is in acute distress.      Appearance: He is well-developed. He is obese. He is not ill-appearing or toxic-appearing.   HENT:      Head: Normocephalic and atraumatic.   Neck:      Musculoskeletal: Normal range of motion and neck supple.   Cardiovascular:      Rate and Rhythm: Normal rate and regular rhythm.      Heart sounds: Normal heart sounds. No murmur.   Pulmonary:      Effort: Pulmonary effort is normal.      Breath sounds: Normal breath sounds.   Musculoskeletal:      Lumbar back: He exhibits no tenderness.        Back:    Skin:     General: Skin is warm and dry.      Findings: No rash.   Neurological:      Mental Status: He is alert and oriented to person, place, and time.      Cranial Nerves: No cranial nerve deficit.      Gait: Gait abnormal.   Psychiatric:         Attention and Perception: Attention normal.          Mood and Affect: Mood is depressed.         Speech: Speech normal.         Behavior: Behavior normal. Behavior is cooperative.         Thought Content: Thought content normal.         Cognition and Memory: Cognition normal.         Judgment: Judgment normal.           Assessment/Plan   Diagnoses and all orders for this visit:    1. DDD (degenerative disc disease), lumbar (Primary)  -     Ambulatory Referral to Spine Surgery    2. Lumbar back pain with radiculopathy affecting left lower extremity  -     Cancel: MRI Lumbar Spine Without Contrast; Future  -     oxyCODONE (OxyCONTIN) 10 MG 12 hr tablet; Take 1 tablet by mouth Every 12 (Twelve) Hours.  Dispense: 60 tablet; Refill: 0    3. Diabetic polyneuropathy associated with type 2 diabetes mellitus (CMS/HCC)  -     pregabalin (LYRICA) 150 MG capsule; 1-2 po qhs  Dispense: 180 capsule; Refill: 0  -     oxyCODONE (OxyCONTIN) 10 MG 12 hr tablet; Take 1 tablet by mouth Every 12 (Twelve) Hours.  Dispense: 60 tablet; Refill: 0    4. Chronic midline low back pain with left-sided sciatica  -     Discontinue: oxyCODONE (ROXICODONE) 15 MG immediate release tablet; Take 0.5 tablets by mouth Every 6 (Six) Hours As Needed for Severe Pain .  Dispense: 120 tablet; Refill: 0  -     oxyCODONE (OxyCONTIN) 10 MG 12 hr tablet; Take 1 tablet by mouth Every 12 (Twelve) Hours.  Dispense: 60 tablet; Refill: 0    Other orders  -     tiZANidine (ZANAFLEX) 2 MG tablet; Take 1 tablet by mouth At Night As Needed for Muscle Spasms.  Dispense: 30 tablet; Refill: 0    Refer to Dr. Rios  Adjust pain meds using 12hr oxycontin and cut short acting pain med to 1/2 tab bid prn for BREAKTHRU pain only  Switch the Lyrica to HS only due to mental fogginess

## 2020-10-22 ENCOUNTER — TELEPHONE (OUTPATIENT)
Dept: FAMILY MEDICINE CLINIC | Facility: CLINIC | Age: 65
End: 2020-10-22

## 2020-10-22 DIAGNOSIS — G89.29 CHRONIC MIDLINE LOW BACK PAIN WITH LEFT-SIDED SCIATICA: ICD-10-CM

## 2020-10-22 DIAGNOSIS — M54.42 CHRONIC MIDLINE LOW BACK PAIN WITH LEFT-SIDED SCIATICA: ICD-10-CM

## 2020-10-22 NOTE — TELEPHONE ENCOUNTER
The pharmacist from [facility] in [place] called wanting to know if Humble is suppose to be on the Oxycontin 10mg and the Oxycodone 15mg or one or the other.       Robbie Lane [phone]

## 2020-10-22 NOTE — TELEPHONE ENCOUNTER
Left msg on pt's phone to ONLY take the oxycontin bid scheduled and take 1/2 tab of the short acting oxycodone bid prn for breakthrough pain only

## 2020-10-22 NOTE — TELEPHONE ENCOUNTER
We decided to try long acting Oxycontin for better pain control and ONLY use 1/2 tab -1 tab BID for breakthrough pain    Please call pt and verify above w/ him as well

## 2020-10-23 ENCOUNTER — TELEPHONE (OUTPATIENT)
Dept: FAMILY MEDICINE CLINIC | Facility: CLINIC | Age: 65
End: 2020-10-23

## 2020-10-23 NOTE — TELEPHONE ENCOUNTER
PA for Oxycontin ER 10mg was approved.   PA approval was faxed to [facility] in [place].     Approved for generic Oxycontin (oxycodone hcl tablet er 12hr deter 10 mg), quantity up to 60 tablets per 30 days, under the pharmacy benefit. Generic substitution required when available.

## 2020-11-04 ENCOUNTER — OFFICE VISIT (OUTPATIENT)
Dept: FAMILY MEDICINE CLINIC | Facility: CLINIC | Age: 65
End: 2020-11-04

## 2020-11-04 VITALS
SYSTOLIC BLOOD PRESSURE: 149 MMHG | OXYGEN SATURATION: 98 % | RESPIRATION RATE: 16 BRPM | HEART RATE: 77 BPM | HEIGHT: 68 IN | BODY MASS INDEX: 38.34 KG/M2 | WEIGHT: 253 LBS | DIASTOLIC BLOOD PRESSURE: 87 MMHG | TEMPERATURE: 97.1 F

## 2020-11-04 DIAGNOSIS — M54.42 CHRONIC MIDLINE LOW BACK PAIN WITH LEFT-SIDED SCIATICA: ICD-10-CM

## 2020-11-04 DIAGNOSIS — E11.42 DIABETIC POLYNEUROPATHY ASSOCIATED WITH TYPE 2 DIABETES MELLITUS (HCC): ICD-10-CM

## 2020-11-04 DIAGNOSIS — M54.16 LUMBAR BACK PAIN WITH RADICULOPATHY AFFECTING LEFT LOWER EXTREMITY: Primary | ICD-10-CM

## 2020-11-04 DIAGNOSIS — G89.29 CHRONIC MIDLINE LOW BACK PAIN WITH LEFT-SIDED SCIATICA: ICD-10-CM

## 2020-11-04 DIAGNOSIS — R40.0 DAYTIME SOMNOLENCE: ICD-10-CM

## 2020-11-04 DIAGNOSIS — I63.413 CEREBROVASCULAR ACCIDENT (CVA) DUE TO BILATERAL EMBOLISM OF MIDDLE CEREBRAL ARTERIES (HCC): ICD-10-CM

## 2020-11-04 PROCEDURE — 99213 OFFICE O/P EST LOW 20 MIN: CPT | Performed by: FAMILY MEDICINE

## 2020-11-04 RX ORDER — POLYETHYLENE GLYCOL 3350 17 G/17G
17 POWDER, FOR SOLUTION ORAL DAILY
Start: 2020-11-04 | End: 2021-08-02

## 2020-11-04 RX ORDER — INSULIN LISPRO 100 [IU]/ML
5 INJECTION, SOLUTION INTRAVENOUS; SUBCUTANEOUS
Qty: 9 PEN | Refills: 0
Start: 2020-11-04 | End: 2020-11-11 | Stop reason: SDUPTHER

## 2020-11-04 RX ORDER — OXYCODONE HYDROCHLORIDE 15 MG/1
15 TABLET, FILM COATED, EXTENDED RELEASE ORAL EVERY 12 HOURS
Start: 2020-11-04 | End: 2020-11-12

## 2020-11-04 RX ORDER — INSULIN DETEMIR 100 [IU]/ML
33 INJECTION, SOLUTION SUBCUTANEOUS NIGHTLY
Qty: 10 PEN | Refills: 1 | Status: SHIPPED | OUTPATIENT
Start: 2020-11-04 | End: 2020-11-11 | Stop reason: SDUPTHER

## 2020-11-04 NOTE — PROGRESS NOTES
Subjective   Humble Green is a 64 y.o. male.     Chief Complaint   Patient presents with   • Back Pain     2 week F/U on back pain and oxycontin medication.          Current Outpatient Medications:   •  apixaban (ELIQUIS) 5 MG tablet tablet, Take 1 tablet by mouth Every 12 (Twelve) Hours., Disp: 180 tablet, Rfl: 0  •  aspirin 81 MG chewable tablet, Chew 81 mg Every Night., Disp: , Rfl:   •  atorvastatin (LIPITOR) 40 MG tablet, Take 1 tablet by mouth Every Night., Disp: , Rfl:   •  buPROPion XL (Wellbutrin XL) 150 MG 24 hr tablet, Take 1 tablet by mouth Daily., Disp: 90 tablet, Rfl: 1  •  butalbital-acetaminophen-caffeine (FIORICET, ESGIC) -40 MG per tablet, Take 1 tablet by mouth Every 6 (Six) Hours As Needed for Headache or Migraine., Disp: 20 tablet, Rfl: 0  •  Dapagliflozin Propanediol (Farxiga) 10 MG tablet, Take 10 mg by mouth Every Evening., Disp: 30 tablet, Rfl: 3  •  FLUoxetine (PROzac) 40 MG capsule, Take 40 mg by mouth Daily., Disp: , Rfl:   •  Insulin Lispro, 1 Unit Dial, (HumaLOG KwikPen) 100 UNIT/ML solution pen-injector, Inject 5 Units under the skin into the appropriate area as directed 3 (Three) Times a Day Before Meals., Disp: 9 pen, Rfl: 0  •  lisinopril (PRINIVIL,ZESTRIL) 2.5 MG tablet, Take 1 tablet by mouth Every Night., Disp: , Rfl:   •  oxyCODONE 7.5 MG tablet, Take 1 tablet by mouth Every 6 (Six) Hours As Needed for Severe Pain ., Disp: , Rfl:   •  oxyCODONE ER (oxyCONTIN) 15 MG tablet extended-release 12 hour, Take 1 tablet by mouth Every 12 (Twelve) Hours., Disp: , Rfl:   •  pregabalin (LYRICA) 150 MG capsule, 1-2 po qhs, Disp: 180 capsule, Rfl: 0  •  tiZANidine (ZANAFLEX) 2 MG tablet, Take 1 tablet by mouth At Night As Needed for Muscle Spasms., Disp: 30 tablet, Rfl: 0  •  insulin detemir (Levemir FlexTouch) 100 UNIT/ML injection, Inject 33 Units under the skin into the appropriate area as directed Every Night., Disp: 10 pen, Rfl: 1  •  polyethylene glycol (MIRALAX) 17 g packet,  Take 17 g by mouth Daily., Disp:  , Rfl:     Past Medical History:   Diagnosis Date   • Arthritis    • BPH (benign prostatic hyperplasia)    • Chronic back pain    • Depression    • Diabetes mellitus (CMS/HCC)    • Hypertension    • Low back pain    • Low vision of right eye with normal vision in contralateral eye    • Memory problem    • Nonobstructive atherosclerosis of coronary artery    • PFO (patent foramen ovale) 11/29/2019   • Stroke (CMS/HCC)        Past Surgical History:   Procedure Laterality Date   • ARM TENDON REPAIR Left    • CHOLECYSTECTOMY     • COLONOSCOPY      2018 = TA, rech 2023   • EYE SURGERY      Rt Eye Sx after trauma @ 2y/o   • JOINT REPLACEMENT      Left TKR   • ROTATOR CUFF REPAIR Bilateral    • UMBILICAL HERNIA REPAIR         Family History   Problem Relation Age of Onset   • Diabetes Mother    • Heart disease Mother    • Arthritis Mother    • Obesity Mother    • Hypertension Mother    • Migraines Mother    • Osteoporosis Mother    • Diabetes Father    • Heart disease Father    • Arthritis Father    • Hypertension Father    • Stroke Father    • Heart attack Father    • Hyperlipidemia Father    • Diabetes Brother    • Heart disease Brother    • Arthritis Sister    • Anemia Sister        Social History     Socioeconomic History   • Marital status: Single     Spouse name: Not on file   • Number of children: Not on file   • Years of education: Not on file   • Highest education level: Not on file   Tobacco Use   • Smoking status: Never Smoker   • Smokeless tobacco: Never Used   Substance and Sexual Activity   • Alcohol use: Yes     Alcohol/week: 1.0 standard drinks     Types: 1 Cans of beer per week   • Drug use: Never   • Sexual activity: Defer       65 y/o C male here for f/u on chronic back pain    Pt states he is taking between 2-4 tabs of breakthru pain pills a day in addition to the  current long acting pain pill dose;  Pt does have appt w/ ortho spine coming up     Admits never feels  rested even first thing in the morn--lives alone so not sure if stops breathing at nite       The following portions of the patient's history were reviewed and updated as appropriate: allergies, current medications, past family history, past medical history, past social history, past surgical history and problem list.    Review of Systems   Constitutional: Positive for fatigue. Negative for activity change, appetite change, unexpected weight gain and unexpected weight loss.   Musculoskeletal: Positive for back pain, gait problem and myalgias.   Skin: Negative for rash.   Psychiatric/Behavioral: Positive for sleep disturbance. Negative for dysphoric mood and depressed mood. The patient is not nervous/anxious.        Vitals:    11/04/20 0940   BP: 149/87   Pulse: 77   Resp: 16   Temp: 97.1 °F (36.2 °C)   SpO2: 98%       Objective   Physical Exam  Constitutional:       General: He is not in acute distress.     Appearance: He is normal weight. He is not ill-appearing or toxic-appearing.   HENT:      Head: Normocephalic and atraumatic.   Pulmonary:      Effort: Pulmonary effort is normal.   Skin:     General: Skin is warm and dry.   Neurological:      Mental Status: He is alert and oriented to person, place, and time. Mental status is at baseline.      Cranial Nerves: No cranial nerve deficit.   Psychiatric:         Mood and Affect: Mood normal.         Behavior: Behavior normal.         Thought Content: Thought content normal.         Judgment: Judgment normal.           Assessment/Plan   Diagnoses and all orders for this visit:    1. Lumbar back pain with radiculopathy affecting left lower extremity (Primary)  -     oxyCODONE ER (oxyCONTIN) 15 MG tablet extended-release 12 hour; Take 1 tablet by mouth Every 12 (Twelve) Hours.    2. Diabetic polyneuropathy associated with type 2 diabetes mellitus (CMS/[facility])  -     oxyCODONE ER (oxyCONTIN) 15 MG tablet extended-release 12 hour; Take 1 tablet by mouth Every 12 (Twelve)  Hours.    3. Chronic midline low back pain with left-sided sciatica  -     oxyCODONE ER (oxyCONTIN) 15 MG tablet extended-release 12 hour; Take 1 tablet by mouth Every 12 (Twelve) Hours.    4. Cerebrovascular accident (CVA) due to bilateral embolism of middle cerebral arteries (CMS/HCC)  -     Ambulatory Referral to Sleep Medicine    5. Daytime somnolence  -     Ambulatory Referral to Sleep Medicine    Other orders  -     insulin detemir (Levemir FlexTouch) 100 UNIT/ML injection; Inject 33 Units under the skin into the appropriate area as directed Every Night.  Dispense: 10 pen; Refill: 1  -     Insulin Lispro, 1 Unit Dial, (HumaLOG KwikPen) 100 UNIT/ML solution pen-injector; Inject 5 Units under the skin into the appropriate area as directed 3 (Three) Times a Day Before Meals.  Dispense: 9 pen; Refill: 0  -     polyethylene glycol (MIRALAX) 17 g packet; Take 17 g by mouth Daily.  Dispense:      INCREASE Oxycontin dose  Sleep study soon

## 2020-11-07 DIAGNOSIS — M54.42 CHRONIC MIDLINE LOW BACK PAIN WITH LEFT-SIDED SCIATICA: ICD-10-CM

## 2020-11-07 DIAGNOSIS — G89.29 CHRONIC MIDLINE LOW BACK PAIN WITH LEFT-SIDED SCIATICA: ICD-10-CM

## 2020-11-09 ENCOUNTER — TELEPHONE (OUTPATIENT)
Dept: FAMILY MEDICINE CLINIC | Facility: CLINIC | Age: 65
End: 2020-11-09

## 2020-11-09 RX ORDER — OXYCODONE HYDROCHLORIDE 5 MG/1
TABLET ORAL
Qty: 60 TABLET | Refills: 0 | OUTPATIENT
Start: 2020-11-09

## 2020-11-09 NOTE — TELEPHONE ENCOUNTER
Patient called into [place] office and stated that the pharmacy advised him that his insulin needs a prior auth.

## 2020-11-10 DIAGNOSIS — M54.42 CHRONIC MIDLINE LOW BACK PAIN WITH LEFT-SIDED SCIATICA: ICD-10-CM

## 2020-11-10 DIAGNOSIS — G89.29 CHRONIC MIDLINE LOW BACK PAIN WITH LEFT-SIDED SCIATICA: ICD-10-CM

## 2020-11-10 RX ORDER — OXYCODONE HYDROCHLORIDE 15 MG/1
TABLET ORAL
Qty: 120 TABLET | Refills: 0 | OUTPATIENT
Start: 2020-11-10

## 2020-11-11 ENCOUNTER — TELEPHONE (OUTPATIENT)
Dept: FAMILY MEDICINE CLINIC | Facility: CLINIC | Age: 65
End: 2020-11-11

## 2020-11-11 DIAGNOSIS — E11.42 DIABETIC POLYNEUROPATHY ASSOCIATED WITH TYPE 2 DIABETES MELLITUS (HCC): ICD-10-CM

## 2020-11-11 DIAGNOSIS — M54.16 LUMBAR BACK PAIN WITH RADICULOPATHY AFFECTING LEFT LOWER EXTREMITY: ICD-10-CM

## 2020-11-11 DIAGNOSIS — M54.42 CHRONIC MIDLINE LOW BACK PAIN WITH LEFT-SIDED SCIATICA: ICD-10-CM

## 2020-11-11 DIAGNOSIS — G89.29 CHRONIC MIDLINE LOW BACK PAIN WITH LEFT-SIDED SCIATICA: ICD-10-CM

## 2020-11-11 RX ORDER — INSULIN LISPRO 100 [IU]/ML
5 INJECTION, SOLUTION INTRAVENOUS; SUBCUTANEOUS
Qty: 9 PEN | Refills: 0 | Status: SHIPPED | OUTPATIENT
Start: 2020-11-11 | End: 2021-04-01 | Stop reason: SDUPTHER

## 2020-11-11 RX ORDER — OXYCODONE HYDROCHLORIDE 15 MG/1
15 TABLET ORAL EVERY 6 HOURS PRN
Qty: 120 TABLET | Refills: 0 | Status: SHIPPED | OUTPATIENT
Start: 2020-11-11 | End: 2020-11-20 | Stop reason: SDUPTHER

## 2020-11-11 RX ORDER — INSULIN DETEMIR 100 [IU]/ML
33 INJECTION, SOLUTION SUBCUTANEOUS NIGHTLY
Qty: 10 PEN | Refills: 1 | Status: SHIPPED | OUTPATIENT
Start: 2020-11-11 | End: 2020-11-16

## 2020-11-11 NOTE — TELEPHONE ENCOUNTER
Patient would like to know why his RX was denied, there was nothing stated in the chart for me to relay.  Patient states that he had an appt with a back surgeon but it was cancelled by the office due to Co-Vid

## 2020-11-11 NOTE — TELEPHONE ENCOUNTER
Pharmacy would not fill the 15mg oxycontin since he had the 10mg still------>>> he cant get the 15mg tabs until 11/23  Pt has had to take the short acting 15mg 3-4 times a day w/ the 10mg long acting

## 2020-11-12 ENCOUNTER — HOSPITAL ENCOUNTER (INPATIENT)
Facility: HOSPITAL | Age: 65
LOS: 2 days | Discharge: HOME | End: 2020-11-16
Attending: EMERGENCY MEDICINE | Admitting: INTERNAL MEDICINE

## 2020-11-12 ENCOUNTER — APPOINTMENT (OUTPATIENT)
Dept: CT IMAGING | Facility: HOSPITAL | Age: 65
End: 2020-11-12

## 2020-11-12 ENCOUNTER — APPOINTMENT (OUTPATIENT)
Dept: GENERAL RADIOLOGY | Facility: HOSPITAL | Age: 65
End: 2020-11-12

## 2020-11-12 DIAGNOSIS — R55 NEAR SYNCOPE: ICD-10-CM

## 2020-11-12 DIAGNOSIS — G43.011 INTRACTABLE MIGRAINE WITHOUT AURA AND WITH STATUS MIGRAINOSUS: ICD-10-CM

## 2020-11-12 DIAGNOSIS — I20.8 ANGINA AT REST (HCC): ICD-10-CM

## 2020-11-12 DIAGNOSIS — R94.39 ABNORMAL NUCLEAR STRESS TEST: ICD-10-CM

## 2020-11-12 DIAGNOSIS — R07.2 PRECORDIAL PAIN: Primary | ICD-10-CM

## 2020-11-12 LAB
ALBUMIN SERPL-MCNC: 4.2 G/DL (ref 3.5–5.2)
ALBUMIN/GLOB SERPL: 1.7 G/DL
ALP SERPL-CCNC: 79 U/L (ref 39–117)
ALT SERPL W P-5'-P-CCNC: 14 U/L (ref 1–41)
ANION GAP SERPL CALCULATED.3IONS-SCNC: 11 MMOL/L (ref 5–15)
AST SERPL-CCNC: 20 U/L (ref 1–40)
BASOPHILS # BLD AUTO: 0.1 10*3/MM3 (ref 0–0.2)
BASOPHILS NFR BLD AUTO: 0.6 % (ref 0–1.5)
BILIRUB SERPL-MCNC: 0.4 MG/DL (ref 0–1.2)
BUN SERPL-MCNC: 18 MG/DL (ref 8–23)
BUN/CREAT SERPL: 13.3 (ref 7–25)
CALCIUM SPEC-SCNC: 9.1 MG/DL (ref 8.6–10.5)
CHLORIDE SERPL-SCNC: 100 MMOL/L (ref 98–107)
CO2 SERPL-SCNC: 27 MMOL/L (ref 22–29)
CREAT SERPL-MCNC: 1.35 MG/DL (ref 0.76–1.27)
DEPRECATED RDW RBC AUTO: 43.8 FL (ref 37–54)
EOSINOPHIL # BLD AUTO: 0.2 10*3/MM3 (ref 0–0.4)
EOSINOPHIL NFR BLD AUTO: 2.3 % (ref 0.3–6.2)
ERYTHROCYTE [DISTWIDTH] IN BLOOD BY AUTOMATED COUNT: 13.9 % (ref 12.3–15.4)
GFR SERPL CREATININE-BSD FRML MDRD: 53 ML/MIN/1.73
GLOBULIN UR ELPH-MCNC: 2.5 GM/DL
GLUCOSE BLDC GLUCOMTR-MCNC: 120 MG/DL (ref 70–105)
GLUCOSE BLDC GLUCOMTR-MCNC: 228 MG/DL (ref 70–105)
GLUCOSE BLDC GLUCOMTR-MCNC: 93 MG/DL (ref 70–105)
GLUCOSE SERPL-MCNC: 264 MG/DL (ref 65–99)
HCT VFR BLD AUTO: 44.5 % (ref 37.5–51)
HGB BLD-MCNC: 14.7 G/DL (ref 13–17.7)
HOLD SPECIMEN: NORMAL
HOLD SPECIMEN: NORMAL
LYMPHOCYTES # BLD AUTO: 2.9 10*3/MM3 (ref 0.7–3.1)
LYMPHOCYTES NFR BLD AUTO: 36.5 % (ref 19.6–45.3)
MCH RBC QN AUTO: 29.6 PG (ref 26.6–33)
MCHC RBC AUTO-ENTMCNC: 33.1 G/DL (ref 31.5–35.7)
MCV RBC AUTO: 89.4 FL (ref 79–97)
MONOCYTES # BLD AUTO: 0.4 10*3/MM3 (ref 0.1–0.9)
MONOCYTES NFR BLD AUTO: 5.4 % (ref 5–12)
NEUTROPHILS NFR BLD AUTO: 4.4 10*3/MM3 (ref 1.7–7)
NEUTROPHILS NFR BLD AUTO: 55.2 % (ref 42.7–76)
NRBC BLD AUTO-RTO: 0.1 /100 WBC (ref 0–0.2)
PLATELET # BLD AUTO: 159 10*3/MM3 (ref 140–450)
PMV BLD AUTO: 10.7 FL (ref 6–12)
POTASSIUM SERPL-SCNC: 4.7 MMOL/L (ref 3.5–5.2)
PROT SERPL-MCNC: 6.7 G/DL (ref 6–8.5)
RBC # BLD AUTO: 4.98 10*6/MM3 (ref 4.14–5.8)
SODIUM SERPL-SCNC: 138 MMOL/L (ref 136–145)
TROPONIN T SERPL-MCNC: <0.01 NG/ML (ref 0–0.03)
WBC # BLD AUTO: 8 10*3/MM3 (ref 3.4–10.8)
WHOLE BLOOD HOLD SPECIMEN: NORMAL
WHOLE BLOOD HOLD SPECIMEN: NORMAL

## 2020-11-12 PROCEDURE — 85025 COMPLETE CBC W/AUTO DIFF WBC: CPT | Performed by: EMERGENCY MEDICINE

## 2020-11-12 PROCEDURE — 63710000001 INSULIN GLARGINE PER 5 UNITS: Performed by: HOSPITALIST

## 2020-11-12 PROCEDURE — 84484 ASSAY OF TROPONIN QUANT: CPT | Performed by: HOSPITALIST

## 2020-11-12 PROCEDURE — 25010000002 PROCHLORPERAZINE 10 MG/2ML SOLUTION: Performed by: EMERGENCY MEDICINE

## 2020-11-12 PROCEDURE — 82962 GLUCOSE BLOOD TEST: CPT

## 2020-11-12 PROCEDURE — 25010000002 DIPHENHYDRAMINE PER 50 MG: Performed by: EMERGENCY MEDICINE

## 2020-11-12 PROCEDURE — 84484 ASSAY OF TROPONIN QUANT: CPT | Performed by: EMERGENCY MEDICINE

## 2020-11-12 PROCEDURE — 99284 EMERGENCY DEPT VISIT MOD MDM: CPT

## 2020-11-12 PROCEDURE — 93005 ELECTROCARDIOGRAM TRACING: CPT | Performed by: EMERGENCY MEDICINE

## 2020-11-12 PROCEDURE — 99220 PR INITIAL OBSERVATION CARE/DAY 70 MINUTES: CPT | Performed by: HOSPITALIST

## 2020-11-12 PROCEDURE — G0378 HOSPITAL OBSERVATION PER HR: HCPCS

## 2020-11-12 PROCEDURE — 70450 CT HEAD/BRAIN W/O DYE: CPT

## 2020-11-12 PROCEDURE — 80053 COMPREHEN METABOLIC PANEL: CPT | Performed by: EMERGENCY MEDICINE

## 2020-11-12 PROCEDURE — 71045 X-RAY EXAM CHEST 1 VIEW: CPT

## 2020-11-12 RX ORDER — OXYCODONE HYDROCHLORIDE 5 MG/1
15 TABLET ORAL EVERY 6 HOURS PRN
Status: DISCONTINUED | OUTPATIENT
Start: 2020-11-12 | End: 2020-11-16 | Stop reason: HOSPADM

## 2020-11-12 RX ORDER — PREGABALIN 75 MG/1
150 CAPSULE ORAL NIGHTLY
Status: DISCONTINUED | OUTPATIENT
Start: 2020-11-12 | End: 2020-11-16 | Stop reason: HOSPADM

## 2020-11-12 RX ORDER — TIZANIDINE 2 MG/1
2 TABLET ORAL NIGHTLY PRN
Status: DISCONTINUED | OUTPATIENT
Start: 2020-11-12 | End: 2020-11-16 | Stop reason: HOSPADM

## 2020-11-12 RX ORDER — INSULIN LISPRO 100 [IU]/ML
5 INJECTION, SOLUTION INTRAVENOUS; SUBCUTANEOUS
Status: DISCONTINUED | OUTPATIENT
Start: 2020-11-13 | End: 2020-11-16 | Stop reason: HOSPADM

## 2020-11-12 RX ORDER — INSULIN GLARGINE 100 [IU]/ML
15 INJECTION, SOLUTION SUBCUTANEOUS NIGHTLY
Status: DISCONTINUED | OUTPATIENT
Start: 2020-11-12 | End: 2020-11-16 | Stop reason: HOSPADM

## 2020-11-12 RX ORDER — ACETAMINOPHEN 650 MG/1
650 SUPPOSITORY RECTAL EVERY 4 HOURS PRN
Status: DISCONTINUED | OUTPATIENT
Start: 2020-11-12 | End: 2020-11-16 | Stop reason: HOSPADM

## 2020-11-12 RX ORDER — BUTALBITAL, ACETAMINOPHEN AND CAFFEINE 50; 325; 40 MG/1; MG/1; MG/1
1 TABLET ORAL EVERY 6 HOURS PRN
Status: DISCONTINUED | OUTPATIENT
Start: 2020-11-12 | End: 2020-11-16 | Stop reason: HOSPADM

## 2020-11-12 RX ORDER — ACETAMINOPHEN 160 MG/5ML
650 SOLUTION ORAL EVERY 4 HOURS PRN
Status: DISCONTINUED | OUTPATIENT
Start: 2020-11-12 | End: 2020-11-16 | Stop reason: HOSPADM

## 2020-11-12 RX ORDER — POLYETHYLENE GLYCOL 3350 17 G/17G
17 POWDER, FOR SOLUTION ORAL DAILY
Status: DISCONTINUED | OUTPATIENT
Start: 2020-11-13 | End: 2020-11-16 | Stop reason: HOSPADM

## 2020-11-12 RX ORDER — INSULIN LISPRO 100 [IU]/ML
0-7 INJECTION, SOLUTION INTRAVENOUS; SUBCUTANEOUS
Status: DISCONTINUED | OUTPATIENT
Start: 2020-11-12 | End: 2020-11-16 | Stop reason: HOSPADM

## 2020-11-12 RX ORDER — ATORVASTATIN CALCIUM 40 MG/1
40 TABLET, FILM COATED ORAL NIGHTLY
Status: DISCONTINUED | OUTPATIENT
Start: 2020-11-12 | End: 2020-11-16 | Stop reason: HOSPADM

## 2020-11-12 RX ORDER — PROCHLORPERAZINE EDISYLATE 5 MG/ML
10 INJECTION INTRAMUSCULAR; INTRAVENOUS ONCE
Status: COMPLETED | OUTPATIENT
Start: 2020-11-12 | End: 2020-11-12

## 2020-11-12 RX ORDER — BISACODYL 10 MG
10 SUPPOSITORY, RECTAL RECTAL DAILY PRN
Status: DISCONTINUED | OUTPATIENT
Start: 2020-11-12 | End: 2020-11-16 | Stop reason: HOSPADM

## 2020-11-12 RX ORDER — CHOLECALCIFEROL (VITAMIN D3) 125 MCG
5 CAPSULE ORAL NIGHTLY PRN
Status: DISCONTINUED | OUTPATIENT
Start: 2020-11-12 | End: 2020-11-16 | Stop reason: HOSPADM

## 2020-11-12 RX ORDER — SODIUM CHLORIDE 0.9 % (FLUSH) 0.9 %
10 SYRINGE (ML) INJECTION EVERY 12 HOURS SCHEDULED
Status: DISCONTINUED | OUTPATIENT
Start: 2020-11-12 | End: 2020-11-16 | Stop reason: HOSPADM

## 2020-11-12 RX ORDER — ONDANSETRON 2 MG/ML
4 INJECTION INTRAMUSCULAR; INTRAVENOUS EVERY 4 HOURS PRN
Status: DISCONTINUED | OUTPATIENT
Start: 2020-11-12 | End: 2020-11-16 | Stop reason: HOSPADM

## 2020-11-12 RX ORDER — BUPROPION HYDROCHLORIDE 150 MG/1
150 TABLET ORAL DAILY
Status: DISCONTINUED | OUTPATIENT
Start: 2020-11-12 | End: 2020-11-16 | Stop reason: HOSPADM

## 2020-11-12 RX ORDER — DEXTROSE MONOHYDRATE 25 G/50ML
25 INJECTION, SOLUTION INTRAVENOUS
Status: DISCONTINUED | OUTPATIENT
Start: 2020-11-12 | End: 2020-11-16 | Stop reason: HOSPADM

## 2020-11-12 RX ORDER — SODIUM CHLORIDE 0.9 % (FLUSH) 0.9 %
10 SYRINGE (ML) INJECTION AS NEEDED
Status: DISCONTINUED | OUTPATIENT
Start: 2020-11-12 | End: 2020-11-16 | Stop reason: HOSPADM

## 2020-11-12 RX ORDER — DIPHENHYDRAMINE HYDROCHLORIDE 50 MG/ML
25 INJECTION INTRAMUSCULAR; INTRAVENOUS ONCE
Status: COMPLETED | OUTPATIENT
Start: 2020-11-12 | End: 2020-11-12

## 2020-11-12 RX ORDER — NICOTINE POLACRILEX 4 MG
15 LOZENGE BUCCAL
Status: DISCONTINUED | OUTPATIENT
Start: 2020-11-12 | End: 2020-11-16 | Stop reason: HOSPADM

## 2020-11-12 RX ORDER — ACETAMINOPHEN 325 MG/1
650 TABLET ORAL EVERY 4 HOURS PRN
Status: DISCONTINUED | OUTPATIENT
Start: 2020-11-12 | End: 2020-11-16 | Stop reason: HOSPADM

## 2020-11-12 RX ORDER — FLUOXETINE HYDROCHLORIDE 20 MG/1
40 CAPSULE ORAL DAILY
Status: DISCONTINUED | OUTPATIENT
Start: 2020-11-12 | End: 2020-11-16 | Stop reason: HOSPADM

## 2020-11-12 RX ORDER — ASPIRIN 81 MG/1
81 TABLET, CHEWABLE ORAL DAILY
Status: DISCONTINUED | OUTPATIENT
Start: 2020-11-13 | End: 2020-11-16 | Stop reason: HOSPADM

## 2020-11-12 RX ORDER — INSULIN LISPRO 100 [IU]/ML
0-7 INJECTION, SOLUTION INTRAVENOUS; SUBCUTANEOUS AS NEEDED
Status: DISCONTINUED | OUTPATIENT
Start: 2020-11-12 | End: 2020-11-16 | Stop reason: HOSPADM

## 2020-11-12 RX ORDER — BISACODYL 5 MG/1
5 TABLET, DELAYED RELEASE ORAL DAILY PRN
Status: DISCONTINUED | OUTPATIENT
Start: 2020-11-12 | End: 2020-11-16 | Stop reason: HOSPADM

## 2020-11-12 RX ORDER — LISINOPRIL 5 MG/1
2.5 TABLET ORAL NIGHTLY
Status: DISCONTINUED | OUTPATIENT
Start: 2020-11-12 | End: 2020-11-16 | Stop reason: HOSPADM

## 2020-11-12 RX ADMIN — FLUOXETINE 40 MG: 20 CAPSULE ORAL at 20:25

## 2020-11-12 RX ADMIN — PREGABALIN 150 MG: 75 CAPSULE ORAL at 20:25

## 2020-11-12 RX ADMIN — PROCHLORPERAZINE EDISYLATE 10 MG: 5 INJECTION INTRAMUSCULAR; INTRAVENOUS at 13:59

## 2020-11-12 RX ADMIN — BUPROPION HYDROCHLORIDE 150 MG: 150 TABLET, EXTENDED RELEASE ORAL at 20:24

## 2020-11-12 RX ADMIN — INSULIN GLARGINE 15 UNITS: 100 INJECTION, SOLUTION SUBCUTANEOUS at 20:25

## 2020-11-12 RX ADMIN — Medication 10 ML: at 20:25

## 2020-11-12 RX ADMIN — Medication 10 ML: at 14:00

## 2020-11-12 RX ADMIN — LISINOPRIL 2.5 MG: 5 TABLET ORAL at 20:24

## 2020-11-12 RX ADMIN — APIXABAN 5 MG: 5 TABLET, FILM COATED ORAL at 20:24

## 2020-11-12 RX ADMIN — DIPHENHYDRAMINE HYDROCHLORIDE 25 MG: 50 INJECTION, SOLUTION INTRAMUSCULAR; INTRAVENOUS at 13:59

## 2020-11-12 RX ADMIN — ATORVASTATIN CALCIUM 40 MG: 40 TABLET, FILM COATED ORAL at 20:25

## 2020-11-13 ENCOUNTER — APPOINTMENT (OUTPATIENT)
Dept: NUCLEAR MEDICINE | Facility: HOSPITAL | Age: 65
End: 2020-11-13

## 2020-11-13 ENCOUNTER — HOSPITAL ENCOUNTER (OUTPATIENT)
Facility: HOSPITAL | Age: 65
Setting detail: HOSPITAL OUTPATIENT SURGERY
End: 2020-11-13
Attending: INTERNAL MEDICINE | Admitting: INTERNAL MEDICINE

## 2020-11-13 DIAGNOSIS — R94.39 ABNORMAL NUCLEAR STRESS TEST: ICD-10-CM

## 2020-11-13 DIAGNOSIS — I20.8 ANGINA AT REST (HCC): ICD-10-CM

## 2020-11-13 PROBLEM — I20.89 ANGINA AT REST: Status: ACTIVE | Noted: 2020-11-13

## 2020-11-13 LAB
ALBUMIN SERPL-MCNC: 3.6 G/DL (ref 3.5–5.2)
ALBUMIN/GLOB SERPL: 1.5 G/DL
ALP SERPL-CCNC: 66 U/L (ref 39–117)
ALT SERPL W P-5'-P-CCNC: 14 U/L (ref 1–41)
ANION GAP SERPL CALCULATED.3IONS-SCNC: 8 MMOL/L (ref 5–15)
ANION GAP SERPL CALCULATED.3IONS-SCNC: 9 MMOL/L (ref 5–15)
AST SERPL-CCNC: 16 U/L (ref 1–40)
BASOPHILS # BLD AUTO: 0 10*3/MM3 (ref 0–0.2)
BASOPHILS NFR BLD AUTO: 0.5 % (ref 0–1.5)
BH CV STRESS BP STAGE 1: NORMAL
BH CV STRESS BP STAGE 2: NORMAL
BH CV STRESS BP STAGE 3: NORMAL
BH CV STRESS COMMENTS STAGE 1: NORMAL
BH CV STRESS COMMENTS STAGE 2: NORMAL
BH CV STRESS DOSE REGADENOSON STAGE 1: 0.4
BH CV STRESS DURATION MIN STAGE 1: 0
BH CV STRESS DURATION MIN STAGE 2: 4
BH CV STRESS DURATION SEC STAGE 1: 10
BH CV STRESS DURATION SEC STAGE 2: 0
BH CV STRESS HR STAGE 1: 72
BH CV STRESS HR STAGE 2: 85
BH CV STRESS HR STAGE 3: 85
BH CV STRESS PROTOCOL 1: NORMAL
BH CV STRESS RECOVERY BP: NORMAL MMHG
BH CV STRESS RECOVERY HR: 80 BPM
BH CV STRESS STAGE 1: 1
BH CV STRESS STAGE 2: 2
BH CV STRESS STAGE 3: 3
BILIRUB SERPL-MCNC: 0.5 MG/DL (ref 0–1.2)
BUN SERPL-MCNC: 17 MG/DL (ref 8–23)
BUN SERPL-MCNC: 18 MG/DL (ref 8–23)
BUN/CREAT SERPL: 16 (ref 7–25)
BUN/CREAT SERPL: 19.1 (ref 7–25)
CALCIUM SPEC-SCNC: 8.4 MG/DL (ref 8.6–10.5)
CALCIUM SPEC-SCNC: 8.9 MG/DL (ref 8.6–10.5)
CHLORIDE SERPL-SCNC: 101 MMOL/L (ref 98–107)
CHLORIDE SERPL-SCNC: 102 MMOL/L (ref 98–107)
CK SERPL-CCNC: 62 U/L (ref 20–200)
CO2 SERPL-SCNC: 27 MMOL/L (ref 22–29)
CO2 SERPL-SCNC: 27 MMOL/L (ref 22–29)
CREAT SERPL-MCNC: 0.94 MG/DL (ref 0.76–1.27)
CREAT SERPL-MCNC: 1.06 MG/DL (ref 0.76–1.27)
DEPRECATED RDW RBC AUTO: 42 FL (ref 37–54)
DEPRECATED RDW RBC AUTO: 42.4 FL (ref 37–54)
EOSINOPHIL # BLD AUTO: 0.2 10*3/MM3 (ref 0–0.4)
EOSINOPHIL NFR BLD AUTO: 2.4 % (ref 0.3–6.2)
ERYTHROCYTE [DISTWIDTH] IN BLOOD BY AUTOMATED COUNT: 13.6 % (ref 12.3–15.4)
ERYTHROCYTE [DISTWIDTH] IN BLOOD BY AUTOMATED COUNT: 13.7 % (ref 12.3–15.4)
GFR SERPL CREATININE-BSD FRML MDRD: 70 ML/MIN/1.73
GFR SERPL CREATININE-BSD FRML MDRD: 81 ML/MIN/1.73
GLOBULIN UR ELPH-MCNC: 2.4 GM/DL
GLUCOSE BLDC GLUCOMTR-MCNC: 134 MG/DL (ref 70–105)
GLUCOSE BLDC GLUCOMTR-MCNC: 198 MG/DL (ref 70–105)
GLUCOSE BLDC GLUCOMTR-MCNC: 199 MG/DL (ref 70–105)
GLUCOSE SERPL-MCNC: 147 MG/DL (ref 65–99)
GLUCOSE SERPL-MCNC: 218 MG/DL (ref 65–99)
HBA1C MFR BLD: 8.1 % (ref 3.5–5.6)
HCT VFR BLD AUTO: 40.4 % (ref 37.5–51)
HCT VFR BLD AUTO: 40.6 % (ref 37.5–51)
HGB BLD-MCNC: 13.3 G/DL (ref 13–17.7)
HGB BLD-MCNC: 13.4 G/DL (ref 13–17.7)
LV EF NUC BP: 66 %
LYMPHOCYTES # BLD AUTO: 2.6 10*3/MM3 (ref 0.7–3.1)
LYMPHOCYTES NFR BLD AUTO: 33 % (ref 19.6–45.3)
MAGNESIUM SERPL-MCNC: 1.9 MG/DL (ref 1.6–2.4)
MAXIMAL PREDICTED HEART RATE: 156 BPM
MCH RBC QN AUTO: 29.3 PG (ref 26.6–33)
MCH RBC QN AUTO: 29.5 PG (ref 26.6–33)
MCHC RBC AUTO-ENTMCNC: 32.8 G/DL (ref 31.5–35.7)
MCHC RBC AUTO-ENTMCNC: 33.2 G/DL (ref 31.5–35.7)
MCV RBC AUTO: 88.9 FL (ref 79–97)
MCV RBC AUTO: 89.5 FL (ref 79–97)
MONOCYTES # BLD AUTO: 0.5 10*3/MM3 (ref 0.1–0.9)
MONOCYTES NFR BLD AUTO: 6.5 % (ref 5–12)
NEUTROPHILS NFR BLD AUTO: 4.6 10*3/MM3 (ref 1.7–7)
NEUTROPHILS NFR BLD AUTO: 57.6 % (ref 42.7–76)
NRBC BLD AUTO-RTO: 0 /100 WBC (ref 0–0.2)
PERCENT MAX PREDICTED HR: 54.49 %
PHOSPHATE SERPL-MCNC: 3.2 MG/DL (ref 2.5–4.5)
PLATELET # BLD AUTO: 144 10*3/MM3 (ref 140–450)
PLATELET # BLD AUTO: 153 10*3/MM3 (ref 140–450)
PMV BLD AUTO: 10.9 FL (ref 6–12)
PMV BLD AUTO: 11.2 FL (ref 6–12)
POTASSIUM SERPL-SCNC: 4.1 MMOL/L (ref 3.5–5.2)
POTASSIUM SERPL-SCNC: 4.8 MMOL/L (ref 3.5–5.2)
PROCALCITONIN SERPL-MCNC: 0.22 NG/ML (ref 0–0.25)
PROT SERPL-MCNC: 6 G/DL (ref 6–8.5)
QT INTERVAL: 404 MS
RBC # BLD AUTO: 4.54 10*6/MM3 (ref 4.14–5.8)
RBC # BLD AUTO: 4.55 10*6/MM3 (ref 4.14–5.8)
SARS-COV-2 RNA PNL SPEC NAA+PROBE: NOT DETECTED
SODIUM SERPL-SCNC: 136 MMOL/L (ref 136–145)
SODIUM SERPL-SCNC: 138 MMOL/L (ref 136–145)
STRESS BASELINE BP: NORMAL MMHG
STRESS BASELINE HR: 75 BPM
STRESS PERCENT HR: 64 %
STRESS POST PEAK BP: NORMAL MMHG
STRESS POST PEAK HR: 85 BPM
STRESS TARGET HR: 133 BPM
T4 FREE SERPL-MCNC: 1.13 NG/DL (ref 0.93–1.7)
TROPONIN T SERPL-MCNC: <0.01 NG/ML (ref 0–0.03)
TROPONIN T SERPL-MCNC: <0.01 NG/ML (ref 0–0.03)
TSH SERPL DL<=0.05 MIU/L-ACNC: 1.17 UIU/ML (ref 0.27–4.2)
WBC # BLD AUTO: 8 10*3/MM3 (ref 3.4–10.8)
WBC # BLD AUTO: 8.3 10*3/MM3 (ref 3.4–10.8)

## 2020-11-13 PROCEDURE — 84100 ASSAY OF PHOSPHORUS: CPT | Performed by: HOSPITALIST

## 2020-11-13 PROCEDURE — 93017 CV STRESS TEST TRACING ONLY: CPT

## 2020-11-13 PROCEDURE — 63710000001 INSULIN LISPRO (HUMAN) PER 5 UNITS: Performed by: HOSPITALIST

## 2020-11-13 PROCEDURE — 78452 HT MUSCLE IMAGE SPECT MULT: CPT

## 2020-11-13 PROCEDURE — 83735 ASSAY OF MAGNESIUM: CPT | Performed by: HOSPITALIST

## 2020-11-13 PROCEDURE — 82550 ASSAY OF CK (CPK): CPT | Performed by: HOSPITALIST

## 2020-11-13 PROCEDURE — 25010000002 REGADENOSON 0.4 MG/5ML SOLUTION: Performed by: HOSPITALIST

## 2020-11-13 PROCEDURE — 99225 PR SBSQ OBSERVATION CARE/DAY 25 MINUTES: CPT | Performed by: HOSPITALIST

## 2020-11-13 PROCEDURE — 83036 HEMOGLOBIN GLYCOSYLATED A1C: CPT | Performed by: HOSPITALIST

## 2020-11-13 PROCEDURE — 85025 COMPLETE CBC W/AUTO DIFF WBC: CPT | Performed by: INTERNAL MEDICINE

## 2020-11-13 PROCEDURE — 87635 SARS-COV-2 COVID-19 AMP PRB: CPT | Performed by: INTERNAL MEDICINE

## 2020-11-13 PROCEDURE — 0 TECHNETIUM SESTAMIBI: Performed by: HOSPITALIST

## 2020-11-13 PROCEDURE — 84145 PROCALCITONIN (PCT): CPT | Performed by: HOSPITALIST

## 2020-11-13 PROCEDURE — G0378 HOSPITAL OBSERVATION PER HR: HCPCS

## 2020-11-13 PROCEDURE — 84443 ASSAY THYROID STIM HORMONE: CPT | Performed by: HOSPITALIST

## 2020-11-13 PROCEDURE — 78452 HT MUSCLE IMAGE SPECT MULT: CPT | Performed by: INTERNAL MEDICINE

## 2020-11-13 PROCEDURE — 84439 ASSAY OF FREE THYROXINE: CPT | Performed by: HOSPITALIST

## 2020-11-13 PROCEDURE — 80053 COMPREHEN METABOLIC PANEL: CPT | Performed by: HOSPITALIST

## 2020-11-13 PROCEDURE — 84484 ASSAY OF TROPONIN QUANT: CPT | Performed by: HOSPITALIST

## 2020-11-13 PROCEDURE — A9500 TC99M SESTAMIBI: HCPCS | Performed by: HOSPITALIST

## 2020-11-13 PROCEDURE — 93018 CV STRESS TEST I&R ONLY: CPT | Performed by: INTERNAL MEDICINE

## 2020-11-13 PROCEDURE — 85027 COMPLETE CBC AUTOMATED: CPT | Performed by: HOSPITALIST

## 2020-11-13 PROCEDURE — 82962 GLUCOSE BLOOD TEST: CPT

## 2020-11-13 PROCEDURE — 99214 OFFICE O/P EST MOD 30 MIN: CPT | Performed by: INTERNAL MEDICINE

## 2020-11-13 PROCEDURE — 93016 CV STRESS TEST SUPVJ ONLY: CPT | Performed by: INTERNAL MEDICINE

## 2020-11-13 PROCEDURE — 63710000001 INSULIN GLARGINE PER 5 UNITS: Performed by: HOSPITALIST

## 2020-11-13 RX ORDER — FENTANYL CITRATE 50 UG/ML
25 INJECTION, SOLUTION INTRAMUSCULAR; INTRAVENOUS ONCE
Status: CANCELLED | OUTPATIENT
Start: 2020-11-13 | End: 2020-11-13

## 2020-11-13 RX ORDER — SODIUM CHLORIDE 9 MG/ML
1-3 INJECTION, SOLUTION INTRAVENOUS CONTINUOUS
Status: CANCELLED | OUTPATIENT
Start: 2020-11-13

## 2020-11-13 RX ORDER — MIDAZOLAM HYDROCHLORIDE 1 MG/ML
1 INJECTION INTRAMUSCULAR; INTRAVENOUS ONCE
Status: CANCELLED | OUTPATIENT
Start: 2020-11-13 | End: 2020-11-13

## 2020-11-13 RX ADMIN — ASPIRIN 81 MG CHEWABLE TABLET 81 MG: 81 TABLET CHEWABLE at 13:12

## 2020-11-13 RX ADMIN — ATORVASTATIN CALCIUM 40 MG: 40 TABLET, FILM COATED ORAL at 20:18

## 2020-11-13 RX ADMIN — Medication 10 ML: at 20:23

## 2020-11-13 RX ADMIN — Medication 10 ML: at 09:00

## 2020-11-13 RX ADMIN — INSULIN GLARGINE 15 UNITS: 100 INJECTION, SOLUTION SUBCUTANEOUS at 20:18

## 2020-11-13 RX ADMIN — BUPROPION HYDROCHLORIDE 150 MG: 150 TABLET, EXTENDED RELEASE ORAL at 13:12

## 2020-11-13 RX ADMIN — LISINOPRIL 2.5 MG: 5 TABLET ORAL at 20:18

## 2020-11-13 RX ADMIN — TECHNETIUM TC 99M SESTAMIBI 1 DOSE: 1 INJECTION INTRAVENOUS at 10:20

## 2020-11-13 RX ADMIN — REGADENOSON 0.4 MG: 0.08 INJECTION, SOLUTION INTRAVENOUS at 12:01

## 2020-11-13 RX ADMIN — TECHNETIUM TC 99M SESTAMIBI 1 DOSE: 1 INJECTION INTRAVENOUS at 12:00

## 2020-11-13 RX ADMIN — FLUOXETINE 40 MG: 20 CAPSULE ORAL at 13:12

## 2020-11-13 RX ADMIN — APIXABAN 5 MG: 5 TABLET, FILM COATED ORAL at 13:12

## 2020-11-13 RX ADMIN — INSULIN LISPRO 5 UNITS: 100 INJECTION, SOLUTION INTRAVENOUS; SUBCUTANEOUS at 17:10

## 2020-11-13 RX ADMIN — PREGABALIN 150 MG: 75 CAPSULE ORAL at 20:18

## 2020-11-14 PROBLEM — R94.39 ABNORMAL STRESS TEST: Status: ACTIVE | Noted: 2020-11-14

## 2020-11-14 LAB
GLUCOSE BLDC GLUCOMTR-MCNC: 104 MG/DL (ref 70–105)
GLUCOSE BLDC GLUCOMTR-MCNC: 121 MG/DL (ref 70–105)
GLUCOSE BLDC GLUCOMTR-MCNC: 153 MG/DL (ref 70–105)
GLUCOSE BLDC GLUCOMTR-MCNC: 220 MG/DL (ref 70–105)

## 2020-11-14 PROCEDURE — 63710000001 INSULIN GLARGINE PER 5 UNITS: Performed by: HOSPITALIST

## 2020-11-14 PROCEDURE — 99232 SBSQ HOSP IP/OBS MODERATE 35: CPT | Performed by: INTERNAL MEDICINE

## 2020-11-14 PROCEDURE — 82962 GLUCOSE BLOOD TEST: CPT

## 2020-11-14 PROCEDURE — 63710000001 INSULIN LISPRO (HUMAN) PER 5 UNITS: Performed by: HOSPITALIST

## 2020-11-14 PROCEDURE — 99232 SBSQ HOSP IP/OBS MODERATE 35: CPT | Performed by: HOSPITALIST

## 2020-11-14 RX ADMIN — Medication 10 ML: at 08:44

## 2020-11-14 RX ADMIN — INSULIN GLARGINE 15 UNITS: 100 INJECTION, SOLUTION SUBCUTANEOUS at 20:51

## 2020-11-14 RX ADMIN — LISINOPRIL 2.5 MG: 5 TABLET ORAL at 20:51

## 2020-11-14 RX ADMIN — INSULIN LISPRO 5 UNITS: 100 INJECTION, SOLUTION INTRAVENOUS; SUBCUTANEOUS at 08:43

## 2020-11-14 RX ADMIN — PREGABALIN 150 MG: 75 CAPSULE ORAL at 20:51

## 2020-11-14 RX ADMIN — BUPROPION HYDROCHLORIDE 150 MG: 150 TABLET, EXTENDED RELEASE ORAL at 08:43

## 2020-11-14 RX ADMIN — ASPIRIN 81 MG CHEWABLE TABLET 81 MG: 81 TABLET CHEWABLE at 08:42

## 2020-11-14 RX ADMIN — INSULIN LISPRO 5 UNITS: 100 INJECTION, SOLUTION INTRAVENOUS; SUBCUTANEOUS at 12:27

## 2020-11-14 RX ADMIN — FLUOXETINE 40 MG: 20 CAPSULE ORAL at 08:43

## 2020-11-14 RX ADMIN — INSULIN LISPRO 2 UNITS: 100 INJECTION, SOLUTION INTRAVENOUS; SUBCUTANEOUS at 17:28

## 2020-11-14 RX ADMIN — Medication 10 ML: at 20:51

## 2020-11-14 RX ADMIN — INSULIN LISPRO 5 UNITS: 100 INJECTION, SOLUTION INTRAVENOUS; SUBCUTANEOUS at 17:28

## 2020-11-14 RX ADMIN — ATORVASTATIN CALCIUM 40 MG: 40 TABLET, FILM COATED ORAL at 20:51

## 2020-11-14 RX ADMIN — OXYCODONE 15 MG: 5 TABLET ORAL at 20:57

## 2020-11-15 LAB
GLUCOSE BLDC GLUCOMTR-MCNC: 169 MG/DL (ref 70–105)
GLUCOSE BLDC GLUCOMTR-MCNC: 221 MG/DL (ref 70–105)
GLUCOSE BLDC GLUCOMTR-MCNC: 252 MG/DL (ref 70–105)
GLUCOSE BLDC GLUCOMTR-MCNC: 99 MG/DL (ref 70–105)

## 2020-11-15 PROCEDURE — 82962 GLUCOSE BLOOD TEST: CPT

## 2020-11-15 PROCEDURE — 99232 SBSQ HOSP IP/OBS MODERATE 35: CPT | Performed by: HOSPITALIST

## 2020-11-15 PROCEDURE — 63710000001 INSULIN LISPRO (HUMAN) PER 5 UNITS: Performed by: HOSPITALIST

## 2020-11-15 PROCEDURE — 63710000001 INSULIN GLARGINE PER 5 UNITS: Performed by: HOSPITALIST

## 2020-11-15 PROCEDURE — 99232 SBSQ HOSP IP/OBS MODERATE 35: CPT | Performed by: INTERNAL MEDICINE

## 2020-11-15 RX ADMIN — ASPIRIN 81 MG CHEWABLE TABLET 81 MG: 81 TABLET CHEWABLE at 08:00

## 2020-11-15 RX ADMIN — INSULIN LISPRO 5 UNITS: 100 INJECTION, SOLUTION INTRAVENOUS; SUBCUTANEOUS at 16:46

## 2020-11-15 RX ADMIN — INSULIN LISPRO 5 UNITS: 100 INJECTION, SOLUTION INTRAVENOUS; SUBCUTANEOUS at 08:00

## 2020-11-15 RX ADMIN — OXYCODONE 15 MG: 5 TABLET ORAL at 06:35

## 2020-11-15 RX ADMIN — INSULIN GLARGINE 15 UNITS: 100 INJECTION, SOLUTION SUBCUTANEOUS at 21:19

## 2020-11-15 RX ADMIN — INSULIN LISPRO 5 UNITS: 100 INJECTION, SOLUTION INTRAVENOUS; SUBCUTANEOUS at 11:59

## 2020-11-15 RX ADMIN — ATORVASTATIN CALCIUM 40 MG: 40 TABLET, FILM COATED ORAL at 21:19

## 2020-11-15 RX ADMIN — BUPROPION HYDROCHLORIDE 150 MG: 150 TABLET, EXTENDED RELEASE ORAL at 08:00

## 2020-11-15 RX ADMIN — FLUOXETINE 40 MG: 20 CAPSULE ORAL at 08:00

## 2020-11-15 RX ADMIN — LISINOPRIL 2.5 MG: 5 TABLET ORAL at 21:17

## 2020-11-15 RX ADMIN — OXYCODONE 15 MG: 5 TABLET ORAL at 21:16

## 2020-11-15 RX ADMIN — INSULIN LISPRO 2 UNITS: 100 INJECTION, SOLUTION INTRAVENOUS; SUBCUTANEOUS at 12:00

## 2020-11-15 RX ADMIN — PREGABALIN 150 MG: 75 CAPSULE ORAL at 21:18

## 2020-11-15 RX ADMIN — Medication 10 ML: at 08:00

## 2020-11-15 RX ADMIN — Medication 10 ML: at 21:15

## 2020-11-15 RX ADMIN — OXYCODONE 15 MG: 5 TABLET ORAL at 14:33

## 2020-11-15 RX ADMIN — INSULIN LISPRO 4 UNITS: 100 INJECTION, SOLUTION INTRAVENOUS; SUBCUTANEOUS at 16:46

## 2020-11-16 ENCOUNTER — READMISSION MANAGEMENT (OUTPATIENT)
Dept: CALL CENTER | Facility: HOSPITAL | Age: 65
End: 2020-11-16

## 2020-11-16 VITALS
BODY MASS INDEX: 35.25 KG/M2 | HEART RATE: 68 BPM | OXYGEN SATURATION: 95 % | TEMPERATURE: 98 F | SYSTOLIC BLOOD PRESSURE: 120 MMHG | HEIGHT: 70 IN | RESPIRATION RATE: 16 BRPM | WEIGHT: 246.25 LBS | DIASTOLIC BLOOD PRESSURE: 79 MMHG

## 2020-11-16 PROBLEM — R07.2 PRECORDIAL PAIN: Status: RESOLVED | Noted: 2020-11-12 | Resolved: 2020-11-16

## 2020-11-16 LAB
ANION GAP SERPL CALCULATED.3IONS-SCNC: 11 MMOL/L (ref 5–15)
BASOPHILS # BLD AUTO: 0 10*3/MM3 (ref 0–0.2)
BASOPHILS NFR BLD AUTO: 0.5 % (ref 0–1.5)
BUN SERPL-MCNC: 14 MG/DL (ref 8–23)
BUN/CREAT SERPL: 14.7 (ref 7–25)
CALCIUM SPEC-SCNC: 8.8 MG/DL (ref 8.6–10.5)
CHLORIDE SERPL-SCNC: 103 MMOL/L (ref 98–107)
CO2 SERPL-SCNC: 26 MMOL/L (ref 22–29)
CREAT SERPL-MCNC: 0.95 MG/DL (ref 0.76–1.27)
DEPRECATED RDW RBC AUTO: 42.9 FL (ref 37–54)
EOSINOPHIL # BLD AUTO: 0.2 10*3/MM3 (ref 0–0.4)
EOSINOPHIL NFR BLD AUTO: 3.2 % (ref 0.3–6.2)
ERYTHROCYTE [DISTWIDTH] IN BLOOD BY AUTOMATED COUNT: 13.7 % (ref 12.3–15.4)
GFR SERPL CREATININE-BSD FRML MDRD: 80 ML/MIN/1.73
GLUCOSE BLDC GLUCOMTR-MCNC: 118 MG/DL (ref 70–105)
GLUCOSE BLDC GLUCOMTR-MCNC: 96 MG/DL (ref 70–105)
GLUCOSE SERPL-MCNC: 98 MG/DL (ref 65–99)
HCT VFR BLD AUTO: 38.5 % (ref 37.5–51)
HGB BLD-MCNC: 12.9 G/DL (ref 13–17.7)
LYMPHOCYTES # BLD AUTO: 2.4 10*3/MM3 (ref 0.7–3.1)
LYMPHOCYTES NFR BLD AUTO: 31.7 % (ref 19.6–45.3)
MAGNESIUM SERPL-MCNC: 1.9 MG/DL (ref 1.6–2.4)
MCH RBC QN AUTO: 29.6 PG (ref 26.6–33)
MCHC RBC AUTO-ENTMCNC: 33.4 G/DL (ref 31.5–35.7)
MCV RBC AUTO: 88.7 FL (ref 79–97)
MONOCYTES # BLD AUTO: 0.7 10*3/MM3 (ref 0.1–0.9)
MONOCYTES NFR BLD AUTO: 8.9 % (ref 5–12)
NEUTROPHILS NFR BLD AUTO: 4.3 10*3/MM3 (ref 1.7–7)
NEUTROPHILS NFR BLD AUTO: 55.7 % (ref 42.7–76)
NRBC BLD AUTO-RTO: 0 /100 WBC (ref 0–0.2)
PLATELET # BLD AUTO: 131 10*3/MM3 (ref 140–450)
PMV BLD AUTO: 11.6 FL (ref 6–12)
POTASSIUM SERPL-SCNC: 4.2 MMOL/L (ref 3.5–5.2)
RBC # BLD AUTO: 4.34 10*6/MM3 (ref 4.14–5.8)
SODIUM SERPL-SCNC: 140 MMOL/L (ref 136–145)
WBC # BLD AUTO: 7.6 10*3/MM3 (ref 3.4–10.8)

## 2020-11-16 PROCEDURE — 99239 HOSP IP/OBS DSCHRG MGMT >30: CPT | Performed by: INTERNAL MEDICINE

## 2020-11-16 PROCEDURE — 63710000001 INSULIN LISPRO (HUMAN) PER 5 UNITS: Performed by: INTERNAL MEDICINE

## 2020-11-16 PROCEDURE — B2151ZZ FLUOROSCOPY OF LEFT HEART USING LOW OSMOLAR CONTRAST: ICD-10-PCS | Performed by: INTERNAL MEDICINE

## 2020-11-16 PROCEDURE — 82962 GLUCOSE BLOOD TEST: CPT

## 2020-11-16 PROCEDURE — 93458 L HRT ARTERY/VENTRICLE ANGIO: CPT | Performed by: INTERNAL MEDICINE

## 2020-11-16 PROCEDURE — 99152 MOD SED SAME PHYS/QHP 5/>YRS: CPT | Performed by: INTERNAL MEDICINE

## 2020-11-16 PROCEDURE — 80048 BASIC METABOLIC PNL TOTAL CA: CPT | Performed by: HOSPITALIST

## 2020-11-16 PROCEDURE — 99232 SBSQ HOSP IP/OBS MODERATE 35: CPT | Performed by: INTERNAL MEDICINE

## 2020-11-16 PROCEDURE — 99153 MOD SED SAME PHYS/QHP EA: CPT | Performed by: INTERNAL MEDICINE

## 2020-11-16 PROCEDURE — 4A023N7 MEASUREMENT OF CARDIAC SAMPLING AND PRESSURE, LEFT HEART, PERCUTANEOUS APPROACH: ICD-10-PCS | Performed by: INTERNAL MEDICINE

## 2020-11-16 PROCEDURE — 25010000002 FENTANYL CITRATE (PF) 100 MCG/2ML SOLUTION: Performed by: INTERNAL MEDICINE

## 2020-11-16 PROCEDURE — B2111ZZ FLUOROSCOPY OF MULTIPLE CORONARY ARTERIES USING LOW OSMOLAR CONTRAST: ICD-10-PCS | Performed by: INTERNAL MEDICINE

## 2020-11-16 PROCEDURE — 85025 COMPLETE CBC W/AUTO DIFF WBC: CPT | Performed by: HOSPITALIST

## 2020-11-16 PROCEDURE — 25010000002 MIDAZOLAM PER 1 MG: Performed by: INTERNAL MEDICINE

## 2020-11-16 PROCEDURE — 0 IOPAMIDOL PER 1 ML: Performed by: INTERNAL MEDICINE

## 2020-11-16 PROCEDURE — C1769 GUIDE WIRE: HCPCS | Performed by: INTERNAL MEDICINE

## 2020-11-16 PROCEDURE — 83735 ASSAY OF MAGNESIUM: CPT | Performed by: HOSPITALIST

## 2020-11-16 PROCEDURE — C1894 INTRO/SHEATH, NON-LASER: HCPCS | Performed by: INTERNAL MEDICINE

## 2020-11-16 RX ORDER — LIDOCAINE HYDROCHLORIDE 20 MG/ML
INJECTION, SOLUTION INFILTRATION; PERINEURAL AS NEEDED
Status: DISCONTINUED | OUTPATIENT
Start: 2020-11-16 | End: 2020-11-16 | Stop reason: HOSPADM

## 2020-11-16 RX ORDER — FENTANYL CITRATE 50 UG/ML
INJECTION, SOLUTION INTRAMUSCULAR; INTRAVENOUS AS NEEDED
Status: DISCONTINUED | OUTPATIENT
Start: 2020-11-16 | End: 2020-11-16 | Stop reason: HOSPADM

## 2020-11-16 RX ORDER — SODIUM CHLORIDE 9 MG/ML
250 INJECTION, SOLUTION INTRAVENOUS ONCE AS NEEDED
Status: DISCONTINUED | OUTPATIENT
Start: 2020-11-16 | End: 2020-11-16 | Stop reason: HOSPADM

## 2020-11-16 RX ORDER — SODIUM CHLORIDE 9 MG/ML
75 INJECTION, SOLUTION INTRAVENOUS CONTINUOUS
Status: DISCONTINUED | OUTPATIENT
Start: 2020-11-16 | End: 2020-11-16 | Stop reason: HOSPADM

## 2020-11-16 RX ORDER — ACETAMINOPHEN 325 MG/1
650 TABLET ORAL EVERY 4 HOURS PRN
Status: DISCONTINUED | OUTPATIENT
Start: 2020-11-16 | End: 2020-11-16 | Stop reason: SDUPTHER

## 2020-11-16 RX ORDER — SODIUM CHLORIDE 9 MG/ML
INJECTION, SOLUTION INTRAVENOUS CONTINUOUS PRN
Status: COMPLETED | OUTPATIENT
Start: 2020-11-16 | End: 2020-11-16

## 2020-11-16 RX ORDER — MIDAZOLAM HYDROCHLORIDE 1 MG/ML
INJECTION INTRAMUSCULAR; INTRAVENOUS AS NEEDED
Status: DISCONTINUED | OUTPATIENT
Start: 2020-11-16 | End: 2020-11-16 | Stop reason: HOSPADM

## 2020-11-16 RX ORDER — INSULIN GLARGINE 100 [IU]/ML
33 INJECTION, SOLUTION SUBCUTANEOUS NIGHTLY
Qty: 4 PEN | Refills: 3 | Status: SHIPPED | OUTPATIENT
Start: 2020-11-16 | End: 2020-11-20

## 2020-11-16 RX ADMIN — INSULIN LISPRO 5 UNITS: 100 INJECTION, SOLUTION INTRAVENOUS; SUBCUTANEOUS at 12:42

## 2020-11-16 RX ADMIN — OXYCODONE 15 MG: 5 TABLET ORAL at 07:12

## 2020-11-16 RX ADMIN — SODIUM CHLORIDE 75 ML/HR: 9 INJECTION, SOLUTION INTRAVENOUS at 11:34

## 2020-11-16 NOTE — TELEPHONE ENCOUNTER
The PA for his Levemir was denied because it is a plan exclusion. The insurance did not mention what the formulary alternative is.

## 2020-11-17 ENCOUNTER — TRANSITIONAL CARE MANAGEMENT TELEPHONE ENCOUNTER (OUTPATIENT)
Dept: CALL CENTER | Facility: HOSPITAL | Age: 65
End: 2020-11-17

## 2020-11-17 NOTE — OUTREACH NOTE
Prep Survey      Responses   [facility] patient discharged from?  [place]   Is LACE score < 7 ?  No   Eligibility  [facility]   Date of Admission  11/12/20   Date of Discharge  11/16/20   Discharge Disposition  Home or Self Care   Discharge diagnosis  Left heart cath,  precordial pain, near syncope, intractable migraine   Does the patient have one of the following disease processes/diagnoses(primary or secondary)?  Other   Does the patient have Home health ordered?  No   Is there a DME ordered?  No   Prep survey completed?  Yes          Felicia Walden RN

## 2020-11-17 NOTE — OUTREACH NOTE
Call Center TCM Note      Responses   [facility] patient discharged from?  [facility]   Does the patient have one of the following disease processes/diagnoses(primary or secondary)?  Other   TCM attempt successful?  Yes   Call start time  1137   Call end time  1142   Discharge diagnosis  Left heart cath,  precordial pain, near syncope, intractable migraine   Is patient permission given to speak with other caregiver?  No   Meds reviewed with patient/caregiver?  Yes   Is the patient having any side effects they believe may be caused by any medication additions or changes?  No   Does the patient have all medications ordered at discharge?  N/A [No changes made to patient medications. ]   Is the patient taking all medications as directed (includes completed medication regime)?  Yes   Does the patient have a primary care provider?   Yes   Does the patient have an appointment with their PCP within 7 days of discharge?  Yes   Comments regarding PCP  PCP Dr Duckworth. Hospital follow up scheduled for Friday 11/20 345pm.    Has the patient kept scheduled appointments due by today?  N/A   Comments  Patient to contact Dr Doyle's office for appt 2-4 weeks.    Has home health visited the patient within 72 hours of discharge?  N/A   Psychosocial issues?  No   Did the patient receive a copy of their discharge instructions?  Yes   Nursing interventions  Reviewed instructions with patient   What is the patient's perception of their health status since discharge?  Improving [Patient denies any chest pain this am, but states that he does have a headache. ]   Is the patient/caregiver able to teach back signs and symptoms related to disease process for when to call PCP?  Yes   Is the patient/caregiver able to teach back signs and symptoms related to disease process for when to call 911?  Yes   Is the patient/caregiver able to teach back the hierarchy of who to call/visit for symptoms/problems? PCP, Specialist, Home health nurse, Urgent  [facility], ED, 911  Yes   TCM call completed?  Yes          Yoanna Wright RN    11/17/2020, 11:42 EST

## 2020-11-20 ENCOUNTER — OFFICE VISIT (OUTPATIENT)
Dept: FAMILY MEDICINE CLINIC | Facility: CLINIC | Age: 65
End: 2020-11-20

## 2020-11-20 VITALS
RESPIRATION RATE: 16 BRPM | HEIGHT: 70 IN | HEART RATE: 78 BPM | WEIGHT: 253 LBS | SYSTOLIC BLOOD PRESSURE: 143 MMHG | OXYGEN SATURATION: 99 % | TEMPERATURE: 96.8 F | DIASTOLIC BLOOD PRESSURE: 83 MMHG | BODY MASS INDEX: 36.22 KG/M2

## 2020-11-20 DIAGNOSIS — R07.9 EXERTIONAL CHEST PAIN: Primary | ICD-10-CM

## 2020-11-20 DIAGNOSIS — G89.29 CHRONIC MIDLINE LOW BACK PAIN WITH LEFT-SIDED SCIATICA: ICD-10-CM

## 2020-11-20 DIAGNOSIS — M54.42 CHRONIC MIDLINE LOW BACK PAIN WITH LEFT-SIDED SCIATICA: ICD-10-CM

## 2020-11-20 DIAGNOSIS — M54.16 LUMBAR BACK PAIN WITH RADICULOPATHY AFFECTING LEFT LOWER EXTREMITY: ICD-10-CM

## 2020-11-20 DIAGNOSIS — E11.42 DIABETIC POLYNEUROPATHY ASSOCIATED WITH TYPE 2 DIABETES MELLITUS (HCC): ICD-10-CM

## 2020-11-20 DIAGNOSIS — I25.10 NONOBSTRUCTIVE ATHEROSCLEROSIS OF CORONARY ARTERY: ICD-10-CM

## 2020-11-20 PROBLEM — R94.39 ABNORMAL STRESS TEST: Status: RESOLVED | Noted: 2020-11-14 | Resolved: 2020-11-20

## 2020-11-20 PROBLEM — R94.39 ABNORMAL NUCLEAR STRESS TEST: Status: RESOLVED | Noted: 2020-11-13 | Resolved: 2020-11-20

## 2020-11-20 PROCEDURE — 99215 OFFICE O/P EST HI 40 MIN: CPT | Performed by: FAMILY MEDICINE

## 2020-11-20 RX ORDER — INSULIN GLARGINE 100 [IU]/ML
30 INJECTION, SOLUTION SUBCUTANEOUS NIGHTLY
Qty: 4 PEN | Refills: 3 | Status: SHIPPED
Start: 2020-11-20 | End: 2020-12-15

## 2020-11-20 RX ORDER — OXYCODONE HYDROCHLORIDE 15 MG/1
TABLET ORAL
Qty: 120 TABLET | Refills: 0
Start: 2020-11-20 | End: 2020-12-03 | Stop reason: SDUPTHER

## 2020-11-20 RX ORDER — OXYCODONE HYDROCHLORIDE 15 MG/1
15 TABLET, FILM COATED, EXTENDED RELEASE ORAL EVERY 12 HOURS
Qty: 60 TABLET | Refills: 0 | Status: SHIPPED | OUTPATIENT
Start: 2020-11-20 | End: 2020-11-30

## 2020-11-20 NOTE — PROGRESS NOTES
Transitional Care Follow Up Visit  Subjective     Humble Green is a 65 y.o. male who presents for a transitional care management visit.    Within 48 business hours after discharge our office contacted him via telephone to coordinate his care and needs.      I reviewed and discussed the details of that call along with the discharge summary, hospital problems, inpatient lab results, inpatient diagnostic studies, and consultation reports with [facility].     Current outpatient and discharge medications have been reconciled for the patient.  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    Review of Systems   Constitutional: Negative for activity change, appetite change and diaphoresis.   Respiratory: Negative for shortness of breath.    Cardiovascular: Negative for chest pain and palpitations.   Endocrine:        Admits he has had a couple episodes of low BS and is losing wt w/ current DM tx    Musculoskeletal: Positive for back pain and myalgias.       Objective   Physical Exam  Vitals signs and nursing note reviewed.   Constitutional:        General: He is not in acute distress.     Appearance: Normal appearance. He is not ill-appearing or toxic-appearing.   HENT:      Head: Normocephalic and atraumatic.   Pulmonary:      Effort: Pulmonary effort is normal. No respiratory distress.   Neurological:      Mental Status: He is alert and oriented to person, place, and time. Mental status is at baseline.   Psychiatric:         Attention and Perception: Attention and perception normal.         Mood and Affect: Mood normal. Affect is flat.         Speech: Speech normal.         Behavior: Behavior normal. Behavior is cooperative.         Thought Content: Thought content normal.         Cognition and Memory: Cognition normal.         Judgment: Judgment normal.         Assessment/Plan   Diagnoses and all orders for this visit:    1. Exertional chest pain (Primary)    2. Nonobstructive atherosclerosis of coronary artery    3. Lumbar back pain with radiculopathy affecting left lower extremity  -     oxyCODONE ER (oxyCONTIN) 15 MG tablet extended-release 12 hour; Take 1 tablet by mouth Every 12 (Twelve) Hours.  Dispense: 60 tablet; Refill: 0  -     oxyCODONE (ROXICODONE) 15 MG immediate release tablet; 1/2 - 1 TID prn  Dispense: 120 tablet; Refill: 0    4. Chronic midline low back pain with left-sided sciatica  -     oxyCODONE ER (oxyCONTIN) 15 MG tablet extended-release 12 hour; Take 1 tablet by mouth Every 12 (Twelve) Hours.  Dispense: 60 tablet; Refill: 0  -     oxyCODONE (ROXICODONE) 15 MG immediate release tablet; 1/2 - 1 TID prn  Dispense: 120 tablet; Refill: 0    5. Diabetic polyneuropathy associated with type 2 diabetes mellitus (CMS/HCC)  -     oxyCODONE ER (oxyCONTIN) 15 MG tablet extended-release 12 hour; Take 1 tablet by mouth Every 12 (Twelve) Hours.  Dispense: 60 tablet; Refill: 0    Other orders  -     Insulin Glargine (BASAGLAR KWIKPEN) 100 UNIT/ML injection pen; Inject 30 Units under the skin into the appropriate area as directed Every Night.   Dispense: 4 pen; Refill: 3    CUT Basaglar from 33 to 30 units and monitor BS's for any more lows----call office if having BS issues  Start oxycontin 15mg bid and cut back the oxycodone to 1/2 tab prn

## 2020-11-24 ENCOUNTER — TELEPHONE (OUTPATIENT)
Dept: FAMILY MEDICINE CLINIC | Facility: CLINIC | Age: 65
End: 2020-11-24

## 2020-11-24 ENCOUNTER — READMISSION MANAGEMENT (OUTPATIENT)
Dept: CALL CENTER | Facility: HOSPITAL | Age: 65
End: 2020-11-24

## 2020-11-24 NOTE — TELEPHONE ENCOUNTER
----- Message from Leda Duckworth DO sent at 11/20/2020  3:55 PM EST -----  Call Dr. Bruno's office and get pt an appt ----he had one but they canceled due to office COVID-----

## 2020-11-24 NOTE — OUTREACH NOTE
Medical Week 1 Survey      Responses   [facility] patient discharged from?  [facility]   Does the patient have one of the following disease processes/diagnoses(primary or secondary)?  Other          Priscilla Andrew LPN

## 2020-11-25 NOTE — TELEPHONE ENCOUNTER
Office states pt DID have TELE appt 11/12 and MRI was ordered. They are waiting for PA from ins. They will fax these notes.

## 2020-11-30 ENCOUNTER — TELEPHONE (OUTPATIENT)
Dept: FAMILY MEDICINE CLINIC | Facility: CLINIC | Age: 65
End: 2020-11-30

## 2020-11-30 DIAGNOSIS — M54.42 CHRONIC MIDLINE LOW BACK PAIN WITH LEFT-SIDED SCIATICA: Primary | ICD-10-CM

## 2020-11-30 DIAGNOSIS — G89.29 CHRONIC MIDLINE LOW BACK PAIN WITH LEFT-SIDED SCIATICA: Primary | ICD-10-CM

## 2020-11-30 RX ORDER — OXYCODONE 13.5 MG/1
1 CAPSULE, EXTENDED RELEASE ORAL EVERY 12 HOURS
Qty: 60 EACH | Refills: 0 | Status: SHIPPED | OUTPATIENT
Start: 2020-11-30 | End: 2020-12-03

## 2020-12-02 ENCOUNTER — READMISSION MANAGEMENT (OUTPATIENT)
Dept: CALL CENTER | Facility: HOSPITAL | Age: 65
End: 2020-12-02

## 2020-12-02 ENCOUNTER — TELEPHONE (OUTPATIENT)
Dept: FAMILY MEDICINE CLINIC | Facility: CLINIC | Age: 65
End: 2020-12-02

## 2020-12-02 NOTE — OUTREACH NOTE
Medical Week 3 Survey      Responses   [facility] patient discharged from?  [facility]   Does the patient have one of the following disease processes/diagnoses(primary or secondary)?  Other   Week 3 attempt successful?  No   Unsuccessful attempts  Attempt 1          Mar Leach RN

## 2020-12-02 NOTE — TELEPHONE ENCOUNTER
PA for Xtampza ER 13.5mg caps was denied.    The requested medication is a benefit exclusion and is not covered by the plan. Please have the patient contact member services for further assistance.    Other alternative medication is:Hysingla ER

## 2020-12-03 ENCOUNTER — TELEPHONE (OUTPATIENT)
Dept: FAMILY MEDICINE CLINIC | Facility: CLINIC | Age: 65
End: 2020-12-03

## 2020-12-03 ENCOUNTER — OFFICE VISIT (OUTPATIENT)
Dept: SLEEP MEDICINE | Facility: HOSPITAL | Age: 65
End: 2020-12-03

## 2020-12-03 VITALS
DIASTOLIC BLOOD PRESSURE: 73 MMHG | HEART RATE: 77 BPM | SYSTOLIC BLOOD PRESSURE: 119 MMHG | HEIGHT: 69 IN | BODY MASS INDEX: 38.21 KG/M2 | WEIGHT: 258 LBS | OXYGEN SATURATION: 96 %

## 2020-12-03 DIAGNOSIS — G89.29 CHRONIC MIDLINE LOW BACK PAIN WITH LEFT-SIDED SCIATICA: ICD-10-CM

## 2020-12-03 DIAGNOSIS — Z01.818 PRE-OP TESTING: ICD-10-CM

## 2020-12-03 DIAGNOSIS — G47.33 OBSTRUCTIVE SLEEP APNEA, ADULT: Primary | ICD-10-CM

## 2020-12-03 DIAGNOSIS — M54.42 CHRONIC MIDLINE LOW BACK PAIN WITH LEFT-SIDED SCIATICA: ICD-10-CM

## 2020-12-03 DIAGNOSIS — M54.16 LUMBAR BACK PAIN WITH RADICULOPATHY AFFECTING LEFT LOWER EXTREMITY: ICD-10-CM

## 2020-12-03 PROCEDURE — G0463 HOSPITAL OUTPT CLINIC VISIT: HCPCS

## 2020-12-03 RX ORDER — OXYCODONE HYDROCHLORIDE 15 MG/1
15 TABLET ORAL
Qty: 135 TABLET | Refills: 0 | Status: SHIPPED | OUTPATIENT
Start: 2020-12-03 | End: 2020-12-03

## 2020-12-03 RX ORDER — OXYCODONE 13.5 MG/1
1 CAPSULE, EXTENDED RELEASE ORAL EVERY 12 HOURS
Qty: 60 EACH | Refills: 0
Start: 2020-12-03 | End: 2020-12-15

## 2020-12-03 RX ORDER — OXYCODONE HYDROCHLORIDE 10 MG/1
15 TABLET ORAL EVERY 8 HOURS PRN
Qty: 60 TABLET | Refills: 0 | Status: SHIPPED | OUTPATIENT
Start: 2020-12-03 | End: 2020-12-15

## 2020-12-03 NOTE — PROGRESS NOTES
SLEEP MEDICINE CONSULT      Patient Identification:     Name: Humble Green   Age: 65 y.o.   Gender: male   : 1955   MRN: [id number]     Date of consult: 12/3/2020    PCP/Referring Physician(s):     PCP: Leda Duckworth DO  Referring Provider: Leda Duckworth DO      Chief Complaint:   Daytime hypersomnolence associated with nocturnal snores.    History of Present Illness:   This is a very pleasant 65 years old  gentleman who finds it difficult to stay awake during the daytime.  He tends to doze off during sedentary activities.  He finds it difficult to drive home from work.  He has always dozed off after work.  He has felt tired during the daytime.  He has occasionally felt exhausted at the end of his day.  He has discussed his symptoms with his primary care physician.  His sleep history was explored.  He has snored for a long time.  He has been referred to sleep center for further management.    As mentioned above he has snored for a long time.  His snores has most likely become more louder and disruptive as he has woken up from his own snores.  He lives alone.  His bedtime nowadays is between 8 PM to 9 PM.  It takes him some time to relax and doze off.  Occasionally takes him an hour to do so.  Sometimes he has fallen asleep quickly.  He likes to wake up between 5:30 AM to 6 AM to start his day.  He is woken up 3-4 times at night for a bathroom break.  He has occasionally woken up at night as well as in the morning with headache.  He denies any symptoms of heartburn.  He has woken up with a runny nose.  He has drooled in his sleep.  He is a mouth breather.  He has occasionally woken up with a dry mouth at night as well as in the morning.  Occasionally he has woken up with his heart racing.    He seldom dreams at night.  No nightmares.  He has never walked or talked in his sleep.  He has bruxism.  He is a restless sleeper toss and turn throughout the night.  Leg cramps is often bothered him  at night.  He denies any symptoms of hypnopompic or hypnagogic hallucinations.  He does not have any symptoms of sleep paralysis.    He has woken up in the morning tired and somewhat somnolent.  He has remained tired throughout the day.  He tends to doze off during sedentary activities.  His sleepiness scale is 20/24.  He occasionally dozes off after work.  His working hours are 9 AM to 3 PM    Allergies, Medications, and Past History:   Allergies:    No Known Allergies  Current Medications:    Prior to Admission medications    Medication Sig Start Date End Date Taking? Authorizing Provider   apixaban (ELIQUIS) 5 MG tablet tablet Take 1 tablet by mouth Every 12 (Twelve) Hours. 10/5/20  Yes Leda Duckworth DO   aspirin 81 MG chewable tablet Chew 81 mg Daily.   Yes Choco Dangelo MD   atorvastatin (LIPITOR) 40 MG tablet Take 1 tablet by mouth Every Night. 10/5/20  Yes Leda Duckworth DO   buPROPion XL (Wellbutrin XL) 150 MG 24 hr tablet Take 1 tablet by mouth Daily. 10/5/20  Yes Leda Duckworth DO   butalbital-acetaminophen-caffeine (FIORICET, ESGIC) -40 MG per tablet Take 1 tablet by mouth Every 6 (Six) Hours As Needed for Headache or Migraine. 7/18/20  Yes Kevyn Vogel, DO   Dapagliflozin Propanediol (Farxiga) 10 MG tablet Take 10 mg by mouth Every Evening. 8/4/20  Yes Leda Duckworth DO   FLUoxetine (PROzac) 40 MG capsule Take 40 mg by mouth Daily. 9/24/20  Yes Choco Dangelo MD   Insulin Glargine (BASAGLAR KWIKPEN) 100 UNIT/ML injection pen Inject 30 Units under the skin into the appropriate area as directed Every Night. 11/20/20  Yes Leda Duckworth, DO   Insulin Lispro, 1 Unit Dial, (HumaLOG KwikPen) 100 UNIT/ML solution pen-injector Inject 5 Units under the skin into the appropriate area as directed 3 (Three) Times a Day Before Meals. 11/11/20  Yes Leda Duckworth DO   lisinopril (PRINIVIL,ZESTRIL) 2.5 MG tablet Take 1 tablet by mouth Every Night. 10/5/20  Yes Leda Duckworth DO    oxyCODONE (ROXICODONE) 10 MG tablet Take 1.5 tablets by mouth Every 8 (Eight) Hours As Needed for Moderate Pain  or Severe Pain . 12/3/20  Yes Leda Duckworth DO   oxyCODONE ER (Xtampza ER) 13.5 MG capsule extended-release 12 hour  Take 1 capsule by mouth Every 12 (Twelve) Hours. 12/3/20  Yes Leda Duckworth DO   polyethylene glycol (MIRALAX) 17 g packet Take 17 g by mouth Daily. 11/4/20  Yes Leda Duckworth DO   pregabalin (LYRICA) 150 MG capsule 1-2 po qhs  Patient taking differently: Take 150 mg by mouth every night at bedtime. 1-2 po qhs 10/21/20  Yes Leda Duckworth DO   tiZANidine (ZANAFLEX) 2 MG tablet Take 1 tablet by mouth At Night As Needed for Muscle Spasms. 10/21/20  Yes Leda Duckworth DO   oxyCODONE (ROXICODONE) 15 MG immediate release tablet 1/2 - 1 TID prn 11/20/20 12/3/20  Leda Duckworth DO   oxyCODONE (ROXICODONE) 15 MG immediate release tablet Take 1 tablet by mouth 5 (Five) Times a Day As Needed for Moderate Pain  or Severe Pain . 12/3/20 12/3/20  Leda Duckworth DO   oxyCODONE ER (Xtampza ER) 13.5 MG capsule extended-release 12 hour  Take 1 capsule by mouth Every 12 (Twelve) Hours. 11/30/20 12/3/20  Leda Duckworth DO     Past Medical History:    Past Medical History:   Diagnosis Date   • Arthritis    • BPH (benign prostatic hyperplasia)    • Chronic back pain    • Depression    • Diabetes mellitus (CMS/HCC)    • Excessive daytime sleepiness    • Heart attack (CMS/HCC)    • High cholesterol    • Hyperlipidemia    • Hypertension    • Low back pain    • Low vision of right eye with normal vision in contralateral eye    • Memory problem    • Neuropathy    • Nonobstructive atherosclerosis of coronary artery    • Obesity    • PFO (patent foramen ovale) 11/29/2019   • Rheumatoid arthritis (CMS/HCC)    • Snoring    • Stroke (CMS/HCC)       Past Surgical History:    Past Surgical History:   Procedure Laterality Date   • ARM TENDON REPAIR Left    • CARDIAC CATHETERIZATION N/A 11/16/2020     Procedure: Left Heart Cath and coronary angiogram;  Surgeon: Hans Doyle MD;  Location: [facility] CATH INVASIVE LOCATION;  Service: Cardiovascular;  Laterality: N/A;   • CARDIAC CATHETERIZATION N/A 11/16/2020    Procedure: Left ventriculography;  Surgeon: Hans Doyle MD;  Location: [facility] INVASIVE LOCATION;  Service: Cardiovascular;  Laterality: N/A;   • CHOLECYSTECTOMY     • COLONOSCOPY      2018 = TA, rech 2023   • EYE SURGERY      Rt Eye Sx after trauma @ 4y/o   • JOINT REPLACEMENT      Left TKR   • ROTATOR CUFF REPAIR Bilateral    • UMBILICAL HERNIA REPAIR        Social History:    Social History     Tobacco Use   • Smoking status: Never Smoker   • Smokeless tobacco: Never Used   Substance Use Topics   • Alcohol use: Yes     Alcohol/week: 1.0 standard drinks     Types: 1 Cans of beer per week   • Drug use: Never     Family Medical History:  Family History   Problem Relation Age of Onset   • Diabetes Mother    • Heart disease Mother    • Arthritis Mother    • Obesity Mother    • Hypertension Mother    • Migraines Mother    • Osteoporosis Mother    • Snoring Mother    • Diabetes Father    • Heart disease Father    • Arthritis Father    • Hypertension Father    • Stroke Father    • Heart attack Father    • Hyperlipidemia Father    • Snoring Father    • Diabetes Brother    • Heart disease Brother    • Arthritis Sister    • Anemia Sister          Review of Systems:   Constitutional:  Denies fever or chills .he has lost 50 pounds in last 5 years.    Eyes:  Denies change in visual acuity   HENT:  Denies nasal congestion, sore throat or post nasal drainage   Respiratory:  Denies cough, shortness of breath . he has experienced  dyspnea on exertion    Cardiovascular:  Denies chest pain or edema .  He was admitted recently to [facility] for chest pain.  He underwent extensive work-up including echocardiogram and cardiac catheterization.  He has paroxysmal atrial fibrillation.  GI:   "Denies abdominal pain, nausea, vomiting, bloody stools or diarrhea   :  Denies dysuria or nocturia   Musculoskeletal:   he has joint pains.  Integument:  Denies rash   Neurologic:  Denies headache, focal weakness or sensory changes.  history of strokes no seizures.   Endocrine:  Denies polyuria or polydipsia   Lymphatic:  Denies swollen glands   Psychiatric:   He has depression, anxiety  no claustrophobia      Physical Exam:     Vitals:    12/03/20 1453   BP: 119/73   BP Location: Right arm   Patient Position: Sitting   Cuff Size: Adult   Pulse: 77   SpO2: 96%   Weight: 117 kg (258 lb)   Height: 175.3 cm (69\")     HEENT: Head is normocephalic, atraumatic, normal distribution of hair. Pupils reactive to light. Ocular movements intact. mild nasal congestion on sniff test. No deviated nasal septum. No micrognathia.  Throat: Mallapatti stage 4, tongue midline, mucosa moist.  Neck: no JVD, thyromegaly, mass, +midline trachea, Neck circumference 18 inches  Lungs: clear, no wheeze, rhonchi, crackles  Cardiovascular: S1, S2, RRR no murmurs, rubs or gallops  Abd: +BS's soft NT/ND, no CVA tenderness. . Obese.   Ext: no edema, cyanosis, clubbing, pulses intact  Neuro: Awake alert, oriented to time place and person. Grossly intact to motor, sensory cerebral, and cerebellar (without focal deficit)  Psych: No overt noris, depression, psychosis or inappropriate behavior  Skin: No rash, cellulitis, or ulcerations.  No facial rash or erosion.  Assessment/Plan:   Sleep apnea, Unspecified :  This is a very pleasant gentleman who has snored for a long time.  He has felt tired and somnolent during the daytime.  He has dozed off during sedentary activities.  He has extensive history of coronary artery disease.  He has suffered 3 strokes in the past.  He has history of paroxysmal atrial fibrillation.  Multiple admissions to the hospital.  Last admission 3 weeks ago.  Obstructive sleep apnea has to be ruled out.  Recommendation.  A " split-night polysomnogram is recommended.  Allergic rhinitis:  He has remained symptomatic.  Recommendation.  Aggressive therapy is recommended.  Obesity:  Based on BMI of 38.10.  Recommendation.  Weight loss is recommended.  Driving instructions. Patient is advised to avoid driving if tired or somnolent. To avoid all alcoholic beverages or medications causing somnolence.         Diagnosis Plan   1. Obstructive sleep apnea, adult  Polysomnography 4 or More Parameters    COVID-19 PCR, [facility], NP SWAB IN [facility] TRANSPORT MEDIA 24-30 HR TAT - Swab, Nasopharynx   2. Pre-op testing  COVID-19 PCR, [facility], NP SWAB IN [facility] TRANSPORT MEDIA 24-30 HR TAT - Swab, Nasopharynx     No orders of the defined types were placed in this encounter.    Orders Placed This Encounter   Procedures   • COVID-19 PCR, [facility], NP SWAB IN [facility] TRANSPORT MEDIA 24-30 HR TAT - Swab, Nasopharynx     Recent admission to hospital.  He has been tested twice in the past . Tested for covid 19 during hospitalization.   Negative.     Standing Status:   Future     Standing Expiration Date:   12/3/2021     Order Specific Question:   Previously tested for COVID-19?     Answer:   Yes     Order Specific Question:   Employed in healthcare setting?     Answer:   No     Order Specific Question:   Symptomatic for COVID-19 as defined by CDC?     Answer:   No     Order Specific Question:   Hospitalized for COVID-19?     Answer:   No     Order Specific Question:   Admitted to ICU for COVID-19?     Answer:   No     Order Specific Question:   Resident in a congregate (group) care setting?     Answer:   No   • Polysomnography 4 or More Parameters     Standing Status:   Future     Standing Expiration Date:   12/3/2021     Order Specific Question:   Split Night     Answer:   Yes     Order Specific Question:   AHI greater than or equal to     Answer:   5     Order Specific Question:   May take own meds     Answer:   Yes     Order Specific  Question:   Details     Answer:   O2 Implementation per Protocol       This document has been electronically signed by:  Selina Low MD  12/3/2020  15:30 EST

## 2020-12-03 NOTE — TELEPHONE ENCOUNTER
Xtampza ER 13.5mg BID PA was resubmitted through Medicare per his pharmacy and it was approved for $3.90 and patient and the pharmacist was notified.     PCN:[id number]  BIN:[id number]  RxGroup:[id number]  MemID:[id number]    Medicare:[phone]

## 2020-12-03 NOTE — TELEPHONE ENCOUNTER
PA for Xtampza ER 13.5mg caps was approved.       Request Reference Number: [id number]. XTAMPZA ER CAP 13.5MG is approved through 12/31/2021  under your Medicare Part D benefit.  Reviewed by: LONA, Pharm.D. For further questions, call [phone].    Please cancel the one for 5 times a day and change it back to what it was before, the pharmacy will get the Xtampza ER 13.5mg ready for the patient and it will be $3.      Patient was notified.

## 2020-12-03 NOTE — TELEPHONE ENCOUNTER
I'm not sure why the insurance company is denying the formulary alternative, I faxed them the information asking why the formulary was denied and they just send back another denial saying it is a plan exclusion. Patient will be out of the pain medication today.       I called the insurance company and they said it has to go through medicare as of 11/2020 and got some more info from the pharmacy, Im going to try to resend the med PA again for the Xtampza ER and see what happens.

## 2020-12-04 ENCOUNTER — APPOINTMENT (OUTPATIENT)
Dept: MRI IMAGING | Facility: HOSPITAL | Age: 65
End: 2020-12-04

## 2020-12-04 ENCOUNTER — APPOINTMENT (OUTPATIENT)
Dept: CT IMAGING | Facility: HOSPITAL | Age: 65
End: 2020-12-04

## 2020-12-04 ENCOUNTER — HOSPITAL ENCOUNTER (INPATIENT)
Facility: HOSPITAL | Age: 65
LOS: 1 days | Discharge: HOME | End: 2020-12-08
Attending: EMERGENCY MEDICINE | Admitting: INTERNAL MEDICINE

## 2020-12-04 DIAGNOSIS — M46.1 BILATERAL SACROILIITIS (HCC): ICD-10-CM

## 2020-12-04 DIAGNOSIS — R52 PAIN: ICD-10-CM

## 2020-12-04 DIAGNOSIS — M54.50 SEVERE LOW BACK PAIN: Primary | ICD-10-CM

## 2020-12-04 LAB
ALBUMIN SERPL-MCNC: 3.9 G/DL (ref 3.5–5.2)
ALBUMIN/GLOB SERPL: 1.6 G/DL
ALP SERPL-CCNC: 76 U/L (ref 39–117)
ALT SERPL W P-5'-P-CCNC: 14 U/L (ref 1–41)
ANION GAP SERPL CALCULATED.3IONS-SCNC: 11 MMOL/L (ref 5–15)
APTT PPP: 27.5 SECONDS (ref 24–31)
AST SERPL-CCNC: 13 U/L (ref 1–40)
BASOPHILS # BLD AUTO: 0 10*3/MM3 (ref 0–0.2)
BASOPHILS NFR BLD AUTO: 0.6 % (ref 0–1.5)
BILIRUB SERPL-MCNC: 0.2 MG/DL (ref 0–1.2)
BILIRUB UR QL STRIP: NEGATIVE
BUN SERPL-MCNC: 18 MG/DL (ref 8–23)
BUN/CREAT SERPL: 15.9 (ref 7–25)
CALCIUM SPEC-SCNC: 8.8 MG/DL (ref 8.6–10.5)
CHLORIDE SERPL-SCNC: 102 MMOL/L (ref 98–107)
CLARITY UR: CLEAR
CO2 SERPL-SCNC: 25 MMOL/L (ref 22–29)
COLOR UR: YELLOW
CREAT SERPL-MCNC: 1.13 MG/DL (ref 0.76–1.27)
DEPRECATED RDW RBC AUTO: 45.1 FL (ref 37–54)
EOSINOPHIL # BLD AUTO: 0.2 10*3/MM3 (ref 0–0.4)
EOSINOPHIL NFR BLD AUTO: 3 % (ref 0.3–6.2)
ERYTHROCYTE [DISTWIDTH] IN BLOOD BY AUTOMATED COUNT: 14.3 % (ref 12.3–15.4)
ERYTHROCYTE [SEDIMENTATION RATE] IN BLOOD: 6 MM/HR (ref 0–20)
GFR SERPL CREATININE-BSD FRML MDRD: 65 ML/MIN/1.73
GLOBULIN UR ELPH-MCNC: 2.4 GM/DL
GLUCOSE BLDC GLUCOMTR-MCNC: 112 MG/DL (ref 70–105)
GLUCOSE SERPL-MCNC: 200 MG/DL (ref 65–99)
GLUCOSE UR STRIP-MCNC: ABNORMAL MG/DL
HCT VFR BLD AUTO: 41.1 % (ref 37.5–51)
HGB BLD-MCNC: 13.5 G/DL (ref 13–17.7)
HGB UR QL STRIP.AUTO: NEGATIVE
HOLD SPECIMEN: NORMAL
INR PPP: 0.97 (ref 0.93–1.1)
KETONES UR QL STRIP: NEGATIVE
LEUKOCYTE ESTERASE UR QL STRIP.AUTO: NEGATIVE
LYMPHOCYTES # BLD AUTO: 2.5 10*3/MM3 (ref 0.7–3.1)
LYMPHOCYTES NFR BLD AUTO: 35.8 % (ref 19.6–45.3)
MCH RBC QN AUTO: 30 PG (ref 26.6–33)
MCHC RBC AUTO-ENTMCNC: 32.8 G/DL (ref 31.5–35.7)
MCV RBC AUTO: 91.6 FL (ref 79–97)
MONOCYTES # BLD AUTO: 0.6 10*3/MM3 (ref 0.1–0.9)
MONOCYTES NFR BLD AUTO: 8.7 % (ref 5–12)
NEUTROPHILS NFR BLD AUTO: 3.7 10*3/MM3 (ref 1.7–7)
NEUTROPHILS NFR BLD AUTO: 51.9 % (ref 42.7–76)
NITRITE UR QL STRIP: NEGATIVE
NRBC BLD AUTO-RTO: 0.1 /100 WBC (ref 0–0.2)
PH UR STRIP.AUTO: 5.5 [PH] (ref 5–8)
PLATELET # BLD AUTO: 152 10*3/MM3 (ref 140–450)
PMV BLD AUTO: 11.5 FL (ref 6–12)
POTASSIUM SERPL-SCNC: 4.3 MMOL/L (ref 3.5–5.2)
PROT SERPL-MCNC: 6.3 G/DL (ref 6–8.5)
PROT UR QL STRIP: NEGATIVE
PROTHROMBIN TIME: 10.7 SECONDS (ref 9.6–11.7)
RBC # BLD AUTO: 4.49 10*6/MM3 (ref 4.14–5.8)
SODIUM SERPL-SCNC: 138 MMOL/L (ref 136–145)
SP GR UR STRIP: 1.03 (ref 1–1.03)
UROBILINOGEN UR QL STRIP: ABNORMAL
WBC # BLD AUTO: 7.1 10*3/MM3 (ref 3.4–10.8)

## 2020-12-04 PROCEDURE — A9579 GAD-BASE MR CONTRAST NOS,1ML: HCPCS | Performed by: EMERGENCY MEDICINE

## 2020-12-04 PROCEDURE — 85025 COMPLETE CBC W/AUTO DIFF WBC: CPT | Performed by: EMERGENCY MEDICINE

## 2020-12-04 PROCEDURE — 85651 RBC SED RATE NONAUTOMATED: CPT | Performed by: EMERGENCY MEDICINE

## 2020-12-04 PROCEDURE — 25010000002 ONDANSETRON PER 1 MG: Performed by: EMERGENCY MEDICINE

## 2020-12-04 PROCEDURE — 85610 PROTHROMBIN TIME: CPT | Performed by: EMERGENCY MEDICINE

## 2020-12-04 PROCEDURE — 25010000002 GADOTERIDOL PER 1 ML: Performed by: EMERGENCY MEDICINE

## 2020-12-04 PROCEDURE — 99284 EMERGENCY DEPT VISIT MOD MDM: CPT

## 2020-12-04 PROCEDURE — 72158 MRI LUMBAR SPINE W/O & W/DYE: CPT

## 2020-12-04 PROCEDURE — G0378 HOSPITAL OBSERVATION PER HR: HCPCS

## 2020-12-04 PROCEDURE — 85730 THROMBOPLASTIN TIME PARTIAL: CPT | Performed by: EMERGENCY MEDICINE

## 2020-12-04 PROCEDURE — 25010000002 HYDROMORPHONE PER 4 MG: Performed by: EMERGENCY MEDICINE

## 2020-12-04 PROCEDURE — 80053 COMPREHEN METABOLIC PANEL: CPT | Performed by: EMERGENCY MEDICINE

## 2020-12-04 PROCEDURE — 82962 GLUCOSE BLOOD TEST: CPT

## 2020-12-04 PROCEDURE — 74176 CT ABD & PELVIS W/O CONTRAST: CPT

## 2020-12-04 PROCEDURE — 99222 1ST HOSP IP/OBS MODERATE 55: CPT | Performed by: HOSPITALIST

## 2020-12-04 PROCEDURE — 63710000001 INSULIN GLARGINE PER 5 UNITS: Performed by: HOSPITALIST

## 2020-12-04 PROCEDURE — 81003 URINALYSIS AUTO W/O SCOPE: CPT | Performed by: EMERGENCY MEDICINE

## 2020-12-04 RX ORDER — INSULIN GLARGINE 100 [IU]/ML
25 INJECTION, SOLUTION SUBCUTANEOUS NIGHTLY
Status: DISCONTINUED | OUTPATIENT
Start: 2020-12-04 | End: 2020-12-06

## 2020-12-04 RX ORDER — POLYETHYLENE GLYCOL 3350 17 G/17G
17 POWDER, FOR SOLUTION ORAL DAILY
Status: DISCONTINUED | OUTPATIENT
Start: 2020-12-05 | End: 2020-12-08 | Stop reason: HOSPADM

## 2020-12-04 RX ORDER — OXYCODONE HCL 20 MG/ML
5 CONCENTRATE, ORAL ORAL EVERY 6 HOURS PRN
Status: DISCONTINUED | OUTPATIENT
Start: 2020-12-04 | End: 2020-12-05

## 2020-12-04 RX ORDER — BUTALBITAL, ACETAMINOPHEN AND CAFFEINE 50; 325; 40 MG/1; MG/1; MG/1
1 TABLET ORAL EVERY 6 HOURS PRN
Status: DISCONTINUED | OUTPATIENT
Start: 2020-12-04 | End: 2020-12-08 | Stop reason: HOSPADM

## 2020-12-04 RX ORDER — ASPIRIN 81 MG/1
81 TABLET, CHEWABLE ORAL DAILY
Status: DISCONTINUED | OUTPATIENT
Start: 2020-12-05 | End: 2020-12-08 | Stop reason: HOSPADM

## 2020-12-04 RX ORDER — HYDROMORPHONE HCL 110MG/55ML
1 PATIENT CONTROLLED ANALGESIA SYRINGE INTRAVENOUS ONCE
Status: COMPLETED | OUTPATIENT
Start: 2020-12-04 | End: 2020-12-04

## 2020-12-04 RX ORDER — ATORVASTATIN CALCIUM 40 MG/1
40 TABLET, FILM COATED ORAL NIGHTLY
Status: DISCONTINUED | OUTPATIENT
Start: 2020-12-04 | End: 2020-12-08 | Stop reason: HOSPADM

## 2020-12-04 RX ORDER — FLUOXETINE HYDROCHLORIDE 20 MG/1
40 CAPSULE ORAL DAILY
Status: DISCONTINUED | OUTPATIENT
Start: 2020-12-05 | End: 2020-12-08 | Stop reason: HOSPADM

## 2020-12-04 RX ORDER — OXYCODONE HYDROCHLORIDE 5 MG/1
15 TABLET ORAL EVERY 8 HOURS PRN
Status: DISCONTINUED | OUTPATIENT
Start: 2020-12-04 | End: 2020-12-05

## 2020-12-04 RX ORDER — ONDANSETRON 2 MG/ML
4 INJECTION INTRAMUSCULAR; INTRAVENOUS ONCE
Status: COMPLETED | OUTPATIENT
Start: 2020-12-04 | End: 2020-12-04

## 2020-12-04 RX ORDER — BUPROPION HYDROCHLORIDE 150 MG/1
150 TABLET ORAL DAILY
Status: DISCONTINUED | OUTPATIENT
Start: 2020-12-05 | End: 2020-12-08 | Stop reason: HOSPADM

## 2020-12-04 RX ORDER — SODIUM CHLORIDE 0.9 % (FLUSH) 0.9 %
10 SYRINGE (ML) INJECTION EVERY 12 HOURS SCHEDULED
Status: DISCONTINUED | OUTPATIENT
Start: 2020-12-04 | End: 2020-12-08 | Stop reason: HOSPADM

## 2020-12-04 RX ORDER — LISINOPRIL 5 MG/1
2.5 TABLET ORAL NIGHTLY
Status: DISCONTINUED | OUTPATIENT
Start: 2020-12-04 | End: 2020-12-08 | Stop reason: HOSPADM

## 2020-12-04 RX ORDER — SODIUM CHLORIDE 0.9 % (FLUSH) 0.9 %
10 SYRINGE (ML) INJECTION AS NEEDED
Status: DISCONTINUED | OUTPATIENT
Start: 2020-12-04 | End: 2020-12-08 | Stop reason: HOSPADM

## 2020-12-04 RX ORDER — TIZANIDINE 2 MG/1
2 TABLET ORAL NIGHTLY PRN
Status: DISCONTINUED | OUTPATIENT
Start: 2020-12-04 | End: 2020-12-08 | Stop reason: HOSPADM

## 2020-12-04 RX ADMIN — TIZANIDINE 2 MG: 2 TABLET ORAL at 23:05

## 2020-12-04 RX ADMIN — Medication 10 ML: at 23:10

## 2020-12-04 RX ADMIN — ATORVASTATIN CALCIUM 40 MG: 40 TABLET, FILM COATED ORAL at 23:05

## 2020-12-04 RX ADMIN — HYDROMORPHONE HYDROCHLORIDE 1 MG: 2 INJECTION, SOLUTION INTRAMUSCULAR; INTRAVENOUS; SUBCUTANEOUS at 12:51

## 2020-12-04 RX ADMIN — OXYCODONE HYDROCHLORIDE 15 MG: 5 TABLET ORAL at 23:05

## 2020-12-04 RX ADMIN — ONDANSETRON HYDROCHLORIDE 4 MG: 2 SOLUTION INTRAMUSCULAR; INTRAVENOUS at 13:32

## 2020-12-04 RX ADMIN — LISINOPRIL 2.5 MG: 5 TABLET ORAL at 23:05

## 2020-12-04 RX ADMIN — INSULIN GLARGINE 25 UNITS: 100 INJECTION, SOLUTION SUBCUTANEOUS at 23:06

## 2020-12-04 RX ADMIN — GADOTERIDOL 20 ML: 279.3 INJECTION, SOLUTION INTRAVENOUS at 14:24

## 2020-12-04 RX ADMIN — Medication 10 ML: at 23:17

## 2020-12-04 RX ADMIN — HYDROMORPHONE HYDROCHLORIDE 1 MG: 2 INJECTION, SOLUTION INTRAMUSCULAR; INTRAVENOUS; SUBCUTANEOUS at 16:22

## 2020-12-04 NOTE — ED NOTES
Pt reports he had left sided back and leg pain at work today.        Alanis Velasquez, RN  12/04/20 [id number]

## 2020-12-04 NOTE — H&P
[facility] Medicine Services      Patient Name: Humble Green  : 1955  MRN: [id number]  Primary Care Physician: Leda Duckworth DO  Date of admission: 2020    Patient Care Team:  Leda Duckworth DO as PCP - General (Family Medicine)  Leda Duckworth DO as Referring Physician (Family Medicine)  Hans Doyle MD as Consulting Physician (Cardiology)          Subjective   History Present Illness   Back pain  Chief Complaint:   Chief Complaint   Patient presents with   • Back Pain       HPI.    Mr. Green is a 65 y.o.  presents to [facility] complaint of back pain for last few days upto point he said he cannot walk around, pain do radiates at time into his leg. Pt underwent MRI lumbar region revealing   1. Degenerative changes of lumbar spine as described in the report.  Please refer to body of report for level by level findings.  2. No high-grade lumbar canal stenosis is seen. Mild to moderate canal  stenosis is present at L2-3 and L4-5.  3. Varying degrees of neural foraminal narrowing, moderate on the left  at L3-4, moderate bilaterally (left greater than right) at L4-5,  moderate to severe on the right at L5-S1.  Following this asked to admit the patient for further care.        History of Present Illness    Review of Systems   Constitution: Negative.   HENT: Negative.    Eyes: Negative.    Cardiovascular: Negative.    Respiratory: Negative.    Endocrine: Negative.    Hematologic/Lymphatic: Negative.    Skin: Negative.    Musculoskeletal: Negative.    Gastrointestinal: Negative.    Genitourinary: Negative.    Neurological: Negative.    Psychiatric/Behavioral: Negative.    Allergic/Immunologic: Negative.    All other systems reviewed and are negative.        Personal History     Past Medical History:   Past Medical History:   Diagnosis Date   • Arthritis    • BPH (benign prostatic hyperplasia)    • Chronic back pain    • Depression    • Diabetes mellitus (CMS/[facility])     • Excessive daytime sleepiness    • Heart attack (CMS/HCC)    • High cholesterol    • Hyperlipidemia    • Hypertension    • Low back pain    • Low vision of right eye with normal vision in contralateral eye    • Memory problem    • Neuropathy    • Nonobstructive atherosclerosis of coronary artery    • Obesity    • PFO (patent foramen ovale) 11/29/2019   • Rheumatoid arthritis (CMS/HCC)    • Snoring    • Stroke (CMS/HCC)        Surgical History:      Past Surgical History:   Procedure Laterality Date   • ARM TENDON REPAIR Left    • CARDIAC CATHETERIZATION N/A 11/16/2020    Procedure: Left Heart Cath and coronary angiogram;  Surgeon: Hans Doyle MD;  Location:  [facility];  Service: Cardiovascular;  Laterality: N/A;   • CARDIAC CATHETERIZATION N/A 11/16/2020    Procedure: Left ventriculography;  Surgeon: Hans Doyle MD;  Location:  [facility];  Service: Cardiovascular;  Laterality: N/A;   • CHOLECYSTECTOMY     • COLONOSCOPY      2018 = TA, rech 2023   • EYE SURGERY      Rt Eye Sx after trauma @ 4y/o   • JOINT REPLACEMENT      Left TKR   • ROTATOR CUFF REPAIR Bilateral    • UMBILICAL HERNIA REPAIR             Family History: family history includes Anemia in his sister; Arthritis in his father, mother, and sister; Diabetes in his brother, father, and mother; Heart attack in his father; Heart disease in his brother, father, and mother; Hyperlipidemia in his father; Hypertension in his father and mother; Migraines in his mother; Obesity in his mother; Osteoporosis in his mother; Snoring in his father and mother; Stroke in his father. Otherwise pertinent FHx was reviewed and unremarkable.     Social History:  reports that he has never smoked. He has never used smokeless tobacco. He reports current alcohol use of about 1.0 standard drinks of alcohol per week. He reports that he does not use drugs.      Medications:  Prior to Admission medications    Medication Sig Start Date End  Date Taking? Authorizing Provider   apixaban (ELIQUIS) 5 MG tablet tablet Take 1 tablet by mouth Every 12 (Twelve) Hours. 10/5/20   Leda Duckworth DO   aspirin 81 MG chewable tablet Chew 81 mg Daily.    ProviderChoco MD   atorvastatin (LIPITOR) 40 MG tablet Take 1 tablet by mouth Every Night. 10/5/20   Leda Duckworth DO   buPROPion XL (Wellbutrin XL) 150 MG 24 hr tablet Take 1 tablet by mouth Daily. 10/5/20   Leda Duckworth DO   butalbital-acetaminophen-caffeine (FIORICET, ESGIC) -40 MG per tablet Take 1 tablet by mouth Every 6 (Six) Hours As Needed for Headache or Migraine. 7/18/20   Kevyn Vogel, DO   Dapagliflozin Propanediol (Farxiga) 10 MG tablet Take 10 mg by mouth Every Evening. 8/4/20   Leda Duckworth DO   FLUoxetine (PROzac) 40 MG capsule Take 40 mg by mouth Daily. 9/24/20   ProviderChoco MD   Insulin Glargine (BASAGLAR KWIKPEN) 100 UNIT/ML injection pen Inject 30 Units under the skin into the appropriate area as directed Every Night. 11/20/20   Leda Duckworth DO   Insulin Lispro, 1 Unit Dial, (HumaLOG KwikPen) 100 UNIT/ML solution pen-injector Inject 5 Units under the skin into the appropriate area as directed 3 (Three) Times a Day Before Meals. 11/11/20   Leda Duckworth DO   lisinopril (PRINIVIL,ZESTRIL) 2.5 MG tablet Take 1 tablet by mouth Every Night. 10/5/20   Leda Duckworth DO   oxyCODONE (ROXICODONE) 10 MG tablet Take 1.5 tablets by mouth Every 8 (Eight) Hours As Needed for Moderate Pain  or Severe Pain . 12/3/20   Leda Duckworth DO   oxyCODONE ER (Xtampza ER) 13.5 MG capsule extended-release 12 hour  Take 1 capsule by mouth Every 12 (Twelve) Hours. 12/3/20   Leda Duckworth DO   polyethylene glycol (MIRALAX) 17 g packet Take 17 g by mouth Daily. 11/4/20   Leda Duckworth DO   pregabalin (LYRICA) 150 MG capsule 1-2 po qhs  Patient taking differently: Take 150 mg by mouth every night at bedtime. 1-2 po qhs 10/21/20   Leda Duckworth DO   tiZANidine (ZANAFLEX)  2 MG tablet Take 1 tablet by mouth At Night As Needed for Muscle Spasms. 10/21/20   Leda Duckworth DO       Allergies:  No Known Allergies    Objective   Objective     Vital Signs  Temp:  [97.7 °F (36.5 °C)] 97.7 °F (36.5 °C)  Heart Rate:  [78] 78  Resp:  [16] 16  BP: (134)/(76) 134/76  SpO2:  [98 %] 98 %  on   ;   Device (Oxygen Therapy): room air  Body mass index is 38.09 kg/m².    Physical Exam  Vitals signs and nursing note reviewed.   Constitutional:       General: He is not in acute distress.     Appearance: Normal appearance. He is well-developed. He is not ill-appearing, toxic-appearing or diaphoretic.   HENT:      Head: Normocephalic and atraumatic.      Right Ear: Ear canal and external ear normal.      Left Ear: Ear canal and external ear normal.      Nose: Nose normal. No congestion or rhinorrhea.      Mouth/Throat:      Mouth: Mucous membranes are moist.      Pharynx: No oropharyngeal exudate.   Eyes:      General: No scleral icterus.        Right eye: No discharge.         Left eye: No discharge.      Extraocular Movements: Extraocular movements intact.      Conjunctiva/sclera: Conjunctivae normal.      Pupils: Pupils are equal, round, and reactive to light.   Neck:      Musculoskeletal: Normal range of motion and neck supple. No neck rigidity or muscular tenderness.      Thyroid: No thyromegaly.      Vascular: No carotid bruit or JVD.      Trachea: No tracheal deviation.   Cardiovascular:      Rate and Rhythm: Normal rate and regular rhythm.      Pulses: Normal pulses.      Heart sounds: Normal heart sounds. No murmur. No friction rub. No gallop.    Pulmonary:      Effort: Pulmonary effort is normal. No respiratory distress.      Breath sounds: Normal breath sounds. No stridor. No wheezing, rhonchi or rales.   Chest:      Chest wall: No tenderness.   Abdominal:      General: Bowel sounds are normal. There is no distension.      Palpations: Abdomen is soft. There is no mass.      Tenderness: There is  no abdominal tenderness. There is no guarding or rebound.      Hernia: No hernia is present.   Musculoskeletal: Normal range of motion.         General: No swelling, tenderness, deformity or signs of injury.      Right lower leg: No edema.      Left lower leg: No edema.   Lymphadenopathy:      Cervical: No cervical adenopathy.   Skin:     General: Skin is warm and dry.      Coloration: Skin is not jaundiced or pale.      Findings: No bruising, erythema or rash.   Neurological:      General: No focal deficit present.      Mental Status: He is alert and oriented to person, place, and time. Mental status is at baseline.      Cranial Nerves: No cranial nerve deficit.      Sensory: No sensory deficit.      Motor: No weakness or abnormal muscle tone.      Coordination: Coordination normal.   Psychiatric:         Mood and Affect: Mood normal.         Behavior: Behavior normal.         Thought Content: Thought content normal.         Judgment: Judgment normal.         Results Review:  I have personally reviewed most recent lab results and agree with findings, most notably: .    Results from last 7 days   Lab Units 12/04/20  1309   WBC 10*3/mm3 7.10   HEMOGLOBIN g/dL 13.5   HEMATOCRIT % 41.1   PLATELETS 10*3/mm3 152   INR  0.97     Results from last 7 days   Lab Units 12/04/20  1309   SODIUM mmol/L 138   POTASSIUM mmol/L 4.3   CHLORIDE mmol/L 102   CO2 mmol/L 25.0   BUN mg/dL 18   CREATININE mg/dL 1.13   GLUCOSE mg/dL 200*   CALCIUM mg/dL 8.8   ALT (SGPT) U/L 14   AST (SGOT) U/L 13     Estimated Creatinine Clearance: 82.2 mL/min (by C-G formula based on SCr of 1.13 mg/dL).  Brief Urine Lab Results  (Last result in the past 365 days)      Color   Clarity   Blood   Leuk Est   Nitrite   Protein   CREAT   Urine HCG        12/04/20 1309 Yellow Clear Negative Negative Negative Negative               Microbiology Results (last 10 days)     ** No results found for the last 240 hours. **          ECG/EMG Results (most recent)      None          Results for orders placed during the hospital encounter of 07/13/20   Duplex Venous Lower Extremity - Right CAR    Narrative · Normal right lower extremity venous duplex scan.          Results for orders placed during the hospital encounter of 11/25/19   Adult Transesophageal Echo (LILIAN) W/ Cont if Necessary Per Protocol    Narrative · Left ventricular systolic function is normal.  · Left atrial cavity size is mildly dilated.  · Mild mitral valve regurgitation is present  · Mild tricuspid valve regurgitation is present.  · LV ejection fraction is about 60%  · Significant patent foramen ovale noted by bubble contrast study  · No pericardial effusion noted  · Aorta has minimal atherosclerosis  · Technically difficult study          Mri Lumbar Spine With & Without Contrast    Result Date: 12/4/2020   1. Degenerative changes of lumbar spine as described in the report. Please refer to body of report for level by level findings. 2. No high-grade lumbar canal stenosis is seen. Mild to moderate canal stenosis is present at L2-3 and L4-5. 3. Varying degrees of neural foraminal narrowing, moderate on the left at L3-4, moderate bilaterally (left greater than right) at L4-5, moderate to severe on the right at L5-S1. 4. No epidural fluid collection is identified.  Electronically Signed By-Charla Nelson MD On:12/4/2020 2:52 PM This report was finalized on [id number] by  Charla Nelson MD.        Estimated Creatinine Clearance: 82.2 mL/min (by C-G formula based on SCr of 1.13 mg/dL).    Assessment/Plan   Assessment/Plan       Active Hospital Problems    Diagnosis  POA   • Severe low back pain [M54.5]  Yes      Resolved Hospital Problems   No resolved problems to display.       Severe low back pain secondary to lumbar stenosis and also secondary to neural foraminal narrowing, will consult spine surgery.  Patient may benefit from epidural.  Will let his spine surgery to decide about this.  Pain control, symptomatic  treatment.    Dyslipidemia, continue statin, monitor LFTs as needed.    Diabetes mellitus type 2 insulin-dependent, continue Lantus, monitor Accu-Cheks, ADA diet.    Hypertension, continue lisinopril, monitor vitals.    Depression, noted on Prozac, stable.    DVT prophylaxis with SCDs.          VTE Prophylaxis -   Mechanical Order History:      Ordered        Signed and Held  Place Venous Foot Pump  Once         Signed and Held  Maintain Venous Foot Pump  Once                 Pharmalogical Order History:     None          CODE STATUS:    Code Status and Medical Interventions:   Ordered at: 12/04/20 1653     Level Of Support Discussed With:    Patient     Code Status:    CPR     Medical Interventions (Level of Support Prior to Arrest):    Full       This patient has been examined wearing appropriate Personal Protective Equipment and discussed with hospital infection control department. 12/04/20      I discussed the patient's findings and my recommendations with patient.      Signature:    [facility]cy [facility] Hospitalist Team

## 2020-12-04 NOTE — ED PROVIDER NOTES
Subjective   Chief complaint back and leg pain    History of present illness 65-year-old male has history of chronic back and leg discomfort who takes pain medicine at home states that he was at work today when he had sudden onset of worsening pain in his left lower back down the back of his left leg.  No numbness or tingling no paralysis no abdominal pain dizziness or syncope.  The patient is pain is increased from normal.  It is throbbing in nature radiates down the leg worse with movement better with rest tender to touch no fever chills no loss of bladder bowel control no chest pain neck arm or jaw pain.  No recent long car ride plane or immobilization or swelling to his legs.  Severe symptoms ongoing chronically but worse the last couple hours          Review of Systems   Constitutional: Negative for chills and fever.   HENT: Negative for congestion and sinus pressure.    Eyes: Negative for photophobia and visual disturbance.   Respiratory: Negative for chest tightness and shortness of breath.    Cardiovascular: Negative for chest pain and leg swelling.   Gastrointestinal: Negative for abdominal pain, blood in stool and vomiting.   Endocrine: Negative for cold intolerance and heat intolerance.   Genitourinary: Negative for difficulty urinating and dysuria.   Musculoskeletal: Positive for arthralgias. Negative for back pain.   Skin: Negative for color change and pallor.   Neurological: Negative for dizziness and light-headedness.   Psychiatric/Behavioral: Negative for agitation and behavioral problems.       Past Medical History:   Diagnosis Date   • Arthritis    • BPH (benign prostatic hyperplasia)    • Chronic back pain    • Depression    • Diabetes mellitus (CMS/HCC)    • Excessive daytime sleepiness    • Heart attack (CMS/HCC)    • High cholesterol    • Hyperlipidemia    • Hypertension    • Low back pain    • Low vision of right eye with normal vision in contralateral eye    • Memory problem    • Neuropathy     • Nonobstructive atherosclerosis of coronary artery    • Obesity    • PFO (patent foramen ovale) 11/29/2019   • Rheumatoid arthritis (CMS/HCC)    • Snoring    • Stroke (CMS/HCC)        No Known Allergies    Past Surgical History:   Procedure Laterality Date   • ARM TENDON REPAIR Left    • CARDIAC CATHETERIZATION N/A 11/16/2020    Procedure: Left Heart Cath and coronary angiogram;  Surgeon: Hans Doyle MD;  Location: [facility] CATH INVASIVE LOCATION;  Service: Cardiovascular;  Laterality: N/A;   • CARDIAC CATHETERIZATION N/A 11/16/2020    Procedure: Left ventriculography;  Surgeon: Hans Doyle MD;  Location: [facility] CATH INVASIVE LOCATION;  Service: Cardiovascular;  Laterality: N/A;   • CHOLECYSTECTOMY     • COLONOSCOPY      2018 = TA, rech 2023   • EYE SURGERY      Rt Eye Sx after trauma @ 4y/o   • JOINT REPLACEMENT      Left TKR   • REPLACEMENT TOTAL KNEE Left 2014   • ROTATOR CUFF REPAIR Bilateral    • UMBILICAL HERNIA REPAIR         Family History   Problem Relation Age of Onset   • Diabetes Mother    • Heart disease Mother    • Arthritis Mother    • Obesity Mother    • Hypertension Mother    • Migraines Mother    • Osteoporosis Mother    • Snoring Mother    • Diabetes Father    • Heart disease Father    • Arthritis Father    • Hypertension Father    • Stroke Father    • Heart attack Father    • Hyperlipidemia Father    • Snoring Father    • Diabetes Brother    • Heart disease Brother    • Arthritis Sister    • Anemia Sister        Social History     Socioeconomic History   • Marital status: Single     Spouse name: Not on file   • Number of children: Not on file   • Years of education: Not on file   • Highest education level: Not on file   Tobacco Use   • Smoking status: Never Smoker   • Smokeless tobacco: Never Used   Substance and Sexual Activity   • Alcohol use: Not Currently     Alcohol/week: 1.0 standard drinks     Types: 1 Cans of beer per week   • Drug use: Never   • Sexual activity: Defer     Prior to  Admission medications    Medication Sig Start Date End Date Taking? Authorizing Provider   apixaban (ELIQUIS) 5 MG tablet tablet Take 1 tablet by mouth Every 12 (Twelve) Hours. 10/5/20   Leda Duckworth DO   aspirin 81 MG chewable tablet Chew 81 mg Daily.    ProviderChoco MD   atorvastatin (LIPITOR) 40 MG tablet Take 1 tablet by mouth Every Night. 10/5/20   Leda Duckworth DO   buPROPion XL (Wellbutrin XL) 150 MG 24 hr tablet Take 1 tablet by mouth Daily. 10/5/20   Leda Duckworth DO   butalbital-acetaminophen-caffeine (FIORICET, ESGIC) -40 MG per tablet Take 1 tablet by mouth Every 6 (Six) Hours As Needed for Headache or Migraine. 7/18/20   Kevyn Vogel, DO   Dapagliflozin Propanediol (Farxiga) 10 MG tablet Take 10 mg by mouth Every Evening. 8/4/20   Leda Duckworth DO   FLUoxetine (PROzac) 40 MG capsule Take 40 mg by mouth Daily. 9/24/20   Choco Dangelo MD   Insulin Glargine (BASAGLAR KWIKPEN) 100 UNIT/ML injection pen Inject 30 Units under the skin into the appropriate area as directed Every Night. 11/20/20   Leda Duckworth DO   Insulin Lispro, 1 Unit Dial, (HumaLOG KwikPen) 100 UNIT/ML solution pen-injector Inject 5 Units under the skin into the appropriate area as directed 3 (Three) Times a Day Before Meals. 11/11/20   Leda Duckworth DO   lisinopril (PRINIVIL,ZESTRIL) 2.5 MG tablet Take 1 tablet by mouth Every Night. 10/5/20   Leda Duckworth DO   oxyCODONE (ROXICODONE) 10 MG tablet Take 1.5 tablets by mouth Every 8 (Eight) Hours As Needed for Moderate Pain  or Severe Pain . 12/3/20   Leda Duckworth DO   oxyCODONE ER (Xtampza ER) 13.5 MG capsule extended-release 12 hour  Take 1 capsule by mouth Every 12 (Twelve) Hours. 12/3/20   Leda Duckworth DO   polyethylene glycol (MIRALAX) 17 g packet Take 17 g by mouth Daily. 11/4/20   Stroot, Leda, DO   pregabalin (LYRICA) 150 MG capsule 1-2 po qhs  Patient taking differently: Take 150 mg by mouth every night at bedtime. 1-2 po qhs  10/21/20   Leda Duckworth DO   tiZANidine (ZANAFLEX) 2 MG tablet Take 1 tablet by mouth At Night As Needed for Muscle Spasms. 10/21/20   Leda Duckworth DO           Objective   Physical Exam  65-year-old awake alert no acute distress but severe pain HEENT extraocular muscles intact pupils equal round reactive mouth is clear neck supple no adenopathy no JVD no bruits lungs clear no retraction heart regular without murmur abdomen was soft nontender good bowel sounds no peritoneal findings no pulsatile masses no bruits extremities pulses are equal throughout upper and lower extremities no edema cords or Homans' sign evidence of DVT.  Patient has neck straight leg testing reflexes noted be 2+ in the right knee and ankle 1+ in the left knee and ankle toes up and including big toe dorsiflex plantar without difficulties no palp cords or Homans' sign no scaly changes.  There is tenderness and spasm on the paralumbar area.  Is not red or hot there is no direct bony tenderness palpation or percussion.  Patient has a lot of pain with any type of movement needs assistance.  The patient has no CVA tenderness noted at this time patient's awake alert and follows commands motor strength normal without focal weakness no facial asymmetry normal speech no drift in the arms no focal deficits.  No weakness patient has good sensation including the buttock area.  No signs of infection.  No evidence of septic hip joint.  Procedures           ED Course      Results for orders placed or performed during the hospital encounter of 12/04/20   Comprehensive Metabolic Panel    Specimen: Blood   Result Value Ref Range    Glucose 200 (H) 65 - 99 mg/dL    BUN 18 8 - 23 mg/dL    Creatinine 1.13 0.76 - 1.27 mg/dL    Sodium 138 136 - 145 mmol/L    Potassium 4.3 3.5 - 5.2 mmol/L    Chloride 102 98 - 107 mmol/L    CO2 25.0 22.0 - 29.0 mmol/L    Calcium 8.8 8.6 - 10.5 mg/dL    Total Protein 6.3 6.0 - 8.5 g/dL    Albumin 3.90 3.50 - 5.20 g/dL    ALT  (SGPT) 14 1 - 41 U/L    AST (SGOT) 13 1 - 40 U/L    Alkaline Phosphatase 76 39 - 117 U/L    Total Bilirubin 0.2 0.0 - 1.2 mg/dL    eGFR Non African Amer 65 >60 mL/min/1.73    Globulin 2.4 gm/dL    A/G Ratio 1.6 g/dL    BUN/Creatinine Ratio 15.9 7.0 - 25.0    Anion Gap 11.0 5.0 - 15.0 mmol/L   Protime-INR    Specimen: Arm, Left; Blood   Result Value Ref Range    Protime 10.7 9.6 - 11.7 Seconds    INR 0.97 0.93 - 1.10   aPTT    Specimen: Blood   Result Value Ref Range    PTT 27.5 24.0 - 31.0 seconds   Sedimentation Rate    Specimen: Blood   Result Value Ref Range    Sed Rate 6 0 - 20 mm/hr   Urinalysis With Microscopic If Indicated (No Culture) - Urine, Clean Catch    Specimen: Urine, Clean Catch   Result Value Ref Range    Color, UA Yellow Yellow, Straw    Appearance, UA Clear Clear    pH, UA 5.5 5.0 - 8.0    Specific Gravity, UA 1.029 1.005 - 1.030    Glucose, UA >=1000 mg/dL (3+) (A) Negative    Ketones, UA Negative Negative    Bilirubin, UA Negative Negative    Blood, UA Negative Negative    Protein, UA Negative Negative    Leuk Esterase, UA Negative Negative    Nitrite, UA Negative Negative    Urobilinogen, UA 0.2 E.U./dL 0.2 - 1.0 E.U./dL   CBC Auto Differential    Specimen: Blood   Result Value Ref Range    WBC 7.10 3.40 - 10.80 10*3/mm3    RBC 4.49 4.14 - 5.80 10*6/mm3    Hemoglobin 13.5 13.0 - 17.7 g/dL    Hematocrit 41.1 37.5 - 51.0 %    MCV 91.6 79.0 - 97.0 fL    MCH 30.0 26.6 - 33.0 pg    MCHC 32.8 31.5 - 35.7 g/dL    RDW 14.3 12.3 - 15.4 %    RDW-SD 45.1 37.0 - 54.0 fl    MPV 11.5 6.0 - 12.0 fL    Platelets 152 140 - 450 10*3/mm3    Neutrophil % 51.9 42.7 - 76.0 %    Lymphocyte % 35.8 19.6 - 45.3 %    Monocyte % 8.7 5.0 - 12.0 %    Eosinophil % 3.0 0.3 - 6.2 %    Basophil % 0.6 0.0 - 1.5 %    Neutrophils, Absolute 3.70 1.70 - 7.00 10*3/mm3    Lymphocytes, Absolute 2.50 0.70 - 3.10 10*3/mm3    Monocytes, Absolute 0.60 0.10 - 0.90 10*3/mm3    Eosinophils, Absolute 0.20 0.00 - 0.40 10*3/mm3    Basophils,  Absolute 0.00 0.00 - 0.20 10*3/mm3    nRBC 0.1 0.0 - 0.2 /100 WBC   Gold Top - SST   Result Value Ref Range    Extra Tube Hold for add-ons.      Ct Abdomen Pelvis Stone Protocol    Result Date: 12/4/2020  1.No obstructive uropathy or nephroureterolithiasis. There is high density material within the collecting system related to recent gadolinium contrast administration for lumbar spine imaging. 2.Benign left renal cyst. 3.There is a questionable nodule at the distal pancreatic body. It measures about 1.3 cm. Dedicated pancreatic MRI or CT should be performed. This should be performed nonemergently. It may be a area of less fatty infiltrated glandular tissue. No pancreatic duct dilatation. 4.Moderate stool burden, question constipation. 5.Diverticulosis without diverticulitis. 6.Small fat-containing right inguinal hernia and periumbilical hernia. 7.Status post cholecystectomy without bile duct dilatation.    Electronically Signed By-Carmelina Marie MD On:12/4/2020 6:06 PM This report was finalized on [id number] by  Carmelina Marie MD.    Mri Lumbar Spine With & Without Contrast    Result Date: 12/4/2020   1. Degenerative changes of lumbar spine as described in the report. Please refer to body of report for level by level findings. 2. No high-grade lumbar canal stenosis is seen. Mild to moderate canal stenosis is present at L2-3 and L4-5. 3. Varying degrees of neural foraminal narrowing, moderate on the left at L3-4, moderate bilaterally (left greater than right) at L4-5, moderate to severe on the right at L5-S1. 4. No epidural fluid collection is identified.  Electronically Signed By-Charla Nelson MD On:12/4/2020 2:52 PM This report was finalized on [id number] by  Charla Nelson MD.    Medications   HYDROmorphone (DILAUDID) injection 1 mg (1 mg Intravenous Given 12/4/20 1251)   ondansetron (ZOFRAN) injection 4 mg (4 mg Intravenous Given 12/4/20 1332)   gadoteridol (PROHANCE) injection 20 mL (20 mL Intravenous Given  12/4/20 1424)   HYDROmorphone (DILAUDID) injection 1 mg (1 mg Intravenous Given 12/4/20 1622)                                            MDM  Number of Diagnoses or Management Options  Severe low back pain:   Diagnosis management comments: Medical decision making.  Patient IV established placed on a monitor given by 1 mg IV and Zofran 4 mg IV and had the above evaluation CBC electrolytes unremarkable urine was negative patient is on Eliquis subsequent emergent MRI was obtained showed degenerative changes lumbar spine the patient was noted to have no high-grade lumbar canal stenosis mild to moderate canal stenosis present at L2-L3 and L4-L5 varying degrees of neural foraminal narrowing moderate on the left at L3-4 moderate on the right at L5-S1 no epidural fluid collections were noted.  The patient repeat exam condition milligram Dilaudid IV.  He remained hemodynamically stable his abdomen soft that tenderness good bowel sounds no pulsatile mass or bruits pulses are equal upper and lower extremities.  I see no evidence of early stress acute aortic dissection or acute aortic aneurysm.  No evidence of spinal cord compression.  This is definitely worse with movement.  He has radicular symptoms down his leg.  The patient had uncontrolled pain at home on narcotic pain medication.  In light of this we talked about the findings hospitalist notified and the patient will be placed in the hospital for further pain management and further evaluation stable unremarkable improved ER course.  CT scan abdomen pelvis showed no obstructive uropathy there was benign left renal cyst questionable nodule in the distal pancreatic body no evidence of aortic aneurysm fat-containing right inguinal hernia diverticulosis without diverticulitis      Final diagnoses:   Severe low back pain            Honorio Perez MD  12/04/20 [id number]

## 2020-12-05 DIAGNOSIS — I20.8 ANGINA AT REST (HCC): ICD-10-CM

## 2020-12-05 DIAGNOSIS — E66.01 MORBIDLY OBESE (HCC): ICD-10-CM

## 2020-12-05 LAB
ALBUMIN SERPL-MCNC: 3.5 G/DL (ref 3.5–5.2)
ALBUMIN/GLOB SERPL: 1.6 G/DL
ALP SERPL-CCNC: 66 U/L (ref 39–117)
ALT SERPL W P-5'-P-CCNC: 13 U/L (ref 1–41)
ANION GAP SERPL CALCULATED.3IONS-SCNC: 7 MMOL/L (ref 5–15)
AST SERPL-CCNC: 12 U/L (ref 1–40)
BASOPHILS # BLD AUTO: 0 10*3/MM3 (ref 0–0.2)
BASOPHILS NFR BLD AUTO: 0.7 % (ref 0–1.5)
BILIRUB SERPL-MCNC: 0.3 MG/DL (ref 0–1.2)
BUN SERPL-MCNC: 15 MG/DL (ref 8–23)
BUN/CREAT SERPL: 16 (ref 7–25)
CALCIUM SPEC-SCNC: 8.4 MG/DL (ref 8.6–10.5)
CHLORIDE SERPL-SCNC: 104 MMOL/L (ref 98–107)
CO2 SERPL-SCNC: 29 MMOL/L (ref 22–29)
CREAT SERPL-MCNC: 0.94 MG/DL (ref 0.76–1.27)
DEPRECATED RDW RBC AUTO: 43.3 FL (ref 37–54)
EOSINOPHIL # BLD AUTO: 0.2 10*3/MM3 (ref 0–0.4)
EOSINOPHIL NFR BLD AUTO: 3.6 % (ref 0.3–6.2)
ERYTHROCYTE [DISTWIDTH] IN BLOOD BY AUTOMATED COUNT: 13.9 % (ref 12.3–15.4)
GFR SERPL CREATININE-BSD FRML MDRD: 81 ML/MIN/1.73
GLOBULIN UR ELPH-MCNC: 2.2 GM/DL
GLUCOSE BLDC GLUCOMTR-MCNC: 138 MG/DL (ref 70–105)
GLUCOSE BLDC GLUCOMTR-MCNC: 223 MG/DL (ref 70–105)
GLUCOSE BLDC GLUCOMTR-MCNC: 224 MG/DL (ref 70–105)
GLUCOSE BLDC GLUCOMTR-MCNC: 304 MG/DL (ref 70–105)
GLUCOSE BLDC GLUCOMTR-MCNC: 337 MG/DL (ref 70–105)
GLUCOSE BLDC GLUCOMTR-MCNC: 90 MG/DL (ref 70–105)
GLUCOSE SERPL-MCNC: 98 MG/DL (ref 65–99)
HCT VFR BLD AUTO: 37.8 % (ref 37.5–51)
HGB BLD-MCNC: 12.7 G/DL (ref 13–17.7)
LYMPHOCYTES # BLD AUTO: 2.3 10*3/MM3 (ref 0.7–3.1)
LYMPHOCYTES NFR BLD AUTO: 35.3 % (ref 19.6–45.3)
MCH RBC QN AUTO: 29.9 PG (ref 26.6–33)
MCHC RBC AUTO-ENTMCNC: 33.7 G/DL (ref 31.5–35.7)
MCV RBC AUTO: 89 FL (ref 79–97)
MONOCYTES # BLD AUTO: 0.6 10*3/MM3 (ref 0.1–0.9)
MONOCYTES NFR BLD AUTO: 9 % (ref 5–12)
NEUTROPHILS NFR BLD AUTO: 3.3 10*3/MM3 (ref 1.7–7)
NEUTROPHILS NFR BLD AUTO: 51.4 % (ref 42.7–76)
NRBC BLD AUTO-RTO: 0.1 /100 WBC (ref 0–0.2)
PLATELET # BLD AUTO: 135 10*3/MM3 (ref 140–450)
PMV BLD AUTO: 10.7 FL (ref 6–12)
POTASSIUM SERPL-SCNC: 4.1 MMOL/L (ref 3.5–5.2)
PROT SERPL-MCNC: 5.7 G/DL (ref 6–8.5)
RBC # BLD AUTO: 4.25 10*6/MM3 (ref 4.14–5.8)
SODIUM SERPL-SCNC: 140 MMOL/L (ref 136–145)
WBC # BLD AUTO: 6.4 10*3/MM3 (ref 3.4–10.8)

## 2020-12-05 PROCEDURE — G0378 HOSPITAL OBSERVATION PER HR: HCPCS

## 2020-12-05 PROCEDURE — 63710000001 INSULIN GLARGINE PER 5 UNITS: Performed by: HOSPITALIST

## 2020-12-05 PROCEDURE — 82962 GLUCOSE BLOOD TEST: CPT

## 2020-12-05 PROCEDURE — 99232 SBSQ HOSP IP/OBS MODERATE 35: CPT | Performed by: HOSPITALIST

## 2020-12-05 PROCEDURE — 99203 OFFICE O/P NEW LOW 30 MIN: CPT | Performed by: NEUROLOGICAL SURGERY

## 2020-12-05 PROCEDURE — 63710000001 DEXAMETHASONE PER 0.25 MG: Performed by: SPECIALIST

## 2020-12-05 PROCEDURE — 83036 HEMOGLOBIN GLYCOSYLATED A1C: CPT | Performed by: SPECIALIST

## 2020-12-05 PROCEDURE — 85025 COMPLETE CBC W/AUTO DIFF WBC: CPT | Performed by: HOSPITALIST

## 2020-12-05 PROCEDURE — 25010000002 HYDROMORPHONE PER 4 MG: Performed by: PHYSICIAN ASSISTANT

## 2020-12-05 PROCEDURE — 80053 COMPREHEN METABOLIC PANEL: CPT | Performed by: HOSPITALIST

## 2020-12-05 RX ORDER — LIDOCAINE 50 MG/G
1 PATCH TOPICAL
Status: DISPENSED | OUTPATIENT
Start: 2020-12-05 | End: 2020-12-07

## 2020-12-05 RX ORDER — HYDROMORPHONE HYDROCHLORIDE 2 MG/1
2 TABLET ORAL EVERY 4 HOURS PRN
Status: DISCONTINUED | OUTPATIENT
Start: 2020-12-05 | End: 2020-12-06

## 2020-12-05 RX ORDER — DEXAMETHASONE 4 MG/1
4 TABLET ORAL EVERY 6 HOURS SCHEDULED
Status: COMPLETED | OUTPATIENT
Start: 2020-12-05 | End: 2020-12-05

## 2020-12-05 RX ORDER — HYDROMORPHONE HCL 110MG/55ML
0.5 PATIENT CONTROLLED ANALGESIA SYRINGE INTRAVENOUS ONCE
Status: COMPLETED | OUTPATIENT
Start: 2020-12-05 | End: 2020-12-05

## 2020-12-05 RX ADMIN — INSULIN GLARGINE 25 UNITS: 100 INJECTION, SOLUTION SUBCUTANEOUS at 21:42

## 2020-12-05 RX ADMIN — HYDROMORPHONE HYDROCHLORIDE 2 MG: 2 TABLET ORAL at 18:47

## 2020-12-05 RX ADMIN — Medication 10 ML: at 20:57

## 2020-12-05 RX ADMIN — DEXAMETHASONE 4 MG: 4 TABLET ORAL at 23:53

## 2020-12-05 RX ADMIN — BUPROPION HYDROCHLORIDE 150 MG: 150 TABLET, EXTENDED RELEASE ORAL at 10:32

## 2020-12-05 RX ADMIN — LISINOPRIL 2.5 MG: 5 TABLET ORAL at 20:57

## 2020-12-05 RX ADMIN — DEXAMETHASONE 4 MG: 4 TABLET ORAL at 11:32

## 2020-12-05 RX ADMIN — FLUOXETINE 40 MG: 20 CAPSULE ORAL at 10:32

## 2020-12-05 RX ADMIN — Medication 10 ML: at 10:34

## 2020-12-05 RX ADMIN — OXYCODONE HYDROCHLORIDE 15 MG: 5 TABLET ORAL at 10:31

## 2020-12-05 RX ADMIN — HYDROMORPHONE HYDROCHLORIDE 2 MG: 2 TABLET ORAL at 11:32

## 2020-12-05 RX ADMIN — DEXAMETHASONE 4 MG: 4 TABLET ORAL at 18:47

## 2020-12-05 RX ADMIN — ATORVASTATIN CALCIUM 40 MG: 40 TABLET, FILM COATED ORAL at 20:57

## 2020-12-05 RX ADMIN — HYDROMORPHONE HYDROCHLORIDE 0.5 MG: 2 INJECTION, SOLUTION INTRAMUSCULAR; INTRAVENOUS; SUBCUTANEOUS at 02:44

## 2020-12-05 RX ADMIN — POLYETHYLENE GLYCOL 3350 17 G: 17 POWDER, FOR SOLUTION ORAL at 10:34

## 2020-12-05 RX ADMIN — LIDOCAINE 1 PATCH: 50 PATCH TOPICAL at 02:43

## 2020-12-05 RX ADMIN — ASPIRIN 81 MG CHEWABLE TABLET 81 MG: 81 TABLET CHEWABLE at 10:32

## 2020-12-05 NOTE — PLAN OF CARE
Goal Outcome Evaluation:    Patient has complained of back pain, treating per MAR - new medications added. No other complaints at this time.

## 2020-12-05 NOTE — PROGRESS NOTES
Case Management/Social Work    Patient Name:  Humble Green  YOB: 1955  MRN: [id number]  Admit Date:  12/4/2020    Out-of-pocket cost for Eliquis checked at [facility].   A 90-day supply (including 10mg PO BID x 7 days; 5mg PO BID x 3 months) will cost the patient $3.90.     Electronically signed by:  Nanette Jalloh RN  12/05/20 12:17 EST

## 2020-12-05 NOTE — H&P
Patient Care Team:  Leda Duckworth DO as PCP - General (Family Medicine)  Leda Duckworth DO as Referring Physician (Family Medicine)  Hans Doyle MD as Consulting Physician (Cardiology)    Chief complaint low back pain with left flank pain and left gluteal pain    Subjective     Humble Bosch is a 65-year-old gentleman with a history of chronic back and leg pain.  He has treated this in the past with medications including long-acting and short acting oxycodone OxyContin and short acting oxycodone.  This has allowed him to continue to be active.    He states that he developed severe left-sided back pain radiating to the left flank over the iliac crest and also into the left gluteal area.  There was no radiation into the leg and no radiation in the perineal area.  He did not have any associated symptoms of nausea vomiting fevers chills urinary changes of hematuria etc.  He was unable to relieve the pain and was admitted through the emergency room for further evaluation of this.    His MRI scan was reviewed and shows that he does have diffuse degenerative disc disease in the lumbar spine with facet joint ligamentum flavum hypertrophy creating problems with foraminal stenosis at on the left at L3-4 bilaterally at L4-5 and on the right at L5-S1.  He has a small synovial cyst on the right at L3-4 and lateral recess stenosis on the left at L4-5 and the right at L5-S1.    He does note that the pain is made worse with sitting and standing and any twisting or bending.  It improves with laying down but is still present.  He rated it as a 7-8 over 10.    Back Pain  This is a recurrent problem. The current episode started today. The problem occurs constantly. The problem is unchanged. The pain is present in the gluteal, lumbar spine and sacro-iliac. The quality of the pain is described as burning and aching. The pain does not radiate. The pain is at a severity of 7/10. The pain is severe. The pain is the same all the  time. The symptoms are aggravated by bending, coughing and standing. He has tried analgesics, bed rest and home exercises for the symptoms. The treatment provided no relief.       Review of Systems   Musculoskeletal: Positive for back pain.        Past History:  Medical History: has a past medical history of Arthritis, BPH (benign prostatic hyperplasia), Chronic back pain, Depression, Diabetes mellitus (CMS/[facility]), Excessive daytime sleepiness, Heart attack (CMS/[facility]), High cholesterol, Hyperlipidemia, Hypertension, Low back pain, Low vision of right eye with normal vision in contralateral eye, Memory problem, Neuropathy, Nonobstructive atherosclerosis of coronary artery, Obesity, PFO (patent foramen ovale) (11/29/2019), Rheumatoid arthritis (CMS/[facility]), Snoring, and Stroke (CMS/[facility]).   Surgical History: has a past surgical history that includes Rotator cuff repair (Bilateral); Arm Tendon Repair (Left); Umbilical hernia repair; Cholecystectomy; Joint replacement; Eye surgery; Colonoscopy; Cardiac catheterization (N/A, 11/16/2020); Cardiac catheterization (N/A, 11/16/2020); and Replacement total knee (Left, 2014).   Family History: family history includes Anemia in his sister; Arthritis in his father, mother, and sister; Diabetes in his brother, father, and mother; Heart attack in his father; Heart disease in his brother, father, and mother; Hyperlipidemia in his father; Hypertension in his father and mother; Migraines in his mother; Obesity in his mother; Osteoporosis in his mother; Snoring in his father and mother; Stroke in his father.   Social History: reports that he has never smoked. He has never used smokeless tobacco. He reports previous alcohol use of about 1.0 standard drinks of alcohol per week. He reports that he does not use drugs.    Medications Prior to Admission   Medication Sig Dispense Refill Last Dose   • apixaban (ELIQUIS) 5 MG tablet tablet Take 1 tablet by mouth Every 12 (Twelve) Hours. 180 tablet 0  12/4/2020 at 0800   • aspirin 81 MG chewable tablet Chew 81 mg Daily.      • atorvastatin (LIPITOR) 40 MG tablet Take 1 tablet by mouth Every Night.   12/3/2020 at Unknown time   • buPROPion XL (Wellbutrin XL) 150 MG 24 hr tablet Take 1 tablet by mouth Daily. 90 tablet 1    • butalbital-acetaminophen-caffeine (FIORICET, ESGIC) -40 MG per tablet Take 1 tablet by mouth Every 6 (Six) Hours As Needed for Headache or Migraine. 20 tablet 0    • Dapagliflozin Propanediol (Farxiga) 10 MG tablet Take 10 mg by mouth Every Evening. 30 tablet 3 12/3/2020 at Unknown time   • FLUoxetine (PROzac) 40 MG capsule Take 40 mg by mouth Daily.      • Insulin Glargine (BASAGLAR KWIKPEN) 100 UNIT/ML injection pen Inject 30 Units under the skin into the appropriate area as directed Every Night. 4 pen 3 12/3/2020 at Unknown time   • Insulin Lispro, 1 Unit Dial, (HumaLOG KwikPen) 100 UNIT/ML solution pen-injector Inject 5 Units under the skin into the appropriate area as directed 3 (Three) Times a Day Before Meals. 9 pen 0    • lisinopril (PRINIVIL,ZESTRIL) 2.5 MG tablet Take 1 tablet by mouth Every Night.   12/3/2020 at Unknown time   • oxyCODONE (ROXICODONE) 10 MG tablet Take 1.5 tablets by mouth Every 8 (Eight) Hours As Needed for Moderate Pain  or Severe Pain . 60 tablet 0    • oxyCODONE ER (Xtampza ER) 13.5 MG capsule extended-release 12 hour  Take 1 capsule by mouth Every 12 (Twelve) Hours. 60 each 0    • polyethylene glycol (MIRALAX) 17 g packet Take 17 g by mouth Daily.      • pregabalin (LYRICA) 150 MG capsule 1-2 po qhs (Patient taking differently: Take 150 mg by mouth every night at bedtime. 2 po qhs) 180 capsule 0    • tiZANidine (ZANAFLEX) 2 MG tablet Take 1 tablet by mouth At Night As Needed for Muscle Spasms. 30 tablet 0       Allergies: Patient has no known allergies.    Objective      Vital Signs  Temp:  [97.7 °F (36.5 °C)-98.2 °F (36.8 °C)] 97.9 °F (36.6 °C)  Heart Rate:  [66-84] 84  Resp:  [16-19] 16  BP:  ()/(50-76) 90/50    Physical Exam  Cardiovascular:      Pulses:           Dorsalis pedis pulses are 1+ on the right side and 1+ on the left side.        Posterior tibial pulses are 1+ on the right side and 1+ on the left side.   Neurological:      Mental Status: He is alert and oriented to person, place, and time. Mental status is at baseline.      Cranial Nerves: Cranial nerves are intact.      Sensory: Sensory deficit present.      Motor: Motor function is intact.      Deep Tendon Reflexes: Reflexes abnormal. Babinski sign present on the left side.      Reflex Scores:       Patellar reflexes are 0 on the right side and 0 on the left side.       Achilles reflexes are 0 on the right side and 0 on the left side.     Comments: He has distal leg hypalgesia that is symmetric   and consistent with a peripheral neuropathy.          Results Review:   I reviewed the patient's new clinical results.  I reviewed the patient's new imaging results and agree with the interpretation.  I reviewed the patient's other test results and agree with the interpretation  Discussed with patient      Assessment/Plan       Severe low back pain  Mr. Buchanan is a patient with chronic low back pain and severe degenerative changes in the lumbar spine.  His pain is somewhat unusual and that it radiates along the left flank and also in the gluteal area but not particularly into the legs.  He has good range of motion with his hip and knee good vascular evaluation distally.    At this point I would like to try a short course of Decadron and switch the oxycodone to oral Dilaudid 4 a day.  We will need to monitor his blood glucose levels and possibly institute a sliding scale insulin ordered.    I do not see any thing surgical immediately as there is difficulty pinpointing any level of abnormality that is leading to this problem.    I will continue to follow the patient with you.  Assessment & Plan    I discussed the patients findings and my  recommendations with patient    Naif Gonzalez MD  12/05/20  09:58 EST    Time: More than 50% of time spent in counseling and coordination of care:  Total face-to-face/floor time 30 min.  Time spent in counseling 20 min. Counseling included the following topics: diagnosis and treatment

## 2020-12-05 NOTE — PROGRESS NOTES
"      [facility] Medicine Services Daily Progress Note      Hospitalist Team  LOS 0 days      Patient Care Team:  Leda Duckworth DO as PCP - General (Family Medicine)  Leda Duckworth DO as Referring Physician (Family Medicine)  Hans Doyle MD as Consulting Physician (Cardiology)    Patient Location: 4101/1      Subjective   Subjective   Denies any chest pain, no nausea no vomiting no abdominal pain.  Still complaining of very significant lower back pain.  Chief Complaint / Subjective  Chief Complaint   Patient presents with   • Back Pain         Brief Synopsis of Hospital Course/HPI    Mr. Green is a 65 y.o.  presents to [facility] complaint of back pain for last few days upto point he said he cannot walk around, pain do radiates at time into his leg. Pt underwent MRI lumbar region revealing   1. Degenerative changes of lumbar spine as described in the report.  Please refer to body of report for level by level findings.  2. No high-grade lumbar canal stenosis is seen. Mild to moderate canal  stenosis is present at L2-3 and L4-5.  3. Varying degrees of neural foraminal narrowing, moderate on the left  at L3-4, moderate bilaterally (left greater than right) at L4-5,  moderate to severe on the right at L5-S1.  Following this asked to admit the patient for further care.     Date::          ROS      Objective   Objective      Vital Signs  Temp:  [97.7 °F (36.5 °C)-98.2 °F (36.8 °C)] 97.9 °F (36.6 °C)  Heart Rate:  [66-84] 84  Resp:  [16-19] 16  BP: ()/(50-76) 90/50  Oxygen Therapy  SpO2: 91 %  Pulse Oximetry Type: Intermittent  Device (Oxygen Therapy): nasal cannula  Flow (L/min): 2  Flowsheet Rows      First Filed Value   Admission Height  175.3 cm (69\") Documented at 12/04/2020 1221   Admission Weight  117 kg (257 lb 15 oz) Documented at 12/04/2020 1221        Intake & Output (last 3 days)       12/02 0701 - 12/03 0700 12/03 0701 - 12/04 0700 12/04 0701 - 12/05 0700 12/05 0701 " - 12/06 0700    P.O.   480 240    Total Intake(mL/kg)   480 (4.1) 240 (2.1)    Net   +480 +240            Urine Unmeasured Occurrence   2 x 1 x        Lines, Drains & Airways    Active LDAs     Name:   Placement date:   Placement time:   Site:   Days:    Peripheral IV 12/04/20 1427 Right Forearm   12/04/20 1427    Forearm   less than 1                  Physical Exam:    Physical Exam  Vitals signs and nursing note reviewed.   Constitutional:       General: He is not in acute distress.     Appearance: Normal appearance. He is well-developed. He is not ill-appearing, toxic-appearing or diaphoretic.   HENT:      Head: Normocephalic and atraumatic.      Right Ear: Ear canal and external ear normal.      Left Ear: Ear canal and external ear normal.      Nose: Nose normal. No congestion or rhinorrhea.      Mouth/Throat:      Mouth: Mucous membranes are moist.      Pharynx: No oropharyngeal exudate.   Eyes:      General: No scleral icterus.        Right eye: No discharge.         Left eye: No discharge.      Extraocular Movements: Extraocular movements intact.      Conjunctiva/sclera: Conjunctivae normal.      Pupils: Pupils are equal, round, and reactive to light.   Neck:      Musculoskeletal: Normal range of motion and neck supple. No neck rigidity or muscular tenderness.      Thyroid: No thyromegaly.      Vascular: No carotid bruit or JVD.      Trachea: No tracheal deviation.   Cardiovascular:      Rate and Rhythm: Normal rate and regular rhythm.      Pulses: Normal pulses.      Heart sounds: Normal heart sounds. No murmur. No friction rub. No gallop.    Pulmonary:      Effort: Pulmonary effort is normal. No respiratory distress.      Breath sounds: Normal breath sounds. No stridor. No wheezing, rhonchi or rales.   Chest:      Chest wall: No tenderness.   Abdominal:      General: Bowel sounds are normal. There is no distension.      Palpations: Abdomen is soft. There is no mass.      Tenderness: There is no abdominal  tenderness. There is no guarding or rebound.      Hernia: No hernia is present.   Musculoskeletal: Normal range of motion.         General: No swelling, tenderness, deformity or signs of injury.      Right lower leg: No edema.      Left lower leg: No edema.   Lymphadenopathy:      Cervical: No cervical adenopathy.   Skin:     General: Skin is warm and dry.      Coloration: Skin is not jaundiced or pale.      Findings: No bruising, erythema or rash.   Neurological:      General: No focal deficit present.      Mental Status: He is alert and oriented to person, place, and time. Mental status is at baseline.      Cranial Nerves: No cranial nerve deficit.      Sensory: No sensory deficit.      Motor: No weakness or abnormal muscle tone.      Coordination: Coordination normal.   Psychiatric:         Mood and Affect: Mood normal.         Behavior: Behavior normal.         Thought Content: Thought content normal.         Judgment: Judgment normal.               Procedures:              Results Review:     I reviewed the patient's new clinical results.      Lab Results (last 24 hours)     Procedure Component Value Units Date/Time    POC Glucose Once [[id number]]  (Abnormal) Collected: 12/05/20 1132    Specimen: Blood Updated: 12/05/20 1135     Glucose 138 mg/dL      Comment: Serial Number: [id number]:  [id number]       Hemoglobin A1c [[id number]] Collected: 12/05/20 0147    Specimen: Blood Updated: 12/05/20 1037    POC Glucose Once [[id number]]  (Normal) Collected: 12/05/20 0708    Specimen: Blood Updated: 12/05/20 0709     Glucose 90 mg/dL      Comment: Serial Number: [id number]:  [id number]       Comprehensive Metabolic Panel [[id number]]  (Abnormal) Collected: 12/05/20 0147    Specimen: Blood Updated: 12/05/20 0217     Glucose 98 mg/dL      BUN 15 mg/dL      Creatinine 0.94 mg/dL      Sodium 140 mmol/L      Potassium 4.1 mmol/L      Chloride 104 mmol/L      CO2 29.0 mmol/L      Calcium 8.4 mg/dL      Total Protein  5.7 g/dL      Albumin 3.50 g/dL      ALT (SGPT) 13 U/L      AST (SGOT) 12 U/L      Alkaline Phosphatase 66 U/L      Total Bilirubin 0.3 mg/dL      eGFR Non African Amer 81 mL/min/1.73      Globulin 2.2 gm/dL      A/G Ratio 1.6 g/dL      BUN/Creatinine Ratio 16.0     Anion Gap 7.0 mmol/L     Narrative:      GFR Normal >60  Chronic Kidney Disease <60  Kidney Failure <15      CBC Auto Differential [[id number]]  (Abnormal) Collected: 12/05/20 0147    Specimen: Blood Updated: 12/05/20 0159     WBC 6.40 10*3/mm3      RBC 4.25 10*6/mm3      Hemoglobin 12.7 g/dL      Hematocrit 37.8 %      MCV 89.0 fL      MCH 29.9 pg      MCHC 33.7 g/dL      RDW 13.9 %      RDW-SD 43.3 fl      MPV 10.7 fL      Platelets 135 10*3/mm3      Neutrophil % 51.4 %      Lymphocyte % 35.3 %      Monocyte % 9.0 %      Eosinophil % 3.6 %      Basophil % 0.7 %      Neutrophils, Absolute 3.30 10*3/mm3      Lymphocytes, Absolute 2.30 10*3/mm3      Monocytes, Absolute 0.60 10*3/mm3      Eosinophils, Absolute 0.20 10*3/mm3      Basophils, Absolute 0.00 10*3/mm3      nRBC 0.1 /100 WBC     POC Glucose Once [[id number]]  (Abnormal) Collected: 12/04/20 2256    Specimen: Blood Updated: 12/04/20 2257     Glucose 112 mg/dL      Comment: Serial Number: [id number]:  [id number]       Sedimentation Rate [[id number]]  (Normal) Collected: 12/04/20 1309    Specimen: Blood Updated: 12/04/20 1427     Sed Rate 6 mm/hr     Extra Tubes [[id number]] Collected: 12/04/20 1309    Specimen: Blood, Venous Line Updated: 12/04/20 1415    Narrative:      The following orders were created for panel order Extra Tubes.  Procedure                               Abnormality         Status                     ---------                               -----------         ------                     [facility] - [facility][[id number]]                                   Final result                 Please view results for these tests on the individual orders.    [facility] - [facility] [[id number]] Collected:  12/04/20 1309    Specimen: Blood Updated: 12/04/20 1415     Extra Tube Hold for add-ons.     Comment: Auto resulted.       Comprehensive Metabolic Panel [[id number]]  (Abnormal) Collected: 12/04/20 1309    Specimen: Blood Updated: 12/04/20 1343     Glucose 200 mg/dL      BUN 18 mg/dL      Creatinine 1.13 mg/dL      Sodium 138 mmol/L      Potassium 4.3 mmol/L      Chloride 102 mmol/L      CO2 25.0 mmol/L      Calcium 8.8 mg/dL      Total Protein 6.3 g/dL      Albumin 3.90 g/dL      ALT (SGPT) 14 U/L      AST (SGOT) 13 U/L      Alkaline Phosphatase 76 U/L      Total Bilirubin 0.2 mg/dL      eGFR Non African Amer 65 mL/min/1.73      Globulin 2.4 gm/dL      A/G Ratio 1.6 g/dL      BUN/Creatinine Ratio 15.9     Anion Gap 11.0 mmol/L     Narrative:      GFR Normal >60  Chronic Kidney Disease <60  Kidney Failure <15      aPTT [[id number]]  (Normal) Collected: 12/04/20 1309    Specimen: Blood Updated: 12/04/20 1329     PTT 27.5 seconds     Protime-INR [[id number]]  (Normal) Collected: 12/04/20 1309    Specimen: Blood from Arm, Left Updated: 12/04/20 1328     Protime 10.7 Seconds      INR 0.97    CBC & Differential [[id number]]  (Normal) Collected: 12/04/20 1309    Specimen: Blood Updated: 12/04/20 1319    Narrative:      The following orders were created for panel order CBC & Differential.  Procedure                               Abnormality         Status                     ---------                               -----------         ------                     CBC Auto Differential[[id number]]        Normal              Final result                 Please view results for these tests on the individual orders.    CBC Auto Differential [[id number]]  (Normal) Collected: 12/04/20 1309    Specimen: Blood Updated: 12/04/20 1319     WBC 7.10 10*3/mm3      RBC 4.49 10*6/mm3      Hemoglobin 13.5 g/dL      Hematocrit 41.1 %      MCV 91.6 fL      MCH 30.0 pg      MCHC 32.8 g/dL      RDW 14.3 %      RDW-SD 45.1 fl      MPV 11.5 fL       Platelets 152 10*3/mm3      Neutrophil % 51.9 %      Lymphocyte % 35.8 %      Monocyte % 8.7 %      Eosinophil % 3.0 %      Basophil % 0.6 %      Neutrophils, Absolute 3.70 10*3/mm3      Lymphocytes, Absolute 2.50 10*3/mm3      Monocytes, Absolute 0.60 10*3/mm3      Eosinophils, Absolute 0.20 10*3/mm3      Basophils, Absolute 0.00 10*3/mm3      nRBC 0.1 /100 WBC     Urinalysis With Microscopic If Indicated (No Culture) - Urine, Clean Catch [[id number]]  (Abnormal) Collected: 12/04/20 1309    Specimen: Urine, Clean Catch Updated: 12/04/20 1317     Color, UA Yellow     Appearance, UA Clear     pH, UA 5.5     Specific Gravity, UA 1.029     Glucose, UA >=1000 mg/dL (3+)     Ketones, UA Negative     Bilirubin, UA Negative     Blood, UA Negative     Protein, UA Negative     Leuk Esterase, UA Negative     Nitrite, UA Negative     Urobilinogen, UA 0.2 E.U./dL    Narrative:      Urine microscopic not indicated.        No results found for: HGBA1C  Results from last 7 days   Lab Units 12/04/20  1309   INR  0.97           No results found for: LIPASE  Lab Results   Component Value Date    CHOL 194 07/14/2020    TRIG 191 (H) 07/14/2020    HDL 50 07/14/2020     (H) 07/14/2020       No results found for: INTRAOP, PREDX, FINALDX, COMDX    Microbiology Results (last 10 days)     ** No results found for the last 240 hours. **          ECG/EMG Results (most recent)     None          Results for orders placed during the hospital encounter of 07/13/20   Duplex Venous Lower Extremity - Right CAR    Narrative · Normal right lower extremity venous duplex scan.          Results for orders placed during the hospital encounter of 11/25/19   Adult Transesophageal Echo (LILIAN) W/ Cont if Necessary Per Protocol    Narrative · Left ventricular systolic function is normal.  · Left atrial cavity size is mildly dilated.  · Mild mitral valve regurgitation is present  · Mild tricuspid valve regurgitation is present.  · LV ejection fraction is  about 60%  · Significant patent foramen ovale noted by bubble contrast study  · No pericardial effusion noted  · Aorta has minimal atherosclerosis  · Technically difficult study          Ct Abdomen Pelvis Stone Protocol    Result Date: 12/4/2020  1.No obstructive uropathy or nephroureterolithiasis. There is high density material within the collecting system related to recent gadolinium contrast administration for lumbar spine imaging. 2.Benign left renal cyst. 3.There is a questionable nodule at the distal pancreatic body. It measures about 1.3 cm. Dedicated pancreatic MRI or CT should be performed. This should be performed nonemergently. It may be a area of less fatty infiltrated glandular tissue. No pancreatic duct dilatation. 4.Moderate stool burden, question constipation. 5.Diverticulosis without diverticulitis. 6.Small fat-containing right inguinal hernia and periumbilical hernia. 7.Status post cholecystectomy without bile duct dilatation.    Electronically Signed By-Carmelina Marie MD On:12/4/2020 6:06 PM This report was finalized on [id number] by  Carmelina Marie MD.    Mri Lumbar Spine With & Without Contrast    Result Date: 12/4/2020   1. Degenerative changes of lumbar spine as described in the report. Please refer to body of report for level by level findings. 2. No high-grade lumbar canal stenosis is seen. Mild to moderate canal stenosis is present at L2-3 and L4-5. 3. Varying degrees of neural foraminal narrowing, moderate on the left at L3-4, moderate bilaterally (left greater than right) at L4-5, moderate to severe on the right at L5-S1. 4. No epidural fluid collection is identified.  Electronically Signed By-Charla Nelson MD On:12/4/2020 2:52 PM This report was finalized on [id number] by  Charla Nelson MD.          Xrays, labs reviewed personally by physician.    Medication Review:   I have reviewed the patient's current medication list      Scheduled Meds  aspirin, 81 mg, Oral, Daily  atorvastatin,  40 mg, Oral, Nightly  buPROPion XL, 150 mg, Oral, Daily  dexamethasone, 4 mg, Oral, Q6H  FLUoxetine, 40 mg, Oral, Daily  insulin glargine, 25 Units, Subcutaneous, Nightly  lidocaine, 1 patch, Transdermal, Q24H  lisinopril, 2.5 mg, Oral, Nightly  polyethylene glycol, 17 g, Oral, Daily  sodium chloride, 10 mL, Intravenous, Q12H        Meds Infusions  Pharmacy Consult - Steroid Insulin Protocol,         Meds PRN  butalbital-acetaminophen-caffeine  •  HYDROmorphone  •  Pharmacy Consult - Steroid Insulin Protocol  •  sodium chloride  •  tiZANidine        Assessment/Plan   Assessment/Plan     Active Hospital Problems    Diagnosis  POA   • Severe low back pain [M54.5]  Yes      Resolved Hospital Problems   No resolved problems to display.       MEDICAL DECISION MAKING COMPLEXITY BY PROBLEM:     Severe low back pain secondary to lumbar stenosis and also secondary to neural foraminal narrowing, spine surgery on board, will follow the recommendation.    Dyslipidemia, continue statin, monitor LFTs as needed.    Diabetes mellitus type 2 insulin-dependent, continue Lantus, monitor Accu-Cheks, ADA diet.    Hypertension, continue lisinopril, monitor vitals.    Depression, noted on Prozac, stable.    DVT prophylaxis with SCDs.      VTE Prophylaxis -   Mechanical Order History:      Ordered        12/04/20 2244  Place Venous Foot Pump  Once         12/04/20 2244  Maintain Venous Foot Pump  Once                 Pharmalogical Order History:     None            Code Status -   Code Status and Medical Interventions:   Ordered at: 12/04/20 1658     Level Of Support Discussed With:    Patient     Code Status:    CPR     Medical Interventions (Level of Support Prior to Arrest):    Full       This patient has been examined wearing appropriate Personal Protective Equipment and discussed with hospital infection control department. 12/05/20        Discharge Planning          Electronically signed by Nickolas Correa MD, 12/05/20, 11:43  EST.  [facility] Hospitalist Team

## 2020-12-06 ENCOUNTER — READMISSION MANAGEMENT (OUTPATIENT)
Dept: CALL CENTER | Facility: HOSPITAL | Age: 65
End: 2020-12-06

## 2020-12-06 LAB
GLUCOSE BLDC GLUCOMTR-MCNC: 207 MG/DL (ref 70–105)
GLUCOSE BLDC GLUCOMTR-MCNC: 247 MG/DL (ref 70–105)
GLUCOSE BLDC GLUCOMTR-MCNC: 251 MG/DL (ref 70–105)
GLUCOSE BLDC GLUCOMTR-MCNC: 271 MG/DL (ref 70–105)

## 2020-12-06 PROCEDURE — 99232 SBSQ HOSP IP/OBS MODERATE 35: CPT | Performed by: HOSPITALIST

## 2020-12-06 PROCEDURE — 63710000001 INSULIN GLARGINE PER 5 UNITS: Performed by: HOSPITALIST

## 2020-12-06 PROCEDURE — G0378 HOSPITAL OBSERVATION PER HR: HCPCS

## 2020-12-06 PROCEDURE — 63710000001 DEXAMETHASONE PER 0.25 MG: Performed by: SPECIALIST

## 2020-12-06 PROCEDURE — 99213 OFFICE O/P EST LOW 20 MIN: CPT | Performed by: NEUROLOGICAL SURGERY

## 2020-12-06 PROCEDURE — 82962 GLUCOSE BLOOD TEST: CPT

## 2020-12-06 RX ORDER — DEXAMETHASONE 4 MG/1
4 TABLET ORAL EVERY 6 HOURS SCHEDULED
Status: COMPLETED | OUTPATIENT
Start: 2020-12-06 | End: 2020-12-07

## 2020-12-06 RX ORDER — GABAPENTIN 300 MG/1
300 CAPSULE ORAL 3 TIMES DAILY
Status: DISCONTINUED | OUTPATIENT
Start: 2020-12-06 | End: 2020-12-08 | Stop reason: HOSPADM

## 2020-12-06 RX ORDER — HYDROMORPHONE HYDROCHLORIDE 2 MG/1
4 TABLET ORAL EVERY 4 HOURS PRN
Status: DISCONTINUED | OUTPATIENT
Start: 2020-12-06 | End: 2020-12-08 | Stop reason: HOSPADM

## 2020-12-06 RX ORDER — INSULIN GLARGINE 100 [IU]/ML
30 INJECTION, SOLUTION SUBCUTANEOUS NIGHTLY
Status: DISCONTINUED | OUTPATIENT
Start: 2020-12-06 | End: 2020-12-08 | Stop reason: HOSPADM

## 2020-12-06 RX ADMIN — BUPROPION HYDROCHLORIDE 150 MG: 150 TABLET, EXTENDED RELEASE ORAL at 08:20

## 2020-12-06 RX ADMIN — ASPIRIN 81 MG CHEWABLE TABLET 81 MG: 81 TABLET CHEWABLE at 08:20

## 2020-12-06 RX ADMIN — BUTALBITAL, ACETAMINOPHEN AND CAFFEINE 1 TABLET: 50; 325; 40 TABLET ORAL at 11:25

## 2020-12-06 RX ADMIN — Medication 10 ML: at 20:31

## 2020-12-06 RX ADMIN — TIZANIDINE 2 MG: 2 TABLET ORAL at 20:30

## 2020-12-06 RX ADMIN — HYDROMORPHONE HYDROCHLORIDE 2 MG: 2 TABLET ORAL at 08:20

## 2020-12-06 RX ADMIN — GABAPENTIN 300 MG: 300 CAPSULE ORAL at 11:23

## 2020-12-06 RX ADMIN — DEXAMETHASONE 4 MG: 4 TABLET ORAL at 11:23

## 2020-12-06 RX ADMIN — FLUOXETINE 40 MG: 20 CAPSULE ORAL at 08:20

## 2020-12-06 RX ADMIN — DEXAMETHASONE 4 MG: 4 TABLET ORAL at 23:39

## 2020-12-06 RX ADMIN — MAGNESIUM HYDROXIDE 10 ML: 2400 SUSPENSION ORAL at 16:00

## 2020-12-06 RX ADMIN — HYDROMORPHONE HYDROCHLORIDE 4 MG: 2 TABLET ORAL at 12:02

## 2020-12-06 RX ADMIN — BUTALBITAL, ACETAMINOPHEN AND CAFFEINE 1 TABLET: 50; 325; 40 TABLET ORAL at 17:36

## 2020-12-06 RX ADMIN — HYDROMORPHONE HYDROCHLORIDE 4 MG: 2 TABLET ORAL at 20:30

## 2020-12-06 RX ADMIN — DEXAMETHASONE 4 MG: 4 TABLET ORAL at 17:36

## 2020-12-06 RX ADMIN — ATORVASTATIN CALCIUM 40 MG: 40 TABLET, FILM COATED ORAL at 20:30

## 2020-12-06 RX ADMIN — INSULIN GLARGINE 30 UNITS: 100 INJECTION, SOLUTION SUBCUTANEOUS at 20:31

## 2020-12-06 RX ADMIN — POLYETHYLENE GLYCOL 3350 17 G: 17 POWDER, FOR SOLUTION ORAL at 08:21

## 2020-12-06 RX ADMIN — HYDROMORPHONE HYDROCHLORIDE 4 MG: 2 TABLET ORAL at 16:00

## 2020-12-06 RX ADMIN — LISINOPRIL 2.5 MG: 5 TABLET ORAL at 20:31

## 2020-12-06 RX ADMIN — GABAPENTIN 300 MG: 300 CAPSULE ORAL at 20:30

## 2020-12-06 RX ADMIN — Medication 10 ML: at 09:00

## 2020-12-06 RX ADMIN — GABAPENTIN 300 MG: 300 CAPSULE ORAL at 16:00

## 2020-12-06 NOTE — H&P
Patient Care Team:  Leda Duckworth DO as PCP - General (Family Medicine)  Leda Duckworth DO as Referring Physician (Family Medicine)  Hans Doyle MD as Consulting Physician (Cardiology)    Chief complaint left flank hip and gluteal pain    Subjective     Back Pain      Humble was seen again today for left flank and gluteal pain.  As noted in his previous note this has been incapacitating to him at this time.  It occasionally radiates into the groin but he has not had any leg pain with this.  He does appear to have more of a sacroiliac dysfunction as the etiology for this pain.  Review of Systems   Musculoskeletal: Positive for back pain.        Past History:  Medical History: has a past medical history of Arthritis, BPH (benign prostatic hyperplasia), Chronic back pain, Depression, Diabetes mellitus (CMS/[facility]), Excessive daytime sleepiness, Heart attack (CMS/[facility]), High cholesterol, Hyperlipidemia, Hypertension, Low back pain, Low vision of right eye with normal vision in contralateral eye, Memory problem, Neuropathy, Nonobstructive atherosclerosis of coronary artery, Obesity, PFO (patent foramen ovale) (11/29/2019), Rheumatoid arthritis (CMS/[facility]), Snoring, and Stroke (CMS/[facility]).   Surgical History: has a past surgical history that includes Rotator cuff repair (Bilateral); Arm Tendon Repair (Left); Umbilical hernia repair; Cholecystectomy; Joint replacement; Eye surgery; Colonoscopy; Cardiac catheterization (N/A, 11/16/2020); Cardiac catheterization (N/A, 11/16/2020); and Replacement total knee (Left, 2014).   Family History: family history includes Anemia in his sister; Arthritis in his father, mother, and sister; Diabetes in his brother, father, and mother; Heart attack in his father; Heart disease in his brother, father, and mother; Hyperlipidemia in his father; Hypertension in his father and mother; Migraines in his mother; Obesity in his mother; Osteoporosis in his mother; Snoring in his father and  mother; Stroke in his father.   Social History: reports that he has never smoked. He has never used smokeless tobacco. He reports previous alcohol use of about 1.0 standard drinks of alcohol per week. He reports that he does not use drugs.    Medications Prior to Admission   Medication Sig Dispense Refill Last Dose   • apixaban (ELIQUIS) 5 MG tablet tablet Take 1 tablet by mouth Every 12 (Twelve) Hours. 180 tablet 0 12/4/2020 at 0800   • aspirin 81 MG chewable tablet Chew 81 mg Daily.      • atorvastatin (LIPITOR) 40 MG tablet Take 1 tablet by mouth Every Night.   12/3/2020 at Unknown time   • buPROPion XL (Wellbutrin XL) 150 MG 24 hr tablet Take 1 tablet by mouth Daily. 90 tablet 1    • butalbital-acetaminophen-caffeine (FIORICET, ESGIC) -40 MG per tablet Take 1 tablet by mouth Every 6 (Six) Hours As Needed for Headache or Migraine. 20 tablet 0    • Dapagliflozin Propanediol (Farxiga) 10 MG tablet Take 10 mg by mouth Every Evening. 30 tablet 3 12/3/2020 at Unknown time   • FLUoxetine (PROzac) 40 MG capsule Take 40 mg by mouth Daily.      • Insulin Glargine (BASAGLAR KWIKPEN) 100 UNIT/ML injection pen Inject 30 Units under the skin into the appropriate area as directed Every Night. 4 pen 3 12/3/2020 at Unknown time   • Insulin Lispro, 1 Unit Dial, (HumaLOG KwikPen) 100 UNIT/ML solution pen-injector Inject 5 Units under the skin into the appropriate area as directed 3 (Three) Times a Day Before Meals. 9 pen 0    • lisinopril (PRINIVIL,ZESTRIL) 2.5 MG tablet Take 1 tablet by mouth Every Night.   12/3/2020 at Unknown time   • oxyCODONE (ROXICODONE) 10 MG tablet Take 1.5 tablets by mouth Every 8 (Eight) Hours As Needed for Moderate Pain  or Severe Pain . 60 tablet 0    • oxyCODONE ER (Xtampza ER) 13.5 MG capsule extended-release 12 hour  Take 1 capsule by mouth Every 12 (Twelve) Hours. 60 each 0    • polyethylene glycol (MIRALAX) 17 g packet Take 17 g by mouth Daily.      • pregabalin (LYRICA) 150 MG capsule 1-2  po qhs (Patient taking differently: Take 150 mg by mouth every night at bedtime. 2 po qhs) 180 capsule 0    • tiZANidine (ZANAFLEX) 2 MG tablet Take 1 tablet by mouth At Night As Needed for Muscle Spasms. 30 tablet 0       Allergies: Patient has no known allergies.    Objective      Vital Signs  Temp:  [98 °F (36.7 °C)-99 °F (37.2 °C)] 98 °F (36.7 °C)  Heart Rate:  [70-91] 86  Resp:  [15-18] 16  BP: (127-141)/(71-84) 141/84    Physical Exam    Humble is awake and alert. He is more mildly uncomfortable. His pain has not changed and it is localized in the same left flank hip and gluteal area. There is no radiation into the leg. He still has 5 or 5 motor strength. Reflexes are absent in the lower extremities. He continues to void well at this time.    Results Review:   I reviewed the patient's new clinical results.      Assessment/Plan       Severe low back pain      Assessment & Plan    I am still uncertain as to the etiology of Mr. Buchanan's pain. He does sound like he probably has more of a sacroiliac dysfunction and would probably benefit from a sacroiliac joint injection as both diagnostic and therapeutic approach to this problem. He really does not have radicular pain and despite having significant degenerative changes in the spine I am unable to certain the level that is causing his discomfort. We discussed this and will consider having the pain management physicians see him for sacroiliac block tomorrow.    In the meantime I will increase his Dilaudid to 4 mg a day. He should be able to tolerate this as he has been on large doses of OxyContin in the past of oxycodone in the past. I will also add Neurontin 300 mg 3 times daily to his medical regimen.    I discussed the patients findings and my recommendations with patient.    Utilized infection precautions with PPP equipment handwashing etc. during the examination of the patient.    Naif Gonzalez MD  12/06/20  10:12 EST    Time: 20 minutes

## 2020-12-06 NOTE — PLAN OF CARE
Goal Outcome Evaluation:    Patient has complained of headache and back ache this shift, treating per MAR. Patient hasn't had bowel movement since 12/03, he has been getting scheduled Miralax but I got orders for PRN Milk of Magnesia as well. Possible sacroiliac block tomorrow.

## 2020-12-06 NOTE — PROGRESS NOTES
"      [facility] Services Daily Progress Note      Hospitalist Team  LOS 0 days      Patient Care Team:  Leda Duckworth DO as PCP - General (Family Medicine)  Leda Duckworth DO as Referring Physician (Family Medicine)  Hans Doyle MD as Consulting Physician (Cardiology)    Patient Location: 4101/1      Subjective   Subjective   Patient still complaining of significant lower back pain, not improved. No nausea or vomiting.  Chief Complaint / Subjective  Chief Complaint   Patient presents with   • Back Pain         Brief Synopsis of Hospital Course/HPI    Mr. Green is a 65 y.o.  presents to [facility] complaint of back pain for last few days upto point he said he cannot walk around, pain do radiates at time into his leg. Pt underwent MRI lumbar region revealing   1. Degenerative changes of lumbar spine as described in the report.  Please refer to body of report for level by level findings.  2. No high-grade lumbar canal stenosis is seen. Mild to moderate canal  stenosis is present at L2-3 and L4-5.  3. Varying degrees of neural foraminal narrowing, moderate on the left  at L3-4, moderate bilaterally (left greater than right) at L4-5,  moderate to severe on the right at L5-S1.  Following this asked to admit the patient for further care.     Date::          ROS      Objective   Objective      Vital Signs  Temp:  [98 °F (36.7 °C)-99 °F (37.2 °C)] 98 °F (36.7 °C)  Heart Rate:  [74-91] 86  Resp:  [15-18] 16  BP: (127-141)/(71-84) 141/84  Oxygen Therapy  SpO2: 92 %  Pulse Oximetry Type: Intermittent  Device (Oxygen Therapy): room air  Flow (L/min): 2  Flowsheet Rows      First Filed Value   Admission Height  175.3 cm (69\") Documented at 12/04/2020 1221   Admission Weight  117 kg (257 lb 15 oz) Documented at 12/04/2020 1221        Intake & Output (last 3 days)       12/03 0701 - 12/04 0700 12/04 0701 - 12/05 0700 12/05 0701 - 12/06 0700 12/06 0701 - 12/07 0700    P.O.  480 3881 434 "    Total Intake(mL/kg)  480 (4.1) 2220 (18.8) 600 (5.1)    Net  +480 +2220 +600            Urine Unmeasured Occurrence  2 x 9 x         Lines, Drains & Airways    Active LDAs     Name:   Placement date:   Placement time:   Site:   Days:    Peripheral IV 12/04/20 1427 Right Forearm   12/04/20 1427    Forearm   less than 1                  Physical Exam:    Physical Exam  Vitals signs and nursing note reviewed.   Constitutional:       General: He is not in acute distress.     Appearance: Normal appearance. He is well-developed. He is not ill-appearing, toxic-appearing or diaphoretic.   HENT:      Head: Normocephalic and atraumatic.      Right Ear: Ear canal and external ear normal.      Left Ear: Ear canal and external ear normal.      Nose: Nose normal. No congestion or rhinorrhea.      Mouth/Throat:      Mouth: Mucous membranes are moist.      Pharynx: No oropharyngeal exudate.   Eyes:      General: No scleral icterus.        Right eye: No discharge.         Left eye: No discharge.      Extraocular Movements: Extraocular movements intact.      Conjunctiva/sclera: Conjunctivae normal.      Pupils: Pupils are equal, round, and reactive to light.   Neck:      Musculoskeletal: Normal range of motion and neck supple. No neck rigidity or muscular tenderness.      Thyroid: No thyromegaly.      Vascular: No carotid bruit or JVD.      Trachea: No tracheal deviation.   Cardiovascular:      Rate and Rhythm: Normal rate and regular rhythm.      Pulses: Normal pulses.      Heart sounds: Normal heart sounds. No murmur. No friction rub. No gallop.    Pulmonary:      Effort: Pulmonary effort is normal. No respiratory distress.      Breath sounds: Normal breath sounds. No stridor. No wheezing, rhonchi or rales.   Chest:      Chest wall: No tenderness.   Abdominal:      General: Bowel sounds are normal. There is no distension.      Palpations: Abdomen is soft. There is no mass.      Tenderness: There is no abdominal tenderness.  There is no guarding or rebound.      Hernia: No hernia is present.   Musculoskeletal: Normal range of motion.         General: No swelling, tenderness, deformity or signs of injury.      Right lower leg: No edema.      Left lower leg: No edema.   Lymphadenopathy:      Cervical: No cervical adenopathy.   Skin:     General: Skin is warm and dry.      Coloration: Skin is not jaundiced or pale.      Findings: No bruising, erythema or rash.   Neurological:      General: No focal deficit present.      Mental Status: He is alert and oriented to person, place, and time. Mental status is at baseline.      Cranial Nerves: No cranial nerve deficit.      Sensory: No sensory deficit.      Motor: No weakness or abnormal muscle tone.      Coordination: Coordination normal.   Psychiatric:         Mood and Affect: Mood normal.         Behavior: Behavior normal.         Thought Content: Thought content normal.         Judgment: Judgment normal.               Procedures:              Results Review:     I reviewed the patient's new clinical results.      Lab Results (last 24 hours)     Procedure Component Value Units Date/Time    POC Glucose Once [[id number]]  (Abnormal) Collected: 12/06/20 1129    Specimen: Blood Updated: 12/06/20 1132     Glucose 271 mg/dL      Comment: Serial Number: [id number]:  [id number]       POC Glucose Once [[id number]]  (Abnormal) Collected: 12/06/20 0605    Specimen: Blood Updated: 12/06/20 0606     Glucose 207 mg/dL      Comment: Serial Number: [id number]:  [id number]       POC Glucose Once [[id number]]  (Abnormal) Collected: 12/05/20 2349    Specimen: Blood Updated: 12/05/20 2350     Glucose 224 mg/dL      Comment: Serial Number: [id number]:  [id number]       POC Glucose Once [[id number]]  (Abnormal) Collected: 12/05/20 2056    Specimen: Blood Updated: 12/05/20 2058     Glucose 304 mg/dL      Comment: Serial Number: [id number]:  [id number]       POC Glucose Once [[id number]]   (Abnormal) Collected: 12/05/20 1756    Specimen: Blood Updated: 12/05/20 1757     Glucose 337 mg/dL      Comment: Serial Number: [id number]:  [id number]       POC Glucose Once [[id number]]  (Abnormal) Collected: 12/05/20 1624    Specimen: Blood Updated: 12/05/20 1625     Glucose 223 mg/dL      Comment: Serial Number: [id number]:  [id number]           No results found for: HGBA1C  Results from last 7 days   Lab Units 12/04/20  1309   INR  0.97           No results found for: LIPASE  Lab Results   Component Value Date    CHOL 194 07/14/2020    TRIG 191 (H) 07/14/2020    HDL 50 07/14/2020     (H) 07/14/2020       No results found for: INTRAOP, PREDX, FINALDX, COMDX    Microbiology Results (last 10 days)     ** No results found for the last 240 hours. **          ECG/EMG Results (most recent)     None          Results for orders placed during the hospital encounter of 07/13/20   Duplex Venous Lower Extremity - Right CAR    Narrative · Normal right lower extremity venous duplex scan.          Results for orders placed during the hospital encounter of 11/25/19   Adult Transesophageal Echo (LILIAN) W/ Cont if Necessary Per Protocol    Narrative · Left ventricular systolic function is normal.  · Left atrial cavity size is mildly dilated.  · Mild mitral valve regurgitation is present  · Mild tricuspid valve regurgitation is present.  · LV ejection fraction is about 60%  · Significant patent foramen ovale noted by bubble contrast study  · No pericardial effusion noted  · Aorta has minimal atherosclerosis  · Technically difficult study          Ct Abdomen Pelvis Stone Protocol    Result Date: 12/4/2020  1.No obstructive uropathy or nephroureterolithiasis. There is high density material within the collecting system related to recent gadolinium contrast administration for lumbar spine imaging. 2.Benign left renal cyst. 3.There is a questionable nodule at the distal pancreatic body. It measures about 1.3 cm.  Dedicated pancreatic MRI or CT should be performed. This should be performed nonemergently. It may be a area of less fatty infiltrated glandular tissue. No pancreatic duct dilatation. 4.Moderate stool burden, question constipation. 5.Diverticulosis without diverticulitis. 6.Small fat-containing right inguinal hernia and periumbilical hernia. 7.Status post cholecystectomy without bile duct dilatation.    Electronically Signed By-Carmelina Marie MD On:12/4/2020 6:06 PM This report was finalized on [id number] by  Carmelina Marie MD.    Mri Lumbar Spine With & Without Contrast    Result Date: 12/4/2020   1. Degenerative changes of lumbar spine as described in the report. Please refer to body of report for level by level findings. 2. No high-grade lumbar canal stenosis is seen. Mild to moderate canal stenosis is present at L2-3 and L4-5. 3. Varying degrees of neural foraminal narrowing, moderate on the left at L3-4, moderate bilaterally (left greater than right) at L4-5, moderate to severe on the right at L5-S1. 4. No epidural fluid collection is identified.  Electronically Signed By-Charla Nelson MD On:12/4/2020 2:52 PM This report was finalized on [id number] by  Charla Nelson MD.          Xrays, labs reviewed personally by physician.    Medication Review:   I have reviewed the patient's current medication list      Scheduled Meds  aspirin, 81 mg, Oral, Daily  atorvastatin, 40 mg, Oral, Nightly  buPROPion XL, 150 mg, Oral, Daily  dexamethasone, 4 mg, Oral, Q6H  FLUoxetine, 40 mg, Oral, Daily  gabapentin, 300 mg, Oral, TID  insulin glargine, 25 Units, Subcutaneous, Nightly  lidocaine, 1 patch, Transdermal, Q24H  lisinopril, 2.5 mg, Oral, Nightly  polyethylene glycol, 17 g, Oral, Daily  sodium chloride, 10 mL, Intravenous, Q12H        Meds Infusions  Pharmacy Consult - Steroid Insulin Protocol,         Meds PRN  butalbital-acetaminophen-caffeine  •  HYDROmorphone  •  Pharmacy Consult - Steroid Insulin Protocol  •   sodium chloride  •  tiZANidine        Assessment/Plan   Assessment/Plan     Active Hospital Problems    Diagnosis  POA   • Severe low back pain [M54.5]  Yes      Resolved Hospital Problems   No resolved problems to display.       MEDICAL DECISION MAKING COMPLEXITY BY PROBLEM:     Severe low back pain secondary to lumbar stenosis and also secondary to neural foraminal narrowing, spine surgery on board, will follow the recommendation. Plan for steroid injection to iliosacral joint am noted.    Dyslipidemia, continue statin, monitor LFTs as needed.    Diabetes mellitus type 2 insulin-dependent uncontrolled, increase Lantus to 30 unit at bed time, monitor Accu-Cheks, ADA diet.    Hypertension, continue lisinopril, monitor vitals.    Depression, noted on Prozac, stable.    DVT prophylaxis with SCDs.      VTE Prophylaxis -   Mechanical Order History:      Ordered        12/04/20 2244  Place Venous Foot Pump  Once         12/04/20 2244  Maintain Venous Foot Pump  Once                 Pharmalogical Order History:     None            Code Status -   Code Status and Medical Interventions:   Ordered at: 12/04/20 1658     Level Of Support Discussed With:    Patient     Code Status:    CPR     Medical Interventions (Level of Support Prior to Arrest):    Full       This patient has been examined wearing appropriate Personal Protective Equipment and discussed with hospital infection control department. 12/06/20        Discharge Planning          Electronically signed by Nickolas Correa MD, 12/06/20, 12:25 EST.  [facility] Hospitalist Team

## 2020-12-07 ENCOUNTER — APPOINTMENT (OUTPATIENT)
Dept: GENERAL RADIOLOGY | Facility: HOSPITAL | Age: 65
End: 2020-12-07

## 2020-12-07 ENCOUNTER — APPOINTMENT (OUTPATIENT)
Dept: PAIN MEDICINE | Facility: HOSPITAL | Age: 65
End: 2020-12-07

## 2020-12-07 PROBLEM — M46.1 SACROILIITIS (HCC): Status: ACTIVE | Noted: 2020-12-07

## 2020-12-07 PROBLEM — E66.01 MORBIDLY OBESE (HCC): Status: ACTIVE | Noted: 2020-12-07

## 2020-12-07 LAB
ALBUMIN SERPL-MCNC: 3.7 G/DL (ref 3.5–5.2)
ALBUMIN/GLOB SERPL: 1.5 G/DL
ALP SERPL-CCNC: 70 U/L (ref 39–117)
ALT SERPL W P-5'-P-CCNC: 12 U/L (ref 1–41)
ANION GAP SERPL CALCULATED.3IONS-SCNC: 12 MMOL/L (ref 5–15)
AST SERPL-CCNC: 9 U/L (ref 1–40)
BASOPHILS # BLD AUTO: 0 10*3/MM3 (ref 0–0.2)
BASOPHILS NFR BLD AUTO: 0.1 % (ref 0–1.5)
BILIRUB SERPL-MCNC: 0.2 MG/DL (ref 0–1.2)
BUN SERPL-MCNC: 26 MG/DL (ref 8–23)
BUN/CREAT SERPL: 25.2 (ref 7–25)
CALCIUM SPEC-SCNC: 8.8 MG/DL (ref 8.6–10.5)
CHLORIDE SERPL-SCNC: 100 MMOL/L (ref 98–107)
CO2 SERPL-SCNC: 23 MMOL/L (ref 22–29)
CREAT SERPL-MCNC: 1.03 MG/DL (ref 0.76–1.27)
DEPRECATED RDW RBC AUTO: 44.2 FL (ref 37–54)
EOSINOPHIL # BLD AUTO: 0 10*3/MM3 (ref 0–0.4)
EOSINOPHIL NFR BLD AUTO: 0 % (ref 0.3–6.2)
ERYTHROCYTE [DISTWIDTH] IN BLOOD BY AUTOMATED COUNT: 14 % (ref 12.3–15.4)
GFR SERPL CREATININE-BSD FRML MDRD: 72 ML/MIN/1.73
GLOBULIN UR ELPH-MCNC: 2.5 GM/DL
GLUCOSE BLDC GLUCOMTR-MCNC: 204 MG/DL (ref 70–105)
GLUCOSE BLDC GLUCOMTR-MCNC: 225 MG/DL (ref 70–105)
GLUCOSE BLDC GLUCOMTR-MCNC: 236 MG/DL (ref 70–105)
GLUCOSE BLDC GLUCOMTR-MCNC: 255 MG/DL (ref 70–105)
GLUCOSE BLDC GLUCOMTR-MCNC: 290 MG/DL (ref 70–105)
GLUCOSE SERPL-MCNC: 235 MG/DL (ref 65–99)
HBA1C MFR BLD: 7.9 % (ref 3.5–5.6)
HCT VFR BLD AUTO: 42.3 % (ref 37.5–51)
HGB BLD-MCNC: 13.7 G/DL (ref 13–17.7)
LYMPHOCYTES # BLD AUTO: 1 10*3/MM3 (ref 0.7–3.1)
LYMPHOCYTES NFR BLD AUTO: 8.9 % (ref 19.6–45.3)
MCH RBC QN AUTO: 29.3 PG (ref 26.6–33)
MCHC RBC AUTO-ENTMCNC: 32.5 G/DL (ref 31.5–35.7)
MCV RBC AUTO: 90.3 FL (ref 79–97)
MONOCYTES # BLD AUTO: 0.3 10*3/MM3 (ref 0.1–0.9)
MONOCYTES NFR BLD AUTO: 3 % (ref 5–12)
NEUTROPHILS NFR BLD AUTO: 88 % (ref 42.7–76)
NEUTROPHILS NFR BLD AUTO: 9.7 10*3/MM3 (ref 1.7–7)
NRBC BLD AUTO-RTO: 0.1 /100 WBC (ref 0–0.2)
PLATELET # BLD AUTO: 173 10*3/MM3 (ref 140–450)
PMV BLD AUTO: 11 FL (ref 6–12)
POTASSIUM SERPL-SCNC: 4.7 MMOL/L (ref 3.5–5.2)
PROT SERPL-MCNC: 6.2 G/DL (ref 6–8.5)
RBC # BLD AUTO: 4.68 10*6/MM3 (ref 4.14–5.8)
SODIUM SERPL-SCNC: 135 MMOL/L (ref 136–145)
WBC # BLD AUTO: 11.1 10*3/MM3 (ref 3.4–10.8)

## 2020-12-07 PROCEDURE — 99213 OFFICE O/P EST LOW 20 MIN: CPT | Performed by: NEUROLOGICAL SURGERY

## 2020-12-07 PROCEDURE — 85025 COMPLETE CBC W/AUTO DIFF WBC: CPT | Performed by: HOSPITALIST

## 2020-12-07 PROCEDURE — 3E0U33Z INTRODUCTION OF ANTI-INFLAMMATORY INTO JOINTS, PERCUTANEOUS APPROACH: ICD-10-PCS | Performed by: STUDENT IN AN ORGANIZED HEALTH CARE EDUCATION/TRAINING PROGRAM

## 2020-12-07 PROCEDURE — 80053 COMPREHEN METABOLIC PANEL: CPT | Performed by: HOSPITALIST

## 2020-12-07 PROCEDURE — 0 IOPAMIDOL 41 % SOLUTION: Performed by: STUDENT IN AN ORGANIZED HEALTH CARE EDUCATION/TRAINING PROGRAM

## 2020-12-07 PROCEDURE — 77003 FLUOROGUIDE FOR SPINE INJECT: CPT

## 2020-12-07 PROCEDURE — 63710000001 DEXAMETHASONE PER 0.25 MG: Performed by: SPECIALIST

## 2020-12-07 PROCEDURE — 82962 GLUCOSE BLOOD TEST: CPT

## 2020-12-07 PROCEDURE — 63710000001 INSULIN GLARGINE PER 5 UNITS: Performed by: HOSPITALIST

## 2020-12-07 PROCEDURE — 25010000002 METHYLPREDNISOLONE PER 40 MG

## 2020-12-07 PROCEDURE — 3E0U3BZ INTRODUCTION OF ANESTHETIC AGENT INTO JOINTS, PERCUTANEOUS APPROACH: ICD-10-PCS | Performed by: STUDENT IN AN ORGANIZED HEALTH CARE EDUCATION/TRAINING PROGRAM

## 2020-12-07 PROCEDURE — 25010000002 METHYLPREDNISOLONE PER 40 MG: Performed by: STUDENT IN AN ORGANIZED HEALTH CARE EDUCATION/TRAINING PROGRAM

## 2020-12-07 PROCEDURE — 27096 INJECT SACROILIAC JOINT: CPT | Performed by: STUDENT IN AN ORGANIZED HEALTH CARE EDUCATION/TRAINING PROGRAM

## 2020-12-07 PROCEDURE — 99233 SBSQ HOSP IP/OBS HIGH 50: CPT | Performed by: INTERNAL MEDICINE

## 2020-12-07 RX ORDER — METHYLPREDNISOLONE ACETATE 40 MG/ML
INJECTION, SUSPENSION INTRA-ARTICULAR; INTRALESIONAL; INTRAMUSCULAR; SOFT TISSUE
Status: DISPENSED
Start: 2020-12-07 | End: 2020-12-08

## 2020-12-07 RX ORDER — METHYLPREDNISOLONE ACETATE 40 MG/ML
40 INJECTION, SUSPENSION INTRA-ARTICULAR; INTRALESIONAL; INTRAMUSCULAR; SOFT TISSUE ONCE
Status: COMPLETED | OUTPATIENT
Start: 2020-12-07 | End: 2020-12-07

## 2020-12-07 RX ADMIN — HYDROMORPHONE HYDROCHLORIDE 4 MG: 2 TABLET ORAL at 15:59

## 2020-12-07 RX ADMIN — IOPAMIDOL 3 ML: 408 INJECTION, SOLUTION INTRATHECAL at 14:37

## 2020-12-07 RX ADMIN — ATORVASTATIN CALCIUM 40 MG: 40 TABLET, FILM COATED ORAL at 20:23

## 2020-12-07 RX ADMIN — MAGNESIUM HYDROXIDE 10 ML: 2400 SUSPENSION ORAL at 23:50

## 2020-12-07 RX ADMIN — FLUOXETINE 40 MG: 20 CAPSULE ORAL at 09:32

## 2020-12-07 RX ADMIN — HYDROMORPHONE HYDROCHLORIDE 4 MG: 2 TABLET ORAL at 01:56

## 2020-12-07 RX ADMIN — Medication 10 ML: at 20:24

## 2020-12-07 RX ADMIN — GABAPENTIN 300 MG: 300 CAPSULE ORAL at 20:23

## 2020-12-07 RX ADMIN — HYDROMORPHONE HYDROCHLORIDE 4 MG: 2 TABLET ORAL at 05:56

## 2020-12-07 RX ADMIN — LISINOPRIL 2.5 MG: 5 TABLET ORAL at 20:23

## 2020-12-07 RX ADMIN — GABAPENTIN 300 MG: 300 CAPSULE ORAL at 15:59

## 2020-12-07 RX ADMIN — BUPROPION HYDROCHLORIDE 150 MG: 150 TABLET, EXTENDED RELEASE ORAL at 09:32

## 2020-12-07 RX ADMIN — POLYETHYLENE GLYCOL 3350 17 G: 17 POWDER, FOR SOLUTION ORAL at 09:33

## 2020-12-07 RX ADMIN — Medication 10 ML: at 09:32

## 2020-12-07 RX ADMIN — METHYLPREDNISOLONE ACETATE 40 MG: 40 INJECTION, SUSPENSION INTRA-ARTICULAR; INTRALESIONAL; INTRAMUSCULAR; SOFT TISSUE at 14:38

## 2020-12-07 RX ADMIN — INSULIN GLARGINE 30 UNITS: 100 INJECTION, SOLUTION SUBCUTANEOUS at 20:23

## 2020-12-07 RX ADMIN — HYDROMORPHONE HYDROCHLORIDE 4 MG: 2 TABLET ORAL at 11:00

## 2020-12-07 RX ADMIN — HYDROMORPHONE HYDROCHLORIDE 4 MG: 2 TABLET ORAL at 20:23

## 2020-12-07 RX ADMIN — GABAPENTIN 300 MG: 300 CAPSULE ORAL at 09:32

## 2020-12-07 RX ADMIN — DEXAMETHASONE 4 MG: 4 TABLET ORAL at 05:56

## 2020-12-07 NOTE — PLAN OF CARE
Goal Outcome Evaluation:  Plan of Care Reviewed With: patient  Progress: no change  Outcome Summary: Patient c/o pain throughout night. Anticipating a sacroileac block 12/7/2020. Resting at this time. Ambulates independently. Encouraged use of walker but patient refused. VSS

## 2020-12-07 NOTE — PLAN OF CARE
Goal Outcome Evaluation:  Plan of Care Reviewed With: patient  Progress: no change  Outcome Summary: pt has been very painful and pt was set up with pain mgmt and down getting block. Pt should be able to discharge hopefully tomorrow.

## 2020-12-07 NOTE — DISCHARGE PLACEMENT REQUEST
"Elizabeth Green (65 y.o. Male)     Date of Birth Social Security Number Address Home Phone MRN    1955  [address]  [facility] [id number] [phone] [id number]    M[employer]onism Marital Status          None Single       Admission Date Admission Type Admitting Provider Attending Provider Department, Room/Bed    12/4/20 Emergency Nickolas Correa MD Gill, Ravi, MD [facility] SURGICAL INPATIENT, 4101/1    Discharge Date Discharge Disposition Discharge Destination                       Attending Provider: Jeremiah Watkins MD    Allergies: No Known Allergies    Isolation: None   Infection: COVID (rule out) (12/03/20)   Code Status: CPR    Ht: 175.3 cm (69.02\")   Wt: 119 kg (262 lb 5.6 oz)    Admission Cmt: None   Principal Problem: None                Active Insurance as of 12/4/2020     Primary Coverage     Payor Plan Insurance Group Employer/Plan Group    MEDICARE MEDICARE A & B      Payor Plan Address Payor Plan Phone Number Payor Plan Fax Number Effective Dates    [address] [phone]  11/1/2020 - None Entered    [facility] [address]       Subscriber Name Subscriber Birth Date Member ID       ELIZABETH GREEN 1955 [id number]           Secondary Coverage     Payor Plan Insurance Group Employer/Plan Group    [place]      Payor Plan Address Payor Plan Phone Number Payor Plan Fax Number Effective Dates    [address]   11/25/2019 - None Entered    [place] [address]       Subscriber Name Subscriber Birth Date Member ID       ELIZABETH GREEN 1955 [id number]                 Emergency Contacts      (Rel.) Home Phone Work Phone Mobile Phone    LUBA GREEN (Son) -- -- [phone]    REAGANLEANDRO SILVER (Daughter) -- -- [phone]              "

## 2020-12-07 NOTE — PROGRESS NOTES
Subjective   Humble Green is a 65 y.o. male.     History of Present Illness    Humble was seen again this morning.  He is a 65-year-old gentleman who does have chronic back problems however he recently developed severe left sided flank and gluteal hip pain with some groin pain.  This has been intolerable and incapacitating to him.  His lumbar MRI scan showed diffuse degenerative changes in the lumbar spine no definitive diagnosis was obtained from this as he really does not have any radicular components to explain his pain.  His symptoms however appear to be more of a left sacroiliac joint problem.    Review of Systems his review of symptoms today is unremarkable other than that of the left-sided back and gluteal hip pain with some groin pain.    Objective   Physical Exam on examination today he is sleeping on his right side avoiding pressure on the left hip and gluteal region.  He continues to have 5/5 motor strength in the lower extremities.  He is areflexic in the legs.  He has slightly limited range of motion of the left hip but does not exhibit pain when testing this.  His bowel and bladder function is normal.  I have used appropriate PPE equipment during the examination.      Assessment/Plan   Diagnoses and all orders for this visit:    1. Angina at rest (CMS/HCC)    2. Morbidly obese (CMS/HCC)     3.  Left sacroiliac joint dysfunction.  He is scheduled for a left sacroiliac joint injection later this afternoon.  We will follow up on this.

## 2020-12-07 NOTE — CONSULTS
"Diabetes Education  Assessment/Teaching    Patient Name:  Humble Green  YOB: 1955  MRN: [id number]  Admit Date:  12/4/2020      Assessment Date:  12/7/2020    Most Recent Value   General Information    Referral From:  MD order [MD consult for blood glucose >200.]   Height  175.3 cm (69.02\")   Height Method  Stated   Weight  119 kg (262 lb 5.6 oz)   Weight Method  Bed scale   Pregnancy Assessment   Diabetes History   What type of diabetes do you have?  Type 2   Length of Diabetes Diagnosis  6 - 10 years [8 years]   Current DM knowledge  fair   Have you had diabetes education/teaching in the past?  yes   When and where was your diabetes education?  Several inpatient hospitalizations with last one being 11/13/2020   Do you test your blood sugar at home?  yes   Frequency of checks  2-3X a day   Meter type  One Touch Ultra about a year old   Who performs the test?  patient   Typical readings  low-mid 100s   Have you had low blood sugar? (<70mg/dl)  no   Have you had high blood sugar? (>140mg/dl)  yes   How often do you have high blood sugar?  unknown   When was your last high blood sugar?  Admission blood sugar 200   Education Preferences   What areas of diabetes would you like to learn about?  diabetes complications   Nutrition Information   Assessment Topics   Problem Solving - Assessment  Needs education   Reducing Risk - Assessment  Needs education   DM Goals   Problem Solving - Goal  Today   Reducing Risk - Goal  Today            Most Recent Value   DM Education Needs   Meter  Has own   Meter Type  One Touch   Frequency of Testing  2 times a day   Medication  Insulin [Patient stated that he takes Levemir 30 units at bedtime and Humalog 5 units before meals at home.]   Problem Solving  Hyperglycemia, Signs, Symptoms, Treatment   Reducing Risks  A1C testing [A1c ordered but not resulted. Discussed A1c target and healthy blood sugar range.]   Healthy Eating  Other (comment) [Patient stated that he " drinks Sprite Zero, Pepsi Zero, or water and watches his carb intake.]   Physical Activity  Walking [Patient stated he works at [employer] and does a lot of walking at work.]   Discharge Plan  Home   Motivation  Moderate, Engaged   Teaching Method  Discussion   Patient Response  Verbalized understanding            Other Comments:  Patient stated that he has some problems with his memory. Patient is on steroids in hospital and blood sugars are elevated at this time. Patient stated that he understands that steroids will cause elevations in blood sugar.  Patient has no further questions or concerns related to diabetes at this time.        Electronically signed by:  Yoanna Moy RN  12/07/20 10:51 EST

## 2020-12-07 NOTE — PROGRESS NOTES
Discharge Planning Assessment   [facility]     Patient Name: Humble Green  MRN: [id number]  Today's Date: 12/7/2020    Admit Date: 12/4/2020    Discharge Needs Assessment     Row Name 12/07/20 1009       Living Environment    Lives With  alone    Current Living Arrangements  home/apartment/condo    Primary Care Provided by  self    Provides Primary Care For  no one    Family Caregiver if Needed  child(opal), adult    Able to Return to Prior Arrangements  yes       Resource/Environmental Concerns    Resource/Environmental Concerns  none    Transportation Concerns  car, none       Transition Planning    Patient/Family Anticipates Transition to  home    Patient/Family Anticipated Services at Transition  outpatient care    Transportation Anticipated  family or friend will provide       Discharge Needs Assessment    Readmission Within the Last 30 Days  no previous admission in last 30 days    Equipment Currently Used at Home  cane, straight;glucometer    Concerns to be Addressed  denies needs/concerns at this time;no discharge needs identified    Concerns Comments  Patient refusing Home Health but will accept outpatient physical therapy.    Anticipated Changes Related to Illness  none    Equipment Needed After Discharge  none    Provided Post Acute Provider List?  Yes    Post Acute Provider List  Outpatient Therapy    Delivered To  Patient    Method of Delivery  Telephone    Discharge Coordination/Progress  Offered patient home health and patient refused and just wanted outpatient physical therapy. Patient also denied using or needing medicaid waiver services.        Discharge Plan     Row Name 12/07/20 1027       Plan    Plan  Offered patient home health and patient refused and just wanted outpatient physical therapy. Patient also denied using or needing medicaid waiver services. New referral to [facility]. Spoke with Makenzie, Patient accepted and the hub will call him with an appointment.    Row Name  12/07/20 1026       Plan    Plan  Offered patient home health and patient refused and just wanted outpatient physical therapy. Patient also denied using or needing medicaid waiver services.    Row Name 12/07/20 1015       Plan    Plan  Offered patient home health and patient refused and just wanted outpatient physical therapy. Patient also denied using or needing medicaid waiver services.Referral to [facility].    Patient/Family in Agreement with Plan  yes    Plan Comments  Spoke with patient over the hospital phone. Patient verified his PCP, current DME used and pharmacy. Patient denied problems obtaining food or meds. Anticipate discharge: today or tomorrow after pain management sees patient.        Continued Care and Services - Admitted Since 12/4/2020     Therapy     Service Provider Request Status Selected Services Address Phone Fax Patient Preferred    [facility] PHYSICAL THERAPY [facility]  Pending - Request Sent N/A [id number] [address] [facility] [address], [place] [id number] [phone] --              Expected Discharge Date and Time     Expected Discharge Date Expected Discharge Time    Dec 8, 2020         Demographic Summary     Row Name 12/07/20 1009       General Information    Admission Type  observation    Arrived From  emergency department    Required Notices Provided  Observation Status Notice    Referral Source  admission list;physician    Reason for Consult  discharge planning    Preferred Language  English     Used During This Interaction  no       Contact Information    Permission Granted to Share Info With          Patient Forms     Row Name 12/07/20 1029       Patient Forms    Patient Observation Letter  Delivered Guadalupe given 12/4              Anna Naegele RN Case Manager  [address]  [address] [phone]  office  [phone]  fax  [email or web address]  [email or web address]

## 2020-12-07 NOTE — PROCEDURES
LEFT Sacroiliac Joint Injection  [facility]    PREOPERATIVE DIAGNOSIS:   Sacroiliac joint dysfunction on the LEFT    POSTOPERATIVE DIAGNOSIS:  Sacroiliac joint dysfunction on the LEFT    PROCEDURE:  Sacroiliac Joint Injection, on the LEFT, with fluoroscopic guidance    PRE-PROCEDURE DISCUSSION WITH PATIENT:    Risks and complications were discussed with the patient prior to starting the procedure and informed consent was obtained.  We discussed various topics including but not limited to bleeding, infection, injury, postprocedural site soreness, painful flareup, worsening of clinical picture, paralysis, coma, and death.     SURGEON:  Mitchel Jaime MD    REASON FOR PROCEDURE:    Patient has pain consistent with SI pathology on history and physical exam. Pt referal as an inpatient.  He has hx and exam consistent with left sacroiliitis. Agree with proceding with injection.    SEDATION:  Patient declined administration of moderate sedation    ANESTHETIC AGENT:  Lidocaine 1%  STEROID AGENT:  Methylprednisolone (DEPO MEDROL) 40mg/ml    DESCRIPTON OF PROCEDURE:  After obtaining informed consent, IV access  was not obtained in the preoperative area.  The patient was transported to the operative suite and placed in the prone position with a pillow under the pelvic area.  The lumbosacral area was prepped with Chloraprep and draped in a sterile fashion.     Under fluoroscopic guidance the inferior most portion of the LEFT sacroiliac joint was identified. The overlying skin and subcutaneous tissue was anesthetized with 1% lidocaine. A 25-gauge spinal needle was introduced from the inferior most portion of the joint into the sacroiliac joint under fluoroscopic guidance in the AP dimension with slight oblique rotation to the contralateral side.  Aspiration was negative.  After confirming the position of the needle with fluoroscopy, 1 mL of Omnipaque was injected and after seeing appropriate spread into the joint a  total of 2.5 mL of Marcaine, with the steroid, was injected very slowly.  Vital signs remained stable.  The onset of analgesia was noted.    ESTIMATED BLOOD LOSS:  minimal  SPECIMENS:  None    COMPLICATIONS:  No complications were noted. and There was no indication of vascular uptake on live injection of contrast dye.    TOLERANCE & DISCHARGE CONDITION:    The patient tolerated the procedure well.  The patient was transported to the recovery area without difficulties.  The patient was discharged to home under the care of family in stable and satisfactory condition.    PLAN OF CARE:  1. The patient was given our standard instruction sheet and will resume all medications as per the medication reconciliation sheet.  2. The patient will Return to clinic 3-4 wks.  3. The patient is instructed to keep a pain log hourly for 8 hours after the procedure.

## 2020-12-07 NOTE — NURSING NOTE
Patients blood glucose remains elevated. PO steroids. Present dx of DM. No coverage PRN. MD aware. Lantus was increased 12/6. Continue monitoring

## 2020-12-07 NOTE — CONSULTS
Nutrition Services    Patient Name: Humble Green  YOB: 1955  MRN: [id number]  Admission date: 12/4/2020    PPE Documentation        PPE Worn By Provider N/A RDN did not enter room on this encounter    PPE Worn By Patient  N/A      NUTRITION SCREENING      Encounter Information: Review of chart given Nursing Admission Screen        PO Diet: Diet Cardiac, Diabetic/Consistent Carbs; Healthy Heart; Diabetic - Consistent Carb   PO Supplements: None     PO Intake:  Eating 100% of most meals, is ordering full meals        Labs (reviewed below): See Below         GI Function:  No BM documented since admission, if does not have one today it will be 3 days since BM.        Skin: Intact        Weight Hx Review: No significant weight loss.     Body mass index is 39.23 kg/m².       Nutrition Intervention: No nutrition intervention needed at this time        Results from last 7 days   Lab Units 12/07/20  0204 12/05/20  0147 12/04/20  1309   SODIUM mmol/L 135* 140 138   POTASSIUM mmol/L 4.7 4.1 4.3   CHLORIDE mmol/L 100 104 102   CO2 mmol/L 23.0 29.0 25.0   BUN mg/dL 26* 15 18   CREATININE mg/dL 1.03 0.94 1.13   CALCIUM mg/dL 8.8 8.4* 8.8   BILIRUBIN mg/dL 0.2 0.3 0.2   ALK PHOS U/L 70 66 76   ALT (SGPT) U/L 12 13 14   AST (SGOT) U/L 9 12 13   GLUCOSE mg/dL 235* 98 200*     Results from last 7 days   Lab Units 12/07/20  0204   HEMOGLOBIN g/dL 13.7   HEMATOCRIT % 42.3     COVID19   Date Value Ref Range Status   11/13/2020 Not Detected Not Detected - Ref. Range Final     Lab Results   Component Value Date    HGBA1C 8.1 (H) 11/13/2020       RD to follow up per protocol.    Electronically signed by:  Deneen Mcintyre RD  12/07/20 15:05 EST

## 2020-12-07 NOTE — H&P
Patient Care Team:  Leda Duckworth DO as PCP - General (Family Medicine)  Leda Duckworth DO as Referring Physician (Family Medicine)  Hans Doyle MD as Consulting Physician (Cardiology)    Chief complaint Back, left flank, hip and gluteal pain with some left groin pain.    Subjective     Back Pain  This is a chronic problem. The current episode started in the past 7 days. The problem occurs constantly. The problem is unchanged. The pain is present in the gluteal and sacro-iliac. The quality of the pain is described as aching. The pain does not radiate. The pain is at a severity of 8/10. The pain is moderate. The pain is the same all the time. The symptoms are aggravated by bending, coughing, position, sitting and standing. He has tried analgesics, bed rest, muscle relaxant and NSAIDs for the symptoms. The treatment provided no relief.       Review of Systems   Musculoskeletal: Positive for back pain.        Past History:  Medical History: has a past medical history of Arthritis, BPH (benign prostatic hyperplasia), Chronic back pain, Depression, Diabetes mellitus (CMS/[facility]), Excessive daytime sleepiness, Heart attack (CMS/HCC), High cholesterol, Hyperlipidemia, Hypertension, Low back pain, Low vision of right eye with normal vision in contralateral eye, Memory problem, Neuropathy, Nonobstructive atherosclerosis of coronary artery, Obesity, PFO (patent foramen ovale) (11/29/2019), Rheumatoid arthritis (CMS/HCC), Snoring, and Stroke (CMS/[facility]).   Surgical History: has a past surgical history that includes Rotator cuff repair (Bilateral); Arm Tendon Repair (Left); Umbilical hernia repair; Cholecystectomy; Joint replacement; Eye surgery; Colonoscopy; Cardiac catheterization (N/A, 11/16/2020); Cardiac catheterization (N/A, 11/16/2020); and Replacement total knee (Left, 2014).   Family History: family history includes Anemia in his sister; Arthritis in his father, mother, and sister; Diabetes in his brother,  father, and mother; Heart attack in his father; Heart disease in his brother, father, and mother; Hyperlipidemia in his father; Hypertension in his father and mother; Migraines in his mother; Obesity in his mother; Osteoporosis in his mother; Snoring in his father and mother; Stroke in his father.   Social History: reports that he has never smoked. He has never used smokeless tobacco. He reports previous alcohol use of about 1.0 standard drinks of alcohol per week. He reports that he does not use drugs.    Medications Prior to Admission   Medication Sig Dispense Refill Last Dose   • apixaban (ELIQUIS) 5 MG tablet tablet Take 1 tablet by mouth Every 12 (Twelve) Hours. 180 tablet 0 12/4/2020 at 0800   • aspirin 81 MG chewable tablet Chew 81 mg Daily.      • atorvastatin (LIPITOR) 40 MG tablet Take 1 tablet by mouth Every Night.   12/3/2020 at Unknown time   • buPROPion XL (Wellbutrin XL) 150 MG 24 hr tablet Take 1 tablet by mouth Daily. 90 tablet 1    • butalbital-acetaminophen-caffeine (FIORICET, ESGIC) -40 MG per tablet Take 1 tablet by mouth Every 6 (Six) Hours As Needed for Headache or Migraine. 20 tablet 0    • Dapagliflozin Propanediol (Farxiga) 10 MG tablet Take 10 mg by mouth Every Evening. 30 tablet 3 12/3/2020 at Unknown time   • FLUoxetine (PROzac) 40 MG capsule Take 40 mg by mouth Daily.      • Insulin Glargine (BASAGLAR KWIKPEN) 100 UNIT/ML injection pen Inject 30 Units under the skin into the appropriate area as directed Every Night. 4 pen 3 12/3/2020 at Unknown time   • Insulin Lispro, 1 Unit Dial, (HumaLOG KwikPen) 100 UNIT/ML solution pen-injector Inject 5 Units under the skin into the appropriate area as directed 3 (Three) Times a Day Before Meals. 9 pen 0    • lisinopril (PRINIVIL,ZESTRIL) 2.5 MG tablet Take 1 tablet by mouth Every Night.   12/3/2020 at Unknown time   • oxyCODONE (ROXICODONE) 10 MG tablet Take 1.5 tablets by mouth Every 8 (Eight) Hours As Needed for Moderate Pain  or Severe  Pain . 60 tablet 0    • oxyCODONE ER (Xtampza ER) 13.5 MG capsule extended-release 12 hour  Take 1 capsule by mouth Every 12 (Twelve) Hours. 60 each 0    • polyethylene glycol (MIRALAX) 17 g packet Take 17 g by mouth Daily.      • pregabalin (LYRICA) 150 MG capsule 1-2 po qhs (Patient taking differently: Take 150 mg by mouth every night at bedtime. 2 po qhs) 180 capsule 0    • tiZANidine (ZANAFLEX) 2 MG tablet Take 1 tablet by mouth At Night As Needed for Muscle Spasms. 30 tablet 0       Allergies: Patient has no known allergies.    Objective      Vital Signs  Temp:  [97.4 °F (36.3 °C)-97.6 °F (36.4 °C)] 97.6 °F (36.4 °C)  Heart Rate:  [73-82] 73  Resp:  [16-18] 18  BP: (122-152)/(66-79) 152/79    Physical ExamHe is awake and alert.  His speech is clear and appropriate.He lays on his right side and avoids pressure on the left hip.  Motor strength is 5/5.  He is arreflexic in the legs.  There remains a  Distal hypalgesia in thje legs.  His left hip motion is slightly limited but without pain.  There is no hip pain with palpation.  Bladder function has remained normal.    Results Review:   I reviewed the patient's new clinical results.      Assessment/Plan       Severe low back pain  I believe his pain is due to left sacroiliac joint dysfunction.  His symptoms lead to this diagnosis.  The lumbar MRI, while abnormal , does not fit his current complaints anatomically.    Assessment & Plan   He has failed to resolve this issue with Steroids, narcotic medications and bed rest.  He is scheduled for a left sacroiliac joint injection this afternoon.    I discussed the patients findings and my recommendations with patient    Naif Gonzalez MD  12/07/20  10:01 EST    Time: 20 minutes

## 2020-12-07 NOTE — OUTREACH NOTE
Medical Week 3 Survey      Responses   [facility] patient discharged from?  [facility]   Does the patient have one of the following disease processes/diagnoses(primary or secondary)?  Other   Week 3 attempt successful?  No   Revoke  Readmitted          Brisa Lea RN

## 2020-12-08 ENCOUNTER — READMISSION MANAGEMENT (OUTPATIENT)
Dept: CALL CENTER | Facility: HOSPITAL | Age: 65
End: 2020-12-08

## 2020-12-08 VITALS
WEIGHT: 258 LBS | RESPIRATION RATE: 14 BRPM | BODY MASS INDEX: 39.1 KG/M2 | HEIGHT: 68 IN | HEART RATE: 69 BPM | TEMPERATURE: 98.3 F | DIASTOLIC BLOOD PRESSURE: 67 MMHG | OXYGEN SATURATION: 94 % | SYSTOLIC BLOOD PRESSURE: 104 MMHG

## 2020-12-08 DIAGNOSIS — K86.89 PANCREATIC MASS: Primary | ICD-10-CM

## 2020-12-08 PROBLEM — M47.817 FACET ARTHRITIS, DEGENERATIVE, L5-S1 LEVEL, LUMBOSACRAL SPINE: Chronic | Status: ACTIVE | Noted: 2020-12-08

## 2020-12-08 LAB
CHROMATIN AB SERPL-ACNC: <10 IU/ML (ref 0–14)
ERYTHROCYTE [SEDIMENTATION RATE] IN BLOOD: 10 MM/HR (ref 0–20)
GLUCOSE BLDC GLUCOMTR-MCNC: 120 MG/DL (ref 70–105)
GLUCOSE BLDC GLUCOMTR-MCNC: 177 MG/DL (ref 70–105)
GLUCOSE BLDC GLUCOMTR-MCNC: 177 MG/DL (ref 70–105)
TROPONIN T SERPL-MCNC: <0.01 NG/ML (ref 0–0.03)

## 2020-12-08 PROCEDURE — 86200 CCP ANTIBODY: CPT | Performed by: INTERNAL MEDICINE

## 2020-12-08 PROCEDURE — 99239 HOSP IP/OBS DSCHRG MGMT >30: CPT | Performed by: INTERNAL MEDICINE

## 2020-12-08 PROCEDURE — 93005 ELECTROCARDIOGRAM TRACING: CPT | Performed by: INTERNAL MEDICINE

## 2020-12-08 PROCEDURE — 97161 PT EVAL LOW COMPLEX 20 MIN: CPT

## 2020-12-08 PROCEDURE — 93010 ELECTROCARDIOGRAM REPORT: CPT | Performed by: INTERNAL MEDICINE

## 2020-12-08 PROCEDURE — 86038 ANTINUCLEAR ANTIBODIES: CPT | Performed by: INTERNAL MEDICINE

## 2020-12-08 PROCEDURE — 86431 RHEUMATOID FACTOR QUANT: CPT | Performed by: INTERNAL MEDICINE

## 2020-12-08 PROCEDURE — 86140 C-REACTIVE PROTEIN: CPT | Performed by: INTERNAL MEDICINE

## 2020-12-08 PROCEDURE — 82962 GLUCOSE BLOOD TEST: CPT

## 2020-12-08 PROCEDURE — 84484 ASSAY OF TROPONIN QUANT: CPT | Performed by: INTERNAL MEDICINE

## 2020-12-08 PROCEDURE — 85651 RBC SED RATE NONAUTOMATED: CPT | Performed by: INTERNAL MEDICINE

## 2020-12-08 RX ADMIN — GABAPENTIN 300 MG: 300 CAPSULE ORAL at 09:30

## 2020-12-08 RX ADMIN — FLUOXETINE 40 MG: 20 CAPSULE ORAL at 09:30

## 2020-12-08 RX ADMIN — BUPROPION HYDROCHLORIDE 150 MG: 150 TABLET, EXTENDED RELEASE ORAL at 09:30

## 2020-12-08 RX ADMIN — HYDROMORPHONE HYDROCHLORIDE 4 MG: 2 TABLET ORAL at 05:47

## 2020-12-08 RX ADMIN — HYDROMORPHONE HYDROCHLORIDE 4 MG: 2 TABLET ORAL at 13:11

## 2020-12-08 RX ADMIN — Medication 10 ML: at 09:30

## 2020-12-08 RX ADMIN — POLYETHYLENE GLYCOL 3350 17 G: 17 POWDER, FOR SOLUTION ORAL at 09:31

## 2020-12-08 RX ADMIN — HYDROMORPHONE HYDROCHLORIDE 4 MG: 2 TABLET ORAL at 01:45

## 2020-12-08 RX ADMIN — ASPIRIN 81 MG CHEWABLE TABLET 81 MG: 81 TABLET CHEWABLE at 09:30

## 2020-12-08 RX ADMIN — HYDROMORPHONE HYDROCHLORIDE 4 MG: 2 TABLET ORAL at 09:30

## 2020-12-08 NOTE — PROGRESS NOTES
[facility] Medicine Services  INPATIENT PROGRESS NOTE  4101/1   Hospitalist Team  LOS 0 days      Patient Care Team:  Leda Duckworth DO as PCP - General (Family Medicine)  Leda Duckworth DO as Referring Physician (Family Medicine)  Hans Doyle MD as Consulting Physician (Cardiology)      Patient was examined with relevant and adequate PPE keeping in mind the current coronavirus pandemic.    Chief Complaint / Subjective  Chief Complaint   Patient presents with   • Back Pain     LD hydromorphone 12/7/20 @ 1100 as inpatient, pain scale #8, LD eliquis 12/4/20       HPI (4 hpi elements or status of 3 chronic)  Mr. Green is a 65 y.o.  presents to [facility] complaint of back pain for last few days upto point he said he cannot walk around, pain do radiates at time into his leg. Pt underwent MRI lumbar region revealing   1. Degenerative changes of lumbar spine as described in the report.  Please refer to body of report for level by level findings.  2. No high-grade lumbar canal stenosis is seen. Mild to moderate canal  stenosis is present at L2-3 and L4-5.  3. Varying degrees of neural foraminal narrowing, moderate on the left  at L3-4, moderate bilaterally (left greater than right) at L4-5,  moderate to severe on the right at L5-S1.  Following this asked to admit the patient for further care.     Today status post injection of the left SI joint           History taken from: patient    Review of Systems   Constitution: Negative.   HENT: Negative.    Eyes: Negative.    Cardiovascular: Negative.    Respiratory: Negative.    Endocrine: Negative.    Hematologic/Lymphatic: Negative.    Skin: Negative.    Musculoskeletal: Positive for back pain.   Gastrointestinal: Negative.    Genitourinary: Negative.    Neurological: Negative.    Psychiatric/Behavioral: Negative.    Allergic/Immunologic: Negative.    All other systems reviewed and are negative.            Family History   Problem Relation Age  of Onset   • Diabetes Mother    • Heart disease Mother    • Arthritis Mother    • Obesity Mother    • Hypertension Mother    • Migraines Mother    • Osteoporosis Mother    • Snoring Mother    • Diabetes Father    • Heart disease Father    • Arthritis Father    • Hypertension Father    • Stroke Father    • Heart attack Father    • Hyperlipidemia Father    • Snoring Father    • Diabetes Brother    • Heart disease Brother    • Arthritis Sister    • Anemia Sister        Past Medical History:   Diagnosis Date   • Arthritis    • BPH (benign prostatic hyperplasia)    • Chronic back pain    • Depression    • Diabetes mellitus (CMS/HCC)    • Excessive daytime sleepiness    • Heart attack (CMS/HCC)    • High cholesterol    • Hyperlipidemia    • Hypertension    • Low back pain    • Low vision of right eye with normal vision in contralateral eye    • Memory problem    • Neuropathy    • Nonobstructive atherosclerosis of coronary artery    • Obesity    • PFO (patent foramen ovale) 11/29/2019   • Rheumatoid arthritis (CMS/HCC)    • Snoring    • Stroke (CMS/HCC)        Social History     Socioeconomic History   • Marital status: Single     Spouse name: Not on file   • Number of children: Not on file   • Years of education: Not on file   • Highest education level: Not on file   Tobacco Use   • Smoking status: Never Smoker   • Smokeless tobacco: Never Used   Substance and Sexual Activity   • Alcohol use: Not Currently     Alcohol/week: 1.0 standard drinks     Types: 1 Cans of beer per week   • Drug use: Never   • Sexual activity: Defer       Prior to Admission medications    Medication Sig Start Date End Date Taking? Authorizing Provider   apixaban (ELIQUIS) 5 MG tablet tablet Take 1 tablet by mouth Every 12 (Twelve) Hours. 10/5/20  Yes Leda Duckworth, DO   aspirin 81 MG chewable tablet Chew 81 mg Daily.   Yes Provider, MD Choco   atorvastatin (LIPITOR) 40 MG tablet Take 1 tablet by mouth Every Night. 10/5/20  Yes Donita  Leda,    buPROPion XL (Wellbutrin XL) 150 MG 24 hr tablet Take 1 tablet by mouth Daily. 10/5/20  Yes Leda Duckworth, DO   butalbital-acetaminophen-caffeine (FIORICET, ESGIC) -40 MG per tablet Take 1 tablet by mouth Every 6 (Six) Hours As Needed for Headache or Migraine. 7/18/20  Yes Kevyn Vogel DO   Dapagliflozin Propanediol (Farxiga) 10 MG tablet Take 10 mg by mouth Every Evening. 8/4/20  Yes Leda Duckworth DO   FLUoxetine (PROzac) 40 MG capsule Take 40 mg by mouth Daily. 9/24/20  Yes Provider, MD Choco   Insulin Glargine (BASAGLAR KWIKPEN) 100 UNIT/ML injection pen Inject 30 Units under the skin into the appropriate area as directed Every Night. 11/20/20  Yes Leda Duckworth DO   Insulin Lispro, 1 Unit Dial, (HumaLOG KwikPen) 100 UNIT/ML solution pen-injector Inject 5 Units under the skin into the appropriate area as directed 3 (Three) Times a Day Before Meals. 11/11/20  Yes Leda Duckworth DO   lisinopril (PRINIVIL,ZESTRIL) 2.5 MG tablet Take 1 tablet by mouth Every Night. 10/5/20  Yes Leda Duckworth DO   oxyCODONE (ROXICODONE) 10 MG tablet Take 1.5 tablets by mouth Every 8 (Eight) Hours As Needed for Moderate Pain  or Severe Pain . 12/3/20  Yes Leda Duckworth DO   oxyCODONE ER (Xtampza ER) 13.5 MG capsule extended-release 12 hour  Take 1 capsule by mouth Every 12 (Twelve) Hours. 12/3/20  Yes Leda Duckworth DO   polyethylene glycol (MIRALAX) 17 g packet Take 17 g by mouth Daily. 11/4/20  Yes Leda Duckworth DO   pregabalin (LYRICA) 150 MG capsule 1-2 po qhs  Patient taking differently: Take 150 mg by mouth every night at bedtime. 2 po qhs 10/21/20  Yes Leda Duckworth DO   tiZANidine (ZANAFLEX) 2 MG tablet Take 1 tablet by mouth At Night As Needed for Muscle Spasms. 10/21/20  Yes Leda Duckworth, DO        Objective    Physical Exam     Vital Signs  Temp:  [97.4 °F (36.3 °C)-97.6 °F (36.4 °C)] 97.6 °F (36.4 °C)  Heart Rate:  [73-78] 78  Resp:  [16-18] 16  BP: (115-152)/(66-82)  "123/82    Oxygen Therapy  SpO2: 96 %  Pulse Oximetry Type: Intermittent  Device (Oxygen Therapy): room air  Flow (L/min): 2  Flowsheet Rows      First Filed Value   Admission Height  175.3 cm (69\") Documented at 12/04/2020 1221   Admission Weight  117 kg (257 lb 15 oz) Documented at 12/04/2020 1221        Weight change: 1 kg (2 lb 3.3 oz)   Intake & Output (last 3 days)       12/05 0701 - 12/06 0700 12/06 0701 - 12/07 0700 12/07 0701 - 12/08 0700    P.O. 2220 1200 1380    Total Intake(mL/kg) 2220 (18.8) 1200 (10.1) 1380 (11.8)    Net +2220 +1200 +1380           Urine Unmeasured Occurrence 9 x 5 x 4 x        Lines, Drains & Airways    Active LDAs     Name:   Placement date:   Placement time:   Site:   Days:    Peripheral IV 12/04/20 1427 Right Forearm   12/04/20 1427    Forearm   3                Physical Exam:    Physical Exam  Vitals signs and nursing note reviewed.   Constitutional:       General: He is not in acute distress.     Appearance: Normal appearance. He is well-developed. He is not ill-appearing, toxic-appearing or diaphoretic.   HENT:      Head: Normocephalic and atraumatic.      Right Ear: Ear canal and external ear normal.      Left Ear: Ear canal and external ear normal.      Nose: Nose normal. No congestion or rhinorrhea.      Mouth/Throat:      Mouth: Mucous membranes are moist.      Pharynx: No oropharyngeal exudate.   Eyes:      General: No scleral icterus.        Right eye: No discharge.         Left eye: No discharge.      Extraocular Movements: Extraocular movements intact.      Conjunctiva/sclera: Conjunctivae normal.      Pupils: Pupils are equal, round, and reactive to light.   Neck:      Musculoskeletal: Normal range of motion and neck supple. No neck rigidity or muscular tenderness.      Thyroid: No thyromegaly.      Vascular: No carotid bruit or JVD.      Trachea: No tracheal deviation.   Cardiovascular:      Rate and Rhythm: Normal rate and regular rhythm.      Pulses: Normal pulses. "      Heart sounds: Normal heart sounds. No murmur. No friction rub. No gallop.    Pulmonary:      Effort: Pulmonary effort is normal. No respiratory distress.      Breath sounds: Normal breath sounds. No stridor. No wheezing, rhonchi or rales.   Chest:      Chest wall: No tenderness.   Abdominal:      General: Bowel sounds are normal. There is no distension.      Palpations: Abdomen is soft. There is no mass.      Tenderness: There is no abdominal tenderness. There is no guarding or rebound.      Hernia: No hernia is present.   Musculoskeletal: Normal range of motion.         General: No swelling, tenderness, deformity or signs of injury.      Right lower leg: No edema.      Left lower leg: No edema.   Lymphadenopathy:      Cervical: No cervical adenopathy.   Skin:     General: Skin is warm and dry.      Coloration: Skin is not jaundiced or pale.      Findings: No bruising, erythema or rash.   Neurological:      General: No focal deficit present.      Mental Status: He is alert and oriented to person, place, and time. Mental status is at baseline.      Cranial Nerves: No cranial nerve deficit.      Sensory: No sensory deficit.      Motor: No weakness or abnormal muscle tone.      Coordination: Coordination normal.   Psychiatric:         Mood and Affect: Mood normal.         Behavior: Behavior normal.         Thought Content: Thought content normal.         Judgment: Judgment normal.               PT Recommendation and Plan             Procedures:        Assessment/Plan with Problem wise       Active Hospital Problems    Diagnosis  POA   • **Sacroiliitis (CMS/HCC) [M46.1]  Yes     Priority: High   • Chronic kidney disease (CKD), stage III (moderate) [N18.30]  Yes   • Essential hypertension [I10]  Yes   • PFO (patent foramen ovale) [Q21.1]  Not Applicable   • Diabetic polyneuropathy associated with type 2 diabetes mellitus (CMS/HCC) [E11.42]  Yes   • Mixed hyperlipidemia [E78.2]  Yes      Resolved Hospital Problems    No resolved problems to display.        Estimated Creatinine Clearance: 88.8 mL/min (by C-G formula based on SCr of 1.03 mg/dL).    Code Status and Medical Interventions:   Ordered at: 12/04/20 2388     Level Of Support Discussed With:    Patient     Code Status:    CPR     Medical Interventions (Level of Support Prior to Arrest):    Full       MEDICAL DECISION MAKING COMPLEXITY BY PROBLEM:     Sacroiliitis:  Status post fluoroscopic injection of the left SI joint  Pain improved      Hypertension:  Continue home medications   Options include-  beta-blockers  Calcium channel blockers  ACE inhibitor  Vasodilators  Low-dose diuretics  PRN medications have not been shown to affect outcomes-to be avoided      Hyperlipidemia:  Check lipid panel  Statins if indicated  Add Ezetimibe if appropriate  No role for omega-3 demonstrated.    Diabetes mellitus:  Monitor blood sugars  Basal bolus insulin  Sliding scale as needed  Diabetic education as needed  Supportive treatment  Nursing do not hold long-acting insulin without orders           Care coordination with nursing for current care 12/07/20 7:47 PM EST.    Plan for disposition:            Continued Care and Services - Admitted Since 12/4/2020     Therapy     Service Provider Request Status Selected Services Address Phone Fax Patient Preferred    [facility] PHYSICAL THERAPY [facility]  Pending - Request Sent N/A [address] [place] [address] [phone] --               Historical & Objective Data     Results Review:    I reviewed the patient's new lab and radiology results. 12/07/20     Results from last 7 days   Lab Units 12/07/20  0204 12/05/20  0147 12/04/20  1309   WBC 10*3/mm3 11.10* 6.40 7.10   HEMOGLOBIN g/dL 13.7 12.7* 13.5   HEMATOCRIT % 42.3 37.8 41.1   MCV fL 90.3 89.0 91.6   MCH pg 29.3 29.9 30.0   MPV fL 11.0 10.7 11.5   RDW % 14.0 13.9 14.3   PLATELETS 10*3/mm3 173 135* 152     Results from last 7 days   Lab Units  12/07/20  0204 12/05/20  0147 12/04/20  1309   SODIUM mmol/L 135* 140 138   POTASSIUM mmol/L 4.7 4.1 4.3   CHLORIDE mmol/L 100 104 102   CO2 mmol/L 23.0 29.0 25.0   BUN mg/dL 26* 15 18   CREATININE mg/dL 1.03 0.94 1.13   CALCIUM mg/dL 8.8 8.4* 8.8   BILIRUBIN mg/dL 0.2 0.3 0.2   ALK PHOS U/L 70 66 76   ALT (SGPT) U/L 12 13 14   AST (SGOT) U/L 9 12 13   GLUCOSE mg/dL 235* 98 200*     Inflammatory Biomarkers        Invalid input(s): ESR, D-DIMER QUANTITATIVE,  PROCALCITONIN,    Lab Results   Component Value Date    PHOS 3.2 11/13/2020     Hemoglobin A1C   Date Value Ref Range Status   12/05/2020 7.9 (H) 3.5 - 5.6 % Final     Lab Results   Component Value Date    CHOL 194 07/14/2020    TRIG 191 (H) 07/14/2020    HDL 50 07/14/2020     (H) 07/14/2020     No results found for: LIPASE  Results from last 7 days   Lab Units 12/04/20  1309   INR  0.97           No results found for: INTRAOP, PREDX, FINALDX, COMDX  COVID19   Date Value Ref Range Status   11/13/2020 Not Detected Not Detected - Ref. Range Final        Microbiology Results (last 10 days)     ** No results found for the last 240 hours. **          ECG/EMG Results (most recent)     None          Results for orders placed during the hospital encounter of 07/13/20   Duplex Venous Lower Extremity - Right CAR    Narrative · Normal right lower extremity venous duplex scan.          Results for orders placed during the hospital encounter of 11/25/19   Adult Transesophageal Echo (LILIAN) W/ Cont if Necessary Per Protocol    Narrative · Left ventricular systolic function is normal.  · Left atrial cavity size is mildly dilated.  · Mild mitral valve regurgitation is present  · Mild tricuspid valve regurgitation is present.  · LV ejection fraction is about 60%  · Significant patent foramen ovale noted by bubble contrast study  · No pericardial effusion noted  · Aorta has minimal atherosclerosis  · Technically difficult study          Ct Abdomen Pelvis Stone  Protocol    Result Date: 12/4/2020  1.No obstructive uropathy or nephroureterolithiasis. There is high density material within the collecting system related to recent gadolinium contrast administration for lumbar spine imaging. 2.Benign left renal cyst. 3.There is a questionable nodule at the distal pancreatic body. It measures about 1.3 cm. Dedicated pancreatic MRI or CT should be performed. This should be performed nonemergently. It may be a area of less fatty infiltrated glandular tissue. No pancreatic duct dilatation. 4.Moderate stool burden, question constipation. 5.Diverticulosis without diverticulitis. 6.Small fat-containing right inguinal hernia and periumbilical hernia. 7.Status post cholecystectomy without bile duct dilatation.    Electronically Signed By-Carmelina Marie MD On:12/4/2020 6:06 PM This report was finalized on [id number] by  Carmelina Marie MD.    Mri Lumbar Spine With & Without Contrast    Result Date: 12/4/2020   1. Degenerative changes of lumbar spine as described in the report. Please refer to body of report for level by level findings. 2. No high-grade lumbar canal stenosis is seen. Mild to moderate canal stenosis is present at L2-3 and L4-5. 3. Varying degrees of neural foraminal narrowing, moderate on the left at L3-4, moderate bilaterally (left greater than right) at L4-5, moderate to severe on the right at L5-S1. 4. No epidural fluid collection is identified.  Electronically Signed By-Charla Nelson MD On:12/4/2020 2:52 PM This report was finalized on [id number] by  Charla Nelson MD.      I personally reviewed patient's x-ray films and my findings are: 0    I personally reviewed patient's EKG strip and my findings are: 0    Medication Review:   I have reviewed the patient's current medication list 12/07/20     Scheduled Meds  aspirin, 81 mg, Oral, Daily  atorvastatin, 40 mg, Oral, Nightly  buPROPion XL, 150 mg, Oral, Daily  FLUoxetine, 40 mg, Oral, Daily  gabapentin, 300 mg, Oral,  TID  insulin glargine, 30 Units, Subcutaneous, Nightly  lidocaine, 1 patch, Transdermal, Q24H  lisinopril, 2.5 mg, Oral, Nightly  methylPREDNISolone acetate, , ,   polyethylene glycol, 17 g, Oral, Daily  sodium chloride, 10 mL, Intravenous, Q12H        Meds Infusions  Pharmacy Consult - Steroid Insulin Protocol,         DVT prophylaxis  Mechanical Order History:      Ordered        12/04/20 2244  Place Venous Foot Pump  Once         12/04/20 2244  Maintain Venous Foot Pump  Once                 Pharmalogical Order History:     None          Meds PRN  butalbital-acetaminophen-caffeine  •  HYDROmorphone  •  magnesium hydroxide  •  Pharmacy Consult - Steroid Insulin Protocol  •  sodium chloride  •  tiZANidine      Jeremiah Watkins MD  12/07/20  19:51 EST

## 2020-12-08 NOTE — PROGRESS NOTES
Neurosurgery Progress Note      Patient: Humble Green    YOB: 1955    Medical Record Number: [id number]    Attending Physician: Jeremiah Watkins MD    Date of Admission: 12/4/2020 12:23 PM    Status Post: No admission procedures for hospital encounter.      Admitting Dx: Severe low back pain [M54.5]  Severe low back pain [M54.5]\  Bilateral sacroilitis (CMS/HCC)    Interval summary: 65-year-old male has been hospitalized with back pain hip the sacroiliac pain.  Patient has been evaluated by neurosurgeon Dr. Mederos with a diagnosis of sacroiliitis.  Patient is on the hospitalist service and has been evaluated and treated with pain management and neurosurgical consultation.  Patient is admitted for pain management with regards his back pain and sacral pain and has been treated with narcotics, analgesics and a short course of Decadron.  Consistent with the diagnosis the patient has undergone a sacroiliac injection, left yesterday, with some improvement.  Patient remains neurologically stable.    Current Problem List:   Patient Active Problem List   Diagnosis   • Mixed hyperlipidemia   • Major depressive disorder, recurrent episode, moderate degree (CMS/HCC)   • Diabetic polyneuropathy associated with type 2 diabetes mellitus (CMS/HCC)   • Intractable migraine without aura and without status migrainosus   • PFO (patent foramen ovale)   • Essential hypertension   • Nonobstructive atherosclerosis of coronary artery   • Diabetes mellitus (CMS/HCC)   • Confusion and disorientation   • Cerebrovascular accident (CVA) due to bilateral embolism of middle cerebral arteries (CMS/HCC)   • ANT (acute kidney injury) (CMS/HCC)   • Chronic kidney disease (CKD), stage III (moderate)   • DDD (degenerative disc disease), lumbar   • Angina at rest (CMS/HCC)   • Morbidly obese (CMS/HCC)   • Sacroiliitis (CMS/HCC)           Current Medications:  Scheduled Meds:apixaban, 5 mg, Oral, Q12H  aspirin, 81 mg, Oral, Daily  atorvastatin,  40 mg, Oral, Nightly  buPROPion XL, 150 mg, Oral, Daily  FLUoxetine, 40 mg, Oral, Daily  gabapentin, 300 mg, Oral, TID  insulin glargine, 30 Units, Subcutaneous, Nightly  lisinopril, 2.5 mg, Oral, Nightly  polyethylene glycol, 17 g, Oral, Daily  sodium chloride, 10 mL, Intravenous, Q12H      Continuous Infusions:Pharmacy Consult - Steroid Insulin Protocol,       PRN Meds:.butalbital-acetaminophen-caffeine  •  HYDROmorphone  •  magnesium hydroxide  •  Pharmacy Consult - Steroid Insulin Protocol  •  sodium chloride  •  tiZANidine      Diagnostic Tests:    Lab Results (last 24 hours)     Procedure Component Value Units Date/Time    Rheumatoid Factor [[id number]]  (Normal) Collected: 12/08/20 0342    Specimen: Blood Updated: 12/08/20 1121     Rheumatoid Factor Quantitative <10.0 IU/mL     POC Glucose Once [[id number]]  (Abnormal) Collected: 12/08/20 1059    Specimen: Blood Updated: 12/08/20 1104     Glucose 177 mg/dL      Comment: Serial Number: [id number]:  [id number]       POC Glucose Once [[id number]]  (Abnormal) Collected: 12/08/20 0805    Specimen: Blood Updated: 12/08/20 0807     Glucose 120 mg/dL      Comment: Serial Number: [id number]:  [id number]       Sedimentation Rate [[id number]]  (Normal) Collected: 12/08/20 0341    Specimen: Blood Updated: 12/08/20 0505     Sed Rate 10 mm/hr     C-reactive Protein [[id number]] Collected: 12/08/20 0342    Specimen: Blood Updated: 12/08/20 0407    MIKE [[id number]] Collected: 12/08/20 0341    Specimen: Blood Updated: 12/08/20 0402    Cyclic Citrul Peptide Antibody, IgG / IgA [[id number]] Collected: 12/08/20 0342    Specimen: Blood Updated: 12/08/20 0402    POC Glucose Once [[id number]]  (Abnormal) Collected: 12/07/20 2020    Specimen: Blood Updated: 12/07/20 2022     Glucose 290 mg/dL      Comment: Serial Number: [id number]:  [id number]       POC Glucose Once [[id number]]  (Abnormal) Collected: 12/07/20 1720    Specimen: Blood Updated: 12/07/20 1722     Glucose 204  mg/dL      Comment: Serial Number: [id number]:  [id number]       Hemoglobin A1c [[id number]]  (Abnormal) Collected: 12/05/20 0147    Specimen: Blood Updated: 12/07/20 1624     Hemoglobin A1C 7.9 %     Narrative:      Hemoglobin A1C Reference Range:    <5.7 %        Normal  5.7-6.4 %     Increased risk for diabetes  > 6.4 %        Diabetes       These guidelines have been recommended by the American Diabetic Association for Hgb A1c.      The following 2010 guidelines have been recommended by the American Diabetes Association for Hemoglobin A1c.    HBA1c 5.7-6.4% Increased risk for future diabetes (pre-diabetes)  HBA1c     >6.4% Diabetes            Imaging Results (Last 24 Hours)     Procedure Component Value Units Date/Time    FL Guided Pain Management (Auto Finalize) [[id number]] Resulted: 12/07/20 1604     Updated: 12/07/20 1604    Narrative:      Please see performing physician's note for result.          Physical Exam:   Remains neurologically intact with full strength of his upper and lower extremities with intact sensation.  Primary and cortical sensory modalities remain intact      Vitals:    12/08/20 0414 12/08/20 0821 12/08/20 1100 12/08/20 1312   BP: 132/82 138/76 109/67 97/61   BP Location: Left arm Left arm Left arm    Patient Position: Lying Lying Lying    Pulse: 63 58 64 69   Resp: 15 14 12    Temp: 97.9 °F (36.6 °C) 97.5 °F (36.4 °C) 98.3 °F (36.8 °C) 97.6 °F (36.4 °C)   TempSrc: Oral Oral Oral    SpO2: 92% 94% 95% 94%   Weight:       Height:             Assessment/ Plan:    Diagnosis of sacroiliitis/sacral joint dysfunction.  Some improvement with localized SI joint injection left.  Continue current care with pain management.  Neurosurgical intervention will not be required at this time.    Cameron Flores Jr., MD FAANS, FACS, FICS        Date: 12/8/2020    Cameron Flores MD  14:08 EST

## 2020-12-08 NOTE — THERAPY EVALUATION
Patient Name: Humble Green  : 1955    MRN: [id number]                              Today's Date: 2020       Admit Date: 2020    Visit Dx:     ICD-10-CM ICD-9-CM   1. Severe low back pain  M54.5 724.2   2. Bilateral sacroiliitis (CMS/HCC)  M46.1 720.2   3. Pain  R52 780.96     Patient Active Problem List   Diagnosis   • Mixed hyperlipidemia   • Major depressive disorder, recurrent episode, moderate degree (CMS/HCC)   • Diabetic polyneuropathy associated with type 2 diabetes mellitus (CMS/HCC)   • Intractable migraine without aura and without status migrainosus   • PFO (patent foramen ovale)   • Essential hypertension   • Nonobstructive atherosclerosis of coronary artery   • Diabetes mellitus (CMS/HCC)   • Confusion and disorientation   • Cerebrovascular accident (CVA) due to bilateral embolism of middle cerebral arteries (CMS/HCC)   • ANT (acute kidney injury) (CMS/HCC)   • Chronic kidney disease (CKD), stage III (moderate)   • DDD (degenerative disc disease), lumbar   • Angina at rest (CMS/HCC)   • Morbidly obese (CMS/HCC)   • Sacroiliitis (CMS/HCC)     Past Medical History:   Diagnosis Date   • Arthritis    • BPH (benign prostatic hyperplasia)    • Chronic back pain    • Depression    • Diabetes mellitus (CMS/HCC)    • Excessive daytime sleepiness    • Heart attack (CMS/HCC)    • High cholesterol    • Hyperlipidemia    • Hypertension    • Low back pain    • Low vision of right eye with normal vision in contralateral eye    • Memory problem    • Neuropathy    • Nonobstructive atherosclerosis of coronary artery    • Obesity    • PFO (patent foramen ovale) 2019   • Rheumatoid arthritis (CMS/HCC)    • Snoring    • Stroke (CMS/HCC)      Past Surgical History:   Procedure Laterality Date   • ARM TENDON REPAIR Left    • CARDIAC CATHETERIZATION N/A 2020    Procedure: Left Heart Cath and coronary angiogram;  Surgeon: Hans Doyle MD;  Location: [facility] INVASIVE LOCATION;  Service:  Cardiovascular;  Laterality: N/A;   • CARDIAC CATHETERIZATION N/A 11/16/2020    Procedure: Left ventriculography;  Surgeon: Hans Doyle MD;  Location: [facility] CATH INVASIVE LOCATION;  Service: Cardiovascular;  Laterality: N/A;   • CHOLECYSTECTOMY     • COLONOSCOPY      2018 = TA, rech 2023   • EYE SURGERY      Rt Eye Sx after trauma @ 2y/o   • JOINT REPLACEMENT      Left TKR   • REPLACEMENT TOTAL KNEE Left 2014   • ROTATOR CUFF REPAIR Bilateral    • UMBILICAL HERNIA REPAIR       General Information     Row Name 12/08/20 1050          Physical Therapy Time and Intention    Document Type  evaluation  -     Mode of Treatment  physical therapy  -     Row Name 12/08/20 1050          General Information    Patient Profile Reviewed  yes  -     Prior Level of Function  independent: works part time in maintenance  -     Row Name 12/08/20 1050          Living Environment    Lives With  alone  -     Row Name 12/08/20 1050          Home Main Entrance    Number of Stairs, Main Entrance  -- ramp  -     Row Name 12/08/20 1050          Cognition    Orientation Status (Cognition)  oriented x 4  -     Row Name 12/08/20 1050          Safety Issues, Functional Mobility    Impairments Affecting Function (Mobility)  pain  -       User Key  (r) = Recorded By, (t) = Taken By, (c) = Cosigned By    Initials Name Provider Type     Karley Mcgee, PT Physical Therapist        Mobility     Row Name 12/08/20 1051          Bed Mobility    Bed Mobility  supine-sit  -HC     Supine-Sit [place] (Bed Mobility)  independent  -HC     Comment (Bed Mobility)  Pt educated on logroll technique to assist with back pain during bed mobility  -     Row Name 12/08/20 1051          Sit-Stand Transfer    Sit-Stand [place] (Transfers)  modified independence  -     Row Name 12/08/20 1051          Gait/Stairs (Locomotion)    [place] Level (Gait)  modified independence  -     Distance in Feet (Gait)  300 ft  -      Deviations/Abnormal Patterns (Gait)  gait speed decreased  -     Comment (Gait/Stairs)  decreased toe clearance left LE and decreased step length left LE that pt contributes to hx of CVA  -       User Key  (r) = Recorded By, (t) = Taken By, (c) = Cosigned By    Initials Name Provider Type     Karley Mcgee, PT Physical Therapist        Obj/Interventions     Row Name 12/08/20 1053          Range of Motion Comprehensive    General Range of Motion  no range of motion deficits identified  -     Row Name 12/08/20 1053          Strength Comprehensive (MMT)    Comment, General Manual Muscle Testing (MMT) Assessment  BUEs WFL, right LE WFL, left hip 4-/5 due to back discomfort  -     Row Name 12/08/20 1053          Balance    Balance Assessment  sitting static balance;sitting dynamic balance;standing static balance;standing dynamic balance  -     Static Sitting Balance  WFL  -HC     Dynamic Sitting Balance  WFL  -HC     Static Standing Balance  WFL  -HC     Dynamic Standing Balance  WFL  -HC       User Key  (r) = Recorded By, (t) = Taken By, (c) = Cosigned By    Initials Name Provider Type     Karley Mcgee, PT Physical Therapist        Goals/Plan    No documentation.       Clinical Impression     Row Name 12/08/20 1053          Pain    Additional Documentation  Pain Scale: Numbers Pre/Post-Treatment (Group)  -     Row Name 12/08/20 1053          Pain Scale: Numbers Pre/Post-Treatment    Pretreatment Pain Rating  8/10  -     Posttreatment Pain Rating  8/10  -     Pre/Posttreatment Pain Comment  left side back discomfort  -     Pain Intervention(s)  Rest;Repositioned  -     Row Name 12/08/20 1053          Plan of Care Review    Outcome Summary  Pt is 64 yo male admitted for left low back pain with MRI L-spine showing severe degenerative changes of L-spine.  Pt with possible sacroiliac joint dysfunction.  Pt s/p left sacroiliac joint injection on 12/7/2020.  -     Row Name 12/08/20 1055           Therapy Assessment/Plan (PT)    Patient/Family Therapy Goals Statement (PT)  decrease pain  -HC     Criteria for Skilled Interventions Met (PT)  no problems identified which require skilled intervention  -     Row Name 12/08/20 1053          Positioning and Restraints    Pre-Treatment Position  in bed  -     Post Treatment Position  chair  -HC     In Chair  notified nsg;call light within reach  -HC       User Key  (r) = Recorded By, (t) = Taken By, (c) = Cosigned By    Initials Name Provider Type     Karley Mcgee, PT Physical Therapist        Outcome Measures    No documentation.       Physical Therapy Education                 Title: PT OT SLP Therapies (Done)     Topic: Physical Therapy (Done)     Point: Mobility training (Done)     Learning Progress Summary           Patient Acceptance, E, VU by  at 12/8/2020 1055                               User Key     Initials Effective Dates Name Provider Type Discipline     04/17/20 -  Karley Mcgee, PT Physical Therapist PT              PT Recommendation and Plan     Plan of Care Reviewed With: patient  Outcome Summary: Pt is 66 yo male admitted for left low back pain with MRI L-spine showing severe degenerative changes of L-spine.  Pt with possible sacroiliac joint dysfunction.  Pt s/p left sacroiliac joint injection on 12/7/2020.  Pt presents with ongoing back discomfort stating injection was minimally effective yesterday.  Pt able to complete bed mobility with indep after education on logroll technique to assist and transfers with indep.  Pt able to ambulate 300 ft with mild lateral sway, decreased toe clearance left LE and decreased step length left LE that pt contributes to hx of CVA.  No signs of LOB or safety concerns with mobility.  Pt appears close to functional mobility baseline.  No further inpatient PT needs at this time.  Pt can be up ad jacquelin.  To assist with back discomfort, PT recommending OPPT.  PPE donned: mask with faceshield,  gloves.     Time Calculation:   PT Charges     Row Name 12/08/20 1058             Time Calculation    Start Time  0852  -HC      Stop Time  0905  -HC      Time Calculation (min)  13 min  -HC      PT Received On  12/08/20  -HC         Time Calculation- PT    Total Timed Code Minutes- PT  0 minute(s)  -        User Key  (r) = Recorded By, (t) = Taken By, (c) = Cosigned By    Initials Name Provider Type     Karley Mcgee, PT Physical Therapist        Therapy Charges for Today     Code Description Service Date Service Provider Modifiers Qty    [id number]  PT EVAL LOW COMPLEXITY 3 12/8/2020 Karley Mcgee, PT GP 1               Karley Mcgee, PT  12/8/2020

## 2020-12-08 NOTE — DISCHARGE SUMMARY
Date of Admission: 12/4/2020  4101/1    Date of Discharge:  12/8/2020    Length of stay:  LOS: 1 day     Patient was examined with relevant and adequate PPE keeping in mind the current coronavirus pandemic.      Presenting Problem/History of Present Illness   Present on Admission:  • Sacroiliitis (CMS/HCC)  • Mixed hyperlipidemia  • Diabetic polyneuropathy associated with type 2 diabetes mellitus (CMS/HCC)  • Essential hypertension  • Chronic kidney disease (CKD), stage III (moderate)  • Facet arthritis, degenerative, L5-S1 level, lumbosacral spine        Hospital Course  Chief complaint:  Chief Complaint   Patient presents with   • Back Pain     LD hydromorphone 12/7/20 @ 1100 as inpatient, pain scale #8, LD eliquis 12/4/20       Humble Green 65 y.o. male.    PCP  Leda Duckworth DO (General)    Mr. Green is a 65 y.o.  presents to [facility] complaint of back pain for last few days upto point he said he cannot walk around, pain do radiates at time into his leg. Pt underwent MRI lumbar region revealing   1. Degenerative changes of lumbar spine as described in the report.  Please refer to body of report for level by level findings.  2. No high-grade lumbar canal stenosis is seen. Mild to moderate canal  stenosis is present at L2-3 and L4-5.  3. Varying degrees of neural foraminal narrowing, moderate on the left  at L3-4, moderate bilaterally (left greater than right) at L4-5,  moderate to severe on the right at L5-S1.  Following this asked to admit the patient for further care.          Patient had severe low back pain.  MRI noted with moderate to severe neuroforaminal narrowing at L5-S1.  No mention of any other joint abnormalities.  However he was seen by pain clinic and injected in sacroiliac joint for sacroiliitis.  Patient reports little if any improvement.  He will be discharged for outpatient care.  PCP to do a nonemergent MRI of the pancreas for pancreatic nodule mentioned incidentally on the  MRI.  Follow-ups as below    ROS  Constitution: Negative.   HENT: Negative.    Eyes: Negative.    Cardiovascular: Negative.    Respiratory: Negative.    Endocrine: Negative.    Hematologic/Lymphatic: Negative.    Skin: Negative.    Musculoskeletal: Positive for back pain.   Gastrointestinal: Negative.    Genitourinary: Negative.    Neurological: Negative.    Psychiatric/Behavioral: Negative.    Allergic/Immunologic: Negative.    All other systems reviewed and are negative.   Noted        Family History   Problem Relation Age of Onset   • Diabetes Mother    • Heart disease Mother    • Arthritis Mother    • Obesity Mother    • Hypertension Mother    • Migraines Mother    • Osteoporosis Mother    • Snoring Mother    • Diabetes Father    • Heart disease Father    • Arthritis Father    • Hypertension Father    • Stroke Father    • Heart attack Father    • Hyperlipidemia Father    • Snoring Father    • Diabetes Brother    • Heart disease Brother    • Arthritis Sister    • Anemia Sister         Past Medical History:   Diagnosis Date   • Arthritis    • BPH (benign prostatic hyperplasia)    • Chronic back pain    • Depression    • Diabetes mellitus (CMS/HCC)    • Excessive daytime sleepiness    • Heart attack (CMS/HCC)    • High cholesterol    • Hyperlipidemia    • Hypertension    • Low back pain    • Low vision of right eye with normal vision in contralateral eye    • Memory problem    • Neuropathy    • Nonobstructive atherosclerosis of coronary artery    • Obesity    • PFO (patent foramen ovale) 11/29/2019   • Rheumatoid arthritis (CMS/HCC)    • Snoring    • Stroke (CMS/HCC)        Past Surgical History:   Procedure Laterality Date   • ARM TENDON REPAIR Left    • CARDIAC CATHETERIZATION N/A 11/16/2020    Procedure: Left Heart Cath and coronary angiogram;  Surgeon: Hans Doyle MD;  Location: [facility] CATH INVASIVE LOCATION;  Service: Cardiovascular;  Laterality: N/A;   • CARDIAC CATHETERIZATION N/A 11/16/2020    Procedure:  Left ventriculography;  Surgeon: Hans Doyle MD;  Location: [facility] INVASIVE LOCATION;  Service: Cardiovascular;  Laterality: N/A;   • CHOLECYSTECTOMY     • COLONOSCOPY      2018 = TA, rech 2023   • EYE SURGERY      Rt Eye Sx after trauma @ 2y/o   • JOINT REPLACEMENT      Left TKR   • REPLACEMENT TOTAL KNEE Left 2014   • ROTATOR CUFF REPAIR Bilateral    • UMBILICAL HERNIA REPAIR         Social History     Socioeconomic History   • Marital status: Single     Spouse name: Not on file   • Number of children: Not on file   • Years of education: Not on file   • Highest education level: Not on file   Tobacco Use   • Smoking status: Never Smoker   • Smokeless tobacco: Never Used   Substance and Sexual Activity   • Alcohol use: Not Currently     Alcohol/week: 1.0 standard drinks     Types: 1 Cans of beer per week   • Drug use: Never   • Sexual activity: Defer       Vital Signs  Temp:  [97.5 °F (36.4 °C)-98.3 °F (36.8 °C)] 97.6 °F (36.4 °C)  Heart Rate:  [58-74] 69  Resp:  [12-17] 12  BP: ()/(61-82) 97/61  Weight change: -1.972 kg (-4 lb 5.6 oz)    Physical Exam:  Physical Exam  Vitals signs and nursing note reviewed.   Constitutional:       General: He is not in acute distress.     Appearance: Normal appearance. He is well-developed. He is not ill-appearing, toxic-appearing or diaphoretic.   HENT:      Head: Normocephalic and atraumatic.      Right Ear: Ear canal and external ear normal.      Left Ear: Ear canal and external ear normal.      Nose: Nose normal. No congestion or rhinorrhea.      Mouth/Throat:      Mouth: Mucous membranes are moist.      Pharynx: No oropharyngeal exudate.   Eyes:      General: No scleral icterus.        Right eye: No discharge.         Left eye: No discharge.      Extraocular Movements: Extraocular movements intact.      Conjunctiva/sclera: Conjunctivae normal.      Pupils: Pupils are equal, round, and reactive to light.   Neck:      Musculoskeletal: Normal range of motion and  neck supple. No neck rigidity or muscular tenderness.      Thyroid: No thyromegaly.      Vascular: No carotid bruit or JVD.      Trachea: No tracheal deviation.   Cardiovascular:      Rate and Rhythm: Normal rate and regular rhythm.      Pulses: Normal pulses.      Heart sounds: Normal heart sounds. No murmur. No friction rub. No gallop.    Pulmonary:      Effort: Pulmonary effort is normal. No respiratory distress.      Breath sounds: Normal breath sounds. No stridor. No wheezing, rhonchi or rales.   Chest:      Chest wall: No tenderness.   Abdominal:      General: Bowel sounds are normal. There is no distension.      Palpations: Abdomen is soft. There is no mass.      Tenderness: There is no abdominal tenderness. There is no guarding or rebound.      Hernia: No hernia is present.   Musculoskeletal: Normal range of motion.         General: No swelling, tenderness, deformity or signs of injury.      Right lower leg: No edema.      Left lower leg: No edema.   Lymphadenopathy:      Cervical: No cervical adenopathy.   Skin:     General: Skin is warm and dry.      Coloration: Skin is not jaundiced or pale.      Findings: No bruising, erythema or rash.   Neurological:      General: No focal deficit present.      Mental Status: He is alert and oriented to person, place, and time. Mental status is at baseline.      Cranial Nerves: No cranial nerve deficit.      Sensory: No sensory deficit.      Motor: No weakness or abnormal muscle tone.      Coordination: Coordination normal.   Psychiatric:         Mood and Affect: Mood normal.         Behavior: Behavior normal.         Thought Content: Thought content normal.         Judgment: Judgment normal.                Discharge Diagnosis:     Active Hospital Problems    Diagnosis  POA   • **Sacroiliitis (CMS/HCC) [M46.1]  Yes     Priority: High   • Facet arthritis, degenerative, L5-S1 level, lumbosacral spine [M47.817]  Yes     Priority: High   • Chronic kidney disease (CKD),  stage III (moderate) [N18.30]  Yes   • Essential hypertension [I10]  Yes   • PFO (patent foramen ovale) [Q21.1]  Not Applicable   • Diabetic polyneuropathy associated with type 2 diabetes mellitus (CMS/HCC) [E11.42]  Yes   • Mixed hyperlipidemia [E78.2]  Yes      Resolved Hospital Problems   No resolved problems to display.       Estimated Creatinine Clearance: 88.8 mL/min (by C-G formula based on SCr of 1.03 mg/dL).    Discharge Disposition    Continued Care and Services - Admitted Since 12/4/2020     Therapy     Service Provider Request Status Selected Services Address Phone Fax Patient Preferred    [facility] PHYSICAL THERAPY [facility]  Pending - Request Sent N/A [address], [place] [address] [phone] --                    PT Recommendation and Plan          Home or Self Care           Discharge Medications      Changes to Medications      Instructions Start Date   pregabalin 150 MG capsule  Commonly known as: LYRICA  What changed:   · how much to take  · how to take this  · when to take this  · additional instructions   1-2 po qhs         Continue These Medications      Instructions Start Date   apixaban 5 MG tablet tablet  Commonly known as: ELIQUIS   5 mg, Oral, Every 12 Hours Scheduled      aspirin 81 MG chewable tablet   81 mg, Oral, Daily      atorvastatin 40 MG tablet  Commonly known as: LIPITOR   40 mg, Oral, Nightly      buPROPion  MG 24 hr tablet  Commonly known as: Wellbutrin XL   150 mg, Oral, Daily      butalbital-acetaminophen-caffeine -40 MG per tablet  Commonly known as: FIORICET, ESGIC   1 tablet, Oral, Every 6 Hours PRN      Dapagliflozin Propanediol 10 MG tablet  Commonly known as: Farxiga   10 mg, Oral, Every Evening      FLUoxetine 40 MG capsule  Commonly known as: PROzac   40 mg, Oral, Daily      Insulin Glargine 100 UNIT/ML injection pen  Commonly known as: BASAGLAR KWIKPEN   30 Units, Subcutaneous, Nightly      Insulin Lispro (1 Unit  Dial) 100 UNIT/ML solution pen-injector  Commonly known as: HumaLOG KwikPen   5 Units, Subcutaneous, 3 Times Daily Before Meals      lisinopril 2.5 MG tablet  Commonly known as: PRINIVIL,ZESTRIL   2.5 mg, Oral, Nightly      oxyCODONE 10 MG tablet  Commonly known as: ROXICODONE   15 mg, Oral, Every 8 Hours PRN      polyethylene glycol 17 g packet  Commonly known as: MIRALAX   17 g, Oral, Daily      tiZANidine 2 MG tablet  Commonly known as: ZANAFLEX   2 mg, Oral, Nightly PRN      Xtampza ER 13.5 MG capsule extended-release 12 hour   Generic drug: oxyCODONE ER   13.5 mg, Oral, Every 12 Hours           Discharge medications personally reviewed by me and med rec done by me personally.  12/08/20, 3:41 PM EST        Consults:   Consults     Date and Time Order Name Status Description    12/7/2020 0804 Inpatient Pain Medicine Consult      12/4/2020 1659 Inpatient Spine Surgery Consult      12/4/2020 1611 Hospitalist (on-call MD unless specified) Completed     11/13/2020 1637 Inpatient Cardiology Consult Completed     11/12/2020 1336 Hospitalist (on-call MD unless specified) Completed           Procedures Performed:    12/07 1452 Note By: Mitchel Jaime MD    Pertinent Test Results:   Results from last 7 days   Lab Units 12/07/20  0204 12/05/20 0147 12/04/20  1309   WBC 10*3/mm3 11.10* 6.40 7.10   HEMOGLOBIN g/dL 13.7 12.7* 13.5   HEMATOCRIT % 42.3 37.8 41.1   MCV fL 90.3 89.0 91.6   MCH pg 29.3 29.9 30.0   PLATELETS 10*3/mm3 173 135* 152     Results from last 7 days   Lab Units 12/07/20  0204 12/05/20  0147 12/04/20  1309   SODIUM mmol/L 135* 140 138   POTASSIUM mmol/L 4.7 4.1 4.3   CHLORIDE mmol/L 100 104 102   CO2 mmol/L 23.0 29.0 25.0   BUN mg/dL 26* 15 18   CREATININE mg/dL 1.03 0.94 1.13   CALCIUM mg/dL 8.8 8.4* 8.8   BILIRUBIN mg/dL 0.2 0.3 0.2   ALK PHOS U/L 70 66 76   ALT (SGPT) U/L 12 13 14   AST (SGOT) U/L 9 12 13   GLUCOSE mg/dL 235* 98 200*     Lab Results   Component Value Date    CALCIUM 8.8 12/07/2020     PHOS 3.2 11/13/2020     No results found for: HGBA1C  Lab Results   Component Value Date    CHOL 194 07/14/2020    TRIG 191 (H) 07/14/2020    HDL 50 07/14/2020     (H) 07/14/2020     No results found for: LIPASE        No results found for: INTRAOP, PREDX, FINALDX, COMDX  Inflammatory Biomarkers        Invalid input(s): ESR, D-DIMER QUANTITATIVE,  PROCALCITONIN  COVID19   Date Value Ref Range Status   11/13/2020 Not Detected Not Detected - Ref. Range Final        Microbiology Results (last 10 days)     ** No results found for the last 240 hours. **          ECG/EMG Results (most recent)     Procedure Component Value Units Date/Time    ECG 12 Lead [[id number]] Collected: 12/08/20 1546     Updated: 12/08/20 1548     QT Interval 426 ms     Narrative:      HEART RATE= 67  bpm  RR Interval= 896  ms  NV Interval= 176  ms  P Horizontal Axis= -6  deg  P Front Axis= 37  deg  QRSD Interval= 94  ms  QT Interval= 426  ms  QRS Axis= 6  deg  T Wave Axis= 53  deg  - OTHERWISE NORMAL ECG -  Sinus rhythm  Low voltage, precordial leads  Electronically Signed By:   Date and Time of Study: 2020-12-08 15:46:00          Results for orders placed during the hospital encounter of 07/13/20   Duplex Venous Lower Extremity - Right CAR    Narrative · Normal right lower extremity venous duplex scan.          Results for orders placed during the hospital encounter of 11/25/19   Adult Transesophageal Echo (LILIAN) W/ Cont if Necessary Per Protocol    Narrative · Left ventricular systolic function is normal.  · Left atrial cavity size is mildly dilated.  · Mild mitral valve regurgitation is present  · Mild tricuspid valve regurgitation is present.  · LV ejection fraction is about 60%  · Significant patent foramen ovale noted by bubble contrast study  · No pericardial effusion noted  · Aorta has minimal atherosclerosis  · Technically difficult study          Ct Abdomen Pelvis Stone Protocol    Result Date: 12/4/2020  1.No obstructive uropathy or  nephroureterolithiasis. There is high density material within the collecting system related to recent gadolinium contrast administration for lumbar spine imaging. 2.Benign left renal cyst. 3.There is a questionable nodule at the distal pancreatic body. It measures about 1.3 cm. Dedicated pancreatic MRI or CT should be performed. This should be performed nonemergently. It may be a area of less fatty infiltrated glandular tissue. No pancreatic duct dilatation. 4.Moderate stool burden, question constipation. 5.Diverticulosis without diverticulitis. 6.Small fat-containing right inguinal hernia and periumbilical hernia. 7.Status post cholecystectomy without bile duct dilatation.    Electronically Signed By-Carmelina Marie MD On:12/4/2020 6:06 PM This report was finalized on [id number] by  Carmelina Marie MD.    Mri Lumbar Spine With & Without Contrast    Result Date: 12/4/2020   1. Degenerative changes of lumbar spine as described in the report. Please refer to body of report for level by level findings. 2. No high-grade lumbar canal stenosis is seen. Mild to moderate canal stenosis is present at L2-3 and L4-5. 3. Varying degrees of neural foraminal narrowing, moderate on the left at L3-4, moderate bilaterally (left greater than right) at L4-5, moderate to severe on the right at L5-S1. 4. No epidural fluid collection is identified.  Electronically Signed By-Charla Nelson MD On:12/4/2020 2:52 PM This report was finalized on [id number] by  Charla Nelson MD.      Xrays, labs reviewed personally by me.  12/08/20  3:41 PM EST      Condition on Discharge:    Stable    Discharge Diet:   Dietary Orders (From admission, onward)     Start     Ordered    12/04/20 1288  Diet Cardiac, Diabetic/Consistent Carbs; Healthy Heart; Diabetic - Consistent Carb  Diet Effective Now     Question Answer Comment   Diet / Texture / Consistency Cardiac    Diet / Texture / Consistency Diabetic/Consistent Carbs    Select Type: Healthy Heart     Select Type: Diabetic - Consistent Carb        12/04/20 2244                Activity at Discharge:   Activity Instructions     Activity as Tolerated      Activity as Tolerated            Follow-up Appointments    Future Appointments   Date Time Provider Department Center   12/16/2020  2:00 PM [facility] PRE SCREEN [facility]  [facility]   12/18/2020  8:00 PM [facility] SLEEP ROOM 3 [facility] SLEEP [facility]   1/6/2021  1:00 PM Mitchel Jaime MD [facility] PAIN  NA [facility]   2/15/2021  8:30 AM Leda Duckworth DO [facility] RIVRG [facility]   3/11/2021  4:00 PM Hans Doyle MD [facility] CARD CTR NA       Additional Instructions for the Follow-ups that You Need to Schedule     Ambulatory Referral to Physical Therapy Evaluate and treat ([facility] to evaluate and treat as indicated. )   As directed      Specialty needed: Evaluate and treat ([facility] to evaluate and treat as indicated. )         Call MD With Problems / Concerns   As directed      Instructions: Call [phone] or email [email or web address] for problems or concerns.    Order Comments: Instructions: Call [phone] or email [email or web address] for problems or concerns.          Discharge Follow-up with PCP   As directed       Currently Documented PCP:    Leda Duckworth DO    PCP Phone Number:    [phone]     Follow Up Details: 1-2 weeks         Discharge Follow-up with PCP   As directed       Currently Documented PCP:    Leda Duckworth DO    PCP Phone Number:    [phone]     Follow Up Details: If no PCP, call MD finder at [phone]         Discharge Follow-up with Specified Provider: neurosurgery; 2 Months   As directed      To: neurosurgery    Follow Up: 2 Months    Follow Up Details: for neuroforaminal narrowing l5-s1         Discharge Follow-up with Specified Provider: pain clinic; 1 Month   As directed      To: pain clinic    Follow Up: 1 Month         Discharge Follow-up with Specified Provider: pain management, Dr. Mitchel Jaime;  3 Weeks   As directed      To: pain management, Dr. Mitchel Jaime    Follow Up: 3 Weeks    Follow Up Details: 3-4 weeks           Follow-up Information     Leda Duckworth DO Follow up.    Specialty: Family Medicine  Why: 1-2 weeks fu on pancreatic nodule.dedicatedpancreatic MRInon emergent,per  PCP  Contact information:  [address]  [address]  [place] [facility]  [phone]             Leda Duckworth DO .    Specialty: Family Medicine  Why: If no PCP, call MD finder at [phone]  Contact information:  [address]  [address]  [place] [facility]  [phone]                    Test Results Pending at Discharge  Pending Labs     Order Current Status    MIKE In process    C-reactive Protein In process    Cyclic Citrul Peptide Antibody, IgG / IgA In process           Risk for Readmission (LACE) Score: 17 (12/8/2020  6:00 AM)        Time: I spent  more than 35 minutes on this discharge activity which included: face-to-face encounter with the patient, reviewing the data in the system, coordination of the care with the nursing staff as well as consultants, documentation, and entering orders.   Care coordination with nursing for current care and discharge planning        Jeremiah Watkins MD  12/08/20  15:48 EST

## 2020-12-08 NOTE — PLAN OF CARE
Goal Outcome Evaluation:  Plan of Care Reviewed With: patient  Progress: no change  Outcome Summary: pt is not comfortable going home today. It has been discussed with pt and Dr. Watkins but orders have been placed for him to discharge.

## 2020-12-08 NOTE — OUTREACH NOTE
Prep Survey      Responses   [facility] patient discharged from?  [facility]   Is LACE score < 7 ?  No   Eligibility  [facility]   Date of Admission  12/04/20   Date of Discharge  12/08/20   Discharge Disposition  Home or Self Care   Discharge diagnosis  Sacroiliitis  Diabetic polyneuropathy associated with type 2 diabetes mellitus    Does the patient have one of the following disease processes/diagnoses(primary or secondary)?  Other   Does the patient have Home health ordered?  No [outpt PT]   Is there a DME ordered?  No   Prep survey completed?  Yes          Felicia Walden RN

## 2020-12-08 NOTE — NURSING NOTE
Dr. Watkins was contacted with pt complaining of chest pain. He is complaining of redness and slight swelling to left elbow as well as stating that his back pain is not any beter. I have informed Dr. Watkins of all of these findings. So far all of the labs and vitals have been WNL.

## 2020-12-08 NOTE — PLAN OF CARE
Goal Outcome Evaluation:  Plan of Care Reviewed With: patient  Progress: no change  Outcome Summary: Patient recieved pain mgmt block 12/7. Resting and appears to be helping slightly with pain control.PT to work with patient tomorrow and then hopeful to DC after.

## 2020-12-08 NOTE — PLAN OF CARE
Goal Outcome Evaluation:  Plan of Care Reviewed With: patient  Progress: no change  Outcome Summary: Pt is 64 yo male admitted for left low back pain with MRI L-spine showing severe degenerative changes of L-spine.  Pt with possible sacroiliac joint dysfunction.  Pt s/p left sacroiliac joint injection on 12/7/2020.  Pt presents with ongoing back discomfort stating injection was minimally effective yesterday.  Pt able to complete bed mobility with indep after education on logroll technique to assist and transfers with indep.  Pt able to ambulate 300 ft with mild lateral sway, decreased toe clearance left LE and decreased step length left LE that pt contributes to hx of CVA.  No signs of LOB or safety concerns with mobility.  Pt appears close to functional mobility baseline.  No further inpatient PT needs at this time.  Pt can be up ad jacquelin.  To assist with back discomfort, PT recommending OPPT.  PPE donned: mask with faceshield, gloves.

## 2020-12-09 ENCOUNTER — TRANSITIONAL CARE MANAGEMENT TELEPHONE ENCOUNTER (OUTPATIENT)
Dept: CALL CENTER | Facility: HOSPITAL | Age: 65
End: 2020-12-09

## 2020-12-09 LAB
ANA SER QL: NEGATIVE
CRP SERPL-MCNC: 2 MG/L (ref 0–10)

## 2020-12-09 NOTE — OUTREACH NOTE
Call Center TCM Note      Responses   [facility] patient discharged from?  [facility]   Does the patient have one of the following disease processes/diagnoses(primary or secondary)?  Other   TCM attempt successful?  No   Unsuccessful attempts  Attempt 1          Priscilla Andrew LPN    12/9/2020, 11:22 EST

## 2020-12-09 NOTE — OUTREACH NOTE
Call Center TCM Note      Responses   [facility] patient discharged from?  [facility]   Does the patient have one of the following disease processes/diagnoses(primary or secondary)?  Other   TCM attempt successful?  No   Unsuccessful attempts  Attempt 2          Priscilla Andrew LPN    12/9/2020, 16:03 EST

## 2020-12-09 NOTE — PROGRESS NOTES
Case Management Discharge Note      Final Note: Offered patient home health and patient refused and just wanted outpatient physical therapy. Patient also denied using or needing medicaid waiver services. New referral to [facility]. Spoke with Makenzie, Patient accepted and the hub will call him with an appointment. Patient had stated yesterday that PT had already called him with an appointment.    Provided Post Acute Provider List?: Yes  Post Acute Provider List: Outpatient Therapy  Delivered To: Patient  Method of Delivery: Telephone    Selected Continued Care - Discharged on 12/8/2020 Admission date: 12/4/2020 - Discharge disposition: Home or Self Care        Therapy     Service Provider Selected Services Address Phone Fax Patient Preferred    [facility] PHYSICAL THERAPY [facility]  [facility]patient [facility] [address] [place] [address] [phone] --                              Final Discharge Disposition Code: 01 - home or self-care

## 2020-12-10 ENCOUNTER — TRANSITIONAL CARE MANAGEMENT TELEPHONE ENCOUNTER (OUTPATIENT)
Dept: CALL CENTER | Facility: HOSPITAL | Age: 65
End: 2020-12-10

## 2020-12-10 LAB — CCP IGA+IGG SERPL IA-ACNC: 4 UNITS (ref 0–19)

## 2020-12-10 NOTE — OUTREACH NOTE
Call Center TCM Note      Responses   [facility] patient discharged from?  [facility]   Does the patient have one of the following disease processes/diagnoses(primary or secondary)?  Other   TCM attempt successful?  No [No one listed on verbal release]   Unsuccessful attempts  Attempt 3          Alondra Mc RN    12/10/2020, 08:26 EST

## 2020-12-11 DIAGNOSIS — M54.42 CHRONIC MIDLINE LOW BACK PAIN WITH LEFT-SIDED SCIATICA: ICD-10-CM

## 2020-12-11 DIAGNOSIS — M54.16 LUMBAR BACK PAIN WITH RADICULOPATHY AFFECTING LEFT LOWER EXTREMITY: ICD-10-CM

## 2020-12-11 DIAGNOSIS — G89.29 CHRONIC MIDLINE LOW BACK PAIN WITH LEFT-SIDED SCIATICA: ICD-10-CM

## 2020-12-11 RX ORDER — OXYCODONE HYDROCHLORIDE 15 MG/1
TABLET ORAL
Qty: 120 TABLET | Refills: 0 | OUTPATIENT
Start: 2020-12-11

## 2020-12-13 ENCOUNTER — HOSPITAL ENCOUNTER (OUTPATIENT)
Dept: MRI IMAGING | Facility: HOSPITAL | Age: 65
Discharge: HOME | End: 2020-12-13
Admitting: FAMILY MEDICINE

## 2020-12-13 DIAGNOSIS — K86.89 PANCREATIC MASS: ICD-10-CM

## 2020-12-13 PROCEDURE — A9579 GAD-BASE MR CONTRAST NOS,1ML: HCPCS | Performed by: FAMILY MEDICINE

## 2020-12-13 PROCEDURE — 25010000002 GADOTERIDOL PER 1 ML: Performed by: FAMILY MEDICINE

## 2020-12-13 PROCEDURE — 74183 MRI ABD W/O CNTR FLWD CNTR: CPT

## 2020-12-13 RX ADMIN — GADOTERIDOL 20 ML: 279.3 INJECTION, SOLUTION INTRAVENOUS at 10:15

## 2020-12-15 ENCOUNTER — TELEPHONE (OUTPATIENT)
Dept: FAMILY MEDICINE CLINIC | Facility: CLINIC | Age: 65
End: 2020-12-15

## 2020-12-15 ENCOUNTER — OFFICE VISIT (OUTPATIENT)
Dept: FAMILY MEDICINE CLINIC | Facility: CLINIC | Age: 65
End: 2020-12-15

## 2020-12-15 VITALS
SYSTOLIC BLOOD PRESSURE: 138 MMHG | TEMPERATURE: 96.9 F | HEIGHT: 68 IN | WEIGHT: 251 LBS | BODY MASS INDEX: 38.04 KG/M2 | OXYGEN SATURATION: 97 % | RESPIRATION RATE: 18 BRPM | HEART RATE: 86 BPM | DIASTOLIC BLOOD PRESSURE: 88 MMHG

## 2020-12-15 DIAGNOSIS — G89.29 CHRONIC MIDLINE LOW BACK PAIN WITH LEFT-SIDED SCIATICA: ICD-10-CM

## 2020-12-15 DIAGNOSIS — E11.42 DIABETIC POLYNEUROPATHY ASSOCIATED WITH TYPE 2 DIABETES MELLITUS (HCC): ICD-10-CM

## 2020-12-15 DIAGNOSIS — M54.42 CHRONIC MIDLINE LOW BACK PAIN WITH LEFT-SIDED SCIATICA: ICD-10-CM

## 2020-12-15 DIAGNOSIS — M54.16 LUMBAR BACK PAIN WITH RADICULOPATHY AFFECTING LEFT LOWER EXTREMITY: ICD-10-CM

## 2020-12-15 PROCEDURE — 99214 OFFICE O/P EST MOD 30 MIN: CPT | Performed by: FAMILY MEDICINE

## 2020-12-15 RX ORDER — PREGABALIN 300 MG/1
300 CAPSULE ORAL NIGHTLY
Qty: 90 CAPSULE | Refills: 0 | Status: SHIPPED | OUTPATIENT
Start: 2020-12-15 | End: 2021-01-12

## 2020-12-15 RX ORDER — INSULIN GLARGINE 100 [IU]/ML
30 INJECTION, SOLUTION SUBCUTANEOUS NIGHTLY
Qty: 4 PEN | Refills: 0
Start: 2020-12-15 | End: 2021-04-01

## 2020-12-15 RX ORDER — DAPAGLIFLOZIN 10 MG/1
10 TABLET, FILM COATED ORAL EVERY EVENING
Qty: 90 TABLET | Refills: 1 | Status: SHIPPED | OUTPATIENT
Start: 2020-12-15 | End: 2021-04-01 | Stop reason: SDUPTHER

## 2020-12-15 RX ORDER — OXYCODONE HYDROCHLORIDE 15 MG/1
7.5 TABLET ORAL EVERY 8 HOURS PRN
Qty: 30 TABLET | Refills: 0 | Status: SHIPPED | OUTPATIENT
Start: 2020-12-15 | End: 2020-12-18

## 2020-12-15 RX ORDER — OXYCODONE 27 MG/1
1 CAPSULE, EXTENDED RELEASE ORAL 2 TIMES DAILY
Qty: 60 EACH
Start: 2020-12-15 | End: 2021-02-15

## 2020-12-15 RX ORDER — ATORVASTATIN CALCIUM 40 MG/1
40 TABLET, FILM COATED ORAL NIGHTLY
Qty: 90 TABLET | Refills: 1 | Status: SHIPPED | OUTPATIENT
Start: 2020-12-15 | End: 2021-04-01 | Stop reason: SDUPTHER

## 2020-12-15 RX ORDER — DULOXETIN HYDROCHLORIDE 30 MG/1
30 CAPSULE, DELAYED RELEASE ORAL DAILY
Qty: 30 CAPSULE | Refills: 1 | Status: SHIPPED | OUTPATIENT
Start: 2020-12-15 | End: 2021-02-15

## 2020-12-15 RX ORDER — PREGABALIN 150 MG/1
CAPSULE ORAL
Qty: 180 CAPSULE | Refills: 0 | Status: SHIPPED
Start: 2020-12-15 | End: 2020-12-15 | Stop reason: SDUPTHER

## 2020-12-15 NOTE — TELEPHONE ENCOUNTER
I called the pharmacy and they don't have the oxycodone 7.5 but they can order it tomorrow and it should be in on Thursday.   The only have the 5mg,10mg,15mg. She could change it to a 15mg and have him cut it in 1/2.       (the one you sent in doesn't require a PA and it will only cost around $2.00)

## 2020-12-16 ENCOUNTER — READMISSION MANAGEMENT (OUTPATIENT)
Dept: CALL CENTER | Facility: HOSPITAL | Age: 65
End: 2020-12-16

## 2020-12-16 NOTE — TELEPHONE ENCOUNTER
I called to confirm with the pharmacy that they received the 15mg oxycodone and he picked up the 30 pills which is a 20 day supply for $0.85 yesterday.  They will no longer be able to get the 7.5mg for future reference.

## 2020-12-16 NOTE — OUTREACH NOTE
Medical Week 2 Survey      Responses   [facility] patient discharged from?  [facility]   Does the patient have one of the following disease processes/diagnoses(primary or secondary)?  Other   Week 2 attempt successful?  Yes   Call start time  1321   Call end time  1331   Meds reviewed with patient/caregiver?  Yes   Does the patient have all medications ordered at discharge?  N/A   Is the patient taking all medications as directed (includes completed medication regime)?  Yes   Does the patient have a primary care provider?   Yes   Has the patient kept scheduled appointments due by today?  Yes   Did the patient receive a copy of their discharge instructions?  Yes   Nursing interventions  Reviewed instructions with patient   What is the patient's perception of their health status since discharge?  Improving   Is the patient/caregiver able to teach back signs and symptoms related to disease process for when to call PCP?  Yes   Is the patient/caregiver able to teach back signs and symptoms related to disease process for when to call 911?  Yes   Is the patient/caregiver able to teach back the hierarchy of who to call/visit for symptoms/problems? PCP, Specialist, Home health nurse, Urgent Care, ED, 911  Yes   If the patient is a current smoker, are they able to teach back resources for cessation?  Not a smoker   Week 2 Call Completed?  Yes          Montse Carmona LPN

## 2020-12-18 ENCOUNTER — TELEPHONE (OUTPATIENT)
Dept: FAMILY MEDICINE CLINIC | Facility: CLINIC | Age: 65
End: 2020-12-18

## 2020-12-18 DIAGNOSIS — G89.29 CHRONIC MIDLINE LOW BACK PAIN WITH LEFT-SIDED SCIATICA: ICD-10-CM

## 2020-12-18 DIAGNOSIS — M54.42 CHRONIC MIDLINE LOW BACK PAIN WITH LEFT-SIDED SCIATICA: ICD-10-CM

## 2020-12-18 DIAGNOSIS — M54.16 LUMBAR BACK PAIN WITH RADICULOPATHY AFFECTING LEFT LOWER EXTREMITY: ICD-10-CM

## 2020-12-18 RX ORDER — OXYCODONE HYDROCHLORIDE 15 MG/1
15 TABLET ORAL EVERY 8 HOURS PRN
Qty: 90 TABLET | Refills: 0 | Status: SHIPPED | OUTPATIENT
Start: 2020-12-18 | End: 2021-01-14

## 2020-12-18 NOTE — TELEPHONE ENCOUNTER
Patient called and left a VM stating that the pain medication oxycodone 15mg 1/2 tablet isn't working and he needs to take a whole tablet.

## 2020-12-18 NOTE — TELEPHONE ENCOUNTER
Yes, he is taking the long acting pill also. He does get relief if he takes the full pill of short acting, but he only got #30 last time, and says he will be out before [facility].

## 2020-12-22 ENCOUNTER — READMISSION MANAGEMENT (OUTPATIENT)
Dept: CALL CENTER | Facility: HOSPITAL | Age: 65
End: 2020-12-22

## 2020-12-22 ENCOUNTER — HOSPITAL ENCOUNTER (EMERGENCY)
Facility: HOSPITAL | Age: 65
Discharge: HOME | End: 2020-12-22
Attending: EMERGENCY MEDICINE | Admitting: EMERGENCY MEDICINE

## 2020-12-22 VITALS
DIASTOLIC BLOOD PRESSURE: 81 MMHG | SYSTOLIC BLOOD PRESSURE: 117 MMHG | WEIGHT: 251 LBS | HEIGHT: 68 IN | OXYGEN SATURATION: 92 % | RESPIRATION RATE: 18 BRPM | BODY MASS INDEX: 38.04 KG/M2 | TEMPERATURE: 97.5 F | HEART RATE: 95 BPM

## 2020-12-22 DIAGNOSIS — M54.50 LOW BACK PAIN, UNSPECIFIED BACK PAIN LATERALITY, UNSPECIFIED CHRONICITY, UNSPECIFIED WHETHER SCIATICA PRESENT: Primary | ICD-10-CM

## 2020-12-22 LAB
ALBUMIN SERPL-MCNC: 4 G/DL (ref 3.5–5.2)
ALBUMIN/GLOB SERPL: 1.7 G/DL
ALP SERPL-CCNC: 77 U/L (ref 39–117)
ALT SERPL W P-5'-P-CCNC: 16 U/L (ref 1–41)
ANION GAP SERPL CALCULATED.3IONS-SCNC: 12 MMOL/L (ref 5–15)
AST SERPL-CCNC: 14 U/L (ref 1–40)
BASOPHILS # BLD AUTO: 0.1 10*3/MM3 (ref 0–0.2)
BASOPHILS NFR BLD AUTO: 0.8 % (ref 0–1.5)
BILIRUB SERPL-MCNC: 0.5 MG/DL (ref 0–1.2)
BUN SERPL-MCNC: 15 MG/DL (ref 8–23)
BUN/CREAT SERPL: 13.2 (ref 7–25)
CALCIUM SPEC-SCNC: 8.8 MG/DL (ref 8.6–10.5)
CHLORIDE SERPL-SCNC: 103 MMOL/L (ref 98–107)
CO2 SERPL-SCNC: 22 MMOL/L (ref 22–29)
CREAT SERPL-MCNC: 1.14 MG/DL (ref 0.76–1.27)
DEPRECATED RDW RBC AUTO: 44.2 FL (ref 37–54)
EOSINOPHIL # BLD AUTO: 0.1 10*3/MM3 (ref 0–0.4)
EOSINOPHIL NFR BLD AUTO: 1.7 % (ref 0.3–6.2)
ERYTHROCYTE [DISTWIDTH] IN BLOOD BY AUTOMATED COUNT: 14.3 % (ref 12.3–15.4)
GFR SERPL CREATININE-BSD FRML MDRD: 64 ML/MIN/1.73
GLOBULIN UR ELPH-MCNC: 2.3 GM/DL
GLUCOSE SERPL-MCNC: 198 MG/DL (ref 65–99)
HCT VFR BLD AUTO: 45.8 % (ref 37.5–51)
HGB BLD-MCNC: 15.2 G/DL (ref 13–17.7)
LYMPHOCYTES # BLD AUTO: 2.3 10*3/MM3 (ref 0.7–3.1)
LYMPHOCYTES NFR BLD AUTO: 32.5 % (ref 19.6–45.3)
MCH RBC QN AUTO: 29.7 PG (ref 26.6–33)
MCHC RBC AUTO-ENTMCNC: 33.2 G/DL (ref 31.5–35.7)
MCV RBC AUTO: 89.6 FL (ref 79–97)
MONOCYTES # BLD AUTO: 0.6 10*3/MM3 (ref 0.1–0.9)
MONOCYTES NFR BLD AUTO: 8 % (ref 5–12)
NEUTROPHILS NFR BLD AUTO: 4 10*3/MM3 (ref 1.7–7)
NEUTROPHILS NFR BLD AUTO: 57 % (ref 42.7–76)
NRBC BLD AUTO-RTO: 0.3 /100 WBC (ref 0–0.2)
PLATELET # BLD AUTO: 148 10*3/MM3 (ref 140–450)
PMV BLD AUTO: 9.4 FL (ref 6–12)
POTASSIUM SERPL-SCNC: 4.3 MMOL/L (ref 3.5–5.2)
PROT SERPL-MCNC: 6.3 G/DL (ref 6–8.5)
RBC # BLD AUTO: 5.11 10*6/MM3 (ref 4.14–5.8)
SODIUM SERPL-SCNC: 137 MMOL/L (ref 136–145)
WBC # BLD AUTO: 7.1 10*3/MM3 (ref 3.4–10.8)

## 2020-12-22 PROCEDURE — 85025 COMPLETE CBC W/AUTO DIFF WBC: CPT | Performed by: EMERGENCY MEDICINE

## 2020-12-22 PROCEDURE — 25010000002 HYDROMORPHONE PER 4 MG: Performed by: EMERGENCY MEDICINE

## 2020-12-22 PROCEDURE — 25010000002 KETOROLAC TROMETHAMINE PER 15 MG: Performed by: EMERGENCY MEDICINE

## 2020-12-22 PROCEDURE — 96376 TX/PRO/DX INJ SAME DRUG ADON: CPT

## 2020-12-22 PROCEDURE — 25010000002 DROPERIDOL PER 5 MG: Performed by: EMERGENCY MEDICINE

## 2020-12-22 PROCEDURE — 25010000002 DEXAMETHASONE SODIUM PHOSPHATE 10 MG/ML SOLUTION: Performed by: EMERGENCY MEDICINE

## 2020-12-22 PROCEDURE — 99283 EMERGENCY DEPT VISIT LOW MDM: CPT

## 2020-12-22 PROCEDURE — 96375 TX/PRO/DX INJ NEW DRUG ADDON: CPT

## 2020-12-22 PROCEDURE — 80053 COMPREHEN METABOLIC PANEL: CPT | Performed by: EMERGENCY MEDICINE

## 2020-12-22 PROCEDURE — 25010000002 ONDANSETRON PER 1 MG: Performed by: EMERGENCY MEDICINE

## 2020-12-22 PROCEDURE — 96374 THER/PROPH/DIAG INJ IV PUSH: CPT

## 2020-12-22 RX ORDER — DEXAMETHASONE SODIUM PHOSPHATE 10 MG/ML
10 INJECTION, SOLUTION INTRAMUSCULAR; INTRAVENOUS ONCE
Status: COMPLETED | OUTPATIENT
Start: 2020-12-22 | End: 2020-12-22

## 2020-12-22 RX ORDER — DROPERIDOL 2.5 MG/ML
1.25 INJECTION, SOLUTION INTRAMUSCULAR; INTRAVENOUS ONCE
Status: COMPLETED | OUTPATIENT
Start: 2020-12-22 | End: 2020-12-22

## 2020-12-22 RX ORDER — SODIUM CHLORIDE 0.9 % (FLUSH) 0.9 %
10 SYRINGE (ML) INJECTION AS NEEDED
Status: DISCONTINUED | OUTPATIENT
Start: 2020-12-22 | End: 2020-12-22 | Stop reason: HOSPADM

## 2020-12-22 RX ORDER — HYDROMORPHONE HCL 110MG/55ML
1 PATIENT CONTROLLED ANALGESIA SYRINGE INTRAVENOUS ONCE
Status: COMPLETED | OUTPATIENT
Start: 2020-12-22 | End: 2020-12-22

## 2020-12-22 RX ORDER — ONDANSETRON 2 MG/ML
4 INJECTION INTRAMUSCULAR; INTRAVENOUS ONCE
Status: COMPLETED | OUTPATIENT
Start: 2020-12-22 | End: 2020-12-22

## 2020-12-22 RX ORDER — KETOROLAC TROMETHAMINE 15 MG/ML
10 INJECTION, SOLUTION INTRAMUSCULAR; INTRAVENOUS ONCE
Status: COMPLETED | OUTPATIENT
Start: 2020-12-22 | End: 2020-12-22

## 2020-12-22 RX ADMIN — HYDROMORPHONE HYDROCHLORIDE 1 MG: 2 INJECTION, SOLUTION INTRAMUSCULAR; INTRAVENOUS; SUBCUTANEOUS at 18:01

## 2020-12-22 RX ADMIN — KETOROLAC TROMETHAMINE 10 MG: 15 INJECTION, SOLUTION INTRAMUSCULAR; INTRAVENOUS at 16:26

## 2020-12-22 RX ADMIN — ONDANSETRON 4 MG: 2 INJECTION, SOLUTION INTRAMUSCULAR; INTRAVENOUS at 16:26

## 2020-12-22 RX ADMIN — HYDROMORPHONE HYDROCHLORIDE 1 MG: 2 INJECTION, SOLUTION INTRAMUSCULAR; INTRAVENOUS; SUBCUTANEOUS at 16:26

## 2020-12-22 RX ADMIN — DROPERIDOL 1.25 MG: 2.5 INJECTION, SOLUTION INTRAMUSCULAR; INTRAVENOUS at 18:01

## 2020-12-22 RX ADMIN — DEXAMETHASONE SODIUM PHOSPHATE 10 MG: 10 INJECTION, SOLUTION INTRAMUSCULAR; INTRAVENOUS at 16:26

## 2020-12-22 NOTE — OUTREACH NOTE
"Medical Week 3 Survey      Responses   [facility] patient discharged from?  [facility]   Does the patient have one of the following disease processes/diagnoses(primary or secondary)?  Other   Week 3 attempt successful?  Yes   Call start time  1139   Call end time  1139   Week 3 Call Completed?  Yes   Wrap up additional comments  PATIENT KEPT CALL VERY BRIEF STATING HE IS GETTING READY TO GO TO THE HOSPITAL DUE TO \"HURTING REALLY BAD.\"          Montse Carmona LPN  "

## 2020-12-22 NOTE — ED PROVIDER NOTES
Subjective   Chief complaint back pain    History of present illness this is a 65-year-old male who has a history of chronic back pain who has had increasing back pain over the last several weeks he just got out of the hospital about a week ago with a lot of different issues he states but mainly for back pain.  The patient has been referred for possible epidural injections but his insurance is not approved this yet.  He states that he is on Percocet without relief.  He has seen a spine doctor and is waiting on epidural injections but he has to wait on insurance approval.  The patient states that the pain has become too much today and he can hardly move.  He denies any fever chills no weight loss no night sweats he denies any loss of bladder bowel control.  He states it is ongoing continuous for last several weeks nonradiating worse with movement and better with rest.  No abdominal pain patient's appetites been normal.  No night sweats no weight loss.  Bowels have been normal urine has been normal          Review of Systems   Constitutional: Negative for chills and fever.   HENT: Negative for congestion and sinus pressure.    Eyes: Negative for photophobia and visual disturbance.   Respiratory: Negative for chest tightness and shortness of breath.    Cardiovascular: Negative for chest pain and leg swelling.   Gastrointestinal: Negative for abdominal distention, abdominal pain, blood in stool and vomiting.   Endocrine: Negative for cold intolerance and heat intolerance.   Genitourinary: Negative for difficulty urinating, dysuria, hematuria, scrotal swelling and testicular pain.   Musculoskeletal: Positive for arthralgias and back pain.   Skin: Negative for color change and pallor.   Neurological: Negative for dizziness, light-headedness and numbness.   Psychiatric/Behavioral: Negative for agitation and behavioral problems.       Past Medical History:   Diagnosis Date   • Arthritis    • BPH (benign prostatic  hyperplasia)    • Chronic back pain    • Depression    • Diabetes mellitus (CMS/HCC)    • Excessive daytime sleepiness    • Heart attack (CMS/HCC)    • High cholesterol    • Hyperlipidemia    • Hypertension    • Low back pain    • Low vision of right eye with normal vision in contralateral eye    • Memory problem    • Neuropathy    • Nonobstructive atherosclerosis of coronary artery    • Obesity    • PFO (patent foramen ovale) 11/29/2019   • Rheumatoid arthritis (CMS/HCC)    • Snoring    • Stroke (CMS/HCC)        No Known Allergies    Past Surgical History:   Procedure Laterality Date   • ARM TENDON REPAIR Left    • CARDIAC CATHETERIZATION N/A 11/16/2020    Procedure: Left Heart Cath and coronary angiogram;  Surgeon: Hans Doyle MD;  Location:  [facility];  Service: Cardiovascular;  Laterality: N/A;   • CARDIAC CATHETERIZATION N/A 11/16/2020    Procedure: Left ventriculography;  Surgeon: Hans Doyle MD;  Location:  [facility];  Service: Cardiovascular;  Laterality: N/A;   • CHOLECYSTECTOMY     • COLONOSCOPY      2018 = TA, rech 2023   • EYE SURGERY      Rt Eye Sx after trauma @ 4y/o   • JOINT REPLACEMENT      Left TKR   • REPLACEMENT TOTAL KNEE Left 2014   • ROTATOR CUFF REPAIR Bilateral    • UMBILICAL HERNIA REPAIR         Family History   Problem Relation Age of Onset   • Diabetes Mother    • Heart disease Mother    • Arthritis Mother    • Obesity Mother    • Hypertension Mother    • Migraines Mother    • Osteoporosis Mother    • Snoring Mother    • Diabetes Father    • Heart disease Father    • Arthritis Father    • Hypertension Father    • Stroke Father    • Heart attack Father    • Hyperlipidemia Father    • Snoring Father    • Diabetes Brother    • Heart disease Brother    • Arthritis Sister    • Anemia Sister        Social History     Socioeconomic History   • Marital status: Single     Spouse name: Not on file   • Number of children: Not on file   • Years of  education: Not on file   • Highest education level: Not on file   Tobacco Use   • Smoking status: Never Smoker   • Smokeless tobacco: Never Used   Substance and Sexual Activity   • Alcohol use: Not Currently     Alcohol/week: 1.0 standard drinks     Types: 1 Cans of beer per week   • Drug use: Never   • Sexual activity: Defer     Prior to Admission medications    Medication Sig Start Date End Date Taking? Authorizing Provider   apixaban (ELIQUIS) 5 MG tablet tablet Take 1 tablet by mouth Every 12 (Twelve) Hours. 10/5/20  Yes Leda Duckworth DO   aspirin 81 MG chewable tablet Chew 81 mg Daily.   Yes Provider, MD Choco   atorvastatin (LIPITOR) 40 MG tablet Take 1 tablet by mouth Every Night. 12/15/20  Yes Leda Duckworth, DO   buPROPion XL (Wellbutrin XL) 150 MG 24 hr tablet Take 1 tablet by mouth Daily. 10/5/20  Yes Leda Duckworth, DO   butalbital-acetaminophen-caffeine (FIORICET, ESGIC) -40 MG per tablet Take 1 tablet by mouth Every 6 (Six) Hours As Needed for Headache or Migraine. 7/18/20  Yes Kevyn Vogel, DO   Dapagliflozin Propanediol (Farxiga) 10 MG tablet Take 10 mg by mouth Every Evening. 12/15/20  Yes Leda Duckworth DO   DULoxetine (Cymbalta) 30 MG capsule Take 1 capsule by mouth Daily. 12/15/20  Yes Leda Duckworth, DO   Insulin Glargine (Lantus SoloStar) 100 UNIT/ML injection pen Inject 30 Units under the skin into the appropriate area as directed Every Night. 12/15/20  Yes Leda Duckworth, DO   Insulin Lispro, 1 Unit Dial, (HumaLOG KwikPen) 100 UNIT/ML solution pen-injector Inject 5 Units under the skin into the appropriate area as directed 3 (Three) Times a Day Before Meals. 11/11/20  Yes Leda Duckworth, DO   lisinopril (PRINIVIL,ZESTRIL) 2.5 MG tablet Take 1 tablet by mouth Every Night. 10/5/20  Yes Leda Duckworth, DO   oxyCODONE (ROXICODONE) 15 MG immediate release tablet Take 1 tablet by mouth Every 8 (Eight) Hours As Needed for Severe Pain  (breakthrough pain). 12/18/20  Yes  Leda Duckworth DO   oxyCODONE 7.5 MG tablet Take 1 tablet by mouth Every 8 (Eight) Hours As Needed for Severe Pain  (breakthrough). 12/15/20   Leda Duckworth DO   oxyCODONE ER (Xtampza ER) 27 MG capsule extended-release 12 hour  Take 1 capsule by mouth 2 (two) times a day. 12/15/20   Leda Duckworth DO   polyethylene glycol (MIRALAX) 17 g packet Take 17 g by mouth Daily. 11/4/20   Leda Duckworth DO   pregabalin (LYRICA) 300 MG capsule Take 1 capsule by mouth Every Night. 12/15/20   Leda Duckworth DO   tiZANidine (ZANAFLEX) 2 MG tablet Take 1 tablet by mouth At Night As Needed for Muscle Spasms. 10/21/20   Leda Duckworth DO           Objective   Physical Exam  65-year-old male awake alert no acute distress but moderate pain HEENT extraocular static sclera clear neck is supple no adenopathy no meningeal signs lungs clear no retractions heart regular without murmur abdomen soft without tenderness good bowel sounds no pulsatile masses extremities pulses are equal throughout upper and lower extremities no edema cords or Homans' sign no evidence of DVT.  Toes up-and-down including big toe dorsiflex plantarflex without difficulty neck straight leg testing reflexes noted be 2+ symmetrical knees and ankles.  Patient has some paralumbar tenderness to the left no direct bony tenderness palpation percussion throughout any of the spine he needs assistance with any type of movement.  The patient has no redness or rash noted no fluctuance.  He has good sensation including along the buttock area and the sacral and rectal area.  There is no saddle anesthesia is good sensation throughout this area.  No weakness in the legs.  Motor strength all normal.  He is able to actually get to the bedside commode and back into the bed.  He is awake alert he follows commands motor strength normal otherwise without focal weakness  Procedures           ED Course      Results for orders placed or performed during the hospital encounter of  12/22/20   Comprehensive Metabolic Panel    Specimen: Blood   Result Value Ref Range    Glucose 198 (H) 65 - 99 mg/dL    BUN 15 8 - 23 mg/dL    Creatinine 1.14 0.76 - 1.27 mg/dL    Sodium 137 136 - 145 mmol/L    Potassium 4.3 3.5 - 5.2 mmol/L    Chloride 103 98 - 107 mmol/L    CO2 22.0 22.0 - 29.0 mmol/L    Calcium 8.8 8.6 - 10.5 mg/dL    Total Protein 6.3 6.0 - 8.5 g/dL    Albumin 4.00 3.50 - 5.20 g/dL    ALT (SGPT) 16 1 - 41 U/L    AST (SGOT) 14 1 - 40 U/L    Alkaline Phosphatase 77 39 - 117 U/L    Total Bilirubin 0.5 0.0 - 1.2 mg/dL    eGFR Non African Amer 64 >60 mL/min/1.73    Globulin 2.3 gm/dL    A/G Ratio 1.7 g/dL    BUN/Creatinine Ratio 13.2 7.0 - 25.0    Anion Gap 12.0 5.0 - 15.0 mmol/L   CBC Auto Differential    Specimen: Blood   Result Value Ref Range    WBC 7.10 3.40 - 10.80 10*3/mm3    RBC 5.11 4.14 - 5.80 10*6/mm3    Hemoglobin 15.2 13.0 - 17.7 g/dL    Hematocrit 45.8 37.5 - 51.0 %    MCV 89.6 79.0 - 97.0 fL    MCH 29.7 26.6 - 33.0 pg    MCHC 33.2 31.5 - 35.7 g/dL    RDW 14.3 12.3 - 15.4 %    RDW-SD 44.2 37.0 - 54.0 fl    MPV 9.4 6.0 - 12.0 fL    Platelets 148 140 - 450 10*3/mm3    Neutrophil % 57.0 42.7 - 76.0 %    Lymphocyte % 32.5 19.6 - 45.3 %    Monocyte % 8.0 5.0 - 12.0 %    Eosinophil % 1.7 0.3 - 6.2 %    Basophil % 0.8 0.0 - 1.5 %    Neutrophils, Absolute 4.00 1.70 - 7.00 10*3/mm3    Lymphocytes, Absolute 2.30 0.70 - 3.10 10*3/mm3    Monocytes, Absolute 0.60 0.10 - 0.90 10*3/mm3    Eosinophils, Absolute 0.10 0.00 - 0.40 10*3/mm3    Basophils, Absolute 0.10 0.00 - 0.20 10*3/mm3    nRBC 0.3 (H) 0.0 - 0.2 /100 WBC     No radiology results for the last day  Medications   HYDROmorphone (DILAUDID) injection 1 mg (1 mg Intravenous Given 12/22/20 1626)   ondansetron (ZOFRAN) injection 4 mg (4 mg Intravenous Given 12/22/20 1626)   ketorolac (TORADOL) injection 10 mg (10 mg Intravenous Given 12/22/20 1626)   dexamethasone sodium phosphate injection 10 mg (10 mg Intravenous Given 12/22/20 1626)    HYDROmorphone (DILAUDID) injection 1 mg (1 mg Intravenous Given 12/22/20 1801)   droperidol (INAPSINE) injection 1.25 mg (1.25 mg Intravenous Given 12/22/20 1801)                                            MDM  Number of Diagnoses or Management Options  Low back pain, unspecified back pain laterality, unspecified chronicity, unspecified whether sciatica present:   Diagnosis management comments: Medical decision making.  Patient had IV established placed on monitor and had the above exam evaluation CBC electrolytes are unremarkable blood sugar was 198.  The patient given Dilaudid 1 mg IV Zofran 4 mg IV and Toradol 10 mg IV.  The patient continued to have pain given additional milligram Dilaudid IV and Inapsine 1.25 mg IV.  Patient's record reviewed he just had an MRI needs in the hospital and multiple degenerative changes.  Did not see any need to redo this tonight.  He is on anticoagulants but I see no evidence that suggest acute cord compression or hemorrhage or epidural hemorrhage or abscess or infection based on his clinical exam and his recent admission and tonight's exam.  Patient was feeling better at this point seen on 7 acute intra-abdominal process no evidence of infection no evidence of cord compression or cauda equina.  He has no weakness in extremities no numbness good sensation no sacral or saddle anesthesia.  He looks well he will be discharged home for outpatient management we did talk about those things with return for he versus understanding and he will be discharged home for outpatient follow-up       Amount and/or Complexity of Data Reviewed  Clinical lab tests: reviewed        Final diagnoses:   Low back pain, unspecified back pain laterality, unspecified chronicity, unspecified whether sciatica present            Honorio Perez MD  12/22/20 1292

## 2020-12-28 ENCOUNTER — TELEPHONE (OUTPATIENT)
Dept: PAIN MEDICINE | Facility: CLINIC | Age: 65
End: 2020-12-28

## 2020-12-29 ENCOUNTER — READMISSION MANAGEMENT (OUTPATIENT)
Dept: CALL CENTER | Facility: HOSPITAL | Age: 65
End: 2020-12-29

## 2020-12-29 LAB — QT INTERVAL: 426 MS

## 2020-12-29 NOTE — OUTREACH NOTE
Medical Week 4 Survey      Responses   [facility] patient discharged from?  [facility]   Does the patient have one of the following disease processes/diagnoses(primary or secondary)?  Other   Week 4 attempt successful?  No          Tevin Lam RN

## 2021-01-05 ENCOUNTER — TELEPHONE (OUTPATIENT)
Dept: FAMILY MEDICINE CLINIC | Facility: CLINIC | Age: 66
End: 2021-01-05

## 2021-01-05 NOTE — TELEPHONE ENCOUNTER
Patient called and is asking if you can refer him to a chiropractor and would like to know who you would recommend.

## 2021-01-05 NOTE — TELEPHONE ENCOUNTER
Lorraine Grubbs---[phone]  [address] [facility]  He shouldn't need a referral

## 2021-01-06 ENCOUNTER — OFFICE VISIT (OUTPATIENT)
Dept: FAMILY MEDICINE CLINIC | Facility: CLINIC | Age: 66
End: 2021-01-06

## 2021-01-06 DIAGNOSIS — E11.65 TYPE 2 DIABETES MELLITUS WITH HYPERGLYCEMIA, WITH LONG-TERM CURRENT USE OF INSULIN (HCC): Primary | ICD-10-CM

## 2021-01-06 DIAGNOSIS — G89.29 CHRONIC MIDLINE LOW BACK PAIN WITHOUT SCIATICA: Chronic | ICD-10-CM

## 2021-01-06 DIAGNOSIS — Z79.4 TYPE 2 DIABETES MELLITUS WITH HYPERGLYCEMIA, WITH LONG-TERM CURRENT USE OF INSULIN (HCC): Primary | ICD-10-CM

## 2021-01-06 DIAGNOSIS — M54.50 CHRONIC MIDLINE LOW BACK PAIN WITHOUT SCIATICA: Chronic | ICD-10-CM

## 2021-01-06 PROBLEM — E11.9 DIABETES MELLITUS: Chronic | Status: ACTIVE | Noted: 2020-04-06

## 2021-01-06 PROCEDURE — 99212 OFFICE O/P EST SF 10 MIN: CPT | Performed by: FAMILY MEDICINE

## 2021-01-06 NOTE — PROGRESS NOTES
Subjective   Humble Green is a 65 y.o. male.     Chief Complaint   Patient presents with   • Back Pain         Current Outpatient Medications:   •  apixaban (ELIQUIS) 5 MG tablet tablet, Take 1 tablet by mouth Every 12 (Twelve) Hours., Disp: 180 tablet, Rfl: 0  •  aspirin 81 MG chewable tablet, Chew 81 mg Daily., Disp: , Rfl:   •  atorvastatin (LIPITOR) 40 MG tablet, Take 1 tablet by mouth Every Night., Disp: 90 tablet, Rfl: 1  •  buPROPion XL (Wellbutrin XL) 150 MG 24 hr tablet, Take 1 tablet by mouth Daily., Disp: 90 tablet, Rfl: 1  •  butalbital-acetaminophen-caffeine (FIORICET, ESGIC) -40 MG per tablet, Take 1 tablet by mouth Every 6 (Six) Hours As Needed for Headache or Migraine., Disp: 20 tablet, Rfl: 0  •  Dapagliflozin Propanediol (Farxiga) 10 MG tablet, Take 10 mg by mouth Every Evening., Disp: 90 tablet, Rfl: 1  •  DULoxetine (Cymbalta) 30 MG capsule, Take 1 capsule by mouth Daily., Disp: 30 capsule, Rfl: 1  •  Insulin Glargine (Lantus SoloStar) 100 UNIT/ML injection pen, Inject 30 Units under the skin into the appropriate area as directed Every Night., Disp: 4 pen, Rfl: 0  •  Insulin Lispro, 1 Unit Dial, (HumaLOG KwikPen) 100 UNIT/ML solution pen-injector, Inject 5 Units under the skin into the appropriate area as directed 3 (Three) Times a Day Before Meals., Disp: 9 pen, Rfl: 0  •  lisinopril (PRINIVIL,ZESTRIL) 2.5 MG tablet, Take 1 tablet by mouth Every Night., Disp: , Rfl:   •  oxyCODONE (ROXICODONE) 15 MG immediate release tablet, Take 1 tablet by mouth Every 8 (Eight) Hours As Needed for Severe Pain  (breakthrough pain)., Disp: 90 tablet, Rfl: 0  •  oxyCODONE 7.5 MG tablet, Take 1 tablet by mouth Every 8 (Eight) Hours As Needed for Severe Pain  (breakthrough)., Disp: 60 tablet, Rfl: 0  •  oxyCODONE ER (Xtampza ER) 27 MG capsule extended-release 12 hour , Take 1 capsule by mouth 2 (two) times a day., Disp: 60 each, Rfl:   •  polyethylene glycol (MIRALAX) 17 g packet, Take 17 g by mouth Daily.,  Disp:  , Rfl:   •  pregabalin (LYRICA) 300 MG capsule, Take 1 capsule by mouth Every Night., Disp: 90 capsule, Rfl: 0  •  tiZANidine (ZANAFLEX) 2 MG tablet, Take 1 tablet by mouth At Night As Needed for Muscle Spasms., Disp: 30 tablet, Rfl: 0    Past Medical History:   Diagnosis Date   • Arthritis    • BPH (benign prostatic hyperplasia)    • Chronic back pain    • Depression    • Diabetes mellitus (CMS/HCC)    • Excessive daytime sleepiness    • Heart attack (CMS/HCC)    • High cholesterol    • Hyperlipidemia    • Hypertension    • Low back pain    • Low vision of right eye with normal vision in contralateral eye    • Memory problem    • Neuropathy    • Nonobstructive atherosclerosis of coronary artery    • Obesity    • PFO (patent foramen ovale) 11/29/2019   • Rheumatoid arthritis (CMS/HCC)    • Snoring    • Stroke (CMS/HCC)        Past Surgical History:   Procedure Laterality Date   • ARM TENDON REPAIR Left    • CARDIAC CATHETERIZATION N/A 11/16/2020    Procedure: Left Heart Cath and coronary angiogram;  Surgeon: Hans Doyle MD;  Location: [facility] CATH INVASIVE LOCATION;  Service: Cardiovascular;  Laterality: N/A;   • CARDIAC CATHETERIZATION N/A 11/16/2020    Procedure: Left ventriculography;  Surgeon: Hans Doyle MD;  Location: [facility] CATH INVASIVE LOCATION;  Service: Cardiovascular;  Laterality: N/A;   • CHOLECYSTECTOMY     • COLONOSCOPY      2018 = TA, rech 2023   • EYE SURGERY      Rt Eye Sx after trauma @ 4y/o   • JOINT REPLACEMENT      Left TKR   • REPLACEMENT TOTAL KNEE Left 2014   • ROTATOR CUFF REPAIR Bilateral    • UMBILICAL HERNIA REPAIR         Family History   Problem Relation Age of Onset   • Diabetes Mother    • Heart disease Mother    • Arthritis Mother    • Obesity Mother    • Hypertension Mother    • Migraines Mother    • Osteoporosis Mother    • Snoring Mother    • Diabetes Father    • Heart disease Father    • Arthritis Father    • Hypertension Father    • Stroke Father    • Heart attack  Father    • Hyperlipidemia Father    • Snoring Father    • Diabetes Brother    • Heart disease Brother    • Arthritis Sister    • Anemia Sister        Social History     Socioeconomic History   • Marital status: Single     Spouse name: Not on file   • Number of children: Not on file   • Years of education: Not on file   • Highest education level: Not on file   Tobacco Use   • Smoking status: Never Smoker   • Smokeless tobacco: Never Used   Substance and Sexual Activity   • Alcohol use: Not Currently     Alcohol/week: 1.0 standard drinks     Types: 1 Cans of beer per week   • Drug use: Never   • Sexual activity: Defer       64y/o on phone w/ concerns of back pain/ toenail care    Pt states he called into work today because his back is bothering him; pt wanting to see a chiropractor and needing a name of one that we use; Pt also states he has a f/u appt w/ the back sx at the end of the month to see what they can offer him----the shot while in the hosp really didn't do anything    Pt also needing referrral to Podiatry to get his feet/ toenails looked after----he cant bend over to get to them to trim him toenails       The following portions of the patient's history were reviewed and updated as appropriate: allergies, current medications, past family history, past medical history, past social history, past surgical history and problem list.    Review of Systems   Constitutional: Positive for activity change. Negative for appetite change, unexpected weight gain and unexpected weight loss.   Musculoskeletal: Positive for arthralgias and back pain.       There were no vitals filed for this visit.    Objective   Physical Exam  Constitutional:       General: He is not in acute distress.  Pulmonary:      Effort: No respiratory distress.   Neurological:      Mental Status: He is alert and oriented to person, place, and time.   Psychiatric:         Attention and Perception: Attention normal.         Mood and Affect: Affect is  flat.         Speech: Speech normal.         Behavior: Behavior is cooperative.         Cognition and Memory: Cognition and memory normal.         Judgment: Judgment normal.           Assessment/Plan   Diagnoses and all orders for this visit:    1. Type 2 diabetes mellitus with hyperglycemia, with long-term current use of insulin (CMS/[facility]) (Primary)  -     Ambulatory Referral to Podiatry    2. Chronic midline low back pain without sciatica        You have chosen to receive care through a telephone visit. Do you consent to use a telephone visit for your medical care today? {YES  This visit has been rescheduled as a phone visit to comply with patient safety concerns in accordance with CDC recommendations. Total time of discussion was 6 minutes.

## 2021-01-08 DIAGNOSIS — E11.42 DIABETIC POLYNEUROPATHY ASSOCIATED WITH TYPE 2 DIABETES MELLITUS (HCC): ICD-10-CM

## 2021-01-08 DIAGNOSIS — G89.29 CHRONIC MIDLINE LOW BACK PAIN WITH LEFT-SIDED SCIATICA: ICD-10-CM

## 2021-01-08 DIAGNOSIS — M54.42 CHRONIC MIDLINE LOW BACK PAIN WITH LEFT-SIDED SCIATICA: ICD-10-CM

## 2021-01-12 DIAGNOSIS — G89.29 CHRONIC MIDLINE LOW BACK PAIN WITH LEFT-SIDED SCIATICA: ICD-10-CM

## 2021-01-12 DIAGNOSIS — M54.42 CHRONIC MIDLINE LOW BACK PAIN WITH LEFT-SIDED SCIATICA: ICD-10-CM

## 2021-01-12 DIAGNOSIS — M54.16 LUMBAR BACK PAIN WITH RADICULOPATHY AFFECTING LEFT LOWER EXTREMITY: ICD-10-CM

## 2021-01-12 RX ORDER — OXYCODONE 27 MG/1
1 CAPSULE, EXTENDED RELEASE ORAL 2 TIMES DAILY
Qty: 60 EACH
Start: 2021-01-12

## 2021-01-12 RX ORDER — OXYCODONE 13.5 MG/1
CAPSULE, EXTENDED RELEASE ORAL
Qty: 60 CAPSULE | Refills: 0 | Status: SHIPPED | OUTPATIENT
Start: 2021-01-12 | End: 2021-02-15

## 2021-01-12 RX ORDER — PREGABALIN 300 MG/1
CAPSULE ORAL
Qty: 90 CAPSULE | Refills: 0 | Status: SHIPPED | OUTPATIENT
Start: 2021-01-12 | End: 2021-02-03 | Stop reason: SDUPTHER

## 2021-01-12 NOTE — TELEPHONE ENCOUNTER
Patient states that he is still taking the xtampza and all of his medications are reasonably priced.

## 2021-01-14 DIAGNOSIS — M54.16 LUMBAR BACK PAIN WITH RADICULOPATHY AFFECTING LEFT LOWER EXTREMITY: ICD-10-CM

## 2021-01-14 DIAGNOSIS — G89.29 CHRONIC MIDLINE LOW BACK PAIN WITH LEFT-SIDED SCIATICA: ICD-10-CM

## 2021-01-14 DIAGNOSIS — M54.42 CHRONIC MIDLINE LOW BACK PAIN WITH LEFT-SIDED SCIATICA: ICD-10-CM

## 2021-01-14 RX ORDER — OXYCODONE HYDROCHLORIDE 15 MG/1
TABLET ORAL
Qty: 90 TABLET | Refills: 0 | Status: SHIPPED | OUTPATIENT
Start: 2021-01-14 | End: 2021-02-15 | Stop reason: SDUPTHER

## 2021-01-14 NOTE — TELEPHONE ENCOUNTER
Last visit:  1/6/21  Next visit: 2/15/21  Last labs: 12/22/20    Rx requested: Oxycodone   Pharmacy: [facility] in [place]

## 2021-01-20 ENCOUNTER — TELEPHONE (OUTPATIENT)
Dept: FAMILY MEDICINE CLINIC | Facility: CLINIC | Age: 66
End: 2021-01-20

## 2021-01-20 DIAGNOSIS — E11.42 DIABETIC POLYNEUROPATHY ASSOCIATED WITH TYPE 2 DIABETES MELLITUS (HCC): ICD-10-CM

## 2021-01-20 RX ORDER — TIZANIDINE 2 MG/1
TABLET ORAL
Qty: 30 TABLET | Refills: 0 | Status: SHIPPED | OUTPATIENT
Start: 2021-01-20 | End: 2021-02-15 | Stop reason: SDUPTHER

## 2021-01-20 NOTE — TELEPHONE ENCOUNTER
HUB to share    SW pharm.     Sending request for Tizanidine only.   Lyrica was fill #90 in Dec and Oxycodone isn't due to fill until alana 1/21.     If pt has further questions, pharmacy says to him call them.

## 2021-01-20 NOTE — TELEPHONE ENCOUNTER
PT CALLED REQUESTING WE REACH OUT TO HIS PHARMACY [id number] - [place], [place] - [address] - [phone]  - [phone]   [phone]    THEY WERE REQUESTING THREE PRESCRIPTION FOR HIM AND HE DOES REMEMBER WHAT THEY ARE, BUT HE NEEDS US TO APPROVE THEM.    HE CAN BE REACHED [phone]

## 2021-01-26 ENCOUNTER — OFFICE VISIT (OUTPATIENT)
Dept: PODIATRY | Facility: CLINIC | Age: 66
End: 2021-01-26

## 2021-01-26 VITALS
HEIGHT: 68 IN | WEIGHT: 246 LBS | DIASTOLIC BLOOD PRESSURE: 97 MMHG | SYSTOLIC BLOOD PRESSURE: 145 MMHG | BODY MASS INDEX: 37.28 KG/M2 | HEART RATE: 82 BPM

## 2021-01-26 DIAGNOSIS — B35.1 ONYCHOMYCOSIS: ICD-10-CM

## 2021-01-26 DIAGNOSIS — M77.42 METATARSALGIA OF BOTH FEET: Primary | ICD-10-CM

## 2021-01-26 DIAGNOSIS — E11.42 DM TYPE 2 WITH DIABETIC PERIPHERAL NEUROPATHY (HCC): ICD-10-CM

## 2021-01-26 DIAGNOSIS — M77.41 METATARSALGIA OF BOTH FEET: Primary | ICD-10-CM

## 2021-01-26 PROCEDURE — 99203 OFFICE O/P NEW LOW 30 MIN: CPT | Performed by: PODIATRIST

## 2021-01-26 PROCEDURE — 11721 DEBRIDE NAIL 6 OR MORE: CPT | Performed by: PODIATRIST

## 2021-01-26 NOTE — PROGRESS NOTES
01/26/2021  Foot and Ankle Surgery - New Patient   Provider: Dr. Ernie Clement DPM  Location: [facility]    Subjective:  Humble Green is a 65 y.o. male.     Chief Complaint   Patient presents with   • Consult     DM check       HPI: Patient is a 65-year-old diabetic male that was referred to me for bilateral foot pain and diabetic foot check.  Patient states that he is doing well overall but does have discomfort with increased weightbearing activity.  He localizes the majority of the discomfort to the forefoot region.  He does notice burning and tingling at times.  He has not had any injuries to his feet.  He has not seen a podiatrist in the past.  He states that he has recently been having issues with his back preventing him from reaching his feet and trimming his toenails.  Denies any history of open wounds or infections to his feet.  His most recent A1c was 7.9%.  No other issues today.    No Known Allergies    Past Medical History:   Diagnosis Date   • Arthritis    • BPH (benign prostatic hyperplasia)    • Chronic back pain    • Depression    • Diabetes mellitus (CMS/HCC)    • Excessive daytime sleepiness    • Heart attack (CMS/HCC)    • High cholesterol    • Hyperlipidemia    • Hypertension    • Low back pain    • Low vision of right eye with normal vision in contralateral eye    • Memory problem    • Neuropathy    • Nonobstructive atherosclerosis of coronary artery    • Obesity    • PFO (patent foramen ovale) 11/29/2019   • Rheumatoid arthritis (CMS/HCC)    • Snoring    • Stroke (CMS/HCC)        Past Surgical History:   Procedure Laterality Date   • ARM TENDON REPAIR Left    • CARDIAC CATHETERIZATION N/A 11/16/2020    Procedure: Left Heart Cath and coronary angiogram;  Surgeon: Hans Doyle MD;  Location: [facility] INVASIVE LOCATION;  Service: Cardiovascular;  Laterality: N/A;   • CARDIAC CATHETERIZATION N/A 11/16/2020    Procedure: Left ventriculography;  Surgeon: Hans Doyle MD;   HEARING AID RECHECK    Patient Name:  Paulette Dorsey    Patient Age:   61 year old    :  1955    Background:   Patient reports the tubing is stiff and is in need of changing on both his Widex Clear BTE hearing aids. Patient was accompanied by his wife to today's appointment.     Procedures:   Changed the tubing and cut to appropriate length to fit the patient's ears. Patient reports better feel and sound now that the tubing is changed. Also performed general cleaning of both hearing aids and earmolds.     Plan:   Patient will return for hearing aid related needs.     CHARGES:   .[address] [facility] $30 Bill to patient's insurance.     Tomasz [facility]  Licensed Audiologist  2017       Location: [facility] INVASIVE LOCATION;  Service: Cardiovascular;  Laterality: N/A;   • CHOLECYSTECTOMY     • COLONOSCOPY      2018 = TA, rech 2023   • EYE SURGERY      Rt Eye Sx after trauma @ 2y/o   • JOINT REPLACEMENT      Left TKR   • REPLACEMENT TOTAL KNEE Left 2014   • ROTATOR CUFF REPAIR Bilateral    • UMBILICAL HERNIA REPAIR         Family History   Problem Relation Age of Onset   • Diabetes Mother    • Heart disease Mother    • Arthritis Mother    • Obesity Mother    • Hypertension Mother    • Migraines Mother    • Osteoporosis Mother    • Snoring Mother    • Diabetes Father    • Heart disease Father    • Arthritis Father    • Hypertension Father    • Stroke Father    • Heart attack Father    • Hyperlipidemia Father    • Snoring Father    • Diabetes Brother    • Heart disease Brother    • Arthritis Sister    • Anemia Sister        Social History     Socioeconomic History   • Marital status: Single     Spouse name: Not on file   • Number of children: Not on file   • Years of education: Not on file   • Highest education level: Not on file   Tobacco Use   • Smoking status: Never Smoker   • Smokeless tobacco: Never Used   Substance and Sexual Activity   • Alcohol use: Not Currently     Alcohol/week: 1.0 standard drinks     Types: 1 Cans of beer per week   • Drug use: Never   • Sexual activity: Defer        Current Outpatient Medications on File Prior to Visit   Medication Sig Dispense Refill   • apixaban (ELIQUIS) 5 MG tablet tablet Take 1 tablet by mouth Every 12 (Twelve) Hours. 180 tablet 0   • aspirin 81 MG chewable tablet Chew 81 mg Daily.     • atorvastatin (LIPITOR) 40 MG tablet Take 1 tablet by mouth Every Night. 90 tablet 1   • buPROPion XL (Wellbutrin XL) 150 MG 24 hr tablet Take 1 tablet by mouth Daily. 90 tablet 1   • butalbital-acetaminophen-caffeine (FIORICET, ESGIC) -40 MG per tablet Take 1 tablet by mouth Every 6 (Six) Hours As Needed for Headache or Migraine. 20 tablet 0   • Dapagliflozin  Propanediol (Farxiga) 10 MG tablet Take 10 mg by mouth Every Evening. 90 tablet 1   • DULoxetine (Cymbalta) 30 MG capsule Take 1 capsule by mouth Daily. 30 capsule 1   • Insulin Glargine (Lantus SoloStar) 100 UNIT/ML injection pen Inject 30 Units under the skin into the appropriate area as directed Every Night. 4 pen 0   • Insulin Lispro, 1 Unit Dial, (HumaLOG KwikPen) 100 UNIT/ML solution pen-injector Inject 5 Units under the skin into the appropriate area as directed 3 (Three) Times a Day Before Meals. 9 pen 0   • lisinopril (PRINIVIL,ZESTRIL) 2.5 MG tablet Take 1 tablet by mouth Every Night.     • oxyCODONE (ROXICODONE) 15 MG immediate release tablet TAKE 1 TABLET BY MOUTH EVERY EIGHT HOURS AS NEEDED FOR SEVERE BREAKTHROUGH PAIN 90 tablet 0   • oxyCODONE 7.5 MG tablet Take 1 tablet by mouth Every 8 (Eight) Hours As Needed for Severe Pain  (breakthrough). 60 tablet 0   • oxyCODONE ER (Xtampza ER) 27 MG capsule extended-release 12 hour  Take 1 capsule by mouth 2 (two) times a day. 60 each    • polyethylene glycol (MIRALAX) 17 g packet Take 17 g by mouth Daily.     • pregabalin (LYRICA) 300 MG capsule TAKE 1 CAPSULE BY MOUTH EVERY DAY AT NIGHT 90 capsule 0   • tiZANidine (ZANAFLEX) 2 MG tablet TAKE 1 TABLET BY MOUTH EVERY DAY AT NIGHT AS NEEDED FOR MUSCLE SPASM 30 tablet 0   • Xtampza ER 13.5 MG capsule extended-release 12 hour  TAKE 1 CAPSULE BY MOUTH EVERY 12 HOURS 60 capsule 0     No current facility-administered medications on file prior to visit.        Review of Systems:  General: Denies fever, chills, fatigue, and weakness.  Eyes: Denies vision loss, blurry vision, and excessive redness.  ENT: Denies hearing issues and difficulty swallowing.  Cardiovascular: Denies palpitations, chest pain, or syncopal episodes.  Respiratory: Denies shortness of breath, wheezing, and coughing.  GI: Denies abdominal pain, nausea, and vomiting.   : Denies frequency, hematuria, and urgency.  Musculoskeletal: + Bilateral foot  "pain  Derm: Denies rash, open wounds, or suspicious lesions.  Neuro: Denies headaches, numbness, loss of coordination, and tremors.  Psych: Denies anxiety and depression.  Endocrine: Denies temperature intolerance and changes in appetite.  Heme: Denies bleeding disorders or abnormal bruising.     Objective   /97   Pulse 82   Ht 172.7 cm (68\")   Wt 112 kg (246 lb)   BMI 37.40 kg/m²     Foot/Ankle Exam:       General:   Appearance: appears stated age and healthy    Orientation: AAOx3    Affect: appropriate    Gait: unimpaired      VASCULAR      Right Foot Vascularity   Normal vascular exam    Dorsalis pedis:  2+  Posterior tibial:  2+  Skin Temperature: warm    Edema Grading:  None  CFT:  < 3 seconds  Pedal Hair Growth:  Present  Varicosities: none       Left Foot Vascularity   Normal vascular exam    Dorsalis pedis:  2+  Posterior tibial:  2+  Skin Temperature: warm    Edema Grading:  None  CFT:  < 3 seconds  Pedal Hair Growth:  Present  Varicosities: none        NEUROLOGIC     Right Foot Neurologic   Light touch sensation:  Diminished  Hot/Cold sensation: normal    Protective Sensation using Dameron-Patel Monofilament:  9  Achilles reflex:  2+     Left Foot Neurologic   Light touch sensation:  Diminished  Hot/cold sensation: normal    Protective Sensation using Dameron-Patel Monofilament:  9  Achilles reflex:  2+     MUSCULOSKELETAL      Right Foot Musculoskeletal   Ecchymosis:  None  Tenderness: none    Arch:  Normal     Left Foot Musculoskeletal   Ecchymosis:  None  Tenderness: none    Arch:  Normal     MUSCLE STRENGTH     Right Foot Muscle Strength   Normal strength    Foot dorsiflexion:  5  Foot plantar flexion:  5  Foot inversion:  5  Foot eversion:  5     Left Foot Muscle Strength   Normal strength    Foot dorsiflexion:  5  Foot plantar flexion:  5  Foot inversion:  5  Foot eversion:  5     DERMATOLOGIC     Right Foot Dermatologic   Skin: skin intact    Nails: onychomycosis, abnormally thick " and dystrophic nails       Left Foot Dermatologic   Skin: skin intact    Nails: onychomycosis, subungual debris and dystrophic nails       TESTS     Right Foot Tests   Anterior drawer: negative    Varus tilt: negative       Left Foot Tests   Anterior drawer: negative    Varus tilt: negative        Right Foot Additional Comments No gross deformity or instability.  Mild equinus contracture with knee extended and flexed, bilateral.      Assessment/Plan   Diagnoses and all orders for this visit:    1. Metatarsalgia of both feet (Primary)    2. DM type 2 with diabetic peripheral neuropathy (CMS/[facility])    3. Onychomycosis      Patient presents for bilateral foot pain and routine diabetic foot check.  He does complain of discomfort with increased weightbearing activity and localizes the pain to the forefoot region.  He complains of numbness and tingling sensations at times.  I explained that his symptoms are likely secondary to increased forefoot stress and lack of support.  He may also be having complicating issues from his back.  We also discussed the possibility of early onset diabetic neuropathy.  I have discussed the importance of proper glycemic control and daily foot checks.  We reviewed diabetic foot care.  His nails were debrided without complication.  I do feel that he is at relatively mild to moderate risk at this time.  I would like him to acquire a pair of over-the-counter arch supports with metatarsal pad for forefoot offloading.  He is to avoid barefoot and unsupported weightbearing.  I would like to see him in 4 months for routine foot check    Nail debridement: Both feet x10    Nails were debrided with a nail nipper without complication.  No anesthesia was required.  Indications for procedure were thickened, dystrophic, and fungal appearing nails which are difficult to trim.  Proper self-care and technique was discussed with patient.  Patient was stable after procedure.      No orders of the defined types  were placed in this encounter.       Note is dictated utilizing voice recognition software. Unfortunately this leads to occasional typographical errors. I apologize in advance if the situation occurs. If questions occur please do not hesitate to call our office.

## 2021-01-26 NOTE — PATIENT INSTRUCTIONS
Diabetes Mellitus and Foot Care  Foot care is an important part of your health, especially when you have diabetes. Diabetes may cause you to have problems because of poor blood flow (circulation) to your feet and legs, which can cause your skin to:  · Become thinner and drier.  · Break more easily.  · Heal more slowly.  · Peel and crack.  You may also have nerve damage (neuropathy) in your legs and feet, causing decreased feeling in them. This means that you may not notice minor injuries to your feet that could lead to more serious problems. Noticing and addressing any potential problems early is the best way to prevent future foot problems.  How to care for your feet  Foot hygiene  · Wash your feet daily with warm water and mild soap. Do not use hot water. Then, pat your feet and the areas between your toes until they are completely dry. Do not soak your feet as this can dry your skin.  · Trim your toenails straight across. Do not dig under them or around the cuticle. File the edges of your nails with an emery board or nail file.  · Apply a moisturizing lotion or petroleum jelly to the skin on your feet and to dry, brittle toenails. Use lotion that does not contain alcohol and is unscented. Do not apply lotion between your toes.  Shoes and socks  · Wear clean socks or stockings every day. Make sure they are not too tight. Do not wear knee-high stockings since they may decrease blood flow to your legs.  · Wear shoes that fit properly and have enough cushioning. Always look in your shoes before you put them on to be sure there are no objects inside.  · To break in new shoes, wear them for just a few hours a day. This prevents injuries on your feet.  Wounds, scrapes, corns, and calluses  · Check your feet daily for blisters, cuts, bruises, sores, and redness. If you cannot see the bottom of your feet, use a mirror or ask someone for help.  · Do not cut corns or calluses or try to remove them with medicine.  · If you  find a minor scrape, cut, or break in the skin on your feet, keep it and the skin around it clean and dry. You may clean these areas with mild soap and water. Do not clean the area with peroxide, alcohol, or iodine.  · If you have a wound, scrape, corn, or callus on your foot, look at it several times a day to make sure it is healing and not infected. Check for:  ? Redness, swelling, or pain.  ? Fluid or blood.  ? Warmth.  ? Pus or a bad smell.  General instructions  · Do not cross your legs. This may decrease blood flow to your feet.  · Do not use heating pads or hot water bottles on your feet. They may burn your skin. If you have lost feeling in your feet or legs, you may not know this is happening until it is too late.  · Protect your feet from hot and cold by wearing shoes, such as at the beach or on hot pavement.  · Schedule a complete foot exam at least once a year (annually) or more often if you have foot problems. If you have foot problems, report any cuts, sores, or bruises to your health care provider immediately.  Contact a health care provider if:  · You have a medical condition that increases your risk of infection and you have any cuts, sores, or bruises on your feet.  · You have an injury that is not healing.  · You have redness on your legs or feet.  · You feel burning or tingling in your legs or feet.  · You have pain or cramps in your legs and feet.  · Your legs or feet are numb.  · Your feet always feel cold.  · You have pain around a toenail.  Get help right away if:  · You have a wound, scrape, corn, or callus on your foot and:  ? You have pain, swelling, or redness that gets worse.  ? You have fluid or blood coming from the wound, scrape, corn, or callus.  ? Your wound, scrape, corn, or callus feels warm to the touch.  ? You have pus or a bad smell coming from the wound, scrape, corn, or callus.  ? You have a fever.  ? You have a red line going up your leg.  Summary  · Check your feet every day  for cuts, sores, red spots, swelling, and blisters.  · Moisturize feet and legs daily.  · Wear shoes that fit properly and have enough cushioning.  · If you have foot problems, report any cuts, sores, or bruises to your health care provider immediately.  · Schedule a complete foot exam at least once a year (annually) or more often if you have foot problems.  This information is not intended to replace advice given to you by your health care provider. Make sure you discuss any questions you have with your health care provider.  Document Revised: 09/09/2020 Document Reviewed: 01/19/2018  Elsevier Patient Education © 2020 [employer]

## 2021-02-01 ENCOUNTER — TELEPHONE (OUTPATIENT)
Dept: FAMILY MEDICINE CLINIC | Facility: CLINIC | Age: 66
End: 2021-02-01

## 2021-02-01 DIAGNOSIS — E11.42 DIABETIC POLYNEUROPATHY ASSOCIATED WITH TYPE 2 DIABETES MELLITUS (HCC): ICD-10-CM

## 2021-02-01 NOTE — TELEPHONE ENCOUNTER
Pt states during last ov you increased the Lyrica to 2 tablets am and 2 tablets pm therefore he needs a new rx called in.  Last seen 1/6/21.  [facility] [place].

## 2021-02-01 NOTE — TELEPHONE ENCOUNTER
Clarify this w/ pt.... Last I have is 300mg @ bed and 300mg am and pm----I dont think he is on 600mg bid

## 2021-02-03 DIAGNOSIS — E11.42 DIABETIC POLYNEUROPATHY ASSOCIATED WITH TYPE 2 DIABETES MELLITUS (HCC): ICD-10-CM

## 2021-02-03 RX ORDER — PREGABALIN 300 MG/1
300 CAPSULE ORAL 2 TIMES DAILY
Qty: 60 CAPSULE | Refills: 2 | Status: SHIPPED | OUTPATIENT
Start: 2021-02-03 | End: 2021-05-27 | Stop reason: SDUPTHER

## 2021-02-15 ENCOUNTER — OFFICE VISIT (OUTPATIENT)
Dept: FAMILY MEDICINE CLINIC | Facility: CLINIC | Age: 66
End: 2021-02-15

## 2021-02-15 DIAGNOSIS — M54.16 LUMBAR BACK PAIN WITH RADICULOPATHY AFFECTING LEFT LOWER EXTREMITY: ICD-10-CM

## 2021-02-15 DIAGNOSIS — M46.1 SACROILIITIS (HCC): ICD-10-CM

## 2021-02-15 DIAGNOSIS — F33.1 MAJOR DEPRESSIVE DISORDER, RECURRENT EPISODE, MODERATE DEGREE (HCC): ICD-10-CM

## 2021-02-15 PROCEDURE — 99442 PR PHYS/QHP TELEPHONE EVALUATION 11-20 MIN: CPT | Performed by: FAMILY MEDICINE

## 2021-02-15 RX ORDER — OXYCODONE HYDROCHLORIDE 15 MG/1
15 TABLET ORAL EVERY 8 HOURS PRN
Qty: 90 TABLET | Refills: 0 | Status: SHIPPED | OUTPATIENT
Start: 2021-02-15 | End: 2021-03-15

## 2021-02-15 RX ORDER — BUPROPION HYDROCHLORIDE 300 MG/1
300 TABLET ORAL DAILY
Qty: 90 TABLET | Refills: 0 | Status: SHIPPED | OUTPATIENT
Start: 2021-02-15 | End: 2021-04-22 | Stop reason: HOSPADM

## 2021-02-15 RX ORDER — TIZANIDINE 2 MG/1
2 TABLET ORAL NIGHTLY PRN
Qty: 30 TABLET | Refills: 0 | Status: SHIPPED | OUTPATIENT
Start: 2021-02-15 | End: 2021-04-01 | Stop reason: SDUPTHER

## 2021-02-15 NOTE — PROGRESS NOTES
Subjective   Humble Green is a 65 y.o. male.     Chief Complaint   Patient presents with   • Diabetes   • Follow-up     3 mon f/u   • Hypertension         Current Outpatient Medications:   •  aspirin 81 MG chewable tablet, Chew 81 mg Daily., Disp: , Rfl:   •  atorvastatin (LIPITOR) 40 MG tablet, Take 1 tablet by mouth Every Night., Disp: 90 tablet, Rfl: 1  •  buPROPion XL (WELLBUTRIN XL) 300 MG 24 hr tablet, Take 1 tablet by mouth Daily., Disp: 90 tablet, Rfl: 0  •  butalbital-acetaminophen-caffeine (FIORICET, ESGIC) -40 MG per tablet, Take 1 tablet by mouth Every 6 (Six) Hours As Needed for Headache or Migraine., Disp: 20 tablet, Rfl: 0  •  Dapagliflozin Propanediol (Farxiga) 10 MG tablet, Take 10 mg by mouth Every Evening., Disp: 90 tablet, Rfl: 1  •  Insulin Glargine (Lantus SoloStar) 100 UNIT/ML injection pen, Inject 30 Units under the skin into the appropriate area as directed Every Night., Disp: 4 pen, Rfl: 0  •  Insulin Lispro, 1 Unit Dial, (HumaLOG KwikPen) 100 UNIT/ML solution pen-injector, Inject 5 Units under the skin into the appropriate area as directed 3 (Three) Times a Day Before Meals., Disp: 9 pen, Rfl: 0  •  lisinopril (PRINIVIL,ZESTRIL) 2.5 MG tablet, Take 1 tablet by mouth Every Night., Disp: , Rfl:   •  oxyCODONE (ROXICODONE) 15 MG immediate release tablet, Take 1 tablet by mouth Every 8 (Eight) Hours As Needed for Severe Pain ., Disp: 90 tablet, Rfl: 0  •  polyethylene glycol (MIRALAX) 17 g packet, Take 17 g by mouth Daily., Disp:  , Rfl:   •  pregabalin (LYRICA) 300 MG capsule, Take 1 capsule by mouth 2 (Two) Times a Day., Disp: 60 capsule, Rfl: 2  •  tiZANidine (ZANAFLEX) 2 MG tablet, Take 1 tablet by mouth At Night As Needed for Muscle Spasms., Disp: 30 tablet, Rfl: 0  •  apixaban (ELIQUIS) 5 MG tablet tablet, Take 1 tablet by mouth Every 12 (Twelve) Hours. (Patient taking differently: Take 5 mg by mouth Every 12 (Twelve) Hours. Hold x 3 days prior procedure), Disp: 180 tablet, Rfl:  0    Past Medical History:   Diagnosis Date   • Arthritis    • BPH (benign prostatic hyperplasia)    • Chronic back pain    • Depression    • Diabetes mellitus (CMS/HCC)    • Excessive daytime sleepiness    • Heart attack (CMS/HCC)    • High cholesterol    • Hyperlipidemia    • Hypertension    • Low back pain    • Low vision of right eye with normal vision in contralateral eye    • Memory problem    • Neuropathy    • Nonobstructive atherosclerosis of coronary artery    • Obesity    • PFO (patent foramen ovale) 11/29/2019   • Rheumatoid arthritis (CMS/HCC)    • Snoring    • Stroke (CMS/HCC)        Past Surgical History:   Procedure Laterality Date   • ARM TENDON REPAIR Left    • CARDIAC CATHETERIZATION N/A 11/16/2020    Procedure: Left Heart Cath and coronary angiogram;  Surgeon: Hans Doyle MD;  Location:  [facility];  Service: Cardiovascular;  Laterality: N/A;   • CARDIAC CATHETERIZATION N/A 11/16/2020    Procedure: Left ventriculography;  Surgeon: Hans oDyle MD;  Location:  [facility];  Service: Cardiovascular;  Laterality: N/A;   • CHOLECYSTECTOMY     • COLONOSCOPY      2018 = TA, rech 2023   • EYE SURGERY      Rt Eye Sx after trauma @ 2y/o   • JOINT REPLACEMENT      Left TKR   • REPLACEMENT TOTAL KNEE Left 2014   • ROTATOR CUFF REPAIR Bilateral    • UMBILICAL HERNIA REPAIR         Family History   Problem Relation Age of Onset   • Diabetes Mother    • Heart disease Mother    • Arthritis Mother    • Obesity Mother    • Hypertension Mother    • Migraines Mother    • Osteoporosis Mother    • Snoring Mother    • Diabetes Father    • Heart disease Father    • Arthritis Father    • Hypertension Father    • Stroke Father    • Heart attack Father    • Hyperlipidemia Father    • Snoring Father    • Diabetes Brother    • Heart disease Brother    • Arthritis Sister    • Anemia Sister        Social History     Socioeconomic History   • Marital status: Single     Spouse name: Not on  file   • Number of children: Not on file   • Years of education: Not on file   • Highest education level: Not on file   Tobacco Use   • Smoking status: Never Smoker   • Smokeless tobacco: Never Used   Substance and Sexual Activity   • Alcohol use: Not Currently     Alcohol/week: 1.0 standard drinks     Types: 1 Cans of beer per week   • Drug use: Never   • Sexual activity: Defer       66 y/o C male on phone for f/u on chronic pain/ depression    PT states he conts to see pain  who is doing back injections but they don't seem to be helping at all; Pt is having to use the tizanidine for his back at nite so he can sleep; pt told by spine that he would have to do sx if the inj aren't helping; Spine is taking him off plavix each time for the shots too; pharmacy keeps refilling the 12hr pain med and short acting but pt doesn't feel it does any better than the short acting does by itself............    Pt states he has also been taking the Cymbalta @ 60mg qday x 1+mo but states it doesn't seem to help w/ his depression or his back pain.....    Pt was waiting on school system for his COVID shot but they havent said anything about it yet       The following portions of the patient's history were reviewed and updated as appropriate: allergies, current medications, past family history, past medical history, past social history, past surgical history and problem list.    Review of Systems   Constitutional: Negative for activity change, appetite change, unexpected weight gain and unexpected weight loss.   Endocrine: Negative for polydipsia, polyphagia and polyuria.   Musculoskeletal: Positive for arthralgias, back pain and myalgias.   Psychiatric/Behavioral: Positive for dysphoric mood, depressed mood and stress. Negative for sleep disturbance.       There were no vitals filed for this visit.    Objective   Physical Exam  Constitutional:       General: He is not in acute distress.  Pulmonary:      Effort: No respiratory  distress.   Neurological:      Mental Status: He is alert and oriented to person, place, and time.   Psychiatric:         Attention and Perception: Attention and perception normal.         Mood and Affect: Mood normal. Affect is flat.         Behavior: Behavior normal. Behavior is cooperative.         Thought Content: Thought content normal.         Cognition and Memory: Cognition and memory normal.         Judgment: Judgment normal.           Assessment/Plan   Diagnoses and all orders for this visit:    1. Major depressive disorder, recurrent episode, moderate degree (CMS/HCC)    2. Sacroiliitis (CMS/HCC)    3. Lumbar back pain with radiculopathy affecting left lower extremity  -     oxyCODONE (ROXICODONE) 15 MG immediate release tablet; Take 1 tablet by mouth Every 8 (Eight) Hours As Needed for Severe Pain .  Dispense: 90 tablet; Refill: 0    Other orders  -     tiZANidine (ZANAFLEX) 2 MG tablet; Take 1 tablet by mouth At Night As Needed for Muscle Spasms.  Dispense: 30 tablet; Refill: 0  -     buPROPion XL (WELLBUTRIN XL) 300 MG 24 hr tablet; Take 1 tablet by mouth Daily.  Dispense: 90 tablet; Refill: 0        Covid-19vaccine to be set up due to approp age group  Wean off Cymbalta  Increase the wellbutrin XR to 300mg qam  Stay on tid short acting pain med only    You have chosen to receive care through a telephone visit. Do you consent to use a telephone visit for your medical care today? {YES   This visit has been rescheduled as a phone visit to comply with patient safety concerns in accordance with CDC recommendations. Total time of discussion was 20 minutes.

## 2021-02-24 ENCOUNTER — HOSPITAL ENCOUNTER (EMERGENCY)
Facility: HOSPITAL | Age: 66
Discharge: HOME | End: 2021-02-24
Attending: EMERGENCY MEDICINE | Admitting: EMERGENCY MEDICINE

## 2021-02-24 VITALS
HEART RATE: 79 BPM | TEMPERATURE: 98.1 F | SYSTOLIC BLOOD PRESSURE: 153 MMHG | WEIGHT: 248.46 LBS | BODY MASS INDEX: 37.66 KG/M2 | RESPIRATION RATE: 17 BRPM | HEIGHT: 68 IN | OXYGEN SATURATION: 99 % | DIASTOLIC BLOOD PRESSURE: 84 MMHG

## 2021-02-24 DIAGNOSIS — M54.50 LOW BACK PAIN, UNSPECIFIED BACK PAIN LATERALITY, UNSPECIFIED CHRONICITY, UNSPECIFIED WHETHER SCIATICA PRESENT: Primary | ICD-10-CM

## 2021-02-24 PROCEDURE — 99283 EMERGENCY DEPT VISIT LOW MDM: CPT

## 2021-02-24 RX ORDER — CYCLOBENZAPRINE HCL 10 MG
10 TABLET ORAL 3 TIMES DAILY PRN
Qty: 15 TABLET | Refills: 0 | Status: SHIPPED | OUTPATIENT
Start: 2021-02-24 | End: 2021-04-01

## 2021-02-24 RX ORDER — PREDNISONE 20 MG/1
20 TABLET ORAL DAILY
Qty: 5 TABLET | Refills: 0 | Status: SHIPPED | OUTPATIENT
Start: 2021-02-24 | End: 2021-03-23

## 2021-03-15 DIAGNOSIS — M54.16 LUMBAR BACK PAIN WITH RADICULOPATHY AFFECTING LEFT LOWER EXTREMITY: ICD-10-CM

## 2021-03-15 RX ORDER — OXYCODONE HYDROCHLORIDE 15 MG/1
TABLET ORAL
Qty: 90 TABLET | Refills: 0 | Status: SHIPPED | OUTPATIENT
Start: 2021-03-15 | End: 2021-04-01 | Stop reason: SDUPTHER

## 2021-03-15 NOTE — TELEPHONE ENCOUNTER
Last visit:2/15/21    Next visit: none  Last labs: 12/22/20    Rx requested: Oxycodone   Pharmacy: [facility] in [place]

## 2021-03-23 ENCOUNTER — TELEPHONE (OUTPATIENT)
Dept: FAMILY MEDICINE CLINIC | Facility: CLINIC | Age: 66
End: 2021-03-23

## 2021-03-23 NOTE — TELEPHONE ENCOUNTER
Pt got an epidural and some arthritis shots on18th and covid shot on 19th and has been having severe midback pain since Sat----tried all his normal pain med options and not getting much relief; hot shower helps but heating pad irritates the area; Ice doesn't help; Pt states he has never had this pain before and just not getting any better------the ms relaxer just knocks him out; Pt also has a call into Pain clinic    Pt to try a 1/2 tab of the ms relaxer since cant use NSAIDS due to blood thinner (which he restarted after his shots) and maxed out on Lyrica; PT to try stretching and told that his BS's could be elevated due to the steroid shots as well as increased pain.....

## 2021-03-23 NOTE — TELEPHONE ENCOUNTER
PATIENT WENT TO THE BACK DR 3/18 FOR MONTHLY INJECTIONS IN HIS SPINE. PATIENT GOT 1ST COVID VACCINE ON 3/19. PATIENT STATES HE HAS SEVER BACK PAIN. PATIENT DID NOT TO WORK TODAY. PATIENT CAN NOT GET OUT OF BED. PATIENT NEEDS DIRECTIONS AS TO WHAT TO DO FOR THE PAIN.    PLEASE CALL: [phone]

## 2021-03-24 NOTE — TELEPHONE ENCOUNTER
PATIENT CALLED TO UPDATE DR. HENRIQUEZ ON HIS BACK PAIN. THE PATIENT STATES THAT THE BACK PAIN IS NO BETTER, AND WANTED TO KNOW IF HE NEEDS TO COME BACK IN AND SEE DR. HENRIQUEZ. PATIENT ALSO STATED THAT HE NEVER HEARD FROM THE PAIN CLINIC.    PATIENT REQUESTING A CALLBACK: [phone]

## 2021-04-01 ENCOUNTER — OFFICE VISIT (OUTPATIENT)
Dept: FAMILY MEDICINE CLINIC | Facility: CLINIC | Age: 66
End: 2021-04-01

## 2021-04-01 VITALS
WEIGHT: 256 LBS | BODY MASS INDEX: 38.8 KG/M2 | DIASTOLIC BLOOD PRESSURE: 82 MMHG | SYSTOLIC BLOOD PRESSURE: 148 MMHG | HEIGHT: 68 IN | OXYGEN SATURATION: 100 % | RESPIRATION RATE: 16 BRPM | TEMPERATURE: 97.1 F | HEART RATE: 91 BPM

## 2021-04-01 DIAGNOSIS — E11.65 TYPE 2 DIABETES MELLITUS WITH HYPERGLYCEMIA, WITH LONG-TERM CURRENT USE OF INSULIN (HCC): ICD-10-CM

## 2021-04-01 DIAGNOSIS — M54.16 LUMBAR BACK PAIN WITH RADICULOPATHY AFFECTING LEFT LOWER EXTREMITY: Primary | ICD-10-CM

## 2021-04-01 DIAGNOSIS — I10 ESSENTIAL HYPERTENSION: Chronic | ICD-10-CM

## 2021-04-01 DIAGNOSIS — Z79.4 TYPE 2 DIABETES MELLITUS WITH HYPERGLYCEMIA, WITH LONG-TERM CURRENT USE OF INSULIN (HCC): ICD-10-CM

## 2021-04-01 LAB — HBA1C MFR BLD: 8.6 %

## 2021-04-01 PROCEDURE — 83036 HEMOGLOBIN GLYCOSYLATED A1C: CPT | Performed by: FAMILY MEDICINE

## 2021-04-01 PROCEDURE — 99214 OFFICE O/P EST MOD 30 MIN: CPT | Performed by: FAMILY MEDICINE

## 2021-04-01 RX ORDER — MORPHINE SULFATE 30 MG/1
30 TABLET, FILM COATED, EXTENDED RELEASE ORAL 2 TIMES DAILY
Qty: 60 TABLET | Refills: 0 | Status: SHIPPED | OUTPATIENT
Start: 2021-04-01 | End: 2021-05-17

## 2021-04-01 RX ORDER — INSULIN LISPRO 100 [IU]/ML
5 INJECTION, SOLUTION INTRAVENOUS; SUBCUTANEOUS
Qty: 5 PEN | Refills: 3 | Status: SHIPPED | OUTPATIENT
Start: 2021-04-01 | End: 2021-04-22 | Stop reason: HOSPADM

## 2021-04-01 RX ORDER — TIZANIDINE 2 MG/1
2 TABLET ORAL NIGHTLY PRN
Qty: 30 TABLET | Refills: 0 | Status: SHIPPED | OUTPATIENT
Start: 2021-04-01 | End: 2021-04-22 | Stop reason: HOSPADM

## 2021-04-01 RX ORDER — ATORVASTATIN CALCIUM 40 MG/1
40 TABLET, FILM COATED ORAL NIGHTLY
Qty: 90 TABLET | Refills: 1 | Status: SHIPPED | OUTPATIENT
Start: 2021-04-01 | End: 2021-04-22 | Stop reason: HOSPADM

## 2021-04-01 RX ORDER — OXYCODONE HYDROCHLORIDE 15 MG/1
15 TABLET ORAL EVERY 6 HOURS PRN
Qty: 120 TABLET | Refills: 0 | Status: SHIPPED | OUTPATIENT
Start: 2021-04-01 | End: 2021-05-05

## 2021-04-01 RX ORDER — DAPAGLIFLOZIN 10 MG/1
10 TABLET, FILM COATED ORAL EVERY EVENING
Qty: 90 TABLET | Refills: 1 | Status: SHIPPED | OUTPATIENT
Start: 2021-04-01 | End: 2021-06-07

## 2021-04-01 RX ORDER — INSULIN GLARGINE 100 [IU]/ML
35 INJECTION, SOLUTION SUBCUTANEOUS NIGHTLY
Qty: 4 PEN | Refills: 0 | Status: SHIPPED
Start: 2021-04-01 | End: 2021-08-02 | Stop reason: SDUPTHER

## 2021-04-04 PROBLEM — I20.89 ANGINA AT REST: Status: RESOLVED | Noted: 2020-11-13 | Resolved: 2021-04-04

## 2021-04-04 PROBLEM — I20.8 ANGINA AT REST (HCC): Status: RESOLVED | Noted: 2020-11-13 | Resolved: 2021-04-04

## 2021-04-04 NOTE — PROGRESS NOTES
Subjective   Humble Green is a 65 y.o. male.     Chief Complaint   Patient presents with   • Back Pain   • Diabetes     Cyclobenzaprine,Oxycodone to [facility] in [place]   • Hypertension         Current Outpatient Medications:   •  apixaban (ELIQUIS) 5 MG tablet tablet, Take 1 tablet by mouth Every 12 (Twelve) Hours., Disp: 180 tablet, Rfl: 0  •  aspirin 81 MG chewable tablet, Chew 81 mg Daily., Disp: , Rfl:   •  atorvastatin (LIPITOR) 40 MG tablet, Take 1 tablet by mouth Every Night., Disp: 90 tablet, Rfl: 1  •  buPROPion XL (WELLBUTRIN XL) 300 MG 24 hr tablet, Take 1 tablet by mouth Daily., Disp: 90 tablet, Rfl: 0  •  butalbital-acetaminophen-caffeine (FIORICET, ESGIC) -40 MG per tablet, Take 1 tablet by mouth Every 6 (Six) Hours As Needed for Headache or Migraine., Disp: 20 tablet, Rfl: 0  •  Dapagliflozin Propanediol (Farxiga) 10 MG tablet, Take 10 mg by mouth Every Evening., Disp: 90 tablet, Rfl: 1  •  Insulin Glargine (Lantus SoloStar) 100 UNIT/ML injection pen, Inject 35 Units under the skin into the appropriate area as directed Every Night., Disp: 4 pen, Rfl: 0  •  Insulin Lispro, 1 Unit Dial, (HumaLOG KwikPen) 100 UNIT/ML solution pen-injector, Inject 5 Units under the skin into the appropriate area as directed 3 (Three) Times a Day Before Meals., Disp: 5 pen, Rfl: 3  •  lisinopril (PRINIVIL,ZESTRIL) 2.5 MG tablet, Take 1 tablet by mouth Every Night., Disp: , Rfl:   •  oxyCODONE (ROXICODONE) 15 MG immediate release tablet, Take 1 tablet by mouth Every 6 (Six) Hours As Needed for Severe Pain ., Disp: 120 tablet, Rfl: 0  •  polyethylene glycol (MIRALAX) 17 g packet, Take 17 g by mouth Daily., Disp:  , Rfl:   •  pregabalin (LYRICA) 300 MG capsule, Take 1 capsule by mouth 2 (Two) Times a Day., Disp: 60 capsule, Rfl: 2  •  tiZANidine (ZANAFLEX) 2 MG tablet, Take 1 tablet by mouth At Night As Needed for Muscle Spasms., Disp: 30 tablet, Rfl: 0  •  Morphine (MS CONTIN) 30 MG 12 hr tablet, Take 1 tablet by mouth 2  (Two) Times a Day., Disp: 60 tablet, Rfl: 0    Past Medical History:   Diagnosis Date   • Arthritis    • BPH    • CAD    • Chronic back pain    • DMII    • Excessive daytime sleepiness    • Low back pain    • Low vision of right eye with normal vision in contralateral eye    • Memory problem    • Neuropathy    • Obesity    • PFO (patent foramen ovale) 11/29/2019   • Snoring    • Stroke        Past Surgical History:   Procedure Laterality Date   • ARM TENDON REPAIR Left    • CARDIAC CATHETERIZATION N/A 11/16/2020   • CARDIAC CATHETERIZATION N/A 11/16/2020   • CHOLECYSTECTOMY     • COLONOSCOPY      2018 = TA, rech 2023   • EYE SURGERY      Rt Eye Sx after trauma @ 4y/o   • JOINT REPLACEMENT      Left TKR   • ROTATOR CUFF REPAIR Bilateral    • UMBILICAL HERNIA REPAIR         Family History   Problem Relation Age of Onset   • Diabetes Mother    • Heart disease Mother    • Arthritis Mother    • Obesity Mother    • Hypertension Mother    • Migraines Mother    • Osteoporosis Mother    • Snoring Mother    • Diabetes Father    • Heart disease Father    • Arthritis Father    • Hypertension Father    • Stroke Father    • Heart attack Father    • Hyperlipidemia Father    • Snoring Father    • Diabetes Brother    • Heart disease Brother    • Arthritis Sister    • Anemia Sister        Social History     Socioeconomic History   • Marital status: Single     Spouse name: Not on file   • Number of children: Not on file   • Years of education: Not on file   • Highest education level: Not on file   Tobacco Use   • Smoking status: Never Smoker   • Smokeless tobacco: Never Used   Vaping Use   • Vaping Use: Never used   Substance and Sexual Activity   • Alcohol use: Not Currently     Alcohol/week: 1.0 standard drinks     Types: 1 Cans of beer per week   • Drug use: Never   • Sexual activity: Defer       64 y/o C male here for worsening back pain/ DMII/ HTN    Pt states he has been getting shots from pain management and after the last  round (when they gave him 6 extra shots), he has felt worse and his usual meds/ doses are not working; pt tried to take a ms relaxer but that just made him sleep and the extra pain pill didn't work    Pt states his BS's have been elevated ever since he started getting the steroid shots in his back-----but told he is done w/ the shots    Pt states he had to call in for work and they were just bought out so he prob will be losing his job anyway-----he states he is hurting too bad to cont to work as a        The following portions of the patient's history were reviewed and updated as appropriate: allergies, current medications, past family history, past medical history, past social history, past surgical history and problem list.    Review of Systems   Constitutional: Positive for activity change. Negative for appetite change, unexpected weight gain and unexpected weight loss.   Eyes: Negative for blurred vision, double vision, pain and visual disturbance.   Cardiovascular: Negative for leg swelling.   Gastrointestinal: Negative for abdominal distention, abdominal pain, constipation, diarrhea, nausea and vomiting.   Endocrine: Negative for polydipsia, polyphagia and polyuria.   Genitourinary: Negative for frequency.   Musculoskeletal: Positive for arthralgias, back pain, gait problem and myalgias.   Skin: Negative for color change, dry skin, rash and skin lesions.   Neurological: Negative for weakness and numbness.   Psychiatric/Behavioral: Positive for dysphoric mood and sleep disturbance.       Vitals:    04/01/21 0824   BP: 148/82   Pulse: 91   Resp: 16   Temp: 97.1 °F (36.2 °C)   SpO2: 100%       Objective   Physical Exam  Vitals and nursing note reviewed.   Constitutional:       General: He is in acute distress.      Appearance: Normal appearance. He is well-developed. He is not ill-appearing or toxic-appearing.   HENT:      Head: Normocephalic and atraumatic.   Cardiovascular:      Rate and Rhythm: Normal  rate and regular rhythm.      Heart sounds: Normal heart sounds. No murmur heard.     Pulmonary:      Effort: Pulmonary effort is normal.      Breath sounds: Normal breath sounds.   Musculoskeletal:      Cervical back: Normal range of motion and neck supple.   Skin:     General: Skin is warm and dry.      Findings: No rash.   Neurological:      Mental Status: He is alert and oriented to person, place, and time.   Psychiatric:         Attention and Perception: Attention and perception normal.         Mood and Affect: Mood is anxious and depressed.         Speech: Speech normal.         Behavior: Behavior normal. Behavior is cooperative.         Thought Content: Thought content normal.         Cognition and Memory: Cognition and memory normal.         Judgment: Judgment normal.           Assessment/Plan   Diagnoses and all orders for this visit:    1. Lumbar back pain with radiculopathy affecting left lower extremity (Primary)  -     oxyCODONE (ROXICODONE) 15 MG immediate release tablet; Take 1 tablet by mouth Every 6 (Six) Hours As Needed for Severe Pain .  Dispense: 120 tablet; Refill: 0  -     Morphine (MS CONTIN) 30 MG 12 hr tablet; Take 1 tablet by mouth 2 (Two) Times a Day.  Dispense: 60 tablet; Refill: 0    2. Type 2 diabetes mellitus with hyperglycemia, with long-term current use of insulin (CMS/[facility])  -     POC Glycosylated Hemoglobin (Hb A1C)    3. Essential hypertension    Other orders  -     Dapagliflozin Propanediol (Farxiga) 10 MG tablet; Take 10 mg by mouth Every Evening.  Dispense: 90 tablet; Refill: 1  -     Insulin Glargine (Lantus SoloStar) 100 UNIT/ML injection pen; Inject 35 Units under the skin into the appropriate area as directed Every Night.  Dispense: 4 pen; Refill: 0  -     tiZANidine (ZANAFLEX) 2 MG tablet; Take 1 tablet by mouth At Night As Needed for Muscle Spasms.  Dispense: 30 tablet; Refill: 0  -     Insulin Lispro, 1 Unit Dial, (HumaLOG KwikPen) 100 UNIT/ML solution pen-injector;  Inject 5 Units under the skin into the appropriate area as directed 3 (Three) Times a Day Before Meals.  Dispense: 5 pen; Refill: 3  -     apixaban (ELIQUIS) 5 MG tablet tablet; Take 1 tablet by mouth Every 12 (Twelve) Hours.  Dispense: 180 tablet; Refill: 0  -     atorvastatin (LIPITOR) 40 MG tablet; Take 1 tablet by mouth Every Night.  Dispense: 90 tablet; Refill: 1    Switch to long acting pain med w/ prn [facility] med  Cont HS ms relaxer  Will monitor o/s BP once pain under better control

## 2021-04-13 ENCOUNTER — TELEPHONE (OUTPATIENT)
Dept: FAMILY MEDICINE CLINIC | Facility: CLINIC | Age: 66
End: 2021-04-13

## 2021-04-13 NOTE — TELEPHONE ENCOUNTER
Patient called stating that he lost his morphine pills but doesn't need another refill since his next refill is coming up on the 18th. Patient states that he is having the school  look for it now. Patient just wanted to let you know.

## 2021-04-20 ENCOUNTER — APPOINTMENT (OUTPATIENT)
Dept: CT IMAGING | Facility: HOSPITAL | Age: 66
End: 2021-04-20

## 2021-04-20 ENCOUNTER — APPOINTMENT (OUTPATIENT)
Dept: MRI IMAGING | Facility: HOSPITAL | Age: 66
End: 2021-04-20

## 2021-04-20 ENCOUNTER — HOSPITAL ENCOUNTER (OUTPATIENT)
Facility: HOSPITAL | Age: 66
Setting detail: OBSERVATION
LOS: 1 days | Discharge: NURSING, REHAB OR LONG-TERM CARE | End: 2021-04-22
Attending: EMERGENCY MEDICINE | Admitting: INTERNAL MEDICINE

## 2021-04-20 ENCOUNTER — APPOINTMENT (OUTPATIENT)
Dept: GENERAL RADIOLOGY | Facility: HOSPITAL | Age: 66
End: 2021-04-20

## 2021-04-20 DIAGNOSIS — R40.0 DAYTIME SLEEPINESS: ICD-10-CM

## 2021-04-20 DIAGNOSIS — R06.83 SNORING: Primary | ICD-10-CM

## 2021-04-20 DIAGNOSIS — G45.9 TIA (TRANSIENT ISCHEMIC ATTACK): ICD-10-CM

## 2021-04-20 LAB
ABO GROUP BLD: NORMAL
ALBUMIN SERPL-MCNC: 3.9 G/DL (ref 3.5–5.2)
ALBUMIN/GLOB SERPL: 1.6 G/DL
ALP SERPL-CCNC: 65 U/L (ref 39–117)
ALT SERPL W P-5'-P-CCNC: 13 U/L (ref 1–41)
ANION GAP SERPL CALCULATED.3IONS-SCNC: 11 MMOL/L (ref 5–15)
APTT PPP: 27.8 SECONDS (ref 24–31)
AST SERPL-CCNC: 16 U/L (ref 1–40)
BASOPHILS # BLD AUTO: 0.1 10*3/MM3 (ref 0–0.2)
BASOPHILS NFR BLD AUTO: 0.8 % (ref 0–1.5)
BILIRUB SERPL-MCNC: 0.4 MG/DL (ref 0–1.2)
BILIRUB UR QL STRIP: NEGATIVE
BLD GP AB SCN SERPL QL: NEGATIVE
BUN SERPL-MCNC: 24 MG/DL (ref 8–23)
BUN/CREAT SERPL: 15.3 (ref 7–25)
CALCIUM SPEC-SCNC: 8.9 MG/DL (ref 8.6–10.5)
CHLORIDE SERPL-SCNC: 101 MMOL/L (ref 98–107)
CHOLEST SERPL-MCNC: 213 MG/DL (ref 0–200)
CLARITY UR: CLEAR
CO2 SERPL-SCNC: 25 MMOL/L (ref 22–29)
COLOR UR: YELLOW
CREAT SERPL-MCNC: 1.57 MG/DL (ref 0.76–1.27)
DEPRECATED RDW RBC AUTO: 45.5 FL (ref 37–54)
EOSINOPHIL # BLD AUTO: 0.1 10*3/MM3 (ref 0–0.4)
EOSINOPHIL NFR BLD AUTO: 1.7 % (ref 0.3–6.2)
ERYTHROCYTE [DISTWIDTH] IN BLOOD BY AUTOMATED COUNT: 14.4 % (ref 12.3–15.4)
GFR SERPL CREATININE-BSD FRML MDRD: 45 ML/MIN/1.73
GIANT PLATELETS: NORMAL
GLOBULIN UR ELPH-MCNC: 2.5 GM/DL
GLUCOSE BLDC GLUCOMTR-MCNC: 103 MG/DL (ref 70–105)
GLUCOSE BLDC GLUCOMTR-MCNC: 111 MG/DL (ref 70–105)
GLUCOSE BLDC GLUCOMTR-MCNC: 133 MG/DL (ref 70–105)
GLUCOSE SERPL-MCNC: 129 MG/DL (ref 65–99)
GLUCOSE UR STRIP-MCNC: ABNORMAL MG/DL
HBA1C MFR BLD: 8.4 % (ref 3.5–5.6)
HCT VFR BLD AUTO: 42.3 % (ref 37.5–51)
HDLC SERPL-MCNC: 56 MG/DL (ref 40–60)
HGB BLD-MCNC: 14.1 G/DL (ref 13–17.7)
HGB UR QL STRIP.AUTO: NEGATIVE
HOLD SPECIMEN: NORMAL
INR PPP: 0.98 (ref 0.93–1.1)
KETONES UR QL STRIP: NEGATIVE
LDLC SERPL CALC-MCNC: 126 MG/DL (ref 0–100)
LDLC/HDLC SERPL: 2.17 {RATIO}
LEUKOCYTE ESTERASE UR QL STRIP.AUTO: NEGATIVE
LYMPHOCYTES # BLD AUTO: 2.5 10*3/MM3 (ref 0.7–3.1)
LYMPHOCYTES NFR BLD AUTO: 34.9 % (ref 19.6–45.3)
MCH RBC QN AUTO: 30.3 PG (ref 26.6–33)
MCHC RBC AUTO-ENTMCNC: 33.3 G/DL (ref 31.5–35.7)
MCV RBC AUTO: 90.8 FL (ref 79–97)
MONOCYTES # BLD AUTO: 0.6 10*3/MM3 (ref 0.1–0.9)
MONOCYTES NFR BLD AUTO: 8.8 % (ref 5–12)
NEUTROPHILS NFR BLD AUTO: 3.8 10*3/MM3 (ref 1.7–7)
NEUTROPHILS NFR BLD AUTO: 53.8 % (ref 42.7–76)
NITRITE UR QL STRIP: NEGATIVE
NRBC BLD AUTO-RTO: 0.1 /100 WBC (ref 0–0.2)
PH UR STRIP.AUTO: <=5 [PH] (ref 5–8)
PLATELET # BLD AUTO: 194 10*3/MM3 (ref 140–450)
PMV BLD AUTO: 12.1 FL (ref 6–12)
POTASSIUM SERPL-SCNC: 4.8 MMOL/L (ref 3.5–5.2)
PROT SERPL-MCNC: 6.4 G/DL (ref 6–8.5)
PROT UR QL STRIP: NEGATIVE
PROTHROMBIN TIME: 10.8 SECONDS (ref 9.6–11.7)
RBC # BLD AUTO: 4.66 10*6/MM3 (ref 4.14–5.8)
RBC MORPH BLD: NORMAL
RH BLD: POSITIVE
SARS-COV-2 ORF1AB RESP QL NAA+PROBE: NOT DETECTED
SODIUM SERPL-SCNC: 137 MMOL/L (ref 136–145)
SP GR UR STRIP: 1.03 (ref 1–1.03)
T&S EXPIRATION DATE: NORMAL
TRIGL SERPL-MCNC: 178 MG/DL (ref 0–150)
TROPONIN T SERPL-MCNC: <0.01 NG/ML (ref 0–0.03)
TSH SERPL DL<=0.05 MIU/L-ACNC: 0.92 UIU/ML (ref 0.27–4.2)
UROBILINOGEN UR QL STRIP: ABNORMAL
VIT B12 BLD-MCNC: 506 PG/ML (ref 211–946)
VLDLC SERPL-MCNC: 31 MG/DL (ref 5–40)
WBC # BLD AUTO: 7.1 10*3/MM3 (ref 3.4–10.8)
WBC MORPH BLD: NORMAL
WHOLE BLOOD HOLD SPECIMEN: NORMAL
WHOLE BLOOD HOLD SPECIMEN: NORMAL

## 2021-04-20 PROCEDURE — C9803 HOPD COVID-19 SPEC COLLECT: HCPCS

## 2021-04-20 PROCEDURE — U0004 COV-19 TEST NON-CDC HGH THRU: HCPCS | Performed by: EMERGENCY MEDICINE

## 2021-04-20 PROCEDURE — 70498 CT ANGIOGRAPHY NECK: CPT

## 2021-04-20 PROCEDURE — 85025 COMPLETE CBC W/AUTO DIFF WBC: CPT | Performed by: EMERGENCY MEDICINE

## 2021-04-20 PROCEDURE — 82962 GLUCOSE BLOOD TEST: CPT

## 2021-04-20 PROCEDURE — 93005 ELECTROCARDIOGRAM TRACING: CPT | Performed by: EMERGENCY MEDICINE

## 2021-04-20 PROCEDURE — 80061 LIPID PANEL: CPT | Performed by: NURSE PRACTITIONER

## 2021-04-20 PROCEDURE — G0378 HOSPITAL OBSERVATION PER HR: HCPCS

## 2021-04-20 PROCEDURE — 86850 RBC ANTIBODY SCREEN: CPT | Performed by: EMERGENCY MEDICINE

## 2021-04-20 PROCEDURE — 63710000001 INSULIN GLARGINE PER 5 UNITS: Performed by: INTERNAL MEDICINE

## 2021-04-20 PROCEDURE — 70450 CT HEAD/BRAIN W/O DYE: CPT

## 2021-04-20 PROCEDURE — 84443 ASSAY THYROID STIM HORMONE: CPT | Performed by: NURSE PRACTITIONER

## 2021-04-20 PROCEDURE — 70496 CT ANGIOGRAPHY HEAD: CPT

## 2021-04-20 PROCEDURE — 85007 BL SMEAR W/DIFF WBC COUNT: CPT | Performed by: EMERGENCY MEDICINE

## 2021-04-20 PROCEDURE — 84484 ASSAY OF TROPONIN QUANT: CPT | Performed by: EMERGENCY MEDICINE

## 2021-04-20 PROCEDURE — 99220 PR INITIAL OBSERVATION CARE/DAY 70 MINUTES: CPT | Performed by: INTERNAL MEDICINE

## 2021-04-20 PROCEDURE — 0 IOPAMIDOL PER 1 ML: Performed by: EMERGENCY MEDICINE

## 2021-04-20 PROCEDURE — 71045 X-RAY EXAM CHEST 1 VIEW: CPT

## 2021-04-20 PROCEDURE — 85610 PROTHROMBIN TIME: CPT | Performed by: EMERGENCY MEDICINE

## 2021-04-20 PROCEDURE — 83036 HEMOGLOBIN GLYCOSYLATED A1C: CPT | Performed by: NURSE PRACTITIONER

## 2021-04-20 PROCEDURE — 85730 THROMBOPLASTIN TIME PARTIAL: CPT | Performed by: EMERGENCY MEDICINE

## 2021-04-20 PROCEDURE — 99214 OFFICE O/P EST MOD 30 MIN: CPT | Performed by: NURSE PRACTITIONER

## 2021-04-20 PROCEDURE — 80053 COMPREHEN METABOLIC PANEL: CPT | Performed by: EMERGENCY MEDICINE

## 2021-04-20 PROCEDURE — 96361 HYDRATE IV INFUSION ADD-ON: CPT

## 2021-04-20 PROCEDURE — 86901 BLOOD TYPING SEROLOGIC RH(D): CPT | Performed by: EMERGENCY MEDICINE

## 2021-04-20 PROCEDURE — 82607 VITAMIN B-12: CPT | Performed by: NURSE PRACTITIONER

## 2021-04-20 PROCEDURE — U0005 INFEC AGEN DETEC AMPLI PROBE: HCPCS | Performed by: EMERGENCY MEDICINE

## 2021-04-20 PROCEDURE — 99285 EMERGENCY DEPT VISIT HI MDM: CPT

## 2021-04-20 PROCEDURE — 81003 URINALYSIS AUTO W/O SCOPE: CPT | Performed by: EMERGENCY MEDICINE

## 2021-04-20 PROCEDURE — 70551 MRI BRAIN STEM W/O DYE: CPT

## 2021-04-20 PROCEDURE — 86900 BLOOD TYPING SEROLOGIC ABO: CPT | Performed by: EMERGENCY MEDICINE

## 2021-04-20 RX ORDER — ASPIRIN 325 MG
325 TABLET ORAL ONCE
Status: COMPLETED | OUTPATIENT
Start: 2021-04-20 | End: 2021-04-20

## 2021-04-20 RX ORDER — SODIUM CHLORIDE 9 MG/ML
100 INJECTION, SOLUTION INTRAVENOUS CONTINUOUS
Status: DISCONTINUED | OUTPATIENT
Start: 2021-04-20 | End: 2021-04-22 | Stop reason: HOSPADM

## 2021-04-20 RX ORDER — ACETAMINOPHEN 650 MG/1
650 SUPPOSITORY RECTAL EVERY 4 HOURS PRN
Status: DISCONTINUED | OUTPATIENT
Start: 2021-04-20 | End: 2021-04-22 | Stop reason: HOSPADM

## 2021-04-20 RX ORDER — CHOLECALCIFEROL (VITAMIN D3) 125 MCG
5 CAPSULE ORAL NIGHTLY PRN
Status: DISCONTINUED | OUTPATIENT
Start: 2021-04-20 | End: 2021-04-22 | Stop reason: HOSPADM

## 2021-04-20 RX ORDER — ACETAMINOPHEN 325 MG/1
650 TABLET ORAL EVERY 4 HOURS PRN
Status: DISCONTINUED | OUTPATIENT
Start: 2021-04-20 | End: 2021-04-22 | Stop reason: HOSPADM

## 2021-04-20 RX ORDER — NITROGLYCERIN 0.4 MG/1
0.4 TABLET SUBLINGUAL
Status: DISCONTINUED | OUTPATIENT
Start: 2021-04-20 | End: 2021-04-22 | Stop reason: HOSPADM

## 2021-04-20 RX ORDER — POLYETHYLENE GLYCOL 3350 17 G/17G
17 POWDER, FOR SOLUTION ORAL DAILY
Status: DISCONTINUED | OUTPATIENT
Start: 2021-04-20 | End: 2021-04-20

## 2021-04-20 RX ORDER — ATORVASTATIN CALCIUM 40 MG/1
40 TABLET, FILM COATED ORAL NIGHTLY
Status: DISCONTINUED | OUTPATIENT
Start: 2021-04-20 | End: 2021-04-20

## 2021-04-20 RX ORDER — BUTALBITAL, ACETAMINOPHEN AND CAFFEINE 50; 325; 40 MG/1; MG/1; MG/1
1 TABLET ORAL EVERY 6 HOURS PRN
Status: DISCONTINUED | OUTPATIENT
Start: 2021-04-20 | End: 2021-04-22 | Stop reason: HOSPADM

## 2021-04-20 RX ORDER — ASPIRIN 81 MG/1
81 TABLET, CHEWABLE ORAL DAILY
Status: DISCONTINUED | OUTPATIENT
Start: 2021-04-21 | End: 2021-04-22 | Stop reason: HOSPADM

## 2021-04-20 RX ORDER — SODIUM CHLORIDE 0.9 % (FLUSH) 0.9 %
10 SYRINGE (ML) INJECTION AS NEEDED
Status: DISCONTINUED | OUTPATIENT
Start: 2021-04-20 | End: 2021-04-22 | Stop reason: HOSPADM

## 2021-04-20 RX ORDER — ONDANSETRON 2 MG/ML
4 INJECTION INTRAMUSCULAR; INTRAVENOUS EVERY 6 HOURS PRN
Status: DISCONTINUED | OUTPATIENT
Start: 2021-04-20 | End: 2021-04-22 | Stop reason: HOSPADM

## 2021-04-20 RX ORDER — OXYCODONE HYDROCHLORIDE 5 MG/1
15 TABLET ORAL EVERY 6 HOURS PRN
Status: DISCONTINUED | OUTPATIENT
Start: 2021-04-20 | End: 2021-04-22 | Stop reason: HOSPADM

## 2021-04-20 RX ORDER — ATORVASTATIN CALCIUM 40 MG/1
80 TABLET, FILM COATED ORAL NIGHTLY
Status: DISCONTINUED | OUTPATIENT
Start: 2021-04-20 | End: 2021-04-22 | Stop reason: HOSPADM

## 2021-04-20 RX ORDER — MORPHINE SULFATE 30 MG/1
30 TABLET, FILM COATED, EXTENDED RELEASE ORAL 2 TIMES DAILY
Status: DISCONTINUED | OUTPATIENT
Start: 2021-04-20 | End: 2021-04-22 | Stop reason: HOSPADM

## 2021-04-20 RX ORDER — ACETAMINOPHEN 160 MG/5ML
650 SOLUTION ORAL EVERY 4 HOURS PRN
Status: DISCONTINUED | OUTPATIENT
Start: 2021-04-20 | End: 2021-04-22 | Stop reason: HOSPADM

## 2021-04-20 RX ORDER — TIZANIDINE 2 MG/1
2 TABLET ORAL NIGHTLY PRN
Status: DISCONTINUED | OUTPATIENT
Start: 2021-04-20 | End: 2021-04-22 | Stop reason: HOSPADM

## 2021-04-20 RX ORDER — INSULIN GLARGINE 100 [IU]/ML
20 INJECTION, SOLUTION SUBCUTANEOUS NIGHTLY
Status: DISCONTINUED | OUTPATIENT
Start: 2021-04-20 | End: 2021-04-22 | Stop reason: HOSPADM

## 2021-04-20 RX ORDER — PREGABALIN 100 MG/1
300 CAPSULE ORAL 2 TIMES DAILY
Status: DISCONTINUED | OUTPATIENT
Start: 2021-04-20 | End: 2021-04-22 | Stop reason: HOSPADM

## 2021-04-20 RX ORDER — ONDANSETRON 4 MG/1
4 TABLET, FILM COATED ORAL EVERY 6 HOURS PRN
Status: DISCONTINUED | OUTPATIENT
Start: 2021-04-20 | End: 2021-04-22 | Stop reason: HOSPADM

## 2021-04-20 RX ORDER — BUPROPION HYDROCHLORIDE 150 MG/1
300 TABLET ORAL DAILY
Status: DISCONTINUED | OUTPATIENT
Start: 2021-04-21 | End: 2021-04-22

## 2021-04-20 RX ADMIN — ATORVASTATIN CALCIUM 80 MG: 40 TABLET, FILM COATED ORAL at 20:38

## 2021-04-20 RX ADMIN — PREGABALIN 300 MG: 100 CAPSULE ORAL at 20:38

## 2021-04-20 RX ADMIN — SODIUM CHLORIDE 1000 ML: 9 INJECTION, SOLUTION INTRAVENOUS at 14:50

## 2021-04-20 RX ADMIN — APIXABAN 5 MG: 5 TABLET, FILM COATED ORAL at 20:38

## 2021-04-20 RX ADMIN — IOPAMIDOL 100 ML: 755 INJECTION, SOLUTION INTRAVENOUS at 14:32

## 2021-04-20 RX ADMIN — INSULIN GLARGINE 20 UNITS: 100 INJECTION, SOLUTION SUBCUTANEOUS at 20:38

## 2021-04-20 RX ADMIN — SODIUM CHLORIDE 100 ML/HR: 9 INJECTION, SOLUTION INTRAVENOUS at 20:46

## 2021-04-20 RX ADMIN — ASPIRIN 325 MG ORAL TABLET 325 MG: 325 PILL ORAL at 16:51

## 2021-04-20 RX ADMIN — MORPHINE SULFATE 30 MG: 30 TABLET, FILM COATED, EXTENDED RELEASE ORAL at 20:38

## 2021-04-20 NOTE — ED NOTES
Pt attempted to give urine before going to MRI but was unsuccessful. Will attempt again after he returns and progress to S/C if needed     Jeanette Valdez, RN  04/20/21 9435

## 2021-04-20 NOTE — ED TRIAGE NOTES
"Pt reports was at work starting 2 hours ago and began to have lt side weakness ad unable to \"get words out\", these have since resolved  On arrival to ED. Pt does report h/o 2 prior strokes  With similar sx,still taking eliquis.  "

## 2021-04-20 NOTE — H&P
[facility] MEDICAL [facility] HOSPITALIST     Leda Duckworth DO    CHIEF COMPLAINT:     Trouble speaking and left sided weakness     HISTORY OF PRESENT ILLNESS:    Patient is a 64 y/o male who presented to the ED complaining of sudden onset of moderate difficulty speaking and left sided weakness with trouble walking that started around 1230 this afternoon. Patient reports he is feeling somewhat better now. He reports associated left sided headache. Denies cp, sob, fever or chills.   He does report nausea and diarrhea that started yesterday. He denies any sick contacts.       Past Medical History:   Diagnosis Date   • Arthritis    • BPH    • CAD    • Chronic back pain    • DMII    • Excessive daytime sleepiness    • Low back pain    • Low vision of right eye with normal vision in contralateral eye    • Memory problem    • Neuropathy    • Obesity    • PFO (patent foramen ovale) 11/29/2019   • Snoring    • Stroke      Past Surgical History:   Procedure Laterality Date   • ARM TENDON REPAIR Left    • CARDIAC CATHETERIZATION N/A 11/16/2020   • CARDIAC CATHETERIZATION N/A 11/16/2020   • CHOLECYSTECTOMY     • COLONOSCOPY      2018 = TA, rech 2023   • EYE SURGERY      Rt Eye Sx after trauma @ 2y/o   • JOINT REPLACEMENT      Left TKR   • ROTATOR CUFF REPAIR Bilateral    • UMBILICAL HERNIA REPAIR       Family History   Problem Relation Age of Onset   • Diabetes Mother    • Heart disease Mother    • Arthritis Mother    • Obesity Mother    • Hypertension Mother    • Migraines Mother    • Osteoporosis Mother    • Snoring Mother    • Diabetes Father    • Heart disease Father    • Arthritis Father    • Hypertension Father    • Stroke Father    • Heart attack Father    • Hyperlipidemia Father    • Snoring Father    • Diabetes Brother    • Heart disease Brother    • Arthritis Sister    • Anemia Sister      Social History     Tobacco Use   • Smoking status: Never Smoker   • Smokeless tobacco: Never Used   Vaping Use   • Vaping Use:  Never used   Substance Use Topics   • Alcohol use: Not Currently     Alcohol/week: 1.0 standard drinks     Types: 1 Cans of beer per week   • Drug use: Never     Medications Prior to Admission   Medication Sig Dispense Refill Last Dose   • apixaban (ELIQUIS) 5 MG tablet tablet Take 1 tablet by mouth Every 12 (Twelve) Hours. 180 tablet 0 4/20/2021 at 0800   • aspirin 81 MG chewable tablet Chew 81 mg Daily.   4/20/2021 at 0800   • atorvastatin (LIPITOR) 40 MG tablet Take 1 tablet by mouth Every Night. 90 tablet 1 4/19/2021 at Unknown time   • buPROPion XL (WELLBUTRIN XL) 300 MG 24 hr tablet Take 1 tablet by mouth Daily. 90 tablet 0 4/20/2021 at 0800   • butalbital-acetaminophen-caffeine (FIORICET, ESGIC) -40 MG per tablet Take 1 tablet by mouth Every 6 (Six) Hours As Needed for Headache or Migraine. 20 tablet 0    • Dapagliflozin Propanediol (Farxiga) 10 MG tablet Take 10 mg by mouth Every Evening. 90 tablet 1 4/19/2021 at Unknown time   • Insulin Glargine (Lantus SoloStar) 100 UNIT/ML injection pen Inject 35 Units under the skin into the appropriate area as directed Every Night. (Patient taking differently: Inject 30 Units under the skin into the appropriate area as directed Every Night.) 4 pen 0 4/19/2021 at Unknown time   • Insulin Lispro, 1 Unit Dial, (HumaLOG KwikPen) 100 UNIT/ML solution pen-injector Inject 5 Units under the skin into the appropriate area as directed 3 (Three) Times a Day Before Meals. 5 pen 3 4/19/2021 at Unknown time   • Morphine (MS CONTIN) 30 MG 12 hr tablet Take 1 tablet by mouth 2 (Two) Times a Day. 60 tablet 0    • oxyCODONE (ROXICODONE) 15 MG immediate release tablet Take 1 tablet by mouth Every 6 (Six) Hours As Needed for Severe Pain . 120 tablet 0 4/20/2021 at 0800   • polyethylene glycol (MIRALAX) 17 g packet Take 17 g by mouth Daily.   Past Week at Unknown time   • pregabalin (LYRICA) 300 MG capsule Take 1 capsule by mouth 2 (Two) Times a Day. 60 capsule 2 4/20/2021 at 0800   •  "tiZANidine (ZANAFLEX) 2 MG tablet Take 1 tablet by mouth At Night As Needed for Muscle Spasms. 30 tablet 0      Allergies:  Patient has no known allergies.    Immunization History   Administered Date(s) Administered   • COVID-19 (PFIZER) 03/12/2021   • Flulaval/Fluarix/Fluzone Quad 10/05/2020   • Influenza TIV (IM) 11/01/2015   • Influenza, Unspecified 01/17/2020   • Pneumococcal Polysaccharide (PPSV23) 10/05/2020   • flucelvax quad pfs =>4 YRS 01/17/2020           REVIEW OF SYSTEMS:    Please see the above history of present illness for pertinent positives and negatives.  The remainder of the patient's systems have been reviewed and are negative.         Vital Signs  Temp:  [97.3 °F (36.3 °C)] 97.3 °F (36.3 °C)  Heart Rate:  [70-80] 70  Resp:  [15-20] 15  BP: (100-169)/(65-86) 142/80    Flowsheet Rows      First Filed Value   Admission Height  172.7 cm (68\") Documented at 04/20/2021 1404   Admission Weight  112 kg (246 lb 14.6 oz) Documented at 04/20/2021 1404             Physical Exam:    Physical Exam  Vitals reviewed.   Constitutional:       General: He is not in acute distress.     Appearance: He is obese.   HENT:      Head: Normocephalic and atraumatic.      Mouth/Throat:      Mouth: Mucous membranes are moist.   Eyes:      General: No scleral icterus.     Pupils: Pupils are equal, round, and reactive to light.   Cardiovascular:      Rate and Rhythm: Normal rate and regular rhythm.      Heart sounds: No murmur heard.     Pulmonary:      Effort: Pulmonary effort is normal. No respiratory distress.      Breath sounds: No rales.   Abdominal:      General: Bowel sounds are normal. There is no distension.      Palpations: Abdomen is soft.      Tenderness: There is no abdominal tenderness.   Musculoskeletal:      Right lower leg: No edema.      Left lower leg: No edema.   Skin:     General: Skin is warm and dry.      Findings: No rash.   Neurological:      Mental Status: He is alert and oriented to person, place, " and time.      Comments: Strength 4+/5 LLE, strength 5/5 b/l upper extremities    Psychiatric:         Mood and Affect: Mood normal.         Behavior: Behavior normal.               Results Review:    I reviewed the patient's new clinical results.  Lab Results (most recent)     Procedure Component Value Units Date/Time    POC Glucose Once [[id number]]  (Normal) Collected: 04/20/21 1844    Specimen: Blood Updated: 04/20/21 1846     Glucose 103 mg/dL      Comment: Serial Number: [id number]:  [id number]       Urinalysis With Culture If Indicated - Urine, Clean Catch [[id number]]  (Abnormal) Collected: 04/20/21 1703    Specimen: Urine, Clean Catch Updated: 04/20/21 1711     Color, UA Yellow     Appearance, UA Clear     pH, UA <=5.0     Specific Gravity, UA 1.027     Glucose,  mg/dL (1+)     Ketones, UA Negative     Bilirubin, UA Negative     Blood, UA Negative     Protein, UA Negative     Leuk Esterase, UA Negative     Nitrite, UA Negative     Urobilinogen, UA 0.2 E.U./dL    Narrative:      Urine microscopic not indicated.    COVID PRE-OP / PRE-PROCEDURE SCREENING ORDER (NO ISOLATION) - Swab, Nasopharynx [[id number]] Collected: 04/20/21 1703    Specimen: Swab from Nasopharynx Updated: 04/20/21 1706    Narrative:      The following orders were created for panel order COVID PRE-OP / PRE-PROCEDURE SCREENING ORDER (NO ISOLATION) - Swab, Nasopharynx.  Procedure                               Abnormality         Status                     ---------                               -----------         ------                     COVID-19,APTIMA PANTHER,...[[id number]]                      In process                   Please view results for these tests on the individual orders.    COVID-19,APTIMA PANTHERDORIAN IN-HOUSE, NP/OP SWAB IN UTM/VTM/SALINE TRANSPORT MEDIA,24 HR TAT - Swab, Nasopharynx [[id number]] Collected: 04/20/21 1703    Specimen: Swab from Nasopharynx Updated: 04/20/21 1706    [place] Draw [[id number]] Collected:  04/20/21 1427    Specimen: Blood Updated: 04/20/21 1530    Narrative:      The following orders were created for panel order [place] Draw.  Procedure                               Abnormality         Status                     ---------                               -----------         ------                     Light Blue Top[[id number]]                                   Final result               Green Top (Gel)[[id number]]                                  Final result               Lavender Top[[id number]]                                     Final result               Gold Top - SST[[id number]]                                   Final result                 Please view results for these tests on the individual orders.    Lavender Top [[id number]] Collected: 04/20/21 1427    Specimen: Blood Updated: 04/20/21 1530     Extra Tube hold for add-on     Comment: Auto resulted       Gold Top - SST [[id number]] Collected: 04/20/21 1427    Specimen: Blood Updated: 04/20/21 1530     Extra Tube Hold for add-ons.     Comment: Auto resulted.       Light Blue Top [[id number]] Collected: 04/20/21 1427    Specimen: Blood Updated: 04/20/21 1530     Extra Tube hold for add-on     Comment: Auto resulted       Hemoglobin A1c [[id number]]  (Abnormal) Collected: 04/20/21 1427    Specimen: Blood Updated: 04/20/21 1524     Hemoglobin A1C 8.4 %     Narrative:      Hemoglobin A1C Reference Range:    <5.7 %        Normal  5.7-6.4 %     Increased risk for diabetes  > 6.4 %        Diabetes       These guidelines have been recommended by the American Diabetic Association for Hgb A1c.      The following 2010 guidelines have been recommended by the American Diabetes Association for Hemoglobin A1c.    HBA1c 5.7-6.4% Increased risk for future diabetes (pre-diabetes)  HBA1c     >6.4% Diabetes      TSH [[id number]]  (Normal) Collected: 04/20/21 1427    Specimen: Blood Updated: 04/20/21 1524     TSH 0.924 uIU/mL     CBC & Differential [[id number]]  (Abnormal)  Collected: 04/20/21 1427    Specimen: Blood Updated: 04/20/21 1521    Narrative:      The following orders were created for panel order CBC & Differential.  Procedure                               Abnormality         Status                     ---------                               -----------         ------                     Scan Slide[[id number]]                                       Final result               CBC Auto Differential[[id number]]        Abnormal            Final result                 Please view results for these tests on the individual orders.    Scan Slide [[id number]] Collected: 04/20/21 1427    Specimen: Blood Updated: 04/20/21 1521     RBC Morphology Normal     WBC Morphology Normal     Giant Platelets Slight/1+    Narrative:      No platelet clumping seen    CBC Auto Differential [[id number]]  (Abnormal) Collected: 04/20/21 1427    Specimen: Blood Updated: 04/20/21 1521     WBC 7.10 10*3/mm3      RBC 4.66 10*6/mm3      Hemoglobin 14.1 g/dL      Hematocrit 42.3 %      MCV 90.8 fL      MCH 30.3 pg      MCHC 33.3 g/dL      RDW 14.4 %      RDW-SD 45.5 fl      MPV 12.1 fL      Platelets 194 10*3/mm3      Neutrophil % 53.8 %      Lymphocyte % 34.9 %      Monocyte % 8.8 %      Eosinophil % 1.7 %      Basophil % 0.8 %      Neutrophils, Absolute 3.80 10*3/mm3      Lymphocytes, Absolute 2.50 10*3/mm3      Monocytes, Absolute 0.60 10*3/mm3      Eosinophils, Absolute 0.10 10*3/mm3      Basophils, Absolute 0.10 10*3/mm3      nRBC 0.1 /100 WBC     Green Top (Gel) [[id number]] Collected: 04/20/21 1427    Specimen: Blood Updated: 04/20/21 1515    Lipid Panel [[id number]]  (Abnormal) Collected: 04/20/21 1427    Specimen: Blood Updated: 04/20/21 1513     Total Cholesterol 213 mg/dL      Triglycerides 178 mg/dL      HDL Cholesterol 56 mg/dL      LDL Cholesterol  126 mg/dL      VLDL Cholesterol 31 mg/dL      LDL/HDL Ratio 2.17    Narrative:      Cholesterol Reference Ranges  (U.S. Department of Health and Human  Services ATP III Classifications)    Desirable          <200 mg/dL  Borderline High    200-239 mg/dL  High Risk          >240 mg/dL      Triglyceride Reference Ranges  (U.S. Department of Health and Human Services ATP III Classifications)    Normal           <150 mg/dL  Borderline High  150-199 mg/dL  High             200-499 mg/dL  Very High        >500 mg/dL    HDL Reference Ranges  (U.S. Department of Health and Human Services ATP III Classifcations)    Low     <40 mg/dl (major risk factor for CHD)  High    >60 mg/dl ('negative' risk factor for CHD)        LDL Reference Ranges  (U.S. Department of Health and Human Services ATP III Classifcations)    Optimal          <100 mg/dL  Near Optimal     100-129 mg/dL  Borderline High  130-159 mg/dL  High             160-189 mg/dL  Very High        >189 mg/dL    Troponin [[id number]]  (Normal) Collected: 04/20/21 1427    Specimen: Blood Updated: 04/20/21 1504     Troponin T <0.010 ng/mL     Narrative:      Troponin T Reference Range:  <= 0.03 ng/mL-   Negative for AMI  >0.03 ng/mL-     Abnormal for myocardial necrosis.  Clinicians would have to utilize clinical acumen, EKG, Troponin and serial changes to determine if it is an Acute Myocardial Infarction or myocardial injury due to an underlying chronic condition.       Results may be falsely decreased if patient taking Biotin.      Comprehensive Metabolic Panel [[id number]]  (Abnormal) Collected: 04/20/21 1427    Specimen: Blood Updated: 04/20/21 1503     Glucose 129 mg/dL      BUN 24 mg/dL      Creatinine 1.57 mg/dL      Sodium 137 mmol/L      Potassium 4.8 mmol/L      Comment: Slight hemolysis detected by analyzer. Results may be affected.        Chloride 101 mmol/L      CO2 25.0 mmol/L      Calcium 8.9 mg/dL      Total Protein 6.4 g/dL      Albumin 3.90 g/dL      ALT (SGPT) 13 U/L      AST (SGOT) 16 U/L      Comment: Slight hemolysis detected by analyzer. Results may be affected.        Alkaline Phosphatase 65 U/L       Total Bilirubin 0.4 mg/dL      eGFR Non African Amer 45 mL/min/1.73      Globulin 2.5 gm/dL      A/G Ratio 1.6 g/dL      BUN/Creatinine Ratio 15.3     Anion Gap 11.0 mmol/L     Narrative:      GFR Normal >60  Chronic Kidney Disease <60  Kidney Failure <15      Vitamin B12 [[id number]] Collected: 04/20/21 1427    Specimen: Blood Updated: 04/20/21 1500    Protime-INR [[id number]]  (Normal) Collected: 04/20/21 1427    Specimen: Blood Updated: 04/20/21 1453     Protime 10.8 Seconds      INR 0.98    aPTT [[id number]]  (Normal) Collected: 04/20/21 1427    Specimen: Blood Updated: 04/20/21 1453     PTT 27.8 seconds     POC Glucose Once [[id number]]  (Abnormal) Collected: 04/20/21 1405    Specimen: Blood Updated: 04/20/21 1406     Glucose 133 mg/dL      Comment: Serial Number: [id number]:  [id number]             Imaging Results (Most Recent)     Procedure Component Value Units Date/Time    MRI Brain Without Contrast [[id number]] Collected: 04/20/21 1732     Updated: 04/20/21 1740    Narrative:      Examination: MRI BRAIN WO CONTRAST-     Date of Exam: 4/20/2021 4:25 PM     Indication: Stroke, follow up.     Comparison: CT head without contrast 04/20/2021, CT head and neck  angiography 04/20/2021, MRI brain 07/17/2020     Technique: Standard non-contrast MR pulse sequences of the brain were  obtained.     Findings:  There is no diffusion restriction or findings to indicate acute  ischemia. No mass effect or midline shift. There are areas of remote  infarct in the right and left periventricular white matter with areas of  mild parenchymal volume loss at the left and right basal ganglia. No  midline shift or mass effect. Prominence of the ventricles and cortical  sulci consistent with generalized cerebral atrophy.      The posterior fossa is without acute abnormality. No findings to  indicate intraparenchymal hemorrhage. The major T2 weighted intracranial  vascular flow voids are grossly patent, vasculature better assessed  on  CT angiography exam. Globes are symmetric. No retro-orbital abnormality.  The mastoid air cells are well-aerated. Negative for sinus fluid level.  There is no suprasellar mass. The pituitary gland is normal in size.  Normal alignment of the cerebellar tonsils.       Impression:      1. Negative for acute ischemia.  2. Chronic infarcts in right and left periventricular white matter and  basal ganglia.  3. Generalized cerebral atrophy.     Electronically Signed By-Amos Kang MD On:4/20/2021 5:38 PM  This report was finalized on [id number] by  Amos Kang MD.    CT Angiogram Neck [[id number]] Collected: 04/20/21 1505     Updated: 04/20/21 1542    Narrative:         DATE OF EXAM:  4/20/2021 2:30 PM     PROCEDURE:  CT ANGIOGRAM HEAD W AI ANALYSIS OF LVO-     INDICATIONS:   Slurred speech left-sided weakness     COMPARISON:   No Comparisons Available     TECHNIQUE:  CTA of the head and CTA of the neck were performed after the intravenous  administration of 100 mL Isovue 370. Reconstructed coronal and sagittal  images were also obtained. In addition, a 3 D volume rendered image was  obtained after post processing. Automated exposure control and iterative  reconstruction methods were used. AI analysis of LVO was utilized for  the CTA Head imaging portion of the study.      FINDINGS:  VASCULAR FINDINGS: The aortic arch is normal in caliber. The right  brachiocephalic artery is widely patent. The right common carotid artery  is widely patent. There is mild plaquing in the right carotid bulb but  no measurable stenosis. The right ICA is widely patent to its course.  The right subclavian and right vertebral artery are widely patent  throughout the course. The left common carotid artery demonstrates mild  to moderate plaquing distally. There is 38% stenosis within the left  proximal ICA. The remainder left ICA is widely patent. The left  subclavian and left vertebral artery are widely patent. The  intracranial  circulation is unremarkable.     NONVASCULAR FINDINGS: No enhancing lesions are identified. Evidence of  prior right basal ganglier infarct. Limited evaluation of the lung  apices are unremarkable. Thyroid is normal. Submandibular and parotid  glands are normal. Orbits paranasal sinuses and mastoid air cells are  unremarkable. No aggressive appearing lytic or sclerotic bone lesions  are identified.          Impression:         1. 38% stenosis within the left proximal ICA.  2. No significant stenosis in the right proximal ICA  3. Intracranial circulation is unremarkable.     Electronically Signed By-Alberto Jones MD On:4/20/2021 3:13 PM  This report was finalized on [id number] by  Alberto Jones MD.    CT Angriogram head w ai analysis of lvo [[id number]] Collected: 04/20/21 1505     Updated: 04/20/21 1542    Narrative:         DATE OF EXAM:  4/20/2021 2:30 PM     PROCEDURE:  CT ANGIOGRAM HEAD W AI ANALYSIS OF LVO-     INDICATIONS:   Slurred speech left-sided weakness     COMPARISON:   No Comparisons Available     TECHNIQUE:  CTA of the head and CTA of the neck were performed after the intravenous  administration of 100 mL Isovue 370. Reconstructed coronal and sagittal  images were also obtained. In addition, a 3 D volume rendered image was  obtained after post processing. Automated exposure control and iterative  reconstruction methods were used. AI analysis of LVO was utilized for  the CTA Head imaging portion of the study.      FINDINGS:  VASCULAR FINDINGS: The aortic arch is normal in caliber. The right  brachiocephalic artery is widely patent. The right common carotid artery  is widely patent. There is mild plaquing in the right carotid bulb but  no measurable stenosis. The right ICA is widely patent to its course.  The right subclavian and right vertebral artery are widely patent  throughout the course. The left common carotid artery demonstrates mild  to moderate plaquing distally. There is 38%  stenosis within the left  proximal ICA. The remainder left ICA is widely patent. The left  subclavian and left vertebral artery are widely patent. The intracranial  circulation is unremarkable.     NONVASCULAR FINDINGS: No enhancing lesions are identified. Evidence of  prior right basal ganglier infarct. Limited evaluation of the lung  apices are unremarkable. Thyroid is normal. Submandibular and parotid  glands are normal. Orbits paranasal sinuses and mastoid air cells are  unremarkable. No aggressive appearing lytic or sclerotic bone lesions  are identified.          Impression:         1. 38% stenosis within the left proximal ICA.  2. No significant stenosis in the right proximal ICA  3. Intracranial circulation is unremarkable.     Electronically Signed By-Alberto Jones MD On:4/20/2021 3:13 PM  This report was finalized on [id number] by  Alberto Jones MD.    XR Chest 1 View [[id number]] Collected: 04/20/21 1458     Updated: 04/20/21 1539    Narrative:      DATE OF EXAM:  4/20/2021 2:45 PM     PROCEDURE:  XR CHEST 1 VW-     INDICATIONS:  Acute Stroke Protocol (onset < 12 hrs)     COMPARISON:  AP chest x-ray 11/12/2020.     TECHNIQUE:   Single radiographic AP view of the chest was obtained.     FINDINGS:  The patient is in a lordotic position. Allowing for lower lung volumes,  the lungs appear grossly clear. No pneumothorax or large pleural  effusion is seen. Cardiomediastinal contours appear stable.        Impression:      Lower lung volumes without definite acute cardiopulmonary abnormality.     Electronically Signed By-Leann Painter MD On:4/20/2021 2:59 PM  This report was finalized on [id number] by  Leann Painter MD.    CT Head Without Contrast Stroke Protocol [[id number]] Collected: 04/20/21 1424     Updated: 04/20/21 1430    Narrative:      Exam: CT HEAD WO CONTRAST STROKE PROTOCOL-     Date of Exam: 4/20/2021 2:21 PM     Indication: Code stroke, weakness, history of prior strokes.     Comparison:  Head CT dated 11/12/2020.     Technique:  Axial CT images of the head were obtained from skull base to  vertex without IV contrast.  Coronal reconstructions were performed.   Automated exposure control and iterative reconstruction methods were  used.     FINDINGS  The ventricles and sulci are proportional without significant volume  loss or hydrocephalus. There is no mass effect or midline shift. There  is no acute intracranial hemorrhage. There is no abnormal extra-axial  fluid collection. The gray-white matter differentiation is preserved.  There is evidence of a prior lacunar type infarct within the right head  of the caudate which appears stable from the prior examination. There is  evidence of an old lacunar-type infarct within the anterior limb of the  left internal capsule and adjacent to the anterior horn of the left  lateral ventricle. These appear similar to the prior examination. The  calvarium is intact. The mastoid air cells and middle ears are well  aerated. The paranasal sinuses are clear. Visualized orbits and globes  appear symmetric. There is atherosclerotic calcification within the  intracranial vasculature.       Impression:      1. No acute intracranial abnormality. Specifically, no evidence of  hemorrhage, mass effect or midline shift.  2. Stable old lacunar-type infarct within the bilateral basal ganglia.     Findings discussed with Dr. Perez at 2:28:00 PM on 4/20/2021     Electronically Signed By-Juma Perry MD On:4/20/2021 2:28 PM  This report was finalized on [id number] by  Juma Perry MD.        reviewed    ECG/EMG Results (most recent)     Procedure Component Value Units Date/Time    ECG 12 Lead [[id number]] Collected: 04/20/21 1504     Updated: 04/20/21 1505     QT Interval 417 ms     Narrative:      HEART RATE= 74  bpm  RR Interval= 816  ms  MA Interval= 176  ms  P Horizontal Axis= 37  deg  P Front Axis= 43  deg  QRSD Interval= 85  ms  QT Interval= 417  ms  QRS Axis= 16   deg  T Wave Axis= 40  deg  - OTHERWISE NORMAL ECG -  Sinus rhythm  Low voltage, precordial leads  Electronically Signed By:   Date and Time of Study: 2021-04-20 15:04:38        EKG personally reviewed and shows NSR With no acute st-t changes     Assessment/Plan   Active Hospital Problems    Diagnosis  POA   • TIA (transient ischemic attack) [G45.9]  Yes      Resolved Hospital Problems   No resolved problems to display.       Difficulty with word finding and LLE weakness   - possibly d/t TIA   - Hx of stroke with PFO  - CT head negative for acute process- stable old lacunar infarcts  - MRI brain- pending  - CTA head and neck  - echo- pending  - continue aspirin, Eliquis and statin  - lipid panel in am   - neuro consutled in the ED     Renal insufficiency   - baseline Cr 0.9-1.1  - likely d/t recent diarrhea  - started IV fluids   - repeat in am    Diarrhea  - check stool studies   - IV fluids  - hold miralax     Hyperlipidemia  - increase atorvastatin to 80mg    Diabetes Mellitus type 2 with peripheral neuropathy  - hgb A1c 8.4  - resume lantus at lower dose  - add SSI  - continue invokana and lyrica  Chronic Pain  - continue MS contin, zanaflex, and oxycodone    Depression  - continue Wellbutrin     DVT Prophylaxis  - Eliquis       I discussed the patient's findings and my recommendations with patient.           Rosi Prakash DO  04/20/21  18:55 EDT

## 2021-04-20 NOTE — PLAN OF CARE
Goal Outcome Evaluation:         Pt arrived on the unit from ED with symptoms of left sided weakness and slurred speech.  Symptoms resolved. Continues to have a slowed speech from previous stroke. Pt had a CTA head and neck. Mri is pending.

## 2021-04-20 NOTE — CONSULTS
Primary Care Provider: No ref. provider found     Consult requested by: Dr. Perez    Reason for Consultation: Neurological evaluation, code stroke     History taken from: patient chart RN    Chief complaint: Word finding difficulty        SUBJECTIVE:    History of present illness: Patient states he was at work around 12:00 this afternoon when he started having word finding difficulty.  He states it lasted 15 to 30 minutes before resolving.  He now started to feel like himself at his not having any more difficulty with his speech.  He does have history of stroke and is on Eliquis and baby aspirin daily.  He says he has been having diarrhea and feeling off the past 2 days, he thought this morning before going to work that it is resolved but this happened.  His other only complaint is that he feels very weak throughout , he has a difficult time getting from the wheelchair to the CT scanner due to general weakness and back pain.     Code stroke initiated on arrival to [facility], patient was evaluated with self in the ER scanner .  Patient had no word finding difficulty on exam, no dysarthria, no focal deficit, no facial symmetry, no visual changes.  He had weakness in bilateral lower extremities but no ataxia.  No TPA given, NIH of 2 for bilateral lower extremity drift.  Patient already on Eliquis.    Review of Systems   Constitutional: Positive for fatigue. Negative for fever.   HENT: Negative.    Eyes: Negative for visual disturbance.   Respiratory: Negative.    Cardiovascular: Negative.    Gastrointestinal: Positive for diarrhea. Negative for nausea and vomiting.   Endocrine: Negative.    Genitourinary: Negative.    Musculoskeletal: Positive for back pain.   Skin: Negative.    Allergic/Immunologic: Negative.    Neurological: Positive for weakness (generalized). Negative for dizziness, tremors, seizures, syncope, facial asymmetry, speech difficulty, light-headedness, numbness and headaches.    Hematological: Negative.    Psychiatric/Behavioral: Negative.         Family history as below    PATIENT HISTORY:  Past Medical History:   Diagnosis Date   • Arthritis    • BPH    • CAD    • Chronic back pain    • DMII    • Excessive daytime sleepiness    • Low back pain    • Low vision of right eye with normal vision in contralateral eye    • Memory problem    • Neuropathy    • Obesity    • PFO (patent foramen ovale) 11/29/2019   • Snoring    • Stroke    ,   Past Surgical History:   Procedure Laterality Date   • ARM TENDON REPAIR Left    • CARDIAC CATHETERIZATION N/A 11/16/2020   • CARDIAC CATHETERIZATION N/A 11/16/2020   • CHOLECYSTECTOMY     • COLONOSCOPY      2018 = TA, rech 2023   • EYE SURGERY      Rt Eye Sx after trauma @ 4y/o   • JOINT REPLACEMENT      Left TKR   • ROTATOR CUFF REPAIR Bilateral    • UMBILICAL HERNIA REPAIR     ,   Family History   Problem Relation Age of Onset   • Diabetes Mother    • Heart disease Mother    • Arthritis Mother    • Obesity Mother    • Hypertension Mother    • Migraines Mother    • Osteoporosis Mother    • Snoring Mother    • Diabetes Father    • Heart disease Father    • Arthritis Father    • Hypertension Father    • Stroke Father    • Heart attack Father    • Hyperlipidemia Father    • Snoring Father    • Diabetes Brother    • Heart disease Brother    • Arthritis Sister    • Anemia Sister    ,   Social History     Tobacco Use   • Smoking status: Never Smoker   • Smokeless tobacco: Never Used   Vaping Use   • Vaping Use: Never used   Substance Use Topics   • Alcohol use: Not Currently     Alcohol/week: 1.0 standard drinks     Types: 1 Cans of beer per week   • Drug use: Never   , (Not in a hospital admission)  , Scheduled Meds:   , Continuous Infusions:   , PRN Meds:  •  sodium chloride, Allergies:  Patient has no known allergies.    ________________________________________________________        OBJECTIVE:    PHYSICAL EXAM:    Constitutional: The patient is in no apparent  distress, awake and alert. There is no shortness of breath.     PSYCHIATRIC: Mood/affect normal, judgement normal, appropriate    HEENT:  Normocephalic, atraumatic.     Chest: Breathing unlabored    Cardiac: Regular rate and rhythm.     Extremities:  No clubbing, cyanosis or edema.    NEUROLOGICAL:    Cognition:   Fully oriented.  Fund of knowledge excellent.  Concentration and attention normal.   Language normal with normal comprehension, fluent speech, intact repetition and naming.   Short and long term memory appears intact    Cranial nerves;    II - pupils bilaterally equal reacting to light,  No new Visual field deficits;  Fundoscopic exam- Not able to be done, non-dilated exam  III,IV,VI: EOMI with no diplopia  V: Normal facial sensations  VII: No facial asymmetry,  VIII: No New hearing abnormality  IX, X, XI: normal gag and shoulder shrug;  XII: tongue is in the midline.    Sensory:  Intact to light touch in all extremities.     Motor: Strength 5/5 bilaterally upper and 5-/5 bilateral lower extremities. No involuntary movements present. Normal tone and bulk.  Cerebellar: Finger to nose and mirror movements normal bilaterally.    Gait and balance: Deferred.     Physical exam performed by FRANCI Stone.    NIHSS:    Level Of Consciousness:  0  LOC Questions to Month and age: 0  LOC Commands:  0  Best Gaze: 0  Visual: 0  Facial Palsy: 0  Motor: Left Arm-0  Left leg-1; Right Arm-0 Right Leg-1  Limb Ataxia: 0  Sensory: 0  Best Language: 0  Dysarthria: 0  Extinction/Neglect: 0    Total: 2  Blood pressure: 100/66  Blood glucose: 132  ________________________________________________________   RESULTS REVIEW:    VITAL SIGNS:   Temp:  [97.3 °F (36.3 °C)] 97.3 °F (36.3 °C)  Heart Rate:  [80] 80  Resp:  [20] 20  BP: (100)/(66) 100/66     LABS:  No results found for: WBC, RBC, HGB, HCT, MCV, MCH, MCHC, RDW, RDWSD, MPV, PLT, NEUTRORELPCT, LYMPHORELPCT, MONORELPCT, EOSRELPCT, BASORELPCT, AUTOIGPER, NEUTROABS,  LYMPHSABS, MONOSABS, EOSABS, BASOSABS, AUTOIGNUM, NRBC  No results found for: GLUCOSE, BUN, CREATININE, NA, K, CL, CO2, CALCIUM, PROTEINTOT, ALBUMIN, ALT, AST, ALKPHOS, BILITOT, EGFRIFNONA, LABIL2, BCR, ANIONGAP    Lab Results   Component Value Date    TSH 1.170 11/13/2020     (H) 07/14/2020    HGBA1C 8.6 04/01/2021    [id number] 07/13/2020         IMAGING STUDIES:  CT Head Without Contrast Stroke Protocol    Result Date: 4/20/2021  1. No acute intracranial abnormality. Specifically, no evidence of hemorrhage, mass effect or midline shift. 2. Stable old lacunar-type infarct within the bilateral basal ganglia.  Findings discussed with Dr. Perez at 2:28:00 PM on 4/20/2021  Electronically Signed By-Juma Perry MD On:4/20/2021 2:28 PM This report was finalized on [id number] by  Juma Perry MD.      I reviewed the patient's new clinical results.      ________________________________________________________     PROBLEM LIST:    * No active hospital problems. *          Assessment/Plan   ASSESSMENT/PLAN:  1. Word finding difficulty, resolved.  Code stroke initiated and symptoms resolved, no tPA given.  Patient has history of strokes.    - CT head: No acute findings, no hemorrhage, stable old lacunar type infarcts within bilateral basal ganglia.   - Check MRI brain  - CTA head and neck reviewed without any obvious acute findings, pending official read  - Check echo and EKG  - Labs: A1C: P, B12: P, LDL:  P, TSH: P  - Antithrombotics: Continue Eliquis and aspirin  - Statin: Cont. Lipitor 40  - PT/OT/ST as appropriate, Neuro checks per protocol, DVT prophylaxis, Stroke education    2. Hypotensive on arrival  - 1L NS bolus started  - Pt has had multiple episodes of diarrhea the past 48 hours  - Continue to monitor     3.  Type 2 Diabetes Mellitus  - A1C: P  - Strict glycemic control, SSI, diabetic diet, diabetes educator    4.  Chronic pain  -Patient takes both Roxicodone and MS Contin for chronic  pain, okay to continue from neuro stand.     4. History of both embolic and small vessel strokes  - Continue Eliquis, baby aspirin and Lipitor for now  - Blood pressure should be less than 130/80 outpatient, HbA1c less than 6.5, LDL less than 70; b12>500 and smoking cessation if applicable. We would be grateful if the primary team / primary care physician would keep a close watch on the above targets.  - Stroke education  - Follow up with neurologist of choice    We will follow.   I discussed the patient's findings and my recommendations with patient, nursing staff and consulting provider.     Dagmar Carreon, APRN  04/20/21  14:35 EDT    Neurology addendum: 65 year old gentleman with history of strokes in past on Eliquis and ASA 81 came with acute onset of speech problems. His condition improved.  He was not a candidate for tPA. NIHH 1.  Admitted for further work up.

## 2021-04-20 NOTE — ED NOTES
IV access established to LAC, pt tolerated well. Neuro NP/MD in CT  Room for eval of pt.     Julissa Kaur, SHAHLA  04/20/21 2071

## 2021-04-20 NOTE — ED NOTES
Team B called per ED provider, pt wheeled via w/c to CT room 3 for eval.     Julissa Kaur, SHAHLA  04/20/21 [id number]

## 2021-04-20 NOTE — ED PROVIDER NOTES
Subjective   Chief complaint possible stroke    History of present illness 65-year-old male complains of a 2-hour history of weakness to his left arm and legs and trouble speaking.  And a left-sided headache.  Symptoms are moderate ongoing 2 hours continuous nothing makes it better or worse no associated fever chills or injury no visual changes no chest pain or abdominal pain.  No recent flus or viruses or ill exposures.  Patient does have a history of strokes and he does take Eliquis currently.  He states he has received TPA in the past          Review of Systems   Constitutional: Negative for chills and fever.   HENT: Negative for congestion and sinus pressure.    Eyes: Negative for photophobia and visual disturbance.   Respiratory: Negative for chest tightness and shortness of breath.    Cardiovascular: Negative for chest pain and leg swelling.   Gastrointestinal: Negative for abdominal pain and vomiting.   Endocrine: Negative for cold intolerance and heat intolerance.   Genitourinary: Negative for difficulty urinating and dysuria.   Musculoskeletal: Positive for arthralgias. Negative for back pain.   Skin: Negative for color change and pallor.   Neurological: Positive for dizziness, speech difficulty and headaches.   Psychiatric/Behavioral: Negative for agitation and behavioral problems.       Past Medical History:   Diagnosis Date   • Arthritis    • BPH    • CAD    • Chronic back pain    • DMII    • Excessive daytime sleepiness    • Low back pain    • Low vision of right eye with normal vision in contralateral eye    • Memory problem    • Neuropathy    • Obesity    • PFO (patent foramen ovale) 11/29/2019   • Snoring    • Stroke        No Known Allergies    Past Surgical History:   Procedure Laterality Date   • ARM TENDON REPAIR Left    • CARDIAC CATHETERIZATION N/A 11/16/2020   • CARDIAC CATHETERIZATION N/A 11/16/2020   • CHOLECYSTECTOMY     • COLONOSCOPY      2018 = TA, rech 2023   • EYE SURGERY      Rt Eye Sx  after trauma @ 2y/o   • JOINT REPLACEMENT      Left TKR   • ROTATOR CUFF REPAIR Bilateral    • UMBILICAL HERNIA REPAIR         Family History   Problem Relation Age of Onset   • Diabetes Mother    • Heart disease Mother    • Arthritis Mother    • Obesity Mother    • Hypertension Mother    • Migraines Mother    • Osteoporosis Mother    • Snoring Mother    • Diabetes Father    • Heart disease Father    • Arthritis Father    • Hypertension Father    • Stroke Father    • Heart attack Father    • Hyperlipidemia Father    • Snoring Father    • Diabetes Brother    • Heart disease Brother    • Arthritis Sister    • Anemia Sister        Social History     Socioeconomic History   • Marital status: Single     Spouse name: Not on file   • Number of children: Not on file   • Years of education: Not on file   • Highest education level: Not on file   Tobacco Use   • Smoking status: Never Smoker   • Smokeless tobacco: Never Used   Vaping Use   • Vaping Use: Never used   Substance and Sexual Activity   • Alcohol use: Not Currently     Alcohol/week: 1.0 standard drinks     Types: 1 Cans of beer per week   • Drug use: Never   • Sexual activity: Defer     Prior to Admission medications    Medication Sig Start Date End Date Taking? Authorizing Provider   apixaban (ELIQUIS) 5 MG tablet tablet Take 1 tablet by mouth Every 12 (Twelve) Hours. 4/1/21   Leda Duckworth, DO   aspirin 81 MG chewable tablet Chew 81 mg Daily.    Provider, MD Choco   atorvastatin (LIPITOR) 40 MG tablet Take 1 tablet by mouth Every Night. 4/1/21   Leda Duckworth, DO   buPROPion XL (WELLBUTRIN XL) 300 MG 24 hr tablet Take 1 tablet by mouth Daily. 2/15/21   Leda Duckworth, DO   butalbital-acetaminophen-caffeine (FIORICET, ESGIC) -40 MG per tablet Take 1 tablet by mouth Every 6 (Six) Hours As Needed for Headache or Migraine. 7/18/20   Kevyn Vogel, DO   Dapagliflozin Propanediol (Farxiga) 10 MG tablet Take 10 mg by mouth Every Evening. 4/1/21   Donita  DO Leda   Insulin Glargine (Lantus SoloStar) 100 UNIT/ML injection pen Inject 35 Units under the skin into the appropriate area as directed Every Night. 4/1/21   Leda Duckworth DO   Insulin Lispro, 1 Unit Dial, (HumaLOG KwikPen) 100 UNIT/ML solution pen-injector Inject 5 Units under the skin into the appropriate area as directed 3 (Three) Times a Day Before Meals. 4/1/21   Leda Duckworth DO   lisinopril (PRINIVIL,ZESTRIL) 2.5 MG tablet Take 1 tablet by mouth Every Night. 10/5/20   Leda Duckworth, DO   Morphine (MS CONTIN) 30 MG 12 hr tablet Take 1 tablet by mouth 2 (Two) Times a Day. 4/1/21   Leda Duckworth DO   oxyCODONE (ROXICODONE) 15 MG immediate release tablet Take 1 tablet by mouth Every 6 (Six) Hours As Needed for Severe Pain . 4/1/21   Leda Duckworth DO   polyethylene glycol (MIRALAX) 17 g packet Take 17 g by mouth Daily. 11/4/20   Leda Duckworth DO   pregabalin (LYRICA) 300 MG capsule Take 1 capsule by mouth 2 (Two) Times a Day. 2/3/21   Leda Duckworth DO   tiZANidine (ZANAFLEX) 2 MG tablet Take 1 tablet by mouth At Night As Needed for Muscle Spasms. 4/1/21   Leda Duckworth DO           Objective   Physical Exam  65-year-old awake alert no acute distress HEENT extraocular muscles are intact pupils equal round reactive no photophobia disks are sharp patient there is no nystagmus currently.  Neck supple no adenopathy no JVD no bruits  Lungs clear no retraction  Heart regular without murmur rub  Abdomen soft no tenderness no pulsatile mass bruits  Extremities pulses are equal throughout upper extremities no edema cords or Homans' sign or evidence of DVT.  Skin warm and dry no rashes no cellulitic change  Neurologic awake alert orientated x4 not really appreciate facial asymmetry but there is some slurring of the speech.  Tongue and soft palate normal no drift the arms or legs unremarkable finger-to-nose NIH 2  Procedures           ED Course      Results for orders placed or performed during  the hospital encounter of 04/20/21   Comprehensive Metabolic Panel    Specimen: Blood   Result Value Ref Range    Glucose 129 (H) 65 - 99 mg/dL    BUN 24 (H) 8 - 23 mg/dL    Creatinine 1.57 (H) 0.76 - 1.27 mg/dL    Sodium 137 136 - 145 mmol/L    Potassium 4.8 3.5 - 5.2 mmol/L    Chloride 101 98 - 107 mmol/L    CO2 25.0 22.0 - 29.0 mmol/L    Calcium 8.9 8.6 - 10.5 mg/dL    Total Protein 6.4 6.0 - 8.5 g/dL    Albumin 3.90 3.50 - 5.20 g/dL    ALT (SGPT) 13 1 - 41 U/L    AST (SGOT) 16 1 - 40 U/L    Alkaline Phosphatase 65 39 - 117 U/L    Total Bilirubin 0.4 0.0 - 1.2 mg/dL    eGFR Non African Amer 45 (L) >60 mL/min/1.73    Globulin 2.5 gm/dL    A/G Ratio 1.6 g/dL    BUN/Creatinine Ratio 15.3 7.0 - 25.0    Anion Gap 11.0 5.0 - 15.0 mmol/L   Protime-INR    Specimen: Blood   Result Value Ref Range    Protime 10.8 9.6 - 11.7 Seconds    INR 0.98 0.93 - 1.10   aPTT    Specimen: Blood   Result Value Ref Range    PTT 27.8 24.0 - 31.0 seconds   Troponin    Specimen: Blood   Result Value Ref Range    Troponin T <0.010 0.000 - 0.030 ng/mL   CBC Auto Differential    Specimen: Blood   Result Value Ref Range    WBC 7.10 3.40 - 10.80 10*3/mm3    RBC 4.66 4.14 - 5.80 10*6/mm3    Hemoglobin 14.1 13.0 - 17.7 g/dL    Hematocrit 42.3 37.5 - 51.0 %    MCV 90.8 79.0 - 97.0 fL    MCH 30.3 26.6 - 33.0 pg    MCHC 33.3 31.5 - 35.7 g/dL    RDW 14.4 12.3 - 15.4 %    RDW-SD 45.5 37.0 - 54.0 fl    MPV 12.1 (H) 6.0 - 12.0 fL    Platelets 194 140 - 450 10*3/mm3    Neutrophil % 53.8 42.7 - 76.0 %    Lymphocyte % 34.9 19.6 - 45.3 %    Monocyte % 8.8 5.0 - 12.0 %    Eosinophil % 1.7 0.3 - 6.2 %    Basophil % 0.8 0.0 - 1.5 %    Neutrophils, Absolute 3.80 1.70 - 7.00 10*3/mm3    Lymphocytes, Absolute 2.50 0.70 - 3.10 10*3/mm3    Monocytes, Absolute 0.60 0.10 - 0.90 10*3/mm3    Eosinophils, Absolute 0.10 0.00 - 0.40 10*3/mm3    Basophils, Absolute 0.10 0.00 - 0.20 10*3/mm3    nRBC 0.1 0.0 - 0.2 /100 WBC   Urinalysis With Culture If Indicated - Urine, Clean  Catch    Specimen: Urine, Clean Catch   Result Value Ref Range    Color, UA Yellow Yellow, Straw    Appearance, UA Clear Clear    pH, UA <=5.0 5.0 - 8.0    Specific Gravity, UA 1.027 1.005 - 1.030    Glucose,  mg/dL (1+) (A) Negative    Ketones, UA Negative Negative    Bilirubin, UA Negative Negative    Blood, UA Negative Negative    Protein, UA Negative Negative    Leuk Esterase, UA Negative Negative    Nitrite, UA Negative Negative    Urobilinogen, UA 0.2 E.U./dL 0.2 - 1.0 E.U./dL   Scan Slide    Specimen: Blood   Result Value Ref Range    RBC Morphology Normal Normal    WBC Morphology Normal Normal    Giant Platelets Slight/1+ None Seen   Hemoglobin A1c    Specimen: Blood   Result Value Ref Range    Hemoglobin A1C 8.4 (H) 3.5 - 5.6 %   TSH    Specimen: Blood   Result Value Ref Range    TSH 0.924 0.270 - 4.200 uIU/mL   Lipid Panel    Specimen: Blood   Result Value Ref Range    Total Cholesterol 213 (H) 0 - 200 mg/dL    Triglycerides 178 (H) 0 - 150 mg/dL    HDL Cholesterol 56 40 - 60 mg/dL    LDL Cholesterol  126 (H) 0 - 100 mg/dL    VLDL Cholesterol 31 5 - 40 mg/dL    LDL/HDL Ratio 2.17    POC Glucose Once    Specimen: Blood   Result Value Ref Range    Glucose 133 (H) 70 - 105 mg/dL   ECG 12 Lead   Result Value Ref Range    QT Interval 417 ms   Type & Screen    Specimen: Blood   Result Value Ref Range    ABO Type O     RH type Positive     Antibody Screen Negative     T&S Expiration Date 4/23/2021 11:59:59 PM    Light Blue Top   Result Value Ref Range    Extra Tube hold for add-on    Lavender Top   Result Value Ref Range    Extra Tube hold for add-on    Gold Top - SST   Result Value Ref Range    Extra Tube Hold for add-ons.      CT Angiogram Neck    Result Date: 4/20/2021   1. 38% stenosis within the left proximal ICA. 2. No significant stenosis in the right proximal ICA 3. Intracranial circulation is unremarkable.  Electronically Signed By-Alberto Jones MD On:4/20/2021 3:13 PM This report was finalized on  [id number] by  Alberto Jones MD.    XR Chest 1 View    Result Date: 4/20/2021  Lower lung volumes without definite acute cardiopulmonary abnormality.  Electronically Signed By-Leann Painter MD On:4/20/2021 2:59 PM This report was finalized on [id number] by  Leann Painter MD.    CT Head Without Contrast Stroke Protocol    Result Date: 4/20/2021  1. No acute intracranial abnormality. Specifically, no evidence of hemorrhage, mass effect or midline shift. 2. Stable old lacunar-type infarct within the bilateral basal ganglia.  Findings discussed with Dr. Perez at 2:28:00 PM on 4/20/2021  Electronically Signed By-Juma Perry MD On:4/20/2021 2:28 PM This report was finalized on [id number] by  Juma Perry MD.    CT Angriogram head w ai analysis of lvo    Result Date: 4/20/2021   1. 38% stenosis within the left proximal ICA. 2. No significant stenosis in the right proximal ICA 3. Intracranial circulation is unremarkable.  Electronically Signed By-Alberto Jones MD On:4/20/2021 3:13 PM This report was finalized on [id number] by  Alberto Jones MD.    Medications   sodium chloride 0.9 % flush 10 mL (has no administration in time range)   iopamidol (ISOVUE-370) 76 % injection 100 mL (100 mL Intravenous Given 4/20/21 1432)   sodium chloride 0.9 % bolus 1,000 mL (0 mL Intravenous Stopped 4/20/21 1652)   aspirin tablet 325 mg (325 mg Oral Given 4/20/21 1651)          EKG my interpretation normal sinus rhythm rate of 75 normal axis no hypertrophy QTC of 447 normal EKG                                  MDM  Number of Diagnoses or Management Options  TIA (transient ischemic attack): new and requires workup  Diagnosis management comments: Medical decision making.  Patient had a team be initiated.  Was seen and sent to CAT scan and a code stroke was noted and neurology Dr. Griffiths notified.  The patient had a head CT without which was unremarkable.  He had been given Tylenol for his headache and an aspirin p.o.   The patient is not a TPA candidate he is on Eliquis and he is improved since he started 2 hours ago telemetry NIH of 2.  Patient had a CT angiogram which showed no large vessel occlusion patient had a 38% stenosis within the left proximal ICA.  Chest x-ray without acute disease EKG on my review was a normal sinus rhythm without acute findings.  Laboratory reviewed by me BUN of 24 creatinine 1.5 blood sugar 129 CBC and urine unremarkable.  On repeat exam the patient is resting comfortably.  His speech seems to be better really I do not appreciate any maybe an NIH 1 for some numbness to the left side but speech is much better again patient has no large vessel occlusion not a TPA candidate based on the Eliquis and his resolution of symptoms are not seems of infection anywhere currently.  No evidence of intracerebral hemorrhage.  His abdominal exam is unremarkable he had soft without tenderness no peritoneal findings we talked about the findings.  Hospitalist notified and the patient will be admitted to the hospital for further work-up and care stable otherwise unremarkable exam.  Improved       Amount and/or Complexity of Data Reviewed  Clinical lab tests: reviewed  Tests in the radiology section of CPT®: reviewed  Discuss the patient with other providers: yes    Risk of Complications, Morbidity, and/or Mortality  Presenting problems: high  Diagnostic procedures: high  Management options: high    Patient Progress  Patient progress: stable      Final diagnoses:   TIA (transient ischemic attack)       ED Disposition  ED Disposition     ED Disposition Condition Comment    Decision to Admit  Level of Care: Telemetry [5]   Diagnosis: TIA (transient ischemic attack) [526844]   Admitting Physician: DRU HILLS [[id number]]   Attending Physician: DRU HILLS [[id number]]   Bed Request Comments: kojo   Certification: I Certify That Inpatient Hospital Services Are Medically Necessary For Greater Than 2 Midnights            No  follow-up provider specified.       Medication List      No changes were made to your prescriptions during this visit.          Honorio Perez MD  04/20/21 0380

## 2021-04-21 ENCOUNTER — APPOINTMENT (OUTPATIENT)
Dept: CARDIOLOGY | Facility: HOSPITAL | Age: 66
End: 2021-04-21

## 2021-04-21 LAB
ANION GAP SERPL CALCULATED.3IONS-SCNC: 8 MMOL/L (ref 5–15)
BH CV ECHO MEAS - ACS: 2 CM
BH CV ECHO MEAS - AO MAX PG (FULL): 0.14 MMHG
BH CV ECHO MEAS - AO MAX PG: 2.8 MMHG
BH CV ECHO MEAS - AO MEAN PG (FULL): 0.02 MMHG
BH CV ECHO MEAS - AO MEAN PG: 1.5 MMHG
BH CV ECHO MEAS - AO ROOT AREA (BSA CORRECTED): 1.6
BH CV ECHO MEAS - AO ROOT AREA: 10.7 CM^2
BH CV ECHO MEAS - AO ROOT DIAM: 3.7 CM
BH CV ECHO MEAS - AO V2 MAX: 83.8 CM/SEC
BH CV ECHO MEAS - AO V2 MEAN: 57.2 CM/SEC
BH CV ECHO MEAS - AO V2 VTI: 18.1 CM
BH CV ECHO MEAS - AORTIC HR: 62.3 BPM
BH CV ECHO MEAS - AORTIC R-R: 0.96 SEC
BH CV ECHO MEAS - ASC AORTA: 2.8 CM
BH CV ECHO MEAS - AVA(I,A): 4.2 CM^2
BH CV ECHO MEAS - AVA(I,D): 4.2 CM^2
BH CV ECHO MEAS - AVA(V,A): 3.6 CM^2
BH CV ECHO MEAS - AVA(V,D): 3.6 CM^2
BH CV ECHO MEAS - BSA(HAYCOCK): 2.4 M^2
BH CV ECHO MEAS - BSA: 2.3 M^2
BH CV ECHO MEAS - BZI_BMI: 39.2 KILOGRAMS/M^2
BH CV ECHO MEAS - BZI_METRIC_HEIGHT: 172.7 CM
BH CV ECHO MEAS - BZI_METRIC_WEIGHT: 117 KG
BH CV ECHO MEAS - CI(AO): 5.3 L/MIN/M^2
BH CV ECHO MEAS - CI(LVOT): 2.1 L/MIN/M^2
BH CV ECHO MEAS - CO(AO): 12 L/MIN
BH CV ECHO MEAS - CO(LVOT): 4.8 L/MIN
BH CV ECHO MEAS - EDV(CUBED): 69.9 ML
BH CV ECHO MEAS - EDV(MOD-SP4): 59.3 ML
BH CV ECHO MEAS - EDV(TEICH): 75 ML
BH CV ECHO MEAS - EF(CUBED): 61.3 %
BH CV ECHO MEAS - EF(MOD-BP): 56 %
BH CV ECHO MEAS - EF(MOD-SP4): 56.1 %
BH CV ECHO MEAS - EF(TEICH): 53.3 %
BH CV ECHO MEAS - ESV(CUBED): 27.1 ML
BH CV ECHO MEAS - ESV(MOD-SP4): 26 ML
BH CV ECHO MEAS - ESV(TEICH): 35.1 ML
BH CV ECHO MEAS - FS: 27.1 %
BH CV ECHO MEAS - IVS/LVPW: 0.75
BH CV ECHO MEAS - IVSD: 0.99 CM
BH CV ECHO MEAS - LA DIMENSION(2D): 4 CM
BH CV ECHO MEAS - LV DIASTOLIC VOL/BSA (35-75): 26 ML/M^2
BH CV ECHO MEAS - LV MASS(C)D: 161.6 GRAMS
BH CV ECHO MEAS - LV MASS(C)DI: 71 GRAMS/M^2
BH CV ECHO MEAS - LV MAX PG: 2.7 MMHG
BH CV ECHO MEAS - LV MEAN PG: 1.5 MMHG
BH CV ECHO MEAS - LV SYSTOLIC VOL/BSA (12-30): 11.4 ML/M^2
BH CV ECHO MEAS - LV V1 MAX: 81.7 CM/SEC
BH CV ECHO MEAS - LV V1 MEAN: 58.7 CM/SEC
BH CV ECHO MEAS - LV V1 VTI: 20.8 CM
BH CV ECHO MEAS - LVIDD: 4.1 CM
BH CV ECHO MEAS - LVIDS: 3 CM
BH CV ECHO MEAS - LVOT AREA: 3.7 CM^2
BH CV ECHO MEAS - LVOT DIAM: 2.2 CM
BH CV ECHO MEAS - LVPWD: 1.3 CM
BH CV ECHO MEAS - MV A MAX VEL: 92.2 CM/SEC
BH CV ECHO MEAS - MV DEC SLOPE: 259.4 CM/SEC^2
BH CV ECHO MEAS - MV DEC TIME: 0.27 SEC
BH CV ECHO MEAS - MV E MAX VEL: 71.2 CM/SEC
BH CV ECHO MEAS - MV E/A: 0.77
BH CV ECHO MEAS - MV MAX PG: 3.2 MMHG
BH CV ECHO MEAS - MV MEAN PG: 1.4 MMHG
BH CV ECHO MEAS - MV V2 MAX: 89 CM/SEC
BH CV ECHO MEAS - MV V2 MEAN: 56.5 CM/SEC
BH CV ECHO MEAS - MV V2 VTI: 27.8 CM
BH CV ECHO MEAS - MVA(VTI): 2.8 CM^2
BH CV ECHO MEAS - PA ACC TIME: 0.1 SEC
BH CV ECHO MEAS - PA MAX PG (FULL): 1.5 MMHG
BH CV ECHO MEAS - PA MAX PG: 3.4 MMHG
BH CV ECHO MEAS - PA MEAN PG (FULL): 0.45 MMHG
BH CV ECHO MEAS - PA MEAN PG: 1.6 MMHG
BH CV ECHO MEAS - PA PR(ACCEL): 32.8 MMHG
BH CV ECHO MEAS - PA V2 MAX: 92.5 CM/SEC
BH CV ECHO MEAS - PA V2 MEAN: 57.3 CM/SEC
BH CV ECHO MEAS - PA V2 VTI: 21.8 CM
BH CV ECHO MEAS - PULM A REVS DUR: 0.11 SEC
BH CV ECHO MEAS - PULM A REVS VEL: 26 CM/SEC
BH CV ECHO MEAS - PULM DIAS VEL: 43.9 CM/SEC
BH CV ECHO MEAS - PULM S/D: 1.1
BH CV ECHO MEAS - PULM SYS VEL: 46.7 CM/SEC
BH CV ECHO MEAS - PVA(I,A): 4.6 CM^2
BH CV ECHO MEAS - PVA(I,D): 4.6 CM^2
BH CV ECHO MEAS - PVA(V,A): 4 CM^2
BH CV ECHO MEAS - PVA(V,D): 4 CM^2
BH CV ECHO MEAS - QP/QS: 1.3
BH CV ECHO MEAS - RAP SYSTOLE: 3 MMHG
BH CV ECHO MEAS - RV MAX PG: 2 MMHG
BH CV ECHO MEAS - RV MEAN PG: 1.1 MMHG
BH CV ECHO MEAS - RV V1 MAX: 70.2 CM/SEC
BH CV ECHO MEAS - RV V1 MEAN: 49.9 CM/SEC
BH CV ECHO MEAS - RV V1 VTI: 19.2 CM
BH CV ECHO MEAS - RVDD: 2.8 CM
BH CV ECHO MEAS - RVOT AREA: 5.2 CM^2
BH CV ECHO MEAS - RVOT DIAM: 2.6 CM
BH CV ECHO MEAS - RVSP: 9.8 MMHG
BH CV ECHO MEAS - SI(AO): 84.7 ML/M^2
BH CV ECHO MEAS - SI(CUBED): 18.8 ML/M^2
BH CV ECHO MEAS - SI(LVOT): 33.7 ML/M^2
BH CV ECHO MEAS - SI(MOD-SP4): 14.6 ML/M^2
BH CV ECHO MEAS - SI(TEICH): 17.6 ML/M^2
BH CV ECHO MEAS - SV(AO): 192.9 ML
BH CV ECHO MEAS - SV(CUBED): 42.8 ML
BH CV ECHO MEAS - SV(LVOT): 76.7 ML
BH CV ECHO MEAS - SV(MOD-SP4): 33.2 ML
BH CV ECHO MEAS - SV(RVOT): 100.2 ML
BH CV ECHO MEAS - SV(TEICH): 40 ML
BH CV ECHO MEAS - TR MAX VEL: 130.4 CM/SEC
BUN SERPL-MCNC: 20 MG/DL (ref 8–23)
BUN/CREAT SERPL: 17.1 (ref 7–25)
CALCIUM SPEC-SCNC: 8.2 MG/DL (ref 8.6–10.5)
CHLORIDE SERPL-SCNC: 105 MMOL/L (ref 98–107)
CO2 SERPL-SCNC: 28 MMOL/L (ref 22–29)
CREAT SERPL-MCNC: 1.17 MG/DL (ref 0.76–1.27)
GFR SERPL CREATININE-BSD FRML MDRD: 63 ML/MIN/1.73
GLUCOSE BLDC GLUCOMTR-MCNC: 140 MG/DL (ref 70–105)
GLUCOSE BLDC GLUCOMTR-MCNC: 165 MG/DL (ref 70–105)
GLUCOSE SERPL-MCNC: 150 MG/DL (ref 65–99)
LV EF 2D ECHO EST: 55 %
MAGNESIUM SERPL-MCNC: 1.8 MG/DL (ref 1.6–2.4)
POTASSIUM SERPL-SCNC: 4 MMOL/L (ref 3.5–5.2)
SODIUM SERPL-SCNC: 141 MMOL/L (ref 136–145)

## 2021-04-21 PROCEDURE — G0378 HOSPITAL OBSERVATION PER HR: HCPCS

## 2021-04-21 PROCEDURE — 97165 OT EVAL LOW COMPLEX 30 MIN: CPT

## 2021-04-21 PROCEDURE — 96361 HYDRATE IV INFUSION ADD-ON: CPT

## 2021-04-21 PROCEDURE — 82962 GLUCOSE BLOOD TEST: CPT

## 2021-04-21 PROCEDURE — 97162 PT EVAL MOD COMPLEX 30 MIN: CPT

## 2021-04-21 PROCEDURE — 83735 ASSAY OF MAGNESIUM: CPT | Performed by: INTERNAL MEDICINE

## 2021-04-21 PROCEDURE — 80048 BASIC METABOLIC PNL TOTAL CA: CPT | Performed by: INTERNAL MEDICINE

## 2021-04-21 PROCEDURE — 93306 TTE W/DOPPLER COMPLETE: CPT | Performed by: INTERNAL MEDICINE

## 2021-04-21 PROCEDURE — 96375 TX/PRO/DX INJ NEW DRUG ADDON: CPT

## 2021-04-21 PROCEDURE — 25010000002 DIPHENHYDRAMINE PER 50 MG: Performed by: PSYCHIATRY & NEUROLOGY

## 2021-04-21 PROCEDURE — 25010000002 PROCHLORPERAZINE 10 MG/2ML SOLUTION: Performed by: PSYCHIATRY & NEUROLOGY

## 2021-04-21 PROCEDURE — 99225 PR SBSQ OBSERVATION CARE/DAY 25 MINUTES: CPT | Performed by: INTERNAL MEDICINE

## 2021-04-21 PROCEDURE — 63710000001 INSULIN GLARGINE PER 5 UNITS: Performed by: INTERNAL MEDICINE

## 2021-04-21 PROCEDURE — 25010000002 KETOROLAC TROMETHAMINE PER 15 MG: Performed by: NURSE PRACTITIONER

## 2021-04-21 PROCEDURE — 93306 TTE W/DOPPLER COMPLETE: CPT

## 2021-04-21 PROCEDURE — 99214 OFFICE O/P EST MOD 30 MIN: CPT | Performed by: NURSE PRACTITIONER

## 2021-04-21 PROCEDURE — 96374 THER/PROPH/DIAG INJ IV PUSH: CPT

## 2021-04-21 RX ORDER — PROCHLORPERAZINE EDISYLATE 5 MG/ML
10 INJECTION INTRAMUSCULAR; INTRAVENOUS ONCE
Status: COMPLETED | OUTPATIENT
Start: 2021-04-21 | End: 2021-04-21

## 2021-04-21 RX ORDER — MECLIZINE HYDROCHLORIDE 25 MG/1
25 TABLET ORAL 3 TIMES DAILY PRN
Status: DISCONTINUED | OUTPATIENT
Start: 2021-04-21 | End: 2021-04-22 | Stop reason: HOSPADM

## 2021-04-21 RX ORDER — KETOROLAC TROMETHAMINE 15 MG/ML
15 INJECTION, SOLUTION INTRAMUSCULAR; INTRAVENOUS ONCE
Status: COMPLETED | OUTPATIENT
Start: 2021-04-21 | End: 2021-04-21

## 2021-04-21 RX ORDER — DIPHENHYDRAMINE HYDROCHLORIDE 50 MG/ML
25 INJECTION INTRAMUSCULAR; INTRAVENOUS ONCE
Status: COMPLETED | OUTPATIENT
Start: 2021-04-21 | End: 2021-04-21

## 2021-04-21 RX ADMIN — OXYCODONE HYDROCHLORIDE 15 MG: 5 TABLET ORAL at 03:43

## 2021-04-21 RX ADMIN — CANAGLIFLOZIN 100 MG: 300 TABLET, FILM COATED ORAL at 08:41

## 2021-04-21 RX ADMIN — ATORVASTATIN CALCIUM 80 MG: 40 TABLET, FILM COATED ORAL at 20:51

## 2021-04-21 RX ADMIN — ASPIRIN 81 MG CHEWABLE TABLET 81 MG: 81 TABLET CHEWABLE at 08:41

## 2021-04-21 RX ADMIN — BUPROPION HYDROCHLORIDE 300 MG: 150 TABLET, EXTENDED RELEASE ORAL at 08:41

## 2021-04-21 RX ADMIN — MORPHINE SULFATE 30 MG: 30 TABLET, FILM COATED, EXTENDED RELEASE ORAL at 20:52

## 2021-04-21 RX ADMIN — BUTALBITAL, ACETAMINOPHEN AND CAFFEINE 1 TABLET: 50; 325; 40 TABLET ORAL at 08:45

## 2021-04-21 RX ADMIN — MORPHINE SULFATE 30 MG: 30 TABLET, FILM COATED, EXTENDED RELEASE ORAL at 08:41

## 2021-04-21 RX ADMIN — INSULIN GLARGINE 20 UNITS: 100 INJECTION, SOLUTION SUBCUTANEOUS at 20:51

## 2021-04-21 RX ADMIN — PREGABALIN 300 MG: 100 CAPSULE ORAL at 20:52

## 2021-04-21 RX ADMIN — APIXABAN 5 MG: 5 TABLET, FILM COATED ORAL at 08:41

## 2021-04-21 RX ADMIN — DIPHENHYDRAMINE HYDROCHLORIDE 25 MG: 50 INJECTION, SOLUTION INTRAMUSCULAR; INTRAVENOUS at 13:39

## 2021-04-21 RX ADMIN — KETOROLAC TROMETHAMINE 15 MG: 15 INJECTION, SOLUTION INTRAMUSCULAR; INTRAVENOUS at 13:38

## 2021-04-21 RX ADMIN — Medication 10 ML: at 08:41

## 2021-04-21 RX ADMIN — PROCHLORPERAZINE EDISYLATE 10 MG: 5 INJECTION INTRAMUSCULAR; INTRAVENOUS at 13:39

## 2021-04-21 RX ADMIN — APIXABAN 5 MG: 5 TABLET, FILM COATED ORAL at 20:52

## 2021-04-21 RX ADMIN — MECLIZINE HYDROCHLORIDE 25 MG: 25 TABLET ORAL at 12:06

## 2021-04-21 RX ADMIN — BUTALBITAL, ACETAMINOPHEN AND CAFFEINE 1 TABLET: 50; 325; 40 TABLET ORAL at 16:41

## 2021-04-21 RX ADMIN — PREGABALIN 300 MG: 100 CAPSULE ORAL at 08:41

## 2021-04-21 NOTE — PLAN OF CARE
Problem: Adult Inpatient Plan of Care  Goal: Plan of Care Review  Outcome: Ongoing, Progressing  Goal: Patient-Specific Goal (Individualized)  Outcome: Ongoing, Progressing  Goal: Absence of Hospital-Acquired Illness or Injury  Outcome: Ongoing, Progressing  Intervention: Identify and Manage Fall Risk  Recent Flowsheet Documentation  Taken 4/21/2021 1654 by Deepa Ko RN  Safety Promotion/Fall Prevention:   assistive device/personal items within reach   clutter free environment maintained   fall prevention program maintained   nonskid shoes/slippers when out of bed   room organization consistent   safety round/check completed  Taken 4/21/2021 1600 by Deepa Ko RN  Safety Promotion/Fall Prevention: safety round/check completed  Taken 4/21/2021 1400 by Deepa Ko RN  Safety Promotion/Fall Prevention: safety round/check completed  Taken 4/21/2021 1215 by Deepa Ko RN  Safety Promotion/Fall Prevention:   assistive device/personal items within reach   clutter free environment maintained   fall prevention program maintained   nonskid shoes/slippers when out of bed   room organization consistent   safety round/check completed  Taken 4/21/2021 1200 by Deepa Ko RN  Safety Promotion/Fall Prevention: safety round/check completed  Taken 4/21/2021 1000 by Deepa Ko RN  Safety Promotion/Fall Prevention: safety round/check completed  Taken 4/21/2021 0800 by Deepa Ko RN  Safety Promotion/Fall Prevention:   clutter free environment maintained   assistive device/personal items within reach   nonskid shoes/slippers when out of bed   room organization consistent   safety round/check completed  Intervention: Prevent Skin Injury  Recent Flowsheet Documentation  Taken 4/21/2021 1654 by Deepa Ko RN  Body Position:   position changed independently   sitting up in bed  Skin Protection:   adhesive use limited   tubing/devices free from skin contact  Taken 4/21/2021  1215 by Deepa Ko RN  Body Position: position changed independently  Skin Protection:   adhesive use limited   tubing/devices free from skin contact  Taken 4/21/2021 0800 by Deepa Ko RN  Body Position: position changed independently  Skin Protection:   adhesive use limited   tubing/devices free from skin contact  Goal: Optimal Comfort and Wellbeing  Outcome: Ongoing, Progressing  Goal: Readiness for Transition of Care  Outcome: Ongoing, Progressing     Problem: Adjustment to Illness (Stroke, Ischemic/Transient Ischemic Attack)  Goal: Optimal Coping  Outcome: Ongoing, Progressing     Problem: Bowel Elimination Impaired (Stroke, Ischemic/Transient Ischemic Attack)  Goal: Effective Bowel Elimination  Outcome: Ongoing, Progressing     Problem: Cerebral Tissue Perfusion Risk (Stroke, Ischemic/Transient Ischemic Attack)  Goal: Optimal Cerebral Tissue Perfusion  Outcome: Ongoing, Progressing  Intervention: Optimize Oxygenation and Ventilation  Recent Flowsheet Documentation  Taken 4/21/2021 1215 by Deepa Ko RN  Head of Bed (HOB): HOB at 20-30 degrees  Taken 4/21/2021 0800 by Deepa Ko RN  Head of Bed (HOB): HOB at 30-45 degrees     Problem: Communication Impairment (Stroke, Ischemic/Transient Ischemic Attack)  Goal: Improved Communication Skills  Outcome: Ongoing, Progressing     Problem: Eating/Swallowing Impairment (Stroke, Ischemic/Transient Ischemic Attack)  Goal: Oral Intake without Aspiration  Outcome: Ongoing, Progressing     Problem: Functional Ability Impaired (Stroke, Ischemic/Transient Ischemic Attack)  Goal: Optimal Functional Ability  Outcome: Ongoing, Progressing  Intervention: Optimize Functional Ability  Recent Flowsheet Documentation  Taken 4/21/2021 1654 by Deepa Ko RN  Activity Management:   activity adjusted per tolerance   activity encouraged  Self-Care Promotion: independence encouraged  Taken 4/21/2021 1215 by Deepa Ko RN  Activity  Management:   activity adjusted per tolerance   activity encouraged  Self-Care Promotion: independence encouraged  Taken 4/21/2021 0800 by Deepa Ko RN  Activity Management:   activity adjusted per tolerance   activity encouraged  Self-Care Promotion: independence encouraged     Problem: Hemodynamic Instability (Stroke, Ischemic/Transient Ischemic Attack)  Goal: Vital Signs Remain in Desired Range  Outcome: Ongoing, Progressing     Problem: Pain (Stroke, Ischemic/Transient Ischemic Attack)  Goal: Acceptable Pain Control  Outcome: Ongoing, Progressing  Intervention: Monitor and Manage Pain  Recent Flowsheet Documentation  Taken 4/21/2021 1641 by Deepa Ko RN  Pain Management Interventions:   see MAR   quiet environment facilitated  Taken 4/21/2021 1215 by Deepa Ko RN  Pain Management Interventions: quiet environment facilitated  Taken 4/21/2021 0845 by Deepa Ko RN  Pain Management Interventions:   see MAR   quiet environment facilitated     Problem: Sensorimotor Impairment (Stroke, Ischemic/Transient Ischemic Attack)  Goal: Improved Sensorimotor Function  Outcome: Ongoing, Progressing  Intervention: Optimize Range of Motion, Motor Control and Function  Recent Flowsheet Documentation  Taken 4/21/2021 1654 by Deepa Ko RN  Positioning/Transfer Devices:   pillows   in use  Taken 4/21/2021 1215 by Deepa Ko RN  Positioning/Transfer Devices:   pillows   in use  Taken 4/21/2021 0800 by Deepa Ko RN  Positioning/Transfer Devices:   pillows   in use  Intervention: Optimize Sensory and Perceptual Abilities  Recent Flowsheet Documentation  Taken 4/21/2021 1654 by Deepa Ko RN  Pressure Reduction Techniques: frequent weight shift encouraged  Pressure Reduction Devices: pressure-redistributing mattress utilized  Taken 4/21/2021 1215 by Deepa Ko RN  Pressure Reduction Techniques: frequent weight shift encouraged  Pressure Reduction Devices:  pressure-redistributing mattress utilized  Taken 4/21/2021 0800 by Deepa Ko RN  Pressure Reduction Techniques: frequent weight shift encouraged  Pressure Reduction Devices: pressure-redistributing mattress utilized     Problem: Urinary Elimination Impaired (Stroke, Ischemic/Transient Ischemic Attack)  Goal: Effective Urinary Elimination  Outcome: Ongoing, Progressing     Problem: Fall Injury Risk  Goal: Absence of Fall and Fall-Related Injury  Outcome: Ongoing, Progressing  Intervention: Identify and Manage Contributors to Fall Injury Risk  Recent Flowsheet Documentation  Taken 4/21/2021 1654 by Deepa Ko RN  Self-Care Promotion: independence encouraged  Taken 4/21/2021 1215 by Deepa oK RN  Medication Review/Management: medications reviewed  Self-Care Promotion: independence encouraged  Taken 4/21/2021 0800 by Deepa Ko RN  Self-Care Promotion: independence encouraged  Intervention: Promote Injury-Free Environment  Recent Flowsheet Documentation  Taken 4/21/2021 1654 by Deepa Ko RN  Safety Promotion/Fall Prevention:   assistive device/personal items within reach   clutter free environment maintained   fall prevention program maintained   nonskid shoes/slippers when out of bed   room organization consistent   safety round/check completed  Taken 4/21/2021 1600 by Deepa Ko RN  Safety Promotion/Fall Prevention: safety round/check completed  Taken 4/21/2021 1400 by Deepa Ko RN  Safety Promotion/Fall Prevention: safety round/check completed  Taken 4/21/2021 1215 by Deepa Ko RN  Safety Promotion/Fall Prevention:   assistive device/personal items within reach   clutter free environment maintained   fall prevention program maintained   nonskid shoes/slippers when out of bed   room organization consistent   safety round/check completed  Taken 4/21/2021 1200 by Deepa Ko RN  Safety Promotion/Fall Prevention: safety round/check  completed  Taken 4/21/2021 1000 by Deepa Ko RN  Safety Promotion/Fall Prevention: safety round/check completed  Taken 4/21/2021 0800 by Deepa Ko RN  Safety Promotion/Fall Prevention:   clutter free environment maintained   assistive device/personal items within reach   nonskid shoes/slippers when out of bed   room organization consistent   safety round/check completed   Goal Outcome Evaluation:      Patient had an echocardiogram today. The patient did not have any other tests or procedures. The patient complained of dizziness which was resolved by PRN medication. See MAR. The patient also complained of a headache that did get better with some medication but has not completely resolved at this time. Will continue to monitor.

## 2021-04-21 NOTE — THERAPY EVALUATION
Patient Name: Humble Green  : 1955    MRN: [id number]                              Today's Date: 2021       Admit Date: 2021    Visit Dx:     ICD-10-CM ICD-9-CM   1. Snoring  R06.83 786.09   2. TIA (transient ischemic attack)  G45.9 435.9   3. Daytime sleepiness  R40.0 780.54     Patient Active Problem List   Diagnosis   • Mixed hyperlipidemia   • Major depressive disorder, recurrent episode, moderate degree (CMS/HCC)   • Diabetic polyneuropathy associated with type 2 diabetes mellitus (CMS/HCC)   • Intractable migraine without aura and without status migrainosus   • PFO (patent foramen ovale)   • Essential hypertension   • Nonobstructive atherosclerosis of coronary artery   • Diabetes mellitus (CMS/HCC)   • Cerebrovascular accident (CVA) due to bilateral embolism of middle cerebral arteries (CMS/[facility])   • ANT (acute kidney injury) (CMS/[facility])   • Chronic kidney disease (CKD), stage III (moderate)   • DDD (degenerative disc disease), lumbar   • Morbidly obese (CMS/[facility])   • Sacroiliitis (CMS/[facility])   • Facet arthritis, degenerative, L5-S1 level, lumbosacral spine   • Chronic back pain   • TIA (transient ischemic attack)     Past Medical History:   Diagnosis Date   • Arthritis    • BPH    • CAD    • Chronic back pain    • DMII    • Excessive daytime sleepiness    • Low back pain    • Low vision of right eye with normal vision in contralateral eye    • Memory problem    • Neuropathy    • Obesity    • PFO (patent foramen ovale) 2019   • Snoring    • Stroke      Past Surgical History:   Procedure Laterality Date   • ARM TENDON REPAIR Left    • CARDIAC CATHETERIZATION N/A 2020   • CARDIAC CATHETERIZATION N/A 2020   • CHOLECYSTECTOMY     • COLONOSCOPY       = TA, rech    • EYE SURGERY      Rt Eye Sx after trauma @ 4y/o   • JOINT REPLACEMENT      Left TKR   • ROTATOR CUFF REPAIR Bilateral    • UMBILICAL HERNIA REPAIR       General Information     Row Name 21 1629          OT  Time and Intention    Document Type  evaluation  -MP     Mode of Treatment  occupational therapy  -     Row Name 04/21/21 1629          General Information    Patient Profile Reviewed  yes  -MP     Prior Level of Function  independent:;ADL's  -MP     Existing Precautions/Restrictions  fall  -     Row Name 04/21/21 1629          Living Environment    Lives With  alone  -     Row Name 04/21/21 1629          Cognition    Orientation Status (Cognition)  oriented x 4  -MP     Row Name 04/21/21 1629          Safety Issues, Functional Mobility    Impairments Affecting Function (Mobility)  balance;endurance/activity tolerance;strength  -MP       User Key  (r) = Recorded By, (t) = Taken By, (c) = Cosigned By    Initials Name Provider Type     Josh Eldridge OT Occupational Therapist          Mobility/ADL's     Row Name 04/21/21 1630          Bed Mobility    Bed Mobility  sit-supine  -     Sit-Supine [place] (Bed Mobility)  supervision  -     Row Name 04/21/21 1630          Transfers    Transfers  sit-stand transfer;bed-chair transfer  -     Bed-Chair [place] (Transfers)  minimum assist (75% patient effort)  -     Sit-Stand [place] (Transfers)  contact guard  -     Row Name 04/21/21 1630          Activities of Daily Living    BADL Assessment/Intervention  lower body dressing  -     Row Name 04/21/21 1630          Lower Body Dressing Assessment/Training    [place] Level (Lower Body Dressing)  don;doff;socks;supervision;set up  -       User Key  (r) = Recorded By, (t) = Taken By, (c) = Cosigned By    Initials Name Provider Type    Josh Munoz OT Occupational Therapist        Obj/Interventions     Row Name 04/21/21 1632          Range of Motion Comprehensive    Comment, General Range of Motion  BUE WFL  -     Row Name 04/21/21 1632          Strength Comprehensive (MMT)    Comment, General Manual Muscle Testing (MMT) Assessment  BUE 4/5  -MP     Row Name 04/21/21 1632           Motor Skills    Motor Skills  coordination  -MP     Coordination  WFL  -MP     Row Name 04/21/21 1632          Balance    Static Standing Balance  mild impairment;unsupported;standing  -MP     Dynamic Standing Balance  moderate impairment;unsupported;standing  -MP     Balance Interventions  sitting;standing;sit to stand;supported;static;dynamic  -MP       User Key  (r) = Recorded By, (t) = Taken By, (c) = Cosigned By    Initials Name Provider Type    Josh Munoz OT Occupational Therapist        Goals/Plan     Row Name 04/21/21 1640          Transfer Goal 1 (OT)    Activity/Assistive Device (Transfer Goal 1, OT)  sit-to-stand/stand-to-sit;toilet  -MP     [place] Level/Cues Needed (Transfer Goal 1, OT)  modified independence  -MP     Time Frame (Transfer Goal 1, OT)  long term goal (LTG);2 weeks  -MP     Row Name 04/21/21 1640          Bathing Goal 1 (OT)    Activity/Device (Bathing Goal 1, OT)  bathing skills, all  -MP     [place] Level/Cues Needed (Bathing Goal 1, OT)  set-up required;supervision required  -MP     Time Frame (Bathing Goal 1, OT)  long term goal (LTG);2 weeks  -MP     Row Name 04/21/21 1640          Dressing Goal 1 (OT)    Activity/Device (Dressing Goal 1, OT)  lower body dressing  -MP     [place]/Cues Needed (Dressing Goal 1, OT)  set-up required;supervision required  -MP     Time Frame (Dressing Goal 1, OT)  long term goal (LTG);2 weeks  -MP       User Key  (r) = Recorded By, (t) = Taken By, (c) = Cosigned By    Initials Name Provider Type    Josh Munoz OT Occupational Therapist        Clinical Impression     Row Name 04/21/21 1633          Pain Assessment    Additional Documentation  Pain Scale: FACES Pre/Post-Treatment (Group)  -MP     Row Name 04/21/21 1633          Pain Scale: FACES Pre/Post-Treatment    Pain: FACES Scale, Pretreatment  2-->hurts little bit  -MP     Posttreatment Pain Rating  2-->hurts little bit  -MP     Pain Location  head  -MP      Row Name 04/21/21 1633          Plan of Care Review    Plan of Care Reviewed With  patient  -MP     Progress  no change  -MP     Outcome Summary  Pt. is 66 y/o male admit w/ dizziness and paraesthesia, MRI (-). Pt. reports living w/ roomate and works PT as a  and does carpentry. Pt. completes functional transfers this date w/ min-mod A secondary to LOB w/ standing, reports increased dizziness. Pt. provided w/ setup assist for LB dressing seated in armchair. Pt. not safe to d/c home at this time, does not have 24 hour care/support and at increased risk for injurious falls. Recommend IP rehab at d/c.  -     Row Name 04/21/21 1633          Therapy Assessment/Plan (OT)    Rehab Potential (OT)  good, to achieve stated therapy goals  -     Criteria for Skilled Therapeutic Interventions Met (OT)  yes  -MP     Therapy Frequency (OT)  3 times/wk  -     Row Name 04/21/21 1633          Therapy Plan Review/Discharge Plan (OT)    Anticipated Discharge Disposition (OT)  inpatient rehabilitation facility  -     Row Name 04/21/21 1633          Vital Signs    Pre Patient Position  Sitting  -MP     Intra Patient Position  Standing  -MP     Post Patient Position  Supine  -MP     Row Name 04/21/21 1633          Positioning and Restraints    Pre-Treatment Position  sitting in chair/recliner  -MP     Post Treatment Position  bed  -MP     In Bed  supine;encouraged to call for assist;call light within reach;exit alarm on  -MP       User Key  (r) = Recorded By, (t) = Taken By, (c) = Cosigned By    Initials Name Provider Type    Josh Munoz OT Occupational Therapist        Outcome Measures    No documentation.       Occupational Therapy Education                 Title: PT OT SLP Therapies (In Progress)     Topic: Occupational Therapy (In Progress)     Point: ADL training (Not Started)     Description:   Instruct learner(s) on proper safety adaptation and remediation techniques during self care or transfers.    Instruct in proper use of assistive devices.              Learner Progress:  Not documented in this visit.          Point: Home exercise program (Not Started)     Description:   Instruct learner(s) on appropriate technique for monitoring, assisting and/or progressing therapeutic exercises/activities.              Learner Progress:  Not documented in this visit.          Point: Precautions (Not Started)     Description:   Instruct learner(s) on prescribed precautions during self-care and functional transfers.              Learner Progress:  Not documented in this visit.          Point: Body mechanics (Done)     Description:   Instruct learner(s) on proper positioning and spine alignment during self-care, functional mobility activities and/or exercises.              Learning Progress Summary           Patient Acceptance, E,TB, VU by  at 4/21/2021 1642                               User Key     Initials Effective Dates Name Provider Type Discipline     03/01/19 -  Josh Eldridge OT Occupational Therapist OT              OT Recommendation and Plan  Therapy Frequency (OT): 3 times/wk  Plan of Care Review  Plan of Care Reviewed With: patient  Progress: no change  Outcome Summary: Pt. is 64 y/o male admit w/ dizziness and paraesthesia, MRI (-). Pt. reports living w/ roomate and works PT as a  and does [employer]. Pt. completes functional transfers this date w/ min-mod A secondary to LOB w/ standing, reports increased dizziness. Pt. provided w/ setup assist for LB dressing seated in armchair. Pt. not safe to d/c home at this time, does not have 24 hour care/support and at increased risk for injurious falls. Recommend IP rehab at d/c.     Time Calculation:   Time Calculation- OT     Row Name 04/21/21 1642             Time Calculation- OT    OT Start Time  1130  -MP      OT Stop Time  1150  -MP      OT Time Calculation (min)  20 min  -MP      Total Timed Code Minutes- OT  0 minute(s)  -MP      OT Received On   04/21/21  -CHASE      OT - Next Appointment  04/23/21  -CHASE      OT Goal Re-Cert Due Date  05/05/21  -CHASE        User Key  (r) = Recorded By, (t) = Taken By, (c) = Cosigned By    Initials Name Provider Type    Josh Munoz OT Occupational Therapist        Therapy Charges for Today     Code Description Service Date Service Provider Modifiers Qty    [id number] HC OT EVAL LOW COMPLEXITY 3 4/21/2021 Josh Eldridge OT GO 1               Josh Eldridge OT  4/21/2021

## 2021-04-21 NOTE — PROGRESS NOTES
"      [facility] Medicine Services Daily Progress Note      Hospitalist Team  LOS 1 days      Patient Care Team:  Leda Duckworth DO as PCP - General (Family Medicine)  Leda Duckworth DO as Referring Physician (Family Medicine)  Hans Doyle MD as Consulting Physician (Cardiology)    Patient Location: 255/1      Subjective   Subjective     Chief Complaint / Subjective  Chief Complaint   Patient presents with   • Extremity Weakness     pt reports that at 1230 today he couldn't get his words out, had slurred speech, left side feels weaker than right side. now has a headache. pt also reports fever for 2 days         Brief Synopsis of Hospital Course/HPI  Patient is a 66 y/o male who presented to the ED complaining of sudden onset of moderate difficulty speaking and left sided weakness with trouble walking that started around 1230 this afternoon. Patient reports he is feeling somewhat better now. He reports associated left sided headache. Denies cp, sob, fever or chills.   He does report nausea and diarrhea that started yesterday. He denies any sick contacts.       4/21- Patient complaining of dizziness/lightheadedness with some trouble balancing. Also complaining of a headache.           Review of Systems   Cardiovascular: Negative for chest pain.   Respiratory: Negative for cough and shortness of breath.    Gastrointestinal: Negative for abdominal pain, diarrhea, nausea and vomiting.   Neurological: Positive for dizziness, light-headedness and loss of balance.         Objective   Objective      Vital Signs  Temp:  [97.4 °F (36.3 °C)-97.8 °F (36.6 °C)] 97.8 °F (36.6 °C)  Heart Rate:  [67-76] 72  Resp:  [12-20] 13  BP: (111-150)/(59-82) 126/68  Oxygen Therapy  SpO2: 95 %  Pulse Oximetry Type: Intermittent  Device (Oxygen Therapy): room air  Flowsheet Rows      First Filed Value   Admission Height  172.7 cm (68\") Documented at 04/20/2021 1404   Admission Weight  112 kg (246 lb 14.6 oz) Documented " at 04/20/2021 1404        Intake & Output (last 3 days)       04/18 0701 - 04/19 0700 04/19 0701 - 04/20 0700 04/20 0701 - 04/21 0700 04/21 0701 - 04/22 0700    P.O.    770    IV Piggyback   1000     Total Intake(mL/kg)   1000 (8.5) 770 (6.6)    Net   +1000 +770            Urine Unmeasured Occurrence    1 x        Lines, Drains & Airways    Active LDAs     Name:   Placement date:   Placement time:   Site:   Days:    Peripheral IV 04/21/21 1338 Anterior;Distal;Left;Upper Arm   04/21/21    1338    Arm   less than 1                  Physical Exam:    Physical Exam  Vitals reviewed.   Constitutional:       General: He is not in acute distress.     Appearance: He is obese.   HENT:      Head: Normocephalic and atraumatic.      Mouth/Throat:      Mouth: Mucous membranes are moist.   Cardiovascular:      Rate and Rhythm: Normal rate and regular rhythm.   Pulmonary:      Effort: Pulmonary effort is normal. No respiratory distress.   Abdominal:      General: Bowel sounds are normal.      Palpations: Abdomen is soft.   Musculoskeletal:      Right lower leg: No edema.      Left lower leg: No edema.   Skin:     General: Skin is warm and dry.      Findings: No rash.   Neurological:      Mental Status: He is alert and oriented to person, place, and time.   Psychiatric:         Mood and Affect: Mood normal.         Behavior: Behavior normal.               Procedures:              Results Review:     I reviewed the patient's new clinical results.      Lab Results (last 24 hours)     Procedure Component Value Units Date/Time    POC Glucose Once [[id number]]  (Abnormal) Collected: 04/21/21 1618    Specimen: Blood Updated: 04/21/21 1620     Glucose 165 mg/dL      Comment: Serial Number: [id number]:  [id number]       POC Glucose Once [[id number]]  (Abnormal) Collected: 04/21/21 1235    Specimen: Blood Updated: 04/21/21 1236     Glucose 140 mg/dL      Comment: Serial Number: [id number]:  [id number]       Basic Metabolic Panel  [[id number]]  (Abnormal) Collected: 04/21/21 0301    Specimen: Blood Updated: 04/21/21 0419     Glucose 150 mg/dL      BUN 20 mg/dL      Creatinine 1.17 mg/dL      Sodium 141 mmol/L      Potassium 4.0 mmol/L      Chloride 105 mmol/L      CO2 28.0 mmol/L      Calcium 8.2 mg/dL      eGFR Non African Amer 63 mL/min/1.73      BUN/Creatinine Ratio 17.1     Anion Gap 8.0 mmol/L     Narrative:      GFR Normal >60  Chronic Kidney Disease <60  Kidney Failure <15      Magnesium [[id number]]  (Normal) Collected: 04/21/21 0301    Specimen: Blood Updated: 04/21/21 0419     Magnesium 1.8 mg/dL     COVID PRE-OP / PRE-PROCEDURE SCREENING ORDER (NO ISOLATION) - Swab, Nasopharynx [[id number]]  (Normal) Collected: 04/20/21 1703    Specimen: Swab from Nasopharynx Updated: 04/20/21 2344    Narrative:      The following orders were created for panel order COVID PRE-OP / PRE-PROCEDURE SCREENING ORDER (NO ISOLATION) - Swab, Nasopharynx.  Procedure                               Abnormality         Status                     ---------                               -----------         ------                     COVID-19,APTIMA PANTHER,...[[id number]]  Normal              Final result                 Please view results for these tests on the individual orders.    COVID-19,APTIMA PANTHER,[facility], NP/OP SWAB IN UTM/VTM/SALINE TRANSPORT MEDIA,24 HR TAT - Swab, Nasopharynx [[id number]]  (Normal) Collected: 04/20/21 1703    Specimen: Swab from Nasopharynx Updated: 04/20/21 2344     COVID19 Not Detected    Narrative:      Fact sheet for providers: https://www.fda.gov/media/012265/download     Fact sheet for patients: https://www.fda.gov/media/541235/download    Test performed by RT PCR.        Hemoglobin A1C   Date Value Ref Range Status   04/20/2021 8.4 (H) 3.5 - 5.6 % Final     Results from last 7 days   Lab Units 04/20/21  1427   INR  0.98           No results found for: LIPASE  Lab Results   Component Value Date    CHOL 213 (H) 04/20/2021     TRIG 178 (H) 04/20/2021    HDL 56 04/20/2021     (H) 04/20/2021       No results found for: INTRAOP, PREDX, FINALDX, COMDX    Microbiology Results (last 10 days)     Procedure Component Value - Date/Time    COVID PRE-OP / PRE-PROCEDURE SCREENING ORDER (NO ISOLATION) - Swab, Nasopharynx [[id number]]  (Normal) Collected: 04/20/21 1703    Lab Status: Final result Specimen: Swab from Nasopharynx Updated: 04/20/21 2344    Narrative:      The following orders were created for panel order COVID PRE-OP / PRE-PROCEDURE SCREENING ORDER (NO ISOLATION) - Swab, Nasopharynx.  Procedure                               Abnormality         Status                     ---------                               -----------         ------                     COVID-19,APTIMA PANTHER,...[[id number]]  Normal              Final result                 Please view results for these tests on the individual orders.    COVID-19,APTIMA PANTHER,[facility], NP/OP SWAB IN UTM/VTM/SALINE TRANSPORT MEDIA,24 HR TAT - Swab, Nasopharynx [[id number]]  (Normal) Collected: 04/20/21 1703    Lab Status: Final result Specimen: Swab from Nasopharynx Updated: 04/20/21 2344     COVID19 Not Detected    Narrative:      Fact sheet for providers: https://www.fda.gov/media/267937/download     Fact sheet for patients: https://www.fda.gov/media/922779/download    Test performed by RT PCR.          ECG/EMG Results (most recent)     Procedure Component Value Units Date/Time    ECG 12 Lead [[id number]] Collected: 04/20/21 1504     Updated: 04/20/21 1505     QT Interval 417 ms     Narrative:      HEART RATE= 74  bpm  RR Interval= 816  ms  KS Interval= 176  ms  P Horizontal Axis= 37  deg  P Front Axis= 43  deg  QRSD Interval= 85  ms  QT Interval= 417  ms  QRS Axis= 16  deg  T Wave Axis= 40  deg  - OTHERWISE NORMAL ECG -  Sinus rhythm  Low voltage, precordial leads  Electronically Signed By:   Date and Time of Study: 2021-04-20 15:04:38    Adult Transthoracic Echo Complete W/  Cont if Necessary Per Protocol (With Agitated Saline) [[id number]] Collected: 04/21/21 0704     Updated: 04/21/21 1800     BSA 2.3 m^2      RVIDd 2.8 cm      IVSd 0.99 cm      LVIDd 4.1 cm      LVIDs 3.0 cm      LVPWd 1.3 cm      IVS/LVPW 0.75     FS 27.1 %      EDV(Teich) 75.0 ml      ESV(Teich) 35.1 ml      EF(Teich) 53.3 %      EDV(cubed) 69.9 ml      ESV(cubed) 27.1 ml      EF(cubed) 61.3 %      LV mass(C)d 161.6 grams      LV mass(C)dI 71.0 grams/m^2      SV(Teich) 40.0 ml      SI(Teich) 17.6 ml/m^2      SV(cubed) 42.8 ml      SI(cubed) 18.8 ml/m^2      Ao root diam 3.7 cm      Ao root area 10.7 cm^2      ACS 2.0 cm      asc Aorta Diam 2.8 cm      LVOT diam 2.2 cm      LVOT area 3.7 cm^2      RVOT diam 2.6 cm      RVOT area 5.2 cm^2      EDV(MOD-sp4) 59.3 ml      ESV(MOD-sp4) 26.0 ml      EF(MOD-sp4) 56.1 %      SV(MOD-sp4) 33.2 ml      SI(MOD-sp4) 14.6 ml/m^2      Ao root area (BSA corrected) 1.6     LV Torrez Vol (BSA corrected) 26.0 ml/m^2      LV Sys Vol (BSA corrected) 11.4 ml/m^2      Aortic R-R 0.96 sec      Aortic HR 62.3 BPM      MV E max kristian 71.2 cm/sec      MV A max kristian 92.2 cm/sec      MV E/A 0.77     MV V2 max 89.0 cm/sec      MV max PG 3.2 mmHg      MV V2 mean 56.5 cm/sec      MV mean PG 1.4 mmHg      MV V2 VTI 27.8 cm      MVA(VTI) 2.8 cm^2      MV dec slope 259.4 cm/sec^2      MV dec time 0.27 sec      Ao pk kristian 83.8 cm/sec      Ao max PG 2.8 mmHg      Ao max PG (full) 0.14 mmHg      Ao V2 mean 57.2 cm/sec      Ao mean PG 1.5 mmHg      Ao mean PG (full) 0.02 mmHg      Ao V2 VTI 18.1 cm      LULA(I,A) 4.2 cm^2      LULA(I,D) 4.2 cm^2      LULA(V,A) 3.6 cm^2      LULA(V,D) 3.6 cm^2      LV V1 max PG 2.7 mmHg      LV V1 mean PG 1.5 mmHg      LV V1 max 81.7 cm/sec      LV V1 mean 58.7 cm/sec      LV V1 VTI 20.8 cm      CO(Ao) 12.0 l/min      CI(Ao) 5.3 l/min/m^2      SV(Ao) 192.9 ml      SI(Ao) 84.7 ml/m^2      CO(LVOT) 4.8 l/min      CI(LVOT) 2.1 l/min/m^2      SV(LVOT) 76.7 ml      SV(RVOT) 100.2 ml       SI(LVOT) 33.7 ml/m^2      PA V2 max 92.5 cm/sec      PA max PG 3.4 mmHg      PA max PG (full) 1.5 mmHg      PA V2 mean 57.3 cm/sec      PA mean PG 1.6 mmHg      PA mean PG (full) 0.45 mmHg      PA V2 VTI 21.8 cm      PVA(I,A) 4.6 cm^2       CV ECHO IFTIKHAR - PVA(I,D) 4.6 cm^2       CV ECHO IFTIKHAR - PVA(V,A) 4.0 cm^2       CV ECHO IFTIKHAR - PVA(V,D) 4.0 cm^2      PA acc time 0.1 sec      RV V1 max PG 2.0 mmHg      RV V1 mean PG 1.1 mmHg      RV V1 max 70.2 cm/sec      RV V1 mean 49.9 cm/sec      RV V1 VTI 19.2 cm      TR max drew 130.4 cm/sec      RVSP(TR) 9.8 mmHg      RAP systole 3.0 mmHg      PA pr(Accel) 32.8 mmHg      Pulm Sys Drew 46.7 cm/sec      Pulm Torrez Drew 43.9 cm/sec      Pulm S/D 1.1     Qp/Qs 1.3     Pulm A Revs Dur 0.11 sec      Pulm A Revs Drew 26.0 cm/sec       CV ECHO IFTIKHAR - BZI_BMI 39.2 kilograms/m^2       CV ECHO IFTIKHAR - BSA(HAYCOCK) 2.4 m^2       CV ECHO IFTIKHAR - BZI_METRIC_WEIGHT 117.0 kg       CV ECHO IFTIKHAR - BZI_METRIC_HEIGHT 172.7 cm      EF(MOD-bp) 56.0 %      LA dimension(2D) 4.0 cm      Echo EF Estimated 55 %     Narrative:      · Estimated left ventricular EF = 55% Left ventricular systolic function   is normal.  · Left ventricular diastolic dysfunction is noted.  · Estimated right ventricular systolic pressure from tricuspid   regurgitation is normal (<35 mmHg).  · Saline test results are positive with valsalva manuever for right to   left atrial level shunt.             Results for orders placed during the hospital encounter of 07/13/20    Duplex Venous Lower Extremity - Right CAR    Interpretation Summary  · Normal right lower extremity venous duplex scan.      Results for orders placed during the hospital encounter of 04/20/21    Adult Transthoracic Echo Complete W/ Cont if Necessary Per Protocol (With Agitated Saline)    Interpretation Summary  · Estimated left ventricular EF = 55% Left ventricular systolic function is normal.  · Left ventricular diastolic dysfunction is noted.  ·  Estimated right ventricular systolic pressure from tricuspid regurgitation is normal (<35 mmHg).  · Saline test results are positive with valsalva manuever for right to left atrial level shunt.      CT Angiogram Neck    Result Date: 4/20/2021   1. 38% stenosis within the left proximal ICA. 2. No significant stenosis in the right proximal ICA 3. Intracranial circulation is unremarkable.  Electronically Signed By-Alberto Jones MD On:4/20/2021 3:13 PM This report was finalized on [id number] by  Alberto Jones MD.    MRI Brain Without Contrast    Result Date: 4/20/2021  1. Negative for acute ischemia. 2. Chronic infarcts in right and left periventricular white matter and basal ganglia. 3. Generalized cerebral atrophy.  Electronically Signed By-Amos Kang MD On:4/20/2021 5:38 PM This report was finalized on [id number] by  Amos Kang MD.    XR Chest 1 View    Result Date: 4/20/2021  Lower lung volumes without definite acute cardiopulmonary abnormality.  Electronically Signed By-Leann Painter MD On:4/20/2021 2:59 PM This report was finalized on [id number] by  Leann Painter MD.    CT Head Without Contrast Stroke Protocol    Result Date: 4/20/2021  1. No acute intracranial abnormality. Specifically, no evidence of hemorrhage, mass effect or midline shift. 2. Stable old lacunar-type infarct within the bilateral basal ganglia.  Findings discussed with Dr. Peerz at 2:28:00 PM on 4/20/2021  Electronically Signed By-Juma Perry MD On:4/20/2021 2:28 PM This report was finalized on [id number] by  Juma Perry MD.    CT Angriogram head w ai analysis of lvo    Result Date: 4/20/2021   1. 38% stenosis within the left proximal ICA. 2. No significant stenosis in the right proximal ICA 3. Intracranial circulation is unremarkable.  Electronically Signed By-Alberto Jones MD On:4/20/2021 3:13 PM This report was finalized on [id number] by  Alberto Jones MD.          Xrays, labs reviewed personally by  physician.    Medication Review:   I have reviewed the patient's current medication list      Scheduled Meds  apixaban, 5 mg, Oral, Q12H  aspirin, 81 mg, Oral, Daily  atorvastatin, 80 mg, Oral, Nightly  buPROPion XL, 300 mg, Oral, Daily  Canagliflozin, 100 mg, Oral, Daily  insulin glargine, 20 Units, Subcutaneous, Nightly  Morphine, 30 mg, Oral, BID  pregabalin, 300 mg, Oral, BID        Meds Infusions  sodium chloride, 100 mL/hr, Last Rate: Stopped (04/21/21 1212)        Meds PRN  •  acetaminophen **OR** acetaminophen **OR** acetaminophen  •  butalbital-acetaminophen-caffeine  •  meclizine  •  melatonin  •  nitroglycerin  •  ondansetron **OR** ondansetron  •  oxyCODONE  •  sodium chloride  •  tiZANidine        Assessment/Plan   Assessment/Plan     Active Hospital Problems    Diagnosis  POA   • TIA (transient ischemic attack) [G45.9]  Yes      Resolved Hospital Problems   No resolved problems to display.       MEDICAL DECISION MAKING COMPLEXITY BY PROBLEM:     Difficulty with word finding and LLE weakness  - discussed with neurology who felt likely related to migraine   - Hx of stroke with PFO  - CT head negative for acute process- stable old lacunar infarcts  - MRI brain negative for acute ischemia  - CTA head and neck- 38% stenosis in the left proximal ICA  - echo- LVEF 55%, diastolic dysfxn, RVSP <35mmHg; Saline test results are positive with valsalva manuever for right to left atrial level shunt.  - continue aspirin, Eliquis   - increase atorvastatin to 80mg   - , HDL 56, Triglycerides 178   - migraine cocktail given but patient still complaining of headache wants to stay overnight     Renal insufficiency - resolved   - baseline Cr 0.9-1.1  - likely d/t recent diarrhea  - continue IV fluids      Diarrhea  - check stool studies   - IV fluids  - hold miralax      Concern for sleep apnea  - needs outpatient referral for sleep study    Hyperlipidemia  - increase atorvastatin to 80mg     Diabetes Mellitus type  2 with peripheral neuropathy  - hgb A1c 8.4  - resume lantus at lower dose  - add SSI  - continue invokana and lyrica    Chronic Pain  - continue MS contin, zanaflex, and oxycodone     Depression  - continue Wellbutrin      DVT Prophylaxis  - Eliquis          VTE Prophylaxis -   Mechanical Order History:      Ordered        04/20/21 1801  Place Sequential Compression Device  Once         04/20/21 1801  Maintain Sequential Compression Device  Continuous                 Pharmalogical Order History:      Ordered     Dose Route Frequency Stop    04/20/21 1909  apixaban (ELIQUIS) tablet 5 mg      5 mg PO Every 12 Hours Scheduled --                  Code Status -   Code Status and Medical Interventions:   Ordered at: 04/20/21 1743     Code Status:    CPR     Medical Interventions (Level of Support Prior to Arrest):    Full             Discharge Planning  Home vs Inpt rehab         Electronically signed by Rosi Prakahs DO, 04/21/21, 18:27 EDT.  [facility]ist Team

## 2021-04-21 NOTE — PLAN OF CARE
Goal Outcome Evaluation:  Plan of Care Reviewed With: patient  Progress: no change  Outcome Summary: Pt. is 66 y/o male admit w/ dizziness and paraesthesia, MRI (-). Pt. reports living w/ roomate and works PT as a  and does carpentry. Pt. completes functional transfers this date w/ min-mod A secondary to LOB w/ standing, reports increased dizziness. Pt. provided w/ setup assist for LB dressing seated in armchair. Pt. not safe to d/c home at this time, does not have 24 hour care/support and at increased risk for injurious falls. Recommend IP rehab at d/c.

## 2021-04-21 NOTE — PLAN OF CARE
Goal Outcome Evaluation:  Plan of Care Reviewed With: patient  Progress: improving   Patient pleasant and cooperative with care, 0c/o of pain, LE elevated x 2, patient has not had a bm r/t stool collection, unsuccessful at this time, will encourage fluid intake, call light in reach, will continue to monitor, bed in safe position.

## 2021-04-21 NOTE — SIGNIFICANT NOTE
Orders generated as per stroke work up. Pt passed swallow screen and is receiving a regular consistency, thin liquid, consistent carb/diabetic diet. MRI of the head shows no acute infarct. Pt has history of previous CVA. Patient presented to the ED with a 15-20 minute episode of word finding difficulties however this has now resolved, with speech/language reported to be baseline. TIA suspected. ST will sign off at this time as per protocol. Please re-consult if our services are warranted in the future or if further difficulties arise.

## 2021-04-21 NOTE — PROGRESS NOTES
LOS: 1 day     Chief Complaint:  Word finding difficulty       SUBJECTIVE:  History taken from: patient chart RN    Interval History: Pt admitted on 4/20 after presenting with c/o word finding difficulty that resolved after about 15-30 minutes. He has a hx of stroke and is on Eliquis and ASA.   - Portions of the above HPI were copied from previous encounters and edited as appropriate.    RN states pt has been c/o a left sided headache and has not responded to Fioricet.     Patient Complaints: Pt continues to c/o a left sided headache. He reports a hx of migraines in the past. No other complaints. Speech is at baseline.       Review of Systems   Constitutional: Negative for chills, diaphoresis and fatigue.   Eyes: Negative for photophobia and visual disturbance.   Neurological: Positive for headaches. Negative for dizziness, tremors, seizures, syncope, facial asymmetry, speech difficulty, weakness, light-headedness and numbness.          Pertinent PMH:  has a past medical history of Arthritis, BPH, CAD, Chronic back pain, DMII, Excessive daytime sleepiness, Low back pain, Low vision of right eye with normal vision in contralateral eye, Memory problem, Neuropathy, Obesity, PFO (patent foramen ovale) (11/29/2019), Snoring, and Stroke.   ________________________________________________     OBJECTIVE:  Pt examined at . Family at .       Neurologic Exam    On exam:  GENERAL: NAD, awake and alert  HEENT: Normocephalic, Atraumatic. Large neck circumference.   RESP: Breathing unlabored, breath sounds normal  CARDIO: RRR  SKIN: Warm, dry. No apparent rash.   PSYCH: Mood/affect normal    NEURO:  Oriented x3  EOMI, PERRL, no visual field deficits  No facial asymmetry  Speech clear without dysarthria  Sensations intact and equal bilaterally  Strength 5/5 and equal in all extremities  No ataxia      ________________________________________________   RESULTS REVIEW    VITAL SIGNS:  Temp:  [97.3 °F (36.3 °C)-97.5 °F (36.4  °C)] 97.4 °F (36.3 °C)  Heart Rate:  [67-80] 76  Resp:  [13-20] 20  BP: (100-169)/(59-86) 126/67    LABS:   Lab Results   Component Value Date    WBC 7.10 04/20/2021    HGB 14.1 04/20/2021    HCT 42.3 04/20/2021    MCV 90.8 04/20/2021     04/20/2021     Lab Results   Component Value Date    GLUCOSE 150 (H) 04/21/2021    BUN 20 04/21/2021    CREATININE 1.17 04/21/2021    EGFRIFNONA 63 04/21/2021    BCR 17.1 04/21/2021    K 4.0 04/21/2021    CO2 28.0 04/21/2021    CALCIUM 8.2 (L) 04/21/2021    ALBUMIN 3.90 04/20/2021    AST 16 04/20/2021    ALT 13 04/20/2021       Lab Results   Component Value Date    TSH 0.924 04/20/2021     (H) 04/20/2021    HGBA1C 8.4 (H) 04/20/2021    AUHAENHB96 506 04/20/2021         IMAGING STUDIES:  CT Angiogram Neck    Result Date: 4/20/2021   1. 38% stenosis within the left proximal ICA. 2. No significant stenosis in the right proximal ICA 3. Intracranial circulation is unremarkable.  Electronically Signed By-Alberto Jones MD On:4/20/2021 3:13 PM This report was finalized on [id number] by  Alberto Jones MD.    MRI Brain Without Contrast    Result Date: 4/20/2021  1. Negative for acute ischemia. 2. Chronic infarcts in right and left periventricular white matter and basal ganglia. 3. Generalized cerebral atrophy.  Electronically Signed By-Amos Kang MD On:4/20/2021 5:38 PM This report was finalized on [id number] by  Amos Kang MD.    XR Chest 1 View    Result Date: 4/20/2021  Lower lung volumes without definite acute cardiopulmonary abnormality.  Electronically Signed By-Leann Painter MD On:4/20/2021 2:59 PM This report was finalized on [id number] by  Leann Painter MD.    CT Head Without Contrast Stroke Protocol    Result Date: 4/20/2021  1. No acute intracranial abnormality. Specifically, no evidence of hemorrhage, mass effect or midline shift. 2. Stable old lacunar-type infarct within the bilateral basal ganglia.  Findings discussed with Dr. Perez at 2:28:00  PM on 4/20/2021  Electronically Signed By-Juma Perry MD On:4/20/2021 2:28 PM This report was finalized on [id number] by  Juma Perry MD.    CT Angriogram head w ai analysis of lvo    Result Date: 4/20/2021   1. 38% stenosis within the left proximal ICA. 2. No significant stenosis in the right proximal ICA 3. Intracranial circulation is unremarkable.  Electronically Signed By-Alberto Jones MD On:4/20/2021 3:13 PM This report was finalized on [id number] by  Alberto Jones MD.      I reviewed the patient's new clinical results.    ________________________________________________      PROBLEM LIST:    TIA (transient ischemic attack)        Assessment/Plan   ASSESSMENT/PLAN:  1. Word finding difficulty and headache. Speech issues have resolved, but headache is persistent. Probable migraine with hx of migraines.  No acute stroke.      - CTA head and neck: 38% stenosis within the left proximal ICA. No significant stenosis in the right proximal ICA. Intracranial circulation is unremarkable  - MRI brain: Negative for acute ischemia. Chronic infarcts in right and left periventricular white matter and basal ganglia. Generalized cerebral atrophy. Images reviewed, no acute stroke.   - Echo: Report pending   - EKG: Sinus rhythm. Low voltage, precordial leads  - Labs: A1C: 8.4, B12: 506, LDL: 126, TSH: 0.924, UA neg except glucose  - Antithrombotics: Continue ASA and Eliquis. Pt is already on antiplatelet and anticoagulation therapy. Additional antithrobotics at this time would likely cause more harm than benefit as this was likely a migraine and not a clear ischemic event.   - Statin: Lipitor 40mg qhs  - Migraine cocktail now (compazine 10mg IV, Benadryl 25mg IV, Toradol 30mg IV)  - Fioricet PRN     2. Probable sleep apnea  - Pt previously referred for outpt sleep study, but was unable to complete it due to other health issues  - Referral to sleep center placed    **Please refer to previous notes for further  details and recommendations.     IF headache resolves, pt can be discharged from neuro standpoint. Otherwise, will follow tomorrow.     I discussed the patients findings and my recommendations with patient, nursing staff, primary care team and consulting provider    ANTONY Tan  04/21/21  11:22 EDT

## 2021-04-21 NOTE — DISCHARGE PLACEMENT REQUEST
"Elizabeth Green (65 y.o. Male)     Date of Birth Social Security Number Address Home Phone MRN    1955  [address]  [facility] [phone] [id number]    Restoration Marital Status          None Single       Admission Date Admission Type Admitting Provider Attending Provider Department, Room/Bed    4/20/21 Emergency Rosi Prakash DO [place], [place], DO [facility] NEURO HEART, 255/1    Discharge Date Discharge Disposition Discharge Destination                       Attending Provider: Rosi Prakash DO    Allergies: No Known Allergies    Isolation: None   Infection: None   Code Status: CPR    Ht: 172.7 cm (68\")   Wt: 117 kg (258 lb)    Admission Cmt: None   Principal Problem: None                Active Insurance as of 4/20/2021     Primary Coverage     Payor Plan Insurance Group Employer/Plan Group    MEDICARE MEDICARE A & B      Payor Plan Address Payor Plan Phone Number Payor Plan Fax Number Effective Dates    [address] [phone]  11/1/2020 - None Entered    [facility] [address]       Subscriber Name Subscriber Birth Date Member ID       ELIZABETH GREEN 1955 [id number]           Secondary Coverage     Payor Plan Insurance Group Employer/Plan Group    [place]      Payor Plan Address Payor Plan Phone Number Payor Plan Fax Number Effective Dates    [address]   11/25/2019 - None Entered    [place] [address]       Subscriber Name Subscriber Birth Date Member ID       ELIZABETH GREEN 1955 [id number]                 Emergency Contacts      (Rel.) Home Phone Work Phone Mobile Phone    LUBA GREEN (Son) -- -- [phone]    REAGANLEANDRO (Daughter) -- -- [phone]            Insurance Information                MEDICARE/MEDICARE A & B Phone: [phone]    Subscriber: Elizabeth Green Subscriber#: [id number]    Group#:  Precert#:         [place] Phone:     Subscriber: " Humble Green Subscriber#: [id number]    Group#:  Precert#:

## 2021-04-21 NOTE — THERAPY EVALUATION
Patient Name: Humble Green  : 1955    MRN: [id number]                              Today's Date: 2021       Admit Date: 2021    Visit Dx:     ICD-10-CM ICD-9-CM   1. Snoring  R06.83 786.09   2. TIA (transient ischemic attack)  G45.9 435.9   3. Daytime sleepiness  R40.0 780.54     Patient Active Problem List   Diagnosis   • Mixed hyperlipidemia   • Major depressive disorder, recurrent episode, moderate degree (CMS/HCC)   • Diabetic polyneuropathy associated with type 2 diabetes mellitus (CMS/HCC)   • Intractable migraine without aura and without status migrainosus   • PFO (patent foramen ovale)   • Essential hypertension   • Nonobstructive atherosclerosis of coronary artery   • Diabetes mellitus (CMS/HCC)   • Cerebrovascular accident (CVA) due to bilateral embolism of middle cerebral arteries (CMS/[facility])   • ANT (acute kidney injury) (CMS/[facility])   • Chronic kidney disease (CKD), stage III (moderate)   • DDD (degenerative disc disease), lumbar   • Morbidly obese (CMS/[facility])   • Sacroiliitis (CMS/[facility])   • Facet arthritis, degenerative, L5-S1 level, lumbosacral spine   • Chronic back pain   • TIA (transient ischemic attack)     Past Medical History:   Diagnosis Date   • Arthritis    • BPH    • CAD    • Chronic back pain    • DMII    • Excessive daytime sleepiness    • Low back pain    • Low vision of right eye with normal vision in contralateral eye    • Memory problem    • Neuropathy    • Obesity    • PFO (patent foramen ovale) 2019   • Snoring    • Stroke      Past Surgical History:   Procedure Laterality Date   • ARM TENDON REPAIR Left    • CARDIAC CATHETERIZATION N/A 2020   • CARDIAC CATHETERIZATION N/A 2020   • CHOLECYSTECTOMY     • COLONOSCOPY       = TA, rech    • EYE SURGERY      Rt Eye Sx after trauma @ 4y/o   • JOINT REPLACEMENT      Left TKR   • ROTATOR CUFF REPAIR Bilateral    • UMBILICAL HERNIA REPAIR       General Information     Row Name 21 7667           Physical Therapy Time and Intention    Document Type  evaluation  -EL     Mode of Treatment  individual therapy;physical therapy  -     Row Name 04/21/21 1325          General Information    Patient Profile Reviewed  yes  -EL     Prior Level of Function  independent:;all household mobility;ADL's;work;driving  -     Row Name 04/21/21 1325          Living Environment    Lives With  alone;other (see comments) Has a roomate, but roomate is typically not home  -     Row Name 04/21/21 1325          Home Main Entrance    Number of Stairs, Main Entrance  three  -     Row Name 04/21/21 1325          Stairs Within Home, Primary    Number of Stairs, Within Home, Primary  none  -     Row Name 04/21/21 1325          Cognition    Orientation Status (Cognition)  oriented x 4  -EL     Row Name 04/21/21 1325          Safety Issues, Functional Mobility    Impairments Affecting Function (Mobility)  balance;endurance/activity tolerance;strength  -EL       User Key  (r) = Recorded By, (t) = Taken By, (c) = Cosigned By    Initials Name Provider Type    Modesto Lowery PT Physical Therapist        Mobility     Row Name 04/21/21 1328          Bed Mobility    Bed Mobility  supine-sit  -EL     Supine-Sit [place] (Bed Mobility)  contact guard  -     Row Name 04/21/21 1328          Bed-Chair Transfer    Bed-Chair [place] (Transfers)  minimum assist (75% patient effort)  -EL     Assistive Device (Bed-Chair Transfers)  walker, front-wheeled  -EL     Row Name 04/21/21 1328          Sit-Stand Transfer    Sit-Stand [place] (Transfers)  minimum assist (75% patient effort)  -EL     Assistive Device (Sit-Stand Transfers)  walker, front-wheeled  -EL     Row Name 04/21/21 1328          Gait/Stairs (Locomotion)    [place] Level (Gait)  minimum assist (75% patient effort);moderate assist (50% patient effort)  -EL     Assistive Device (Gait)  walker, front-wheeled  -EL     Distance in Feet (Gait)  1 LOB requiring MOD A for  recovery  -EL     Deviations/Abnormal Patterns (Gait)  gait speed decreased;stride length decreased;base of support, narrow  -EL       User Key  (r) = Recorded By, (t) = Taken By, (c) = Cosigned By    Initials Name Provider Type    Modesto Lowery PT Physical Therapist        Obj/Interventions     [facility] Name 04/21/21 1331          Range of Motion Comprehensive    General Range of Motion  bilateral lower extremity ROM WFL  -EL     Row Name 04/21/21 1331          Strength Comprehensive (MMT)    General Manual Muscle Testing (MMT) Assessment  lower extremity strength deficits identified  -EL     Comment, General Manual Muscle Testing (MMT) Assessment  BLE 4/5 gross  -EL     Row Name 04/21/21 1331          Balance    Balance Assessment  sitting static balance;standing static balance;standing dynamic balance  -EL     Static Sitting Balance  WFL  -EL     Static Standing Balance  mild impairment;supported  -EL     Dynamic Standing Balance  moderate impairment;supported  -EL       User Key  (r) = Recorded By, (t) = Taken By, (c) = Cosigned By    Initials Name Provider Type    Modesto Lowery PT Physical Therapist        Goals/Plan     Row Name 04/21/21 1345          Bed Mobility Goal 1 (PT)    Activity/Assistive Device (Bed Mobility Goal 1, PT)  bed mobility activities, all  -EL     [place] Level/Cues Needed (Bed Mobility Goal 1, PT)  modified independence  -EL     Time Frame (Bed Mobility Goal 1, PT)  long term goal (LTG);2 weeks  -EL     Row Name 04/21/21 1345          Transfer Goal 1 (PT)    Activity/Assistive Device (Transfer Goal 1, PT)  transfers, all;walker, rolling  -EL     [place] Level/Cues Needed (Transfer Goal 1, PT)  modified independence  -EL     Time Frame (Transfer Goal 1, PT)  long term goal (LTG);2 weeks  -EL     Row Name 04/21/21 1345          Gait Training Goal 1 (PT)    Activity/Assistive Device (Gait Training Goal 1, PT)  gait (walking locomotion);walker, rolling  -EL     [place] Level (Gait  Training Goal 1, PT)  modified independence  -EL     Distance (Gait Training Goal 1, PT)  120  -EL     Time Frame (Gait Training Goal 1, PT)  long term goal (LTG);2 weeks  -EL     Strategies/Barriers (Gait Training Goal 1, PT)  no LOB  -EL       User Key  (r) = Recorded By, (t) = Taken By, (c) = Cosigned By    Initials Name Provider Type    EL Modesto Bagley, PT Physical Therapist        Clinical Impression     Row Name 04/21/21 1333          Pain    Additional Documentation  Pain Scale: FACES Pre/Post-Treatment (Group)  -EL     Row Name 04/21/21 1333          Pain Scale: FACES Pre/Post-Treatment    Pain: FACES Scale, Pretreatment  4-->hurts little more  -EL     Posttreatment Pain Rating  4-->hurts little more  -EL     Pain Location - Side  Left  -EL     Pain Location  head  -EL     Row Name 04/21/21 4733          Plan of Care Review    Plan of Care Reviewed With  patient  -EL     Outcome Summary  Pt is a 66 YO M who reports living with a roommate, but typically roommate is not home. Pt typically is very independent, ambulating without AD, no recent falls, working part time. Pt this date with some continued slurred speech, and balance defecits. Pt with 1 significant LOB this date during gait assessment. Concern remains about pt able to perform all necessary tasks at home. Recommendation is IP rehab following d/c. PPE worn includes gloves and mask with goggles.  -     Row Name 04/21/21 1333          Therapy Assessment/Plan (PT)    Rehab Potential (PT)  good, to achieve stated therapy goals  -EL     Criteria for Skilled Interventions Met (PT)  yes  -EL     Predicted Duration of Therapy Intervention (PT)  Until d/c  -EL     Row Name 04/21/21 1333          Vital Signs    Pre Systolic BP Rehab  137  -EL     Pre Treatment Diastolic BP  64  -EL     Intra Systolic BP Rehab  136  -EL     Intra Treatment Diastolic BP  69  -EL     Post Systolic BP Rehab  126  -EL     Post Treatment Diastolic BP  67  -EL     O2 Delivery Pre  Treatment  room air  -EL     O2 Delivery Intra Treatment  room air  -EL     O2 Delivery Post Treatment  room air  -EL     Pre Patient Position  Supine  -EL     Intra Patient Position  Standing  -EL     Post Patient Position  Sitting  -EL     Row Name 04/21/21 1333          Positioning and Restraints    Pre-Treatment Position  in bed  -EL     Post Treatment Position  chair  -EL     In Chair  notified nsg;sitting;call light within reach;encouraged to call for assist  -EL       User Key  (r) = Recorded By, (t) = Taken By, (c) = Cosigned By    Initials Name Provider Type    Modesto Lowery PT Physical Therapist        Outcome Measures     Row Name 04/21/21 1347          How much help from another person do you currently need...    Turning from your back to your side while in flat bed without using bedrails?  3  -EL     Moving from lying on back to sitting on the side of a flat bed without bedrails?  3  -EL     Moving to and from a bed to a chair (including a wheelchair)?  3  -EL     Standing up from a chair using your arms (e.g., wheelchair, bedside chair)?  3  -EL     Climbing 3-5 steps with a railing?  2  -EL     To walk in hospital room?  2  -EL     AM-PAC 6 Clicks Score (PT)  16  -EL     Row Name 04/21/21 1347          Functional Assessment    Outcome Measure Options  AM-PAC 6 Clicks Basic Mobility (PT)  -EL       User Key  (r) = Recorded By, (t) = Taken By, (c) = Cosigned By    Initials Name Provider Type    Modesto Lowery PT Physical Therapist        Physical Therapy Education                 Title: PT OT SLP Therapies (Done)     Topic: Physical Therapy (Done)     Point: Mobility training (Done)     Learning Progress Summary           Patient Acceptance, E,TB, VU by FERN at 4/21/2021 1347                   Point: Precautions (Done)     Learning Progress Summary           Patient Acceptance, E,TB, VU by FERN at 4/21/2021 1347                               User Key     Initials Effective Dates Name Provider Type  Discipline    EL 06/23/20 -  Modesto Bagley PT Physical Therapist PT              PT Recommendation and Plan  Planned Therapy Interventions (PT): balance training, neuromuscular re-education, bed mobility training, transfer training, gait training, patient/family education, strengthening  Plan of Care Reviewed With: patient  Outcome Summary: Pt is a 64 YO M who reports living with a roommate, but typically roommate is not home. Pt typically is very independent, ambulating without AD, no recent falls, working part time. Pt this date with some continued slurred speech, and balance defecits. Pt with 1 significant LOB this date during gait assessment. Concern remains about pt able to perform all necessary tasks at home. Recommendation is IP rehab following d/c. PPE worn includes gloves and mask with goggles.     Time Calculation:   PT Charges     Row Name 04/21/21 1348             Time Calculation    Start Time  0919  -EL      Stop Time  0946  -EL      Time Calculation (min)  27 min  -EL      PT Received On  04/21/21  -EL      PT - Next Appointment  04/22/21  -EL      PT Goal Re-Cert Due Date  05/05/21  -EL        User Key  (r) = Recorded By, (t) = Taken By, (c) = Cosigned By    Initials Name Provider Type    Modesto Lowery PT Physical Therapist        Therapy Charges for Today     Code Description Service Date Service Provider Modifiers Qty    69037127456 HC PT EVAL MOD COMPLEXITY 4 4/21/2021 Modesto Bagley PT GP 1          PT G-Codes  Outcome Measure Options: AM-PAC 6 Clicks Basic Mobility (PT)  AM-PAC 6 Clicks Score (PT): 16    Modesto Bagley PT  4/21/2021

## 2021-04-21 NOTE — PLAN OF CARE
Goal Outcome Evaluation:  Plan of Care Reviewed With: patient     Outcome Summary: Pt is a 64 YO M who reports living with a roommate, but typically roommate is not home. Pt typically is very independent, ambulating without AD, no recent falls, working part time. Pt this date with some continued slurred speech, and balance defecits. Pt with 1 significant LOB this date during gait assessment. Concern remains about pt able to perform all necessary tasks at home. Recommendation is IP rehab following d/c. PPE worn includes gloves and mask with goggles.

## 2021-04-21 NOTE — CASE MANAGEMENT/SOCIAL WORK
Discharge Planning Assessment   Agustín     Patient Name: Humble Green  MRN: [id number]  Today's Date: 4/21/2021    Admit Date: 4/20/2021    Discharge Needs Assessment     Row Name 04/21/21 1523       Living Environment    Lives With  alone    Name(s) of Who Lives With Patient  Patient lives with a room mate who is rarely there    Current Living Arrangements  home/apartment/condo    Primary Care Provided by  self    Able to Return to Prior Arrangements  no PT recommends IP Rehab and patient agreeable - Chose [facility] and referral made - pending decision       Resource/Environmental Concerns    Resource/Environmental Concerns  none       Transition Planning    Patient/Family Anticipates Transition to  inpatient rehabilitation facility    Patient/Family Anticipated Services at Transition  rehabilitation services    Transportation Anticipated  car, drives self Son will transport at DC       Discharge Needs Assessment    Equipment Currently Used at Home  cane, straight    Concerns to be Addressed  discharge planning        Discharge Plan     Row Name 04/21/21 1526       Plan    Plan  PT recommends IP Rehab - Referral made to [facility] - Pending Decision - No PASRR or precert needed      Plan Comments  Spoke to patient in room with mask maintaining 6 ft for less than 15 min. Patient is I with ADL's and drives. PCP is Donita and pharmacy is [facility] Barriers - CVA Workup        Continued Care and Services - Admitted Since 4/20/2021     Destination     Service Provider Request Status Selected Services Address Phone Fax Patient Preferred    [facility]  Pending - Request Sent N/A [address] [facility] [address] [place] [address] [phone] --                Demographic Summary     Row Name 04/21/21 1522       General Information    Admission Type  observation    Arrived From  emergency department    Required Notices Provided  Observation Status Notice    Referral Source  admission list     Reason for Consult  discharge planning    Preferred Language  English        Functional Status     Row Name 04/21/21 1522       Functional Status, IADL    Medications  independent    Meal Preparation  independent    Housekeeping  independent    Laundry  independent    Shopping  independent       Mental Status    General Appearance WDL  WDL       Mental Status Summary    Recent Changes in Mental Status/Cognitive Functioning  no changes            Patient Forms     Row Name 04/21/21 1521       Patient Forms    Patient Observation Letter  Delivered Moon 4/21/21 per registration          Linda Arteaga RN, CM  Office Phone [phone]  Cell [phone]

## 2021-04-22 VITALS
WEIGHT: 262.5 LBS | HEART RATE: 73 BPM | OXYGEN SATURATION: 94 % | DIASTOLIC BLOOD PRESSURE: 72 MMHG | SYSTOLIC BLOOD PRESSURE: 145 MMHG | TEMPERATURE: 97.6 F | RESPIRATION RATE: 12 BRPM | HEIGHT: 68 IN | BODY MASS INDEX: 39.78 KG/M2

## 2021-04-22 PROBLEM — G43.111 INTRACTABLE MIGRAINE WITH AURA WITH STATUS MIGRAINOSUS: Status: ACTIVE | Noted: 2021-04-20

## 2021-04-22 LAB
GLUCOSE BLDC GLUCOMTR-MCNC: 102 MG/DL (ref 70–105)
QT INTERVAL: 417 MS

## 2021-04-22 PROCEDURE — 97116 GAIT TRAINING THERAPY: CPT

## 2021-04-22 PROCEDURE — 99214 OFFICE O/P EST MOD 30 MIN: CPT | Performed by: NURSE PRACTITIONER

## 2021-04-22 PROCEDURE — 97530 THERAPEUTIC ACTIVITIES: CPT

## 2021-04-22 PROCEDURE — G0378 HOSPITAL OBSERVATION PER HR: HCPCS

## 2021-04-22 PROCEDURE — 82962 GLUCOSE BLOOD TEST: CPT

## 2021-04-22 PROCEDURE — 99217 PR OBSERVATION CARE DISCHARGE MANAGEMENT: CPT | Performed by: INTERNAL MEDICINE

## 2021-04-22 RX ORDER — AMITRIPTYLINE HYDROCHLORIDE 25 MG/1
25 TABLET, FILM COATED ORAL NIGHTLY
Status: DISCONTINUED | OUTPATIENT
Start: 2021-04-22 | End: 2021-04-22 | Stop reason: HOSPADM

## 2021-04-22 RX ORDER — BACLOFEN 10 MG/1
10 TABLET ORAL 2 TIMES DAILY PRN
Status: DISCONTINUED | OUTPATIENT
Start: 2021-04-22 | End: 2021-04-22 | Stop reason: HOSPADM

## 2021-04-22 RX ORDER — BACLOFEN 10 MG/1
10 TABLET ORAL 2 TIMES DAILY PRN
Start: 2021-04-22 | End: 2021-08-02 | Stop reason: SDUPTHER

## 2021-04-22 RX ORDER — AMITRIPTYLINE HYDROCHLORIDE 25 MG/1
25 TABLET, FILM COATED ORAL NIGHTLY
Qty: 30 TABLET | Refills: 0
Start: 2021-04-23 | End: 2021-05-23

## 2021-04-22 RX ORDER — MECLIZINE HYDROCHLORIDE 25 MG/1
25 TABLET ORAL 3 TIMES DAILY PRN
Start: 2021-04-22 | End: 2021-08-02 | Stop reason: SDUPTHER

## 2021-04-22 RX ORDER — POLYETHYLENE GLYCOL 3350 17 G/17G
17 POWDER, FOR SOLUTION ORAL DAILY
Status: DISCONTINUED | OUTPATIENT
Start: 2021-04-23 | End: 2021-04-22 | Stop reason: HOSPADM

## 2021-04-22 RX ORDER — IBUPROFEN 400 MG/1
800 TABLET ORAL ONCE
Status: COMPLETED | OUTPATIENT
Start: 2021-04-22 | End: 2021-04-22

## 2021-04-22 RX ORDER — ATORVASTATIN CALCIUM 80 MG/1
80 TABLET, FILM COATED ORAL NIGHTLY
Start: 2021-04-22 | End: 2021-08-02 | Stop reason: SDUPTHER

## 2021-04-22 RX ADMIN — OXYCODONE HYDROCHLORIDE 15 MG: 5 TABLET ORAL at 06:31

## 2021-04-22 RX ADMIN — BUPROPION HYDROCHLORIDE 300 MG: 150 TABLET, EXTENDED RELEASE ORAL at 08:34

## 2021-04-22 RX ADMIN — PREGABALIN 300 MG: 100 CAPSULE ORAL at 08:34

## 2021-04-22 RX ADMIN — BUTALBITAL, ACETAMINOPHEN AND CAFFEINE 1 TABLET: 50; 325; 40 TABLET ORAL at 08:34

## 2021-04-22 RX ADMIN — ASPIRIN 81 MG CHEWABLE TABLET 81 MG: 81 TABLET CHEWABLE at 08:34

## 2021-04-22 RX ADMIN — AMITRIPTYLINE HYDROCHLORIDE 25 MG: 25 TABLET, FILM COATED ORAL at 14:53

## 2021-04-22 RX ADMIN — Medication 10 ML: at 08:35

## 2021-04-22 RX ADMIN — OXYCODONE HYDROCHLORIDE 15 MG: 5 TABLET ORAL at 12:27

## 2021-04-22 RX ADMIN — MORPHINE SULFATE 30 MG: 30 TABLET, FILM COATED, EXTENDED RELEASE ORAL at 08:34

## 2021-04-22 RX ADMIN — APIXABAN 5 MG: 5 TABLET, FILM COATED ORAL at 08:34

## 2021-04-22 RX ADMIN — IBUPROFEN 800 MG: 400 TABLET, FILM COATED ORAL at 14:53

## 2021-04-22 RX ADMIN — CANAGLIFLOZIN 100 MG: 300 TABLET, FILM COATED ORAL at 08:35

## 2021-04-22 NOTE — CASE MANAGEMENT/SOCIAL WORK
Continued Stay Note  CHIN Randolph     Patient Name: Humble Green  MRN: [id number]  Today's Date: 4/22/2021    Admit Date: 4/20/2021    Discharge Plan     Row Name 04/22/21 1318       Plan    Plan  DC Plan: [facility] accepted, bed today, 4/22 after 3pm. No PASRR or pre-cert required.    Patient/Family in Agreement with Plan  yes    Plan Comments  SW informed by liaison (Wendie) that pt is accepted to [facility], bed available today after 3pm. Private room. Notified pt of acceptance at bedside & he is agreeable to discharge. Notified that if pt doesn't qualify for EMS, then pt's son can transport. MD/RN notified of d/c plan. Facility orders off d/c med rec.     Met with patient in room wearing PPE: mask, face shield/goggles, gloves, gown.      Maintained distance greater than six feet and spent less than 15 minutes in the room.      MAGGIE Denson    Phone: [phone]  Cell: [phone]  Fax: [phone]  [email or web address]

## 2021-04-22 NOTE — CASE MANAGEMENT/SOCIAL WORK
Continued Stay Note  [facility]     Patient Name: Humble Green  MRN: [id number]  Today's Date: 4/22/2021    Admit Date: 4/20/2021    Discharge Plan     Row Name 04/22/21 1226       Plan    Plan  [facility] unable to accept due to no bed available for subacute. Referral made to [facility] - Pending acceptance. No PASRR or precert required . Second choice is [facility].      Plan Comments  [facility] unable to accept due to no bed available for subacute. Spoke to patient and he is agreeable to [facility] and then [facility]. Referral made to [facility] pending decision. Patient ready to DC.        Linda Arteaga RN, CM  Office Phone [phone]  Cell [phone]

## 2021-04-22 NOTE — DISCHARGE SUMMARY
Date of Admission: 4/20/2021    Date of Discharge:  4/22/2021    Length of stay:  LOS: 1 day     Admission Diagnosis:   Difficulty with word finding and LLE weakness    Discharge Diagnosis:     Difficulty with word finding and LLE weakness  - discussed with neurology who felt likely related to migraine   - no acute stroke   - Hx of stroke with PFO  - CT head negative for acute process- stable old lacunar infarcts  - MRI brain negative for acute ischemia  - CTA head and neck- 38% stenosis in the left proximal ICA  - echo- LVEF 55%, diastolic dysfxn, RVSP <35mmHg; Saline test results are positive with valsalva manuever for right to left atrial level shunt.  - continue aspirin, Eliquis   - increase atorvastatin to 80mg   - , HDL 56, Triglycerides 178   - migraine cocktail given but patient still complaining of headache   - started on Elavil and will need to titrate outpatient pending response  - discontinue Wellbutrin  - may need to decrease or wean off lyrica  - follow up with neurology     Occipital neuralgia, bilateral  - Start baclofen 10mg BID PRN  - Not a candidate for occipital nerve blocks at this time due to DM and Eliquis  - Recommend massage therapy, heat/ice application, and may benefit from outpt PT    Renal insufficiency   - resolved with IV fluids   - baseline Cr 0.9-1.1  - likely d/t recent diarrhea     Diarrhea- resolved      Concern for sleep apnea  - referral to sleep center placed      Hyperlipidemia  - increase atorvastatin to 80mg     Diabetes Mellitus type 2 with peripheral neuropathy  - hgb A1c 8.4  - resume home medications as below      Chronic Pain  - continue MS contin, and oxycodone  - Zanaflex held while on baclofen    PFO  - Chronic and previously evaluated with [facility] on 11/29/2019. Followed by Dr. Doyle and is on chronic Eliquis therapy     Depression  - Wellbutrin discontinue and started on Elavil as above        Active Hospital Problems    Diagnosis  POA   • Intractable  migraine with aura with status migrainosus [G43.111]  Yes      Resolved Hospital Problems   No resolved problems to display.       Hospital Course:  Patient is a 66 y/o male who presented to the ED complaining of sudden onset of moderate difficulty speaking and left sided weakness with trouble walking that started around 1230 this afternoon. Patient reports he is feeling somewhat better now. He reports associated left sided headache. Denies cp, sob, fever or chills.   He does report nausea and diarrhea that started yesterday. He denies any sick contacts.       4/21- Patient complaining of dizziness/lightheadedness with some trouble balancing. Also complaining of a headache.       4/22- Patient is now agreeable to inpatient rehab. Medications have been adjusted. Will need to follow up with neurology and sleep lab.    Procedures Performed:  none         Consults:   Consults     Date and Time Order Name Status Description    4/20/2021  3:28 PM Hospitalist (on-call MD unless specified) Completed     4/20/2021  2:13 PM Inpatient Neurology Consult Stroke Completed     4/20/2021  2:13 PM Inpatient Neurology Consult Stroke Completed           Vital Signs:  Temp:  [97.2 °F (36.2 °C)-97.9 °F (36.6 °C)] 97.6 °F (36.4 °C)  Heart Rate:  [66-79] 73  Resp:  [9-19] 12  BP: (118-163)/(56-82) 145/72        Physical Exam:  Physical Exam  Vitals reviewed.   HENT:      Head: Normocephalic and atraumatic.      Mouth/Throat:      Mouth: Mucous membranes are moist.   Cardiovascular:      Rate and Rhythm: Normal rate and regular rhythm.   Pulmonary:      Effort: Pulmonary effort is normal. No respiratory distress.   Abdominal:      General: Bowel sounds are normal. There is no distension.      Palpations: Abdomen is soft.      Tenderness: There is no abdominal tenderness.   Neurological:      Mental Status: He is alert and oriented to person, place, and time.   Psychiatric:         Mood and Affect: Mood normal.         Behavior: Behavior  normal.                 Pertinent Test Results:   Lab Results (last 72 hours)     Procedure Component Value Units Date/Time    POC Glucose Once [[id number]]  (Normal) Collected: 04/22/21 0951    Specimen: Blood Updated: 04/22/21 0952     Glucose 102 mg/dL      Comment: Serial Number: [id number]:  [id number]       POC Glucose Once [[id number]]  (Abnormal) Collected: 04/21/21 1618    Specimen: Blood Updated: 04/21/21 1620     Glucose 165 mg/dL      Comment: Serial Number: [id number]:  [id number]       POC Glucose Once [[id number]]  (Abnormal) Collected: 04/21/21 1235    Specimen: Blood Updated: 04/21/21 1236     Glucose 140 mg/dL      Comment: Serial Number: [id number]:  [id number]       Basic Metabolic Panel [[id number]]  (Abnormal) Collected: 04/21/21 0301    Specimen: Blood Updated: 04/21/21 0419     Glucose 150 mg/dL      BUN 20 mg/dL      Creatinine 1.17 mg/dL      Sodium 141 mmol/L      Potassium 4.0 mmol/L      Chloride 105 mmol/L      CO2 28.0 mmol/L      Calcium 8.2 mg/dL      eGFR Non African Amer 63 mL/min/1.73      BUN/Creatinine Ratio 17.1     Anion Gap 8.0 mmol/L     Narrative:      GFR Normal >60  Chronic Kidney Disease <60  Kidney Failure <15      Magnesium [[id number]]  (Normal) Collected: 04/21/21 0301    Specimen: Blood Updated: 04/21/21 0419     Magnesium 1.8 mg/dL     COVID PRE-OP / PRE-PROCEDURE SCREENING ORDER (NO ISOLATION) - Swab, Nasopharynx [[id number]]  (Normal) Collected: 04/20/21 1703    Specimen: Swab from Nasopharynx Updated: 04/20/21 2344    Narrative:      The following orders were created for panel order COVID PRE-OP / PRE-PROCEDURE SCREENING ORDER (NO ISOLATION) - Swab, Nasopharynx.  Procedure                               Abnormality         Status                     ---------                               -----------         ------                     COVID-19,APTIMA PANTHER,...[[id number]]  Normal              Final result                 Please view results for these  tests on the individual orders.    COVID-19,APTIMA PANTHER,[facility] IN-HOUSE, NP/OP SWAB IN UTM/VTM/SALINE TRANSPORT MEDIA,24 HR TAT - Swab, Nasopharynx [[id number]]  (Normal) Collected: 04/20/21 1703    Specimen: Swab from Nasopharynx Updated: 04/20/21 2344     COVID19 Not Detected    Narrative:      Fact sheet for providers: https://www.fda.gov/media/884661/download     Fact sheet for patients: https://www.fda.gov/media/466961/download    Test performed by RT PCR.    Vitamin B12 [[id number]]  (Normal) Collected: 04/20/21 1427    Specimen: Blood Updated: 04/20/21 2209     Vitamin B-12 506 pg/mL     Narrative:      Results may be falsely increased if patient taking Biotin.      POC Glucose Once [[id number]]  (Abnormal) Collected: 04/20/21 1911    Specimen: Blood Updated: 04/20/21 1913     Glucose 111 mg/dL      Comment: Serial Number: [id number]:  [id number]       POC Glucose Once [[id number]]  (Normal) Collected: 04/20/21 1844    Specimen: Blood Updated: 04/20/21 1846     Glucose 103 mg/dL      Comment: Serial Number: [id number]:  [id number]       Urinalysis With Culture If Indicated - Urine, Clean Catch [[id number]]  (Abnormal) Collected: 04/20/21 1703    Specimen: Urine, Clean Catch Updated: 04/20/21 1711     Color, UA Yellow     Appearance, UA Clear     pH, UA <=5.0     Specific Gravity, UA 1.027     Glucose,  mg/dL (1+)     Ketones, UA Negative     Bilirubin, UA Negative     Blood, UA Negative     Protein, UA Negative     Leuk Esterase, UA Negative     Nitrite, UA Negative     Urobilinogen, UA 0.2 E.U./dL    Narrative:      Urine microscopic not indicated.    [place] Draw [[id number]] Collected: 04/20/21 1427    Specimen: Blood Updated: 04/20/21 1530    Narrative:      The following orders were created for panel order [place] Draw.  Procedure                               Abnormality         Status                     ---------                               -----------         ------                      Light Blue Top[[id number]]                                   Final result               Green Top (Gel)[[id number]]                                  Final result               Lavender Top[[id number]]                                     Final result               Gold Top - SST[[id number]]                                   Final result                 Please view results for these tests on the individual orders.    Lavender Top [[id number]] Collected: 04/20/21 1427    Specimen: Blood Updated: 04/20/21 1530     Extra Tube hold for add-on     Comment: Auto resulted       Gold Top - SST [[id number]] Collected: 04/20/21 1427    Specimen: Blood Updated: 04/20/21 1530     Extra Tube Hold for add-ons.     Comment: Auto resulted.       Light Blue Top [[id number]] Collected: 04/20/21 1427    Specimen: Blood Updated: 04/20/21 1530     Extra Tube hold for add-on     Comment: Auto resulted       Hemoglobin A1c [[id number]]  (Abnormal) Collected: 04/20/21 1427    Specimen: Blood Updated: 04/20/21 1524     Hemoglobin A1C 8.4 %     Narrative:      Hemoglobin A1C Reference Range:    <5.7 %        Normal  5.7-6.4 %     Increased risk for diabetes  > 6.4 %        Diabetes       These guidelines have been recommended by the American Diabetic Association for Hgb A1c.      The following 2010 guidelines have been recommended by the American Diabetes Association for Hemoglobin A1c.    HBA1c 5.7-6.4% Increased risk for future diabetes (pre-diabetes)  HBA1c     >6.4% Diabetes      TSH [[id number]]  (Normal) Collected: 04/20/21 1427    Specimen: Blood Updated: 04/20/21 1524     TSH 0.924 uIU/mL     CBC & Differential [[id number]]  (Abnormal) Collected: 04/20/21 1427    Specimen: Blood Updated: 04/20/21 1521    Narrative:      The following orders were created for panel order CBC & Differential.  Procedure                               Abnormality         Status                     ---------                               -----------         ------                      Scan Slide[[id number]]                                       Final result               CBC Auto Differential[[id number]]        Abnormal            Final result                 Please view results for these tests on the individual orders.    Scan Slide [[id number]] Collected: 04/20/21 1427    Specimen: Blood Updated: 04/20/21 1521     RBC Morphology Normal     WBC Morphology Normal     Giant Platelets Slight/1+    Narrative:      No platelet clumping seen    CBC Auto Differential [[id number]]  (Abnormal) Collected: 04/20/21 1427    Specimen: Blood Updated: 04/20/21 1521     WBC 7.10 10*3/mm3      RBC 4.66 10*6/mm3      Hemoglobin 14.1 g/dL      Hematocrit 42.3 %      MCV 90.8 fL      MCH 30.3 pg      MCHC 33.3 g/dL      RDW 14.4 %      RDW-SD 45.5 fl      MPV 12.1 fL      Platelets 194 10*3/mm3      Neutrophil % 53.8 %      Lymphocyte % 34.9 %      Monocyte % 8.8 %      Eosinophil % 1.7 %      Basophil % 0.8 %      Neutrophils, Absolute 3.80 10*3/mm3      Lymphocytes, Absolute 2.50 10*3/mm3      Monocytes, Absolute 0.60 10*3/mm3      Eosinophils, Absolute 0.10 10*3/mm3      Basophils, Absolute 0.10 10*3/mm3      nRBC 0.1 /100 WBC     Green Top (Gel) [[id number]] Collected: 04/20/21 1427    Specimen: Blood Updated: 04/20/21 1515    Lipid Panel [[id number]]  (Abnormal) Collected: 04/20/21 1427    Specimen: Blood Updated: 04/20/21 1513     Total Cholesterol 213 mg/dL      Triglycerides 178 mg/dL      HDL Cholesterol 56 mg/dL      LDL Cholesterol  126 mg/dL      VLDL Cholesterol 31 mg/dL      LDL/HDL Ratio 2.17    Narrative:      Cholesterol Reference Ranges  (U.S. Department of Health and Human Services ATP III Classifications)    Desirable          <200 mg/dL  Borderline High    200-239 mg/dL  High Risk          >240 mg/dL      Triglyceride Reference Ranges  (U.S. Department of Health and Human Services ATP III Classifications)    Normal           <150 mg/dL  Borderline High  150-199 mg/dL  High              200-499 mg/dL  Very High        >500 mg/dL    HDL Reference Ranges  (U.S. Department of Health and Human Services ATP III Classifcations)    Low     <40 mg/dl (major risk factor for CHD)  High    >60 mg/dl ('negative' risk factor for CHD)        LDL Reference Ranges  (U.S. Department of Health and Human Services ATP III Classifcations)    Optimal          <100 mg/dL  Near Optimal     100-129 mg/dL  Borderline High  130-159 mg/dL  High             160-189 mg/dL  Very High        >189 mg/dL    Troponin [[id number]]  (Normal) Collected: 04/20/21 1427    Specimen: Blood Updated: 04/20/21 1504     Troponin T <0.010 ng/mL     Narrative:      Troponin T Reference Range:  <= 0.03 ng/mL-   Negative for AMI  >0.03 ng/mL-     Abnormal for myocardial necrosis.  Clinicians would have to utilize clinical acumen, EKG, Troponin and serial changes to determine if it is an Acute Myocardial Infarction or myocardial injury due to an underlying chronic condition.       Results may be falsely decreased if patient taking Biotin.      Comprehensive Metabolic Panel [[id number]]  (Abnormal) Collected: 04/20/21 1427    Specimen: Blood Updated: 04/20/21 1503     Glucose 129 mg/dL      BUN 24 mg/dL      Creatinine 1.57 mg/dL      Sodium 137 mmol/L      Potassium 4.8 mmol/L      Comment: Slight hemolysis detected by analyzer. Results may be affected.        Chloride 101 mmol/L      CO2 25.0 mmol/L      Calcium 8.9 mg/dL      Total Protein 6.4 g/dL      Albumin 3.90 g/dL      ALT (SGPT) 13 U/L      AST (SGOT) 16 U/L      Comment: Slight hemolysis detected by analyzer. Results may be affected.        Alkaline Phosphatase 65 U/L      Total Bilirubin 0.4 mg/dL      eGFR Non African Amer 45 mL/min/1.73      Globulin 2.5 gm/dL      A/G Ratio 1.6 g/dL      BUN/Creatinine Ratio 15.3     Anion Gap 11.0 mmol/L     Narrative:      GFR Normal >60  Chronic Kidney Disease <60  Kidney Failure <15      Protime-INR [[id number]]  (Normal) Collected: 04/20/21 1427     Specimen: Blood Updated: 04/20/21 1453     Protime 10.8 Seconds      INR 0.98    aPTT [[id number]]  (Normal) Collected: 04/20/21 1427    Specimen: Blood Updated: 04/20/21 1453     PTT 27.8 seconds     POC Glucose Once [[id number]]  (Abnormal) Collected: 04/20/21 1405    Specimen: Blood Updated: 04/20/21 1406     Glucose 133 mg/dL      Comment: Serial Number: [id number]:  [id number]                 Results for orders placed during the hospital encounter of 04/20/21    Adult Transthoracic Echo Complete W/ Cont if Necessary Per Protocol (With Agitated Saline)    Interpretation Summary  · Estimated left ventricular EF = 55% Left ventricular systolic function is normal.  · Left ventricular diastolic dysfunction is noted.  · Estimated right ventricular systolic pressure from tricuspid regurgitation is normal (<35 mmHg).  · Saline test results are positive with valsalva manuever for right to left atrial level shunt.      Imaging Results (All)     Procedure Component Value Units Date/Time    MRI Brain Without Contrast [[id number]] Collected: 04/20/21 1732     Updated: 04/20/21 1740    Narrative:      Examination: MRI BRAIN WO CONTRAST-     Date of Exam: 4/20/2021 4:25 PM     Indication: Stroke, follow up.     Comparison: CT head without contrast 04/20/2021, CT head and neck  angiography 04/20/2021, MRI brain 07/17/2020     Technique: Standard non-contrast MR pulse sequences of the brain were  obtained.     Findings:  There is no diffusion restriction or findings to indicate acute  ischemia. No mass effect or midline shift. There are areas of remote  infarct in the right and left periventricular white matter with areas of  mild parenchymal volume loss at the left and right basal ganglia. No  midline shift or mass effect. Prominence of the ventricles and cortical  sulci consistent with generalized cerebral atrophy.      The posterior fossa is without acute abnormality. No findings to  indicate intraparenchymal  hemorrhage. The major T2 weighted intracranial  vascular flow voids are grossly patent, vasculature better assessed on  CT angiography exam. Globes are symmetric. No retro-orbital abnormality.  The mastoid air cells are well-aerated. Negative for sinus fluid level.  There is no suprasellar mass. The pituitary gland is normal in size.  Normal alignment of the cerebellar tonsils.       Impression:      1. Negative for acute ischemia.  2. Chronic infarcts in right and left periventricular white matter and  basal ganglia.  3. Generalized cerebral atrophy.     Electronically Signed By-Amos Kang MD On:4/20/2021 5:38 PM  This report was finalized on [id number] by  Amos Kang MD.    CT Angiogram Neck [[id number]] Collected: 04/20/21 1505     Updated: 04/20/21 1542    Narrative:         DATE OF EXAM:  4/20/2021 2:30 PM     PROCEDURE:  CT ANGIOGRAM HEAD W AI ANALYSIS OF LVO-     INDICATIONS:   Slurred speech left-sided weakness     COMPARISON:   No Comparisons Available     TECHNIQUE:  CTA of the head and CTA of the neck were performed after the intravenous  administration of 100 mL Isovue 370. Reconstructed coronal and sagittal  images were also obtained. In addition, a 3 D volume rendered image was  obtained after post processing. Automated exposure control and iterative  reconstruction methods were used. AI analysis of LVO was utilized for  the CTA Head imaging portion of the study.      FINDINGS:  VASCULAR FINDINGS: The aortic arch is normal in caliber. The right  brachiocephalic artery is widely patent. The right common carotid artery  is widely patent. There is mild plaquing in the right carotid bulb but  no measurable stenosis. The right ICA is widely patent to its course.  The right subclavian and right vertebral artery are widely patent  throughout the course. The left common carotid artery demonstrates mild  to moderate plaquing distally. There is 38% stenosis within the left  proximal ICA. The remainder  left ICA is widely patent. The left  subclavian and left vertebral artery are widely patent. The intracranial  circulation is unremarkable.     NONVASCULAR FINDINGS: No enhancing lesions are identified. Evidence of  prior right basal ganglier infarct. Limited evaluation of the lung  apices are unremarkable. Thyroid is normal. Submandibular and parotid  glands are normal. Orbits paranasal sinuses and mastoid air cells are  unremarkable. No aggressive appearing lytic or sclerotic bone lesions  are identified.          Impression:         1. 38% stenosis within the left proximal ICA.  2. No significant stenosis in the right proximal ICA  3. Intracranial circulation is unremarkable.     Electronically Signed By-Alberto Jones MD On:4/20/2021 3:13 PM  This report was finalized on [id number] by  Alberto Jones MD.    CT Angriogram head w ai analysis of lvo [[id number]] Collected: 04/20/21 1505     Updated: 04/20/21 1542    Narrative:         DATE OF EXAM:  4/20/2021 2:30 PM     PROCEDURE:  CT ANGIOGRAM HEAD W AI ANALYSIS OF LVO-     INDICATIONS:   Slurred speech left-sided weakness     COMPARISON:   No Comparisons Available     TECHNIQUE:  CTA of the head and CTA of the neck were performed after the intravenous  administration of 100 mL Isovue 370. Reconstructed coronal and sagittal  images were also obtained. In addition, a 3 D volume rendered image was  obtained after post processing. Automated exposure control and iterative  reconstruction methods were used. AI analysis of LVO was utilized for  the CTA Head imaging portion of the study.      FINDINGS:  VASCULAR FINDINGS: The aortic arch is normal in caliber. The right  brachiocephalic artery is widely patent. The right common carotid artery  is widely patent. There is mild plaquing in the right carotid bulb but  no measurable stenosis. The right ICA is widely patent to its course.  The right subclavian and right vertebral artery are widely patent  throughout the  course. The left common carotid artery demonstrates mild  to moderate plaquing distally. There is 38% stenosis within the left  proximal ICA. The remainder left ICA is widely patent. The left  subclavian and left vertebral artery are widely patent. The intracranial  circulation is unremarkable.     NONVASCULAR FINDINGS: No enhancing lesions are identified. Evidence of  prior right basal ganglier infarct. Limited evaluation of the lung  apices are unremarkable. Thyroid is normal. Submandibular and parotid  glands are normal. Orbits paranasal sinuses and mastoid air cells are  unremarkable. No aggressive appearing lytic or sclerotic bone lesions  are identified.          Impression:         1. 38% stenosis within the left proximal ICA.  2. No significant stenosis in the right proximal ICA  3. Intracranial circulation is unremarkable.     Electronically Signed By-Alberto Jones MD On:4/20/2021 3:13 PM  This report was finalized on [id number] by  Alberto Jones MD.    XR Chest 1 View [[id number]] Collected: 04/20/21 1458     Updated: 04/20/21 1539    Narrative:      DATE OF EXAM:  4/20/2021 2:45 PM     PROCEDURE:  XR CHEST 1 VW-     INDICATIONS:  Acute Stroke Protocol (onset < 12 hrs)     COMPARISON:  AP chest x-ray 11/12/2020.     TECHNIQUE:   Single radiographic AP view of the chest was obtained.     FINDINGS:  The patient is in a lordotic position. Allowing for lower lung volumes,  the lungs appear grossly clear. No pneumothorax or large pleural  effusion is seen. Cardiomediastinal contours appear stable.        Impression:      Lower lung volumes without definite acute cardiopulmonary abnormality.     Electronically Signed By-Leann Painter MD On:4/20/2021 2:59 PM  This report was finalized on [id number] by  Leann Painter MD.    CT Head Without Contrast Stroke Protocol [[id number]] Collected: 04/20/21 1424     Updated: 04/20/21 1430    Narrative:      Exam: CT HEAD WO CONTRAST STROKE PROTOCOL-     Date of Exam:  4/20/2021 2:21 PM     Indication: Code stroke, weakness, history of prior strokes.     Comparison: Head CT dated 11/12/2020.     Technique:  Axial CT images of the head were obtained from skull base to  vertex without IV contrast.  Coronal reconstructions were performed.   Automated exposure control and iterative reconstruction methods were  used.     FINDINGS  The ventricles and sulci are proportional without significant volume  loss or hydrocephalus. There is no mass effect or midline shift. There  is no acute intracranial hemorrhage. There is no abnormal extra-axial  fluid collection. The gray-white matter differentiation is preserved.  There is evidence of a prior lacunar type infarct within the right head  of the caudate which appears stable from the prior examination. There is  evidence of an old lacunar-type infarct within the anterior limb of the  left internal capsule and adjacent to the anterior horn of the left  lateral ventricle. These appear similar to the prior examination. The  calvarium is intact. The mastoid air cells and middle ears are well  aerated. The paranasal sinuses are clear. Visualized orbits and globes  appear symmetric. There is atherosclerotic calcification within the  intracranial vasculature.       Impression:      1. No acute intracranial abnormality. Specifically, no evidence of  hemorrhage, mass effect or midline shift.  2. Stable old lacunar-type infarct within the bilateral basal ganglia.     Findings discussed with Dr. Perez at 2:28:00 PM on 4/20/2021     Electronically Signed By-Juma Perry MD On:4/20/2021 2:28 PM  This report was finalized on [id number] by  Juma Perry MD.                Discharge Disposition:  Skilled Nursing Facility (DC - External)    Discharge Medications:     Discharge Medications      New Medications      Instructions Start Date   amitriptyline 25 MG tablet  Commonly known as: ELAVIL   25 mg, Oral, Nightly   Start Date: April 23, 2021      baclofen 10 MG tablet  Commonly known as: LIORESAL   10 mg, Oral, 2 Times Daily PRN      meclizine 25 MG tablet  Commonly known as: ANTIVERT   25 mg, Oral, 3 Times Daily PRN         Changes to Medications      Instructions Start Date   atorvastatin 80 MG tablet  Commonly known as: LIPITOR  What changed:   · medication strength  · how much to take   80 mg, Oral, Nightly      Lantus SoloStar 100 UNIT/ML injection pen  Generic drug: Insulin Glargine  What changed: how much to take   35 Units, Subcutaneous, Nightly         Continue These Medications      Instructions Start Date   apixaban 5 MG tablet tablet  Commonly known as: ELIQUIS   5 mg, Oral, Every 12 Hours Scheduled      aspirin 81 MG chewable tablet   81 mg, Oral, Daily      butalbital-acetaminophen-caffeine -40 MG per tablet  Commonly known as: FIORICET, ESGIC   1 tablet, Oral, Every 6 Hours PRN      Farxiga 10 MG tablet  Generic drug: Dapagliflozin Propanediol   10 mg, Oral, Every Evening      Morphine 30 MG 12 hr tablet  Commonly known as: MS CONTIN   30 mg, Oral, 2 Times Daily      oxyCODONE 15 MG immediate release tablet  Commonly known as: ROXICODONE   15 mg, Oral, Every 6 Hours PRN      polyethylene glycol 17 g packet  Commonly known as: MIRALAX   17 g, Oral, Daily      pregabalin 300 MG capsule  Commonly known as: LYRICA   300 mg, Oral, 2 Times Daily         Stop These Medications    buPROPion  MG 24 hr tablet  Commonly known as: Wellbutrin XL     Insulin Lispro (1 Unit Dial) 100 UNIT/ML solution pen-injector  Commonly known as: HumaLOG KwikPen     tiZANidine 2 MG tablet  Commonly known as: ZANAFLEX            Discharge Diet:   Diet Instructions     Diet: Consistent Carbohydrate      Discharge Diet: Consistent Carbohydrate          Activity at Discharge:   Activity Instructions     Activity as Tolerated            Follow-up Appointments  Future Appointments   Date Time Provider Department Center   5/5/2021  3:20 PM Hans Doyle MD [facility]   5/26/2021  8:00 AM JEANINE Clement DPM [facility]       Additional Instructions for the Follow-ups that You Need to Schedule     Discharge Follow-up with PCP   As directed       Currently Documented PCP:    Leda Duckworth DO    PCP Phone Number:    [phone]     Follow Up Details: after discharge from rehab         Discharge Follow-up with Specified Provider: Neurology; 1 Month   As directed      To: Neurology    Follow Up: 1 Month               Test Results Pending at Discharge:  None    Condition on Discharge:    Stable      Risk for Readmission (LACE): Score: 12 (4/22/2021  6:01 AM)          Rosi Prakash DO  04/22/21  17:06 EDT    Time: Discharge 33 min with face-to-face history/exam, writing all prescriptions, and documenting discharge data including care coordination

## 2021-04-22 NOTE — THERAPY TREATMENT NOTE
Subjective: Pt agreeable to therapeutic plan of care.    Objective:     Bed mobility - CGA and with rolling walker  Supine to sit to supine.  Pt reported dizziness with initial sitting and then return to supine.    Transfers - Min-A and with rolling walker  Ambulation - 40 feet and 30 feet Min-A and with rolling walkerabout 4 episodes for dizziness.  One loss of balance.  Min assist to correct.      Fall Risk Assessment was completed, and patient is at high risk for falls.    Pain: 0 VAS  No pain reported  Education: Provided education on importance of mobility and skilled verbal / tactile cueing throughout intervention.     Assessment: Humble Green presents with functional mobility impairments which indicate the need for skilled intervention. 4 episodes of dizziness during tx session. pt is not safe to return home at this time. Pt did discuss if he could go home and not to rehab.  This PTA encouraged IP rehab.  Will continue to follow and progress as tolerated.     Plan/Recommendations:   Pt would benefit from Inpatient Rehabilitation placement at discharge from facility   Pt desires Inpatient Rehabilitation placement at discharge. Pt cooperative; agreeable to therapeutic recommendations and plan of care.       Modified Carter: 4 = Moderately severe disability (Unable to attend to own bodily needs without assistance, and unable to walk unassisted)     Post-Tx Position: Supine with HOB Elevated, Alarms activated and Call light and personal items within reach  PPE: gloves, surgical mask, eyewear protection

## 2021-04-22 NOTE — PLAN OF CARE
Goal Outcome Evaluation:  HA has not resolved with current PO tx per pt. NIH at shift change 0. Pt reports migraine cocktail seemed to have helped, but made him too drowsy. Plan to d/c to [place] rehab. VS stable at this time. No other acute complaints expressed.

## 2021-04-22 NOTE — PLAN OF CARE
Problem: Adult Inpatient Plan of Care  Goal: Plan of Care Review  Outcome: Ongoing, Progressing  Flowsheets (Taken 4/22/2021 0247)  Progress: improving  Plan of Care Reviewed With: patient  Goal: Patient-Specific Goal (Individualized)  Outcome: Ongoing, Progressing  Goal: Absence of Hospital-Acquired Illness or Injury  Outcome: Ongoing, Progressing  Intervention: Identify and Manage Fall Risk  Recent Flowsheet Documentation  Taken 4/22/2021 0200 by Chinedu Butcher RN  Safety Promotion/Fall Prevention:   safety round/check completed   room organization consistent   nonskid shoes/slippers when out of bed   lighting adjusted   fall prevention program maintained   clutter free environment maintained   assistive device/personal items within reach   activity supervised  Taken 4/22/2021 0000 by Chinedu Butcher RN  Safety Promotion/Fall Prevention:   safety round/check completed   room organization consistent   nonskid shoes/slippers when out of bed   lighting adjusted   fall prevention program maintained   clutter free environment maintained   assistive device/personal items within reach   activity supervised  Taken 4/21/2021 2000 by Chinedu Butcher RN  Safety Promotion/Fall Prevention:   safety round/check completed   room organization consistent   nonskid shoes/slippers when out of bed   lighting adjusted   fall prevention program maintained   clutter free environment maintained   assistive device/personal items within reach   activity supervised  Intervention: Prevent Skin Injury  Recent Flowsheet Documentation  Taken 4/21/2021 2000 by Chinedu Butcher, RN  Skin Protection:   adhesive use limited   incontinence pads utilized   tubing/devices free from skin contact  Intervention: Prevent and Manage VTE (venous thromboembolism) Risk  Recent Flowsheet Documentation  Taken 4/21/2021 2000 by Chinedu Butcher RN  VTE Prevention/Management:   bilateral   sequential compression devices on  Intervention: Prevent  Infection  Recent Flowsheet Documentation  Taken 4/22/2021 0200 by Chinedu Butcher RN  Infection Prevention:   personal protective equipment utilized   visitors restricted/screened  Taken 4/22/2021 0000 by Chinedu Butcher RN  Infection Prevention:   personal protective equipment utilized   visitors restricted/screened  Taken 4/21/2021 2000 by Chinedu Butcher RN  Infection Prevention:   personal protective equipment utilized   visitors restricted/screened  Goal: Optimal Comfort and Wellbeing  Outcome: Ongoing, Progressing  Intervention: Provide Person-Centered Care  Recent Flowsheet Documentation  Taken 4/22/2021 0000 by Chinedu Butcher RN  Trust Relationship/Rapport: care explained  Taken 4/21/2021 2000 by Chinedu Butcher RN  Trust Relationship/Rapport:   care explained   choices provided   thoughts/feelings acknowledged  Goal: Readiness for Transition of Care  Outcome: Ongoing, Progressing     Problem: Adjustment to Illness (Stroke, Ischemic/Transient Ischemic Attack)  Goal: Optimal Coping  Outcome: Ongoing, Progressing  Intervention: Support Patient/Family Psychosocial Response to Stroke  Recent Flowsheet Documentation  Taken 4/21/2021 2000 by Chinedu Butcher RN  Supportive Measures: active listening utilized     Problem: Bowel Elimination Impaired (Stroke, Ischemic/Transient Ischemic Attack)  Goal: Effective Bowel Elimination  Outcome: Ongoing, Progressing     Problem: Cerebral Tissue Perfusion Risk (Stroke, Ischemic/Transient Ischemic Attack)  Goal: Optimal Cerebral Tissue Perfusion  Outcome: Ongoing, Progressing  Intervention: Protect and Optimize Cerebral Perfusion  Recent Flowsheet Documentation  Taken 4/22/2021 0000 by Chinedu Butcher RN  Cerebral Perfusion Promotion: blood pressure monitored  Taken 4/21/2021 2000 by Chinedu Butcher RN  Cerebral Perfusion Promotion: blood pressure monitored     Problem: Communication Impairment (Stroke, Ischemic/Transient Ischemic Attack)  Goal:  Improved Communication Skills  Outcome: Ongoing, Progressing  Intervention: Optimize Cognitive and Communication Skills  Recent Flowsheet Documentation  Taken 4/22/2021 0000 by Chinedu Butcher RN  Communication Enhancement Strategies: call light answered in person  Taken 4/21/2021 2000 by Chinedu Butcher RN  Communication Enhancement Strategies: call light answered in person     Problem: Eating/Swallowing Impairment (Stroke, Ischemic/Transient Ischemic Attack)  Goal: Oral Intake without Aspiration  Outcome: Ongoing, Progressing     Problem: Functional Ability Impaired (Stroke, Ischemic/Transient Ischemic Attack)  Goal: Optimal Functional Ability  Outcome: Ongoing, Progressing  Intervention: Optimize Functional Ability  Recent Flowsheet Documentation  Taken 4/21/2021 2000 by Chinedu Butcher RN  Activity Management: activity adjusted per tolerance     Problem: Hemodynamic Instability (Stroke, Ischemic/Transient Ischemic Attack)  Goal: Vital Signs Remain in Desired Range  Outcome: Ongoing, Progressing     Problem: Pain (Stroke, Ischemic/Transient Ischemic Attack)  Goal: Acceptable Pain Control  Outcome: Ongoing, Progressing  Intervention: Monitor and Manage Pain  Recent Flowsheet Documentation  Taken 4/21/2021 2000 by Chinedu Butcher RN  Pain Management Interventions: see MAR     Problem: Sensorimotor Impairment (Stroke, Ischemic/Transient Ischemic Attack)  Goal: Improved Sensorimotor Function  Outcome: Ongoing, Progressing  Intervention: Optimize Sensory and Perceptual Abilities  Recent Flowsheet Documentation  Taken 4/21/2021 2000 by Chinedu Butcher RN  Pressure Reduction Techniques: frequent weight shift encouraged  Pressure Reduction Devices: pressure-redistributing mattress utilized     Problem: Urinary Elimination Impaired (Stroke, Ischemic/Transient Ischemic Attack)  Goal: Effective Urinary Elimination  Outcome: Ongoing, Progressing     Problem: Fall Injury Risk  Goal: Absence of Fall and  Fall-Related Injury  Outcome: Ongoing, Progressing  Intervention: Identify and Manage Contributors to Fall Injury Risk  Recent Flowsheet Documentation  Taken 4/22/2021 0200 by Chinedu Butcher, RN  Medication Review/Management: medications reviewed  Taken 4/22/2021 0000 by Chinedu Butcher, RN  Medication Review/Management: medications reviewed  Taken 4/21/2021 2000 by Chinedu Butcher, RN  Medication Review/Management: medications reviewed  Intervention: Promote Injury-Free Environment  Recent Flowsheet Documentation  Taken 4/22/2021 0200 by Chinedu Butcher, RN  Safety Promotion/Fall Prevention:   safety round/check completed   room organization consistent   nonskid shoes/slippers when out of bed   lighting adjusted   fall prevention program maintained   clutter free environment maintained   assistive device/personal items within reach   activity supervised  Taken 4/22/2021 0000 by Chinedu Butcher, RN  Safety Promotion/Fall Prevention:   safety round/check completed   room organization consistent   nonskid shoes/slippers when out of bed   lighting adjusted   fall prevention program maintained   clutter free environment maintained   assistive device/personal items within reach   activity supervised  Taken 4/21/2021 2000 by Chinedu Butcher, RN  Safety Promotion/Fall Prevention:   safety round/check completed   room organization consistent   nonskid shoes/slippers when out of bed   lighting adjusted   fall prevention program maintained   clutter free environment maintained   assistive device/personal items within reach   activity supervised   Goal Outcome Evaluation:  Plan of Care Reviewed With: patient  Progress: improving

## 2021-04-22 NOTE — PROGRESS NOTES
LOS: 1 day     Chief Complaint:  Word finding difficulty       SUBJECTIVE:  History taken from: patient chart RN    Interval History: Pt admitted on 4/20 after presenting with c/o word finding difficulty that resolved after about 15-30 minutes. He has a hx of stroke and is on Eliquis and ASA.   Pt has been c/o a left sided headache and has not responded to Fioricet. Migraine cocktail given on 4/21 with improvement in headache, but it did not resolve.       Patient Complaints: Pt states medications yesterday helped, but made him sleepy and the headache has returned. Today, headache is 5/10. He reports having migraines at least twice a month.       Review of Systems   Constitutional: Negative for chills, diaphoresis and fatigue.   Eyes: Negative for photophobia and visual disturbance.   Neurological: Positive for headaches. Negative for dizziness, tremors, seizures, syncope, facial asymmetry, speech difficulty, weakness, light-headedness and numbness.          Pertinent PMH:  has a past medical history of Arthritis, BPH, CAD, Chronic back pain, DMII, Excessive daytime sleepiness, Low back pain, Low vision of right eye with normal vision in contralateral eye, Memory problem, Neuropathy, Obesity, PFO (patent foramen ovale) (11/29/2019), Snoring, and Stroke.   ________________________________________________     OBJECTIVE:  Pt examined at . Pt awoken from a deep sleep. He is a little slower to respond today, but appropriate.       Neurologic Exam    On exam:  GENERAL: NAD, awake and alert  HEENT: Normocephalic, Atraumatic. Large neck circumference.   RESP: Breathing unlabored, breath sounds normal  CARDIO: RRR  SKIN: Warm, dry. No apparent rash.   PSYCH: Mood/affect flat    NEURO:  Oriented x3  EOMI, PERRL, no visual field deficits  No facial asymmetry  Speech clear without dysarthria  Sensations intact and equal bilaterally  Strength 5/5 and equal in all extremities  No  ataxia      ________________________________________________   RESULTS REVIEW    VITAL SIGNS:  Temp:  [97.2 °F (36.2 °C)-97.9 °F (36.6 °C)] 97.9 °F (36.6 °C)  Heart Rate:  [66-79] 66  Resp:  [9-19] 16  BP: (118-163)/(56-82) 119/56    LABS:   Lab Results   Component Value Date    WBC 7.10 04/20/2021    HGB 14.1 04/20/2021    HCT 42.3 04/20/2021    MCV 90.8 04/20/2021     04/20/2021     Lab Results   Component Value Date    GLUCOSE 150 (H) 04/21/2021    BUN 20 04/21/2021    CREATININE 1.17 04/21/2021    EGFRIFNONA 63 04/21/2021    BCR 17.1 04/21/2021    K 4.0 04/21/2021    CO2 28.0 04/21/2021    CALCIUM 8.2 (L) 04/21/2021    ALBUMIN 3.90 04/20/2021    AST 16 04/20/2021    ALT 13 04/20/2021       Lab Results   Component Value Date    TSH 0.924 04/20/2021     (H) 04/20/2021    HGBA1C 8.4 (H) 04/20/2021    GWIAZDCE80 506 04/20/2021         IMAGING STUDIES:  CT Angiogram Neck    Result Date: 4/20/2021   1. 38% stenosis within the left proximal ICA. 2. No significant stenosis in the right proximal ICA 3. Intracranial circulation is unremarkable.  Electronically Signed By-Alberto Jones MD On:4/20/2021 3:13 PM This report was finalized on [id number] by  Alberto Jones MD.    MRI Brain Without Contrast    Result Date: 4/20/2021  1. Negative for acute ischemia. 2. Chronic infarcts in right and left periventricular white matter and basal ganglia. 3. Generalized cerebral atrophy.  Electronically Signed By-Amos Kang MD On:4/20/2021 5:38 PM This report was finalized on [id number] by  Amos aKng MD.    XR Chest 1 View    Result Date: 4/20/2021  Lower lung volumes without definite acute cardiopulmonary abnormality.  Electronically Signed By-Leann Painter MD On:4/20/2021 2:59 PM This report was finalized on [id number] by  Leann Painter MD.    CT Head Without Contrast Stroke Protocol    Result Date: 4/20/2021  1. No acute intracranial abnormality. Specifically, no evidence of hemorrhage, mass effect or  midline shift. 2. Stable old lacunar-type infarct within the bilateral basal ganglia.  Findings discussed with Dr. Perez at 2:28:00 PM on 4/20/2021  Electronically Signed By-Juma Perry MD On:4/20/2021 2:28 PM This report was finalized on [id number] by  Juma Perry MD.    CT Angriogram head w ai analysis of lvo    Result Date: 4/20/2021   1. 38% stenosis within the left proximal ICA. 2. No significant stenosis in the right proximal ICA 3. Intracranial circulation is unremarkable.  Electronically Signed By-Alberto Jones MD On:4/20/2021 3:13 PM This report was finalized on [id number] by  Alberto Jones MD.      I reviewed the patient's new clinical results.    ________________________________________________      PROBLEM LIST:    Intractable migraine with aura with status migrainosus        Assessment/Plan   ASSESSMENT/PLAN:  1. Word finding difficulty and headache. Speech issues have resolved, but headache is persistent. Probable migraine with strong hx of migraines.  No acute stroke.      - Echo: EF = 55% Left ventricular systolic function is normal. Left ventricular diastolic dysfunction is noted. Estimated right ventricular systolic pressure from tricuspid regurgitation is normal (<35 mmHg). Saline test results are positive with valsalva manuever for right to left atrial level shunt.  - Labs: A1C: 8.4, B12: 506, LDL: 126, TSH: 0.924, UA neg except glucose  - Fioricet PRN  - Ibuprofen 800mg x1 dose now  - Pt would likely benefit from migraine prevention medication. He does have a hx of glaucoma so will avoid Topamax. Start Elavil 25mg qhs, titrate outpt pending response. Discontinue Wellbutrin. May need to decrease or wean off Lyrica as Elavil is increased.  Monitor closely for side effects. Pt counseled     2.  Occipital neuralgia, bilateral  - Start baclofen 10mg BID PRN  - Not a candidate for occipital nerve blocks at this time due to DM and Eliquis  - Recommend massage therapy, heat/ice  application, and may benefit from outpt PT    3. PFO  - Chronic and previously evaluated with [facility] on 11/29/2019. Followed by Dr. Doyle and is on chronic Eliquis therapy    4. Probable sleep apnea  - Pt previously referred for outpt sleep study, but was unable to complete it due to other health issues  - Referral to sleep center placed    **Please refer to previous notes for further details and recommendations.     IF headache resolves, pt can be discharged from neuro standpoint. Otherwise, will follow tomorrow.     I discussed the patients findings and my recommendations with patient, nursing staff, primary care team and consulting provider    ANTONY Tan  04/22/21  10:42 EDT

## 2021-04-22 NOTE — DISCHARGE PLACEMENT REQUEST
"Elizabeth Green (65 y.o. Male)     Date of Birth Social Security Number Address Home Phone MRN    1955  [address]  [facility] [address] [phone] [id number]    Church Marital Status          None Single       Admission Date Admission Type Admitting Provider Attending Provider Department, Room/Bed    4/20/21 Emergency Rosi Prakash DO [place], [place], DO [facility] NEURO HEART, 255/1    Discharge Date Discharge Disposition Discharge Destination                       Attending Provider: Rosi Prakash DO    Allergies: No Known Allergies    Isolation: None   Infection: None   Code Status: CPR    Ht: 172.7 cm (68\")   Wt: 119 kg (262 lb 8 oz)    Admission Cmt: None   Principal Problem: None                Active Insurance as of 4/20/2021     Primary Coverage     Payor Plan Insurance Group Employer/Plan Group    MEDICARE MEDICARE A & B      Payor Plan Address Payor Plan Phone Number Payor Plan Fax Number Effective Dates    [address] [phone]  11/1/2020 - None Entered    [facility] [id number]       Subscriber Name Subscriber Birth Date Member ID       ELIZABETH GREEN 1955 [id number]           Secondary Coverage     Payor Plan Insurance Group Employer/Plan Group    [place]      Payor Plan Address Payor Plan Phone Number Payor Plan Fax Number Effective Dates    [address]   11/25/2019 - None Entered    [place] [id number]       Subscriber Name Subscriber Birth Date Member ID       ELIZABETH GREEN 1955 [id number]                 Emergency Contacts      (Rel.) Home Phone Work Phone Mobile Phone    LUBA GREEN (Son) -- -- [phone]    REAGANLEANDRO SILVER (Daughter) -- -- [phone]            Insurance Information                MEDICARE/MEDICARE A & B Phone: [phone]    Subscriber: Elizabeth Green Subscriber#: [id number]    Group#:  Precert#:         [place] Phone:     " Subscriber: [place] Green Subscriber#: [id number]    Group#:  Precert#:

## 2021-04-22 NOTE — PLAN OF CARE
Goal Outcome Evaluation:     Bed mobility - CGA and with rolling walker  Supine to sit to supine.  Pt reported dizziness with initial sitting and then return to supine.    Transfers - Min-A and with rolling walker  Ambulation - 40 feet and 30 feet Min-A and with rolling walkerabout 4 episodes for dizziness.  One loss of balance.  Min assist to correct.      Pt would benefit from Inpatient Rehabilitation placement at discharge from facility   Pt desires Inpatient Rehabilitation placement at discharge. Pt cooperative; agreeable to therapeutic recommendations and plan of care.

## 2021-04-23 NOTE — CASE MANAGEMENT/SOCIAL WORK
Case Management Discharge Note      Final Note: [place]         Selected Continued Care - Discharged on 4/22/2021 Admission date: 4/20/2021 - Discharge disposition: Skilled Nursing Facility (DC - External)                 Final Discharge Disposition Code: 62 - inpatient rehab facility

## 2021-04-23 NOTE — CONSULTS
"Diabetes Education  Assessment/Teaching    Patient Name:  Humble Green  YOB: 1955  MRN: [id number]  Admit Date:  4/20/2021      Assessment Date:  4/22/2021    Most Recent Value   General Information    Referral From:  MD Curt order [Consult received due to stroke protocol. A1c this adm 8.4%]   Height  172.7 cm (68\")   Height Method  Stated   Weight  119 kg (262 lb 8 oz)   Weight Method  Bed scale   Pregnancy Assessment   Diabetes History   What type of diabetes do you have?  Type 2   Length of Diabetes Diagnosis  -- [Long-standing hx of type 2 dm]   Have you had diabetes education/teaching in the past?  yes   When and where was your diabetes education?  Pt received education as inpatient at [facility] on 12/7/2020   Do you test your blood sugar at home?  yes   Frequency of checks  bid (am and hs)   Meter type  One Touch   Who performs the test?  self   Have you had low blood sugar? (<70mg/dl)  no   Education Preferences   What areas of diabetes would you like to learn about?  avoiding high blood sugar, avoiding low blood sugar, diabetes complications   Nutrition Information   Assessment Topics   Problem Solving - Assessment  Needs education   Reducing Risk - Assessment  Needs education   DM Goals   Problem Solving - Goal  Today   Reducing Risk - Goal  Today            Most Recent Value   DM Education Needs   Meter  Has own   Meter Type  One Touch   Frequency of Testing  2 times a day [Pt states PCP wanting him to check 3 times/day. Discussed importance of following PCP recommendations.]   Blood Glucose Target Range  Discussed A1c of 8.4%. Reviewed healthy bs range and healthy A1c target. Discussed importance of bs control   Medication  Oral, Insulin, Pen [Pt taking Lantus 35 units hs, Humalog 5 units ac and Farxiga 10 mg in pm]   Problem Solving  Hypoglycemia, Hyperglycemia, Signs, Symptoms, Treatment [Discussed importance of always carrying low bs tx]   Reducing Risks  A1C testing [Gave A1c info sheet] "   Motivation  Moderate   Teaching Method  Explanation, Discussion, Handouts   Patient Response  Verbalized understanding            Other Comments:  Pt was seen today for diabetes education. Pt states has PCP and sees monthly. Discussed importance of recording bs in log book and taking to MD visits. Pt going to rehab at d/c. Pt verbalized understanding of info and has no further questions at this time.         Electronically signed by:  Diane Lara RN  04/22/21 20:06 EDT

## 2021-04-26 RX ORDER — INSULIN LISPRO 100 [IU]/ML
5 INJECTION, SOLUTION INTRAVENOUS; SUBCUTANEOUS
COMMUNITY
End: 2021-10-19

## 2021-05-05 DIAGNOSIS — M54.16 LUMBAR BACK PAIN WITH RADICULOPATHY AFFECTING LEFT LOWER EXTREMITY: ICD-10-CM

## 2021-05-05 RX ORDER — OXYCODONE HYDROCHLORIDE 15 MG/1
TABLET ORAL
Qty: 120 TABLET | Refills: 0 | Status: SHIPPED | OUTPATIENT
Start: 2021-05-05 | End: 2021-06-11 | Stop reason: SDUPTHER

## 2021-05-17 ENCOUNTER — OFFICE VISIT (OUTPATIENT)
Dept: FAMILY MEDICINE CLINIC | Facility: CLINIC | Age: 66
End: 2021-05-17

## 2021-05-17 VITALS
HEART RATE: 82 BPM | SYSTOLIC BLOOD PRESSURE: 112 MMHG | DIASTOLIC BLOOD PRESSURE: 71 MMHG | HEIGHT: 68 IN | BODY MASS INDEX: 37.89 KG/M2 | OXYGEN SATURATION: 98 % | TEMPERATURE: 97.1 F | WEIGHT: 250 LBS | RESPIRATION RATE: 18 BRPM

## 2021-05-17 DIAGNOSIS — M54.42 CHRONIC MIDLINE LOW BACK PAIN WITH LEFT-SIDED SCIATICA: ICD-10-CM

## 2021-05-17 DIAGNOSIS — E11.42 DIABETIC POLYNEUROPATHY ASSOCIATED WITH TYPE 2 DIABETES MELLITUS (HCC): ICD-10-CM

## 2021-05-17 DIAGNOSIS — I63.9 CEREBROVASCULAR ACCIDENT (CVA), UNSPECIFIED MECHANISM (HCC): Primary | ICD-10-CM

## 2021-05-17 DIAGNOSIS — G89.29 CHRONIC MIDLINE LOW BACK PAIN WITH LEFT-SIDED SCIATICA: ICD-10-CM

## 2021-05-17 DIAGNOSIS — M54.16 LUMBAR BACK PAIN WITH RADICULOPATHY AFFECTING LEFT LOWER EXTREMITY: ICD-10-CM

## 2021-05-17 DIAGNOSIS — Z12.5 SCREENING FOR PROSTATE CANCER: ICD-10-CM

## 2021-05-17 PROCEDURE — 99214 OFFICE O/P EST MOD 30 MIN: CPT | Performed by: FAMILY MEDICINE

## 2021-05-17 RX ORDER — OXYCODONE HCL 10 MG/1
10 TABLET, FILM COATED, EXTENDED RELEASE ORAL EVERY 12 HOURS
Qty: 60 TABLET | Refills: 0 | Status: SHIPPED | OUTPATIENT
Start: 2021-05-17 | End: 2021-05-27 | Stop reason: SDUPTHER

## 2021-05-24 ENCOUNTER — TELEPHONE (OUTPATIENT)
Dept: FAMILY MEDICINE CLINIC | Facility: CLINIC | Age: 66
End: 2021-05-24

## 2021-05-24 NOTE — TELEPHONE ENCOUNTER
Caller: Humble Green    Relationship: Self    Best call back number [phone]     What medication are you requesting: GENERIC LORTAB     What are your current symptoms: BACK PAIN       Have you had these symptoms before:    [x] Yes  [] No    Have you been treated for these symptoms before:   [x] Yes  [] No    If a prescription is needed, what is your preferred pharmacy and phone number:      [id number] [address] [facility] [phone] [facility] [phone]        Additional notes:PATIENT DOES NOT FEEL LIKE oxyCODONE (OxyCONTIN) HELPS AS WELL AS THE LORTAB

## 2021-05-27 ENCOUNTER — TELEPHONE (OUTPATIENT)
Dept: FAMILY MEDICINE CLINIC | Facility: CLINIC | Age: 66
End: 2021-05-27

## 2021-05-27 DIAGNOSIS — M54.16 LUMBAR BACK PAIN WITH RADICULOPATHY AFFECTING LEFT LOWER EXTREMITY: ICD-10-CM

## 2021-05-27 DIAGNOSIS — G89.29 CHRONIC MIDLINE LOW BACK PAIN WITH LEFT-SIDED SCIATICA: ICD-10-CM

## 2021-05-27 DIAGNOSIS — M54.42 CHRONIC MIDLINE LOW BACK PAIN WITH LEFT-SIDED SCIATICA: ICD-10-CM

## 2021-05-27 DIAGNOSIS — E11.42 DIABETIC POLYNEUROPATHY ASSOCIATED WITH TYPE 2 DIABETES MELLITUS (HCC): ICD-10-CM

## 2021-05-27 RX ORDER — PREGABALIN 300 MG/1
300 CAPSULE ORAL 2 TIMES DAILY
Qty: 60 CAPSULE | Refills: 1 | Status: SHIPPED | OUTPATIENT
Start: 2021-05-27 | End: 2021-08-02 | Stop reason: SDUPTHER

## 2021-05-27 RX ORDER — OXYCODONE HCL 10 MG/1
10 TABLET, FILM COATED, EXTENDED RELEASE ORAL EVERY 12 HOURS
Qty: 60 TABLET | Refills: 0 | Status: SHIPPED | OUTPATIENT
Start: 2021-05-27 | End: 2021-07-01

## 2021-05-27 NOTE — TELEPHONE ENCOUNTER
Last visit:  5/17/21  Next visit: 7/16/21  Last labs: 4/22/21    Rx requested: Oxycodone,Lyrica  Pharmacy:[facility] in [place]

## 2021-05-27 NOTE — TELEPHONE ENCOUNTER
Caller: Humble Green    Relationship: Self    Best call back number: [phone]    Medication needed:   Requested Prescriptions     Pending Prescriptions Disp Refills   • oxyCODONE (OxyCONTIN) 10 MG 12 hr tablet 60 tablet 0     Sig: Take 1 tablet by mouth Every 12 (Twelve) Hours.   • pregabalin (LYRICA) 300 MG capsule 60 capsule 2     Sig: Take 1 capsule by mouth 2 (Two) Times a Day.       When do you need the refill by: ASAP    What additional details did the patient provide when requesting the medication:   PLEASE CONTACT PATIENT WHEN THIS IS SENT OVER TO THE PHARMACY.    [facility] IS NEEDING US TO GIVE PRIOR AUTHORIZATION ON THESE MEDICATIONS.     Does the patient have less than a 3 day supply:  [x] Yes  [] No    What is the patient's preferred pharmacy: [facility] [address][id number] [place], [place] - [address] [facility] [phone] [facility] [phone] FX

## 2021-06-01 ENCOUNTER — TELEPHONE (OUTPATIENT)
Dept: FAMILY MEDICINE CLINIC | Facility: CLINIC | Age: 66
End: 2021-06-01

## 2021-06-01 NOTE — TELEPHONE ENCOUNTER
Caller: Humble Green    Relationship: Self    Best call back number: [phone]    What medications are you currently taking:   Current Outpatient Medications on File Prior to Visit   Medication Sig Dispense Refill   • apixaban (ELIQUIS) 5 MG tablet tablet Take 1 tablet by mouth Every 12 (Twelve) Hours. 180 tablet 0   • aspirin 81 MG chewable tablet Chew 81 mg Daily.     • atorvastatin (LIPITOR) 80 MG tablet Take 1 tablet by mouth Every Night.     • baclofen (LIORESAL) 10 MG tablet Take 1 tablet by mouth 2 (Two) Times a Day As Needed for Muscle Spasms (Headache, neck pain).     • butalbital-acetaminophen-caffeine (FIORICET, ESGIC) -40 MG per tablet Take 1 tablet by mouth Every 6 (Six) Hours As Needed for Headache or Migraine. 20 tablet 0   • Dapagliflozin Propanediol (Farxiga) 10 MG tablet Take 10 mg by mouth Every Evening. 90 tablet 1   • Insulin Glargine (Lantus SoloStar) 100 UNIT/ML injection pen Inject 35 Units under the skin into the appropriate area as directed Every Night. 4 pen 0   • Insulin Lispro, 1 Unit Dial, (HumaLOG KwikPen) 100 UNIT/ML solution pen-injector Humalog KwikPen (U-100) Insulin 100 unit/mL subcutaneous   INJECT 5 UNITS SUBCUTANEOUSLY THREE TIMES DAILY BEFORE MEALS AS DIRECTED     • meclizine (ANTIVERT) 25 MG tablet Take 1 tablet by mouth 3 (Three) Times a Day As Needed for Dizziness.     • oxyCODONE (OxyCONTIN) 10 MG 12 hr tablet Take 1 tablet by mouth Every 12 (Twelve) Hours. 60 tablet 0   • oxyCODONE (ROXICODONE) 15 MG immediate release tablet TAKE 1 TABLET BY MOUTH EVERY SIX HOURS AS NEEDED FOR SEVERE PAIN 120 tablet 0   • polyethylene glycol (MIRALAX) 17 g packet Take 17 g by mouth Daily.     • pregabalin (LYRICA) 300 MG capsule Take 1 capsule by mouth 2 (Two) Times a Day. 60 capsule 1     No current facility-administered medications on file prior to visit.      Which medication are you concerned about: oxyCODONE (OxyCONTIN) 10 MG 12 hr tablet    What are your concerns:  PATIENT SAID THAT OXYCODONE REQUIRES A PRIOR AUTHORIZATION BEFORE IT COULD BE FILLED.

## 2021-06-04 ENCOUNTER — TELEPHONE (OUTPATIENT)
Dept: FAMILY MEDICINE CLINIC | Facility: CLINIC | Age: 66
End: 2021-06-04

## 2021-06-07 RX ORDER — EMPAGLIFLOZIN 25 MG/1
1 TABLET, FILM COATED ORAL EVERY MORNING
Qty: 30 TABLET | Refills: 3 | Status: SHIPPED | OUTPATIENT
Start: 2021-06-07 | End: 2021-10-19 | Stop reason: SDUPTHER

## 2021-06-11 ENCOUNTER — TELEPHONE (OUTPATIENT)
Dept: FAMILY MEDICINE CLINIC | Facility: CLINIC | Age: 66
End: 2021-06-11

## 2021-06-11 DIAGNOSIS — M54.16 LUMBAR BACK PAIN WITH RADICULOPATHY AFFECTING LEFT LOWER EXTREMITY: ICD-10-CM

## 2021-06-11 RX ORDER — OXYCODONE HYDROCHLORIDE 15 MG/1
15 TABLET ORAL EVERY 6 HOURS PRN
Qty: 120 TABLET | Refills: 0 | Status: SHIPPED | OUTPATIENT
Start: 2021-06-11 | End: 2021-07-09

## 2021-06-11 NOTE — TELEPHONE ENCOUNTER
Sp w/pt.  Pt has been taking the oxy 10mg 12hr time release BID, however he has had to double up on them for them to work.  He has been out of the Roxycodone for a while.    (He said he assumed since it wasn't sent in Dr neri wasn't going to refill it.) I do not see a refill request for it.

## 2021-06-11 NOTE — TELEPHONE ENCOUNTER
PATIENT STATES THAT THE oxyCODONE (OxyCONTIN) 10 MG 12 hr tablet IS NOT WORKING AND HE IS HAVING TO TAKE TOO MANY OF THEM. HE IS ASKING IF SOMETHING ELSE CAN BE SENT TO THE PHARMACY.[id number] - [place], [place] - [address] [facility] [phone] [facility] [phone]        [phone]

## 2021-06-11 NOTE — TELEPHONE ENCOUNTER
Pt told to call out office when due for pain meds since pharmacies no longer do this  Rx short acting pain med/ eliquis sent to pharmacy

## 2021-06-25 ENCOUNTER — TELEPHONE (OUTPATIENT)
Dept: FAMILY MEDICINE CLINIC | Facility: CLINIC | Age: 66
End: 2021-06-25

## 2021-06-25 NOTE — TELEPHONE ENCOUNTER
Caller: Humble Green    Relationship: Self    Best call back number: [phone]     What medication are you requesting: NEW GLUCOSE METER KIT. PATIENT STATES OLD METER BROKE     Have you had these symptoms before:    [x] Yes  [] No    Have you been treated for these symptoms before:   [x] Yes  [] No    If a prescription is needed, what is your preferred pharmacy and phone number:        [facility] [address] [place], [place] - [address] [facility] [phone] [facility] [phone]

## 2021-06-28 RX ORDER — BLOOD-GLUCOSE METER
1 EACH MISCELLANEOUS
Qty: 1 KIT | Refills: 0 | Status: SHIPPED | OUTPATIENT
Start: 2021-06-28 | End: 2021-06-29

## 2021-06-29 ENCOUNTER — TELEPHONE (OUTPATIENT)
Dept: PEDIATRICS | Facility: OTHER | Age: 66
End: 2021-06-29

## 2021-06-29 ENCOUNTER — TELEPHONE (OUTPATIENT)
Dept: FAMILY MEDICINE CLINIC | Facility: CLINIC | Age: 66
End: 2021-06-29

## 2021-06-29 DIAGNOSIS — M54.42 CHRONIC MIDLINE LOW BACK PAIN WITH LEFT-SIDED SCIATICA: ICD-10-CM

## 2021-06-29 DIAGNOSIS — E11.42 DIABETIC POLYNEUROPATHY ASSOCIATED WITH TYPE 2 DIABETES MELLITUS (HCC): ICD-10-CM

## 2021-06-29 DIAGNOSIS — M54.16 LUMBAR BACK PAIN WITH RADICULOPATHY AFFECTING LEFT LOWER EXTREMITY: ICD-10-CM

## 2021-06-29 DIAGNOSIS — G89.29 CHRONIC MIDLINE LOW BACK PAIN WITH LEFT-SIDED SCIATICA: ICD-10-CM

## 2021-06-29 RX ORDER — OXYCODONE HYDROCHLORIDE 15 MG/1
15 TABLET ORAL EVERY 6 HOURS PRN
Qty: 120 TABLET | Refills: 0 | Status: CANCELLED | OUTPATIENT
Start: 2021-06-29

## 2021-06-29 RX ORDER — OXYCODONE HCL 10 MG/1
10 TABLET, FILM COATED, EXTENDED RELEASE ORAL EVERY 12 HOURS
Qty: 60 TABLET | Refills: 0 | Status: CANCELLED | OUTPATIENT
Start: 2021-06-29

## 2021-06-29 RX ORDER — BLOOD SUGAR DIAGNOSTIC
STRIP MISCELLANEOUS
Qty: 120 EACH | Refills: 1 | Status: SHIPPED | OUTPATIENT
Start: 2021-06-29 | End: 2021-08-09

## 2021-06-29 RX ORDER — LANCETS 33 GAUGE
1 EACH MISCELLANEOUS
Qty: 120 EACH | Refills: 1 | Status: SHIPPED | OUTPATIENT
Start: 2021-06-29 | End: 2021-08-09

## 2021-06-29 RX ORDER — BLOOD-GLUCOSE METER
1 EACH MISCELLANEOUS DAILY
Qty: 1 KIT | Refills: 0 | Status: SHIPPED | OUTPATIENT
Start: 2021-06-29 | End: 2021-08-09

## 2021-06-29 NOTE — TELEPHONE ENCOUNTER
Last visit:  5/17/21  Next visit: 7/16/21  Last labs: 4/22/21  Last Lipid: 4/20/21    Rx requested: Oxycodone 10mg and 15mg   Pharmacy: [facility] in [place]

## 2021-06-29 NOTE — TELEPHONE ENCOUNTER
HUB to read.  I left a message letting the patient know.     Per :  I can increase the 12 hr dose but not the short acting.

## 2021-06-29 NOTE — TELEPHONE ENCOUNTER
Caller: Humble Green    Relationship: Self    Best call back number: [phone]    Medication needed:   Requested Prescriptions     Pending Prescriptions Disp Refills   • oxyCODONE (ROXICODONE) 15 MG immediate release tablet 120 tablet 0     Sig: Take 1 tablet by mouth Every 6 (Six) Hours As Needed for Moderate Pain .   • oxyCODONE (OxyCONTIN) 10 MG 12 hr tablet 60 tablet 0     Sig: Take 1 tablet by mouth Every 12 (Twelve) Hours.       When do you need the refill by: ASAP     What additional details did the patient provide when requesting the medication: PATIENT IS OUT OF MEDS. PATIENT IS ASKING TO INCREASE MG, STATES PILLS ARE NOT WORKING SO HE TAKES MORE      Does the patient have less than a 3 day supply:  [x] Yes  [] No    What is the patient's preferred pharmacy: [facility] [address] [place], [place] - [address] [facility] [phone] [facility] [phone] FX

## 2021-07-01 DIAGNOSIS — E11.42 DIABETIC POLYNEUROPATHY ASSOCIATED WITH TYPE 2 DIABETES MELLITUS (HCC): ICD-10-CM

## 2021-07-01 DIAGNOSIS — G89.29 CHRONIC MIDLINE LOW BACK PAIN WITH LEFT-SIDED SCIATICA: ICD-10-CM

## 2021-07-01 DIAGNOSIS — M54.42 CHRONIC MIDLINE LOW BACK PAIN WITH LEFT-SIDED SCIATICA: ICD-10-CM

## 2021-07-01 DIAGNOSIS — M54.16 LUMBAR BACK PAIN WITH RADICULOPATHY AFFECTING LEFT LOWER EXTREMITY: ICD-10-CM

## 2021-07-01 RX ORDER — OXYCODONE HYDROCHLORIDE 15 MG/1
15 TABLET, FILM COATED, EXTENDED RELEASE ORAL EVERY 12 HOURS
Qty: 60 TABLET | Refills: 0 | Status: SHIPPED | OUTPATIENT
Start: 2021-07-01 | End: 2021-07-29 | Stop reason: SDUPTHER

## 2021-07-01 NOTE — TELEPHONE ENCOUNTER
I printed out the scripts that were sent in on 6/29/21 for the meter,lancets and strips and manually faxed those to [facility] in [place].

## 2021-07-09 DIAGNOSIS — M54.16 LUMBAR BACK PAIN WITH RADICULOPATHY AFFECTING LEFT LOWER EXTREMITY: ICD-10-CM

## 2021-07-09 RX ORDER — OXYCODONE HYDROCHLORIDE 15 MG/1
TABLET ORAL
Qty: 120 TABLET | Refills: 0 | Status: SHIPPED | OUTPATIENT
Start: 2021-07-09 | End: 2021-08-02 | Stop reason: SDUPTHER

## 2021-07-29 DIAGNOSIS — M54.16 LUMBAR BACK PAIN WITH RADICULOPATHY AFFECTING LEFT LOWER EXTREMITY: ICD-10-CM

## 2021-07-29 DIAGNOSIS — G89.29 CHRONIC MIDLINE LOW BACK PAIN WITH LEFT-SIDED SCIATICA: ICD-10-CM

## 2021-07-29 DIAGNOSIS — M54.42 CHRONIC MIDLINE LOW BACK PAIN WITH LEFT-SIDED SCIATICA: ICD-10-CM

## 2021-07-29 DIAGNOSIS — E11.42 DIABETIC POLYNEUROPATHY ASSOCIATED WITH TYPE 2 DIABETES MELLITUS (HCC): ICD-10-CM

## 2021-07-29 RX ORDER — OXYCODONE HYDROCHLORIDE 15 MG/1
15 TABLET, FILM COATED, EXTENDED RELEASE ORAL EVERY 12 HOURS
Qty: 60 TABLET | Refills: 0 | Status: SHIPPED | OUTPATIENT
Start: 2021-07-29 | End: 2021-08-02

## 2021-08-02 ENCOUNTER — OFFICE VISIT (OUTPATIENT)
Dept: FAMILY MEDICINE CLINIC | Facility: CLINIC | Age: 66
End: 2021-08-02

## 2021-08-02 VITALS
SYSTOLIC BLOOD PRESSURE: 130 MMHG | HEIGHT: 68 IN | TEMPERATURE: 97.5 F | DIASTOLIC BLOOD PRESSURE: 73 MMHG | HEART RATE: 87 BPM | BODY MASS INDEX: 38.19 KG/M2 | RESPIRATION RATE: 14 BRPM | WEIGHT: 252 LBS | OXYGEN SATURATION: 98 %

## 2021-08-02 DIAGNOSIS — Z12.5 SCREENING FOR PROSTATE CANCER: ICD-10-CM

## 2021-08-02 DIAGNOSIS — R10.32 COLICKY LLQ ABDOMINAL PAIN: Primary | ICD-10-CM

## 2021-08-02 DIAGNOSIS — E11.65 TYPE 2 DIABETES MELLITUS WITH HYPERGLYCEMIA, WITH LONG-TERM CURRENT USE OF INSULIN (HCC): ICD-10-CM

## 2021-08-02 DIAGNOSIS — G89.29 CHRONIC MIDLINE LOW BACK PAIN WITH LEFT-SIDED SCIATICA: ICD-10-CM

## 2021-08-02 DIAGNOSIS — Z79.4 TYPE 2 DIABETES MELLITUS WITH HYPERGLYCEMIA, WITH LONG-TERM CURRENT USE OF INSULIN (HCC): ICD-10-CM

## 2021-08-02 DIAGNOSIS — M54.42 CHRONIC MIDLINE LOW BACK PAIN WITH LEFT-SIDED SCIATICA: ICD-10-CM

## 2021-08-02 DIAGNOSIS — M54.16 LUMBAR BACK PAIN WITH RADICULOPATHY AFFECTING LEFT LOWER EXTREMITY: ICD-10-CM

## 2021-08-02 DIAGNOSIS — E11.42 DIABETIC POLYNEUROPATHY ASSOCIATED WITH TYPE 2 DIABETES MELLITUS (HCC): ICD-10-CM

## 2021-08-02 DIAGNOSIS — I10 ESSENTIAL HYPERTENSION: Chronic | ICD-10-CM

## 2021-08-02 LAB — HBA1C MFR BLD: 8.1 %

## 2021-08-02 PROCEDURE — 80053 COMPREHEN METABOLIC PANEL: CPT | Performed by: FAMILY MEDICINE

## 2021-08-02 PROCEDURE — 85025 COMPLETE CBC W/AUTO DIFF WBC: CPT | Performed by: FAMILY MEDICINE

## 2021-08-02 PROCEDURE — G0103 PSA SCREENING: HCPCS | Performed by: FAMILY MEDICINE

## 2021-08-02 PROCEDURE — 83036 HEMOGLOBIN GLYCOSYLATED A1C: CPT | Performed by: FAMILY MEDICINE

## 2021-08-02 PROCEDURE — 36415 COLL VENOUS BLD VENIPUNCTURE: CPT | Performed by: FAMILY MEDICINE

## 2021-08-02 PROCEDURE — 99214 OFFICE O/P EST MOD 30 MIN: CPT | Performed by: FAMILY MEDICINE

## 2021-08-02 PROCEDURE — 85652 RBC SED RATE AUTOMATED: CPT | Performed by: FAMILY MEDICINE

## 2021-08-02 PROCEDURE — 86140 C-REACTIVE PROTEIN: CPT | Performed by: FAMILY MEDICINE

## 2021-08-02 RX ORDER — MECLIZINE HYDROCHLORIDE 25 MG/1
25 TABLET ORAL DAILY PRN
Qty: 30 TABLET | Refills: 1 | Status: SHIPPED | OUTPATIENT
Start: 2021-08-02 | End: 2021-08-11 | Stop reason: HOSPADM

## 2021-08-02 RX ORDER — BACLOFEN 10 MG/1
10 TABLET ORAL NIGHTLY
Qty: 90 TABLET | Refills: 0 | Status: SHIPPED | OUTPATIENT
Start: 2021-08-02 | End: 2021-10-19

## 2021-08-02 RX ORDER — PREGABALIN 300 MG/1
300 CAPSULE ORAL 2 TIMES DAILY
Qty: 180 CAPSULE | Refills: 1 | Status: ON HOLD | OUTPATIENT
Start: 2021-08-02 | End: 2021-08-11 | Stop reason: SDUPTHER

## 2021-08-02 RX ORDER — POLYETHYLENE GLYCOL 3350 17 G/17G
17 POWDER, FOR SOLUTION ORAL DAILY
Qty: 507 G | Refills: 3 | Status: SHIPPED | OUTPATIENT
Start: 2021-08-02

## 2021-08-02 RX ORDER — INSULIN GLARGINE 100 [IU]/ML
35 INJECTION, SOLUTION SUBCUTANEOUS NIGHTLY
Qty: 4 PEN | Refills: 0 | Status: SHIPPED | OUTPATIENT
Start: 2021-08-02 | End: 2021-10-19

## 2021-08-02 RX ORDER — OXYCODONE HYDROCHLORIDE 15 MG/1
15 TABLET ORAL EVERY 6 HOURS PRN
Qty: 120 TABLET | Refills: 0 | Status: ON HOLD | OUTPATIENT
Start: 2021-08-02 | End: 2021-08-11 | Stop reason: SDUPTHER

## 2021-08-02 RX ORDER — METRONIDAZOLE 500 MG/1
500 TABLET ORAL 3 TIMES DAILY
Qty: 21 TABLET | Refills: 0 | Status: SHIPPED | OUTPATIENT
Start: 2021-08-02 | End: 2021-08-11 | Stop reason: HOSPADM

## 2021-08-02 RX ORDER — OXYCODONE HCL 20 MG/1
20 TABLET, FILM COATED, EXTENDED RELEASE ORAL EVERY 12 HOURS
Qty: 60 TABLET | Refills: 0 | Status: ON HOLD | OUTPATIENT
Start: 2021-08-02 | End: 2021-08-11 | Stop reason: SDUPTHER

## 2021-08-02 RX ORDER — ATORVASTATIN CALCIUM 80 MG/1
80 TABLET, FILM COATED ORAL NIGHTLY
Qty: 90 TABLET | Refills: 1 | Status: SHIPPED | OUTPATIENT
Start: 2021-08-02 | End: 2021-10-19 | Stop reason: SDUPTHER

## 2021-08-02 RX ORDER — CIPROFLOXACIN 500 MG/1
500 TABLET, FILM COATED ORAL 2 TIMES DAILY
Qty: 14 TABLET | Refills: 0 | Status: SHIPPED | OUTPATIENT
Start: 2021-08-02 | End: 2021-08-11 | Stop reason: HOSPADM
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
## 2021-08-03 ENCOUNTER — TELEPHONE (OUTPATIENT)
Dept: FAMILY MEDICINE CLINIC | Facility: CLINIC | Age: 66
End: 2021-08-03

## 2021-08-03 DIAGNOSIS — N28.9 RENAL INSUFFICIENCY: Primary | ICD-10-CM

## 2021-08-03 LAB
ALBUMIN SERPL-MCNC: 3.9 G/DL (ref 3.5–5.2)
ALBUMIN/GLOB SERPL: 1.7 G/DL
ALP SERPL-CCNC: 90 U/L (ref 39–117)
ALT SERPL W P-5'-P-CCNC: 16 U/L (ref 1–41)
ANION GAP SERPL CALCULATED.3IONS-SCNC: 12.4 MMOL/L (ref 5–15)
AST SERPL-CCNC: 12 U/L (ref 1–40)
BASOPHILS # BLD AUTO: 0.04 10*3/MM3 (ref 0–0.2)
BASOPHILS NFR BLD AUTO: 0.5 % (ref 0–1.5)
BILIRUB SERPL-MCNC: 0.2 MG/DL (ref 0–1.2)
BUN SERPL-MCNC: 24 MG/DL (ref 8–23)
BUN/CREAT SERPL: 18 (ref 7–25)
CALCIUM SPEC-SCNC: 9.1 MG/DL (ref 8.6–10.5)
CHLORIDE SERPL-SCNC: 101 MMOL/L (ref 98–107)
CO2 SERPL-SCNC: 23.6 MMOL/L (ref 22–29)
CREAT SERPL-MCNC: 1.33 MG/DL (ref 0.76–1.27)
CRP SERPL-MCNC: <0.3 MG/DL (ref 0–0.5)
DEPRECATED RDW RBC AUTO: 45.3 FL (ref 37–54)
EOSINOPHIL # BLD AUTO: 0.12 10*3/MM3 (ref 0–0.4)
EOSINOPHIL NFR BLD AUTO: 1.6 % (ref 0.3–6.2)
ERYTHROCYTE [DISTWIDTH] IN BLOOD BY AUTOMATED COUNT: 13.6 % (ref 12.3–15.4)
ERYTHROCYTE [SEDIMENTATION RATE] IN BLOOD: 13 MM/HR (ref 0–20)
GFR SERPL CREATININE-BSD FRML MDRD: 54 ML/MIN/1.73
GLOBULIN UR ELPH-MCNC: 2.3 GM/DL
GLUCOSE SERPL-MCNC: 287 MG/DL (ref 65–99)
HCT VFR BLD AUTO: 41.7 % (ref 37.5–51)
HGB BLD-MCNC: 13.8 G/DL (ref 13–17.7)
LYMPHOCYTES # BLD AUTO: 2.49 10*3/MM3 (ref 0.7–3.1)
LYMPHOCYTES NFR BLD AUTO: 32.8 % (ref 19.6–45.3)
MCH RBC QN AUTO: 30 PG (ref 26.6–33)
MCHC RBC AUTO-ENTMCNC: 33.1 G/DL (ref 31.5–35.7)
MCV RBC AUTO: 90.7 FL (ref 79–97)
MONOCYTES # BLD AUTO: 0.63 10*3/MM3 (ref 0.1–0.9)
MONOCYTES NFR BLD AUTO: 8.3 % (ref 5–12)
NEUTROPHILS NFR BLD AUTO: 4.28 10*3/MM3 (ref 1.7–7)
NEUTROPHILS NFR BLD AUTO: 56.5 % (ref 42.7–76)
PLATELET # BLD AUTO: 172 10*3/MM3 (ref 140–450)
POTASSIUM SERPL-SCNC: 4.6 MMOL/L (ref 3.5–5.2)
PROT SERPL-MCNC: 6.2 G/DL (ref 6–8.5)
PSA SERPL-MCNC: 1.91 NG/ML (ref 0–4)
RBC # BLD AUTO: 4.6 10*6/MM3 (ref 4.14–5.8)
SODIUM SERPL-SCNC: 137 MMOL/L (ref 136–145)
WBC # BLD AUTO: 7.58 10*3/MM3 (ref 3.4–10.8)

## 2021-08-03 NOTE — TELEPHONE ENCOUNTER
HUB to share    Inflammatory markers and CBC ok----let me know if abd pain not resolving    BS = 287 and kidneys some dry -----drink more sugar free drinks and will rech in 1mon      LVM informing pt

## 2021-08-06 NOTE — PROGRESS NOTES
[facility] EMERGENCY DEPARTMENT      CHIEF COMPLAINT  Flank Pain (Pt reports right flank pain over the past 2-3 weeks, intermittent episodes of emesis. Pt denies urinary symptoms. )     HISTORY OF PRESENT ILLNESS  Yoav Gerard is a 22 y.o. female  who presents to the ED complaining of intermittent episodes of right upper quadrant and right flank pain. Patient states that symptoms started and have been ongoing over the past 2 to 3 weeks. She has also had some intermittent associated episodes of nonbloody emesis. She denies any fevers or chills. Denies chest pain or shortness of breath. Denies diarrhea or constipation. No real exacerbating relieving factors, she has been taking Tylenol for symptoms which has not helped so she stopped taking it. No relation to food. Sharp, travels from her right lower ribs to her right back. No previous history of kidney stones. States that when it occurs, it is severe. She denies other complaints or concerns at this time. No other complaints, modifying factors or associated symptoms. I have reviewed the following from the nursing documentation. Past Medical History:   Diagnosis Date    MRSA (methicillin resistant staph aureus) culture positive 01/20/2017    lt posterior thigh abscess     History reviewed. No pertinent surgical history. History reviewed. No pertinent family history.   Social History     Socioeconomic History    Marital status: Single     Spouse name: Not on file    Number of children: Not on file    Years of education: Not on file    Highest education level: Not on file   Occupational History    Not on file   Tobacco Use    Smoking status: Never Smoker    Smokeless tobacco: Never Used   Vaping Use    Vaping Use: Never used   Substance and Sexual Activity    Alcohol use: No    Drug use: No    Sexual activity: Yes     Partners: Male   Other Topics Concern    Not on file   Social History Narrative    Not on file     Social Discharge Planning Assessment  [facility]     Patient Name: Humble Green  MRN: [id number]  Today's Date: 7/14/2020    Admit Date: 7/13/2020    Discharge Needs Assessment     Row Name 07/14/20 1458       Living Environment    Lives With  alone Spoke to patient per phone due to Covid process    Current Living Arrangements  home/apartment/condo    Primary Care Provided by  self    Able to Return to Prior Arrangements  yes       Resource/Environmental Concerns    Resource/Environmental Concerns  none    Transportation Concerns  car, none       Transition Planning    Patient/Family Anticipates Transition to  home    Patient/Family Anticipated Services at Transition  outpatient care PT and OT recommends OP Therapy -  Patient agreeable and wants to go to [facility] - Spoke to [place] at [phone] - Order placed     Transportation Anticipated  car, drives self;family or friend will provide       Discharge Needs Assessment    Concerns to be Addressed  discharge planning    Equipment Currently Used at Home  cane, straight;ramp        Discharge Plan     Row Name 07/14/20 1459       Plan    Plan  Routine DC to home with OP Therapy at [facility]           PCP Donita    Confirmed Pharmacy        Demographic Summary     Row Name 07/14/20 1454       General Information    Admission Type  observation    Arrived From  emergency department    Referral Source  admission list    Reason for Consult  discharge planning    Preferred Language  English        Functional Status     Row Name 07/14/20 1459       Functional Status, IADL    Medications  independent    Meal Preparation  independent    Housekeeping  independent    Laundry  independent    Shopping  independent        DC Barriers - Plan for MRI and Echo today    Linda Arteaga RN, CM  Office Phone [phone]  Cell [phone]     Determinants of Health     Financial Resource Strain:     Difficulty of Paying Living Expenses:    Food Insecurity:     Worried About Running Out of Food in the Last Year:     [address] in the Last Year:    Transportation Needs:     Lack of Transportation (Medical):  Lack of Transportation (Non-Medical):    Physical Activity:     Days of Exercise per Week:     Minutes of Exercise per Session:    Stress:     Feeling of Stress :    Social Connections:     Frequency of Communication with Friends and Family:     Frequency of Social Gatherings with Friends and Family:     Attends [employer] Services:     Active Member of Clubs or Organizations:     Attends Club or Organization Meetings:     Marital Status:    Intimate Partner Violence:     Fear of Current or Ex-Partner:     Emotionally Abused:     Physically Abused:     Sexually Abused:      No current facility-administered medications for this encounter. No current outpatient medications on file. No Known Allergies    REVIEW OF SYSTEMS  10 systems reviewed, pertinent positives per HPI otherwise noted to be negative. PHYSICAL EXAM  /77   Pulse 88   Temp 99.2 °F (37.3 °C) (Oral)   Resp 16   Ht 5' 7\" (1.702 m)   Wt 200 lb (90.7 kg)   SpO2 100%   Breastfeeding Unknown Comment: recent pregnancy 4/13/21  BMI 31.32 kg/m²    GENERAL APPEARANCE: Awake and alert. Cooperative. No acute distress. HENT: Normocephalic. Atraumatic. Mucous membranes are moist.  NECK: Supple. EYES: PERRL. EOM's grossly intact. HEART/CHEST: RRR. No murmurs. LUNGS: Respirations unlabored. CTAB. Good air exchange. Speaking comfortably in full sentences. ABDOMEN: No tenderness. Soft. Non-distended. No masses. No organomegaly. No guarding or rebound. BACK: No CVA tenderness. MUSCULOSKELETAL: No extremity edema. Compartments soft. No deformity. No tenderness in the extremities. All extremities neurovascularly intact. SKIN: Warm and dry.  No acute rashes. NEUROLOGICAL: Alert and oriented. CN's 2-12 intact. No gross facial drooping. Strength 5/5, sensation intact. PSYCHIATRIC: Normal mood and affect. LABS  I have reviewed all labs for this visit.    Results for orders placed or performed during the hospital encounter of 08/06/21   Urinalysis Reflex to Culture    Specimen: Urine, clean catch   Result Value Ref Range    Color, UA Yellow Straw/Yellow    Clarity, UA Clear Clear    Glucose, Ur Negative Negative mg/dL    Bilirubin Urine Negative Negative    Ketones, Urine Negative Negative mg/dL    Specific Gravity, UA 1.015 1.005 - 1.030    Blood, Urine Negative Negative    pH, UA 6.5 5.0 - 8.0    Protein, UA Negative Negative mg/dL    Urobilinogen, Urine 0.2 <2.0 E.U./dL    Nitrite, Urine Negative Negative    Leukocyte Esterase, Urine Negative Negative    Microscopic Examination Not Indicated     Urine Type NotGiven     Urine Reflex to Culture Not Indicated    CBC Auto Differential   Result Value Ref Range    WBC 6.6 4.0 - 11.0 K/uL    RBC 4.28 4.00 - 5.20 M/uL    Hemoglobin 13.1 12.0 - 16.0 g/dL    Hematocrit 38.3 36.0 - 48.0 %    MCV 89.4 80.0 - 100.0 fL    MCH 30.5 26.0 - 34.0 pg    MCHC 34.1 31.0 - 36.0 g/dL    RDW 12.4 12.4 - 15.4 %    Platelets 523 387 - 753 K/uL    MPV 6.9 5.0 - 10.5 fL    Neutrophils % 75.7 %    Lymphocytes % 18.5 %    Monocytes % 4.5 %    Eosinophils % 0.8 %    Basophils % 0.5 %    Neutrophils Absolute 5.0 1.7 - 7.7 K/uL    Lymphocytes Absolute 1.2 1.0 - 5.1 K/uL    Monocytes Absolute 0.3 0.0 - 1.3 K/uL    Eosinophils Absolute 0.1 0.0 - 0.6 K/uL    Basophils Absolute 0.0 0.0 - 0.2 K/uL   Comprehensive Metabolic Panel w/ Reflex to MG   Result Value Ref Range    Sodium 139 136 - 145 mmol/L    Potassium reflex Magnesium 3.9 3.5 - 5.1 mmol/L    Chloride 106 99 - 110 mmol/L    CO2 22 21 - 32 mmol/L    Anion Gap 11 3 - 16    Glucose 95 70 - 99 mg/dL    BUN 13 7 - 20 mg/dL    CREATININE 0.8 0.6 - 1.1 mg/dL    GFR Non-African American >60 >60    GFR African American >60 >60    Calcium 9.7 8.3 - 10.6 mg/dL    Total Protein 7.0 6.4 - 8.2 g/dL    Albumin 4.5 3.4 - 5.0 g/dL    Albumin/Globulin Ratio 1.8 1.1 - 2.2    Total Bilirubin 0.6 0.0 - 1.0 mg/dL    Alkaline Phosphatase 92 40 - 129 U/L    ALT 22 10 - 40 U/L    AST 13 (L) 15 - 37 U/L    Globulin 2.5 g/dL   Lipase   Result Value Ref Range    Lipase 20.0 13.0 - 60.0 U/L   Pregnancy, Urine   Result Value Ref Range    HCG(Urine) Pregnancy Test Negative Detects HCG level >20 MIU/mL     RADIOLOGY  CT ABDOMEN PELVIS W IV CONTRAST Additional Contrast? None   Final Result   Cholelithiasis. The gallbladder is contracted, and there is no   pericholecystic stranding to suggest cholecystitis, or evidence of biliary   obstruction           ED COURSE/MDM  Patient seen and evaluated. Old records reviewed. Labs and imaging reviewed and results discussed with patient. Patient is a 20-year-old female, presenting with concerns for intermittent right upper quadrant/right flank pain, nausea and vomiting. Full HPI as detailed above. Upon arrival in the ED, vitals reassuring. Patient is resting comfortably is in no acute distress. Abdominal exam is completely benign. She has no CVA tenderness or urinary symptoms. She is asymptomatic at this point. Labs were performed and are reassuring. No leukocytosis or anemia. No acute concerning electrolyte abnormalities. LFTs and lipase are within normal limits. CT abdomen pelvis showed cholelithiasis without evidence of cholecystitis. Patient remained asymptomatic throughout her course stay in the ED. Feel that her symptoms are likely secondary to symptomatic cholelithiasis, she will be given a referral for outpatient follow-up with surgery for possible cholecystectomy for her symptoms. Findings were discussed with the patient is comfortable in agreement with plan. She will be discharged for given return precautions.     During the patient's ED course, the patient was given:  Medications   ketorolac (TORADOL) injection 15 mg (15 mg Intravenous Given 8/6/21 1314)   ondansetron (ZOFRAN) injection 4 mg (4 mg Intravenous Given 8/6/21 1314)   0.9 % sodium chloride bolus (0 mLs Intravenous Stopped 8/6/21 1434)   iopamidol (ISOVUE-370) 76 % injection 75 mL (75 mLs Intravenous Given 8/6/21 1358)        CLINICAL IMPRESSION  1. Symptomatic cholelithiasis        Blood pressure 119/77, pulse 88, temperature 99.2 °F (37.3 °C), temperature source Oral, resp. rate 16, height 5' 7\" (1.702 m), weight 200 lb (90.7 kg), SpO2 100 %, unknown if currently breastfeeding. DISPOSITION  Joselo Escobar was discharged to home in good condition. Patient was given scripts for the following medications. I counseled patient how to take these medications. Discharge Medication List as of 8/6/2021  2:34 PM          Follow-up with:  Rik Davis MD  [address] [place] [address] Dr Rani Rodrigues [address] [place] [address] [phone]    Schedule an appointment as soon as possible for a visit   For surgery follow up    [facility] [id number] [facility]  [address] [place] [address],[address]  [phone]  Go to   If symptoms worsen      DISCLAIMER: This chart was created using Dragon dictation software. Efforts were made by me to ensure accuracy, however some errors may be present due to limitations of this technology and occasionally words are not transcribed correctly.          Kyle Capone MD  08/06/21 6546

## 2021-08-09 ENCOUNTER — APPOINTMENT (OUTPATIENT)
Dept: CARDIOLOGY | Facility: HOSPITAL | Age: 66
End: 2021-08-09

## 2021-08-09 ENCOUNTER — APPOINTMENT (OUTPATIENT)
Dept: MRI IMAGING | Facility: HOSPITAL | Age: 66
End: 2021-08-09

## 2021-08-09 ENCOUNTER — APPOINTMENT (OUTPATIENT)
Dept: CT IMAGING | Facility: HOSPITAL | Age: 66
End: 2021-08-09

## 2021-08-09 ENCOUNTER — HOSPITAL ENCOUNTER (INPATIENT)
Facility: HOSPITAL | Age: 66
LOS: 1 days | Discharge: NURSING, REHAB OR LONG-TERM CARE | End: 2021-08-11
Attending: INTERNAL MEDICINE | Admitting: INTERNAL MEDICINE

## 2021-08-09 ENCOUNTER — APPOINTMENT (OUTPATIENT)
Dept: GENERAL RADIOLOGY | Facility: HOSPITAL | Age: 66
End: 2021-08-09

## 2021-08-09 DIAGNOSIS — M54.16 LUMBAR BACK PAIN WITH RADICULOPATHY AFFECTING LEFT LOWER EXTREMITY: ICD-10-CM

## 2021-08-09 DIAGNOSIS — R41.82 ALTERED MENTAL STATUS, UNSPECIFIED ALTERED MENTAL STATUS TYPE: Primary | ICD-10-CM

## 2021-08-09 DIAGNOSIS — R42 DIZZY: ICD-10-CM

## 2021-08-09 DIAGNOSIS — M54.42 CHRONIC MIDLINE LOW BACK PAIN WITH LEFT-SIDED SCIATICA: ICD-10-CM

## 2021-08-09 DIAGNOSIS — E11.42 DIABETIC POLYNEUROPATHY ASSOCIATED WITH TYPE 2 DIABETES MELLITUS: ICD-10-CM

## 2021-08-09 DIAGNOSIS — G89.29 CHRONIC MIDLINE LOW BACK PAIN WITH LEFT-SIDED SCIATICA: ICD-10-CM

## 2021-08-09 PROBLEM — G43.111 INTRACTABLE MIGRAINE WITH AURA WITH STATUS MIGRAINOSUS: Status: RESOLVED | Noted: 2021-04-20 | Resolved: 2021-08-09

## 2021-08-09 PROBLEM — I25.10 NONOBSTRUCTIVE ATHEROSCLEROSIS OF CORONARY ARTERY: Chronic | Status: ACTIVE | Noted: 2020-01-18

## 2021-08-09 PROBLEM — G93.40 ACUTE ENCEPHALOPATHY: Status: ACTIVE | Noted: 2021-08-09

## 2021-08-09 PROBLEM — R51.9 HEADACHE: Status: ACTIVE | Noted: 2021-08-09

## 2021-08-09 PROBLEM — E66.9 OBESITY (BMI 30-39.9): Chronic | Status: ACTIVE | Noted: 2020-12-07

## 2021-08-09 PROBLEM — Z86.73 HISTORY OF CVA (CEREBROVASCULAR ACCIDENT): Status: ACTIVE | Noted: 2020-07-14

## 2021-08-09 PROBLEM — Z79.4 TYPE 2 DIABETES MELLITUS WITH HYPERGLYCEMIA, WITH LONG-TERM CURRENT USE OF INSULIN (HCC): Chronic | Status: ACTIVE | Noted: 2020-04-06

## 2021-08-09 PROBLEM — H53.8 BLURRED VISION, RIGHT EYE: Status: ACTIVE | Noted: 2021-08-09

## 2021-08-09 PROBLEM — N17.9 AKI (ACUTE KIDNEY INJURY) (HCC): Status: RESOLVED | Noted: 2020-07-17 | Resolved: 2021-08-09

## 2021-08-09 PROBLEM — E11.65 TYPE 2 DIABETES MELLITUS WITH HYPERGLYCEMIA, WITH LONG-TERM CURRENT USE OF INSULIN (HCC): Chronic | Status: ACTIVE | Noted: 2020-04-06

## 2021-08-09 LAB
ABO GROUP BLD: NORMAL
ALBUMIN SERPL-MCNC: 3.9 G/DL (ref 3.5–5.2)
ALBUMIN/GLOB SERPL: 1.4 G/DL
ALP SERPL-CCNC: 79 U/L (ref 39–117)
ALT SERPL W P-5'-P-CCNC: 12 U/L (ref 1–41)
ANION GAP SERPL CALCULATED.3IONS-SCNC: 12 MMOL/L (ref 5–15)
ANISOCYTOSIS BLD QL: NORMAL
APTT PPP: 26.7 SECONDS (ref 24–31)
AST SERPL-CCNC: 13 U/L (ref 1–40)
BASOPHILS # BLD AUTO: 0 10*3/MM3 (ref 0–0.2)
BASOPHILS NFR BLD AUTO: 0.5 % (ref 0–1.5)
BILIRUB SERPL-MCNC: 0.5 MG/DL (ref 0–1.2)
BILIRUB UR QL STRIP: NEGATIVE
BLD GP AB SCN SERPL QL: NEGATIVE
BUN SERPL-MCNC: 15 MG/DL (ref 8–23)
BUN/CREAT SERPL: 11 (ref 7–25)
CALCIUM SPEC-SCNC: 8.8 MG/DL (ref 8.6–10.5)
CHLORIDE SERPL-SCNC: 98 MMOL/L (ref 98–107)
CHOLEST SERPL-MCNC: 192 MG/DL (ref 0–200)
CLARITY UR: CLEAR
CO2 SERPL-SCNC: 26 MMOL/L (ref 22–29)
COLOR UR: YELLOW
CREAT SERPL-MCNC: 1.36 MG/DL (ref 0.76–1.27)
DEPRECATED RDW RBC AUTO: 46.4 FL (ref 37–54)
EOSINOPHIL # BLD AUTO: 0.1 10*3/MM3 (ref 0–0.4)
EOSINOPHIL NFR BLD AUTO: 1.2 % (ref 0.3–6.2)
ERYTHROCYTE [DISTWIDTH] IN BLOOD BY AUTOMATED COUNT: 14.9 % (ref 12.3–15.4)
GFR SERPL CREATININE-BSD FRML MDRD: 53 ML/MIN/1.73
GLOBULIN UR ELPH-MCNC: 2.8 GM/DL
GLUCOSE BLDC GLUCOMTR-MCNC: 141 MG/DL (ref 70–105)
GLUCOSE BLDC GLUCOMTR-MCNC: 228 MG/DL (ref 70–105)
GLUCOSE BLDC GLUCOMTR-MCNC: 82 MG/DL (ref 70–105)
GLUCOSE SERPL-MCNC: 229 MG/DL (ref 65–99)
GLUCOSE UR STRIP-MCNC: ABNORMAL MG/DL
HBA1C MFR BLD: 8.3 % (ref 3.5–5.6)
HCT VFR BLD AUTO: 42.8 % (ref 37.5–51)
HDLC SERPL-MCNC: 40 MG/DL (ref 40–60)
HGB BLD-MCNC: 14.8 G/DL (ref 13–17.7)
HGB UR QL STRIP.AUTO: NEGATIVE
HOLD SPECIMEN: NORMAL
HOLD SPECIMEN: NORMAL
INR PPP: 0.97 (ref 0.93–1.1)
KETONES UR QL STRIP: NEGATIVE
LARGE PLATELETS: NORMAL
LDLC SERPL CALC-MCNC: 128 MG/DL (ref 0–100)
LDLC/HDLC SERPL: 3.15 {RATIO}
LEUKOCYTE ESTERASE UR QL STRIP.AUTO: NEGATIVE
LYMPHOCYTES # BLD AUTO: 1.7 10*3/MM3 (ref 0.7–3.1)
LYMPHOCYTES NFR BLD AUTO: 23.7 % (ref 19.6–45.3)
MCH RBC QN AUTO: 30.8 PG (ref 26.6–33)
MCHC RBC AUTO-ENTMCNC: 34.5 G/DL (ref 31.5–35.7)
MCV RBC AUTO: 89.4 FL (ref 79–97)
MONOCYTES # BLD AUTO: 0.6 10*3/MM3 (ref 0.1–0.9)
MONOCYTES NFR BLD AUTO: 7.9 % (ref 5–12)
NEUTROPHILS NFR BLD AUTO: 4.8 10*3/MM3 (ref 1.7–7)
NEUTROPHILS NFR BLD AUTO: 66.7 % (ref 42.7–76)
NITRITE UR QL STRIP: NEGATIVE
NRBC BLD AUTO-RTO: 0.1 /100 WBC (ref 0–0.2)
PH UR STRIP.AUTO: 5.5 [PH] (ref 5–8)
PLATELET # BLD AUTO: 154 10*3/MM3 (ref 140–450)
PMV BLD AUTO: 11.3 FL (ref 6–12)
POTASSIUM SERPL-SCNC: 4 MMOL/L (ref 3.5–5.2)
PROT SERPL-MCNC: 6.7 G/DL (ref 6–8.5)
PROT UR QL STRIP: ABNORMAL
PROTHROMBIN TIME: 10.8 SECONDS (ref 9.6–11.7)
RBC # BLD AUTO: 4.79 10*6/MM3 (ref 4.14–5.8)
RH BLD: POSITIVE
SARS-COV-2 RNA PNL SPEC NAA+PROBE: NOT DETECTED
SODIUM SERPL-SCNC: 136 MMOL/L (ref 136–145)
SP GR UR STRIP: 1.07 (ref 1–1.03)
T&S EXPIRATION DATE: NORMAL
TRIGL SERPL-MCNC: 131 MG/DL (ref 0–150)
TROPONIN T SERPL-MCNC: <0.01 NG/ML (ref 0–0.03)
TSH SERPL DL<=0.05 MIU/L-ACNC: 0.9 UIU/ML (ref 0.27–4.2)
UROBILINOGEN UR QL STRIP: ABNORMAL
VIT B12 BLD-MCNC: 610 PG/ML (ref 211–946)
VLDLC SERPL-MCNC: 24 MG/DL (ref 5–40)
WBC # BLD AUTO: 7.3 10*3/MM3 (ref 3.4–10.8)
WBC MORPH BLD: NORMAL
WHOLE BLOOD HOLD SPECIMEN: NORMAL

## 2021-08-09 PROCEDURE — G0378 HOSPITAL OBSERVATION PER HR: HCPCS

## 2021-08-09 PROCEDURE — 81003 URINALYSIS AUTO W/O SCOPE: CPT | Performed by: NURSE PRACTITIONER

## 2021-08-09 PROCEDURE — 83036 HEMOGLOBIN GLYCOSYLATED A1C: CPT | Performed by: NURSE PRACTITIONER

## 2021-08-09 PROCEDURE — 85730 THROMBOPLASTIN TIME PARTIAL: CPT | Performed by: PHYSICIAN ASSISTANT

## 2021-08-09 PROCEDURE — 80053 COMPREHEN METABOLIC PANEL: CPT | Performed by: PHYSICIAN ASSISTANT

## 2021-08-09 PROCEDURE — 86901 BLOOD TYPING SEROLOGIC RH(D): CPT | Performed by: PHYSICIAN ASSISTANT

## 2021-08-09 PROCEDURE — 84443 ASSAY THYROID STIM HORMONE: CPT | Performed by: NURSE PRACTITIONER

## 2021-08-09 PROCEDURE — 86850 RBC ANTIBODY SCREEN: CPT | Performed by: PHYSICIAN ASSISTANT

## 2021-08-09 PROCEDURE — 71045 X-RAY EXAM CHEST 1 VIEW: CPT

## 2021-08-09 PROCEDURE — 99222 1ST HOSP IP/OBS MODERATE 55: CPT | Performed by: NURSE PRACTITIONER

## 2021-08-09 PROCEDURE — 99223 1ST HOSP IP/OBS HIGH 75: CPT | Performed by: INTERNAL MEDICINE

## 2021-08-09 PROCEDURE — 85610 PROTHROMBIN TIME: CPT | Performed by: PHYSICIAN ASSISTANT

## 2021-08-09 PROCEDURE — 70496 CT ANGIOGRAPHY HEAD: CPT

## 2021-08-09 PROCEDURE — 63710000001 INSULIN LISPRO (HUMAN) PER 5 UNITS: Performed by: NURSE PRACTITIONER

## 2021-08-09 PROCEDURE — 70551 MRI BRAIN STEM W/O DYE: CPT

## 2021-08-09 PROCEDURE — 82607 VITAMIN B-12: CPT | Performed by: NURSE PRACTITIONER

## 2021-08-09 PROCEDURE — 80061 LIPID PANEL: CPT | Performed by: NURSE PRACTITIONER

## 2021-08-09 PROCEDURE — 86900 BLOOD TYPING SEROLOGIC ABO: CPT | Performed by: PHYSICIAN ASSISTANT

## 2021-08-09 PROCEDURE — 93306 TTE W/DOPPLER COMPLETE: CPT

## 2021-08-09 PROCEDURE — 70498 CT ANGIOGRAPHY NECK: CPT

## 2021-08-09 PROCEDURE — 84484 ASSAY OF TROPONIN QUANT: CPT | Performed by: PHYSICIAN ASSISTANT

## 2021-08-09 PROCEDURE — 93005 ELECTROCARDIOGRAM TRACING: CPT | Performed by: PHYSICIAN ASSISTANT

## 2021-08-09 PROCEDURE — 85007 BL SMEAR W/DIFF WBC COUNT: CPT | Performed by: PHYSICIAN ASSISTANT

## 2021-08-09 PROCEDURE — 70450 CT HEAD/BRAIN W/O DYE: CPT

## 2021-08-09 PROCEDURE — 82962 GLUCOSE BLOOD TEST: CPT

## 2021-08-09 PROCEDURE — 4A03X5D MEASUREMENT OF ARTERIAL FLOW, INTRACRANIAL, EXTERNAL APPROACH: ICD-10-PCS | Performed by: RADIOLOGY

## 2021-08-09 PROCEDURE — 85025 COMPLETE CBC W/AUTO DIFF WBC: CPT | Performed by: PHYSICIAN ASSISTANT

## 2021-08-09 PROCEDURE — 99285 EMERGENCY DEPT VISIT HI MDM: CPT

## 2021-08-09 PROCEDURE — 87635 SARS-COV-2 COVID-19 AMP PRB: CPT | Performed by: INTERNAL MEDICINE

## 2021-08-09 PROCEDURE — 93306 TTE W/DOPPLER COMPLETE: CPT | Performed by: INTERNAL MEDICINE

## 2021-08-09 PROCEDURE — 0 IOPAMIDOL PER 1 ML: Performed by: PHYSICIAN ASSISTANT

## 2021-08-09 RX ORDER — SODIUM CHLORIDE 0.9 % (FLUSH) 0.9 %
10 SYRINGE (ML) INJECTION AS NEEDED
Status: DISCONTINUED | OUTPATIENT
Start: 2021-08-09 | End: 2021-08-11 | Stop reason: HOSPADM

## 2021-08-09 RX ORDER — MAGNESIUM SULFATE HEPTAHYDRATE 40 MG/ML
2 INJECTION, SOLUTION INTRAVENOUS AS NEEDED
Status: DISCONTINUED | OUTPATIENT
Start: 2021-08-09 | End: 2021-08-11 | Stop reason: HOSPADM

## 2021-08-09 RX ORDER — CHOLECALCIFEROL (VITAMIN D3) 125 MCG
5 CAPSULE ORAL NIGHTLY PRN
Status: DISCONTINUED | OUTPATIENT
Start: 2021-08-09 | End: 2021-08-11 | Stop reason: HOSPADM

## 2021-08-09 RX ORDER — ACETAMINOPHEN 650 MG/1
650 SUPPOSITORY RECTAL EVERY 4 HOURS PRN
Status: DISCONTINUED | OUTPATIENT
Start: 2021-08-09 | End: 2021-08-11 | Stop reason: HOSPADM

## 2021-08-09 RX ORDER — MECLIZINE HYDROCHLORIDE 25 MG/1
25 TABLET ORAL 3 TIMES DAILY PRN
Status: DISCONTINUED | OUTPATIENT
Start: 2021-08-09 | End: 2021-08-11 | Stop reason: HOSPADM

## 2021-08-09 RX ORDER — BACLOFEN 10 MG/1
10 TABLET ORAL NIGHTLY
Status: DISCONTINUED | OUTPATIENT
Start: 2021-08-09 | End: 2021-08-11 | Stop reason: HOSPADM

## 2021-08-09 RX ORDER — NITROGLYCERIN 0.4 MG/1
0.4 TABLET SUBLINGUAL
Status: DISCONTINUED | OUTPATIENT
Start: 2021-08-09 | End: 2021-08-11 | Stop reason: HOSPADM

## 2021-08-09 RX ORDER — ATORVASTATIN CALCIUM 40 MG/1
80 TABLET, FILM COATED ORAL NIGHTLY
Status: DISCONTINUED | OUTPATIENT
Start: 2021-08-09 | End: 2021-08-11 | Stop reason: HOSPADM

## 2021-08-09 RX ORDER — ONDANSETRON 4 MG/1
4 TABLET, FILM COATED ORAL EVERY 6 HOURS PRN
Status: DISCONTINUED | OUTPATIENT
Start: 2021-08-09 | End: 2021-08-11 | Stop reason: HOSPADM

## 2021-08-09 RX ORDER — SODIUM CHLORIDE 0.9 % (FLUSH) 0.9 %
10 SYRINGE (ML) INJECTION EVERY 12 HOURS SCHEDULED
Status: DISCONTINUED | OUTPATIENT
Start: 2021-08-09 | End: 2021-08-11 | Stop reason: HOSPADM

## 2021-08-09 RX ORDER — ONDANSETRON 2 MG/ML
4 INJECTION INTRAMUSCULAR; INTRAVENOUS EVERY 6 HOURS PRN
Status: DISCONTINUED | OUTPATIENT
Start: 2021-08-09 | End: 2021-08-11 | Stop reason: HOSPADM

## 2021-08-09 RX ORDER — ATORVASTATIN CALCIUM 40 MG/1
40 TABLET, FILM COATED ORAL NIGHTLY
Status: DISCONTINUED | OUTPATIENT
Start: 2021-08-09 | End: 2021-08-09

## 2021-08-09 RX ORDER — INSULIN GLARGINE 100 [IU]/ML
25 INJECTION, SOLUTION SUBCUTANEOUS NIGHTLY
Status: DISCONTINUED | OUTPATIENT
Start: 2021-08-09 | End: 2021-08-11 | Stop reason: HOSPADM

## 2021-08-09 RX ORDER — INSULIN LISPRO 100 [IU]/ML
5 INJECTION, SOLUTION INTRAVENOUS; SUBCUTANEOUS
Status: DISCONTINUED | OUTPATIENT
Start: 2021-08-09 | End: 2021-08-11 | Stop reason: HOSPADM

## 2021-08-09 RX ORDER — ACETAMINOPHEN 500 MG
1000 TABLET ORAL ONCE
Status: COMPLETED | OUTPATIENT
Start: 2021-08-09 | End: 2021-08-09

## 2021-08-09 RX ORDER — ASPIRIN 81 MG/1
81 TABLET ORAL DAILY
Status: DISCONTINUED | OUTPATIENT
Start: 2021-08-09 | End: 2021-08-11 | Stop reason: HOSPADM

## 2021-08-09 RX ORDER — ACETAMINOPHEN 325 MG/1
650 TABLET ORAL EVERY 4 HOURS PRN
Status: DISCONTINUED | OUTPATIENT
Start: 2021-08-09 | End: 2021-08-09

## 2021-08-09 RX ORDER — SODIUM CHLORIDE 9 MG/ML
100 INJECTION, SOLUTION INTRAVENOUS CONTINUOUS
Status: DISPENSED | OUTPATIENT
Start: 2021-08-09 | End: 2021-08-10

## 2021-08-09 RX ORDER — INSULIN LISPRO 100 [IU]/ML
0-9 INJECTION, SOLUTION INTRAVENOUS; SUBCUTANEOUS
Status: DISCONTINUED | OUTPATIENT
Start: 2021-08-09 | End: 2021-08-11 | Stop reason: HOSPADM

## 2021-08-09 RX ORDER — POTASSIUM CHLORIDE 20 MEQ/1
40 TABLET, EXTENDED RELEASE ORAL AS NEEDED
Status: DISCONTINUED | OUTPATIENT
Start: 2021-08-09 | End: 2021-08-11 | Stop reason: HOSPADM

## 2021-08-09 RX ORDER — DEXTROSE MONOHYDRATE 25 G/50ML
25 INJECTION, SOLUTION INTRAVENOUS
Status: DISCONTINUED | OUTPATIENT
Start: 2021-08-09 | End: 2021-08-11 | Stop reason: HOSPADM

## 2021-08-09 RX ORDER — MECLIZINE HYDROCHLORIDE 25 MG/1
25 TABLET ORAL ONCE
Status: COMPLETED | OUTPATIENT
Start: 2021-08-09 | End: 2021-08-09

## 2021-08-09 RX ORDER — ALUMINA, MAGNESIA, AND SIMETHICONE 2400; 2400; 240 MG/30ML; MG/30ML; MG/30ML
15 SUSPENSION ORAL EVERY 6 HOURS PRN
Status: DISCONTINUED | OUTPATIENT
Start: 2021-08-09 | End: 2021-08-11 | Stop reason: HOSPADM

## 2021-08-09 RX ORDER — OXYCODONE HCL 20 MG/1
20 TABLET, FILM COATED, EXTENDED RELEASE ORAL EVERY 12 HOURS
Status: DISCONTINUED | OUTPATIENT
Start: 2021-08-09 | End: 2021-08-11 | Stop reason: HOSPADM

## 2021-08-09 RX ORDER — OXYCODONE HYDROCHLORIDE 5 MG/1
15 TABLET ORAL EVERY 6 HOURS PRN
Status: DISCONTINUED | OUTPATIENT
Start: 2021-08-09 | End: 2021-08-11 | Stop reason: HOSPADM

## 2021-08-09 RX ORDER — ACETAMINOPHEN 160 MG/5ML
650 SOLUTION ORAL EVERY 4 HOURS PRN
Status: DISCONTINUED | OUTPATIENT
Start: 2021-08-09 | End: 2021-08-11 | Stop reason: HOSPADM

## 2021-08-09 RX ORDER — INSULIN LISPRO 100 [IU]/ML
0-9 INJECTION, SOLUTION INTRAVENOUS; SUBCUTANEOUS AS NEEDED
Status: DISCONTINUED | OUTPATIENT
Start: 2021-08-09 | End: 2021-08-11 | Stop reason: HOSPADM

## 2021-08-09 RX ORDER — ACETAMINOPHEN 650 MG/1
650 SUPPOSITORY RECTAL EVERY 4 HOURS PRN
Status: DISCONTINUED | OUTPATIENT
Start: 2021-08-09 | End: 2021-08-09

## 2021-08-09 RX ORDER — POLYETHYLENE GLYCOL 3350 17 G/17G
17 POWDER, FOR SOLUTION ORAL DAILY
Status: DISCONTINUED | OUTPATIENT
Start: 2021-08-09 | End: 2021-08-11 | Stop reason: HOSPADM

## 2021-08-09 RX ORDER — PREGABALIN 100 MG/1
300 CAPSULE ORAL 2 TIMES DAILY
Status: DISCONTINUED | OUTPATIENT
Start: 2021-08-09 | End: 2021-08-11 | Stop reason: HOSPADM

## 2021-08-09 RX ORDER — NICOTINE POLACRILEX 4 MG
15 LOZENGE BUCCAL
Status: DISCONTINUED | OUTPATIENT
Start: 2021-08-09 | End: 2021-08-11 | Stop reason: HOSPADM

## 2021-08-09 RX ORDER — MAGNESIUM SULFATE 1 G/100ML
1 INJECTION INTRAVENOUS AS NEEDED
Status: DISCONTINUED | OUTPATIENT
Start: 2021-08-09 | End: 2021-08-11 | Stop reason: HOSPADM

## 2021-08-09 RX ORDER — ACETAMINOPHEN 160 MG/5ML
650 SOLUTION ORAL EVERY 4 HOURS PRN
Status: DISCONTINUED | OUTPATIENT
Start: 2021-08-09 | End: 2021-08-09

## 2021-08-09 RX ORDER — ACETAMINOPHEN 325 MG/1
650 TABLET ORAL EVERY 4 HOURS PRN
Status: DISCONTINUED | OUTPATIENT
Start: 2021-08-09 | End: 2021-08-11 | Stop reason: HOSPADM

## 2021-08-09 RX ADMIN — PREGABALIN 300 MG: 100 CAPSULE ORAL at 21:56

## 2021-08-09 RX ADMIN — BACLOFEN 10 MG: 10 TABLET ORAL at 21:58

## 2021-08-09 RX ADMIN — ACETAMINOPHEN 1000 MG: 500 TABLET, FILM COATED ORAL at 12:40

## 2021-08-09 RX ADMIN — ASPIRIN 81 MG: 81 TABLET, COATED ORAL at 19:06

## 2021-08-09 RX ADMIN — SODIUM CHLORIDE 100 ML/HR: 9 INJECTION, SOLUTION INTRAVENOUS at 17:28

## 2021-08-09 RX ADMIN — ATORVASTATIN CALCIUM 80 MG: 20 TABLET, FILM COATED ORAL at 21:57

## 2021-08-09 RX ADMIN — IOPAMIDOL 100 ML: 755 INJECTION, SOLUTION INTRAVENOUS at 11:36

## 2021-08-09 RX ADMIN — INSULIN LISPRO 5 UNITS: 100 INJECTION, SOLUTION INTRAVENOUS; SUBCUTANEOUS at 19:05

## 2021-08-09 RX ADMIN — APIXABAN 5 MG: 5 TABLET, FILM COATED ORAL at 21:58

## 2021-08-09 RX ADMIN — SODIUM CHLORIDE 1000 ML: 9 INJECTION, SOLUTION INTRAVENOUS at 12:44

## 2021-08-09 RX ADMIN — Medication 10 ML: at 16:12

## 2021-08-09 RX ADMIN — OXYCODONE HYDROCHLORIDE 20 MG: 20 TABLET, FILM COATED, EXTENDED RELEASE ORAL at 21:34

## 2021-08-09 RX ADMIN — OXYCODONE 15 MG: 5 TABLET ORAL at 17:24

## 2021-08-09 RX ADMIN — MECLIZINE HYDROCHLORIDE 25 MG: 25 TABLET ORAL at 12:40

## 2021-08-10 PROBLEM — R41.82 ALTERED MENTAL STATUS: Status: ACTIVE | Noted: 2021-08-10

## 2021-08-10 LAB
ANION GAP SERPL CALCULATED.3IONS-SCNC: 8 MMOL/L (ref 5–15)
BUN SERPL-MCNC: 19 MG/DL (ref 8–23)
BUN/CREAT SERPL: 16.4 (ref 7–25)
CALCIUM SPEC-SCNC: 8.1 MG/DL (ref 8.6–10.5)
CHLORIDE SERPL-SCNC: 106 MMOL/L (ref 98–107)
CO2 SERPL-SCNC: 24 MMOL/L (ref 22–29)
CREAT SERPL-MCNC: 1.16 MG/DL (ref 0.76–1.27)
DEPRECATED RDW RBC AUTO: 44.2 FL (ref 37–54)
ERYTHROCYTE [DISTWIDTH] IN BLOOD BY AUTOMATED COUNT: 14.5 % (ref 12.3–15.4)
GFR SERPL CREATININE-BSD FRML MDRD: 63 ML/MIN/1.73
GLUCOSE BLDC GLUCOMTR-MCNC: 155 MG/DL (ref 70–105)
GLUCOSE BLDC GLUCOMTR-MCNC: 160 MG/DL (ref 70–105)
GLUCOSE BLDC GLUCOMTR-MCNC: 168 MG/DL (ref 70–105)
GLUCOSE BLDC GLUCOMTR-MCNC: 177 MG/DL (ref 70–105)
GLUCOSE BLDC GLUCOMTR-MCNC: 181 MG/DL (ref 70–105)
GLUCOSE SERPL-MCNC: 233 MG/DL (ref 65–99)
HCT VFR BLD AUTO: 37.6 % (ref 37.5–51)
HGB BLD-MCNC: 12.9 G/DL (ref 13–17.7)
MAGNESIUM SERPL-MCNC: 1.8 MG/DL (ref 1.6–2.4)
MCH RBC QN AUTO: 30.4 PG (ref 26.6–33)
MCHC RBC AUTO-ENTMCNC: 34.2 G/DL (ref 31.5–35.7)
MCV RBC AUTO: 88.9 FL (ref 79–97)
PLATELET # BLD AUTO: 112 10*3/MM3 (ref 140–450)
PMV BLD AUTO: 10.3 FL (ref 6–12)
POTASSIUM SERPL-SCNC: 3.8 MMOL/L (ref 3.5–5.2)
QT INTERVAL: 401 MS
QT INTERVAL: 431 MS
RBC # BLD AUTO: 4.23 10*6/MM3 (ref 4.14–5.8)
SODIUM SERPL-SCNC: 138 MMOL/L (ref 136–145)
WBC # BLD AUTO: 6.1 10*3/MM3 (ref 3.4–10.8)

## 2021-08-10 PROCEDURE — 63710000001 INSULIN LISPRO (HUMAN) PER 5 UNITS: Performed by: NURSE PRACTITIONER

## 2021-08-10 PROCEDURE — 63710000001 INSULIN GLARGINE PER 5 UNITS: Performed by: NURSE PRACTITIONER

## 2021-08-10 PROCEDURE — 80048 BASIC METABOLIC PNL TOTAL CA: CPT | Performed by: NURSE PRACTITIONER

## 2021-08-10 PROCEDURE — 85027 COMPLETE CBC AUTOMATED: CPT | Performed by: NURSE PRACTITIONER

## 2021-08-10 PROCEDURE — 82962 GLUCOSE BLOOD TEST: CPT

## 2021-08-10 PROCEDURE — 99232 SBSQ HOSP IP/OBS MODERATE 35: CPT | Performed by: NURSE PRACTITIONER

## 2021-08-10 PROCEDURE — 36415 COLL VENOUS BLD VENIPUNCTURE: CPT | Performed by: NURSE PRACTITIONER

## 2021-08-10 PROCEDURE — 99232 SBSQ HOSP IP/OBS MODERATE 35: CPT | Performed by: INTERNAL MEDICINE

## 2021-08-10 PROCEDURE — 83735 ASSAY OF MAGNESIUM: CPT | Performed by: NURSE PRACTITIONER

## 2021-08-10 PROCEDURE — 93005 ELECTROCARDIOGRAM TRACING: CPT | Performed by: INTERNAL MEDICINE

## 2021-08-10 PROCEDURE — 97162 PT EVAL MOD COMPLEX 30 MIN: CPT

## 2021-08-10 PROCEDURE — 97166 OT EVAL MOD COMPLEX 45 MIN: CPT

## 2021-08-10 PROCEDURE — G0378 HOSPITAL OBSERVATION PER HR: HCPCS

## 2021-08-10 RX ADMIN — INSULIN LISPRO 2 UNITS: 100 INJECTION, SOLUTION INTRAVENOUS; SUBCUTANEOUS at 17:54

## 2021-08-10 RX ADMIN — INSULIN LISPRO 5 UNITS: 100 INJECTION, SOLUTION INTRAVENOUS; SUBCUTANEOUS at 17:55

## 2021-08-10 RX ADMIN — OXYCODONE 15 MG: 5 TABLET ORAL at 11:15

## 2021-08-10 RX ADMIN — INSULIN GLARGINE 25 UNITS: 100 INJECTION, SOLUTION SUBCUTANEOUS at 23:28

## 2021-08-10 RX ADMIN — INSULIN LISPRO 2 UNITS: 100 INJECTION, SOLUTION INTRAVENOUS; SUBCUTANEOUS at 13:40

## 2021-08-10 RX ADMIN — Medication 10 ML: at 13:56

## 2021-08-10 RX ADMIN — BACLOFEN 10 MG: 10 TABLET ORAL at 22:13

## 2021-08-10 RX ADMIN — APIXABAN 5 MG: 5 TABLET, FILM COATED ORAL at 13:55

## 2021-08-10 RX ADMIN — PREGABALIN 300 MG: 100 CAPSULE ORAL at 13:55

## 2021-08-10 RX ADMIN — INSULIN LISPRO 2 UNITS: 100 INJECTION, SOLUTION INTRAVENOUS; SUBCUTANEOUS at 07:30

## 2021-08-10 RX ADMIN — OXYCODONE HYDROCHLORIDE 20 MG: 20 TABLET, FILM COATED, EXTENDED RELEASE ORAL at 06:43

## 2021-08-10 RX ADMIN — APIXABAN 5 MG: 5 TABLET, FILM COATED ORAL at 22:13

## 2021-08-10 RX ADMIN — PREGABALIN 300 MG: 100 CAPSULE ORAL at 23:08

## 2021-08-10 RX ADMIN — ATORVASTATIN CALCIUM 80 MG: 20 TABLET, FILM COATED ORAL at 22:14

## 2021-08-10 RX ADMIN — NITROGLYCERIN 0.4 MG: 0.4 TABLET SUBLINGUAL at 01:29

## 2021-08-10 RX ADMIN — ASPIRIN 81 MG: 81 TABLET, COATED ORAL at 13:55

## 2021-08-10 RX ADMIN — ACETAMINOPHEN 650 MG: 325 TABLET, FILM COATED ORAL at 02:01

## 2021-08-10 RX ADMIN — INSULIN LISPRO 5 UNITS: 100 INJECTION, SOLUTION INTRAVENOUS; SUBCUTANEOUS at 13:38

## 2021-08-10 RX ADMIN — Medication 10 ML: at 22:14

## 2021-08-10 RX ADMIN — OXYCODONE HYDROCHLORIDE 20 MG: 20 TABLET, FILM COATED, EXTENDED RELEASE ORAL at 18:33

## 2021-08-11 ENCOUNTER — READMISSION MANAGEMENT (OUTPATIENT)
Dept: CALL CENTER | Facility: HOSPITAL | Age: 66
End: 2021-08-11

## 2021-08-11 VITALS
DIASTOLIC BLOOD PRESSURE: 60 MMHG | WEIGHT: 254.6 LBS | RESPIRATION RATE: 16 BRPM | OXYGEN SATURATION: 92 % | HEIGHT: 68 IN | HEART RATE: 73 BPM | TEMPERATURE: 98.1 F | BODY MASS INDEX: 38.58 KG/M2 | SYSTOLIC BLOOD PRESSURE: 128 MMHG

## 2021-08-11 LAB
GLUCOSE BLDC GLUCOMTR-MCNC: 129 MG/DL (ref 70–105)
GLUCOSE BLDC GLUCOMTR-MCNC: 173 MG/DL (ref 70–105)
GLUCOSE BLDC GLUCOMTR-MCNC: 177 MG/DL (ref 70–105)
HOLD SPECIMEN: NORMAL
MAGNESIUM SERPL-MCNC: 1.8 MG/DL (ref 1.6–2.4)
POTASSIUM SERPL-SCNC: 3.9 MMOL/L (ref 3.5–5.2)
WHOLE BLOOD HOLD SPECIMEN: NORMAL

## 2021-08-11 PROCEDURE — 97116 GAIT TRAINING THERAPY: CPT

## 2021-08-11 PROCEDURE — 99239 HOSP IP/OBS DSCHRG MGMT >30: CPT | Performed by: INTERNAL MEDICINE

## 2021-08-11 PROCEDURE — 63710000001 INSULIN LISPRO (HUMAN) PER 5 UNITS: Performed by: NURSE PRACTITIONER

## 2021-08-11 PROCEDURE — 36415 COLL VENOUS BLD VENIPUNCTURE: CPT | Performed by: NURSE PRACTITIONER

## 2021-08-11 PROCEDURE — 82962 GLUCOSE BLOOD TEST: CPT

## 2021-08-11 PROCEDURE — 83735 ASSAY OF MAGNESIUM: CPT | Performed by: NURSE PRACTITIONER

## 2021-08-11 PROCEDURE — 97530 THERAPEUTIC ACTIVITIES: CPT

## 2021-08-11 PROCEDURE — 84132 ASSAY OF SERUM POTASSIUM: CPT | Performed by: NURSE PRACTITIONER

## 2021-08-11 RX ORDER — ONDANSETRON 4 MG/1
4 TABLET, FILM COATED ORAL EVERY 6 HOURS PRN
Qty: 30 TABLET | Refills: 0 | Status: SHIPPED | OUTPATIENT
Start: 2021-08-11 | End: 2021-12-01

## 2021-08-11 RX ORDER — OXYCODONE HYDROCHLORIDE 15 MG/1
15 TABLET ORAL EVERY 6 HOURS PRN
Qty: 120 TABLET | Refills: 0 | Status: SHIPPED | OUTPATIENT
Start: 2021-08-11 | End: 2021-10-19 | Stop reason: SDUPTHER

## 2021-08-11 RX ORDER — PREGABALIN 300 MG/1
300 CAPSULE ORAL 2 TIMES DAILY
Qty: 180 CAPSULE | Refills: 1 | Status: SHIPPED | OUTPATIENT
Start: 2021-08-11 | End: 2021-10-19 | Stop reason: SDUPTHER

## 2021-08-11 RX ORDER — MECLIZINE HYDROCHLORIDE 25 MG/1
25 TABLET ORAL 3 TIMES DAILY PRN
Qty: 90 TABLET | Refills: 0 | Status: SHIPPED | OUTPATIENT
Start: 2021-08-11 | End: 2021-10-19

## 2021-08-11 RX ORDER — OXYCODONE HCL 20 MG/1
20 TABLET, FILM COATED, EXTENDED RELEASE ORAL EVERY 12 HOURS
Qty: 60 TABLET | Refills: 0 | Status: SHIPPED | OUTPATIENT
Start: 2021-08-11 | End: 2021-10-19 | Stop reason: SDUPTHER

## 2021-08-11 RX ADMIN — OXYCODONE HYDROCHLORIDE 20 MG: 20 TABLET, FILM COATED, EXTENDED RELEASE ORAL at 08:35

## 2021-08-11 RX ADMIN — Medication 10 ML: at 08:36

## 2021-08-11 RX ADMIN — APIXABAN 5 MG: 5 TABLET, FILM COATED ORAL at 08:36

## 2021-08-11 RX ADMIN — PREGABALIN 300 MG: 100 CAPSULE ORAL at 08:35

## 2021-08-11 RX ADMIN — POLYETHYLENE GLYCOL 3350 17 G: 17 POWDER, FOR SOLUTION ORAL at 08:35

## 2021-08-11 RX ADMIN — OXYCODONE 15 MG: 5 TABLET ORAL at 10:57

## 2021-08-11 RX ADMIN — ASPIRIN 81 MG: 81 TABLET, COATED ORAL at 08:35

## 2021-08-11 RX ADMIN — OXYCODONE 15 MG: 5 TABLET ORAL at 02:24

## 2021-08-11 RX ADMIN — INSULIN LISPRO 2 UNITS: 100 INJECTION, SOLUTION INTRAVENOUS; SUBCUTANEOUS at 12:38

## 2021-08-11 RX ADMIN — INSULIN LISPRO 5 UNITS: 100 INJECTION, SOLUTION INTRAVENOUS; SUBCUTANEOUS at 12:36

## 2021-08-11 RX ADMIN — INSULIN LISPRO 5 UNITS: 100 INJECTION, SOLUTION INTRAVENOUS; SUBCUTANEOUS at 17:33

## 2021-08-11 RX ADMIN — INSULIN LISPRO 2 UNITS: 100 INJECTION, SOLUTION INTRAVENOUS; SUBCUTANEOUS at 17:34

## 2021-08-11 NOTE — OUTREACH NOTE
Prep Survey      Responses   [facility] facility patient discharged from?  [facility]   Is LACE score < 7 ?  No   Emergency Room discharge w/ pulse ox?  No   Eligibility  Not Eligible   What are the reasons patient is not eligible?  [facility]   Does the patient have one of the following disease processes/diagnoses(primary or secondary)?  Other   Prep survey completed?  Yes          Anum Salgado RN

## 2021-08-12 LAB
BH CV ECHO MEAS - ACS: 2.3 CM
BH CV ECHO MEAS - AO MAX PG (FULL): 2.7 MMHG
BH CV ECHO MEAS - AO MAX PG: 5.9 MMHG
BH CV ECHO MEAS - AO MEAN PG (FULL): 1.5 MMHG
BH CV ECHO MEAS - AO MEAN PG: 3.3 MMHG
BH CV ECHO MEAS - AO ROOT AREA (BSA CORRECTED): 1.5
BH CV ECHO MEAS - AO ROOT AREA: 8.8 CM^2
BH CV ECHO MEAS - AO ROOT DIAM: 3.4 CM
BH CV ECHO MEAS - AO V2 MAX: 121.4 CM/SEC
BH CV ECHO MEAS - AO V2 MEAN: 86.1 CM/SEC
BH CV ECHO MEAS - AO V2 VTI: 25.6 CM
BH CV ECHO MEAS - ASC AORTA: 3.2 CM
BH CV ECHO MEAS - AVA(I,A): 2.6 CM^2
BH CV ECHO MEAS - AVA(I,D): 2.6 CM^2
BH CV ECHO MEAS - AVA(V,A): 2.5 CM^2
BH CV ECHO MEAS - AVA(V,D): 2.5 CM^2
BH CV ECHO MEAS - BSA(HAYCOCK): 2.4 M^2
BH CV ECHO MEAS - BSA: 2.2 M^2
BH CV ECHO MEAS - BZI_BMI: 37.3 KILOGRAMS/M^2
BH CV ECHO MEAS - BZI_METRIC_HEIGHT: 172.7 CM
BH CV ECHO MEAS - BZI_METRIC_WEIGHT: 111.1 KG
BH CV ECHO MEAS - EDV(CUBED): 132 ML
BH CV ECHO MEAS - EDV(MOD-SP4): 65.1 ML
BH CV ECHO MEAS - EDV(TEICH): 123.4 ML
BH CV ECHO MEAS - EF(CUBED): 76.3 %
BH CV ECHO MEAS - EF(MOD-BP): 58 %
BH CV ECHO MEAS - EF(MOD-SP4): 57.6 %
BH CV ECHO MEAS - EF(TEICH): 68 %
BH CV ECHO MEAS - ESV(CUBED): 31.3 ML
BH CV ECHO MEAS - ESV(MOD-SP4): 27.6 ML
BH CV ECHO MEAS - ESV(TEICH): 39.5 ML
BH CV ECHO MEAS - FS: 38.1 %
BH CV ECHO MEAS - IVS/LVPW: 0.98
BH CV ECHO MEAS - IVSD: 1.2 CM
BH CV ECHO MEAS - LA DIMENSION(2D): 4.3 CM
BH CV ECHO MEAS - LV DIASTOLIC VOL/BSA (35-75): 29.2 ML/M^2
BH CV ECHO MEAS - LV MASS(C)D: 246.8 GRAMS
BH CV ECHO MEAS - LV MASS(C)DI: 110.8 GRAMS/M^2
BH CV ECHO MEAS - LV MAX PG: 3.2 MMHG
BH CV ECHO MEAS - LV MEAN PG: 1.8 MMHG
BH CV ECHO MEAS - LV SYSTOLIC VOL/BSA (12-30): 12.4 ML/M^2
BH CV ECHO MEAS - LV V1 MAX: 88.8 CM/SEC
BH CV ECHO MEAS - LV V1 MEAN: 63.9 CM/SEC
BH CV ECHO MEAS - LV V1 VTI: 19 CM
BH CV ECHO MEAS - LVIDD: 5.1 CM
BH CV ECHO MEAS - LVIDS: 3.2 CM
BH CV ECHO MEAS - LVOT AREA: 3.5 CM^2
BH CV ECHO MEAS - LVOT DIAM: 2.1 CM
BH CV ECHO MEAS - LVPWD: 1.2 CM
BH CV ECHO MEAS - MR MAX PG: 19 MMHG
BH CV ECHO MEAS - MR MAX VEL: 218 CM/SEC
BH CV ECHO MEAS - MV A MAX VEL: 103.6 CM/SEC
BH CV ECHO MEAS - MV DEC SLOPE: 307.6 CM/SEC^2
BH CV ECHO MEAS - MV DEC TIME: 0.27 SEC
BH CV ECHO MEAS - MV E MAX VEL: 83.1 CM/SEC
BH CV ECHO MEAS - MV E/A: 0.8
BH CV ECHO MEAS - MV MAX PG: 5.8 MMHG
BH CV ECHO MEAS - MV MEAN PG: 2.4 MMHG
BH CV ECHO MEAS - MV V2 MAX: 120.2 CM/SEC
BH CV ECHO MEAS - MV V2 MEAN: 72.3 CM/SEC
BH CV ECHO MEAS - MV V2 VTI: 32.8 CM
BH CV ECHO MEAS - MVA(VTI): 2 CM^2
BH CV ECHO MEAS - PA MAX PG (FULL): 5.4 MMHG
BH CV ECHO MEAS - PA MAX PG: 7.1 MMHG
BH CV ECHO MEAS - PA MEAN PG (FULL): 2.7 MMHG
BH CV ECHO MEAS - PA MEAN PG: 3.6 MMHG
BH CV ECHO MEAS - PA V2 MAX: 133.6 CM/SEC
BH CV ECHO MEAS - PA V2 MEAN: 89.5 CM/SEC
BH CV ECHO MEAS - PA V2 VTI: 26.9 CM
BH CV ECHO MEAS - PULM A REVS DUR: 0.13 SEC
BH CV ECHO MEAS - PULM A REVS VEL: 29.9 CM/SEC
BH CV ECHO MEAS - PULM DIAS VEL: 47.8 CM/SEC
BH CV ECHO MEAS - PULM S/D: 1.3
BH CV ECHO MEAS - PULM SYS VEL: 63 CM/SEC
BH CV ECHO MEAS - RAP SYSTOLE: 3 MMHG
BH CV ECHO MEAS - RV MAX PG: 1.8 MMHG
BH CV ECHO MEAS - RV MEAN PG: 0.97 MMHG
BH CV ECHO MEAS - RV V1 MAX: 66.2 CM/SEC
BH CV ECHO MEAS - RV V1 MEAN: 46.9 CM/SEC
BH CV ECHO MEAS - RV V1 VTI: 15.3 CM
BH CV ECHO MEAS - RVDD: 3 CM
BH CV ECHO MEAS - SI(AO): 101.7 ML/M^2
BH CV ECHO MEAS - SI(CUBED): 45.2 ML/M^2
BH CV ECHO MEAS - SI(LVOT): 29.7 ML/M^2
BH CV ECHO MEAS - SI(MOD-SP4): 16.8 ML/M^2
BH CV ECHO MEAS - SI(TEICH): 37.6 ML/M^2
BH CV ECHO MEAS - SV(AO): 226.5 ML
BH CV ECHO MEAS - SV(CUBED): 100.7 ML
BH CV ECHO MEAS - SV(LVOT): 66.1 ML
BH CV ECHO MEAS - SV(MOD-SP4): 37.5 ML
BH CV ECHO MEAS - SV(TEICH): 83.8 ML

## 2021-08-18 ENCOUNTER — TRANSCRIBE ORDERS (OUTPATIENT)
Dept: ADMINISTRATIVE | Facility: HOSPITAL | Age: 66
End: 2021-08-18

## 2021-08-18 DIAGNOSIS — R10.9 ABDOMINAL PAIN, UNSPECIFIED ABDOMINAL LOCATION: Primary | ICD-10-CM

## 2021-08-19 ENCOUNTER — HOSPITAL ENCOUNTER (OUTPATIENT)
Facility: HOSPITAL | Age: 66
Setting detail: OBSERVATION
Discharge: NURSING, REHAB OR LONG-TERM CARE | End: 2021-08-20
Attending: INTERNAL MEDICINE | Admitting: STUDENT IN AN ORGANIZED HEALTH CARE EDUCATION/TRAINING PROGRAM

## 2021-08-19 ENCOUNTER — PATIENT OUTREACH (OUTPATIENT)
Dept: CASE MANAGEMENT | Facility: OTHER | Age: 66
End: 2021-08-19

## 2021-08-19 ENCOUNTER — APPOINTMENT (OUTPATIENT)
Dept: CT IMAGING | Facility: HOSPITAL | Age: 66
End: 2021-08-19

## 2021-08-19 DIAGNOSIS — R25.1 TREMOR: Primary | ICD-10-CM

## 2021-08-19 DIAGNOSIS — R29.90 STROKE-LIKE SYMPTOMS: ICD-10-CM

## 2021-08-19 LAB
ALBUMIN SERPL-MCNC: 3.5 G/DL (ref 3.5–5.2)
ALBUMIN/GLOB SERPL: 1.1 G/DL
ALP SERPL-CCNC: 76 U/L (ref 39–117)
ALT SERPL W P-5'-P-CCNC: 11 U/L (ref 1–41)
ANION GAP SERPL CALCULATED.3IONS-SCNC: 10 MMOL/L (ref 5–15)
AST SERPL-CCNC: 12 U/L (ref 1–40)
BASOPHILS # BLD AUTO: 0.1 10*3/MM3 (ref 0–0.2)
BASOPHILS NFR BLD AUTO: 0.8 % (ref 0–1.5)
BILIRUB SERPL-MCNC: 0.4 MG/DL (ref 0–1.2)
BUN SERPL-MCNC: 15 MG/DL (ref 8–23)
BUN/CREAT SERPL: 11.5 (ref 7–25)
CALCIUM SPEC-SCNC: 8.6 MG/DL (ref 8.6–10.5)
CHLORIDE SERPL-SCNC: 104 MMOL/L (ref 98–107)
CO2 SERPL-SCNC: 27 MMOL/L (ref 22–29)
CREAT SERPL-MCNC: 1.31 MG/DL (ref 0.76–1.27)
DEPRECATED RDW RBC AUTO: 45.9 FL (ref 37–54)
EOSINOPHIL # BLD AUTO: 0.3 10*3/MM3 (ref 0–0.4)
EOSINOPHIL NFR BLD AUTO: 3.5 % (ref 0.3–6.2)
ERYTHROCYTE [DISTWIDTH] IN BLOOD BY AUTOMATED COUNT: 14.6 % (ref 12.3–15.4)
GFR SERPL CREATININE-BSD FRML MDRD: 55 ML/MIN/1.73
GLOBULIN UR ELPH-MCNC: 3.2 GM/DL
GLUCOSE BLDC GLUCOMTR-MCNC: 94 MG/DL (ref 70–105)
GLUCOSE SERPL-MCNC: 97 MG/DL (ref 65–99)
HCT VFR BLD AUTO: 42.8 % (ref 37.5–51)
HGB BLD-MCNC: 14.7 G/DL (ref 13–17.7)
LARGE PLATELETS: NORMAL
LYMPHOCYTES # BLD AUTO: 3.7 10*3/MM3 (ref 0.7–3.1)
LYMPHOCYTES NFR BLD AUTO: 41.9 % (ref 19.6–45.3)
MCH RBC QN AUTO: 31 PG (ref 26.6–33)
MCHC RBC AUTO-ENTMCNC: 34.4 G/DL (ref 31.5–35.7)
MCV RBC AUTO: 90.2 FL (ref 79–97)
MONOCYTES # BLD AUTO: 0.8 10*3/MM3 (ref 0.1–0.9)
MONOCYTES NFR BLD AUTO: 9.4 % (ref 5–12)
NEUTROPHILS NFR BLD AUTO: 3.9 10*3/MM3 (ref 1.7–7)
NEUTROPHILS NFR BLD AUTO: 44.4 % (ref 42.7–76)
NRBC BLD AUTO-RTO: 0.3 /100 WBC (ref 0–0.2)
PLATELET # BLD AUTO: 133 10*3/MM3 (ref 140–450)
PMV BLD AUTO: 11 FL (ref 6–12)
POTASSIUM SERPL-SCNC: 4.2 MMOL/L (ref 3.5–5.2)
PROT SERPL-MCNC: 6.7 G/DL (ref 6–8.5)
RBC # BLD AUTO: 4.75 10*6/MM3 (ref 4.14–5.8)
RBC MORPH BLD: NORMAL
SMALL PLATELETS BLD QL SMEAR: NORMAL
SODIUM SERPL-SCNC: 141 MMOL/L (ref 136–145)
TSH SERPL DL<=0.05 MIU/L-ACNC: 3.28 UIU/ML (ref 0.27–4.2)
WBC # BLD AUTO: 8.8 10*3/MM3 (ref 3.4–10.8)
WBC MORPH BLD: NORMAL
WHOLE BLOOD HOLD SPECIMEN: NORMAL

## 2021-08-19 PROCEDURE — 85007 BL SMEAR W/DIFF WBC COUNT: CPT | Performed by: PHYSICIAN ASSISTANT

## 2021-08-19 PROCEDURE — 70450 CT HEAD/BRAIN W/O DYE: CPT

## 2021-08-19 PROCEDURE — 84443 ASSAY THYROID STIM HORMONE: CPT | Performed by: PHYSICIAN ASSISTANT

## 2021-08-19 PROCEDURE — 93005 ELECTROCARDIOGRAM TRACING: CPT | Performed by: PHYSICIAN ASSISTANT

## 2021-08-19 PROCEDURE — 82962 GLUCOSE BLOOD TEST: CPT

## 2021-08-19 PROCEDURE — 85025 COMPLETE CBC W/AUTO DIFF WBC: CPT | Performed by: PHYSICIAN ASSISTANT

## 2021-08-19 PROCEDURE — 80053 COMPREHEN METABOLIC PANEL: CPT | Performed by: PHYSICIAN ASSISTANT

## 2021-08-19 PROCEDURE — 99285 EMERGENCY DEPT VISIT HI MDM: CPT

## 2021-08-19 RX ORDER — SODIUM CHLORIDE 0.9 % (FLUSH) 0.9 %
10 SYRINGE (ML) INJECTION AS NEEDED
Status: DISCONTINUED | OUTPATIENT
Start: 2021-08-19 | End: 2021-08-21 | Stop reason: HOSPADM

## 2021-08-19 RX ORDER — ACETAMINOPHEN 500 MG
1000 TABLET ORAL ONCE
Status: COMPLETED | OUTPATIENT
Start: 2021-08-19 | End: 2021-08-19

## 2021-08-19 RX ADMIN — ACETAMINOPHEN 1000 MG: 500 TABLET, FILM COATED ORAL at 22:51

## 2021-08-19 NOTE — ED NOTES
Patient states that he is here today with tremors; he states that he has no other symptoms; tremors started three days ago; tremors occur when lifting any weight; tremors occur in bilateral arms and bilateral legs; reports no pain with tremors     Taylor Jimenez, RN  08/19/21 9737

## 2021-08-19 NOTE — ED PROVIDER NOTES
Subjective     Patient is 65-year-old male who reports tremors of bilateral upper extremities and legs for the past 4 days.  Patient states that he has a history of CVA x3 with residual left-sided weakness and was recently admitted to [facility] about a week ago for strokelike symptoms.  Patient was last admitted on 8/9/2021 and discharged on 8/11/2021.  Upon chart review, CTAs and MRIs from prior visit show no acute findings.  Patient however does have chronic bilateral basal ganglia infarcts on MRI.  Patient otherwise denies any fever, chills, cough, chest pain, shortness of breath, nausea, vomiting, abdominal pain, diarrhea, urinary symptoms.  Patient reports that his dizziness since last week has somewhat improved while at rehab at [facility].  Patient reports that his symptoms that started 4 days ago are worse when he is trying to use his phone or hold his phone up to his face that sometimes he drops it or pushes the wrong button when he touches a screen.          Review of Systems   Constitutional: Negative for chills, fatigue and fever.   HENT: Negative for congestion, sore throat, tinnitus and trouble swallowing.    Eyes: Negative for photophobia, discharge and visual disturbance.   Respiratory: Negative for cough, shortness of breath and wheezing.    Cardiovascular: Negative for chest pain, palpitations and leg swelling.   Gastrointestinal: Negative for abdominal pain, diarrhea, nausea and vomiting.   Genitourinary: Negative for dysuria, flank pain, frequency and urgency.   Musculoskeletal: Negative for arthralgias and myalgias.   Skin: Negative for rash.   Neurological: Positive for dizziness and tremors. Negative for syncope, speech difficulty, weakness, light-headedness, numbness and headaches.   Psychiatric/Behavioral: Negative for confusion.       Past Medical History:   Diagnosis Date   • Arthritis    • BPH    • CAD    • Cerebrovascular accident (CVA) due to bilateral embolism  of middle cerebral arteries (CMS/HCC) 7/14/2020   • Chronic back pain    • DMII    • Excessive daytime sleepiness    • Low back pain    • Low vision of right eye with normal vision in contralateral eye    • Memory problem    • Neuropathy    • Obesity    • PFO (patent foramen ovale) 11/29/2019   • PSA    • Snoring    • Stroke 05/2021    hx stroke x3       Allergies   Allergen Reactions   • Ms Contin [Morphine] Mental Status Change       Past Surgical History:   Procedure Laterality Date   • ARM TENDON REPAIR Left    • CARDIAC CATHETERIZATION N/A 11/16/2020   • CARDIAC CATHETERIZATION N/A 11/16/2020   • CHOLECYSTECTOMY     • COLONOSCOPY      2018 = TA, rech 2023   • EYE SURGERY      Rt Eye Sx after trauma @ 4y/o   • JOINT REPLACEMENT      Left TKR   • ROTATOR CUFF REPAIR Bilateral    • UMBILICAL HERNIA REPAIR         Family History   Problem Relation Age of Onset   • Diabetes Mother    • Heart disease Mother    • Arthritis Mother    • Obesity Mother    • Hypertension Mother    • Migraines Mother    • Osteoporosis Mother    • Snoring Mother    • Diabetes Father    • Heart disease Father    • Arthritis Father    • Hypertension Father    • Stroke Father    • Heart attack Father    • Hyperlipidemia Father    • Snoring Father    • Diabetes Brother    • Heart disease Brother    • Arthritis Sister    • Anemia Sister        Social History     Socioeconomic History   • Marital status: Single     Spouse name: Not on file   • Number of children: Not on file   • Years of education: Not on file   • Highest education level: Not on file   Tobacco Use   • Smoking status: Never Smoker   • Smokeless tobacco: Never Used   Vaping Use   • Vaping Use: Never used   Substance and Sexual Activity   • Alcohol use: Not Currently     Alcohol/week: 1.0 standard drinks     Types: 1 Cans of beer per week   • Drug use: Never   • Sexual activity: Defer           Objective   Physical Exam  Vitals and nursing note reviewed.   Constitutional:        General: He is not in acute distress.     Appearance: Normal appearance. He is well-developed. He is not diaphoretic.   HENT:      Head: Normocephalic and atraumatic.      Right Ear: Ear canal and external ear normal.      Left Ear: Ear canal and external ear normal.      Nose: Nose normal.      Mouth/Throat:      Pharynx: No oropharyngeal exudate.   Eyes:      Extraocular Movements: Extraocular movements intact.      Conjunctiva/sclera: Conjunctivae normal.      Pupils: Pupils are equal, round, and reactive to light.   Cardiovascular:      Rate and Rhythm: Normal rate and regular rhythm.      Pulses: Normal pulses.      Heart sounds: Normal heart sounds.      Comments: S1, S2 audible.  Pulmonary:      Effort: Pulmonary effort is normal. No respiratory distress.      Breath sounds: Normal breath sounds. No wheezing, rhonchi or rales.      Comments: On room air.  Abdominal:      General: Bowel sounds are normal. There is no distension.      Palpations: Abdomen is soft.      Tenderness: There is no abdominal tenderness. There is no guarding or rebound.   Musculoskeletal:         General: No tenderness or deformity. Normal range of motion.      Cervical back: Normal range of motion.      Right lower leg: Edema (trace) present.      Left lower leg: Edema (trace) present.   Skin:     General: Skin is warm.      Capillary Refill: Capillary refill takes less than 2 seconds.      Findings: No erythema or rash.   Neurological:      Mental Status: He is alert and oriented to person, place, and time.      Cranial Nerves: No cranial nerve deficit.      Comments: Cranial nerves II through XII intact.    Motor: 5+ upper extremity bilaterally, 3+ left lower extremity versus right lower extremity strength.  Sensation: Grossly intact to fine touch upper extremity and lower extremity bilaterally symmetric.    Cerebellar: FTN bilaterally.  No tremor noted on exam.    Tone: Normal bulk and tone in upper and lower extremities.  No  "atrophy noted.  Patient otherwise currently at baseline as no tremor is noted per history of CVA x3 and has a history of left-sided weakness along with dizziness at baseline.     Psychiatric:         Mood and Affect: Mood normal.         Behavior: Behavior normal.         Procedures           ED Course  ED Course as of Aug 20 0150   Fri Aug 20, 2021   0100 Awaiting Ct results    [RL]   0114 Awaiting Ct read for 4 hours    [RL]      ED Course User Index  [RL] Remington Pride PA      /61   Pulse 70   Temp 98.7 °F (37.1 °C) (Oral)   Resp 16   Ht 180.3 cm (71\")   Wt 113 kg (250 lb)   SpO2 95%   BMI 34.87 kg/m²   Labs Reviewed   COMPREHENSIVE METABOLIC PANEL - Abnormal; Notable for the following components:       Result Value    Creatinine 1.31 (*)     eGFR Non  Amer 55 (*)     All other components within normal limits    Narrative:     GFR Normal >60  Chronic Kidney Disease <60  Kidney Failure <15     CBC WITH AUTO DIFFERENTIAL - Abnormal; Notable for the following components:    Platelets 133 (*)     Lymphocytes, Absolute 3.70 (*)     nRBC 0.3 (*)     All other components within normal limits   TSH - Normal   POCT GLUCOSE FINGERSTICK - Normal   SCAN SLIDE    Narrative:     No platelet clumps seen.    URINALYSIS W/ CULTURE IF INDICATED   POCT GLUCOSE FINGERSTICK   CBC AND DIFFERENTIAL    Narrative:     The following orders were created for panel order CBC & Differential.  Procedure                               Abnormality         Status                     ---------                               -----------         ------                     CBC Auto Differential[[id number]]        Abnormal            Final result               Scan Slide[[id number]]                                       Final result                 Please view results for these tests on the individual orders.   EXTRA TUBES    Narrative:     The following orders were created for panel order Extra Tubes.  Procedure                          " "     Abnormality         Status                     ---------                               -----------         ------                     Lavender Top[[id number]]                                     Final result                 Please view results for these tests on the individual orders.   LAVENDER TOP   EXTRA TUBES    Narrative:     The following orders were created for panel order Extra Tubes.  Procedure                               Abnormality         Status                     ---------                               -----------         ------                     Lavender Top[[id number]]                                                                  Please view results for these tests on the individual orders.   LAVENDER TOP     CT Head Without Contrast    Result Date: 8/20/2021  1. No acute intracranial process. MRI is more sensitive for the detection of acute nonhemorrhagic infarct. 2. Mild changes small vessel ischemic disease of indeterminate age, presumably mostly chronic. Volume loss. Atherosclerosis.  Electronically signed by:  Vasu Gonzalez M.D.  8/19/2021 11:22 PM                                         MDM  Number of Diagnoses or Management Options     Amount and/or Complexity of Data Reviewed  Clinical lab tests: reviewed  Tests in the radiology section of CPT®: reviewed  Tests in the medicine section of CPT®: reviewed    Risk of Complications, Morbidity, and/or Mortality  Presenting problems: low  Diagnostic procedures: low  Management options: low    Patient Progress  Patient progress: stable       Chart review: See above  EKG:  EKG reviewed by myself interpreted by , showed sinus rhythm 76 bpm, no ST elevation apparent.    Imaging: CT head unremarkable for acute finding.  Labs: Initial glucose normal, TSH normal, CBC and CMP unremarkable.  UA pending.  Serum creatinine at baseline.  Vitals:  /61   Pulse 70   Temp 98.7 °F (37.1 °C) (Oral)   Resp 16   Ht 180.3 cm (71\")   " Wt 113 kg (250 lb)   SpO2 95%   BMI 34.87 kg/m²     Medications given:    Medications   sodium chloride 0.9 % flush 10 mL (has no administration in time range)   acetaminophen (TYLENOL) tablet 1,000 mg (1,000 mg Oral Given 8/19/21 6935)       Procedures:    MDM: Patient is a 65-year-old male comes in complaining of tremor for the past 3 to 4 days.  Of note patient has a history of CVA x3 with left-sided weakness.  Patient was also worked up at [facility] about 10 days ago for dizziness and found to have no acute findings on CTAs or MRI.  Today, TSH normal, CBC and CMP unremarkable and serum creatinine at baseline and platelets slightly low.  UA pending.  CT head shows no acute findings.  EKG showed no acute findings.  Patient did not have any appreciable tremors on exam.  Patient is otherwise at known baseline per prior strokes.  Spoke with Karley, nurse practitioner who accepted patient on behalf of hospitalist team and Dr. Watkins.  Results and plan discussed with patient and is agreeable with plan.    Final diagnoses:   Tremor   Stroke-like symptoms       ED Disposition  ED Disposition     ED Disposition Condition Comment    Decision to Admit  Level of Care: Telemetry [5]   Admitting Physician: WILI WATKINS [[id number]]   Attending Physician: WILI WATKINS [[id number]]            No follow-up provider specified.       Medication List      No changes were made to your prescriptions during this visit.          Remington Pride PA  08/20/21 0138       Remington Pride PA  08/20/21 0150

## 2021-08-19 NOTE — OUTREACH NOTE
Ambulatory Case Management Note    SNF Follow-up    Questions/Answers      Responses   Acute Facility Discharged From  [facility]   Acute Discharge Date  08/11/21   Name of the Skilled Nursing Facility?  [facility]   Estimated length of stay for the patient?  Unknown by patient nurse   Who is the insurance provider or payor of patient stay?  Medicare, Medicaid   Progression of Patient?  slow progression per patient nurse Vy        Patient nurse at [facility], Vy Martinez, [facility] patient still admitted.  States she is not aware of any discharge plans. She took RN-ACM phone # and states she will have  call back with more info.    Alejandra Gilbert RN  Ambulatory Case Management  8/19/2021, 10:18 EDT

## 2021-08-20 VITALS
DIASTOLIC BLOOD PRESSURE: 94 MMHG | OXYGEN SATURATION: 97 % | HEART RATE: 75 BPM | WEIGHT: 253.4 LBS | BODY MASS INDEX: 38.4 KG/M2 | RESPIRATION RATE: 16 BRPM | SYSTOLIC BLOOD PRESSURE: 167 MMHG | HEIGHT: 68 IN | TEMPERATURE: 98.1 F

## 2021-08-20 PROBLEM — I69.30 HISTORY OF CVA WITH RESIDUAL DEFICIT: Status: ACTIVE | Noted: 2020-07-14

## 2021-08-20 PROBLEM — R25.1 TREMOR: Status: ACTIVE | Noted: 2021-08-20

## 2021-08-20 LAB
ANION GAP SERPL CALCULATED.3IONS-SCNC: 8 MMOL/L (ref 5–15)
BASOPHILS # BLD AUTO: 0.1 10*3/MM3 (ref 0–0.2)
BASOPHILS NFR BLD AUTO: 0.9 % (ref 0–1.5)
BILIRUB UR QL STRIP: NEGATIVE
BUN SERPL-MCNC: 14 MG/DL (ref 8–23)
BUN/CREAT SERPL: 14.1 (ref 7–25)
CALCIUM SPEC-SCNC: 8.5 MG/DL (ref 8.6–10.5)
CHLORIDE SERPL-SCNC: 104 MMOL/L (ref 98–107)
CLARITY UR: CLEAR
CO2 SERPL-SCNC: 28 MMOL/L (ref 22–29)
COLOR UR: YELLOW
CREAT SERPL-MCNC: 0.99 MG/DL (ref 0.76–1.27)
DEPRECATED RDW RBC AUTO: 45.1 FL (ref 37–54)
EOSINOPHIL # BLD AUTO: 0.2 10*3/MM3 (ref 0–0.4)
EOSINOPHIL NFR BLD AUTO: 3.3 % (ref 0.3–6.2)
ERYTHROCYTE [DISTWIDTH] IN BLOOD BY AUTOMATED COUNT: 14.5 % (ref 12.3–15.4)
GFR SERPL CREATININE-BSD FRML MDRD: 76 ML/MIN/1.73
GLUCOSE BLDC GLUCOMTR-MCNC: 139 MG/DL (ref 70–105)
GLUCOSE BLDC GLUCOMTR-MCNC: 153 MG/DL (ref 70–105)
GLUCOSE BLDC GLUCOMTR-MCNC: 159 MG/DL (ref 70–105)
GLUCOSE BLDC GLUCOMTR-MCNC: 189 MG/DL (ref 70–105)
GLUCOSE SERPL-MCNC: 96 MG/DL (ref 65–99)
GLUCOSE UR STRIP-MCNC: ABNORMAL MG/DL
HCT VFR BLD AUTO: 38.4 % (ref 37.5–51)
HGB BLD-MCNC: 12.9 G/DL (ref 13–17.7)
HGB UR QL STRIP.AUTO: NEGATIVE
KETONES UR QL STRIP: NEGATIVE
LEUKOCYTE ESTERASE UR QL STRIP.AUTO: NEGATIVE
LYMPHOCYTES # BLD AUTO: 2.3 10*3/MM3 (ref 0.7–3.1)
LYMPHOCYTES NFR BLD AUTO: 41.5 % (ref 19.6–45.3)
MCH RBC QN AUTO: 30.1 PG (ref 26.6–33)
MCHC RBC AUTO-ENTMCNC: 33.7 G/DL (ref 31.5–35.7)
MCV RBC AUTO: 89.3 FL (ref 79–97)
MONOCYTES # BLD AUTO: 0.5 10*3/MM3 (ref 0.1–0.9)
MONOCYTES NFR BLD AUTO: 9.6 % (ref 5–12)
NEUTROPHILS NFR BLD AUTO: 2.5 10*3/MM3 (ref 1.7–7)
NEUTROPHILS NFR BLD AUTO: 44.7 % (ref 42.7–76)
NITRITE UR QL STRIP: NEGATIVE
NRBC BLD AUTO-RTO: 0.1 /100 WBC (ref 0–0.2)
PH UR STRIP.AUTO: 6 [PH] (ref 5–8)
PLATELET # BLD AUTO: 132 10*3/MM3 (ref 140–450)
PMV BLD AUTO: 11.3 FL (ref 6–12)
POTASSIUM SERPL-SCNC: 3.9 MMOL/L (ref 3.5–5.2)
PROT UR QL STRIP: NEGATIVE
QT INTERVAL: 413 MS
RBC # BLD AUTO: 4.3 10*6/MM3 (ref 4.14–5.8)
SARS-COV-2 RNA PNL SPEC NAA+PROBE: NOT DETECTED
SODIUM SERPL-SCNC: 140 MMOL/L (ref 136–145)
SP GR UR STRIP: 1.02 (ref 1–1.03)
UROBILINOGEN UR QL STRIP: ABNORMAL
WBC # BLD AUTO: 5.5 10*3/MM3 (ref 3.4–10.8)

## 2021-08-20 PROCEDURE — 82962 GLUCOSE BLOOD TEST: CPT

## 2021-08-20 PROCEDURE — 81003 URINALYSIS AUTO W/O SCOPE: CPT | Performed by: PHYSICIAN ASSISTANT

## 2021-08-20 PROCEDURE — 36415 COLL VENOUS BLD VENIPUNCTURE: CPT | Performed by: NURSE PRACTITIONER

## 2021-08-20 PROCEDURE — 63710000001 INSULIN LISPRO (HUMAN) PER 5 UNITS: Performed by: NURSE PRACTITIONER

## 2021-08-20 PROCEDURE — G0378 HOSPITAL OBSERVATION PER HR: HCPCS

## 2021-08-20 PROCEDURE — 99283 EMERGENCY DEPT VISIT LOW MDM: CPT | Performed by: NURSE PRACTITIONER

## 2021-08-20 PROCEDURE — 87635 SARS-COV-2 COVID-19 AMP PRB: CPT | Performed by: STUDENT IN AN ORGANIZED HEALTH CARE EDUCATION/TRAINING PROGRAM

## 2021-08-20 PROCEDURE — 63710000001 INSULIN GLARGINE PER 5 UNITS: Performed by: NURSE PRACTITIONER

## 2021-08-20 PROCEDURE — 80048 BASIC METABOLIC PNL TOTAL CA: CPT | Performed by: NURSE PRACTITIONER

## 2021-08-20 PROCEDURE — 99217 PR OBSERVATION CARE DISCHARGE MANAGEMENT: CPT | Performed by: STUDENT IN AN ORGANIZED HEALTH CARE EDUCATION/TRAINING PROGRAM

## 2021-08-20 PROCEDURE — 85025 COMPLETE CBC W/AUTO DIFF WBC: CPT | Performed by: NURSE PRACTITIONER

## 2021-08-20 RX ORDER — PREGABALIN 100 MG/1
300 CAPSULE ORAL 2 TIMES DAILY
Status: DISCONTINUED | OUTPATIENT
Start: 2021-08-20 | End: 2021-08-21 | Stop reason: HOSPADM

## 2021-08-20 RX ORDER — ATORVASTATIN CALCIUM 40 MG/1
80 TABLET, FILM COATED ORAL NIGHTLY
Status: DISCONTINUED | OUTPATIENT
Start: 2021-08-20 | End: 2021-08-21 | Stop reason: HOSPADM

## 2021-08-20 RX ORDER — CHOLECALCIFEROL (VITAMIN D3) 125 MCG
5 CAPSULE ORAL NIGHTLY PRN
Status: DISCONTINUED | OUTPATIENT
Start: 2021-08-20 | End: 2021-08-21 | Stop reason: HOSPADM

## 2021-08-20 RX ORDER — NICOTINE POLACRILEX 4 MG
LOZENGE BUCCAL
COMMUNITY
End: 2021-10-19

## 2021-08-20 RX ORDER — OXYCODONE HYDROCHLORIDE 5 MG/1
15 TABLET ORAL EVERY 6 HOURS PRN
Status: DISCONTINUED | OUTPATIENT
Start: 2021-08-20 | End: 2021-08-21 | Stop reason: HOSPADM

## 2021-08-20 RX ORDER — ALUMINA, MAGNESIA, AND SIMETHICONE 2400; 2400; 240 MG/30ML; MG/30ML; MG/30ML
15 SUSPENSION ORAL EVERY 6 HOURS PRN
Status: DISCONTINUED | OUTPATIENT
Start: 2021-08-20 | End: 2021-08-21 | Stop reason: HOSPADM

## 2021-08-20 RX ORDER — ONDANSETRON 4 MG/1
4 TABLET, FILM COATED ORAL EVERY 6 HOURS PRN
Status: DISCONTINUED | OUTPATIENT
Start: 2021-08-20 | End: 2021-08-21 | Stop reason: HOSPADM

## 2021-08-20 RX ORDER — DEXTROSE MONOHYDRATE 25 G/50ML
25 INJECTION, SOLUTION INTRAVENOUS
Status: DISCONTINUED | OUTPATIENT
Start: 2021-08-20 | End: 2021-08-21 | Stop reason: HOSPADM

## 2021-08-20 RX ORDER — BACLOFEN 10 MG/1
10 TABLET ORAL NIGHTLY
Status: DISCONTINUED | OUTPATIENT
Start: 2021-08-20 | End: 2021-08-21 | Stop reason: HOSPADM

## 2021-08-20 RX ORDER — SODIUM PHOSPHATE, DIBASIC AND SODIUM PHOSPHATE, MONOBASIC 7; 19 G/133ML; G/133ML
1 ENEMA RECTAL DAILY PRN
Status: ON HOLD | COMMUNITY
End: 2021-08-20

## 2021-08-20 RX ORDER — OLANZAPINE 10 MG/2ML
1 INJECTION, POWDER, LYOPHILIZED, FOR SOLUTION INTRAMUSCULAR AS NEEDED
Status: DISCONTINUED | OUTPATIENT
Start: 2021-08-20 | End: 2021-08-21 | Stop reason: HOSPADM

## 2021-08-20 RX ORDER — ASPIRIN 81 MG/1
81 TABLET, CHEWABLE ORAL DAILY
Status: DISCONTINUED | OUTPATIENT
Start: 2021-08-20 | End: 2021-08-21 | Stop reason: HOSPADM

## 2021-08-20 RX ORDER — INSULIN LISPRO 100 [IU]/ML
0-14 INJECTION, SOLUTION INTRAVENOUS; SUBCUTANEOUS
Status: DISCONTINUED | OUTPATIENT
Start: 2021-08-20 | End: 2021-08-21 | Stop reason: HOSPADM

## 2021-08-20 RX ORDER — OXYCODONE HCL 10 MG/1
20 TABLET, FILM COATED, EXTENDED RELEASE ORAL EVERY 12 HOURS
Status: DISCONTINUED | OUTPATIENT
Start: 2021-08-20 | End: 2021-08-21 | Stop reason: HOSPADM

## 2021-08-20 RX ORDER — ACETAMINOPHEN 325 MG/1
650 TABLET ORAL EVERY 4 HOURS PRN
Status: DISCONTINUED | OUTPATIENT
Start: 2021-08-20 | End: 2021-08-21 | Stop reason: HOSPADM

## 2021-08-20 RX ORDER — MENTHOL 40 MG/ML
GEL TOPICAL EVERY 6 HOURS PRN
COMMUNITY

## 2021-08-20 RX ORDER — INSULIN GLARGINE 100 [IU]/ML
35 INJECTION, SOLUTION SUBCUTANEOUS NIGHTLY
Status: DISCONTINUED | OUTPATIENT
Start: 2021-08-20 | End: 2021-08-21 | Stop reason: HOSPADM

## 2021-08-20 RX ORDER — SODIUM CHLORIDE 0.9 % (FLUSH) 0.9 %
10 SYRINGE (ML) INJECTION AS NEEDED
Status: DISCONTINUED | OUTPATIENT
Start: 2021-08-20 | End: 2021-08-21 | Stop reason: HOSPADM

## 2021-08-20 RX ORDER — BISACODYL 10 MG
10 SUPPOSITORY, RECTAL RECTAL DAILY PRN
Status: ON HOLD | COMMUNITY
End: 2021-08-20

## 2021-08-20 RX ORDER — INSULIN LISPRO 100 [IU]/ML
0-14 INJECTION, SOLUTION INTRAVENOUS; SUBCUTANEOUS AS NEEDED
Status: DISCONTINUED | OUTPATIENT
Start: 2021-08-20 | End: 2021-08-21 | Stop reason: HOSPADM

## 2021-08-20 RX ORDER — ACETAMINOPHEN 650 MG/1
650 SUPPOSITORY RECTAL EVERY 4 HOURS PRN
Status: DISCONTINUED | OUTPATIENT
Start: 2021-08-20 | End: 2021-08-21 | Stop reason: HOSPADM

## 2021-08-20 RX ORDER — SODIUM CHLORIDE 0.9 % (FLUSH) 0.9 %
10 SYRINGE (ML) INJECTION EVERY 12 HOURS SCHEDULED
Status: DISCONTINUED | OUTPATIENT
Start: 2021-08-20 | End: 2021-08-21 | Stop reason: HOSPADM

## 2021-08-20 RX ORDER — ACETAMINOPHEN 160 MG/5ML
650 SOLUTION ORAL EVERY 4 HOURS PRN
Status: DISCONTINUED | OUTPATIENT
Start: 2021-08-20 | End: 2021-08-21 | Stop reason: HOSPADM

## 2021-08-20 RX ORDER — NICOTINE POLACRILEX 4 MG
15 LOZENGE BUCCAL
Status: DISCONTINUED | OUTPATIENT
Start: 2021-08-20 | End: 2021-08-21 | Stop reason: HOSPADM

## 2021-08-20 RX ORDER — ONDANSETRON 2 MG/ML
4 INJECTION INTRAMUSCULAR; INTRAVENOUS EVERY 6 HOURS PRN
Status: DISCONTINUED | OUTPATIENT
Start: 2021-08-20 | End: 2021-08-21 | Stop reason: HOSPADM

## 2021-08-20 RX ADMIN — INSULIN GLARGINE 35 UNITS: 100 INJECTION, SOLUTION SUBCUTANEOUS at 21:06

## 2021-08-20 RX ADMIN — ATORVASTATIN CALCIUM 80 MG: 40 TABLET, FILM COATED ORAL at 03:44

## 2021-08-20 RX ADMIN — Medication 10 ML: at 09:39

## 2021-08-20 RX ADMIN — PREGABALIN 300 MG: 100 CAPSULE ORAL at 09:38

## 2021-08-20 RX ADMIN — ASPIRIN 81 MG: 81 TABLET, CHEWABLE ORAL at 09:38

## 2021-08-20 RX ADMIN — Medication 10 ML: at 21:10

## 2021-08-20 RX ADMIN — OXYCODONE 15 MG: 5 TABLET ORAL at 03:47

## 2021-08-20 RX ADMIN — APIXABAN 5 MG: 5 TABLET, FILM COATED ORAL at 21:09

## 2021-08-20 RX ADMIN — OXYCODONE HYDROCHLORIDE 20 MG: 20 TABLET, FILM COATED, EXTENDED RELEASE ORAL at 09:38

## 2021-08-20 RX ADMIN — Medication 10 ML: at 03:45

## 2021-08-20 RX ADMIN — APIXABAN 5 MG: 5 TABLET, FILM COATED ORAL at 09:39

## 2021-08-20 RX ADMIN — INSULIN LISPRO 3 UNITS: 100 INJECTION, SOLUTION INTRAVENOUS; SUBCUTANEOUS at 11:58

## 2021-08-20 RX ADMIN — PREGABALIN 300 MG: 100 CAPSULE ORAL at 21:08

## 2021-08-20 RX ADMIN — OXYCODONE HYDROCHLORIDE 20 MG: 20 TABLET, FILM COATED, EXTENDED RELEASE ORAL at 21:09

## 2021-08-20 NOTE — CASE MANAGEMENT/SOCIAL WORK
Discharge Planning Assessment  [facility]     Patient Name: Humble Green  MRN: [id number]  Today's Date: 8/20/2021    Admit Date: 8/19/2021    Discharge Needs Assessment    No documentation.       Discharge Plan     Row Name 08/20/21 1532       Plan    Plan  Return to [facility]    Plan Comments  Consult received regarding patient's ability to return to [facility]. I called and  spoke with patient.He is in agreement with returning to facility. I called [facility] [place] and confirmed patient can return today. Epic access given. Dr. Lemon and SHAHLA Duke notified patient can return today. Pt denies additional dc needs at this time.        Continued Care and Services - Admitted Since 8/19/2021     Destination Coordination complete.    Service Provider Request Status Selected Services Address Phone Fax Patient Preferred    [facility]   Selected Skilled Nursing [address] [place] [address] [phone] --            Selected Continued Care - Episodes Includes selections from active Coordinated Care Management episodes    High Risk Care Management Episode start date: 8/13/2021 (Paused)   There are no active outsourced providers for this episode.             Selected Continued Care - Prior Encounters Includes selections from prior encounters from 5/21/2021 to 8/20/2021    Discharged on 8/11/2021 Admission date: 8/9/2021 - Discharge disposition: Home or Self Care    Destination     Service Provider Selected Services Address Phone Fax Patient Preferred    [facility]  Skilled Nursing [address] [place] [address] [phone] --                    Cait Chávez RN, [facility]  Office: [phone]  Fax: [phone]  [email or web address]    Cait Chávez RN

## 2021-08-20 NOTE — ED NOTES
Pt resting comfortably, given morning medications and ate breakfast. Will continue to monitor.      Stephanie Lea RN  08/20/21 5214

## 2021-08-20 NOTE — CONSULTS
Primary Care Provider: Leda Duckworth DO     Consult requested by: Dr. Lemon     Reason for Consultation: Neurological evaluation, tremor     History taken from: patient chart RN    Chief complaint: tremor        SUBJECTIVE:    History of present illness: Per H&P: Patient is 65-year-old male who reports tremors of bilateral upper extremities and legs for the past 4 days.  Patient states that he has a history of CVA x3 with residual left-sided weakness and was recently admitted to [facility] about a week ago for strokelike symptoms.  Patient was last admitted on 8/9/2021 and discharged on 8/11/2021.  Upon chart review, CTAs and MRIs from prior visit show no acute findings.  Patient however does have chronic bilateral basal ganglia infarcts on MRI.  Patient otherwise denies any fever, chills, cough, chest pain, shortness of breath, nausea, vomiting, abdominal pain, diarrhea, urinary symptoms.  Patient reports that his dizziness since last week has somewhat improved while at rehab at [facility].  Patient reports that his symptoms that started 4 days ago are worse when he is trying to use his phone or hold his phone up to his face that sometimes he drops it or pushes the wrong button when he touches a screen.    Portions of the above HPI have been copied from previous encounters and edited as appropriate.    Patient has had 3-4 episodes of bilateral hand tremors within the past several days.  Episodes last approximately 30 minutes before resolving.  He feels that these typically happen right after physical therapy.  He still feels very weak.  During these episodes, patient is awake and alert, able to talk.  Patient denies that his blood glucose have been low or abnormal blood pressure.  No new medications.     Review of Systems   Constitutional: Positive for fatigue.   Eyes: Negative for visual disturbance.   Cardiovascular: Negative.    Neurological: Positive for tremors and weakness.  Negative for dizziness, seizures, syncope, facial asymmetry, speech difficulty, light-headedness, numbness and headaches.   Psychiatric/Behavioral: Negative for confusion.            PATIENT HISTORY:  Past Medical History:   Diagnosis Date   • Arthritis    • BPH    • CAD    • Cerebrovascular accident (CVA) due to bilateral embolism of middle cerebral arteries (CMS/HCC) 7/14/2020   • Chronic back pain    • DMII    • Excessive daytime sleepiness    • Low back pain    • Low vision of right eye with normal vision in contralateral eye    • Memory problem    • Neuropathy    • Obesity    • PFO (patent foramen ovale) 11/29/2019   • PSA    • Snoring    • Stroke 05/2021    hx stroke x3   ,   Past Surgical History:   Procedure Laterality Date   • ARM TENDON REPAIR Left    • CARDIAC CATHETERIZATION N/A 11/16/2020   • CARDIAC CATHETERIZATION N/A 11/16/2020   • CHOLECYSTECTOMY     • COLONOSCOPY      2018 = TA, rech 2023   • EYE SURGERY      Rt Eye Sx after trauma @ 4y/o   • JOINT REPLACEMENT      Left TKR   • ROTATOR CUFF REPAIR Bilateral    • UMBILICAL HERNIA REPAIR     ,   Family History   Problem Relation Age of Onset   • Diabetes Mother    • Heart disease Mother    • Arthritis Mother    • Obesity Mother    • Hypertension Mother    • Migraines Mother    • Osteoporosis Mother    • Snoring Mother    • Diabetes Father    • Heart disease Father    • Arthritis Father    • Hypertension Father    • Stroke Father    • Heart attack Father    • Hyperlipidemia Father    • Snoring Father    • Diabetes Brother    • Heart disease Brother    • Arthritis Sister    • Anemia Sister    ,   Social History     Tobacco Use   • Smoking status: Never Smoker   • Smokeless tobacco: Never Used   Vaping Use   • Vaping Use: Never used   Substance Use Topics   • Alcohol use: Not Currently     Alcohol/week: 1.0 standard drinks     Types: 1 Cans of beer per week   • Drug use: Never   ,   Medications Prior to Admission   Medication Sig Dispense Refill Last  Dose   • apixaban (ELIQUIS) 5 MG tablet tablet Take 1 tablet by mouth Every 12 (Twelve) Hours. 180 tablet 0    • aspirin 81 MG chewable tablet Chew 81 mg Daily.      • atorvastatin (LIPITOR) 80 MG tablet Take 1 tablet by mouth Every Night. 90 tablet 1    • baclofen (LIORESAL) 10 MG tablet Take 1 tablet by mouth Every Night. 90 tablet 0    • dextrose (GLUTOSE) 40 % gel Take  by mouth Every 1 (One) Hour As Needed for Low Blood Sugar.      • Empagliflozin (Jardiance) 25 MG tablet Take 25 mg by mouth Every Morning. 30 tablet 3    • glucagon (GLUCAGEN) 1 MG injection Infuse 1 mg into a venous catheter As Needed for Low Blood Sugar.      • Insulin Glargine (Lantus SoloStar) 100 UNIT/ML injection pen Inject 35 Units under the skin into the appropriate area as directed Every Night. 4 pen 0    • Insulin Lispro, 1 Unit Dial, (HumaLOG KwikPen) 100 UNIT/ML solution pen-injector Inject 5 Units under the skin into the appropriate area as directed 3 (Three) Times a Day Before Meals.      • magnesium hydroxide (MILK OF MAGNESIA) 400 MG/5ML suspension Take 30 mL by mouth Daily As Needed for Constipation.      • meclizine (ANTIVERT) 25 MG tablet Take 1 tablet by mouth 3 (Three) Times a Day As Needed for Dizziness. 90 tablet 0    • Menthol, Topical Analgesic, (Biofreeze) 4 % gel Apply  topically Every 6 (Six) Hours As Needed.      • ondansetron (ZOFRAN) 4 MG tablet Take 1 tablet by mouth Every 6 (Six) Hours As Needed for Nausea or Vomiting. 30 tablet 0    • oxyCODONE (ROXICODONE) 15 MG immediate release tablet Take 1 tablet by mouth Every 6 (Six) Hours As Needed for Severe Pain . 120 tablet 0 8/19/2021 at Unknown time   • oxyCODONE ER (OxyCONTIN) 20 MG 12 hr tablet Take 1 tablet by mouth Every 12 (Twelve) Hours. 60 tablet 0 8/20/2021 at Unknown time   • polyethylene glycol (MIRALAX) 17 GM/SCOOP powder Take 17 g by mouth Daily. 507 g 3    • pregabalin (LYRICA) 300 MG capsule Take 1 capsule by mouth 2 (Two) Times a Day. 180 capsule 1  8/20/2021 at Unknown time   , Scheduled Meds:  apixaban, 5 mg, Oral, Q12H  aspirin, 81 mg, Oral, Daily  atorvastatin, 80 mg, Oral, Nightly  baclofen, 10 mg, Oral, Nightly  insulin glargine, 35 Units, Subcutaneous, Nightly  insulin lispro, 0-14 Units, Subcutaneous, TID AC  oxyCODONE ER, 20 mg, Oral, Q12H  pregabalin, 300 mg, Oral, BID  sodium chloride, 10 mL, Intravenous, Q12H    , Continuous Infusions:   , PRN Meds:  •  acetaminophen **OR** acetaminophen **OR** acetaminophen  •  aluminum-magnesium hydroxide-simethicone  •  dextrose  •  dextrose  •  glucagon (human recombinant)  •  insulin lispro **AND** insulin lispro  •  melatonin  •  ondansetron **OR** ondansetron  •  oxyCODONE  •  sodium chloride  •  sodium chloride, Allergies:  Ms contin [morphine]    ________________________________________________________        OBJECTIVE:    PHYSICAL EXAM:    Constitutional: The patient is in no apparent distress, awake and alert. There is no shortness of breath.   HEENT: Normocephalic, atraumatic.   Chest: Breathing unlabored  Cardiac: Regular rate and rhythm.   Extremities:  No clubbing, cyanosis or edema.    NEUROLOGICAL:    Cognition:   Fully oriented.  Fund of knowledge decent.  Concentration and attention normal.   Language normal with normal comprehension, fluent speech, intact repetition and naming.   Short and long term memory appears intact    Cranial nerves;    II - pupils bilaterally equal reacting to light,  No new Visual field deficits;  Fundoscopic exam- Not able to be done, non-dilated exam  III,IV,VI: EOMI with no diplopia  V: Normal facial sensations  VII: No facial asymmetry,  VIII: No New hearing abnormality  IX, X, XI: normal gag and shoulder shrug;  XII: tongue is in the midline.    Sensory:  Intact to light touch in all extremities.     Motor: Strength 5-/5 bilaterally upper and lower extremities, Left weaker than right. No involuntary movements present. No asterixis. Normal tone and bulk.  Deep tendon  reflexes: 1/4 and symmetrical in biceps, brachioradialis, triceps, bilateral 1/4 knees and ankles. Both plantars are flexor.    Cerebellar: Finger to nose and mirror movements normal bilaterally.    Gait and balance: Deferred.     Physical exam performed by FRANCI Stone.  ________________________________________________________   RESULTS REVIEW:    VITAL SIGNS:   Temp:  [97.3 °F (36.3 °C)-98.7 °F (37.1 °C)] 97.4 °F (36.3 °C)  Heart Rate:  [61-85] 69  Resp:  [15-16] 16  BP: (109-169)/(48-93) 133/79     LABS:  WBC   Date Value Ref Range Status   08/20/2021 5.50 3.40 - 10.80 10*3/mm3 Final     RBC   Date Value Ref Range Status   08/20/2021 4.30 4.14 - 5.80 10*6/mm3 Final     Hemoglobin   Date Value Ref Range Status   08/20/2021 12.9 (L) 13.0 - 17.7 g/dL Final     Hematocrit   Date Value Ref Range Status   08/20/2021 38.4 37.5 - 51.0 % Final     MCV   Date Value Ref Range Status   08/20/2021 89.3 79.0 - 97.0 fL Final     MCH   Date Value Ref Range Status   08/20/2021 30.1 26.6 - 33.0 pg Final     MCHC   Date Value Ref Range Status   08/20/2021 33.7 31.5 - 35.7 g/dL Final     RDW   Date Value Ref Range Status   08/20/2021 14.5 12.3 - 15.4 % Final     RDW-SD   Date Value Ref Range Status   08/20/2021 45.1 37.0 - 54.0 fl Final     MPV   Date Value Ref Range Status   08/20/2021 11.3 6.0 - 12.0 fL Final     Platelets   Date Value Ref Range Status   08/20/2021 132 (L) 140 - 450 10*3/mm3 Final     Neutrophil %   Date Value Ref Range Status   08/20/2021 44.7 42.7 - 76.0 % Final     Lymphocyte %   Date Value Ref Range Status   08/20/2021 41.5 19.6 - 45.3 % Final     Monocyte %   Date Value Ref Range Status   08/20/2021 9.6 5.0 - 12.0 % Final     Eosinophil %   Date Value Ref Range Status   08/20/2021 3.3 0.3 - 6.2 % Final     Basophil %   Date Value Ref Range Status   08/20/2021 0.9 0.0 - 1.5 % Final     Neutrophils, Absolute   Date Value Ref Range Status   08/20/2021 2.50 1.70 - 7.00 10*3/mm3 Final     Lymphocytes,  Absolute   Date Value Ref Range Status   08/20/2021 2.30 0.70 - 3.10 10*3/mm3 Final     Monocytes, Absolute   Date Value Ref Range Status   08/20/2021 0.50 0.10 - 0.90 10*3/mm3 Final     Eosinophils, Absolute   Date Value Ref Range Status   08/20/2021 0.20 0.00 - 0.40 10*3/mm3 Final     Basophils, Absolute   Date Value Ref Range Status   08/20/2021 0.10 0.00 - 0.20 10*3/mm3 Final     nRBC   Date Value Ref Range Status   08/20/2021 0.1 0.0 - 0.2 /100 WBC Final     Glucose   Date Value Ref Range Status   08/20/2021 96 65 - 99 mg/dL Final     BUN   Date Value Ref Range Status   08/20/2021 14 8 - 23 mg/dL Final     Creatinine   Date Value Ref Range Status   08/20/2021 0.99 0.76 - 1.27 mg/dL Final     Sodium   Date Value Ref Range Status   08/20/2021 140 136 - 145 mmol/L Final     Potassium   Date Value Ref Range Status   08/20/2021 3.9 3.5 - 5.2 mmol/L Final     Chloride   Date Value Ref Range Status   08/20/2021 104 98 - 107 mmol/L Final     CO2   Date Value Ref Range Status   08/20/2021 28.0 22.0 - 29.0 mmol/L Final     Calcium   Date Value Ref Range Status   08/20/2021 8.5 (L) 8.6 - 10.5 mg/dL Final     Total Protein   Date Value Ref Range Status   08/19/2021 6.7 6.0 - 8.5 g/dL Final     Albumin   Date Value Ref Range Status   08/19/2021 3.50 3.50 - 5.20 g/dL Final     ALT (SGPT)   Date Value Ref Range Status   08/19/2021 11 1 - 41 U/L Final     AST (SGOT)   Date Value Ref Range Status   08/19/2021 12 1 - 40 U/L Final     Alkaline Phosphatase   Date Value Ref Range Status   08/19/2021 76 39 - 117 U/L Final     Total Bilirubin   Date Value Ref Range Status   08/19/2021 0.4 0.0 - 1.2 mg/dL Final     eGFR Non  Amer   Date Value Ref Range Status   08/20/2021 76 >60 mL/min/1.73 Final     BUN/Creatinine Ratio   Date Value Ref Range Status   08/20/2021 14.1 7.0 - 25.0 Final     Anion Gap   Date Value Ref Range Status   08/20/2021 8.0 5.0 - 15.0 mmol/L Final       Lab Results   Component Value Date    TSH 3.280  08/19/2021     (H) 08/09/2021    HGBA1C 8.3 (H) 08/09/2021    [id number] 610 08/09/2021         IMAGING STUDIES:  CT Head Without Contrast    Result Date: 8/20/2021  1. No acute intracranial process. MRI is more sensitive for the detection of acute nonhemorrhagic infarct. 2. Mild changes small vessel ischemic disease of indeterminate age, presumably mostly chronic. Volume loss. Atherosclerosis.  Electronically signed by:  Vasu Gonzalez M.D.  8/19/2021 11:22 PM      I reviewed the patient's new clinical results.      ________________________________________________________     PROBLEM LIST:    Tremor    Mixed hyperlipidemia    Diabetic polyneuropathy associated with type 2 diabetes mellitus (CMS/[facility])    PFO (patent foramen ovale)    Essential hypertension    Nonobstructive atherosclerosis of coronary artery    Type 2 diabetes mellitus with hyperglycemia, with long-term current use of insulin (CMS/[facility])    History of CVA with residual deficit    Chronic kidney disease (CKD), stage III (moderate) (CMS/[facility])    Obesity (BMI 30-39.9)    Chronic back pain          Assessment/Plan   ASSESSMENT/PLAN:  1. Episodic tremor, likely physiologic tremor 2/2 fatigability post exercise/physicial therapy with underlying weakness. These tremors are not seizures. This not stroke/TIA. No tremors since admission so unable to evaluate. These episodes do not sound like myoclonus.   - Chart reviewed- labs and medications reviewed   - Do not recommend adding medications for physiologic tremor   - Continue outpatient PT/OT  - Patient may need further work-up outpatient if tremors become more frequent and persistent.   - No further recommendations at this time    I discussed the patient's findings and my recommendations with the patient.     ANTONY Roberts  08/20/21  14:30 EDT

## 2021-08-20 NOTE — THERAPY DISCHARGE NOTE
Patient Name: Humble Green  : 1955    MRN: [id number]                              Today's Date: 2021       Admit Date: 2021    Visit Dx:     ICD-10-CM ICD-9-CM   1. Tremor  R25.1 781.0   2. Stroke-like symptoms  R29.90 781.99     Patient Active Problem List   Diagnosis   • Mixed hyperlipidemia   • Major depressive disorder, recurrent episode, moderate degree (CMS/[facility])   • Diabetic polyneuropathy associated with type 2 diabetes mellitus (CMS/[facility])   • PFO (patent foramen ovale)   • Essential hypertension   • Nonobstructive atherosclerosis of coronary artery   • Type 2 diabetes mellitus with hyperglycemia, with long-term current use of insulin (CMS/[facility])   • History of CVA with residual deficit   • Chronic kidney disease (CKD), stage III (moderate) (CMS/[facility])   • DDD (degenerative disc disease), lumbar   • Obesity (BMI 30-39.9)   • Sacroiliitis (CMS/[facility])   • Facet arthritis, degenerative, L5-S1 level, lumbosacral spine   • Chronic back pain   • Dizziness   • Acute encephalopathy   • Headache   • Blurred vision, right eye   • Altered mental status   • Tremor     Past Medical History:   Diagnosis Date   • Arthritis    • BPH    • CAD    • Cerebrovascular accident (CVA) due to bilateral embolism of middle cerebral arteries (CMS/[facility]) 2020   • Chronic back pain    • DMII    • Excessive daytime sleepiness    • Low back pain    • Low vision of right eye with normal vision in contralateral eye    • Memory problem    • Neuropathy    • Obesity    • PFO (patent foramen ovale) 2019   • PSA    • Snoring    • Stroke 2021    hx stroke x3     Past Surgical History:   Procedure Laterality Date   • ARM TENDON REPAIR Left    • CARDIAC CATHETERIZATION N/A 2020   • CARDIAC CATHETERIZATION N/A 2020   • CHOLECYSTECTOMY     • COLONOSCOPY       = TA, rech    • EYE SURGERY      Rt Eye Sx after trauma @ 2y/o   • JOINT REPLACEMENT      Left TKR   • ROTATOR CUFF REPAIR Bilateral    • UMBILICAL  HERNIA REPAIR       General Information    No documentation.       Mobility    No documentation.       Obj/Interventions    No documentation.       Goals/Plan    No documentation.       Clinical Impression     Row Name 08/20/21 1546          Plan of Care Review    Outcome Summary  Pt preparing for d/c back to [facility] when PT entered room.  Hospitalist present, informs pt that he was having mm spasms in his UEs due to PT being too aggressive at rehab. PT discussed ex program w/ pt, who reports that he was doing 15 min of UE ex bike, then walking, then stacking cones, etc. Advised pt to discuss w/ PT to decrease time on bike to 7 min, then amb, then return after resting UEs for additional 8 min on bike. To alternate and decrease consecutive time at one task. Hospital d/c now pending. Discussed recommendation w/ charge RN.  -JANAE       User Key  (r) = Recorded By, (t) = Taken By, (c) = Cosigned By    Initials Name Provider Type    Jazmine Canada, PT Physical Therapist        Outcome Measures    No documentation.         PT Recommendation and Plan     Outcome Summary: Pt preparing for d/c back to [facility] when PT entered room.  Hospitalist present, informs pt that he was having mm spasms in his UEs due to PT being too aggressive at rehab. PT discussed ex program w/ pt, who reports that he was doing 15 min of UE ex bike, then walking, then stacking cones, etc. Advised pt to discuss w/ PT to decrease time on bike to 7 min, then amb, then return after resting UEs for additional 8 min on bike. To alternate and decrease consecutive time at one task. Hospital d/c now pending. Discussed recommendation w/ charge RN.     Time Calculation:                   Jazmine Hurd, PT  8/20/2021

## 2021-08-20 NOTE — DISCHARGE SUMMARY
[facility] Medicine Services  DISCHARGE SUMMARY    Patient Name: Humble Green  : 1955  MRN: [id number]    Date of Admission: 2021  Date of Discharge:  2021  Primary Care Physician: Leda Duckworth DO      Presenting Problem:   Tremor [R25.1]  Stroke-like symptoms [R29.90]    Active and Resolved Hospital Problems:  Active Hospital Problems    Diagnosis POA   • **Tremor [R25.1] Yes   • Chronic back pain [M54.9, G89.29] Yes   • Obesity (BMI 30-39.9) [E66.9] Yes   • Chronic kidney disease (CKD), stage III (moderate) (CMS/[facility]) [N18.30] Yes   • History of CVA with residual deficit [I69.30] Not Applicable   • Type 2 diabetes mellitus with hyperglycemia, with long-term current use of insulin (CMS/[facility]) [E11.65, Z79.4] Not Applicable   • Essential hypertension [I10] Yes   • Nonobstructive atherosclerosis of coronary artery [I25.10] Yes   • PFO (patent foramen ovale) [Q21.1] Not Applicable   • Diabetic polyneuropathy associated with type 2 diabetes mellitus (CMS/[facility]) [E11.42] Yes   • Mixed hyperlipidemia [E78.2] Yes      Resolved Hospital Problems   No resolved problems to display.         Hospital Course     Hospital Course:  Humble Green is a 65 y.o. male W/PMH of HLD, DM, PFO, HTN, CAD, CKD, obesity, chronic back pain, CVA x3, DDD who presents to [facility] ED due to tremors, patient discharged from this facility 10 days prior.  Patient reports 3 days prior tremors began in upper extremities when attempting to use phone, continuing to drop objects while attempting to pick them up.  Neuro evaluated patient and determined symptoms due to over-exertion at SNF.  Patient asymptomatic while inpatient.  No further intervention needed by neuro.  He was discharged back to SNF with instructions to decrease PT duration and intensity.  Patient discharged to SNF and in agreement with plan below.        DISCHARGE Follow Up Recommendations for labs and diagnostics:     -   Follow up with PCP in 5-7 days  - Patient instructed to decrease PT time and intensity during the day as this is the cause of his tremors  - Return to [facility]      Day of Discharge     Vital Signs:  Temp:  [97.3 °F (36.3 °C)-98.7 °F (37.1 °C)] 97.4 °F (36.3 °C)  Heart Rate:  [61-85] 69  Resp:  [15-16] 16  BP: (109-169)/(48-93) 133/79    Physical Exam:  Physical Exam  Constitutional:       General: He is not in acute distress.     Appearance: He is not toxic-appearing.   HENT:      Head: Normocephalic and atraumatic.      Nose: Nose normal.      Mouth/Throat:      Pharynx: Oropharynx is clear.   Eyes:      General: No scleral icterus.  Cardiovascular:      Rate and Rhythm: Normal rate and regular rhythm.      Heart sounds: No murmur heard.   No friction rub. No gallop.    Pulmonary:      Effort: No respiratory distress.      Breath sounds: No wheezing or rales.   Abdominal:      General: There is no distension.      Tenderness: There is no abdominal tenderness. There is no guarding.   Musculoskeletal:         General: No swelling or deformity.      Cervical back: Normal range of motion.      Right lower leg: No edema.      Left lower leg: No edema.   Skin:     Coloration: Skin is not jaundiced.      Findings: No bruising or lesion.   Neurological:      General: No focal deficit present.      Mental Status: He is alert and oriented to person, place, and time.      Motor: No weakness.   Psychiatric:         Mood and Affect: Mood normal.         Behavior: Behavior normal.         Thought Content: Thought content normal.         Judgment: Judgment normal.            Pertinent  and/or Most Recent Results     LAB RESULTS:      Lab 08/20/21  0450 08/19/21 2115   WBC 5.50 8.80   HEMOGLOBIN 12.9* 14.7   HEMATOCRIT 38.4 42.8   PLATELETS 132* 133*   NEUTROS ABS 2.50 3.90   LYMPHS ABS 2.30 3.70*   MONOS ABS 0.50 0.80   EOS ABS 0.20 0.30   MCV 89.3 90.2         Lab 08/20/21  0450 08/19/21  2204   SODIUM 140 141    POTASSIUM 3.9 4.2   CHLORIDE 104 104   CO2 28.0 27.0   ANION GAP 8.0 10.0   BUN 14 15   CREATININE 0.99 1.31*   GLUCOSE 96 97   CALCIUM 8.5* 8.6   TSH  --  3.280         Lab 08/19/21  2204   TOTAL PROTEIN 6.7   ALBUMIN 3.50   GLOBULIN 3.2   ALT (SGPT) 11   AST (SGOT) 12   BILIRUBIN 0.4   ALK PHOS 76                     Brief Urine Lab Results  (Last result in the past 365 days)      Color   Clarity   Blood   Leuk Est   Nitrite   Protein   CREAT   Urine HCG        08/20/21 0332 Yellow Clear Negative Negative Negative Negative             Microbiology Results (last 10 days)     Procedure Component Value - Date/Time    COVID PRE-OP / PRE-PROCEDURE SCREENING ORDER (NO ISOLATION) - Swab, Nasopharynx [[id number]]  (Normal) Collected: 08/20/21 1031    Lab Status: Final result Specimen: Swab from Nasopharynx Updated: 08/20/21 1058    Narrative:      The following orders were created for panel order COVID PRE-OP / PRE-PROCEDURE SCREENING ORDER (NO ISOLATION) - Swab, Nasopharynx.  Procedure                               Abnormality         Status                     ---------                               -----------         ------                     COVID-19,CEPHEID/DORINA/BD...[[id number]]  Normal              Final result                 Please view results for these tests on the individual orders.    COVID-19,CEPHEID/DORINA/BDMAX,COR/GATO/PAD/ERNST IN-HOUSE(OR EMERGENT/ADD-ON),NP SWAB IN TRANSPORT MEDIA 3-4 HR TAT, RT-PCR - Swab, Nasopharynx [[id number]]  (Normal) Collected: 08/20/21 1031    Lab Status: Final result Specimen: Swab from Nasopharynx Updated: 08/20/21 1058     COVID19 Not Detected    Narrative:      Fact sheet for providers: https://www.fda.gov/media/630230/download     Fact sheet for patients: https://www.fda.gov/media/755575/download  Fact sheet for providers: https://www.fda.gov/media/043723/download    Fact sheet for patients: https://www.fda.gov/media/702552/download    Test performed by PCR.          CT Head  Without Contrast    Result Date: 8/20/2021  Impression: 1. No acute intracranial process. MRI is more sensitive for the detection of acute nonhemorrhagic infarct. 2. Mild changes small vessel ischemic disease of indeterminate age, presumably mostly chronic. Volume loss. Atherosclerosis.  Electronically signed by:  Vasu Gonzalez M.D.  8/19/2021 11:22 PM    CT Angiogram Neck    Result Date: 8/9/2021  Impression:  1. Atherosclerotic plaque in both carotid bifurcations. This is not hemodynamically significant. Computer analysis suggests a stenosis of 35% on both the right and left by NASCET criteria. 2. No significant vertebrobasilar stenosis. Normal intracranial circulation. 3. Lacunar infarctions in both basal ganglia, unchanged from the patient's previous examination.  Electronically Signed By-Honorio Daly MD On:8/9/2021 2:05 PM This report was finalized on [id number] by  Honorio Daly MD.    MRI Brain Without Contrast    Addendum Date: 8/10/2021    Addendum: Additional review performed. There is a 2 mm focus of diffusion signal change in the medial right temporal lobe (series 2 image 56). This has a T2 correlate (series 6 image 11), but no ADC correlate. It is unclear whether this could simply represent some T2 shine through, or could represent a subacute infarct which is too small to resolve on ADC imaging.  Electronically Signed By-Charla Nelson MD On:8/10/2021 10:33 AM This report was finalized on [id number] by  Charla Nelson MD.    Result Date: 8/10/2021  Impression:  1. No acute intracranial findings. No significant change from 4/20/2021. 2. Chronic bilateral basal ganglia infarcts. Mild to moderate periventricular chronic microvascular disease. 3. Mild generalized atrophy.  Electronically Signed By-Charla Nelson MD On:8/9/2021 2:58 PM This report was finalized on [id number] by  Charla Nelson MD.    XR Chest 1 View    Result Date: 8/9/2021  Impression:   1.  Left basilar airspace disease  favored to be secondary to atelectasis.  No other acute cardiopulmonary disease identified.   Electronically Signed By-Oziel Nash MD On:8/9/2021 12:42 PM This report was finalized on [id number] by  Oziel Nash MD.    CT Head Without Contrast Stroke Protocol    Result Date: 8/9/2021  Impression:  1. No acute intracranial findings. 2. Chronic bilateral basal ganglia infarcts. 3. Mild atrophy. 4. No significant change compared to 4/20/2021.  Electronically Signed By-Charla Nelson MD On:8/9/2021 11:29 AM This report was finalized on [id number] by  Charla Nelson MD.    CT Angiogram Head w AI Analysis of LVO    Result Date: 8/9/2021  Impression:  1. Atherosclerotic plaque in both carotid bifurcations. This is not hemodynamically significant. Computer analysis suggests a stenosis of 35% on both the right and left by NASCET criteria. 2. No significant vertebrobasilar stenosis. Normal intracranial circulation. 3. Lacunar infarctions in both basal ganglia, unchanged from the patient's previous examination.  Electronically Signed By-Honorio Daly MD On:8/9/2021 2:05 PM This report was finalized on [id number] by  Honorio Daly MD.      Results for orders placed during the hospital encounter of 07/13/20    Duplex Venous Lower Extremity - Right CAR    Interpretation Summary  · Normal right lower extremity venous duplex scan.      Results for orders placed during the hospital encounter of 07/13/20    Duplex Venous Lower Extremity - Right CAR    Interpretation Summary  · Normal right lower extremity venous duplex scan.      Results for orders placed during the hospital encounter of 08/09/21    Adult Transthoracic Echo Complete W/ Cont if Necessary Per Protocol    Interpretation Summary  · Left ventricular ejection fraction appears to be 56 - 60%.  · The right ventricular cavity is mildly dilated.  · No pericardial effusion noted      Labs Pending at Discharge:      Procedures Performed           Consults:    Consults     Date and Time Order Name Status Description    8/20/2021 10:34 AM Inpatient Neurology Consult General Completed     8/9/2021  2:57 PM Inpatient Neurology Consult Stroke Completed     8/9/2021 11:21 AM Inpatient Neurology Consult Stroke Completed     8/9/2021 11:21 AM Inpatient Neurology Consult Stroke Completed             Discharge Details        Discharge Medications      Continue These Medications      Instructions Start Date   apixaban 5 MG tablet tablet  Commonly known as: ELIQUIS   5 mg, Oral, Every 12 Hours Scheduled      aspirin 81 MG chewable tablet   81 mg, Oral, Daily      atorvastatin 80 MG tablet  Commonly known as: LIPITOR   80 mg, Oral, Nightly      baclofen 10 MG tablet  Commonly known as: LIORESAL   10 mg, Oral, Nightly      Biofreeze 4 % gel  Generic drug: Menthol (Topical Analgesic)   Apply externally, Every 6 Hours PRN      dextrose 40 % gel  Commonly known as: GLUTOSE   Oral, Every 1 Hour PRN      glucagon 1 MG injection  Commonly known as: GLUCAGEN   1 mg, Intravenous, As Needed      HumaLOG KwikPen 100 UNIT/ML solution pen-injector  Generic drug: Insulin Lispro (1 Unit Dial)   5 Units, Subcutaneous, 3 Times Daily Before Meals      Jardiance 25 MG tablet tablet  Generic drug: empagliflozin   25 mg, Oral, Every Morning      Lantus SoloStar 100 UNIT/ML injection pen  Generic drug: Insulin Glargine   35 Units, Subcutaneous, Nightly      magnesium hydroxide 400 MG/5ML suspension  Commonly known as: MILK OF MAGNESIA   30 mL, Oral, Daily PRN      meclizine 25 MG tablet  Commonly known as: ANTIVERT   25 mg, Oral, 3 Times Daily PRN      ondansetron 4 MG tablet  Commonly known as: ZOFRAN   4 mg, Oral, Every 6 Hours PRN      oxyCODONE ER 20 MG 12 hr tablet  Commonly known as: OxyCONTIN   20 mg, Oral, Every 12 Hours      oxyCODONE 15 MG immediate release tablet  Commonly known as: ROXICODONE   15 mg, Oral, Every 6 Hours PRN      polyethylene glycol 17 GM/SCOOP powder  Commonly known as:  MIRALAX   17 g, Oral, Daily      pregabalin 300 MG capsule  Commonly known as: LYRICA   300 mg, Oral, 2 Times Daily             Allergies   Allergen Reactions   • Ms Contin [Morphine] Mental Status Change         Discharge Disposition: SNF  Condition on discharge: good  Skilled Nursing Facility (DC - External)    Diet:  Hospital:  Diet Order   Procedures   • Diet Cardiac, Diabetic/Consistent Carbs, Renal; 2gm Na+; Diabetic - Consistent Carb; 2gm Na+, 2gm K+, Low Phosphorus         Discharge Activity:   Activity Instructions     Activity as Tolerated              CODE STATUS:  Code Status and Medical Interventions:   Ordered at: 08/20/21 0210     Code Status:    CPR     Medical Interventions (Level of Support Prior to Arrest):    Full         Future Appointments   Date Time Provider Department Center   8/24/2021 11:15 AM [facility] CT 2 [facility]       Additional Instructions for the Follow-ups that You Need to Schedule     Call MD With Problems / Concerns   As directed      Instructions: Call [phone] or email [email or web address] for problems or concerns.    Order Comments: Instructions: Call [phone] or email [email or web address] for problems or concerns.          Discharge Follow-up with PCP   As directed       Currently Documented PCP:    Leda Duckworth DO    PCP Phone Number:    [phone]     Follow Up Details: Follow up with PCP in 5-7 days               Time spent on Discharge including face to face service:  20 minutes    This patient has been examined wearing appropriate Personal Protective Equipment and discussed with Patient. 08/20/21      Signature: Electronically signed by Marshall Lemon DO, 08/20/21, 4:02 PM EDT.

## 2021-08-20 NOTE — H&P
[facility] Medicine Services      Patient Name: Humble Green  : 1955  MRN: [id number]  Primary Care Physician:  Leda Duckworth DO  Date of admission: 2021      Subjective      Chief Complaint: Tremors      History of Present Illness:  Humble Green is a 65-year-old male W/PMH of HLD, DM, PFO, HTN, CAD, CKD, obesity, chronic back pain, CVA x3, DDD who presents to [facility] ED due to tremors, patient discharged from this facility 10 days prior.  Patient reports 3 days prior tremors began in upper extremities when attempting to use phone, continuing to drop objects while attempting to pick them up.  Patient denies headache, dizziness, fever, chills, illegal drug use, alcohol use.  Patient admits to neuropathy.  Patient was sent by ECF for evaluation yesterday evening.        On arrival to the ED vitals temp 98.7, HR 85, RR 15, /93, O2 sat 97% on room air. Labs notable for glucose 97, , K4.2, creatinine 1.31, BUN 15, GFR 55, ALT 11, AST 12, total bili 0.4, TSH 3.2, WBC 8.8, Hgb 14.7, platelets 133, neutrophils 44.4. EKG shows sinus rhythm rate 76 atrial premature complex low voltage, precordial leads when compared with EKG of 08/10/2021 no significant change. CT head W/O shows mild changes small vessel ischemic disease of indeterminate age, presumably mostly chronic. Volume loss. Atherosclerosis.  Patient treated in ED with 1000 mg p.o. acetaminophen.      Review of Systems   Constitutional: Negative for chills and fever.   HENT: Negative.    Eyes: Negative.    Cardiovascular: Negative.    Respiratory: Negative.    Endocrine: Negative.    Hematologic/Lymphatic: Negative.    Skin: Negative.    Musculoskeletal: Positive for arthritis, back pain, joint pain and joint swelling.   Gastrointestinal: Negative.  Negative for nausea and vomiting.   Genitourinary: Negative.    Neurological: Positive for tremors and weakness.   Psychiatric/Behavioral: Negative.     Allergic/Immunologic: Negative.    All other systems reviewed and are negative.       Personal History     Past Medical History:   Diagnosis Date   • Arthritis    • BPH    • CAD    • Cerebrovascular accident (CVA) due to bilateral embolism of middle cerebral arteries (CMS/HCC) 7/14/2020   • Chronic back pain    • DMII    • Excessive daytime sleepiness    • Low back pain    • Low vision of right eye with normal vision in contralateral eye    • Memory problem    • Neuropathy    • Obesity    • PFO (patent foramen ovale) 11/29/2019   • PSA    • Snoring    • Stroke 05/2021    hx stroke x3       Past Surgical History:   Procedure Laterality Date   • ARM TENDON REPAIR Left    • CARDIAC CATHETERIZATION N/A 11/16/2020   • CARDIAC CATHETERIZATION N/A 11/16/2020   • CHOLECYSTECTOMY     • COLONOSCOPY      2018 = TA, rech 2023   • EYE SURGERY      Rt Eye Sx after trauma @ 4y/o   • JOINT REPLACEMENT      Left TKR   • ROTATOR CUFF REPAIR Bilateral    • UMBILICAL HERNIA REPAIR         Family History: family history includes Anemia in his sister; Arthritis in his father, mother, and sister; Diabetes in his brother, father, and mother; Heart attack in his father; Heart disease in his brother, father, and mother; Hyperlipidemia in his father; Hypertension in his father and mother; Migraines in his mother; Obesity in his mother; Osteoporosis in his mother; Snoring in his father and mother; Stroke in his father. Otherwise pertinent FHx was reviewed and not pertinent to current issue.    Social History:  reports that he has never smoked. He has never used smokeless tobacco. He reports previous alcohol use of about 1.0 standard drinks of alcohol per week. He reports that he does not use drugs.    Home Medications:  Prior to Admission Medications     Prescriptions Last Dose Informant Patient Reported? Taking?    apixaban (ELIQUIS) 5 MG tablet tablet   No Yes    Take 1 tablet by mouth Every 12 (Twelve) Hours.    aspirin 81 MG chewable  tablet  Self Yes Yes    Chew 81 mg Daily.    atorvastatin (LIPITOR) 80 MG tablet   No Yes    Take 1 tablet by mouth Every Night.    baclofen (LIORESAL) 10 MG tablet   No Yes    Take 1 tablet by mouth Every Night.    bisacodyl (DULCOLAX) 10 MG suppository   Yes Yes    Insert 10 mg into the rectum Daily As Needed for Constipation.    dextrose (GLUTOSE) 40 % gel   Yes Yes    Take  by mouth Every 1 (One) Hour As Needed for Low Blood Sugar.    Empagliflozin (Jardiance) 25 MG tablet   No Yes    Take 25 mg by mouth Every Morning.    glucagon (GLUCAGEN) 1 MG injection   Yes Yes    Infuse 1 mg into a venous catheter As Needed for Low Blood Sugar.    Insulin Glargine (Lantus SoloStar) 100 UNIT/ML injection pen   No Yes    Inject 35 Units under the skin into the appropriate area as directed Every Night.    Insulin Lispro, 1 Unit Dial, (HumaLOG KwikPen) 100 UNIT/ML solution pen-injector   Yes Yes    Inject 5 Units under the skin into the appropriate area as directed 3 (Three) Times a Day Before Meals.    magnesium hydroxide (MILK OF MAGNESIA) 400 MG/5ML suspension   Yes Yes    Take 30 mL by mouth Daily As Needed for Constipation.    meclizine (ANTIVERT) 25 MG tablet   No Yes    Take 1 tablet by mouth 3 (Three) Times a Day As Needed for Dizziness.    Menthol, Topical Analgesic, (Biofreeze) 4 % gel   Yes Yes    Apply  topically Every 6 (Six) Hours As Needed.    ondansetron (ZOFRAN) 4 MG tablet   No Yes    Take 1 tablet by mouth Every 6 (Six) Hours As Needed for Nausea or Vomiting.    oxyCODONE (ROXICODONE) 15 MG immediate release tablet   No Yes    Take 1 tablet by mouth Every 6 (Six) Hours As Needed for Severe Pain .    oxyCODONE ER (OxyCONTIN) 20 MG 12 hr tablet   No Yes    Take 1 tablet by mouth Every 12 (Twelve) Hours.    polyethylene glycol (MIRALAX) 17 GM/SCOOP powder   No Yes    Take 17 g by mouth Daily.    pregabalin (LYRICA) 300 MG capsule   No Yes    Take 1 capsule by mouth 2 (Two) Times a Day.    Sodium Phosphates  (fleet enema) 7-19 GM/118ML enema   Yes Yes    Insert 1 enema into the rectum Daily As Needed.            Allergies:  Allergies   Allergen Reactions   • Ms Contin [Morphine] Mental Status Change       Objective      Vitals:   Temp:  [98.7 °F (37.1 °C)] 98.7 °F (37.1 °C)  Heart Rate:  [62-85] 65  Resp:  [15-16] 16  BP: (109-169)/(48-93) 129/66    Physical Exam  Vitals and nursing note reviewed.   Constitutional:       Appearance: He is obese.   HENT:      Right Ear: External ear normal.      Left Ear: External ear normal.      Nose: Nose normal.      Mouth/Throat:      Mouth: Mucous membranes are dry.   Eyes:      Pupils: Pupils are equal, round, and reactive to light.   Cardiovascular:      Rate and Rhythm: Normal rate and regular rhythm.      Pulses: Normal pulses.      Heart sounds: Normal heart sounds.   Pulmonary:      Effort: Pulmonary effort is normal.      Breath sounds: Normal breath sounds.   Abdominal:      General: Bowel sounds are normal.      Palpations: Abdomen is soft.   Musculoskeletal:         General: Normal range of motion.      Cervical back: Normal range of motion.   Skin:     General: Skin is warm and dry.   Neurological:      Mental Status: He is alert and oriented to person, place, and time. Mental status is at baseline.      Motor: No tremor.   Psychiatric:         Mood and Affect: Affect is angry.          Result Review    Result Review:  I have personally reviewed the results from the time of this admission to 8/20/2021 06:55 EDT and agree with these findings:  [x]  Laboratory  [x]  Microbiology  [x]  Radiology  [x]  EKG/Telemetry   [x]  Cardiology/Vascular   []  Pathology  []  Old records  []  Other:        Assessment/Plan        Active Hospital Problems:  Active Hospital Problems    Diagnosis    • **Tremor    • Chronic kidney disease (CKD), stage III (moderate) (CMS/[facility])    • Type 2 diabetes mellitus with hyperglycemia, with long-term current use of insulin (CMS/[facility])    • Essential  hypertension    • Nonobstructive atherosclerosis of coronary artery    • Diabetic polyneuropathy associated with type 2 diabetes mellitus (CMS/HCC)    • Mixed hyperlipidemia    • Chronic back pain    • Obesity (BMI 30-39.9)    • History of CVA with residual deficit    • PFO (patent foramen ovale)        Tremor:  -Patient reports new onset tremor in upper extremities bilaterally  -No tremor appreciated upon exam  -CT head W/O shows mild changes small vessel ischemic disease of indeterminate age, presumably mostly chronic. Volume loss. Atherosclerosis  -Continue to monitor    History of CVA x3 with left-sided residual deficit/PFO/arthrosclerosis of coronary artery:  -Discharged 10 days prior after extensive stroke work-up  -Home medication Eliquis    CKD stage III:  -Creatinine 1.31, BUN 15 upon arrival to the ED  -Monitor intake and output  -Renal diet    Diabetic neuropathy:  -Home medication Lyrica 300 mg p.o. twice daily, inspect verified    Mixed hyperlipidemia:  -Encourage lifestyle modifications  -Encourage dietary modifications  -Home medication atorvastatin 80 mg nightly    DM type II:  -Sliding scale insulin  -Before meals and at bedtime Accu-Cheks  -Home medication Lantus 35 units nightly  -Hypoglycemia protocol    Essential hypertension:  -Encourage lifestyle modifications  -Low-sodium diet  -Vital signs every 4    Chronic back pain:  -Home medication oxycodone 15 mg every 6 hours p.o. as needed for severe pain  -Home medication OxyContin 20 mg p.o. every 12 hours  -Inspect verified    Obesity:   -Encourage lifestyle modifications  -Encourage dietary modifications        DVT prophylaxis:  Medical and mechanical DVT prophylaxis orders are present.    CODE STATUS:    Code Status: CPR  Medical Interventions (Level of Support Prior to Arrest): Full    Admission Status:  I believe this patient meets observation status.    I discussed the patient's findings and my recommendations with the patient.      This  patient has been interviewed wearing appropriate personal protective equipment and findings discussed with the ED provider.    Signature: Electronically signed by ANTONY Shaw, 08/20/21, 6:59 AM EDT.

## 2021-08-20 NOTE — DISCHARGE PLACEMENT REQUEST
"Elizabeth Green (65 y.o. Male)     Date of Birth Social Security Number Address Home Phone MRN    1955  [address]  [place] [address] [phone] [id number]    Jain Marital Status          None Single       Admission Date Admission Type Admitting Provider Attending Provider Department, Room/Bed    8/19/21 Emergency Marshall Lemon DO Taylor, Wait[place], DO [facility] OBSERVATION, 102/1    Discharge Date Discharge Disposition Discharge Destination                       Attending Provider: Marshall Lemon DO    Allergies: Ms Contin [Morphine]    Isolation: None   Infection: None   Code Status: CPR    Ht: 172.7 cm (68\")   Wt: 115 kg (253 lb 6.4 oz)    Admission Cmt: None   Principal Problem: Tremor [R25.1]                 Active Insurance as of 8/19/2021     Primary Coverage     Payor Plan Insurance Group Employer/Plan Group    MEDICARE MEDICARE A & B      Payor Plan Address Payor Plan Phone Number Payor Plan Fax Number Effective Dates    [address] [phone]  11/1/2020 - None Entered    [facility] [id number]       Subscriber Name Subscriber Birth Date Member ID       ELIZABETH GREEN 1955 [id number]           Secondary Coverage     Payor Plan Insurance Group Employer/Plan Group    [place]      Payor Plan Address Payor Plan Phone Number Payor Plan Fax Number Effective Dates    [address]   11/25/2019 - None Entered    [place] [id number]       Subscriber Name Subscriber Birth Date Member ID       ELIZABETH GREEN 1955 [id number]                 Emergency Contacts      (Rel.) Home Phone Work Phone Mobile Phone    LUBA GREEN (Son) -- -- [phone]    LEANDRO GREENE (Daughter) -- -- [phone]            "

## 2021-08-20 NOTE — PLAN OF CARE
Goal Outcome Evaluation:  Plan of Care Reviewed With: patient        Progress: improving  Outcome Summary: pt no longer having any tremors. Neurology ruled out seizures, stroke. Pt is to continue PT but do shorter sets of each exercise. pt will be discharged back to [facility]. continue to monitor

## 2021-08-20 NOTE — NURSING NOTE
Called report to Leena GALE at [facility]. Will transport patient through assisting transport back to [facility] room #512.

## 2021-08-21 NOTE — PLAN OF CARE
Goal Outcome Evaluation:  Plan of Care Reviewed With: patient        Progress: improving  Outcome Summary: pt no longer having any tremors. Neurology ruled out seizures, stroke. Pt is to continue PT but do shorter sets of each exercise. pt will be discharged back to [facility]. continue to monitor

## 2021-08-21 NOTE — NURSING NOTE
[facility] HERE TO  THIS PT; PER STEPH (EMS DISPATCH), ALEXANDRA CALLED [facility] TO TRANSPORT THIS PT TO [facility] DUE TO PT HAVING MEDICAID. THEY ARE AWARE THAT PT DOES NOT QUALIFY FOR EMS TRANSPORTATION.

## 2021-08-24 ENCOUNTER — HOSPITAL ENCOUNTER (OUTPATIENT)
Dept: CT IMAGING | Facility: HOSPITAL | Age: 66
Discharge: HOME | End: 2021-08-24
Admitting: INTERNAL MEDICINE

## 2021-08-24 DIAGNOSIS — R10.9 ABDOMINAL PAIN, UNSPECIFIED ABDOMINAL LOCATION: ICD-10-CM

## 2021-08-24 PROCEDURE — 74150 CT ABDOMEN W/O CONTRAST: CPT

## 2021-08-24 NOTE — CASE MANAGEMENT/SOCIAL WORK
Case Management Discharge Note                Selected Continued Care - Discharged on 8/20/2021 Admission date: 8/19/2021 - Discharge disposition: Skilled Nursing Facility (DC - External)    Destination Coordination complete.    Service Provider Selected Services Address Phone Fax Patient Preferred    [facility]  Skilled Nursing [address] [place] [address] [phone] --                Selected Continued Care - Episodes Includes selections from active Coordinated Care Management episodes    High Risk Care Management Episode start date: 8/13/2021 (Paused)   There are no active outsourced providers for this episode.             Selected Continued Care - Prior Encounters Includes selections from prior encounters from 5/21/2021 to 8/20/2021    Discharged on 8/11/2021 Admission date: 8/9/2021 - Discharge disposition: Home or Self Care    Destination     Service Provider Selected Services Address Phone Fax Patient Preferred    [facility]  Skilled Nursing [address] [place] [address] [phone] --                         Final Discharge Disposition Code: 03 - skilled nursing facility (SNF)

## 2021-08-25 ENCOUNTER — PATIENT OUTREACH (OUTPATIENT)
Dept: CASE MANAGEMENT | Facility: OTHER | Age: 66
End: 2021-08-25

## 2021-08-25 NOTE — OUTREACH NOTE
Ambulatory Case Management Note    Patient Outreach  SNF Follow-up    Questions/Answers      Responses   Acute Facility Discharged From  [facility]   Acute Discharge Date  08/11/21   Name of the Skilled Nursing Facility?  [facility]   Purpose of SNF Admission  PT, OT, SN   Who is the insurance provider or payor of patient stay?  Medicare, Medicaid   Progression of Patient?  Had another [facility] admit 8/19-20/21 for worsening tremor d/t PT overexertion at SNF.  Pt returned to SNF 8/20/21        Gayle at [facility] patient still admitted there.    Patient was readmitted at [facility] overnight 8/19-20/21 d/t worsening tremor related to overexertion at SNF PT.  He returned to SNF w/ orders to decrease PT intensity.   No planned discharge date at this time.    Alejandra Gilbert RN  Ambulatory Case Management  8/25/2021, 13:23 EDT

## 2021-09-02 ENCOUNTER — PATIENT OUTREACH (OUTPATIENT)
Dept: CASE MANAGEMENT | Facility: OTHER | Age: 66
End: 2021-09-02

## 2021-09-02 NOTE — OUTREACH NOTE
Ambulatory Case Management Note  Patient Outreach  SNF Follow-up    Questions/Answers      Responses   Acute Facility Discharged From  [facility]   Acute Discharge Date  08/11/21 [Original DC date. Was readmitted 8/19-20/21 d/t worsening tremors, DC'd back to SNF]   Name of the Skilled Nursing Facility?  [place]   Purpose of SNF Admission  PT, OT, SN [decreased intensity d/t worsened tremors]   Who is the insurance provider or payor of patient stay?  Medicare, Medicaid   Progression of Patient?  progressing slowly, PT intensity decreased d/t worsening tremors requiring rehospitalization 8/19-20/21        Grace at [facility] patient still admitted.  Original SNF admit date 8/11/21. PT intensity decreased after patient was readmitted [facility] 8/19-20/21 d/t worsening tremors. No documented DC date yet, per Grace.    Alejandra Gilbert RN  Ambulatory Case Management  9/2/2021, 10:30 EDT

## 2021-09-03 ENCOUNTER — TELEPHONE (OUTPATIENT)
Dept: FAMILY MEDICINE CLINIC | Facility: CLINIC | Age: 66
End: 2021-09-03

## 2021-09-03 NOTE — TELEPHONE ENCOUNTER
Pt states he had a stroke 3 weeks ago and discharged to [facility] and they are doing nothing for him.  He wants to know if you can call them or schedule an appt for him

## 2021-09-03 NOTE — TELEPHONE ENCOUNTER
HUB TO READ    ATTEMPTED TO CALL PATIENT BUT NO ANSWER AND HIS MAILBOX WAS FULL SO I WAS UNABLE TO LEAVE A MESSAGE.  PATIENT CAN MAKE APPT HERE.

## 2021-09-09 ENCOUNTER — PATIENT OUTREACH (OUTPATIENT)
Dept: CASE MANAGEMENT | Facility: OTHER | Age: 66
End: 2021-09-09

## 2021-09-09 NOTE — OUTREACH NOTE
Ambulatory Case Management Note    SNF Follow-up    Questions/Answers      Responses   Acute Facility Discharged From  [facility]   Acute Discharge Date  08/09/21   Name of the Skilled Nursing Facility?  [facility]   Purpose of SNF Admission  PT, OT, SN   Estimated length of stay for the patient?  no discharge date documented   Who is the insurance provider or payor of patient stay?  Medicare, Medicaid   Progression of Patient?  progressing slowly.  PT intensity decreased after 8/19-20/21 readmission for worsening tremors        Yvette at [facility] states patient still admitted with no immediate plans for discharge. See flow sheet for details. RN-ACM continues to track SNF rehab admission status weekly.    Alejandra Gilbert RN  Ambulatory Case Management    9/9/2021, 11:36 EDT

## 2021-09-15 DIAGNOSIS — M54.16 LUMBAR BACK PAIN WITH RADICULOPATHY AFFECTING LEFT LOWER EXTREMITY: ICD-10-CM

## 2021-09-15 RX ORDER — OXYCODONE HYDROCHLORIDE 15 MG/1
TABLET ORAL
Qty: 120 TABLET | Refills: 0 | OUTPATIENT
Start: 2021-09-15

## 2021-09-15 NOTE — TELEPHONE ENCOUNTER
Rx Refill Note  Requested Prescriptions     Pending Prescriptions Disp Refills   • oxyCODONE (ROXICODONE) 15 MG immediate release tablet [Pharmacy Med Name: oxyCODONE HCl Oral Tablet 15 MG] 120 tablet 0     Sig: TAKE 1 TABLET BY MOUTH EVERY SIX HOURS AS NEEDED FOR SEVERE PAIN      Last office visit with prescribing clinician: 8/2/2021      Next office visit with prescribing clinician: Visit date not found     Basic Metabolic Panel (08/20/2021 04:50)  Lipid Panel (08/09/2021 11:38)         Sari Robb CMA  09/15/21, 10:57 EDT

## 2021-09-16 ENCOUNTER — PATIENT OUTREACH (OUTPATIENT)
Dept: CASE MANAGEMENT | Facility: OTHER | Age: 66
End: 2021-09-16

## 2021-09-16 NOTE — OUTREACH NOTE
"Ambulatory Case Management Note    SNF Follow-up    Questions/Answers      Responses   Acute Facility Discharged From  [facility]   Acute Discharge Date  08/09/21   Name of the Skilled Nursing Facility?  [facility] length of stay for the patient?  Per Latasha in admissions, \"plan is to return home, but not anytime soon\"   Who is the insurance provider or payor of patient stay?  Medicare, Medicaid   Progression of Patient?  slow progression      Latasha in [facility] admissions states patient still admitted with no immediate plans for discharge.   See flow sheet for details.   RN-ACM continues to track SNF rehab admission status weekly.    Alejandra Gilbert RN  Ambulatory Case Management  9/16/2021, 12:39 EDT    "

## 2021-09-17 DIAGNOSIS — M54.16 LUMBAR BACK PAIN WITH RADICULOPATHY AFFECTING LEFT LOWER EXTREMITY: ICD-10-CM

## 2021-09-17 RX ORDER — OXYCODONE HYDROCHLORIDE 15 MG/1
TABLET ORAL
Qty: 120 TABLET | Refills: 0 | OUTPATIENT
Start: 2021-09-17

## 2021-09-17 NOTE — TELEPHONE ENCOUNTER
Rx Refill Note  Requested Prescriptions     Pending Prescriptions Disp Refills   • oxyCODONE (ROXICODONE) 15 MG immediate release tablet [Pharmacy Med Name: oxyCODONE HCl Oral Tablet 15 MG] 120 tablet 0     Sig: TAKE 1 TABLET BY MOUTH EVERY SIX HOURS AS NEEDED FOR SEVERE PAIN      Last office visit with prescribing clinician: 8/2/2021      Next office visit with prescribing clinician: Visit date not found     Basic Metabolic Panel (08/20/2021 04:50)  Lipid Panel (08/09/2021 11:38)         Sari Robb CMA  09/17/21, 10:18 EDT

## 2021-09-21 DIAGNOSIS — M54.16 LUMBAR BACK PAIN WITH RADICULOPATHY AFFECTING LEFT LOWER EXTREMITY: ICD-10-CM

## 2021-09-21 RX ORDER — OXYCODONE HYDROCHLORIDE 15 MG/1
TABLET ORAL
Qty: 120 TABLET | Refills: 0 | OUTPATIENT
Start: 2021-09-21

## 2021-09-21 NOTE — TELEPHONE ENCOUNTER
Rx Refill Note  Requested Prescriptions     Pending Prescriptions Disp Refills   • oxyCODONE (ROXICODONE) 15 MG immediate release tablet [Pharmacy Med Name: oxyCODONE HCl Oral Tablet 15 MG] 120 tablet 0     Sig: TAKE 1 TABLET BY MOUTH EVERY SIX HOURS AS NEEDED FOR SEVERE PAIN      Last office visit with prescribing clinician: 8/2/2021      Next office visit with prescribing clinician: Visit date not found     Basic Metabolic Panel (08/20/2021 04:50)         Sari Robb CMA  09/21/21, 16:46 EDT

## 2021-09-23 ENCOUNTER — PATIENT OUTREACH (OUTPATIENT)
Dept: CASE MANAGEMENT | Facility: OTHER | Age: 66
End: 2021-09-23

## 2021-09-23 NOTE — OUTREACH NOTE
Ambulatory Case Management Note    SNF Follow-up    Questions/Answers      Responses   Acute Facility Discharged From  [facility]   Acute Discharge Date  08/20/21   Name of the Skilled Nursing Facility?  [facility]   Estimated length of stay for the patient?  left msg requesting info   Progression of Patient?  unknown- LM for patient nurse to return call w/ more info      Grace at [facility] states patient still admitted, no other details. LM for patient nurse to return RN-ACM call w/ more info.  RN-ACM continues to track SNF rehab admission status weekly.    Alejandra Gilbert RN  Ambulatory Case Management  9/23/2021, 12:08 EDT

## 2021-09-24 DIAGNOSIS — M54.16 LUMBAR BACK PAIN WITH RADICULOPATHY AFFECTING LEFT LOWER EXTREMITY: ICD-10-CM

## 2021-09-24 RX ORDER — OXYCODONE HYDROCHLORIDE 15 MG/1
TABLET ORAL
Qty: 120 TABLET | Refills: 0 | OUTPATIENT
Start: 2021-09-24

## 2021-09-24 NOTE — TELEPHONE ENCOUNTER
Rx Refill Note  Requested Prescriptions     Pending Prescriptions Disp Refills   • oxyCODONE (ROXICODONE) 15 MG immediate release tablet [Pharmacy Med Name: oxyCODONE HCl Oral Tablet 15 MG] 120 tablet 0     Sig: TAKE 1 TABLET BY MOUTH EVERY SIX HOURS AS NEEDED FOR SEVERE PAIN      Last office visit with prescribing clinician: 8/2/2021      Next office visit with prescribing clinician: Visit date not found     Hemoglobin A1c (08/09/2021 11:38)  Lipid Panel (08/09/2021 11:38)         Sari Robb CMA  09/24/21, 12:29 EDT

## 2021-10-06 NOTE — TELEPHONE ENCOUNTER
Farxiga PA was approved    Approved for generic Farxiga (dapagliflozin propanediol tabloid 10mg (base equivalent)), quantity up to 30 tablets per 30 days, under the pharmacy benefit. Generic substitution required when available.   Billing Type: Third-Party Bill

## 2021-10-07 ENCOUNTER — PATIENT OUTREACH (OUTPATIENT)
Dept: CASE MANAGEMENT | Facility: OTHER | Age: 66
End: 2021-10-07

## 2021-10-07 NOTE — OUTREACH NOTE
Ambulatory Case Management Note    SNF Follow-up    Questions/Answers      Responses   Acute Facility Discharged From  [facility]   Acute Discharge Date  08/20/21   Name of the Skilled Nursing Facility?  [facility]   Estimated length of stay for the patient?  plan is to discharge to home 10/11/21. Unknown HH at this time   Progression of Patient?  progressing well        Latasha at [facility] states patient still admitted with tentative plan for discharge Monday 10/11/21. She states patient just decided this today and that his HH needs have not yet been determined.  RN-ACM continues to track rehab admission status weekly.    Alejandra Gilbert RN  Ambulatory Case Management  10/7/2021, 13:00 EDT

## 2021-10-11 ENCOUNTER — READMISSION MANAGEMENT (OUTPATIENT)
Dept: CALL CENTER | Facility: HOSPITAL | Age: 66
End: 2021-10-11

## 2021-10-11 NOTE — OUTREACH NOTE
Prep Survey      Responses   [facility] facility patient discharged from? Non-BH   Is LACE score < 7 ? Non-BH Discharge   Emergency Room discharge w/ pulse ox? No   Eligibility [facility]   Date of Admission 08/11/21   Date of Discharge 10/11/21   Discharge diagnosis Dizziness, AMS, Hs Stroke   Does the patient have one of the following disease processes/diagnoses(primary or secondary)? Non-BH Discharge   Prep survey completed? Yes          Liz Cagle RN

## 2021-10-12 ENCOUNTER — TRANSITIONAL CARE MANAGEMENT TELEPHONE ENCOUNTER (OUTPATIENT)
Dept: CALL CENTER | Facility: HOSPITAL | Age: 66
End: 2021-10-12

## 2021-10-12 NOTE — OUTREACH NOTE
Call Center TCM Note      Responses   [facility] patient discharged from? Non-BH   Does the patient have one of the following disease processes/diagnoses(primary or secondary)? Non- Discharge   TCM attempt successful? No   Unsuccessful attempts Attempt 1          Tiny Vee RN    10/12/2021, 10:12 EDT

## 2021-10-12 NOTE — OUTREACH NOTE
Call Center TCM Note      Responses   [facility] patient discharged from? Non-BH   Does the patient have one of the following disease processes/diagnoses(primary or secondary)? Non- Discharge   TCM attempt successful? No   Unsuccessful attempts Attempt 2          Tiny Vee RN    10/12/2021, 10:33 EDT

## 2021-10-13 ENCOUNTER — TRANSITIONAL CARE MANAGEMENT TELEPHONE ENCOUNTER (OUTPATIENT)
Dept: CALL CENTER | Facility: HOSPITAL | Age: 66
End: 2021-10-13

## 2021-10-13 NOTE — OUTREACH NOTE
Call Center TCM Note      Responses   [facility] patient discharged from? Non-BH   Does the patient have one of the following disease processes/diagnoses(primary or secondary)? Non-BH Discharge   TCM attempt successful? No   Unsuccessful attempts Attempt 3          Priscilla Maldonado LPN    10/13/2021, 11:19 EDT

## 2021-10-15 ENCOUNTER — PATIENT OUTREACH (OUTPATIENT)
Dept: CASE MANAGEMENT | Facility: OTHER | Age: 66
End: 2021-10-15

## 2021-10-15 NOTE — OUTREACH NOTE
Ambulatory Case Management Note  Care Coordination  Lily at [facility] admissions confirms patient discharged 10/11 to home with his son, patient repeatedly declined HH referral.    Lily states she will fax discharge note & medications to PCP office @ [phone] prior to 10/19/21 f/up appt.    Alejandra Gilbert RN  Ambulatory Case Management  10/15/2021, 15:23 EDT

## 2021-10-15 NOTE — OUTREACH NOTE
"Ambulatory Case Management Note  Patient Outreach  General & Health Literacy Assessment    Questions/Answers      Most Recent Value   Assessment Completed With Patient   Living Arrangement Alone  [ has two adult children that help as needed]   Type of Residence Private Residence   Home Care Services No  [declined HH]   Equiptment Used at Home Tub Seat,  Walker,  Cane  [and glucometer w/ supplies]   Bed or Wheelchair Confined No   Difficulty Keeping Appointments No  [ is driving self]      Care Evaluation  Questions/Answers      Most Recent Value   Care Gap Comments All care gaps to be discussed at next call   Other Patient Education/Resources  24/7 [facility] Nurse Call Line,  Advanced Care Planning   24/7 Nurse Call Line Education Method Verbal   ACP Education Method Send Materials   Advanced Directives: Send Materials   Medication Adherence --  [agrees to bring [place] discharge meds list and all meds actually taking in bag to 10/19/21 PCP f/up appt]   Healthy Lifestyle (Self-Efficacy) self-reports important symptoms to medical professional,  recognizes when to contact medical assistance,  recognizes when to stop activity        RN-ACM SNF discharge follow-up contact completed - See assessment/care plan flow sheets for details.    Spoke briefly w/ Mr Green re his discharge needs at this time. He states is \"doing alright\" at home. He confirms he does not want or need HH.  Denies new or worsening sx since SNF discharge.  VU of sx requiring immediate medical care. Eating/drinking WNL.   He states his son has his SNF discharge paperwork, but that he will get it and bring to 10/19/21 PCP TCM appt, along w/ bottles of medications he's actually taking. States blood sugars also \"doing alright\", did not have numbers with him.    Mr Green states he lives alone, but has 2 adult children that can assist when needed. States is using cane to ambulate, denies other DME needs - fall prevention discussed.  " States is still driving self. Denies immediate needs/concerns/questions. Confirms PCP f/up 10/19/21.  He verb appreciation for the call and agrees to RN-ACM f/up outreach: plan review care gaps, ACP, complete SDOH.    Alejandra Gilbert RN  Ambulatory Case Management  10/15/2021, 15:41 EDT  .

## 2021-10-19 ENCOUNTER — OFFICE VISIT (OUTPATIENT)
Dept: FAMILY MEDICINE CLINIC | Facility: CLINIC | Age: 66
End: 2021-10-19

## 2021-10-19 VITALS
RESPIRATION RATE: 16 BRPM | HEART RATE: 100 BPM | SYSTOLIC BLOOD PRESSURE: 105 MMHG | TEMPERATURE: 98.6 F | HEIGHT: 68 IN | DIASTOLIC BLOOD PRESSURE: 66 MMHG | OXYGEN SATURATION: 96 % | WEIGHT: 242 LBS | BODY MASS INDEX: 36.68 KG/M2

## 2021-10-19 DIAGNOSIS — M54.16 LUMBAR BACK PAIN WITH RADICULOPATHY AFFECTING LEFT LOWER EXTREMITY: ICD-10-CM

## 2021-10-19 DIAGNOSIS — M54.42 CHRONIC MIDLINE LOW BACK PAIN WITH LEFT-SIDED SCIATICA: ICD-10-CM

## 2021-10-19 DIAGNOSIS — I69.30 HISTORY OF CVA WITH RESIDUAL DEFICIT: Primary | ICD-10-CM

## 2021-10-19 DIAGNOSIS — Z23 NEED FOR INFLUENZA VACCINATION: ICD-10-CM

## 2021-10-19 DIAGNOSIS — E11.42 DIABETIC POLYNEUROPATHY ASSOCIATED WITH TYPE 2 DIABETES MELLITUS (HCC): ICD-10-CM

## 2021-10-19 DIAGNOSIS — F32.9 REACTIVE DEPRESSION: ICD-10-CM

## 2021-10-19 DIAGNOSIS — G89.29 CHRONIC MIDLINE LOW BACK PAIN WITH LEFT-SIDED SCIATICA: ICD-10-CM

## 2021-10-19 LAB
POC AMPHETAMINES: NEGATIVE
POC BARBITURATES: NEGATIVE
POC BENZODIAZEPHINES: NEGATIVE
POC COCAINE: NEGATIVE
POC METHADONE: NEGATIVE
POC METHAMPHETAMINE SCREEN URINE: NEGATIVE
POC OPIATES: NEGATIVE
POC OXYCODONE: POSITIVE
POC PHENCYCLIDINE: NEGATIVE
POC PROPOXYPHENE: NEGATIVE
POC THC: NEGATIVE
POC TRICYCLIC ANTIDEPRESSANTS: NEGATIVE

## 2021-10-19 PROCEDURE — 80305 DRUG TEST PRSMV DIR OPT OBS: CPT | Performed by: FAMILY MEDICINE

## 2021-10-19 PROCEDURE — 90662 IIV NO PRSV INCREASED AG IM: CPT | Performed by: FAMILY MEDICINE

## 2021-10-19 PROCEDURE — G0008 ADMIN INFLUENZA VIRUS VAC: HCPCS | Performed by: FAMILY MEDICINE

## 2021-10-19 PROCEDURE — 99214 OFFICE O/P EST MOD 30 MIN: CPT | Performed by: FAMILY MEDICINE

## 2021-10-19 RX ORDER — OXYCODONE HYDROCHLORIDE 15 MG/1
15 TABLET ORAL EVERY 6 HOURS PRN
Qty: 120 TABLET | Refills: 0 | Status: SHIPPED | OUTPATIENT
Start: 2021-10-19 | End: 2021-11-16 | Stop reason: SDUPTHER

## 2021-10-19 RX ORDER — MIDODRINE HYDROCHLORIDE 2.5 MG/1
2.5 TABLET ORAL
Start: 2021-10-19 | End: 2021-12-01

## 2021-10-19 RX ORDER — ATORVASTATIN CALCIUM 80 MG/1
80 TABLET, FILM COATED ORAL NIGHTLY
Qty: 90 TABLET | Refills: 1 | Status: SHIPPED | OUTPATIENT
Start: 2021-10-19 | End: 2022-03-01 | Stop reason: SDUPTHER

## 2021-10-19 RX ORDER — INSULIN LISPRO 100 [IU]/ML
5 INJECTION, SOLUTION INTRAVENOUS; SUBCUTANEOUS
Start: 2021-10-19 | End: 2021-12-30 | Stop reason: SDUPTHER

## 2021-10-19 RX ORDER — PREGABALIN 300 MG/1
300 CAPSULE ORAL 2 TIMES DAILY
Qty: 180 CAPSULE | Refills: 1 | Status: SHIPPED | OUTPATIENT
Start: 2021-10-19 | End: 2022-06-03 | Stop reason: SDUPTHER

## 2021-10-19 RX ORDER — ASPIRIN 81 MG/1
81 TABLET ORAL DAILY
Start: 2021-10-19 | End: 2022-03-01 | Stop reason: SDUPTHER

## 2021-10-19 RX ORDER — ESCITALOPRAM OXALATE 5 MG/1
5 TABLET ORAL NIGHTLY
Qty: 30 TABLET | Refills: 1 | Status: SHIPPED | OUTPATIENT
Start: 2021-10-19 | End: 2021-12-01 | Stop reason: SDUPTHER

## 2021-10-19 RX ORDER — OXYCODONE HCL 20 MG/1
20 TABLET, FILM COATED, EXTENDED RELEASE ORAL EVERY 12 HOURS
Qty: 60 TABLET | Refills: 0 | Status: SHIPPED | OUTPATIENT
Start: 2021-10-19 | End: 2021-11-16 | Stop reason: SDUPTHER

## 2021-10-19 RX ORDER — INSULIN GLARGINE 100 [IU]/ML
25 INJECTION, SOLUTION SUBCUTANEOUS NIGHTLY
Qty: 4 PEN | Refills: 0 | Status: SHIPPED | OUTPATIENT
Start: 2021-10-19 | End: 2021-12-01

## 2021-11-16 DIAGNOSIS — E11.42 DIABETIC POLYNEUROPATHY ASSOCIATED WITH TYPE 2 DIABETES MELLITUS (HCC): ICD-10-CM

## 2021-11-16 DIAGNOSIS — M54.42 CHRONIC MIDLINE LOW BACK PAIN WITH LEFT-SIDED SCIATICA: ICD-10-CM

## 2021-11-16 DIAGNOSIS — G89.29 CHRONIC MIDLINE LOW BACK PAIN WITH LEFT-SIDED SCIATICA: ICD-10-CM

## 2021-11-16 DIAGNOSIS — M54.16 LUMBAR BACK PAIN WITH RADICULOPATHY AFFECTING LEFT LOWER EXTREMITY: ICD-10-CM

## 2021-11-16 RX ORDER — OXYCODONE HCL 20 MG/1
20 TABLET, FILM COATED, EXTENDED RELEASE ORAL EVERY 12 HOURS
Qty: 60 TABLET | Refills: 0 | Status: SHIPPED | OUTPATIENT
Start: 2021-11-16 | End: 2021-12-13 | Stop reason: SDUPTHER

## 2021-11-16 RX ORDER — OXYCODONE HYDROCHLORIDE 15 MG/1
15 TABLET ORAL EVERY 6 HOURS PRN
Qty: 120 TABLET | Refills: 0 | Status: SHIPPED | OUTPATIENT
Start: 2021-11-16 | End: 2021-12-13 | Stop reason: SDUPTHER

## 2021-11-16 NOTE — TELEPHONE ENCOUNTER
Caller: Humble Green    Relationship: Self    Best call back number: [phone]     Requested Prescriptions:   Requested Prescriptions     Pending Prescriptions Disp Refills   • oxyCODONE (ROXICODONE) 15 MG immediate release tablet 120 tablet 0     Sig: Take 1 tablet by mouth Every 6 (Six) Hours As Needed for Severe Pain .   • oxyCODONE ER (OxyCONTIN) 20 MG 12 hr tablet 60 tablet 0     Sig: Take 1 tablet by mouth Every 12 (Twelve) Hours.        Pharmacy where request should be sent: [id number] [place], [place] - [address] [phone] [facility] [phone]      Additional details provided by patient: HE STATES HE IS OUT OF THESE MEDICATIONS BUT SAYS THEY ARE NOT DUE UNTIL 11/19/21.    Does the patient have less than a 3 day supply:  [x] Yes  [] No    Suzanna Bradley, PCT   11/16/21 13:09 EST

## 2021-11-16 NOTE — TELEPHONE ENCOUNTER
Rx Refill Note  Requested Prescriptions     Pending Prescriptions Disp Refills   • oxyCODONE (ROXICODONE) 15 MG immediate release tablet 120 tablet 0     Sig: Take 1 tablet by mouth Every 6 (Six) Hours As Needed for Severe Pain .   • oxyCODONE ER (OxyCONTIN) 20 MG 12 hr tablet 60 tablet 0     Sig: Take 1 tablet by mouth Every 12 (Twelve) Hours.      Last office visit with prescribing clinician: 10/19/2021      Next office visit with prescribing clinician: 11/22/2021     Lipid Panel (08/09/2021 11:38)         Jackelyn Valle, RT  11/16/21, 13:21 EST

## 2021-12-01 ENCOUNTER — OFFICE VISIT (OUTPATIENT)
Dept: FAMILY MEDICINE CLINIC | Facility: CLINIC | Age: 66
End: 2021-12-01

## 2021-12-01 VITALS
HEIGHT: 68 IN | DIASTOLIC BLOOD PRESSURE: 69 MMHG | RESPIRATION RATE: 16 BRPM | SYSTOLIC BLOOD PRESSURE: 110 MMHG | TEMPERATURE: 97.1 F | WEIGHT: 244 LBS | BODY MASS INDEX: 36.98 KG/M2 | HEART RATE: 77 BPM | OXYGEN SATURATION: 99 %

## 2021-12-01 DIAGNOSIS — I69.30 HISTORY OF CVA WITH RESIDUAL DEFICIT: ICD-10-CM

## 2021-12-01 DIAGNOSIS — Z79.4 TYPE 2 DIABETES MELLITUS WITH HYPERGLYCEMIA, WITH LONG-TERM CURRENT USE OF INSULIN (HCC): Primary | ICD-10-CM

## 2021-12-01 DIAGNOSIS — L98.9 FACIAL SKIN LESION: ICD-10-CM

## 2021-12-01 DIAGNOSIS — Z23 IMMUNIZATION DUE: ICD-10-CM

## 2021-12-01 DIAGNOSIS — R42 DIZZINESS: ICD-10-CM

## 2021-12-01 DIAGNOSIS — E11.65 TYPE 2 DIABETES MELLITUS WITH HYPERGLYCEMIA, WITH LONG-TERM CURRENT USE OF INSULIN (HCC): Primary | ICD-10-CM

## 2021-12-01 LAB
EXPIRATION DATE: ABNORMAL
HBA1C MFR BLD: 7.6 %
Lab: ABNORMAL

## 2021-12-01 PROCEDURE — 90670 PCV13 VACCINE IM: CPT | Performed by: FAMILY MEDICINE

## 2021-12-01 PROCEDURE — 3051F HG A1C>EQUAL 7.0%<8.0%: CPT | Performed by: FAMILY MEDICINE

## 2021-12-01 PROCEDURE — 83036 HEMOGLOBIN GLYCOSYLATED A1C: CPT | Performed by: FAMILY MEDICINE

## 2021-12-01 PROCEDURE — G0009 ADMIN PNEUMOCOCCAL VACCINE: HCPCS | Performed by: FAMILY MEDICINE

## 2021-12-01 PROCEDURE — 99214 OFFICE O/P EST MOD 30 MIN: CPT | Performed by: FAMILY MEDICINE

## 2021-12-01 RX ORDER — INSULIN GLARGINE 100 [IU]/ML
15 INJECTION, SOLUTION SUBCUTANEOUS NIGHTLY
Qty: 4 PEN | Refills: 0
Start: 2021-12-01 | End: 2022-03-01 | Stop reason: SDUPTHER

## 2021-12-01 RX ORDER — MIDODRINE HYDROCHLORIDE 5 MG/1
5 TABLET ORAL
Qty: 90 TABLET | Refills: 3 | Status: SHIPPED | OUTPATIENT
Start: 2021-12-01 | End: 2022-03-01

## 2021-12-01 RX ORDER — ESCITALOPRAM OXALATE 10 MG/1
10 TABLET ORAL NIGHTLY
Qty: 30 TABLET | Refills: 1 | Status: SHIPPED | OUTPATIENT
Start: 2021-12-01 | End: 2021-12-30

## 2021-12-01 RX ORDER — MIDODRINE HYDROCHLORIDE 5 MG/1
5 TABLET ORAL
COMMUNITY
End: 2021-12-01 | Stop reason: SDUPTHER

## 2021-12-01 NOTE — PROGRESS NOTES
Subjective   Humble Green is a 66 y.o. male.     Chief Complaint   Patient presents with   • Med Management   • Med Refill     Refill on Midodrine to [facility] in [place]         Current Outpatient Medications:   •  apixaban (ELIQUIS) 5 MG tablet tablet, Take 1 tablet by mouth Every 12 (Twelve) Hours., Disp: 180 tablet, Rfl: 0  •  aspirin (aspirin) 81 MG EC tablet, Take 1 tablet by mouth Daily., Disp: , Rfl:   •  atorvastatin (LIPITOR) 80 MG tablet, Take 1 tablet by mouth Every Night., Disp: 90 tablet, Rfl: 1  •  empagliflozin (Jardiance) 25 MG tablet tablet, Take 1 tablet by mouth Every Morning., Disp: 90 tablet, Rfl: 0  •  escitalopram (Lexapro) 10 MG tablet, Take 1 tablet by mouth Every Night., Disp: 30 tablet, Rfl: 1  •  Insulin Glargine (Lantus SoloStar) 100 UNIT/ML injection pen, Inject 15 Units under the skin into the appropriate area as directed Every Night., Disp: 4 pen, Rfl: 0  •  Insulin Lispro, 1 Unit Dial, (HumaLOG KwikPen) 100 UNIT/ML solution pen-injector, Inject 5 Units under the skin into the appropriate area as directed 3 (Three) Times a Day Before Meals., Disp: , Rfl:   •  Menthol, Topical Analgesic, (Biofreeze) 4 % gel, Apply  topically Every 6 (Six) Hours As Needed., Disp: , Rfl:   •  midodrine (PROAMATINE) 5 MG tablet, Take 1 tablet by mouth 3 (Three) Times a Day Before Meals., Disp: 90 tablet, Rfl: 3  •  oxyCODONE (ROXICODONE) 15 MG immediate release tablet, Take 1 tablet by mouth Every 6 (Six) Hours As Needed for Severe Pain ., Disp: 120 tablet, Rfl: 0  •  oxyCODONE ER (OxyCONTIN) 20 MG 12 hr tablet, Take 1 tablet by mouth Every 12 (Twelve) Hours., Disp: 60 tablet, Rfl: 0  •  polyethylene glycol (MIRALAX) 17 GM/SCOOP powder, Take 17 g by mouth Daily., Disp: 507 g, Rfl: 3  •  pregabalin (LYRICA) 300 MG capsule, Take 1 capsule by mouth 2 (Two) Times a Day., Disp: 180 capsule, Rfl: 1    Past Medical History:   Diagnosis Date   • Arthritis    • BPH    • CAD    • Chronic back pain    • 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 07/14/2020   • DMII    • Low back pain    • Low vision of right eye with normal vision in contralateral eye    • Memory problem    • Neuropathy    • Obesity    • PFO (patent foramen ovale) 11/29/2019   • PSA    • Snoring        Past Surgical History:   Procedure Laterality Date   • ARM TENDON REPAIR Left    • CARDIAC CATHETERIZATION N/A 11/16/2020   • CARDIAC CATHETERIZATION N/A 11/16/2020   • CHOLECYSTECTOMY     • COLONOSCOPY      2018 = TA, rech 2023   • EYE SURGERY      Rt Eye Sx after trauma @ 2y/o   • JOINT REPLACEMENT      Left TKR   • ROTATOR CUFF REPAIR Bilateral    • UMBILICAL HERNIA REPAIR         Family History   Problem Relation Age of Onset   • Diabetes Mother    • Heart disease Mother    • Arthritis Mother    • Obesity Mother    • Hypertension Mother    • Migraines Mother    • Osteoporosis Mother    • Snoring Mother    • Diabetes Father    • Heart disease Father    • Arthritis Father    • Hypertension Father    • Stroke Father    • Heart attack Father    • Hyperlipidemia Father    • Snoring Father    • Diabetes Brother    • Heart disease Brother    • Arthritis Sister    • Anemia Sister        Social History     Socioeconomic History   • Marital status: Single   Tobacco Use   • Smoking status: Never Smoker   • Smokeless tobacco: Never Used   Vaping Use   • Vaping Use: Never used   Substance and Sexual Activity   • Alcohol use: Not Currently     Alcohol/week: 1.0 standard drink     Types: 1 Cans of beer per week   • Drug use: Never   • Sexual activity: Defer       65 y/o C male here for f/u on BP/ DMII/ Depression ----pt also has a skin lesion x 1 yr and not healing    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; not ready for this yet    Pt needing toenails cut ----has seen Podiatry in the past and did fine---needs the number to make an appt  Pt states he is still having some issues w/ dizzyness --------pt didn't care for the  other one he saw and wanting to see someone else    [facility] ----->>>optho and overdue for appt  Pt states his BS's have been running pretty good       The following portions of the patient's history were reviewed and updated as appropriate: allergies, current medications, past family history, past medical history, past social history, past surgical history and problem list.    Review of Systems   Constitutional: Positive for activity change. Negative for appetite change, unexpected weight gain and unexpected weight loss.   Eyes: Negative for blurred vision, double vision, pain and visual disturbance.   Cardiovascular: Negative for leg swelling.   Gastrointestinal: Negative for abdominal distention, abdominal pain, constipation, diarrhea, nausea and vomiting.   Endocrine: Negative for polydipsia, polyphagia and polyuria.   Genitourinary: Negative for frequency.   Musculoskeletal: Positive for gait problem.   Skin: Positive for color change and skin lesions. Negative for dry skin and rash.   Neurological: Positive for dizziness. Negative for weakness and numbness.       Vitals:    12/01/21 0819   BP: 110/69   Pulse: 77   Resp: 16   Temp: 97.1 °F (36.2 °C)   SpO2: 99%       Objective   Physical Exam  Vitals and nursing note reviewed.   Constitutional:       General: He is not in acute distress.     Appearance: Normal appearance. He is well-developed. He is not ill-appearing or toxic-appearing.   HENT:      Head:     Cardiovascular:      Rate and Rhythm: Normal rate and regular rhythm.      Pulses:           Dorsalis pedis pulses are 1+ on the right side and 1+ on the left side.      Heart sounds: Normal heart sounds. No murmur heard.      Pulmonary:      Effort: Pulmonary effort is normal. No respiratory distress.      Breath sounds: Normal breath sounds. No stridor. No wheezing, rhonchi or rales.   Musculoskeletal:         General: No tenderness or deformity.      Right foot: Normal range of motion. No  deformity, bunion, Charcot foot, foot drop or prominent metatarsal heads.      Left foot: Normal range of motion. No deformity, bunion, Charcot foot, foot drop or prominent metatarsal heads.   Feet:      Right foot:      Skin integrity: Skin integrity normal. No ulcer, blister, skin breakdown, erythema, warmth, callus, dry skin or fissure.      Toenail Condition: Right toenails are abnormally thick and long. ingrown     Left foot:      Skin integrity: Skin integrity normal. No ulcer, blister, skin breakdown, erythema, warmth, callus, dry skin or fissure.      Toenail Condition: Left toenails are abnormally thick and long. ingrown  Skin:     General: Skin is warm and dry.      Capillary Refill: Capillary refill takes less than 2 seconds.      Findings: No erythema or rash.   Neurological:      Mental Status: He is alert and oriented to person, place, and time.      Cranial Nerves: No cranial nerve deficit.      Gait: Gait abnormal.   Psychiatric:         Attention and Perception: Attention normal.         Mood and Affect: Mood and affect normal.         Speech: Speech normal.         Behavior: Behavior normal. Behavior is cooperative.         Thought Content: Thought content normal.         Cognition and Memory: Cognition and memory normal.         Judgment: Judgment normal.           Assessment/Plan   Diagnoses and all orders for this visit:    1. Type 2 diabetes mellitus with hyperglycemia, with long-term current use of insulin (HCC) (Primary)  -     POC Glycosylated Hemoglobin (Hb A1C)    2. Facial skin lesion  -     Ambulatory Referral to Dermatology    3. History of CVA with residual deficit    4. Dizziness  -     Ambulatory Referral to ENT (Otolaryngology)    5. Immunization due  -     Pneumococcal Conjugate Vaccine 13-Valent All (PCV13)    Other orders  -     midodrine (PROAMATINE) 5 MG tablet; Take 1 tablet by mouth 3 (Three) Times a Day Before Meals.  Dispense: 90 tablet; Refill: 3  -     Insulin Glargine  (Lantus SoloStar) 100 UNIT/ML injection pen; Inject 15 Units under the skin into the appropriate area as directed Every Night.  Dispense: 4 pen; Refill: 0  -     escitalopram (Lexapro) 10 MG tablet; Take 1 tablet by mouth Every Night.  Dispense: 30 tablet; Refill: 1  -     empagliflozin (Jardiance) 25 MG tablet tablet; Take 1 tablet by mouth Every Morning.  Dispense: 90 tablet; Refill: 0  -     apixaban (ELIQUIS) 5 MG tablet tablet; Take 1 tablet by mouth Every 12 (Twelve) Hours.  Dispense: 180 tablet; Refill: 0    A1C = 7.6  Derm consult for lesion eval/ tx  Podiatry for toenail care  ENT consult for Dizzyness eval

## 2021-12-07 ENCOUNTER — PATIENT OUTREACH (OUTPATIENT)
Dept: CASE MANAGEMENT | Facility: OTHER | Age: 66
End: 2021-12-07

## 2021-12-07 ENCOUNTER — TELEPHONE (OUTPATIENT)
Dept: FAMILY MEDICINE CLINIC | Facility: CLINIC | Age: 66
End: 2021-12-07

## 2021-12-07 DIAGNOSIS — L98.9 FACIAL SKIN LESION: Primary | ICD-10-CM

## 2021-12-07 NOTE — OUTREACH NOTE
Ambulatory Case Management Note  Patient Outreach    Multiple RN-ACM outreach attempts unsuccessful to patient cell/H# (NA/no VM set up), no other phone numbers authorized on WANG.     HRCM closed and no additional RN-ACM outreach scheduled as per case management protocol.    Alejandra Gilbert, SHAHLA  Ambulatory Case Management  12/7/2021, 15:34 EST

## 2021-12-13 DIAGNOSIS — E11.42 DIABETIC POLYNEUROPATHY ASSOCIATED WITH TYPE 2 DIABETES MELLITUS (HCC): ICD-10-CM

## 2021-12-13 DIAGNOSIS — G89.29 CHRONIC MIDLINE LOW BACK PAIN WITH LEFT-SIDED SCIATICA: ICD-10-CM

## 2021-12-13 DIAGNOSIS — M54.16 LUMBAR BACK PAIN WITH RADICULOPATHY AFFECTING LEFT LOWER EXTREMITY: ICD-10-CM

## 2021-12-13 DIAGNOSIS — M54.42 CHRONIC MIDLINE LOW BACK PAIN WITH LEFT-SIDED SCIATICA: ICD-10-CM

## 2021-12-13 NOTE — TELEPHONE ENCOUNTER
Caller: Humble Green    Relationship: Self    Best call back number: [phone]    Requested Prescriptions:   Requested Prescriptions     Pending Prescriptions Disp Refills   • oxyCODONE (ROXICODONE) 15 MG immediate release tablet 120 tablet 0     Sig: Take 1 tablet by mouth Every 6 (Six) Hours As Needed for Severe Pain .   • oxyCODONE ER (OxyCONTIN) 20 MG 12 hr tablet 60 tablet 0     Sig: Take 1 tablet by mouth Every 12 (Twelve) Hours.        Pharmacy where request should be sent: [id number] [address] [facility] [phone] [facility] [phone] FX     Additional details provided by patient: PATIENT HAS 2 DAYS LEFT OF MEDICATION.     Does the patient have less than a 3 day supply:  [x] Yes  [] No    Obey Mcintyre Rep   12/13/21 10:57 EST

## 2021-12-14 RX ORDER — OXYCODONE HYDROCHLORIDE 15 MG/1
15 TABLET ORAL EVERY 6 HOURS PRN
Qty: 120 TABLET | Refills: 0 | Status: SHIPPED | OUTPATIENT
Start: 2021-12-14 | End: 2022-01-13 | Stop reason: SDUPTHER

## 2021-12-14 RX ORDER — OXYCODONE HCL 20 MG/1
20 TABLET, FILM COATED, EXTENDED RELEASE ORAL EVERY 12 HOURS
Qty: 60 TABLET | Refills: 0 | Status: SHIPPED | OUTPATIENT
Start: 2021-12-14 | End: 2022-01-13 | Stop reason: SDUPTHER

## 2021-12-30 ENCOUNTER — OFFICE VISIT (OUTPATIENT)
Dept: FAMILY MEDICINE CLINIC | Facility: CLINIC | Age: 66
End: 2021-12-30

## 2021-12-30 VITALS
HEART RATE: 79 BPM | DIASTOLIC BLOOD PRESSURE: 67 MMHG | SYSTOLIC BLOOD PRESSURE: 104 MMHG | HEIGHT: 68 IN | RESPIRATION RATE: 18 BRPM | WEIGHT: 249 LBS | TEMPERATURE: 97.3 F | BODY MASS INDEX: 37.74 KG/M2 | OXYGEN SATURATION: 96 %

## 2021-12-30 DIAGNOSIS — L98.9 FACE LESION: ICD-10-CM

## 2021-12-30 DIAGNOSIS — R42 DIZZINESS: ICD-10-CM

## 2021-12-30 DIAGNOSIS — Z79.4 TYPE 2 DIABETES MELLITUS WITH HYPERGLYCEMIA, WITH LONG-TERM CURRENT USE OF INSULIN: Chronic | ICD-10-CM

## 2021-12-30 DIAGNOSIS — R53.83 FATIGUE, UNSPECIFIED TYPE: Primary | ICD-10-CM

## 2021-12-30 DIAGNOSIS — E11.65 TYPE 2 DIABETES MELLITUS WITH HYPERGLYCEMIA, WITH LONG-TERM CURRENT USE OF INSULIN: Chronic | ICD-10-CM

## 2021-12-30 PROCEDURE — 99213 OFFICE O/P EST LOW 20 MIN: CPT | Performed by: FAMILY MEDICINE

## 2021-12-30 RX ORDER — INSULIN LISPRO 100 [IU]/ML
5 INJECTION, SOLUTION INTRAVENOUS; SUBCUTANEOUS
Qty: 1 PEN | Refills: 2 | Status: SHIPPED | OUTPATIENT
Start: 2021-12-30 | End: 2022-03-01 | Stop reason: SDUPTHER

## 2022-01-04 ENCOUNTER — TELEPHONE (OUTPATIENT)
Dept: FAMILY MEDICINE CLINIC | Facility: CLINIC | Age: 67
End: 2022-01-04

## 2022-01-04 DIAGNOSIS — M54.42 CHRONIC MIDLINE LOW BACK PAIN WITH LEFT-SIDED SCIATICA: ICD-10-CM

## 2022-01-04 DIAGNOSIS — E11.42 DIABETIC POLYNEUROPATHY ASSOCIATED WITH TYPE 2 DIABETES MELLITUS: ICD-10-CM

## 2022-01-04 DIAGNOSIS — G89.29 CHRONIC MIDLINE LOW BACK PAIN WITH LEFT-SIDED SCIATICA: ICD-10-CM

## 2022-01-04 DIAGNOSIS — M54.16 LUMBAR BACK PAIN WITH RADICULOPATHY AFFECTING LEFT LOWER EXTREMITY: ICD-10-CM

## 2022-01-04 RX ORDER — OXYCODONE HYDROCHLORIDE 15 MG/1
15 TABLET ORAL EVERY 6 HOURS PRN
Qty: 120 TABLET | Refills: 0 | Status: CANCELLED | OUTPATIENT
Start: 2022-01-04

## 2022-01-04 RX ORDER — OXYCODONE HCL 20 MG/1
20 TABLET, FILM COATED, EXTENDED RELEASE ORAL EVERY 12 HOURS
Qty: 60 TABLET | Refills: 0 | Status: CANCELLED | OUTPATIENT
Start: 2022-01-04

## 2022-01-04 NOTE — TELEPHONE ENCOUNTER
Caller: Humble Green    Relationship to patient: Self    Best call back number: [phone]    Patient is needing: PATIENT STATES THAT HE SAW SPECIALIST ABOUT THE CANCER ON HIS FACE. WANTED DR HENRIQUEZ TO KNOW

## 2022-01-04 NOTE — TELEPHONE ENCOUNTER
Caller: Humble Green    Relationship: Self    Best call back number: [phone]    Requested Prescriptions:   Requested Prescriptions     Pending Prescriptions Disp Refills   • oxyCODONE (ROXICODONE) 15 MG immediate release tablet 120 tablet 0     Sig: Take 1 tablet by mouth Every 6 (Six) Hours As Needed for Severe Pain .   • oxyCODONE ER (OxyCONTIN) 20 MG 12 hr tablet 60 tablet 0     Sig: Take 1 tablet by mouth Every 12 (Twelve) Hours.        Pharmacy where request should be sent: [id number] [place] [place] [facility] [address] [facility] [phone] [facility] [phone]      Additional details provided by patient: OUT OF MEDICATION     Does the patient have less than a 3 day supply:  [x] Yes  [] No    Obey Solo Rep   01/04/22 13:33 EST

## 2022-01-04 NOTE — TELEPHONE ENCOUNTER
Rx Refill Note  Requested Prescriptions     Pending Prescriptions Disp Refills   • oxyCODONE (ROXICODONE) 15 MG immediate release tablet 120 tablet 0     Sig: Take 1 tablet by mouth Every 6 (Six) Hours As Needed for Severe Pain .   • oxyCODONE ER (OxyCONTIN) 20 MG 12 hr tablet 60 tablet 0     Sig: Take 1 tablet by mouth Every 12 (Twelve) Hours.      Last office visit with prescribing clinician: 12/30/2021      Next office visit with prescribing clinician: 1/4/2022     POC Glycosylated Hemoglobin (Hb A1C) (12/01/2021 08:45)  CBC Auto Differential (08/20/2021 04:50)  Basic Metabolic Panel (08/20/2021 04:50)         Sari Robb, MITCHELL  01/04/22, 14:27 EST     INSPECT ran and on your desk to review.

## 2022-01-13 DIAGNOSIS — E11.42 DIABETIC POLYNEUROPATHY ASSOCIATED WITH TYPE 2 DIABETES MELLITUS: ICD-10-CM

## 2022-01-13 DIAGNOSIS — G89.29 CHRONIC MIDLINE LOW BACK PAIN WITH LEFT-SIDED SCIATICA: ICD-10-CM

## 2022-01-13 DIAGNOSIS — M54.42 CHRONIC MIDLINE LOW BACK PAIN WITH LEFT-SIDED SCIATICA: ICD-10-CM

## 2022-01-13 DIAGNOSIS — M54.16 LUMBAR BACK PAIN WITH RADICULOPATHY AFFECTING LEFT LOWER EXTREMITY: ICD-10-CM

## 2022-01-13 RX ORDER — OXYCODONE HCL 20 MG/1
20 TABLET, FILM COATED, EXTENDED RELEASE ORAL EVERY 12 HOURS
Qty: 60 TABLET | Refills: 0 | OUTPATIENT
Start: 2022-01-13

## 2022-01-13 RX ORDER — OXYCODONE HCL 20 MG/1
20 TABLET, FILM COATED, EXTENDED RELEASE ORAL EVERY 12 HOURS
Qty: 60 TABLET | Refills: 0 | Status: SHIPPED | OUTPATIENT
Start: 2022-01-13 | End: 2022-02-11 | Stop reason: SDUPTHER

## 2022-01-13 RX ORDER — OXYCODONE HYDROCHLORIDE 15 MG/1
15 TABLET ORAL EVERY 6 HOURS PRN
Qty: 120 TABLET | Refills: 0 | Status: SHIPPED | OUTPATIENT
Start: 2022-01-13 | End: 2022-02-11 | Stop reason: SDUPTHER

## 2022-01-13 RX ORDER — OXYCODONE HYDROCHLORIDE 15 MG/1
15 TABLET ORAL EVERY 6 HOURS PRN
Qty: 120 TABLET | Refills: 0 | OUTPATIENT
Start: 2022-01-13

## 2022-01-13 NOTE — TELEPHONE ENCOUNTER
Incoming Refill Request      Medication requested (name and dose): Oxycodone 15mg Immediate Release  Oxycodone 20mg 12hr tablet      Pharmacy where request should be sent: [facility]    Additional details provided by patient: Patient asking if you can increase the frequency of either one of the medications because he requires more each day than prescribed and always runs out before he is due for his next refill.    Best call back number: [phone]    Does the patient have less than a 3 day supply:  [x] Yes  [] No    Leann So, RegSched Rep  01/13/22, 12:54 EST

## 2022-02-11 DIAGNOSIS — E11.42 DIABETIC POLYNEUROPATHY ASSOCIATED WITH TYPE 2 DIABETES MELLITUS: ICD-10-CM

## 2022-02-11 DIAGNOSIS — M54.42 CHRONIC MIDLINE LOW BACK PAIN WITH LEFT-SIDED SCIATICA: ICD-10-CM

## 2022-02-11 DIAGNOSIS — M54.16 LUMBAR BACK PAIN WITH RADICULOPATHY AFFECTING LEFT LOWER EXTREMITY: ICD-10-CM

## 2022-02-11 DIAGNOSIS — G89.29 CHRONIC MIDLINE LOW BACK PAIN WITH LEFT-SIDED SCIATICA: ICD-10-CM

## 2022-02-11 RX ORDER — OXYCODONE HYDROCHLORIDE 15 MG/1
15 TABLET ORAL EVERY 6 HOURS PRN
Qty: 120 TABLET | Refills: 0 | Status: SHIPPED | OUTPATIENT
Start: 2022-02-11 | End: 2022-03-11 | Stop reason: SDUPTHER

## 2022-02-11 RX ORDER — OXYCODONE HCL 20 MG/1
20 TABLET, FILM COATED, EXTENDED RELEASE ORAL EVERY 12 HOURS
Qty: 60 TABLET | Refills: 0 | Status: SHIPPED | OUTPATIENT
Start: 2022-02-11 | End: 2022-03-01 | Stop reason: SDUPTHER

## 2022-02-11 NOTE — TELEPHONE ENCOUNTER
Rx Refill Note  Requested Prescriptions     Pending Prescriptions Disp Refills   • oxyCODONE (ROXICODONE) 15 MG immediate release tablet 120 tablet 0     Sig: Take 1 tablet by mouth Every 6 (Six) Hours As Needed for Severe Pain .   • oxyCODONE ER (OxyCONTIN) 20 MG 12 hr tablet 60 tablet 0     Sig: Take 1 tablet by mouth Every 12 (Twelve) Hours.      Last office visit with prescribing clinician: 12/30/2021      Next office visit with prescribing clinician: 3/1/2022     POC Glycosylated Hemoglobin (Hb A1C) (12/01/2021 08:45)  Comprehensive Metabolic Panel (08/19/2021 22:04)  CBC & Differential (08/19/2021 21:15)         Sari Robb, MITCHELL  02/11/22, 14:02 EST     INSPECT is in the chart

## 2022-02-11 NOTE — TELEPHONE ENCOUNTER
Caller: Humble Green    Relationship: Self    Best call back number: [phone]    Requested Prescriptions:   Requested Prescriptions     Pending Prescriptions Disp Refills   • oxyCODONE (ROXICODONE) 15 MG immediate release tablet 120 tablet 0     Sig: Take 1 tablet by mouth Every 6 (Six) Hours As Needed for Severe Pain .   • oxyCODONE ER (OxyCONTIN) 20 MG 12 hr tablet 60 tablet 0     Sig: Take 1 tablet by mouth Every 12 (Twelve) Hours.        Pharmacy where request should be sent: [id number] [address] [facility] [phone] [facility] [phone] FX     Additional details provided by patient: PATIENT CALLING IN REGARDS TO REQUEST RX. PLEASE ADVISE THANK YOU!      Does the patient have less than a 3 day supply:  [x] Yes  [] No    Obey Leary Rep   02/11/22 13:42 EST

## 2022-02-16 ENCOUNTER — TELEPHONE (OUTPATIENT)
Dept: FAMILY MEDICINE CLINIC | Facility: CLINIC | Age: 67
End: 2022-02-16

## 2022-02-16 NOTE — TELEPHONE ENCOUNTER
ING: Telephone encounter to be sent to the  pool     Caller: Humble Green    Relationship: Self    Best call back number: [phone]    What is the best time to reach you: ANYTIME    Who are you requesting to speak with (clinical staff, provider,  specific staff member): CLINICAL    What was the call regarding: PATIENT CALLING STATING THAT THE PHARMACY ADVISE HIM THAT THE OXYCODONE TIME RELEASED WILL NEED A PRIOR AUTHORIZATION. THE PATIENT ALSO STATED THAT HE WAS ADVISED THAT HIS MEDICATION WAS BEING CHANGED TO OXYCONTIN.    Do you require a callback: YES TO ADVISE.

## 2022-02-17 ENCOUNTER — TELEPHONE (OUTPATIENT)
Dept: FAMILY MEDICINE CLINIC | Facility: CLINIC | Age: 67
End: 2022-02-17

## 2022-02-17 NOTE — TELEPHONE ENCOUNTER
HUB to read    I received there questionaire fax on the Oxycontin ER 20mg and just faxed it back to [facility].

## 2022-02-17 NOTE — TELEPHONE ENCOUNTER
PATIENT IS CALLING TO GET STATUS OF OXYCONTIN. READ MESSAGES FOR HUB TO READ TO HIM. WOULD LIKE TO KNOW WHEN MEDICATION IS SENT TO PHARMACY. [phone]

## 2022-02-17 NOTE — TELEPHONE ENCOUNTER
HUB to read    The insurance sent additional paperwork to be filled out. Additional PA paperwork was filled out and faxed back to them today.  Waiting on the response from insurance.

## 2022-02-17 NOTE — TELEPHONE ENCOUNTER
STACEY LEA [facility] HAS A CLINICAL QUESTION ON THE PATIENTS oxyCODONE ER (OxyCONTIN) 20 MG 12 hr tablet      PLEASE ADVISE  [phone]  REF# [id number]

## 2022-02-24 NOTE — TELEPHONE ENCOUNTER
HUB to read    Oxycontin ER 20mg was denied.   Appeal was faxed to the insurance.   Still waiting on the outcome from insurance.     I tried to call call JUANA to do the Appeal over the phone and soon soon as someone answered the sent me to their survey without talking to me.

## 2022-02-25 ENCOUNTER — TELEPHONE (OUTPATIENT)
Dept: FAMILY MEDICINE CLINIC | Facility: CLINIC | Age: 67
End: 2022-02-25

## 2022-02-25 RX ORDER — METRONIDAZOLE 10 MG/G
GEL TOPICAL
Qty: 55 G | Refills: 0 | Status: SHIPPED | OUTPATIENT
Start: 2022-02-25

## 2022-02-25 NOTE — TELEPHONE ENCOUNTER
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

## 2022-02-26 ENCOUNTER — HOSPITAL ENCOUNTER (EMERGENCY)
Facility: HOSPITAL | Age: 67
Discharge: HOME | End: 2022-02-26
Attending: EMERGENCY MEDICINE | Admitting: EMERGENCY MEDICINE

## 2022-02-26 VITALS
RESPIRATION RATE: 16 BRPM | HEIGHT: 69 IN | DIASTOLIC BLOOD PRESSURE: 71 MMHG | TEMPERATURE: 97.4 F | SYSTOLIC BLOOD PRESSURE: 138 MMHG | BODY MASS INDEX: 37.29 KG/M2 | HEART RATE: 75 BPM | OXYGEN SATURATION: 99 % | WEIGHT: 251.8 LBS

## 2022-02-26 DIAGNOSIS — T78.40XA ALLERGIC REACTION, INITIAL ENCOUNTER: ICD-10-CM

## 2022-02-26 DIAGNOSIS — R21 FACIAL RASH: Primary | ICD-10-CM

## 2022-02-26 LAB
ALBUMIN SERPL-MCNC: 3.9 G/DL (ref 3.5–5.2)
ALBUMIN/GLOB SERPL: 1.4 G/DL
ALP SERPL-CCNC: 87 U/L (ref 39–117)
ALT SERPL W P-5'-P-CCNC: 8 U/L (ref 1–41)
ANION GAP SERPL CALCULATED.3IONS-SCNC: 9 MMOL/L (ref 5–15)
AST SERPL-CCNC: 9 U/L (ref 1–40)
BASOPHILS # BLD AUTO: 0 10*3/MM3 (ref 0–0.2)
BASOPHILS NFR BLD AUTO: 0.8 % (ref 0–1.5)
BILIRUB SERPL-MCNC: 0.4 MG/DL (ref 0–1.2)
BUN SERPL-MCNC: 13 MG/DL (ref 8–23)
BUN/CREAT SERPL: 16.7 (ref 7–25)
CALCIUM SPEC-SCNC: 9.2 MG/DL (ref 8.6–10.5)
CHLORIDE SERPL-SCNC: 101 MMOL/L (ref 98–107)
CO2 SERPL-SCNC: 28 MMOL/L (ref 22–29)
CREAT SERPL-MCNC: 0.78 MG/DL (ref 0.76–1.27)
CRP SERPL-MCNC: <0.3 MG/DL (ref 0–0.5)
D-LACTATE SERPL-SCNC: 0.8 MMOL/L (ref 0.5–2)
DEPRECATED RDW RBC AUTO: 44.6 FL (ref 37–54)
EOSINOPHIL # BLD AUTO: 0.2 10*3/MM3 (ref 0–0.4)
EOSINOPHIL NFR BLD AUTO: 3.7 % (ref 0.3–6.2)
ERYTHROCYTE [DISTWIDTH] IN BLOOD BY AUTOMATED COUNT: 14.4 % (ref 12.3–15.4)
ERYTHROCYTE [SEDIMENTATION RATE] IN BLOOD: 13 MM/HR (ref 0–20)
GFR SERPL CREATININE-BSD FRML MDRD: 100 ML/MIN/1.73
GLOBULIN UR ELPH-MCNC: 2.8 GM/DL
GLUCOSE SERPL-MCNC: 177 MG/DL (ref 65–99)
HCT VFR BLD AUTO: 42.4 % (ref 37.5–51)
HGB BLD-MCNC: 14.2 G/DL (ref 13–17.7)
LYMPHOCYTES # BLD AUTO: 1.8 10*3/MM3 (ref 0.7–3.1)
LYMPHOCYTES NFR BLD AUTO: 34.6 % (ref 19.6–45.3)
MCH RBC QN AUTO: 29.9 PG (ref 26.6–33)
MCHC RBC AUTO-ENTMCNC: 33.6 G/DL (ref 31.5–35.7)
MCV RBC AUTO: 89 FL (ref 79–97)
MONOCYTES # BLD AUTO: 0.4 10*3/MM3 (ref 0.1–0.9)
MONOCYTES NFR BLD AUTO: 8.5 % (ref 5–12)
NEUTROPHILS NFR BLD AUTO: 2.8 10*3/MM3 (ref 1.7–7)
NEUTROPHILS NFR BLD AUTO: 52.4 % (ref 42.7–76)
NRBC BLD AUTO-RTO: 0.2 /100 WBC (ref 0–0.2)
PLATELET # BLD AUTO: 146 10*3/MM3 (ref 140–450)
PMV BLD AUTO: 11.3 FL (ref 6–12)
POTASSIUM SERPL-SCNC: 4.4 MMOL/L (ref 3.5–5.2)
PROT SERPL-MCNC: 6.7 G/DL (ref 6–8.5)
RBC # BLD AUTO: 4.76 10*6/MM3 (ref 4.14–5.8)
SODIUM SERPL-SCNC: 138 MMOL/L (ref 136–145)
WBC NRBC COR # BLD: 5.3 10*3/MM3 (ref 3.4–10.8)

## 2022-02-26 PROCEDURE — 96375 TX/PRO/DX INJ NEW DRUG ADDON: CPT

## 2022-02-26 PROCEDURE — 80053 COMPREHEN METABOLIC PANEL: CPT | Performed by: NURSE PRACTITIONER

## 2022-02-26 PROCEDURE — 87040 BLOOD CULTURE FOR BACTERIA: CPT | Performed by: NURSE PRACTITIONER

## 2022-02-26 PROCEDURE — 99283 EMERGENCY DEPT VISIT LOW MDM: CPT

## 2022-02-26 PROCEDURE — 25010000002 DIPHENHYDRAMINE PER 50 MG: Performed by: NURSE PRACTITIONER

## 2022-02-26 PROCEDURE — 96374 THER/PROPH/DIAG INJ IV PUSH: CPT

## 2022-02-26 PROCEDURE — 83605 ASSAY OF LACTIC ACID: CPT

## 2022-02-26 PROCEDURE — 25010000002 DEXAMETHASONE PER 1 MG: Performed by: NURSE PRACTITIONER

## 2022-02-26 PROCEDURE — 99284 EMERGENCY DEPT VISIT MOD MDM: CPT

## 2022-02-26 PROCEDURE — 85025 COMPLETE CBC W/AUTO DIFF WBC: CPT | Performed by: NURSE PRACTITIONER

## 2022-02-26 PROCEDURE — 85652 RBC SED RATE AUTOMATED: CPT | Performed by: NURSE PRACTITIONER

## 2022-02-26 PROCEDURE — 86140 C-REACTIVE PROTEIN: CPT | Performed by: NURSE PRACTITIONER

## 2022-02-26 RX ORDER — METHYLPREDNISOLONE 4 MG/1
TABLET ORAL
Qty: 21 TABLET | Refills: 0 | Status: SHIPPED | OUTPATIENT
Start: 2022-02-26 | End: 2022-03-01

## 2022-02-26 RX ORDER — SODIUM CHLORIDE 0.9 % (FLUSH) 0.9 %
10 SYRINGE (ML) INJECTION AS NEEDED
Status: DISCONTINUED | OUTPATIENT
Start: 2022-02-26 | End: 2022-02-26 | Stop reason: HOSPADM

## 2022-02-26 RX ORDER — DIPHENHYDRAMINE HCL 25 MG
25 CAPSULE ORAL EVERY 6 HOURS PRN
Qty: 12 CAPSULE | Refills: 0 | Status: SHIPPED | OUTPATIENT
Start: 2022-02-26 | End: 2022-03-01

## 2022-02-26 RX ORDER — DEXAMETHASONE SODIUM PHOSPHATE 4 MG/ML
10 INJECTION, SOLUTION INTRA-ARTICULAR; INTRALESIONAL; INTRAMUSCULAR; INTRAVENOUS; SOFT TISSUE ONCE
Status: COMPLETED | OUTPATIENT
Start: 2022-02-26 | End: 2022-02-26

## 2022-02-26 RX ORDER — ACETAMINOPHEN 500 MG
1000 TABLET ORAL ONCE
Status: COMPLETED | OUTPATIENT
Start: 2022-02-26 | End: 2022-02-26

## 2022-02-26 RX ORDER — DIPHENHYDRAMINE HYDROCHLORIDE 50 MG/ML
25 INJECTION INTRAMUSCULAR; INTRAVENOUS ONCE
Status: COMPLETED | OUTPATIENT
Start: 2022-02-26 | End: 2022-02-26

## 2022-02-26 RX ORDER — PROPARACAINE HYDROCHLORIDE 5 MG/ML
SOLUTION/ DROPS OPHTHALMIC
Status: DISCONTINUED
Start: 2022-02-26 | End: 2022-02-26 | Stop reason: HOSPADM

## 2022-02-26 RX ADMIN — DIPHENHYDRAMINE HYDROCHLORIDE 25 MG: 50 INJECTION, SOLUTION INTRAMUSCULAR; INTRAVENOUS at 14:53

## 2022-02-26 RX ADMIN — DEXAMETHASONE SODIUM PHOSPHATE 10 MG: 4 INJECTION, SOLUTION INTRAMUSCULAR; INTRAVENOUS at 13:03

## 2022-02-26 RX ADMIN — ACETAMINOPHEN 1000 MG: 500 TABLET, FILM COATED ORAL at 14:53

## 2022-02-26 RX ADMIN — SODIUM CHLORIDE 1000 ML: 9 INJECTION, SOLUTION INTRAVENOUS at 13:03

## 2022-03-01 ENCOUNTER — OFFICE VISIT (OUTPATIENT)
Dept: FAMILY MEDICINE CLINIC | Facility: CLINIC | Age: 67
End: 2022-03-01

## 2022-03-01 VITALS
HEIGHT: 69 IN | SYSTOLIC BLOOD PRESSURE: 108 MMHG | TEMPERATURE: 98 F | OXYGEN SATURATION: 97 % | HEART RATE: 90 BPM | BODY MASS INDEX: 37.33 KG/M2 | RESPIRATION RATE: 16 BRPM | WEIGHT: 252 LBS | DIASTOLIC BLOOD PRESSURE: 68 MMHG

## 2022-03-01 DIAGNOSIS — E11.65 TYPE 2 DIABETES MELLITUS WITH HYPERGLYCEMIA, WITH LONG-TERM CURRENT USE OF INSULIN: Primary | ICD-10-CM

## 2022-03-01 DIAGNOSIS — G89.29 CHRONIC MIDLINE LOW BACK PAIN WITH LEFT-SIDED SCIATICA: ICD-10-CM

## 2022-03-01 DIAGNOSIS — R21 RASH: ICD-10-CM

## 2022-03-01 DIAGNOSIS — M54.16 LUMBAR BACK PAIN WITH RADICULOPATHY AFFECTING LEFT LOWER EXTREMITY: ICD-10-CM

## 2022-03-01 DIAGNOSIS — M54.42 CHRONIC MIDLINE LOW BACK PAIN WITH LEFT-SIDED SCIATICA: ICD-10-CM

## 2022-03-01 DIAGNOSIS — E11.42 DIABETIC POLYNEUROPATHY ASSOCIATED WITH TYPE 2 DIABETES MELLITUS: ICD-10-CM

## 2022-03-01 DIAGNOSIS — Z79.4 TYPE 2 DIABETES MELLITUS WITH HYPERGLYCEMIA, WITH LONG-TERM CURRENT USE OF INSULIN: Primary | ICD-10-CM

## 2022-03-01 LAB
EXPIRATION DATE: ABNORMAL
HBA1C MFR BLD: 8 %
Lab: ABNORMAL

## 2022-03-01 PROCEDURE — 99214 OFFICE O/P EST MOD 30 MIN: CPT | Performed by: FAMILY MEDICINE

## 2022-03-01 PROCEDURE — 83036 HEMOGLOBIN GLYCOSYLATED A1C: CPT | Performed by: FAMILY MEDICINE

## 2022-03-01 PROCEDURE — 3052F HG A1C>EQUAL 8.0%<EQUAL 9.0%: CPT | Performed by: FAMILY MEDICINE

## 2022-03-01 RX ORDER — INSULIN GLARGINE 100 [IU]/ML
10 INJECTION, SOLUTION SUBCUTANEOUS NIGHTLY
Qty: 3 PEN | Refills: 1 | Status: SHIPPED | OUTPATIENT
Start: 2022-03-01 | End: 2022-07-11 | Stop reason: SDUPTHER

## 2022-03-01 RX ORDER — OXYCODONE HCL 20 MG/1
20 TABLET, FILM COATED, EXTENDED RELEASE ORAL EVERY 12 HOURS
Qty: 60 TABLET | Refills: 0 | Status: SHIPPED | OUTPATIENT
Start: 2022-03-01 | End: 2022-05-03

## 2022-03-01 RX ORDER — INSULIN LISPRO 100 [IU]/ML
5 INJECTION, SOLUTION INTRAVENOUS; SUBCUTANEOUS
Qty: 5 PEN | Refills: 1 | Status: SHIPPED | OUTPATIENT
Start: 2022-03-01 | End: 2022-07-11 | Stop reason: SDUPTHER

## 2022-03-01 RX ORDER — ATORVASTATIN CALCIUM 80 MG/1
80 TABLET, FILM COATED ORAL NIGHTLY
Qty: 90 TABLET | Refills: 0 | Status: SHIPPED | OUTPATIENT
Start: 2022-03-01

## 2022-03-01 RX ORDER — BLOOD-GLUCOSE METER
1 EACH MISCELLANEOUS DAILY
Qty: 1 KIT | Refills: 0 | Status: SHIPPED | OUTPATIENT
Start: 2022-03-01

## 2022-03-01 RX ORDER — ASPIRIN 81 MG/1
81 TABLET ORAL DAILY
Start: 2022-03-01

## 2022-03-01 NOTE — PROGRESS NOTES
Subjective   Humble Green is a 66 y.o. male.     Chief Complaint   Patient presents with   • Diabetes   • Rash   • Pain         Current Outpatient Medications:   •  apixaban (ELIQUIS) 5 MG tablet tablet, Take 1 tablet by mouth Every 12 (Twelve) Hours., Disp: 180 tablet, Rfl: 0  •  aspirin (aspirin) 81 MG EC tablet, Take 1 tablet by mouth Daily., Disp: , Rfl:   •  atorvastatin (LIPITOR) 80 MG tablet, Take 1 tablet by mouth Every Night., Disp: 90 tablet, Rfl: 0  •  empagliflozin (Jardiance) 25 MG tablet tablet, Take 1 tablet by mouth Every Morning., Disp: 90 tablet, Rfl: 0  •  Insulin Glargine (Lantus SoloStar) 100 UNIT/ML injection pen, Inject 10 Units under the skin into the appropriate area as directed Every Night., Disp: 3 pen, Rfl: 1  •  Insulin Lispro, 1 Unit Dial, (HumaLOG KwikPen) 100 UNIT/ML solution pen-injector, Inject 5 Units under the skin into the appropriate area as directed 3 (Three) Times a Day Before Meals., Disp: 5 pen, Rfl: 1  •  Menthol, Topical Analgesic, (Biofreeze) 4 % gel, Apply  topically Every 6 (Six) Hours As Needed., Disp: , Rfl:   •  metroNIDAZOLE (METROGEL) 1 % gel, Apply to face rash qday, Disp: 55 g, Rfl: 0  •  oxyCODONE (ROXICODONE) 15 MG immediate release tablet, Take 1 tablet by mouth Every 6 (Six) Hours As Needed for Severe Pain ., Disp: 120 tablet, Rfl: 0  •  polyethylene glycol (MIRALAX) 17 GM/SCOOP powder, Take 17 g by mouth Daily., Disp: 507 g, Rfl: 3  •  pregabalin (LYRICA) 300 MG capsule, Take 1 capsule by mouth 2 (Two) Times a Day., Disp: 180 capsule, Rfl: 1  •  Blood Glucose Monitoring Suppl (Accu-Chek Guide Me) w/Device kit, 1 kit Daily., Disp: 1 kit, Rfl: 0  •  oxyCODONE ER (OxyCONTIN) 20 MG 12 hr tablet, Take 1 tablet by mouth Every 12 (Twelve) Hours., Disp: 60 tablet, Rfl: 0    Past Medical History:   Diagnosis Date   • Arthritis    • BPH    • CAD    • Chronic back pain    • 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 07/14/2020   • DMII    • Low back  pain    • Low vision of right eye with normal vision in contralateral eye    • Memory problem    • Neuropathy    • Obesity    • PFO (patent foramen ovale) 11/29/2019   • PSA    • Snoring        Past Surgical History:   Procedure Laterality Date   • ARM TENDON REPAIR Left    • CARDIAC CATHETERIZATION N/A 11/16/2020   • CARDIAC CATHETERIZATION N/A 11/16/2020   • CHOLECYSTECTOMY     • COLONOSCOPY      2018 = TA, rech 2023   • EYE SURGERY      Rt Eye Sx after trauma @ 2y/o   • JOINT REPLACEMENT      Left TKR   • ROTATOR CUFF REPAIR Bilateral    • UMBILICAL HERNIA REPAIR         Family History   Problem Relation Age of Onset   • Diabetes Mother    • Heart disease Mother    • Arthritis Mother    • Obesity Mother    • Hypertension Mother    • Migraines Mother    • Osteoporosis Mother    • Snoring Mother    • Diabetes Father    • Heart disease Father    • Arthritis Father    • Hypertension Father    • Stroke Father    • Heart attack Father    • Hyperlipidemia Father    • Snoring Father    • Diabetes Brother    • Heart disease Brother    • Arthritis Sister    • Anemia Sister        Social History     Socioeconomic History   • Marital status: Single   Tobacco Use   • Smoking status: Never Smoker   • Smokeless tobacco: Never Used   Vaping Use   • Vaping Use: Never used   Substance and Sexual Activity   • Alcohol use: Not Currently     Alcohol/week: 1.0 standard drink     Types: 1 Cans of beer per week   • Drug use: Never   • Sexual activity: Defer       67 y/o C male here for f/u from ER and f/u DMII/ PAIN    Pt will be getting a Mohs procedure at the end of the month for his left cheek Keratoacanthoma    Pt went to ER when he broke out in a bumpy red painful/ pruitic rash @ b/l cheeks/ Left UE and b/l LE------when his left eye swelled, he went to ER for eval; pt given metronidazole topical to put on whole rash-----also given steroid roscoe and topical benadryl; pt never told what it was and it does seem to be getting better (eye  no longer swollen)    Pt states he was told he needed a PA for the oxycontin and has only had the short acting this past month.......so he had to use more than normal this past month; he didn't think the long acting was helping until he didn't have it     Pt states he just changed ins again and it starts today    Pt states he hasnt been eating as good as he should and that could be why his BS is high       The following portions of the patient's history were reviewed and updated as appropriate: allergies, current medications, past family history, past medical history, past social history, past surgical history and problem list.    Review of Systems   Constitutional: Negative for activity change, appetite change, unexpected weight gain and unexpected weight loss.   Eyes: Negative for blurred vision, double vision, pain and visual disturbance.   Cardiovascular: Negative for leg swelling.   Gastrointestinal: Negative for abdominal distention, abdominal pain, constipation, diarrhea, nausea and vomiting.   Endocrine: Negative for polydipsia, polyphagia and polyuria.   Genitourinary: Negative for frequency.   Musculoskeletal: Positive for arthralgias and back pain. Negative for gait problem.   Skin: Positive for dry skin and rash. Negative for color change and skin lesions.   Neurological: Negative for weakness, light-headedness and numbness.       Vitals:    03/01/22 1302   BP: 108/68   Pulse: 90   Resp: 16   Temp: 98 °F (36.7 °C)   SpO2: 97%       Objective   Physical Exam  Vitals and nursing note reviewed.   Constitutional:       General: He is not in acute distress.     Appearance: Normal appearance. He is well-developed. He is not ill-appearing or toxic-appearing.   Eyes:      General: Lids are normal.   Cardiovascular:      Rate and Rhythm: Normal rate and regular rhythm.      Pulses:           Dorsalis pedis pulses are 1+ on the right side and 1+ on the left side.      Heart sounds: Normal heart sounds. No murmur  heard.  Pulmonary:      Effort: Pulmonary effort is normal. No respiratory distress.      Breath sounds: Normal breath sounds. No stridor. No wheezing, rhonchi or rales.   Musculoskeletal:         General: No tenderness or deformity.      Right foot: Normal range of motion. No deformity, bunion, Charcot foot, foot drop or prominent metatarsal heads.      Left foot: Normal range of motion. No deformity, bunion, Charcot foot, foot drop or prominent metatarsal heads.   Feet:      Right foot:      Skin integrity: Skin integrity normal. No ulcer, blister, skin breakdown, erythema, warmth, callus, dry skin or fissure.      Toenail Condition: Right toenails are abnormally thick and long. ingrownFungal disease present.     Left foot:      Skin integrity: Skin integrity normal. No ulcer, blister, skin breakdown, erythema, warmth, callus, dry skin or fissure.      Toenail Condition: Left toenails are abnormally thick and long. ingrownFungal disease present.  Skin:     General: Skin is warm and dry.      Capillary Refill: Capillary refill takes less than 2 seconds.      Findings: No erythema or rash.          Neurological:      Mental Status: He is alert and oriented to person, place, and time.      Cranial Nerves: No cranial nerve deficit.   Psychiatric:         Attention and Perception: Attention normal.         Mood and Affect: Mood and affect normal.         Speech: Speech normal.         Behavior: Behavior normal. Behavior is cooperative.         Thought Content: Thought content normal.         Cognition and Memory: Cognition and memory normal.         Judgment: Judgment normal.           Assessment/Plan   Diagnoses and all orders for this visit:    1. Type 2 diabetes mellitus with hyperglycemia, with long-term current use of insulin (HCC) (Primary)  -     POC Glycosylated Hemoglobin (Hb A1C)    2. Lumbar back pain with radiculopathy affecting left lower extremity  -     oxyCODONE ER (OxyCONTIN) 20 MG 12 hr tablet; Take 1  tablet by mouth Every 12 (Twelve) Hours.  Dispense: 60 tablet; Refill: 0    3. Diabetic polyneuropathy associated with type 2 diabetes mellitus (HCC)  -     oxyCODONE ER (OxyCONTIN) 20 MG 12 hr tablet; Take 1 tablet by mouth Every 12 (Twelve) Hours.  Dispense: 60 tablet; Refill: 0    4. Chronic midline low back pain with left-sided sciatica  -     oxyCODONE ER (OxyCONTIN) 20 MG 12 hr tablet; Take 1 tablet by mouth Every 12 (Twelve) Hours.  Dispense: 60 tablet; Refill: 0    5. Rash    Other orders  -     Insulin Glargine (Lantus SoloStar) 100 UNIT/ML injection pen; Inject 10 Units under the skin into the appropriate area as directed Every Night.  Dispense: 3 pen; Refill: 1  -     Insulin Lispro, 1 Unit Dial, (HumaLOG KwikPen) 100 UNIT/ML solution pen-injector; Inject 5 Units under the skin into the appropriate area as directed 3 (Three) Times a Day Before Meals.  Dispense: 5 pen; Refill: 1  -     apixaban (ELIQUIS) 5 MG tablet tablet; Take 1 tablet by mouth Every 12 (Twelve) Hours.  Dispense: 180 tablet; Refill: 0  -     aspirin (aspirin) 81 MG EC tablet; Take 1 tablet by mouth Daily.  -     atorvastatin (LIPITOR) 80 MG tablet; Take 1 tablet by mouth Every Night.  Dispense: 90 tablet; Refill: 0  -     empagliflozin (Jardiance) 25 MG tablet tablet; Take 1 tablet by mouth Every Morning.  Dispense: 90 tablet; Refill: 0  -     Blood Glucose Monitoring Suppl (Accu-Chek Guide Me) w/Device kit; 1 kit Daily.  Dispense: 1 kit; Refill: 0    reviewed ER visit w/ pt   Pt to call DERM for f/u appt on new rash/ keep sx appt for removal of keratoacanthoma  meds all sent to [facility]  A1C = 8.0  RTC 3mos  Encouraged pt to check BS before each meal and adjust insulin

## 2022-03-03 LAB
BACTERIA SPEC AEROBE CULT: NORMAL
BACTERIA SPEC AEROBE CULT: NORMAL

## 2022-03-11 DIAGNOSIS — M54.16 LUMBAR BACK PAIN WITH RADICULOPATHY AFFECTING LEFT LOWER EXTREMITY: ICD-10-CM

## 2022-03-11 RX ORDER — OXYCODONE HYDROCHLORIDE 15 MG/1
15 TABLET ORAL EVERY 6 HOURS PRN
Qty: 120 TABLET | Refills: 0 | Status: SHIPPED | OUTPATIENT
Start: 2022-03-11 | End: 2022-04-08

## 2022-03-17 ENCOUNTER — PATIENT OUTREACH (OUTPATIENT)
Dept: CASE MANAGEMENT | Facility: OTHER | Age: 67
End: 2022-03-17

## 2022-03-17 NOTE — OUTREACH NOTE
AMBULATORY CASE MANAGEMENT NOTE  Patient Outreach  Name and Relationship of Patient/Support Person: Humble Green R - Self    UTRx4: RN-ACM outreach attempts unsuccessful to patient cell/H# (NA/no VM set up x4), no other phone numbers authorized per WANG.  HRCM closed and no additional RN-ACM outreach scheduled.     ALONZO TATUM  Ambulatory Case Management  3/17/2022, 17:55 EDT

## 2022-04-06 DIAGNOSIS — M54.16 LUMBAR BACK PAIN WITH RADICULOPATHY AFFECTING LEFT LOWER EXTREMITY: ICD-10-CM

## 2022-04-06 NOTE — TELEPHONE ENCOUNTER
Rx Refill Note  Requested Prescriptions     Pending Prescriptions Disp Refills   • oxyCODONE (ROXICODONE) 15 MG immediate release tablet [Pharmacy Med Name: oxyCODONE HCl Oral Tablet 15 MG] 120 tablet 0     Sig: TAKE 1 TABLET BY MOUTH EVERY SIX HOURS AS NEEDED FOR SEVERE PAIN      Last office visit with prescribing clinician: 3/1/2022      Next office visit with prescribing clinician: 6/2/2022     POC Glycosylated Hemoglobin (Hb A1C) (03/01/2022 13:18)  Comprehensive Metabolic Panel (02/26/2022 12:43)  Lipid Panel (08/09/2021 11:38)         Sari Robb CMA  04/06/22, 15:47 EDT     INSPECT in chart

## 2022-04-08 RX ORDER — OXYCODONE HYDROCHLORIDE 15 MG/1
TABLET ORAL
Qty: 120 TABLET | Refills: 0 | Status: SHIPPED | OUTPATIENT
Start: 2022-04-08 | End: 2022-05-03

## 2022-05-02 DIAGNOSIS — M54.42 CHRONIC MIDLINE LOW BACK PAIN WITH LEFT-SIDED SCIATICA: ICD-10-CM

## 2022-05-02 DIAGNOSIS — M54.16 LUMBAR BACK PAIN WITH RADICULOPATHY AFFECTING LEFT LOWER EXTREMITY: ICD-10-CM

## 2022-05-02 DIAGNOSIS — E11.42 DIABETIC POLYNEUROPATHY ASSOCIATED WITH TYPE 2 DIABETES MELLITUS: ICD-10-CM

## 2022-05-02 DIAGNOSIS — G89.29 CHRONIC MIDLINE LOW BACK PAIN WITH LEFT-SIDED SCIATICA: ICD-10-CM

## 2022-05-03 RX ORDER — OXYCODONE HYDROCHLORIDE 15 MG/1
TABLET ORAL
Qty: 120 TABLET | Refills: 0 | Status: SHIPPED | OUTPATIENT
Start: 2022-05-03 | End: 2022-06-08

## 2022-05-03 NOTE — TELEPHONE ENCOUNTER
Clarify if he is doing short and long acting------looks like getting enough short acting/ day that long not needed

## 2022-05-03 NOTE — TELEPHONE ENCOUNTER
Pt states he takes both long acting bid and short in between, and needs refills of both at this time.

## 2022-05-03 NOTE — TELEPHONE ENCOUNTER
Rx Refill Note  Requested Prescriptions     Pending Prescriptions Disp Refills   • oxyCODONE (ROXICODONE) 15 MG immediate release tablet [Pharmacy Med Name: oxyCODONE HCl Oral Tablet 15 MG] 120 tablet 0     Sig: TAKE 1 TABLET BY MOUTH EVERY SIX HOURS AS NEEDED FOR SEVERE PAIN   • OxyCONTIN 20 MG 12 hr tablet [Pharmacy Med Name: OxyCONTIN Oral Tablet ER 12 Hour Abuse-Deterrent 20 MG] 60 tablet 0     Sig: take 1 tablet by mouth every 12 hours      Last office visit with prescribing clinician: 3/1/2022      Next office visit with prescribing clinician: 6/2/2022     POC Glycosylated Hemoglobin (Hb A1C) (03/01/2022 13:18)  Comprehensive Metabolic Panel (02/26/2022 12:43)  Lipid Panel (08/09/2021 11:38)         Sari Robb CMA  05/03/22, 10:32 EDT     INSPECT in chart

## 2022-05-20 NOTE — TELEPHONE ENCOUNTER
We'll just have to go back to only using short acting I guess   Clofazimine Counseling:  I discussed with the patient the risks of clofazimine including but not limited to skin and eye pigmentation, liver damage, nausea/vomiting, gastrointestinal bleeding and allergy.

## 2022-06-03 DIAGNOSIS — M54.16 LUMBAR BACK PAIN WITH RADICULOPATHY AFFECTING LEFT LOWER EXTREMITY: ICD-10-CM

## 2022-06-03 DIAGNOSIS — G89.29 CHRONIC MIDLINE LOW BACK PAIN WITH LEFT-SIDED SCIATICA: ICD-10-CM

## 2022-06-03 DIAGNOSIS — E11.42 DIABETIC POLYNEUROPATHY ASSOCIATED WITH TYPE 2 DIABETES MELLITUS: ICD-10-CM

## 2022-06-03 DIAGNOSIS — M54.42 CHRONIC MIDLINE LOW BACK PAIN WITH LEFT-SIDED SCIATICA: ICD-10-CM

## 2022-06-03 RX ORDER — PREGABALIN 300 MG/1
300 CAPSULE ORAL 2 TIMES DAILY
Qty: 180 CAPSULE | Refills: 1 | Status: SHIPPED | OUTPATIENT
Start: 2022-06-03 | End: 2022-07-11 | Stop reason: SDUPTHER

## 2022-06-03 RX ORDER — OXYCODONE HCL 20 MG/1
20 TABLET, FILM COATED, EXTENDED RELEASE ORAL EVERY 12 HOURS
Qty: 60 TABLET | Refills: 0 | Status: SHIPPED | OUTPATIENT
Start: 2022-06-03 | End: 2022-07-01 | Stop reason: SDUPTHER

## 2022-06-03 NOTE — TELEPHONE ENCOUNTER
Rx Refill Note  Requested Prescriptions     Pending Prescriptions Disp Refills   • oxyCODONE (ROXICODONE) 15 MG immediate release tablet [Pharmacy Med Name: oxyCODONE HCl Oral Tablet 15 MG] 120 tablet 0     Sig: TAKE 1 TABLET BY MOUTH EVERY SIX HOURS AS NEEDED SEVERE PAIN   • Lyrica 150 MG capsule [Pharmacy Med Name: Lyrica Oral Capsule 150 MG] 180 capsule 0     Sig: TAKE 1 CAPSULE BY MOUTH TWO TIMES A DAY      Last office visit with prescribing clinician: 3/1/2022      Next office visit with prescribing clinician: Visit date not found     Comprehensive Metabolic Panel (02/26/2022 12:43)         Jackelyn Valle, RT  06/03/22, 13:47 EDT

## 2022-06-07 RX ORDER — OXYCODONE HYDROCHLORIDE 15 MG/1
TABLET ORAL
Qty: 120 TABLET | Refills: 0 | OUTPATIENT
Start: 2022-06-07

## 2022-06-08 DIAGNOSIS — M54.16 LUMBAR BACK PAIN WITH RADICULOPATHY AFFECTING LEFT LOWER EXTREMITY: ICD-10-CM

## 2022-06-08 RX ORDER — OXYCODONE HYDROCHLORIDE 15 MG/1
TABLET ORAL
Qty: 120 TABLET | Refills: 0 | Status: SHIPPED | OUTPATIENT
Start: 2022-06-08 | End: 2022-07-05 | Stop reason: SDUPTHER

## 2022-06-08 NOTE — TELEPHONE ENCOUNTER
Caller: Humble Green    Relationship: Self    Best call back number: [phone] (H)    Requested Prescriptions:   Requested Prescriptions     Pending Prescriptions Disp Refills   • oxyCODONE (ROXICODONE) 15 MG immediate release tablet [Pharmacy Med Name: oxyCODONE HCl Oral Tablet 15 MG] 120 tablet 0     Sig: TAKE 1 TABLET BY MOUTH EVERY SIX HOURS AS NEEDED FOR SEVERE PAIN        Pharmacy where request should be sent: [id number] [phone] [facility] [phone]      Additional details provided by patient: PATIENT CALLED TO REQUEST A MEDICATION REFILL ON HIS MEDICATION. PATIENT HAS AN APPOINTMENT SCHEDULED ON 07/11/22 DR. HENRIQUEZ'S FIRST AVAILABLE.  PATIENT STATES THAT HE IS COMPLETELY OUT MEDICATION.        Does the patient have less than a 3 day supply:  [x] Yes  [] No    Obey Nixon Rep   06/08/22 12:57 EDT         THANKS

## 2022-07-01 DIAGNOSIS — G89.29 CHRONIC MIDLINE LOW BACK PAIN WITH LEFT-SIDED SCIATICA: ICD-10-CM

## 2022-07-01 DIAGNOSIS — M54.42 CHRONIC MIDLINE LOW BACK PAIN WITH LEFT-SIDED SCIATICA: ICD-10-CM

## 2022-07-01 DIAGNOSIS — E11.42 DIABETIC POLYNEUROPATHY ASSOCIATED WITH TYPE 2 DIABETES MELLITUS: ICD-10-CM

## 2022-07-01 DIAGNOSIS — M54.16 LUMBAR BACK PAIN WITH RADICULOPATHY AFFECTING LEFT LOWER EXTREMITY: ICD-10-CM

## 2022-07-01 RX ORDER — OXYCODONE HCL 20 MG/1
20 TABLET, FILM COATED, EXTENDED RELEASE ORAL EVERY 12 HOURS
Qty: 60 TABLET | Refills: 0 | Status: SHIPPED | OUTPATIENT
Start: 2022-07-01 | End: 2022-08-01

## 2022-07-05 DIAGNOSIS — M54.16 LUMBAR BACK PAIN WITH RADICULOPATHY AFFECTING LEFT LOWER EXTREMITY: ICD-10-CM

## 2022-07-05 RX ORDER — OXYCODONE HYDROCHLORIDE 15 MG/1
15 TABLET ORAL EVERY 6 HOURS PRN
Qty: 120 TABLET | Refills: 0 | Status: SHIPPED | OUTPATIENT
Start: 2022-07-05 | End: 2022-08-01

## 2022-07-05 NOTE — TELEPHONE ENCOUNTER
Caller: Humble Green    Relationship: Self    Best call back number: [phone]   Requested Prescriptions:   Requested Prescriptions     Pending Prescriptions Disp Refills   • oxyCODONE (ROXICODONE) 15 MG immediate release tablet 120 tablet 0        Pharmacy where request should be sent: [id number] [address] [facility] [phone] [facility] [phone] [facility]     Additional details provided by patient: OUT OF MEDICATION, DUE 7/6/2022   Does the patient have less than a 3 day supply:  [x] Yes  [] No    Humza Erwin   07/05/22 14:03 EDT

## 2022-07-11 ENCOUNTER — OFFICE VISIT (OUTPATIENT)
Dept: FAMILY MEDICINE CLINIC | Facility: CLINIC | Age: 67
End: 2022-07-11

## 2022-07-11 VITALS
TEMPERATURE: 97.8 F | BODY MASS INDEX: 37.62 KG/M2 | WEIGHT: 248.2 LBS | HEART RATE: 77 BPM | DIASTOLIC BLOOD PRESSURE: 77 MMHG | HEIGHT: 68 IN | SYSTOLIC BLOOD PRESSURE: 124 MMHG | RESPIRATION RATE: 20 BRPM | OXYGEN SATURATION: 96 %

## 2022-07-11 DIAGNOSIS — B35.3 TINEA PEDIS OF LEFT FOOT: ICD-10-CM

## 2022-07-11 DIAGNOSIS — Z13.220 ENCOUNTER FOR SCREENING FOR LIPID DISORDER: ICD-10-CM

## 2022-07-11 DIAGNOSIS — N28.9 RENAL INSUFFICIENCY: ICD-10-CM

## 2022-07-11 DIAGNOSIS — Z79.4 TYPE 2 DIABETES MELLITUS WITH HYPERGLYCEMIA, WITH LONG-TERM CURRENT USE OF INSULIN: Primary | ICD-10-CM

## 2022-07-11 DIAGNOSIS — I10 ESSENTIAL HYPERTENSION: ICD-10-CM

## 2022-07-11 DIAGNOSIS — F32.9 REACTIVE DEPRESSION: ICD-10-CM

## 2022-07-11 DIAGNOSIS — E11.42 DIABETIC POLYNEUROPATHY ASSOCIATED WITH TYPE 2 DIABETES MELLITUS: ICD-10-CM

## 2022-07-11 DIAGNOSIS — E11.65 TYPE 2 DIABETES MELLITUS WITH HYPERGLYCEMIA, WITH LONG-TERM CURRENT USE OF INSULIN: Primary | ICD-10-CM

## 2022-07-11 DIAGNOSIS — Z12.5 SCREENING FOR MALIGNANT NEOPLASM OF PROSTATE: ICD-10-CM

## 2022-07-11 LAB
EXPIRATION DATE: ABNORMAL
HBA1C MFR BLD: 9.5 %
Lab: ABNORMAL

## 2022-07-11 PROCEDURE — 83036 HEMOGLOBIN GLYCOSYLATED A1C: CPT | Performed by: FAMILY MEDICINE

## 2022-07-11 PROCEDURE — 99214 OFFICE O/P EST MOD 30 MIN: CPT | Performed by: FAMILY MEDICINE

## 2022-07-11 PROCEDURE — 3046F HEMOGLOBIN A1C LEVEL >9.0%: CPT | Performed by: FAMILY MEDICINE

## 2022-07-11 RX ORDER — INSULIN GLARGINE 100 [IU]/ML
10 INJECTION, SOLUTION SUBCUTANEOUS NIGHTLY
Qty: 5 PEN | Refills: 1 | Status: SHIPPED | OUTPATIENT
Start: 2022-07-11

## 2022-07-11 RX ORDER — PAROXETINE HYDROCHLORIDE 20 MG/1
20 TABLET, FILM COATED ORAL NIGHTLY
Qty: 30 TABLET | Refills: 1 | Status: SHIPPED | OUTPATIENT
Start: 2022-07-11

## 2022-07-11 RX ORDER — INSULIN LISPRO 100 [IU]/ML
5 INJECTION, SOLUTION INTRAVENOUS; SUBCUTANEOUS
Qty: 5 PEN | Refills: 1 | Status: SHIPPED | OUTPATIENT
Start: 2022-07-11

## 2022-07-11 RX ORDER — INSULIN GLARGINE 100 [IU]/ML
10 INJECTION, SOLUTION SUBCUTANEOUS NIGHTLY
Qty: 3 PEN | Refills: 1 | Status: CANCELLED | OUTPATIENT
Start: 2022-07-11

## 2022-07-11 RX ORDER — INSULIN LISPRO 100 [IU]/ML
5 INJECTION, SOLUTION INTRAVENOUS; SUBCUTANEOUS
Qty: 5 PEN | Refills: 1 | Status: CANCELLED | OUTPATIENT
Start: 2022-07-11

## 2022-07-11 RX ORDER — PREGABALIN 300 MG/1
300 CAPSULE ORAL 2 TIMES DAILY
Qty: 180 CAPSULE | Refills: 1 | Status: SHIPPED | OUTPATIENT
Start: 2022-07-11

## 2022-07-11 RX ORDER — PROCHLORPERAZINE 25 MG/1
1 SUPPOSITORY RECTAL DAILY
Qty: 1 EACH | Refills: 3 | Status: SHIPPED | OUTPATIENT
Start: 2022-07-11

## 2022-07-11 RX ORDER — PROCHLORPERAZINE 25 MG/1
1 SUPPOSITORY RECTAL DAILY
Qty: 1 EACH | Refills: 1 | Status: SHIPPED | OUTPATIENT
Start: 2022-07-11

## 2022-07-11 RX ORDER — PROCHLORPERAZINE 25 MG/1
SUPPOSITORY RECTAL
Qty: 3 EACH | Refills: 3 | Status: SHIPPED | OUTPATIENT
Start: 2022-07-11

## 2022-07-11 RX ORDER — TERBINAFINE HYDROCHLORIDE 250 MG/1
250 TABLET ORAL DAILY
Qty: 14 TABLET | Refills: 0 | Status: SHIPPED | OUTPATIENT
Start: 2022-07-11

## 2022-07-11 RX ORDER — PREGABALIN 300 MG/1
300 CAPSULE ORAL 2 TIMES DAILY
Qty: 180 CAPSULE | Refills: 1 | Status: CANCELLED | OUTPATIENT
Start: 2022-07-11

## 2022-07-11 NOTE — PROGRESS NOTES
Subjective   Humble Green is a 66 y.o. male.     Chief Complaint   Patient presents with   • Diabetes     Follow up     • depressed over eye issue         Current Outpatient Medications:   •  apixaban (ELIQUIS) 5 MG tablet tablet, Take 1 tablet by mouth Every 12 (Twelve) Hours., Disp: 180 tablet, Rfl: 0  •  aspirin (aspirin) 81 MG EC tablet, Take 1 tablet by mouth Daily., Disp: , Rfl:   •  atorvastatin (LIPITOR) 80 MG tablet, Take 1 tablet by mouth Every Night., Disp: 90 tablet, Rfl: 0  •  Blood Glucose Monitoring Suppl (Accu-Chek Guide Me) w/Device kit, 1 kit Daily., Disp: 1 kit, Rfl: 0  •  empagliflozin (Jardiance) 25 MG tablet tablet, Take 1 tablet by mouth Every Morning., Disp: 90 tablet, Rfl: 0  •  Insulin Glargine (Lantus SoloStar) 100 UNIT/ML injection pen, Inject 10 Units under the skin into the appropriate area as directed Every Night., Disp: 5 pen, Rfl: 1  •  Insulin Lispro, 1 Unit Dial, (HumaLOG KwikPen) 100 UNIT/ML solution pen-injector, Inject 5 Units under the skin into the appropriate area as directed 3 (Three) Times a Day Before Meals., Disp: 5 pen, Rfl: 1  •  Menthol, Topical Analgesic, (Biofreeze) 4 % gel, Apply  topically Every 6 (Six) Hours As Needed., Disp: , Rfl:   •  oxyCODONE (ROXICODONE) 15 MG immediate release tablet, Take 1 tablet by mouth Every 6 (Six) Hours As Needed for Moderate Pain ., Disp: 120 tablet, Rfl: 0  •  oxyCODONE ER (OxyCONTIN) 20 MG 12 hr tablet, Take 1 tablet by mouth Every 12 (Twelve) Hours., Disp: 60 tablet, Rfl: 0  •  polyethylene glycol (MIRALAX) 17 GM/SCOOP powder, Take 17 g by mouth Daily., Disp: 507 g, Rfl: 3  •  pregabalin (LYRICA) 300 MG capsule, Take 1 capsule by mouth 2 (Two) Times a Day., Disp: 180 capsule, Rfl: 1  •  Continuous Blood Gluc  (Dexcom G6 ) device, 1 kit Daily., Disp: 1 each, Rfl: 1  •  Continuous Blood Gluc Sensor (Dexcom G6 Sensor), Every 10 (Ten) Days., Disp: 3 each, Rfl: 3  •  Continuous Blood Gluc Transmit (Dexcom G6  Transmitter) misc, 1 kit Daily., Disp: 1 each, Rfl: 3  •  metroNIDAZOLE (METROGEL) 1 % gel, Apply to face rash qday, Disp: 55 g, Rfl: 0  •  PARoxetine (Paxil) 20 MG tablet, Take 1 tablet by mouth Every Night., Disp: 30 tablet, Rfl: 1  •  terbinafine (LamISIL) 250 MG tablet, Take 1 tablet by mouth Daily., Disp: 14 tablet, Rfl: 0    Past Medical History:   Diagnosis Date   • Arthritis    • BPH    • CAD    • Chronic back pain    • 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 07/14/2020   • DMII    • Low back pain    • Low vision of right eye with normal vision in contralateral eye    • Memory problem    • Neuropathy    • Obesity    • PFO (patent foramen ovale) 11/29/2019   • PSA    • Snoring        Past Surgical History:   Procedure Laterality Date   • ARM TENDON REPAIR Left    • CARDIAC CATHETERIZATION N/A 11/16/2020   • CARDIAC CATHETERIZATION N/A 11/16/2020   • CHOLECYSTECTOMY     • COLONOSCOPY      2018 = TA, rech 2023   • EYE SURGERY      Rt Eye Sx after trauma @ 2y/o   • JOINT REPLACEMENT      Left TKR   • ROTATOR CUFF REPAIR Bilateral    • UMBILICAL HERNIA REPAIR         Family History   Problem Relation Age of Onset   • Diabetes Mother    • Heart disease Mother    • Arthritis Mother    • Obesity Mother    • Hypertension Mother    • Migraines Mother    • Osteoporosis Mother    • Snoring Mother    • Diabetes Father    • Heart disease Father    • Arthritis Father    • Hypertension Father    • Stroke Father    • Heart attack Father    • Hyperlipidemia Father    • Snoring Father    • Diabetes Brother    • Heart disease Brother    • Arthritis Sister    • Anemia Sister        Social History     Socioeconomic History   • Marital status: Single   Tobacco Use   • Smoking status: Never Smoker   • Smokeless tobacco: Never Used   Vaping Use   • Vaping Use: Never used   Substance and Sexual Activity   • Alcohol use: Not Currently     Alcohol/week: 1.0 standard drink     Types: 1 Cans of beer per week   • Drug  use: Never   • Sexual activity: Defer       67 y/o C male here for DMII and recent eye issues and depression    Pt getting shots in his eyes x 3mos from optho but not sure why (cant remember)......Pt using a magnifier to see his BS's but not taking his BS regularly------pt states he hasnt been able to work due to his vision    Pt states when he does check his BS, his am/pm BS= 90's-120's         The following portions of the patient's history were reviewed and updated as appropriate: allergies, current medications, past family history, past medical history, past social history, past surgical history and problem list.    Review of Systems   Constitutional: Positive for activity change. Negative for appetite change, unexpected weight gain and unexpected weight loss.   Eyes: Positive for blurred vision and visual disturbance. Negative for double vision and pain.   Cardiovascular: Negative for leg swelling.   Gastrointestinal: Negative for abdominal distention, abdominal pain, constipation, diarrhea, nausea and vomiting.   Endocrine: Negative for polydipsia, polyphagia and polyuria.   Genitourinary: Negative for frequency.   Musculoskeletal: Positive for arthralgias, back pain and gait problem.   Skin: Negative for color change, dry skin, rash, skin lesions and wound.   Neurological: Negative for weakness and numbness.   Psychiatric/Behavioral: Positive for depressed mood and stress.       Vitals:    07/11/22 0942   BP: 124/77   Pulse: 77   Resp: 20   Temp: 97.8 °F (36.6 °C)   SpO2: 96%       Objective   Physical Exam  Vitals and nursing note reviewed.   Constitutional:       General: He is not in acute distress.     Appearance: Normal appearance. He is well-developed. He is not ill-appearing or toxic-appearing.   Cardiovascular:      Rate and Rhythm: Normal rate and regular rhythm.      Pulses:           Dorsalis pedis pulses are 1+ on the right side and 1+ on the left side.      Heart sounds: Normal heart sounds. No  murmur heard.  Pulmonary:      Effort: Pulmonary effort is normal. No respiratory distress.      Breath sounds: Normal breath sounds. No stridor. No wheezing, rhonchi or rales.   Musculoskeletal:         General: No tenderness or deformity.      Right foot: Normal range of motion. No deformity, bunion, Charcot foot, foot drop or prominent metatarsal heads.      Left foot: Normal range of motion. No deformity, bunion, Charcot foot, foot drop or prominent metatarsal heads.   Feet:      Right foot:      Skin integrity: Skin integrity normal. No ulcer, blister, skin breakdown, erythema, warmth, callus, dry skin or fissure.      Toenail Condition: Right toenails are abnormally thick and long. ingrownFungal disease present.     Left foot:      Skin integrity: Erythema and dry skin present. No ulcer, blister, skin breakdown, warmth, callus or fissure.      Toenail Condition: Left toenails are abnormally thick and long. ingrownFungal disease present.  Skin:     General: Skin is warm and dry.      Capillary Refill: Capillary refill takes less than 2 seconds.      Findings: No erythema or rash.   Neurological:      Mental Status: He is alert and oriented to person, place, and time.      Cranial Nerves: No cranial nerve deficit.   Psychiatric:         Attention and Perception: Attention normal.         Mood and Affect: Mood and affect normal.         Speech: Speech normal.         Behavior: Behavior normal. Behavior is cooperative.         Thought Content: Thought content normal.         Cognition and Memory: Cognition and memory normal.         Judgment: Judgment normal.           Assessment & Plan   Diagnoses and all orders for this visit:    1. Type 2 diabetes mellitus with hyperglycemia, with long-term current use of insulin (HCC) (Primary)  -     MicroAlbumin, Urine, Random - Urine, Clean Catch; Future  -     Hemoglobin A1c; Future    2. Reactive depression    3. Tinea pedis of left foot    4. Diabetic polyneuropathy  associated with type 2 diabetes mellitus (HCC)  -     POC Glycosylated Hemoglobin (Hb A1C)  -     pregabalin (LYRICA) 300 MG capsule; Take 1 capsule by mouth 2 (Two) Times a Day.  Dispense: 180 capsule; Refill: 1    5. Renal insufficiency  -     Comprehensive Metabolic Panel; Future    6. Essential hypertension    7. Encounter for screening for lipid disorder  -     Lipid Panel; Future    8. Screening for malignant neoplasm of prostate  -     PSA Screen; Future    Other orders  -     Insulin Lispro, 1 Unit Dial, (HumaLOG KwikPen) 100 UNIT/ML solution pen-injector; Inject 5 Units under the skin into the appropriate area as directed 3 (Three) Times a Day Before Meals.  Dispense: 5 pen; Refill: 1  -     Insulin Glargine (Lantus SoloStar) 100 UNIT/ML injection pen; Inject 10 Units under the skin into the appropriate area as directed Every Night.  Dispense: 5 pen; Refill: 1  -     Continuous Blood Gluc Transmit (Dexcom G6 Transmitter) misc; 1 kit Daily.  Dispense: 1 each; Refill: 3  -     Continuous Blood Gluc Sensor (Dexcom G6 Sensor); Every 10 (Ten) Days.  Dispense: 3 each; Refill: 3  -     Continuous Blood Gluc  (Dexcom G6 ) device; 1 kit Daily.  Dispense: 1 each; Refill: 1  -     terbinafine (LamISIL) 250 MG tablet; Take 1 tablet by mouth Daily.  Dispense: 14 tablet; Refill: 0  -     PARoxetine (Paxil) 20 MG tablet; Take 1 tablet by mouth Every Night.  Dispense: 30 tablet; Refill: 1    A1C= 9.5  Fasting labs in 3mos  Rx----Dexcom6 trial to assist in BS control  Start Paxil 20mg qday       Rx---Lamisil x 2 weeks

## 2022-07-15 ENCOUNTER — TELEPHONE (OUTPATIENT)
Dept: FAMILY MEDICINE CLINIC | Facility: CLINIC | Age: 67
End: 2022-07-15

## 2022-07-15 NOTE — TELEPHONE ENCOUNTER
HUB to read    PA was sent for HumaLOG KwikPen 100UNIT/ML pen-injector    Waiting on the outcome from insurance

## 2022-07-15 NOTE — TELEPHONE ENCOUNTER
Hub to read    PA was sent for Dexcom G6 Transmitter    Waiting for the outcome from insurance

## 2022-07-25 NOTE — TELEPHONE ENCOUNTER
HUB to read    PA was approved for HumaLOG KwikPen 100UNIT/ML pen-injectors    PA approval was faxed to [facility] in [place] Case: [id number], Status: Approved, Coverage Starts on: 1/1/2022 12:00:00 AM, Coverage Ends on: 12/31/2022 12:00:00 AM. Questions? Contact [phone].

## 2022-07-25 NOTE — TELEPHONE ENCOUNTER
HUB to read    PA was denied for Dexcom G6 Transmitter    No notification sent/No se envi notificacin

## 2022-08-01 DIAGNOSIS — G89.29 CHRONIC MIDLINE LOW BACK PAIN WITH LEFT-SIDED SCIATICA: ICD-10-CM

## 2022-08-01 DIAGNOSIS — E11.42 DIABETIC POLYNEUROPATHY ASSOCIATED WITH TYPE 2 DIABETES MELLITUS: ICD-10-CM

## 2022-08-01 DIAGNOSIS — M54.16 LUMBAR BACK PAIN WITH RADICULOPATHY AFFECTING LEFT LOWER EXTREMITY: ICD-10-CM

## 2022-08-01 DIAGNOSIS — M54.42 CHRONIC MIDLINE LOW BACK PAIN WITH LEFT-SIDED SCIATICA: ICD-10-CM

## 2022-08-01 RX ORDER — OXYCODONE HYDROCHLORIDE 15 MG/1
TABLET ORAL
Qty: 120 TABLET | Refills: 0 | Status: SHIPPED | OUTPATIENT
Start: 2022-08-01 | End: 2022-09-02

## 2022-08-01 NOTE — TELEPHONE ENCOUNTER
INSPECT RAN    Rx Refill Note  Requested Prescriptions     Pending Prescriptions Disp Refills   • oxyCODONE (ROXICODONE) 15 MG immediate release tablet [Pharmacy Med Name: oxyCODONE HCl Oral Tablet 15 MG] 120 tablet 0     Sig: TAKE 1 TABLET BY MOUTH EVERY SIX HOURS AS NEEDED FOR moderate PAIN   • OxyCONTIN 20 MG 12 hr tablet [Pharmacy Med Name: OxyCONTIN Oral Tablet ER 12 Hour Abuse-Deterrent 20 MG] 60 tablet 0     Sig: TAKE 1 TABLET BY MOUTH EVERY 12 HOURS      Last office visit with prescribing clinician: 7/11/2022      Next office visit with prescribing clinician: 8/11/2022     Comprehensive Metabolic Panel (02/26/2022 12:43)         Jackelyn Valle, RT  08/01/22, 11:51 EDT

## 2022-09-02 DIAGNOSIS — G89.29 CHRONIC MIDLINE LOW BACK PAIN WITH LEFT-SIDED SCIATICA: ICD-10-CM

## 2022-09-02 DIAGNOSIS — E11.42 DIABETIC POLYNEUROPATHY ASSOCIATED WITH TYPE 2 DIABETES MELLITUS: ICD-10-CM

## 2022-09-02 DIAGNOSIS — M54.42 CHRONIC MIDLINE LOW BACK PAIN WITH LEFT-SIDED SCIATICA: ICD-10-CM

## 2022-09-02 DIAGNOSIS — M54.16 LUMBAR BACK PAIN WITH RADICULOPATHY AFFECTING LEFT LOWER EXTREMITY: ICD-10-CM

## 2022-09-02 RX ORDER — OXYCODONE HYDROCHLORIDE 15 MG/1
TABLET ORAL
Qty: 120 TABLET | Refills: 0 | Status: SHIPPED | OUTPATIENT
Start: 2022-09-02

## 2022-09-02 NOTE — TELEPHONE ENCOUNTER
Rx Refill Note  Requested Prescriptions     Pending Prescriptions Disp Refills   • oxyCODONE (ROXICODONE) 15 MG immediate release tablet [Pharmacy Med Name: oxyCODONE HCl Oral Tablet 15 MG] 120 tablet 0     Sig: TAKE 1 TABLET BY MOUTH EVERY SIX HOURS AS NEEDED FOR moderate PAIN   • OxyCONTIN 20 MG 12 hr tablet [Pharmacy Med Name: OxyCONTIN Oral Tablet ER 12 Hour Abuse-Deterrent 20 MG] 60 tablet 0     Sig: TAKE 1 TABLET BY MOUTH EVERY TWELVE HOURS      Last office visit with prescribing clinician: 7/11/2022      Next office visit with prescribing clinician: Visit date not found     POC Glycosylated Hemoglobin (Hb A1C) (07/11/2022 09:55)  Comprehensive Metabolic Panel (02/26/2022 12:43)  CBC & Differential (02/26/2022 12:43)         Sari Robb CMA  09/02/22, 09:51 EDT

## 2022-09-06 NOTE — TELEPHONE ENCOUNTER
HUB TO READ    Appointment due in OCT per Dr. Duckworth.    Attempted to call patient to schedule but VM has not been set up.  Unable to leave msg.

## 2025-03-14 NOTE — PROGRESS NOTES
"Case Management Readmission Assessment Note       Case Management Readmission Assessment (all recorded)      Readmission Interview     Row Name 12/08/20 1451             Readmission Indications    Is this hospitalization related to the prior hospital diagnosis?  No      What was the reason you were admitted?  On 11/12 patient was admitted with precordial pain, intractable migrain and abnormal stress test. He dc home. On 14/4 he was admitted with left side back pain with radiation to iliac crest and gluteal area. MRI shows multiple shiva of diffuse degenerative disease and stenosis. He is getting decaron and a chnage in pain meds.      Row Name 12/08/20 1451             Recommendation for rehospitalization    Did you speak with your physician prior to coming to the hospital  No      Who recommended you return to the hospital?  -- self-pain      Did you seek care elsewhere prior to coming to the hospital?  No      Row Name 12/08/20 [facility]             Follow up appointment    Do you have a PCP?  Yes Gloria Fontanez      Did you have an appointment with PCP/specialist after hospitalization within 7 days?  No      Did you keep your appointment?  -- AVS instructed to follow as needed      Row Name 12/08/20 145             Medications    Did you have newly prescribed medications at discharge?  Yes      Did you understand the reasons for your medications at discharge and how to take them?  Yes      Did you understand the side effects of your medications?  Yes      Are you taking all of you prescribed medications?  Yes      [facility] Name 12/08/20 1451             Discharge Instructions    Did you understand your discharge instructions?  Yes      Did your family/caregiver hear your instructions?  Yes      Were you told to eat a special diet?  Yes per avs consistant carb diet      Did you adhere to the diet?  No \" I dont really count the carbs\"      Were you given a number of someone to call if you had questions or concerns?  No      " Row Name 12/08/20 1451             Index discharge location/services    Where did you go upon discharge?  Home      Row Name 12/08/20 1451             Discharge Readiness    On a scale of 1-5 (5 being well prepared), how ready were you for discharge  3      Recommendation based on interview  Education on diagnosis/self management;Goals of care discussion/advanced care planning         Phone communication only - no physical contact with patient or family.  Brianne Rose RN  Complex Case Manager  [facility] Care Coordination  [phone]  [phone]-office  [phone]-fax  [email or web address]   Statement Selected

## (undated) DEVICE — PINNACLE INTRODUCER SHEATH: Brand: PINNACLE

## (undated) DEVICE — GW DIAG EMERALD HEPCOAT MOVE JTIP STD .035 3MM 150CM

## (undated) DEVICE — CATH DIAG IMPULSE PIG .056 6F 110CM

## (undated) DEVICE — CATH DIAG IMPULSE FL4 6F 100CM

## (undated) DEVICE — PK TRY HEART CATH 50

## (undated) DEVICE — RADIFOCUS OBTURATOR: Brand: RADIFOCUS

## (undated) DEVICE — SKIN PREP TRAY W/CHG: Brand: MEDLINE INDUSTRIES, INC.

## (undated) DEVICE — CATH DIAG IMPULSE FR4 6F 100CM